# Patient Record
Sex: FEMALE | Race: WHITE | Employment: OTHER | URBAN - METROPOLITAN AREA
[De-identification: names, ages, dates, MRNs, and addresses within clinical notes are randomized per-mention and may not be internally consistent; named-entity substitution may affect disease eponyms.]

---

## 2017-01-12 ENCOUNTER — OFFICE VISIT (OUTPATIENT)
Dept: CARDIOLOGY CLINIC | Age: 67
End: 2017-01-12

## 2017-01-12 DIAGNOSIS — Z95.810 PRESENCE OF AUTOMATIC CARDIOVERTER/DEFIBRILLATOR (AICD): Primary | ICD-10-CM

## 2017-01-25 ENCOUNTER — OFFICE VISIT (OUTPATIENT)
Dept: CARDIOLOGY CLINIC | Age: 67
End: 2017-01-25

## 2017-01-25 VITALS
RESPIRATION RATE: 18 BRPM | HEIGHT: 65 IN | SYSTOLIC BLOOD PRESSURE: 140 MMHG | DIASTOLIC BLOOD PRESSURE: 94 MMHG | BODY MASS INDEX: 41.72 KG/M2 | HEART RATE: 77 BPM | WEIGHT: 250.4 LBS | OXYGEN SATURATION: 95 %

## 2017-01-25 DIAGNOSIS — I50.22 CHRONIC SYSTOLIC CONGESTIVE HEART FAILURE (HCC): Primary | ICD-10-CM

## 2017-01-25 DIAGNOSIS — I25.10 CORONARY ARTERY DISEASE INVOLVING NATIVE HEART WITHOUT ANGINA PECTORIS, UNSPECIFIED VESSEL OR LESION TYPE: ICD-10-CM

## 2017-01-25 DIAGNOSIS — E87.6 HYPOKALEMIA: ICD-10-CM

## 2017-01-25 DIAGNOSIS — I34.0 MITRAL VALVE INSUFFICIENCY, UNSPECIFIED ETIOLOGY: ICD-10-CM

## 2017-01-25 DIAGNOSIS — I10 ESSENTIAL HYPERTENSION: ICD-10-CM

## 2017-01-25 DIAGNOSIS — I34.0 NON-RHEUMATIC MITRAL REGURGITATION: ICD-10-CM

## 2017-01-25 DIAGNOSIS — E78.5 HYPERLIPIDEMIA, UNSPECIFIED HYPERLIPIDEMIA TYPE: ICD-10-CM

## 2017-01-25 NOTE — PATIENT INSTRUCTIONS
Decrease your Toprol XL dose to 12.5mg (1/2 of 25mg tablet) once daily  Decrease your Entresto dose to 24/26 one tablet twice daily  Please check your blood pressure twice daily (at least one hour after your morning blood pressure medications.)  Keep a written record of your blood pressure readings and call the office in 2 weeks with your readings.   Please have labs drawn prior to your appointment with Dr. Patel Ann in March 2017

## 2017-01-25 NOTE — PROGRESS NOTES
[facility]  [phone]    HPI: Cintia Lane, a 61 y.o. who presents for follow up regarding her CAD and CHF. She is feeling well, losing weight, has lost 9 lbs since her last visit  Having some hypotension and dizziness at home, BP ok today but several of her readings are low at home (SBP 90mmHg) accompanied with dizziness  She admits to some confusion about her medications - currently taking Toprol XL 12.5mg BID and Entrestro 24/26 1/2 tab BID  No falls or syncope but more dizziness than usual  No increase in dyspnea, denies PND  No chest pain or palpitations  Last revision for AICD in November 2016 with Dr. Birt Saint, doing well now  No LE edema, taking Bumex 2 BID, only takes Metolazone PRN (maybe once every other week)  She is exercising 3 days/week, walking, riding stationary bike, doing ellipitcal    Sleeping better on 10mg ambien, will not [facility] for now    Assessment/Plan:  1. Chronic systolic heart failure - LVEF 35% by TTE in 9/16, s/p AICD placement with Dr. Birt Saint and subsequent lead revisions and repeat procedures due to non-healing midsternal incision  -due to dizziness and hypotension will reduce Entresto to 24/26 BID and Toprol XL 12.5mg to once daily, will reassess at follow up visit in 2 months  -continue Spironolactone, Bumex 2mg BID, Metolazone PRN   -will check BMP and magnesium level prior to her next visit   -c/o hair loss with Coreg and Toprol XL but willing to continue Toprol XL for now    2. CAD - hx of MI x 2 and multiple stents, she believes she may have 6 or 7 stents, recent cardiac cath did not show progression of CAD, continue ASA and beta blocker, unable to tolerate pravastatin, simvastatin, atorvastatin, see lipid therapy plan below    -reports having an MI in 11/2011 and received PCI to RCA at that time, previous MI in 2006 or 2007  3. Mitral regurgitation - mod MR by TTE in 9/16  4.  Asthma - x 15 - 16 years, has not seen pulmonologist in years, did not need her Albuterol PRN until Coreg dose increased in 2015, now using it 2-3 times/week  5. HTN - continue current regimen  6. Dyslipidemia -  in 2/16, zocor caused muscle spasms and cramps, lipitor caused pain shooting all over her body, could tolerate Pravastatin 40mg daily due to leg cramps but LDL 97 on pravastatin 40mg daily, tried Livalo after her last visit but could not tolerate it due to myalgias, now on just [employer]  -will discuss injectable lipid medication at her next visit   7. DM Type 2 - resolved with gastric bypass, not currently on any oral agents, last Hgb A1C 5.8%  8. Hypokalemia - on Spironolactone 25mg daily and KCL 40meq daily, will check BMP prior to her next visit   9. Hypomagnesemia - takes OTC magnesium 250mg daily, will check Mg level prior to her next visit  10. Morbid obesity - Body mass index is 41.67 kg/(m^2). ). weighed 416 lbs at her heaviest weight, s/p gastric bypass in 2007 with revision a few years later, working on weight loss and exercise, weight down 9 lbs today  11. PUD - had EGD and cauterized bleeding ulcer, not on PPI anymore  12. Vitamin D deficiency - on supplementation, Vitamin D level 34.2  13. Anxiety - on Prozac, could not sleep on Clonazepam, able to sleep better on ambien 10mg at bedtime      TTE 9/16 - LVEF 35%, moderate hypokinesis of the basal-mid inferolateral wall(s), grd 1 dd, dilated LA, mod MR, mild TR  8/26/16 - RV lead revision by Dr. Brittney Cope  8/24/16 - single chamber AICD placed by Dr. Brittney Cope ([address] [place] [address], serial # [id number], model # [id number].  The ventricular lead: model # [id number] , serial # [id number])  MUGA 6/16 - LVEF 27%  Holter 6/16 - no arrhythmias  Echo 5/16 - LV mildly dilated, LVEF 40%, moderate diffuse hypokinesis with regional variations, severe hypokinesis of the basal-mid inferior wall(s), grd 1 dd, mod dilated LA, atrial septum bows from left to right, consistent with increased left atrial pressure, mildly dilated RA, mild to near moderate MR    OLIVER  - normal  Nuc 5/15 EF 31% large anterolateral infarct with periinfarct reversibility, 13% LV reversible(32% infarct at rest, 45% at stress)    Echo 2015 - LVEF 30-35%, grd 1 dd, mod LAE, mild to mod MR  Nuc Stress  - small reversibility in anteroapical wall, LVEF 31%  Cardiac Cath 3/13 - patent stents in LAD, LCx and RCA, LVEF 30%, severe inferior HK, LCx relatively small vessel with patent stent in proximal LCx, RCA is large and dominant with patent stents in proximal and mid RCA, distal RCA free of disease, LAD normal and large vessel which coursed around apex  Echo 2011 - LVEF 30%, mild MR  Cardiac Cath 2011 - s/p PCI with AMRIT to 100% mid RCA, also had 40% mid LAD lesion, 50% diagonal 1 lesion, 100% distal LCx lesion, 60% OM1 lesion, 100% proximal Ramus lesion      Soc Hx: quit tobacco use x 13 years ago, used to smoke 1ppd, no etoh use, no drug use, lives with , son, daughter in law, grandson, retired/on disability  Fam Hx: father in his 62s when he had a MI, had 3 vessel CABG in his 62s, passed from fall but had cancer too, mother lived to age 80 and  from Alzheimer's, brother had MI in his 62s, sister had aortic repair for aneurysm in her 76s    She  has a past medical history of Asthma; Cardiomyopathy ([facility] Coronary artery disease (); Depression with anxiety; DVT (deep venous thrombosis) ([facility] (2010); Family history of early CAD; GERD (gastroesophageal reflux disease); H/O gastric bypass (); Hepatitis C antibody test positive; HTN (hypertension) (2010); Hyperlipidemia (2010); Joint pain (2010); MI (myocardial infarction) ([facility] (2010); Mitral valve regurgitation; Morbid obesity ([facility] (2010); Myocardial infarction [facility]) ( and ); PUD (peptic ulcer disease); and Urinary incontinence, stress (2010). She also has no past medical history of Diabetes ([facility]     Cardiovascular ROS: positive for dyspnea on exertion   Respiratory ROS: no cough, wheezing  Neurological ROS: no TIA or stroke symptoms  All other systems negative except as above. PE  Vitals:    01/25/17 1306   BP: (!) 140/94   Pulse: 77   Resp: 18   SpO2: 95%   Weight: 250 lb 6.4 oz (113.6 kg)   Height: 5' 5\" (1.651 m)    Body mass index is 41.67 kg/(m^2).   General appearance - alert, well appearing, and in no distress  Mental status - affect appropriate to mood  Eyes - sclera anicteric, moist mucous membranes  Neck - supple  Lymphatics - not assessed  Chest - clear to auscultation, no wheezes, rales or rhonchi  Heart - normal rate, regular rhythm, normal S1, S2, 2/6 MYKE   Abdomen - soft, nontender, nondistended, obese  Back exam - full range of motion, no tenderness  Neurological - cranial nerves II through XII grossly intact, no focal deficit  Musculoskeletal - no muscular tenderness noted, normal strength  Extremities - diminished peripheral pulses, no LE edema  Skin - normal coloration  no rashes    Recent Labs:  Lab Results   Component Value Date/Time    Cholesterol, total 177 08/02/2016 12:51 PM    HDL Cholesterol 63 08/02/2016 12:51 PM    LDL, calculated 97 08/02/2016 12:51 PM    Triglyceride 87 08/02/2016 12:51 PM     Lab Results   Component Value Date/Time    Creatinine 0.89 11/23/2016 10:59 AM     Lab Results   Component Value Date/Time    BUN 22 11/23/2016 10:59 AM    BUN (POC) 24 11/26/2011 09:50 PM     Lab Results   Component Value Date/Time    Potassium 3.8 11/23/2016 10:59 AM     Lab Results   Component Value Date/Time    Hemoglobin A1c 5.8 02/09/2016 11:44 AM    Hemoglobin A1c, External 5.5 12/12/2014     Lab Results   Component Value Date/Time    HGB 14.0 11/23/2016 10:59 AM     Lab Results   Component Value Date/Time    PLATELET 829 29/76/4942 10:59 AM       Reviewed:  Past Medical History   Diagnosis Date    Asthma     Cardiomy[facility] ([facility]     Coronary artery disease 2008     s/p RCA stent (AMRIT) on 11/26/11    Depression with anxiety      2011    DVT (deep venous thrombosis) ([facility] 7/27/2010    Family history of early CAD     GERD (gastroesophageal reflux disease)     H/O gastric bypass 2006     Revision in 2009    Hepatitis C antibody test positive      Does not have chronic hep C (labs 10/6/15: neg HCV RNA)     HTN (hypertension) 7/27/2010    Hyperlipidemia 07/27/2010    Joint pain 7/27/2010    MI (myocardial infarction) ([facility] 7/27/2010    Mitral valve regurgitation      Mild to moderate    Morbid obesity ([facility] 7/27/2010    Myocardial infarction [facility]) 2006 and 2011     Dr. Martine Georges    PUD (peptic ulcer disease)      gi bleed 2008; ulcer n gastric bypass pouch    Urinary incontinence, stress 7/27/2010     History   Smoking Status    Former Smoker    Start date: 1/1/1970    Quit date: 5/17/2004   Smokeless Tobacco    Never Used     History   Alcohol Use No     Allergies   Allergen Reactions    Amoxicillin Anaphylaxis    Amoxicillin Anaphylaxis    Lipitor [Atorvastatin] Other (comments)     Severe muscle pain and spasms    Zocor [Simvastatin] Other (comments)     Cramps, muscle spasms    Livalo [Pitavastatin] Myalgia    Pravastatin Other (comments)     Leg cramps       Current Outpatient Prescriptions   Medication Sig    FLUoxetine (PROZAC) 20 mg tablet TAKE 2 TABLETS EVERY MORNING AND 1 AT BEDTIME FOR ANXIETY WITH DEPRESSION    metoprolol succinate (TOPROL-XL) 25 mg XL tablet Take 0.5 Tabs by mouth daily. (Patient taking differently: Take 12.5 mg by mouth two (2) times a day.)    LACTOBACILLUS ACIDOPHILUS (PROBIOTIC PO) Take  by mouth.  spironolactone (ALDACTONE) 25 mg tablet Take 1 Tab by mouth daily.  potassium chloride (KLOR-CON M20) 20 mEq tablet TAKE TWO TABLETS BY MOUTH DAILY    zolpidem (AMBIEN) 10 mg tablet Take 1 Tab by mouth nightly as needed for Sleep.  Max Daily Amount: 10 mg.    sacubitril-valsartan (ENTRESTO) 49 mg/51 mg tablet Take 1 Tab by mouth two (2) times a day. (Patient taking differently: Take 0.5 Tabs by mouth two (2) times a day.)    albuterol (PROVENTIL HFA, VENTOLIN HFA, PROAIR HFA) 90 mcg/actuation inhaler Take 2 Puffs by inhalation every four (4) hours as needed for Wheezing or Shortness of Breath.  aspirin delayed-release 81 mg tablet Take  by mouth daily.  vitamin A 8,000 unit capsule Take 8,000 Units by mouth daily.  biotin 10,000 mcg cap Take  by mouth daily.  OTHER Complete multi formula, bariatric advantage    magnesium 250 mg tab Take 1 Tab by mouth daily.  bumetanide (BUMEX) 2 mg tablet Take 2 mg by mouth two (2) times a day. Patient takes Bumex in relation to what her B/P is. Indications: EDEMA    ferrous sulfate (IRON, FERROUS SULFATE,) 325 mg (65 mg Iron) tablet Take  by mouth daily (before breakfast).  cholecalciferol, vitamin d3, (VITAMIN D) 1,000 unit tablet Take 10,000 Units by mouth daily. 10,000 units \"dry vitamin e \"    CALCIUM PO Take 1 Tab by mouth daily. No current facility-administered medications for this visit.         Eli Salazar NP  [facility] [address] [facility]  [facility]  [phone]

## 2017-01-25 NOTE — MR AVS SNAPSHOT
Visit Information Date & Time Provider Department Dept. Phone Encounter #  
 1/25/2017  1:00 PM Daysi Castro, [address] [phone] [id number] Your Appointments 2/23/2017 10:20 AM  
ESTABLISHED PATIENT with Clinton De Jesus MD  
[facility] ([address]) Appt Note: bsc sicd, chron visit, new Lat, see Carrion $35CP valadez 10/6/16; bsc sicd, chron visit, new Lat, see Carrion $35CP valadez 10/6/16 pt r/s from 1/12  
 [address] [facility] [address]  
[address] [address]  
  
    
 3/16/2017  9:40 AM  
ESTABLISHED PATIENT with Carrol Whitmore MD  
[facility] ([address]) Appt Note: follow up  
 [address] [place]  
[address] [address]  
  
    
 4/7/2017  1:00 PM  
ROUTINE CARE with Krystal Arias MD  
[facility] [address]) Appt Note: 4mth f/u;  
 [address] [facility] [address] [phone]  
  
   
 8 [facility] [address] [place] [address] 4/17/2017  9:30 AM  
REMOTE OFFICE VISIT with Alejandrina Montaño [facility] ([facility] SCHEDULING) Appt Note: LAT sub Q /rc  b 1-12-17   (added 1-24-17)  remote came, no schedule [address] [facility] 7 [address]  
[address] [address]  
  
    
 7/19/2017  8:15 AM  
REMOTE OFFICE VISIT with Alejandrina Montaño [facility] ([facility] SCHEDULING) Appt Note: LAT SubQ ICD/rc b  
 [address] [place]  
[phone] Upcoming Health Maintenance Date Due  
 MEDICARE YEARLY EXAM 6/15/2017 BREAST CANCER SCRN MAMMOGRAM 2/1/2018 PAP AKA CERVICAL CYTOLOGY 7/26/2019 COLONOSCOPY 9/5/2019 DTaP/Tdap/Td series (2 - Td) 10/30/2024 Allergies as of 1/25/2017  Review Complete On: 12/16/2016 By: Irma Ng MD  
  
 Severity Noted Reaction Type Reactions Amoxicillin High 07/27/2010   Side Effect Anaphylaxis Amoxicillin High 06/30/2015    Anaphylaxis Lipitor [Atorvastatin] High 06/30/2015    Other (comments) Severe muscle pain and spasms Zocor [Simvastatin] Medium 06/30/2015    Other (comments) Cramps, muscle spasms Livalo [Pitavastatin]  01/25/2017    Myalgia Pravastatin  08/19/2016    Other (comments) Leg cramps Current Immunizations  Reviewed on 7/11/2016 Name Date Influenza Vaccine (Quad) PF 9/21/2016  6:19 PM, 10/6/2015 Influenza Vaccine Split 11/28/2011 11:25 AM  
 Pneumococcal Conjugate (PCV-13) 6/14/2016 Pneumococcal Vaccine (Unspecified Type) 8/20/2011 Tdap 10/30/2014 Zoster Vaccine, Live 2/26/2016 Not reviewed this visit You Were Diagnosed With   
  
 Codes Comments Chronic systolic congestive heart failure (HCC)    -  Primary ICD-10-CM: O53.15 ICD-9-CM: 428.22, 428.0 Hyperlipidemia, unspecified hyperlipidemia type     ICD-10-CM: E78.5 ICD-9-CM: 272.4 Mitral valve insufficiency, unspecified etiology     ICD-10-CM: I34.0 ICD-9-CM: 424.0 Essential hypertension     ICD-10-CM: I10 
ICD-9-CM: 401.9 Coronary artery disease involving native heart without angina pectoris, unspecified vessel or lesion type     ICD-10-CM: I25.10 ICD-9-CM: 414.01 Non-rheumatic mitral regurgitation     ICD-10-CM: I34.0 ICD-9-CM: 424.0 Hypokalemia     ICD-10-CM: E87.6 ICD-9-CM: 276.8 Vitals BP Pulse Resp Height(growth percentile) Weight(growth percentile) SpO2  
 (!) 140/94 (BP 1 Location: Left arm, BP Patient Position: Sitting) 77 18 5' 5\" (1.651 m) 250 lb 6.4 oz (113.6 kg) 95% BMI OB Status Smoking Status 41.67 kg/m2 Postmenopausal Former Smoker BMI and BSA Data Body Mass Index Body Surface Area  
 41.67 kg/m 2 2.28 m 2 Preferred Pharmacy Pharmacy Name Phone [facility]/PHARMACY #[id number] [facility] [place] [phone] Your Updated Medication List  
  
   
This list is accurate as of: 1/25/17  1:39 PM.  Always use your most recent med list.  
  
  
  
  
 albuterol 90 mcg/actuation inhaler Commonly known as:  PROVENTIL HFA, VENTOLIN HFA, PROAIR HFA Take 2 Puffs by inhalation every four (4) hours as needed for Wheezing or Shortness of Breath. aspirin delayed-release 81 mg tablet Take  by mouth daily. biotin 10,000 mcg Cap Take  by mouth daily. bumetanide 2 mg tablet Commonly known as:  Zhane Campbell Take 2 mg by mouth two (2) times a day. Patient takes Bumex in relation to what her B/P is. Indications: EDEMA CALCIUM PO Take 1 Tab by mouth daily. FLUoxetine 20 mg tablet Commonly known as:  PROzac TAKE 2 TABLETS EVERY MORNING AND 1 AT BEDTIME FOR ANXIETY WITH DEPRESSION Iron (Ferrous Sulfate) 325 mg (65 mg iron) tablet Generic drug:  ferrous sulfate Take  by mouth daily (before breakfast). magnesium 250 mg Tab Take 1 Tab by mouth daily. metoprolol succinate 25 mg XL tablet Commonly known as:  TOPROL-XL Take 0.5 Tabs by mouth daily. OTHER Complete multi formula, bariatric advantage  
  
 potassium chloride 20 mEq tablet Commonly known as:  KLOR-CON M20  
TAKE TWO TABLETS BY MOUTH DAILY PROBIOTIC PO Take  by mouth. * sacubitril-valsartan 24-26 mg tablet Commonly known as:  ENTRESTO Take 1 Tab by mouth two (2) times a day. * sacubitril-valsartan 24-26 mg tablet Commonly known as:  ENTRESTO Take 1 Tab by mouth two (2) times a day. spironolactone 25 mg tablet Commonly known as:  ALDACTONE Take 1 Tab by mouth daily. vitamin A 8,000 unit capsule Take 8,000 Units by mouth daily. VITAMIN D3 1,000 unit tablet Generic drug:  cholecalciferol Take 10,000 Units by mouth daily. 10,000 units \"dry vitamin e \"  
  
 zolpidem 10 mg tablet Commonly known as:  AMBIEN Take 1 Tab by mouth nightly as needed for Sleep. Max Daily Amount: 10 mg.  
  
 * Notice: This list has 2 medication(s) that are the same as other medications prescribed for you. Read the directions carefully, and ask your doctor or other care provider to review them with you. Prescriptions Sent to Pharmacy Refills  
 sacubitril-valsartan (ENTRESTO) 24 mg/26 mg tablet 3 Sig: Take 1 Tab by mouth two (2) times a day. Class: Normal  
 Pharmacy: [facility]/pharmacy [address] [facility], [address] [place] [address] #: [phone] Route: Oral  
  
We Performed the Following AMB POC EKG ROUTINE W/ 12 LEADS, INTER & REP [67468 CPT(R)] MAGNESIUM [id number] CPT(R)] METABOLIC PANEL, BASIC [89838 CPT(R)] Patient Instructions Decrease your Toprol XL dose to 12.5mg (1/2 of 25mg tablet) once daily Decrease your Entresto dose to 24/26 one tablet twice daily Please check your blood pressure twice daily (at least one hour after your morning blood pressure medications.)  Keep a written record of your blood pressure readings and call the office in 2 weeks with your readings. Please have labs drawn prior to your appointment with Dr. Tiesha Burgess in March 2017 Introducing [facility]! Dear Kelly Alexander: Thank you for requesting a [employer] account. Our records indicate that you already have an active [employer] account. You can access your account anytime at [email or web address] Did you know that you can access your hospital and ER discharge instructions at any time in [employer]? You can also review all of your test results from your hospital stay or ER visit. Additional Information If you have questions, please visit the Frequently Asked Questions section of the [employer] website at [email or web address] "[email or web address][email or web address] Remember, [employer] is NOT to be used for urgent needs. For medical emergencies, dial 911. Now available from your iPhone and Android! Please provide this summary of care documentation to your next provider. Your primary care clinician is listed as Jason Perez. If you have any questions after today's visit, please call [phone].

## 2017-02-01 RX ORDER — POTASSIUM CHLORIDE 20 MEQ/1
TABLET, EXTENDED RELEASE ORAL
Qty: 60 TAB | Refills: 5 | Status: SHIPPED | OUTPATIENT
Start: 2017-02-01 | End: 2017-08-06 | Stop reason: SDUPTHER

## 2017-02-23 ENCOUNTER — HOSPITAL ENCOUNTER (EMERGENCY)
Age: 67
Discharge: HOME | End: 2017-02-23
Attending: EMERGENCY MEDICINE | Admitting: EMERGENCY MEDICINE
Payer: COMMERCIAL

## 2017-02-23 ENCOUNTER — APPOINTMENT (OUTPATIENT)
Dept: GENERAL RADIOLOGY | Age: 67
End: 2017-02-23
Attending: EMERGENCY MEDICINE
Payer: COMMERCIAL

## 2017-02-23 ENCOUNTER — APPOINTMENT (OUTPATIENT)
Dept: CT IMAGING | Age: 67
End: 2017-02-23
Attending: EMERGENCY MEDICINE
Payer: COMMERCIAL

## 2017-02-23 VITALS
OXYGEN SATURATION: 97 % | RESPIRATION RATE: 17 BRPM | TEMPERATURE: 98.6 F | WEIGHT: 250 LBS | DIASTOLIC BLOOD PRESSURE: 77 MMHG | BODY MASS INDEX: 41.65 KG/M2 | HEART RATE: 74 BPM | SYSTOLIC BLOOD PRESSURE: 120 MMHG | HEIGHT: 65 IN

## 2017-02-23 DIAGNOSIS — R55 SYNCOPE AND COLLAPSE: Primary | ICD-10-CM

## 2017-02-23 LAB
ALBUMIN SERPL BCP-MCNC: 3.8 G/DL (ref 3.5–5)
ALBUMIN/GLOB SERPL: 1.1 {RATIO} (ref 1.1–2.2)
ALP SERPL-CCNC: 63 U/L (ref 45–117)
ALT SERPL-CCNC: 21 U/L (ref 12–78)
ANION GAP BLD CALC-SCNC: 9 MMOL/L (ref 5–15)
AST SERPL W P-5'-P-CCNC: 12 U/L (ref 15–37)
ATRIAL RATE: 69 BPM
BASOPHILS # BLD AUTO: 0 K/UL (ref 0–0.1)
BASOPHILS # BLD: 0 % (ref 0–1)
BILIRUB SERPL-MCNC: 0.5 MG/DL (ref 0.2–1)
BUN SERPL-MCNC: 24 MG/DL (ref 6–20)
BUN/CREAT SERPL: 19 (ref 12–20)
CALCIUM SERPL-MCNC: 8.8 MG/DL (ref 8.5–10.1)
CALCULATED P AXIS, ECG09: 33 DEGREES
CALCULATED R AXIS, ECG10: -12 DEGREES
CALCULATED T AXIS, ECG11: 84 DEGREES
CHLORIDE SERPL-SCNC: 93 MMOL/L (ref 97–108)
CK SERPL-CCNC: 85 U/L (ref 26–192)
CO2 SERPL-SCNC: 34 MMOL/L (ref 21–32)
CREAT SERPL-MCNC: 1.24 MG/DL (ref 0.55–1.02)
DIAGNOSIS, 93000: NORMAL
EOSINOPHIL # BLD: 0.1 K/UL (ref 0–0.4)
EOSINOPHIL NFR BLD: 1 % (ref 0–7)
ERYTHROCYTE [DISTWIDTH] IN BLOOD BY AUTOMATED COUNT: 13.7 % (ref 11.5–14.5)
GLOBULIN SER CALC-MCNC: 3.4 G/DL (ref 2–4)
GLUCOSE SERPL-MCNC: 115 MG/DL (ref 65–100)
HCT VFR BLD AUTO: 42.7 % (ref 35–47)
HGB BLD-MCNC: 14.7 G/DL (ref 11.5–16)
LYMPHOCYTES # BLD AUTO: 26 % (ref 12–49)
LYMPHOCYTES # BLD: 2.1 K/UL (ref 0.8–3.5)
MCH RBC QN AUTO: 29.5 PG (ref 26–34)
MCHC RBC AUTO-ENTMCNC: 34.4 G/DL (ref 30–36.5)
MCV RBC AUTO: 85.7 FL (ref 80–99)
MONOCYTES # BLD: 0.6 K/UL (ref 0–1)
MONOCYTES NFR BLD AUTO: 8 % (ref 5–13)
NEUTS SEG # BLD: 5.4 K/UL (ref 1.8–8)
NEUTS SEG NFR BLD AUTO: 65 % (ref 32–75)
P-R INTERVAL, ECG05: 174 MS
PLATELET # BLD AUTO: 226 K/UL (ref 150–400)
POTASSIUM SERPL-SCNC: 3 MMOL/L (ref 3.5–5.1)
PROT SERPL-MCNC: 7.2 G/DL (ref 6.4–8.2)
Q-T INTERVAL, ECG07: 446 MS
QRS DURATION, ECG06: 116 MS
QTC CALCULATION (BEZET), ECG08: 477 MS
RBC # BLD AUTO: 4.98 M/UL (ref 3.8–5.2)
SODIUM SERPL-SCNC: 136 MMOL/L (ref 136–145)
TROPONIN I SERPL-MCNC: <0.04 NG/ML
VENTRICULAR RATE, ECG03: 69 BPM
WBC # BLD AUTO: 8.3 K/UL (ref 3.6–11)

## 2017-02-23 PROCEDURE — 84484 ASSAY OF TROPONIN QUANT: CPT | Performed by: EMERGENCY MEDICINE

## 2017-02-23 PROCEDURE — 74011250637 HC RX REV CODE- 250/637: Performed by: EMERGENCY MEDICINE

## 2017-02-23 PROCEDURE — 99285 EMERGENCY DEPT VISIT HI MDM: CPT

## 2017-02-23 PROCEDURE — 36415 COLL VENOUS BLD VENIPUNCTURE: CPT | Performed by: EMERGENCY MEDICINE

## 2017-02-23 PROCEDURE — 93971 EXTREMITY STUDY: CPT

## 2017-02-23 PROCEDURE — 70450 CT HEAD/BRAIN W/O DYE: CPT

## 2017-02-23 PROCEDURE — 72125 CT NECK SPINE W/O DYE: CPT

## 2017-02-23 PROCEDURE — 71020 XR CHEST PA LAT: CPT

## 2017-02-23 PROCEDURE — 82550 ASSAY OF CK (CPK): CPT | Performed by: EMERGENCY MEDICINE

## 2017-02-23 PROCEDURE — 80053 COMPREHEN METABOLIC PANEL: CPT | Performed by: EMERGENCY MEDICINE

## 2017-02-23 PROCEDURE — 93005 ELECTROCARDIOGRAM TRACING: CPT

## 2017-02-23 PROCEDURE — 85025 COMPLETE CBC W/AUTO DIFF WBC: CPT | Performed by: EMERGENCY MEDICINE

## 2017-02-23 RX ORDER — POTASSIUM CHLORIDE 750 MG/1
40 TABLET, FILM COATED, EXTENDED RELEASE ORAL
Status: COMPLETED | OUTPATIENT
Start: 2017-02-23 | End: 2017-02-23

## 2017-02-23 RX ADMIN — POTASSIUM CHLORIDE 40 MEQ: 750 TABLET, FILM COATED, EXTENDED RELEASE ORAL at 09:43

## 2017-02-23 NOTE — PROCEDURES
[address]  *** FINAL REPORT ***    Name: Yohannes Smith  MRN: [id number]  : 17 May 1950  HIS Order #: [id number]  [id number] [facility] Visit #: [id number]  Date: 2017    TYPE OF TEST: Peripheral Venous Testing    REASON FOR TEST  Pain in limb    Right Leg:-  Deep venous thrombosis:           No  Superficial venous thrombosis:    No  Deep venous insufficiency:        Not examined  Superficial venous insufficiency: Not examined      INTERPRETATION/FINDINGS  PROCEDURE:  Color duplex ultrasound imaging of lower extremity veins. FINDINGS:       Right: The common femoral, deep femoral, femoral, popliteal,  posterior tibial, peroneal, and great saphenous are patent and without   evidence of thrombus where visualized; each is is compressible and  there is no narrowing of the flow channel on color Doppler imaging. Phasic flow is observed in the common femoral vein. Left:   The common femoral vein is patent and without evidence of   thrombus. Phasic flow is observed. This extremity was not otherwise   evaluated. IMPRESSION:  No evidence of right lower extremity vein thrombosis. ADDITIONAL COMMENTS    I have personally reviewed the data relevant to the interpretation of  this  study. TECHNOLOGIST: Jessie An.  Lorraine Deras  Signed: 2017 09:04 AM    PHYSICIAN: Silvia Queen MD  Signed: 2017 07:46 AM

## 2017-02-23 NOTE — ED NOTES
Pt tolerating ginger ale well. Discharge instructions given to pt. All questions answered and pt verbalized understanding. V/S stable @ time of discharge. Pt reports pain 0/10. Pt ambulatory out of unit.

## 2017-02-23 NOTE — DISCHARGE INSTRUCTIONS

## 2017-02-23 NOTE — ED TRIAGE NOTES
Triage note: Pt arrives via EMS with syncopal episode +LOC this morning while in the kitchen, found by . Pt has extensive cardiac hx. Goose egg to back of head. En route pt had 4mg zofran and 125mg fentanyl. .

## 2017-02-23 NOTE — PROGRESS NOTES
Dr Virginia Shen in ER called me about her syncope  [employer] scientific rep check S ICD and there was no VT  BP is mildly low but otherwise he would like to have Dr Rachell Alex follow up in office

## 2017-02-23 NOTE — ED PROVIDER NOTES
Patient is a 61 y.o. female presenting with syncope. Syncope           58y F with hx of CAD, DVT, HTN, cardiomyopathy here with syncope. Was up in the kitchen this morning getting a cup for her grandson, and the next thing she knew, she was on the floor. She doesn't recall how she got there. Did hit the back of her head and has a \"goose egg\" to the back of the head. Has had a hx of syncope. She says that she has an AICD but that it did not fire today that she is aware. No recent illnesses. No vomiting or diarrhea. No chest pain. No trouble breathing. Has chronic RLE calf pain that seems worse over the past few days. No shortness of breath. No fever. No cough.     Past Medical History:   Diagnosis Date    Asthma     Cardiomyopathy ([address]     Coronary artery disease 2008    s/p RCA stent (AMRIT) on 11/26/11    Depression with anxiety     2011    DVT (deep venous thrombosis) ([facility] 7/27/2010    Family history of early CAD     GERD (gastroesophageal reflux disease)     H/O gastric bypass 2006    Revision in 2009    Hepatitis C antibody test positive     Does not have chronic hep C (labs 10/6/15: neg HCV RNA)     HTN (hypertension) 7/27/2010    Hyperlipidemia 07/27/2010    Joint pain 7/27/2010    MI (myocardial infarction) ([facility] 7/27/2010    Mitral valve regurgitation     Mild to moderate    Morbid obesity ([facility] 7/27/2010    Myocardial infarction [facility]) 2006 and 2011    Dr. Dina Stapleton    PUD (peptic ulcer disease)     gi bleed 2008; ulcer n gastric bypass pouch    Urinary incontinence, stress 7/27/2010       Past Surgical History:   Procedure Laterality Date    HX COLONOSCOPY  9/5/14    Dr. Ed Baez; normal, repeat in 5 yrs    HX IMPLANTABLE CARDIOVERTER DEFIBRILLATOR  08/24/2016    HX LAP GASTRIC BYPASS  2006    revision 2009/Dr. Kvng Johnson    HX OTHER SURGICAL  09/19/2016    Removal of right ventricular ICD lead & single chamber transvenous AICD    HX OTHER SURGICAL  10/12/2016    pocket revison of ICD; Dr. Elton Hung    INS PPM/ICD LED SING ONLY  8/24/2016         INS PPM/ICD LED SING ONLY  8/26/2016         INS PPM/ICD LED SING ONLY  9/21/2016         [place] LAP,STOMACH,OTHER,W/O TUBE  04/05/2010    Revision GBP         Family History:   Problem Relation Age of Onset    Dementia Mother     Cancer Mother      colon    Alzheimer Mother     Cancer Father      stomach    Heart Disease Father     Hypertension Father     Heart Disease Sister     Stroke Sister     Heart Attack Brother        Social History     Social History    Marital status:      Spouse name: N/A    Number of children: N/A    Years of education: N/A     Occupational History    Not on file. Social History Main Topics    Smoking status: Former Smoker     Start date: 1/1/1970     Quit date: 5/17/2004    Smokeless tobacco: Never Used    Alcohol use No    Drug use: No    Sexual activity: No     Other Topics Concern    Not on file     Social History Narrative    ** Merged History Encounter **              ALLERGIES: Amoxicillin; Amoxicillin; Lipitor [atorvastatin]; Zocor [simvastatin]; Livalo [pitavastatin]; and Pravastatin    Review of Systems   Cardiovascular: Positive for syncope. Review of Systems   Constitutional: (-) weight loss. HEENT: (-) stiff neck   Eyes: (-) discharge. Respiratory: (-) for cough. Cardiovascular: (+) syncope. Gastrointestinal: (-) blood in stool. Genitourinary: (-) hematuria. Musculoskeletal: (-) myalgias. Neurological: (-) seizure. Skin: (-) petechiae  Lymph/Immunologic: (-) enlarged lymph nodes  All other systems reviewed and are negative. Vitals:    02/23/17 0724 02/23/17 0741   BP: 108/70    Pulse: 65    Resp: 16    Temp: 98.1 °F (36.7 °C)    SpO2: 92% 94%   Weight: 113.4 kg (250 lb)    Height: 5' 5\" (1.651 m)             Physical Exam Nursing note and vitals reviewed. Constitutional: oriented to person, place, and time. appears obese and deconditioned.  No distress. Head: Normocephalic. 1cm hematoma to the back of the head at the occiput. Sclera anicteric  Nose: No rhinorrhea  Mouth/Throat: Oropharynx is clear and moist. Pharynx normal  Eyes: Conjunctivae are normal. Pupils are equal, round, and reactive to light. Right eye exhibits no discharge. Left eye exhibits no discharge. Neck: Painless normal range of motion. Neck supple. No LAD. Cardiovascular: Normal rate, regular rhythm, normal heart sounds and intact distal pulses. Exam reveals no gallop and no friction rub. No murmur heard. Pulmonary/Chest:  No respiratory distress. No wheezes. No rales. No rhonchi. No increased work of breathing. No accessory muscle use. Good air exchange throughout. Abdominal: soft, non-tender, no rebound or guarding. No hepatosplenomegaly. Normal bowel sounds throughout. Back: no tenderness to palpation, no deformities, no CVA tenderness  Extremities/Musculoskeletal: Normal range of motion. no tenderness. No edema. Distal extremities are neurovasc intact. Lymphadenopathy:   No adenopathy. Neurological:  Alert and oriented to person, place, and time. Coordination normal. CN 2-12 intact. Motor and sensory function intact. Skin: Skin is warm and dry. No rash noted. No pallor. MDM 58y F here with syncope. Hx of same. Has a pacer. Will check labs and interrogate the pacemaker. Will also check CT head/neck given fall. ED Course       Procedures    ED EKG interpretation:  Rhythm: normal sinus rhythm; and regular . Rate (approx.): 69; Axis: normal; P wave: normal; QRS interval: normal ; ST/T wave: normal;  This EKG was interpreted by Florencia Graf MD,ED Provider.

## 2017-02-24 ENCOUNTER — TELEPHONE (OUTPATIENT)
Dept: CARDIOLOGY CLINIC | Age: 67
End: 2017-02-24

## 2017-02-24 DIAGNOSIS — I50.22 SYSTOLIC CHF, CHRONIC (HCC): Primary | ICD-10-CM

## 2017-02-24 NOTE — LETTER
3/2/2017 3:10 PM 
 
Ms[address] [facility] [id number] Dear Anthony Carr: Please find your most recent results below. Resulted Orders METABOLIC PANEL, BASIC Result Value Ref Range Glucose 97 65 - 99 mg/dL BUN 14 8 - 27 mg/dL Creatinine 0.95 0.57 - 1.00 mg/dL GFR est non-AA 63 >59 mL/min/1.73 GFR est AA 73 >59 mL/min/1.73  
 BUN/Creatinine ratio 15 11 - 26 Sodium 141 134 - 144 mmol/L Potassium 5.2 3.5 - 5.2 mmol/L Chloride 102 96 - 106 mmol/L  
 CO2 22 18 - 29 mmol/L Calcium 8.9 8.7 - 10.3 mg/dL Narrative Performed at:  [address]  [id number] : Jennifer Whitmore MD, Phone:  [phone] MAGNESIUM Result Value Ref Range Magnesium 2.2 1.6 - 2.3 mg/dL Narrative Performed at:  [address]  [id number] : Jennifer Whitmore MD, Phone:  [phone] RECOMMENDATIONS: 
None. Keep up the good work! Please call me if you have any questions: [phone] Sincerely, Aminta Desai MD

## 2017-02-24 NOTE — TELEPHONE ENCOUNTER
Pt called and stated she needs a follow up from her hospital stay with Dr Helen Wiley or [facility] Plaza for syncope. She is scheduled with Dr. Helen Wiley on 3/16 but requested a sooner appt. Please return her call to [phone]. Thanks!

## 2017-02-24 NOTE — TELEPHONE ENCOUNTER
Patient identified with 2 identifiers  Returned patient phone call, she states she went to ED and they told her that she had syncope because her blood pressure was too low, so she has not taken anything since yesterday. I spoke with Teresa Regalado, who advised that patient hold bumex and spironolactone, take kcl 20 meq Bid for 2 days, take blood pressures twice per day for 2 days, and have BMP and magnesium level drawn on Monday. I instructed patient and she states understanding.

## 2017-02-24 NOTE — TELEPHONE ENCOUNTER
Patient has questions concerning her medications after her recent ED visit.  Please call her @ [phone]

## 2017-02-28 ENCOUNTER — OFFICE VISIT (OUTPATIENT)
Dept: INTERNAL MEDICINE CLINIC | Age: 67
End: 2017-02-28

## 2017-02-28 VITALS
TEMPERATURE: 98.7 F | WEIGHT: 250 LBS | RESPIRATION RATE: 16 BRPM | BODY MASS INDEX: 41.65 KG/M2 | HEART RATE: 73 BPM | SYSTOLIC BLOOD PRESSURE: 110 MMHG | OXYGEN SATURATION: 96 % | DIASTOLIC BLOOD PRESSURE: 69 MMHG | HEIGHT: 65 IN
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
## 2017-02-28 DIAGNOSIS — I10 ESSENTIAL HYPERTENSION: ICD-10-CM

## 2017-02-28 DIAGNOSIS — M79.604 RIGHT LEG PAIN: Primary | ICD-10-CM

## 2017-02-28 DIAGNOSIS — M17.11 PRIMARY OSTEOARTHRITIS OF RIGHT KNEE: Primary | ICD-10-CM

## 2017-02-28 DIAGNOSIS — I50.22 SYSTOLIC CHF, CHRONIC (HCC): ICD-10-CM

## 2017-02-28 DIAGNOSIS — R42 DIZZINESS: ICD-10-CM

## 2017-02-28 DIAGNOSIS — M25.561 ACUTE PAIN OF RIGHT KNEE: ICD-10-CM

## 2017-02-28 DIAGNOSIS — Z95.810 ICD (IMPLANTABLE CARDIOVERTER-DEFIBRILLATOR) IN PLACE: ICD-10-CM

## 2017-02-28 RX ORDER — METHYLPREDNISOLONE 4 MG/1
TABLET ORAL
Qty: 1 DOSE PACK | Refills: 0 | Status: SHIPPED | OUTPATIENT
Start: 2017-02-28 | End: 2017-03-16 | Stop reason: ALTCHOICE

## 2017-02-28 RX ORDER — IBUPROFEN 200 MG
TABLET ORAL
COMMUNITY
End: 2018-01-23

## 2017-02-28 NOTE — PATIENT INSTRUCTIONS
Leg Pain: Care Instructions  Your Care Instructions  Many things can cause leg pain. Too much exercise or overuse can cause a muscle cramp (or charley horse). You can get leg cramps from not eating a balanced diet that has enough potassium, calcium, and other minerals. If you do not drink enough fluids or are taking certain medicines, you may develop leg cramps. Other causes of leg pain include injuries, blood flow problems, nerve damage, and twisted and enlarged veins (varicose veins). You can usually ease pain with self-care. Your doctor may recommend that you rest your leg and keep it elevated. Follow-up care is a key part of your treatment and safety. Be sure to make and go to all appointments, and call your doctor if you are having problems. Its also a good idea to know your test results and keep a list of the medicines you take. How can you care for yourself at home? · Take pain medicines exactly as directed. ¨ If the doctor gave you a prescription medicine for pain, take it as prescribed. ¨ If you are not taking a prescription pain medicine, ask your doctor if you can take an over-the-counter medicine. · Take any other medicines exactly as prescribed. Call your doctor if you think you are having a problem with your medicine. · Rest your leg while you have pain, and avoid standing for long periods of time. · Prop up your leg at or above the level of your heart when possible. · Make sure you are eating a balanced diet that is rich in calcium, potassium, and magnesium, especially if you are pregnant. · If directed by your doctor, put ice or a cold pack on the area for 10 to 20 minutes at a time. Put a thin cloth between the ice and your skin. · Your leg may be in a splint, a brace, or an elastic bandage, and you may have crutches to help you walk. Follow your doctor's directions about how long to wear supports and how to use the crutches. When should you call for help?   Call 911 anytime you think you may need emergency care. For example, call if:  · You have sudden chest pain and shortness of breath, or you cough up blood. · Your leg is cool or pale or changes color. Call your doctor now or seek immediate medical care if:  · You have increasing or severe pain. · Your leg suddenly feels weak and you cannot move it. · You have signs of a blood clot, such as:  ¨ Pain in your calf, back of the knee, thigh, or groin. ¨ Redness and swelling in your leg or groin. · You have signs of infection, such as:  ¨ Increased pain, swelling, warmth, or redness. ¨ Red streaks leading from the sore area. ¨ Pus draining from a place on your leg. ¨ A fever. · You cannot bear weight on your leg. Watch closely for changes in your health, and be sure to contact your doctor if:  · You do not get better as expected. Where can you learn more? Go to http://jose-rm.info/. Enter G702 in the search box to learn more about \"Leg Pain: Care Instructions. \"  Current as of: May 27, 2016  Content Version: 11.1  © 7963-9852 [employer]. Care instructions adapted under license by [employer] (which disclaims liability or warranty for this information). If you have questions about a medical condition or this instruction, always ask your healthcare professional. [address] any warranty or liability for your use of this information.

## 2017-02-28 NOTE — PROGRESS NOTES
Reviewed record  In preparation for visit and have obtained necessary documentation. 1. Have you been to the ER, urgent care clinic since your last visit? Hospitalized since your last visit?seen on 2/23/17   2. Have you seen or consulted any other health care providers outside of the [facility] since your last visit? Include any pap smears or colon screening. No  Patient has been given information on advanced directives at a previous visit.

## 2017-02-28 NOTE — MR AVS SNAPSHOT
Visit Information Date & Time Provider Department Dept. Phone Encounter #  
 2/28/2017 10:30 AM MD Lisa Stubbs [phone] [id number] Follow-up Instructions Return in about 2 months (around 4/28/2017), or if symptoms worsen or fail to improve, for Dizziness, falls, HTN. Your Appointments 3/16/2017  9:40 AM  
ESTABLISHED PATIENT with Denzel Armstrong MD  
[facility] ([address]) Appt Note: follow up  
 [address]  
[address] [address]  
  
    
 4/7/2017  1:00 PM  
ROUTINE CARE with MD Lisa Stubbs [address]) Appt Note: 4mth f/u;  
 [address] [facility] [address] [phone]  
  
   
 [address] 4/17/2017  9:30 AM  
REMOTE OFFICE VISIT with Guillermo Mendenhall [facility] ([facility] SCHEDULING) Appt Note: LAT sub Q /rc  b 1-12-17   (added 1-24-17)  remote came, no schedule [address] [place],[address] [facility] [address] [id number]  
[address] [address]  
  
    
 7/19/2017  8:15 AM  
REMOTE OFFICE VISIT with Guillermo Mendenhall [facility] ([facility] SCHEDULING) Appt Note: LAT SubQ ICD/rc b  
 [address]  
[phone] Upcoming Health Maintenance Date Due  
 MEDICARE YEARLY EXAM 6/15/2017 BREAST CANCER SCRN MAMMOGRAM 2/1/2018 PAP AKA CERVICAL CYTOLOGY 7/26/2019 COLONOSCOPY 9/5/2019 DTaP/Tdap/Td series (2 - Td) 10/30/2024 Allergies as of 2/28/2017  Review Complete On: 2/28/2017 By: Genevieve Marina MD  
  
 Severity Noted Reaction Type Reactions Amoxicillin High 07/27/2010   Side Effect Anaphylaxis Amoxicillin High 06/30/2015    Anaphylaxis Lipitor [Atorvastatin] High 06/30/2015    Other (comments) Severe muscle pain and spasms Zocor [Simvastatin] Medium 06/30/2015    Other (comments) Cramps, muscle spasms Livalo [Pitavastatin]  01/25/2017    Myalgia Pravastatin  08/19/2016    Other (comments) Leg cramps Current Immunizations  Reviewed on 7/11/2016 Name Date Influenza Vaccine (Quad) PF 9/21/2016  6:19 PM, 10/6/2015 Influenza Vaccine Split 11/28/2011 11:25 AM  
 Pneumococcal Conjugate (PCV-13) 6/14/2016 Pneumococcal Vaccine (Unspecified Type) 8/20/2011 Tdap 10/30/2014 Zoster Vaccine, Live 2/26/2016 Not reviewed this visit You Were Diagnosed With   
  
 Codes Comments Right leg pain    -  Primary ICD-10-CM: M79.604 ICD-9-CM: 729.5 Acute pain of right knee     ICD-10-CM: M25.561 ICD-9-CM: 719.46 Fall, initial encounter     ICD-10-CM: W19. Jayme Hashimoto ICD-9-CM: E888.9 Dizziness     ICD-10-CM: X94 ICD-9-CM: 780.4 Systolic CHF, chronic (HCC)     ICD-10-CM: I50.22 ICD-9-CM: 428.22, 428.0 Essential hypertension     ICD-10-CM: I10 
ICD-9-CM: 401.9 ICD (implantable cardioverter-defibrillator) in place     ICD-10-CM: Z95.810 ICD-9-CM: V45.02 Vitals BP  
  
  
  
  
  
 110/69 (BP 1 Location: Left arm, BP Patient Position: Sitting) BMI and BSA Data Body Mass Index Body Surface Area  
 41.6 kg/m 2 2.28 m 2 Preferred Pharmacy Pharmacy Name Phone [id number] [phone] Your Updated Medication List  
  
   
This list is accurate as of: 2/28/17 11:15 AM.  Always use your most recent med list.  
  
  
  
  
 albuterol 90 mcg/actuation inhaler Commonly known as:  PROVENTIL HFA, VENTOLIN HFA, PROAIR HFA Take 2 Puffs by inhalation every four (4) hours as needed for Wheezing or Shortness of Breath. aspirin delayed-release 81 mg tablet Take  by mouth daily. biotin 10,000 mcg Cap Take  by mouth daily. bumetanide 2 mg tablet Commonly known as:  Romy Walden Take 2 mg by mouth two (2) times a day. Patient takes Bumex in relation to what her B/P is. Indications: EDEMA CALCIUM PO Take 1 Tab by mouth daily. FLUoxetine 20 mg tablet Commonly known as:  PROzac TAKE 2 TABLETS EVERY MORNING AND 1 AT BEDTIME FOR ANXIETY WITH DEPRESSION  
  
 ibuprofen 200 mg tablet Commonly known as:  MOTRIN Take  by mouth. Iron (Ferrous Sulfate) 325 mg (65 mg iron) tablet Generic drug:  ferrous sulfate Take  by mouth daily (before breakfast). magnesium 250 mg Tab Take 1 Tab by mouth daily. methylPREDNISolone 4 mg tablet Commonly known as:  Trinna  Use following package instructions. For right knee and leg pain. metoprolol succinate 25 mg XL tablet Commonly known as:  TOPROL-XL Take 0.5 Tabs by mouth daily. OTHER Complete multi formula, bariatric advantage  
  
 potassium chloride 20 mEq tablet Commonly known as:  K-DUR, KLOR-CON  
TAKE TWO TABLETS BY MOUTH DAILY PROBIOTIC PO Take  by mouth. sacubitril-valsartan 24-26 mg tablet Commonly known as:  ENTRESTO Take 1 Tab by mouth two (2) times a day. spironolactone 25 mg tablet Commonly known as:  ALDACTONE Take 1 Tab by mouth daily. vitamin A 8,000 unit capsule Take 8,000 Units by mouth daily. VITAMIN D3 1,000 unit tablet Generic drug:  cholecalciferol Take 10,000 Units by mouth daily. 10,000 units \"dry vitamin e \"  
  
 zolpidem 10 mg tablet Commonly known as:  AMBIEN Take 1 Tab by mouth nightly as needed for Sleep. Max Daily Amount: 10 mg.  
  
  
  
  
Prescriptions Sent to Pharmacy Refills  
 methylPREDNISolone (MEDROL DOSEPACK) 4 mg tablet 0 Sig: Use following package instructions. For right knee and leg pain. Class: Normal  
 [facility], [address] [place] [address] Ph #: [phone] Follow-up Instructions Return in about 2 months (around 4/28/2017), or if symptoms worsen or fail to improve, for Dizziness, falls, HTN. To-Do List   
 02/28/2017 Imaging:  XR KNEE RT 3 V   
  
 03/01/2017 Imaging:  US EXT NONVAS RT LTD Patient Instructions Leg Pain: Care Instructions Your Care Instructions Many things can cause leg pain. Too much exercise or overuse can cause a muscle cramp (or charley horse). You can get leg cramps from not eating a balanced diet that has enough potassium, calcium, and other minerals. If you do not drink enough fluids or are taking certain medicines, you may develop leg cramps. Other causes of leg pain include injuries, blood flow problems, nerve damage, and twisted and enlarged veins (varicose veins). You can usually ease pain with self-care. Your doctor may recommend that you rest your leg and keep it elevated. Follow-up care is a key part of your treatment and safety. Be sure to make and go to all appointments, and call your doctor if you are having problems. Its also a good idea to know your test results and keep a list of the medicines you take. How can you care for yourself at home? · Take pain medicines exactly as directed. ¨ If the doctor gave you a prescription medicine for pain, take it as prescribed. ¨ If you are not taking a prescription pain medicine, ask your doctor if you can take an over-the-counter medicine. · Take any other medicines exactly as prescribed. Call your doctor if you think you are having a problem with your medicine. · Rest your leg while you have pain, and avoid standing for long periods of time. · Prop up your leg at or above the level of your heart when possible. · Make sure you are eating a balanced diet that is rich in calcium, potassium, and magnesium, especially if you are pregnant.  
· If directed by your doctor, put ice or a cold pack on the area for 10 to 20 minutes at a time. Put a thin cloth between the ice and your skin. · Your leg may be in a splint, a brace, or an elastic bandage, and you may have crutches to help you walk. Follow your doctor's directions about how long to wear supports and how to use the crutches. When should you call for help? Call 911 anytime you think you may need emergency care. For example, call if: 
· You have sudden chest pain and shortness of breath, or you cough up blood. · Your leg is cool or pale or changes color. Call your doctor now or seek immediate medical care if: 
· You have increasing or severe pain. · Your leg suddenly feels weak and you cannot move it. · You have signs of a blood clot, such as: 
¨ Pain in your calf, back of the knee, thigh, or groin. ¨ Redness and swelling in your leg or groin. · You have signs of infection, such as: 
¨ Increased pain, swelling, warmth, or redness. ¨ Red streaks leading from the sore area. ¨ Pus draining from a place on your leg. ¨ A fever. · You cannot bear weight on your leg. Watch closely for changes in your health, and be sure to contact your doctor if: 
· You do not get better as expected. Where can you learn more? Go to http://jose-rm.info/. Enter W579 in the search box to learn more about \"Leg Pain: Care Instructions. \" Current as of: May 27, 2016 Content Version: 11.1 © 4005-4017 [employer]. Care instructions adapted under license by [employer] (which disclaims liability or warranty for this information). If you have questions about a medical condition or this instruction, always ask your healthcare professional. [place] [address] any warranty or liability for your use of this information. Introducing [facility]! Dear Colin Quintana: Thank you for requesting a Yeahka account. Our records indicate that you already have an active Yeahka account.   You can access your account anytime at [email or web address] Did you know that you can access your hospital and ER discharge instructions at any time in SimpleSite? You can also review all of your test results from your hospital stay or ER visit. Additional Information If you have questions, please visit the Frequently Asked Questions section of the SimpleSite website at [email or web address] Remember, SimpleSite is NOT to be used for urgent needs. For medical emergencies, dial 911. Now available from your iPhone and Android! Please provide this summary of care documentation to your next provider. Your primary care clinician is listed as Felisa Baker. If you have any questions after today's visit, please call [phone].

## 2017-02-28 NOTE — PROGRESS NOTES
CC:  Chief Complaint   Patient presents with    Leg Pain     right leg/follow up ER visit     HISTORY OF PRESENT ILLNESS  Kaleb Lopez is a 61 y.o. female. Presents for ED follow up evaluation. She has HTN, CAD s/p 2 MI's in  and , valvular heart disease, cardiomyopathy, ICD in place, depression with anxiety, obesity s/p gastric bypass in . Since last clinic had AICD placement on ; complicated by infection, leading to removal of ICD and wires on 16 and placement of a subcutaneous ICD on 16 and wound revision of sternal incision. Seen at ED [facility] ED on 17 after having a syncopal episode with a fall at home while reaching in a kitchen cabinet. Had noticed dizziness morning of fall. Labs remarkable for low potassium of 3.0. EKG showed sinus rhythm with incomplete BBB. CT head showed left parietal scalp hematoma with no intracranial abnormalities. CT cervical spine was normal. Having dizziness since . Seen in Cardiology Clinic on 17 with complaint of dizziness; doses of Entresto and metoprolol reduced. Reports persisting dizziness even after these changes. Has next Cardiology appointment on 3/16/17. Denies CP, palpitations, leg swelling, orthopnea, PND. Today she complains of right leg pain since the fall. Had RLE venous Doppler in ED that was normal. Pain located at posterior knee and right calf, achy, constant, ranges from 5-8/10 in intensity, worse in mornings after getting out of bed and after sitting for a while, feels like right knee \"catches\" when she gets up from a chair, no improvement with heat, ice, or Tylenol. Improved with ibuprofen, which she has been told not to take due to heart. Soc Hx  . Has 3 living children; one daughter  a few years ago in a car accident. Has 2 grandchildren. She is a retired post . Former smoker; quit in .  Denies alcohol or recreational drug use.     ROS  Constitutional: negative for fevers, chills, night sweats  ENT:   negative for sore throat, nasal congestion, ear pains, hoarseness  Respiratory:  negative for cough, hemoptysis, dyspnea, wheezing  CV:   negative for chest pain, palpitations, lower extremity edema  GI:   negative for heartburn, abd pain, nausea, vomiting, diarrhea, constipation  Genitourinary: negative for frequency, dysuria and hematuria  Integument:  negative for rash and pruritus  Musculoskel: negative for back pain, muscle weakness  Neurological:  negative for headaches, gait problems  Behavl/Psych: negative for feelings of anxiety, depression, mood change     Patient Active Problem List   Diagnosis Code    Urinary incontinence, stress     DVT (deep venous thrombosis) ([facility]) I82.409    HTN (hypertension) I10    History of gastric bypass Z98.890    Depression with anxiety F41.8    Reactive airway disease J45.909    CAD (coronary artery disease) J29.88    Systolic CHF, chronic ([facility]) I50.22    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[facility] I42.9    Hyperlipidemia E78.5    Mitral valve regurgitation I34.0    History of MI (myocardial infarction) I25.2    Cardiomyopathy ([facility] I42.9    Osteoporosis M81.0    Morbid obesity with BMI of 40.0-44.9, adult ([facility] E66.01, Z68.41    Advance care planning Z71.89    Insomnia G47.00    S/P ICD (internal cardiac defibrillator) procedure Z95.810    AICD lead displacement T82.120A    ICD (implantable cardioverter-defibrillator) in place Z95.810     Past Medical History:   Diagnosis Date    Asthma     Cardiomyopathy ([facility]     Coronary artery disease 2008    s/p RCA stent ([facility]) on 11/26/11    Depression with anxiety     2011    DVT (deep venous thrombosis) ([facility] 7/27/2010    Family history of early CAD     GERD (gastroesophageal reflux disease)     H/O gastric bypass 2006    Revision in 2009    Hepatitis C antibody test positive     Does not have chronic hep C (labs 10/6/15: neg HCV RNA)     HTN (hypertension) 7/27/2010    Hyperlipidemia 07/27/2010    Joint pain 7/27/2010    MI (myocardial infarction) ([facility] 7/27/2010    Mitral valve regurgitation     Mild to moderate    Morbid obesity ([facility] 7/27/2010    Myocardial infarction [facility]) 2006 and 2011    Dr. Anotnia SMITH (peptic ulcer disease)     gi bleed 2008; ulcer n gastric bypass pouch    Urinary incontinence, stress 7/27/2010     Allergies   Allergen Reactions    Amoxicillin Anaphylaxis    Amoxicillin Anaphylaxis    Lipitor [Atorvastatin] Other (comments)     Severe muscle pain and spasms    Zocor [Simvastatin] Other (comments)     Cramps, muscle spasms    Livalo [Pitavastatin] Myalgia    Pravastatin Other (comments)     Leg cramps     Current Outpatient Prescriptions   Medication Sig Dispense Refill    ibuprofen (MOTRIN) 200 mg tablet Take  by mouth.  potassium chloride (K-DUR, KLOR-CON) 20 mEq tablet TAKE TWO TABLETS BY MOUTH DAILY 60 Tab 5    sacubitril-valsartan (ENTRESTO) 24 mg/26 mg tablet Take 1 Tab by mouth two (2) times a day. 180 Tab 3    FLUoxetine (PROZAC) 20 mg tablet TAKE 2 TABLETS EVERY MORNING AND 1 AT BEDTIME FOR ANXIETY WITH DEPRESSION 90 Tab 3    metoprolol succinate (TOPROL-XL) 25 mg XL tablet Take 0.5 Tabs by mouth daily. 45 Tab 3    LACTOBACILLUS ACIDOPHILUS (PROBIOTIC PO) Take  by mouth.  spironolactone (ALDACTONE) 25 mg tablet Take 1 Tab by mouth daily. 90 Tab 3    zolpidem (AMBIEN) 10 mg tablet Take 1 Tab by mouth nightly as needed for Sleep. Max Daily Amount: 10 mg. 90 Tab 1    albuterol (PROVENTIL HFA, VENTOLIN HFA, PROAIR HFA) 90 mcg/actuation inhaler Take 2 Puffs by inhalation every four (4) hours as needed for Wheezing or Shortness of Breath. 1 Inhaler 5    aspirin delayed-release 81 mg tablet Take  by mouth daily.  vitamin A 8,000 unit capsule Take 8,000 Units by mouth daily.  biotin 10,000 mcg cap Take  by mouth daily.       OTHER Complete multi formula, bariatric advantage      magnesium 250 mg tab Take 1 Tab by mouth daily.  bumetanide (BUMEX) 2 mg tablet Take 2 mg by mouth two (2) times a day. Patient takes Bumex in relation to what her B/P is. Indications: EDEMA      ferrous sulfate (IRON, FERROUS SULFATE,) 325 mg (65 mg Iron) tablet Take  by mouth daily (before breakfast).  cholecalciferol, vitamin d3, (VITAMIN D) 1,000 unit tablet Take 10,000 Units by mouth daily. 10,000 units \"dry vitamin e \"      CALCIUM PO Take 1 Tab by mouth daily. PHYSICAL EXAM  Visit Vitals    /69 (BP 1 Location: Left arm, BP Patient Position: Sitting)    Pulse 73    Temp 98.7 °F (37.1 °C) (Oral)    Resp 16    Ht 5' 5\" (1.651 m)    Wt 250 lb (113.4 kg)    SpO2 96%    BMI 41.6 kg/m2       General: Obese, no distress. HEENT:  Head normocephalic/atraumatic, no scleral icterus  Lungs:  Clear to ausculation bilaterally. Good air movement. Heart:  Regular rate and rhythm, normal S1 and S2, no murmur, gallop, or rub  Extremities: No clubbing, cyanosis, or edema. Normal ROM at right knee. Mild tenderness at posterior right knee and right calf; right upper calf slightly larger in size compared to left upper calf. Negative Priscilla's sign. Neurological: Alert and oriented. Psychiatric: Normal mood and affect. Behavior is normal.         ASSESSMENT AND PLAN    ICD-10-CM ICD-9-CM    1. Right leg pain M79.604 729.5 US EXT NONVAS RT LTD      methylPREDNISolone (MEDROL DOSEPACK) 4 mg tablet   2. Acute pain of right knee M25.561 719.46 XR KNEE RT 3 V      US EXT NONVAS RT LTD   3. Fall, initial encounter [address] XXXA E888.9    4. Dizziness R42 780.4    5. Systolic CHF, chronic (HCC) I50.22 428.22      428.0    6. Essential hypertension I10 401.9    7. ICD (implantable cardioverter-defibrillator) in place Z95.810 V45.02        Lisa Paredes was seen today for leg pain. Diagnoses and all orders for this visit:    Right leg pain/Acute pain of right knee  Likely right knee OA; rule out occult fracture.  Rule out [employer] cyst.  -     US EXT NONVAS RT LTD; Future  -     methylPREDNISolone (MEDROL DOSEPACK) 4 mg tablet; Use following package instructions. For right knee and leg pain. -     XR KNEE RT 3 V; Future    Fall, initial encounter    Dizziness    Systolic CHF, chronic ([facility]    Essential hypertension    ICD (implantable cardioverter-defibrillator) in place    Greater than 40 mins direct face-to-face time spent with patient. Greater than 50% of time spent on counseling and coordination of care. Follow-up Disposition:  Return in about 2 months (around 4/28/2017), or if symptoms worsen or fail to improve, for Dizziness, falls, HTN. Provided patient and/or family with advanced directive information and answered pertinent questions. Encouraged patient to provide a copy of advanced directive to the office when available. I have discussed the diagnosis with the patient and the intended plan as seen in the above orders. Patient is in agreement. The patient has received an after-visit summary and questions were answered concerning future plans. I have discussed medication side effects and warnings with the patient as well.

## 2017-03-01 ENCOUNTER — HOSPITAL ENCOUNTER (OUTPATIENT)
Dept: ULTRASOUND IMAGING | Age: 67
Discharge: HOME | End: 2017-03-01
Attending: INTERNAL MEDICINE
Payer: COMMERCIAL

## 2017-03-01 DIAGNOSIS — M25.561 ACUTE PAIN OF RIGHT KNEE: ICD-10-CM

## 2017-03-01 DIAGNOSIS — M79.604 RIGHT LEG PAIN: ICD-10-CM

## 2017-03-01 LAB
BUN SERPL-MCNC: 14 MG/DL (ref 8–27)
BUN/CREAT SERPL: 15 (ref 11–26)
CALCIUM SERPL-MCNC: 8.9 MG/DL (ref 8.7–10.3)
CHLORIDE SERPL-SCNC: 102 MMOL/L (ref 96–106)
CO2 SERPL-SCNC: 22 MMOL/L (ref 18–29)
CREAT SERPL-MCNC: 0.95 MG/DL (ref 0.57–1)
GLUCOSE SERPL-MCNC: 97 MG/DL (ref 65–99)
MAGNESIUM SERPL-MCNC: 2.2 MG/DL (ref 1.6–2.3)
POTASSIUM SERPL-SCNC: 5.2 MMOL/L (ref 3.5–5.2)
SODIUM SERPL-SCNC: 141 MMOL/L (ref 134–144)

## 2017-03-01 PROCEDURE — 76882 US LMTD JT/FCL EVL NVASC XTR: CPT

## 2017-03-01 NOTE — PROGRESS NOTES
Inform patient that her right leg ultrasound showed a small to moderate sized Baker's cyst. I placed a referral for her to see an orthopedic doctor (see result note for right knee X-ray).

## 2017-03-01 NOTE — PROGRESS NOTES
Attempted to reach patient by phone. Unable to reach patient or leave message requesting a return call. Will attempt at latter date and time.

## 2017-03-02 NOTE — PROGRESS NOTES
Spoke with patient and after verifying name and date of birth of patient gave her test results and referral to ortho per Dr. Sheng Saeed.

## 2017-03-02 NOTE — TELEPHONE ENCOUNTER
Letter sent to patient with lab results. Patient just sent in her blood pressure readings and Leticia Conner has noted them.

## 2017-03-03 ENCOUNTER — TELEPHONE (OUTPATIENT)
Dept: INTERNAL MEDICINE CLINIC | Age: 67
End: 2017-03-03

## 2017-03-03 ENCOUNTER — DOCUMENTATION ONLY (OUTPATIENT)
Dept: CARDIOLOGY CLINIC | Age: 67
End: 2017-03-03

## 2017-03-03 ENCOUNTER — TELEPHONE (OUTPATIENT)
Dept: CARDIOLOGY CLINIC | Age: 67
End: 2017-03-03

## 2017-03-03 RX ORDER — SPIRONOLACTONE 25 MG/1
12.5 TABLET ORAL DAILY
Qty: 45 TAB | Refills: 3
Start: 2017-03-03 | End: 2017-03-08

## 2017-03-03 RX ORDER — BUMETANIDE 2 MG/1
1 TABLET ORAL 2 TIMES DAILY
Qty: 45 TAB | Refills: 3
Start: 2017-03-03 | End: 2017-10-25

## 2017-03-03 NOTE — TELEPHONE ENCOUNTER
Patient identified with 2 identifiers  Called patient and per Lavonne Hernandez, let her know that Kenna Osler has reviewed her BP readings and would like her to decrease her bumex to 1 mg twice per day and spironolactone to 12.5 mg per day. She will continue to take her blood pressure readings. The last couple of days, she has been 110/60, 98/55, but complaining of dizziness when laying down, then getting up. She states she still has a bump on her head. She did see her PCP 3 days ago for leg pain, but didn't tell them this, because she states it became more obvious when she was having her xrays done. Please advise. Left message for patient to return call to check blood pressures and see how she is feeling since decreasing bumex. Left message for patient to return call on home and mobile voice mailboxes. Returned patient phone call, she states she is feeling better, but still has some dizziness,especially when laying down. No chest pain but has shortness of breath but states that is her baseline. Her legs are swollen and tight today. Has not been taking daily weights but BP today 102/60, occasionally drops below 892 systolically.     Future Appointments  Date Time Provider Denis Hurst   3/16/2017 9:40 124 [facility] MD Kaden [address]   4/7/2017 1:00 PM Humberto Pollard  [facility]   4/17/2017 9:30 AM REMOTE1, Darci VAZQUEZ   7/19/2017 8:15 AM REMOTE1, [id number] Liang Carter

## 2017-03-03 NOTE — TELEPHONE ENCOUNTER
Pt states that Dr. Marito Giles needs a copy of her recent xrays; states they may be faxed to them at [phone]  If any further questions, you may call their office [phone] (located near [facility])

## 2017-03-03 NOTE — PROGRESS NOTES
Please call and let her know I reviewed her BP readings. Ask her to decrease her Bumex 1mg BID and then resume spironolactone 12.5mg daily. So she should be taking Toprol XL, Entresto, Spironolactone 12.5mg daily and Bumex 1mg BID. Please ask her to check BP BID x 1 week and send me her readings. Has follow up with Dr. Jefferson Jacob 3/16.     Jacob Bazzial

## 2017-03-06 NOTE — TELEPHONE ENCOUNTER
Hopefully decreasing her bumex will help with her dizziness and low BP. Please call her Monday to check on her BP readings and dizziness after making the changes.

## 2017-03-08 ENCOUNTER — TELEPHONE (OUTPATIENT)
Dept: CARDIOLOGY CLINIC | Age: 67
End: 2017-03-08

## 2017-03-08 NOTE — TELEPHONE ENCOUNTER
Patient identified with 2 identifiers  Called patient to check on her blood pressure and edema. She states she still has some lower extremity edema, BP is 102/61 today, not feeling too dizzy today. I instructed her per Joanie Vaughan to stop spironolactone, and take an extra dose of bumex 1 mg today, continue to take daily blood pressures, and to try to get a battery so she can take daily weights. She states understanding and has no further questions.

## 2017-03-16 ENCOUNTER — OFFICE VISIT (OUTPATIENT)
Dept: CARDIOLOGY CLINIC | Age: 67
End: 2017-03-16

## 2017-03-16 VITALS
RESPIRATION RATE: 16 BRPM | BODY MASS INDEX: 42.62 KG/M2 | OXYGEN SATURATION: 98 % | DIASTOLIC BLOOD PRESSURE: 84 MMHG | SYSTOLIC BLOOD PRESSURE: 112 MMHG | HEART RATE: 82 BPM | HEIGHT: 65 IN | WEIGHT: 255.8 LBS

## 2017-03-16 DIAGNOSIS — E78.5 HYPERLIPIDEMIA, UNSPECIFIED HYPERLIPIDEMIA TYPE: ICD-10-CM

## 2017-03-16 DIAGNOSIS — I42.9 CARDIOMYOPATHY (HCC): Primary | ICD-10-CM

## 2017-03-16 DIAGNOSIS — I50.22 SYSTOLIC CHF, CHRONIC (HCC): ICD-10-CM

## 2017-03-16 DIAGNOSIS — I34.0 MITRAL VALVE INSUFFICIENCY, UNSPECIFIED ETIOLOGY: ICD-10-CM

## 2017-03-16 NOTE — MR AVS SNAPSHOT
Visit Information Date & Time Provider Department Dept. Phone Encounter #  
 3/16/2017  9:40 AM Ashlyn Hinton MD [facility] Nitin Peterson [phone] [id number] Your Appointments 4/7/2017  1:00 PM  
ROUTINE CARE with Izaiah Landers MD  
40 [facility] [address] [place] [address]) Appt Note: 4mth f/u;  
 [address] [place] [address] [phone]  
  
   
 [address] 4/17/2017  9:30 AM  
REMOTE OFFICE VISIT with Sulema Levy [facility] ([facility] SCHEDULING) Appt Note: LAT sub Q /rc  b 1-12-17   (added 1-24-17)  remote came, no schedule [address] [facility] [address] [id number]  
[address] [place] [id number]  
  
    
 7/19/2017  8:15 AM  
REMOTE OFFICE VISIT with Sulema Levy [facility] ([facility] SCHEDULING) Appt Note: LAT SubQ ICD/rc b  
 [address] [place]  
[phone] Upcoming Health Maintenance Date Due  
 MEDICARE YEARLY EXAM 6/15/2017 BREAST CANCER SCRN MAMMOGRAM 2/1/2018 PAP AKA CERVICAL CYTOLOGY 7/26/2019 COLONOSCOPY 9/5/2019 DTaP/Tdap/Td series (2 - Td) 10/30/2024 Allergies as of 3/16/2017  Review Complete On: 3/16/2017 By: Trae Liz RN Severity Noted Reaction Type Reactions Amoxicillin High 07/27/2010   Side Effect Anaphylaxis Amoxicillin High 06/30/2015    Anaphylaxis Lipitor [Atorvastatin] High 06/30/2015    Other (comments) Severe muscle pain and spasms Zocor [Simvastatin] Medium 06/30/2015    Other (comments) Cramps, muscle spasms Livalo [Pitavastatin]  01/25/2017    Myalgia Pravastatin  08/19/2016    Other (comments) Leg cramps Current Immunizations  Reviewed on 7/11/2016 Name Date Influenza Vaccine (Quad) PF 9/21/2016  6:19 PM, 10/6/2015 Influenza Vaccine Split 11/28/2011 11:25 AM  
 Pneumococcal Conjugate (PCV-13) 6/14/2016 Pneumococcal Vaccine (Unspecified Type) 8/20/2011 Tdap 10/30/2014 Zoster Vaccine, Live 2/26/2016 Not reviewed this visit You Were Diagnosed With   
  
 Codes Comments Cardiomyopathy ([facility]    -  Primary ICD-10-CM: I42.9 ICD-9-CM: 425.4 Systolic CHF, chronic (HCC)     ICD-10-CM: I50.22 ICD-9-CM: 428.22, 428.0 Hyperlipidemia, unspecified hyperlipidemia type     ICD-10-CM: E78.5 ICD-9-CM: 272.4 Mitral valve insufficiency, unspecified etiology     ICD-10-CM: I34.0 ICD-9-CM: 424.0 Vitals BP Pulse Resp Height(growth percentile) Weight(growth percentile) SpO2  
 112/84 (BP 1 Location: Right arm, BP Patient Position: Sitting) 82 16 5' 5\" (1.651 m) 255 lb 12.8 oz (116 kg) 98% BMI OB Status Smoking Status 42.57 kg/m2 Postmenopausal Former Smoker Vitals History BMI and BSA Data Body Mass Index Body Surface Area 42.57 kg/m 2 2.31 m 2 Preferred Pharmacy Pharmacy Name Phone [id number] [place], [place] - [place] [phone] Your Updated Medication List  
  
   
This list is accurate as of: 3/16/17 10:27 AM.  Always use your most recent med list.  
  
  
  
  
 albuterol 90 mcg/actuation inhaler Commonly known as:  PROVENTIL HFA, VENTOLIN HFA, PROAIR HFA Take 2 Puffs by inhalation every four (4) hours as needed for Wheezing or Shortness of Breath. aspirin delayed-release 81 mg tablet Take  by mouth daily. biotin 10,000 mcg Cap Take  by mouth daily. bumetanide 2 mg tablet Commonly known as:  Quiros [place]an Take 0.5 Tabs by mouth two (2) times a day. Patient takes Bumex in relation to what her B/P is. Indications: Edema CALCIUM PO Take 1 Tab by mouth daily. FLUoxetine 20 mg tablet Commonly known as:  PROzac TAKE 2 TABLETS EVERY MORNING AND 1 AT BEDTIME FOR ANXIETY WITH DEPRESSION  
  
 ibuprofen 200 mg tablet Commonly known as:  MOTRIN Take  by mouth. Iron (Ferrous Sulfate) 325 mg (65 mg iron) tablet Generic drug:  ferrous sulfate Take  by mouth daily (before breakfast). magnesium 250 mg Tab Take 1 Tab by mouth daily. metoprolol succinate 25 mg XL tablet Commonly known as:  TOPROL-XL Take 0.5 Tabs by mouth daily. OTHER Complete multi formula, bariatric advantage  
  
 potassium chloride 20 mEq tablet Commonly known as:  K-DUR, KLOR-CON  
TAKE TWO TABLETS BY MOUTH DAILY PROBIOTIC PO Take  by mouth. sacubitril-valsartan 24-26 mg tablet Commonly known as:  ENTRESTO Take 1 Tab by mouth two (2) times a day. vitamin A 8,000 unit capsule Take 8,000 Units by mouth daily. VITAMIN D3 1,000 unit tablet Generic drug:  cholecalciferol Take 10,000 Units by mouth daily. 10,000 units \"dry vitamin e \"  
  
 zolpidem 10 mg tablet Commonly known as:  AMBIEN Take 1 Tab by mouth nightly as needed for Sleep. Max Daily Amount: 10 mg. We Performed the Following AMB POC EKG ROUTINE W/ 12 LEADS, INTER & REP [39255 CPT(R)] To-Do List   
 04/03/2017 ECG:  STRESS TEST LEXISCAN   
  
 04/06/2017 ECHO:  2D ECHO COMPLETE ADULT (TTE) W OR WO CONTR Referral Information Referral ID Referred By Referred To  
  
 [id number] Poncho Vaughan Not Available Visits Status Start Date End Date 1 New Request 3/16/17 3/16/18 If your referral has a status of pending review or denied, additional information will be sent to support the outcome of this decision. Introducing [address]! Dear Jamari Navarro: Thank you for requesting a [employer] account. Our records indicate that you already have an active [employer] account. You can access your account anytime at [email or web address] Did you know that you can access your hospital and ER discharge instructions at any time in [employer]? You can also review all of your test results from your hospital stay or ER visit. Additional Information If you have questions, please visit the Frequently Asked Questions section of the [employer] website at [email or web address] Remember, [employer] is NOT to be used for urgent needs. For medical emergencies, dial 911. Now available from your iPhone and Android! Please provide this summary of care documentation to your next provider. Your primary care clinician is listed as Anmol Collins. If you have any questions after today's visit, please call [phone].

## 2017-03-16 NOTE — PROGRESS NOTES
[facility]  [phone]    HPI: Argenis Ackerman, a 61 y.o. who presents for follow up regarding her CAD and CHF. Recently went to ER for syncope, also with dyspnea, chest discomfort and general malaise today. She does not feel well, she is tired and her heart races at night, which wakes her up, she is short of breath, the fluid is still there. The heartbeat feels very fast and lasts 1-2 minutes but wakes her up and then comes off and on. Having some dizziness with position changes. Feels the room is spinning. EKG today is NSR. Passed out a few weeks ago and had to go to the ER. She felt ill and she could not stop it fast enough to sit down, hit her head, everything went black and her BP was low. She feels that she is doing worse sine her last pacer surgery. Needs echo and stress testing to look for progression of her MV and CAD. 12.5mg BID and Entrestro 24/26 1/2 tab BID  Last revision for AICD in November 2016 with Dr. Martinez January. No LE edema, taking Bumex 2 BID, only takes Metolazone PRN (maybe once every other week)    Assessment/Plan:  1. Chronic systolic heart failure - LVEF 35% by TTE in 9/16, s/p AICD placement with Dr. Martinez January and subsequent lead revisions and repeat procedures due to non-healing midsternal incision  -due to dizziness and hypotension will reduce Entresto to 24/26 BID and Toprol XL 12.5mg to once daily, will reassess at follow up visit in 2 months  -off spironolactone right now due to hypotension, cont Bumex 2mg BID, Metolazone PRN   -c/o hair loss with Coreg and Toprol XL but willing to continue Toprol XL for now    2.  CAD - hx of MI x 2 and multiple stents, she believes she may have 6 or 7 stents, recent cardiac cath did not show progression of CAD, continue ASA and beta blocker, unable to tolerate pravastatin, simvastatin, atorvastatin, see lipid therapy plan below    -reports having an MI in 11/2011 and received PCI to RCA at that time, previous MI in 2006 or 2007  3. Mitral regurgitation - mod MR by TTE in 9/16  4. Asthma - x 15 - 16 years, has not seen pulmonologist in years, did not need her Albuterol PRN until Coreg dose increased in 2015, now using it 2-3 times/week  5. HTN - continue current regimen  6. Dyslipidemia -  in 2/16, zocor caused muscle spasms and cramps, lipitor caused pain shooting all over her body, could tolerate Pravastatin 40mg daily due to leg cramps but LDL 97 on pravastatin 40mg daily, tried Livalo after her last visit but could not tolerate it due to myalgias, now on just [employer]  -will discuss injectable lipid medication at her next visit   7. DM Type 2 - resolved with gastric bypass, , last Hgb A1C 5.8%  8. Hypokalemia - nomal on recheck, now off spironolactone  9. Hypomagnesemia - cont otc, ok last check  10. Morbid obesity - Body mass index is 42.57 kg/(m^2). ). weighed 416 lbs at her heaviest weight, s/p gastric bypass in 2007 with revision a few years later, working on weight loss and exercise, weight down 9 lbs today  11. PUD - had EGD and cauterized bleeding ulcer, not on PPI anymore  12. Vitamin D deficiency - on supplementation, Vitamin D level 34.2  13. Anxiety - on Prozac, could not sleep on Clonazepam, able to sleep better on ambien 10mg at bedtime      TTE 9/16 - LVEF 35%, moderate hypokinesis of the basal-mid inferolateral wall(s), grd 1 dd, dilated LA, mod MR, mild TR  8/26/16 - RV lead revision by Dr. Jeannette De La Cruz  8/24/16 - single chamber AICD placed by Dr. Jeannette De La Cruz ([address] [place] [address], serial # [id number], model # [id number].  The ventricular lead: model # [id number] , serial # [id number])  MUGA 6/16 - LVEF 27%  Holter 6/16 - no arrhythmias  Echo 5/16 - LV mildly dilated, LVEF 40%, moderate diffuse hypokinesis with regional variations, severe hypokinesis of the basal-mid inferior wall(s), grd 1 dd, mod dilated LA, atrial septum bows from left to right, consistent with increased left atrial pressure, mildly dilated RA, mild to near moderate MR    OLIVER /16 - normal  Nuc 5/15 EF 31% large anterolateral infarct with periinfarct reversibility, 13% LV reversible(32% infarct at rest, 45% at stress)    Echo 2015 - LVEF 30-35%, grd 1 dd, mod LAE, mild to mod MR  Nuc Stress  - small reversibility in anteroapical wall, LVEF 31%  Cardiac Cath 3/13 - patent stents in LAD, LCx and RCA, LVEF 30%, severe inferior HK, LCx relatively small vessel with patent stent in proximal LCx, RCA is large and dominant with patent stents in proximal and mid RCA, distal RCA free of disease, LAD normal and large vessel which coursed around apex  Echo 2011 - LVEF 30%, mild MR  Cardiac Cath 2011 - s/p PCI with AMRIT to 100% mid RCA, also had 40% mid LAD lesion, 50% diagonal 1 lesion, 100% distal LCx lesion, 60% OM1 lesion, 100% proximal Ramus lesion      Soc Hx: quit tobacco use x 13 years ago, used to smoke 1ppd, no etoh use, no drug use, lives with , son, daughter in law, grandson, retired/on disability  Fam Hx: father in his 62s when he had a MI, had 3 vessel CABG in his 62s, passed from fall but had cancer too, mother lived to age 80 and  from Alzheimer's, brother had MI in his 62s, sister had aortic repair for aneurysm in her 76s    She  has a past medical history of Asthma; Cardiomyopathy ([facility] Coronary artery disease (); Depression with anxiety; DVT (deep venous thrombosis) ([facility] (2010); Family history of early CAD; GERD (gastroesophageal reflux disease); H/O gastric bypass (); Hepatitis C antibody test positive; HTN (hypertension) (2010); Hyperlipidemia (2010); Joint pain (2010); MI (myocardial infarction) ([facility] (2010); Mitral valve regurgitation; Morbid obesity ([facility] (2010); Myocardial infarction [facility]) ( and ); PUD (peptic ulcer disease); and Urinary incontinence, stress (2010). She also has no past medical history of Diabetes ([facility]     Cardiovascular ROS: positive for dyspnea on exertion   Respiratory ROS: no cough, wheezing  Neurological ROS: no TIA or stroke symptoms  All other systems negative except as above. PE  Vitals:    03/16/17 1004 03/16/17 1005   BP: 120/80 112/84   Pulse: 82    Resp: 16    SpO2: 98%    Weight: 255 lb 12.8 oz (116 kg)    Height: 5' 5\" (1.651 m)     Body mass index is 42.57 kg/(m^2).   General appearance - alert, well appearing, and in no distress  Mental status - affect appropriate to mood  Eyes - sclera anicteric, moist mucous membranes  Neck - supple  Lymphatics - not assessed  Chest - clear to auscultation, no wheezes, rales or rhonchi  Heart - normal rate, regular rhythm, normal S1, S2, 2/6 MYKE   Abdomen - soft, nontender, nondistended, obese  Back exam - full range of motion, no tenderness  Neurological - cranial nerves II through XII grossly intact, no focal deficit  Musculoskeletal - no muscular tenderness noted, normal strength  Extremities - diminished peripheral pulses, no LE edema  Skin - normal coloration  no rashes    Recent Labs:  Lab Results   Component Value Date/Time    Cholesterol, total 177 08/02/2016 12:51 PM    HDL Cholesterol 63 08/02/2016 12:51 PM    LDL, calculated 97 08/02/2016 12:51 PM    Triglyceride 87 08/02/2016 12:51 PM     Lab Results   Component Value Date/Time    Creatinine 0.95 02/27/2017 12:24 PM     Lab Results   Component Value Date/Time    BUN 14 02/27/2017 12:24 PM    BUN (POC) 24 11/26/2011 09:50 PM     Lab Results   Component Value Date/Time    Potassium 5.2 02/27/2017 12:24 PM     Lab Results   Component Value Date/Time    Hemoglobin A1c 5.8 02/09/2016 11:44 AM    Hemoglobin A1c, External 5.5 12/12/2014     Lab Results   Component Value Date/Time    HGB 14.7 02/23/2017 07:26 AM     Lab Results   Component Value Date/Time    PLATELET 865 87/35/3152 07:26 AM       Reviewed:  Past Medical History:   Diagnosis Date    Asthma     Cardiomy[facility] ([facility]     Coronary artery disease 2008    s/p RCA stent (AMRIT) on 11/26/11    Depression with anxiety     2011    DVT (deep venous thrombosis) ([facility] 7/27/2010    Family history of early CAD     GERD (gastroesophageal reflux disease)     H/O gastric bypass 2006    Revision in 2009    Hepatitis C antibody test positive     Does not have chronic hep C (labs 10/6/15: neg HCV RNA)     HTN (hypertension) 7/27/2010    Hyperlipidemia 07/27/2010    Joint pain 7/27/2010    MI (myocardial infarction) ([facility] 7/27/2010    Mitral valve regurgitation     Mild to moderate    Morbid obesity ([facility] 7/27/2010    Myocardial infarction [facility]) 2006 and 2011    Dr. Martine Georges    PUD (peptic ulcer disease)     gi bleed 2008; ulcer n gastric bypass pouch    Urinary incontinence, stress 7/27/2010     History   Smoking Status    Former Smoker    Start date: 1/1/1970    Quit date: 5/17/2004   Smokeless Tobacco    Never Used     History   Alcohol Use No     Allergies   Allergen Reactions    Amoxicillin Anaphylaxis    Amoxicillin Anaphylaxis    Lipitor [Atorvastatin] Other (comments)     Severe muscle pain and spasms    Zocor [Simvastatin] Other (comments)     Cramps, muscle spasms    Livalo [Pitavastatin] Myalgia    Pravastatin Other (comments)     Leg cramps       Current Outpatient Prescriptions   Medication Sig    bumetanide (BUMEX) 2 mg tablet Take 0.5 Tabs by mouth two (2) times a day. Patient takes Bumex in relation to what her B/P is. Indications: Edema    ibuprofen (MOTRIN) 200 mg tablet Take  by mouth.  potassium chloride (K-DUR, KLOR-CON) 20 mEq tablet TAKE TWO TABLETS BY MOUTH DAILY    sacubitril-valsartan (ENTRESTO) 24 mg/26 mg tablet Take 1 Tab by mouth two (2) times a day.  FLUoxetine (PROZAC) 20 mg tablet TAKE 2 TABLETS EVERY MORNING AND 1 AT BEDTIME FOR ANXIETY WITH DEPRESSION    metoprolol succinate (TOPROL-XL) 25 mg XL tablet Take 0.5 Tabs by mouth daily.  LACTOBACILLUS ACIDOPHILUS (PROBIOTIC PO) Take  by mouth.     zolpidem (AMBIEN) 10 mg tablet Take 1 Tab by mouth nightly as needed for Sleep. Max Daily Amount: 10 mg.    albuterol (PROVENTIL HFA, VENTOLIN HFA, PROAIR HFA) 90 mcg/actuation inhaler Take 2 Puffs by inhalation every four (4) hours as needed for Wheezing or Shortness of Breath.  aspirin delayed-release 81 mg tablet Take  by mouth daily.  vitamin A 8,000 unit capsule Take 8,000 Units by mouth daily.  biotin 10,000 mcg cap Take  by mouth daily.  OTHER Complete multi formula, bariatric advantage    magnesium 250 mg tab Take 1 Tab by mouth daily.  ferrous sulfate (IRON, FERROUS SULFATE,) 325 mg (65 mg Iron) tablet Take  by mouth daily (before breakfast).  cholecalciferol, vitamin d3, (VITAMIN D) 1,000 unit tablet Take 10,000 Units by mouth daily. 10,000 units \"dry vitamin e \"    CALCIUM PO Take 1 Tab by mouth daily. No current facility-administered medications for this visit.         Ashlyn Hinton MD  [facility] [address] [place] [address] [id number] [facility] [address]  [facility]  [phone]

## 2017-04-07 ENCOUNTER — OFFICE VISIT (OUTPATIENT)
Dept: INTERNAL MEDICINE CLINIC | Age: 67
End: 2017-04-07

## 2017-04-07 VITALS
OXYGEN SATURATION: 97 % | RESPIRATION RATE: 16 BRPM | HEART RATE: 75 BPM | SYSTOLIC BLOOD PRESSURE: 104 MMHG | BODY MASS INDEX: 41.95 KG/M2 | DIASTOLIC BLOOD PRESSURE: 66 MMHG | WEIGHT: 251.8 LBS | HEIGHT: 65 IN | TEMPERATURE: 98 F

## 2017-04-07 DIAGNOSIS — M17.11 PRIMARY OSTEOARTHRITIS OF RIGHT KNEE: ICD-10-CM

## 2017-04-07 DIAGNOSIS — F41.9 ANXIETY: ICD-10-CM

## 2017-04-07 DIAGNOSIS — I10 ESSENTIAL HYPERTENSION: ICD-10-CM

## 2017-04-07 DIAGNOSIS — I50.22 SYSTOLIC CHF, CHRONIC (HCC): ICD-10-CM

## 2017-04-07 DIAGNOSIS — I25.10 CORONARY ARTERY DISEASE INVOLVING NATIVE CORONARY ARTERY OF NATIVE HEART WITHOUT ANGINA PECTORIS: ICD-10-CM

## 2017-04-07 DIAGNOSIS — H81.10 VERTIGO, BENIGN PAROXYSMAL, UNSPECIFIED LATERALITY: Primary | ICD-10-CM

## 2017-04-07 RX ORDER — ALPRAZOLAM 0.5 MG/1
0.5 TABLET ORAL
Qty: 20 TAB | Refills: 0 | Status: SHIPPED | OUTPATIENT
Start: 2017-04-07 | End: 2017-10-09 | Stop reason: SDUPTHER

## 2017-04-07 RX ORDER — MECLIZINE HYDROCHLORIDE 25 MG/1
25 TABLET ORAL
Qty: 30 TAB | Refills: 1 | Status: SHIPPED | OUTPATIENT
Start: 2017-04-07 | End: 2017-04-17

## 2017-04-07 NOTE — PROGRESS NOTES
CC:  Chief Complaint   Patient presents with    Depression    Coronary Artery Disease    Hypertension    Dizziness     problems with lying flat/getting up or turn over     Rita Caceres is a 61 y.o. female. Presents for 2 month follow up evaluation. She has HTN, CAD s/p 2 MI's in 2006 and 2011, valvular heart disease, CHF (EF 35% in 9/16), ICD in place, depression with anxiety, obesity s/p gastric bypass in 2006. AICD placement in 3/50 was complicated by infection, leading to removal of ICD and wires on 9/19/16 and placement of a subcutaneous ICD on 9/2/16 and wound revision of sternal incision. She complains of dizziness that occurs with positional changes; associated with nausea. States that it is different from the type of dizziness she was having before, which is now better with decrease in Entresto dose. No falls or syncopal spells since syncope on 2/23/17. She also complains of increased anxiety recently related to home situation (has tense interactions with son's girlfriend that make her anxious).       Soc Hx  . Has 2 living children; lives with , son, son's girlfriend, and 2 grandchildren. She is a retired post . Former smoker; quit in 2002.  Denies alcohol or recreational drug use.     ROS  Constitutional: negative for fevers, chills, night sweats  ENT:   negative for sore throat, nasal congestion, ear pains, hoarseness  Respiratory:  negative for cough, hemoptysis, dyspnea,wheezing  CV:   negative for chest pain, palpitations, lower extremity edema  GI:   negative for heartburn, abd pain, nausea, vomiting, diarrhea, constipation  Genitourinary: negative for frequency, dysuria and hematuria  Integument:  negative for rash and pruritus  Musculoskel: negative for myalgias, back pain, muscle weakness; has mild pain behind right knee (received corticosteroid injection for right-sided Baker's cyst in 3/17 by an orthopedist)  Neurological: negative for headaches, dizziness, vertigo, gait problems  Behavl/Psych: negative for feelings of depression    Patient Active Problem List   Diagnosis Code    Urinary incontinence, stress     DVT (deep venous thrombosis) ([facility]) I82.409    HTN (hypertension) I10    History of gastric bypass Z98.890    Depression with anxiety F41.8    Reactive airway disease J45.909    CAD (coronary artery disease) R07.98    Systolic CHF, chronic (HCC) I50.22    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[facility] 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.9    Hyperlipidemia E78.5    Mitral valve regurgitation I34.0    History of MI (myocardial infarction) I25.2    Cardiomyopathy ([facility] I42.9    Osteoporosis M81.0    Morbid obesity with BMI of 40.0-44.9, adult ([facility] E66.01, Z68.41    Advance care planning Z71.89    Insomnia G47.00    S/P ICD (internal cardiac defibrillator) procedure Z95.810    AICD lead displacement T82.120A    ICD (implantable cardioverter-defibrillator) in place Z95.810    Primary osteoarthritis of right knee M17.11     Past Medical History:   Diagnosis Date    Asthma     Cardiomyopathy ([facility]     Coronary artery disease 2008    s/p RCA stent (AMRIT) on 11/26/11    Depression with anxiety     2011    DVT (deep venous thrombosis) ([facility] 7/27/2010    Family history of early CAD     GERD (gastroesophageal reflux disease)     H/O gastric bypass 2006    Revision in 2009    Hepatitis C antibody test positive     Does not have chronic hep C (labs 10/6/15: neg HCV RNA)     HTN (hypertension) 7/27/2010    Hyperlipidemia 07/27/2010    Joint pain 7/27/2010    MI (myocardial infarction) ([facility] 7/27/2010    Mitral valve regurgitation     Mild to moderate    Morbid obesity ([facility] 7/27/2010    Myocardial infarction [facility]) 2006 and 2011    Dr. Gant Cassette    PUD (peptic ulcer disease)     gi bleed 2008; ulcer n gastric bypass pouch    Urinary incontinence, stress 7/27/2010     Allergies   Allergen Reactions    Amoxicillin Anaphylaxis    Amoxicillin Anaphylaxis    Lipitor [Atorvastatin] Other (comments)     Severe muscle pain and spasms    Zocor [Simvastatin] Other (comments)     Cramps, muscle spasms    Livalo [Pitavastatin] Myalgia    Pravastatin Other (comments)     Leg cramps     Current Outpatient Prescriptions   Medication Sig Dispense Refill    zolpidem (AMBIEN) 10 mg tablet TAKE ONE TABLET BY MOUTH NIGHTLY AS NEEDED FOR SLEEP. MAX DAILY AMOUNT 10MG 30 Tab 5    bumetanide (BUMEX) 2 mg tablet Take 0.5 Tabs by mouth two (2) times a day. Patient takes Bumex in relation to what her B/P is. Indications: Edema 45 Tab 3    ibuprofen (MOTRIN) 200 mg tablet Take  by mouth.  potassium chloride (K-DUR, KLOR-CON) 20 mEq tablet TAKE TWO TABLETS BY MOUTH DAILY 60 Tab 5    sacubitril-valsartan (ENTRESTO) 24 mg/26 mg tablet Take 1 Tab by mouth two (2) times a day. 180 Tab 3    FLUoxetine (PROZAC) 20 mg tablet TAKE 2 TABLETS EVERY MORNING AND 1 AT BEDTIME FOR ANXIETY WITH DEPRESSION 90 Tab 3    metoprolol succinate (TOPROL-XL) 25 mg XL tablet Take 0.5 Tabs by mouth daily. 45 Tab 3    LACTOBACILLUS ACIDOPHILUS (PROBIOTIC PO) Take  by mouth.  albuterol (PROVENTIL HFA, VENTOLIN HFA, PROAIR HFA) 90 mcg/actuation inhaler Take 2 Puffs by inhalation every four (4) hours as needed for Wheezing or Shortness of Breath. 1 Inhaler 5    aspirin delayed-release 81 mg tablet Take  by mouth daily.  vitamin A 8,000 unit capsule Take 8,000 Units by mouth daily.  biotin 10,000 mcg cap Take  by mouth daily.  OTHER Complete multi formula, bariatric advantage      magnesium 250 mg tab Take 1 Tab by mouth daily.  ferrous sulfate (IRON, FERROUS SULFATE,) 325 mg (65 mg Iron) tablet Take  by mouth daily (before breakfast).  cholecalciferol, vitamin d3, (VITAMIN D) 1,000 unit tablet Take 10,000 Units by mouth daily. 10,000 units \"dry vitamin d \"      CALCIUM PO Take 1 Tab by mouth daily.            PHYSICAL EXAM  Visit Vitals    /66 (BP 1 Location: Left arm, BP Patient Position: Sitting)    Pulse 75    Temp 98 °F (36.7 °C) (Oral)    Resp 16    Ht 5' 5\" (1.651 m)    Wt 251 lb 12.8 oz (114.2 kg)    SpO2 97%    BMI 41.9 kg/m2       General: Obese, no distress. HEENT:  Head normocephalic/atraumatic, no scleral icterus  Lungs:  Clear to ausculation bilaterally. Good air movement. Heart:  Regular rate and rhythm, normal S1 and S2, 2/6 MYKE at LSB, no gallop, or rub  Extremities: No clubbing, cyanosis, or edema. Neurological: Alert and oriented. Psychiatric: Normal mood and affect. Behavior is normal.     Results for orders placed or performed in visit on 20/95/00   METABOLIC PANEL, BASIC   Result Value Ref Range    Glucose 97 65 - 99 mg/dL    BUN 14 8 - 27 mg/dL    Creatinine 0.95 0.57 - 1.00 mg/dL    GFR est non-AA 63 >59 mL/min/1.73    GFR est AA 73 >59 mL/min/1.73    BUN/Creatinine ratio 15 11 - 26    Sodium 141 134 - 144 mmol/L    Potassium 5.2 3.5 - 5.2 mmol/L    Chloride 102 96 - 106 mmol/L    CO2 22 18 - 29 mmol/L    Calcium 8.9 8.7 - 10.3 mg/dL   MAGNESIUM   Result Value Ref Range    Magnesium 2.2 1.6 - 2.3 mg/dL         ASSESSMENT AND PLAN    ICD-10-CM ICD-9-CM    1. Vertigo, benign paroxysmal, unspecified laterality H81.10 386.11 meclizine (ANTIVERT) 25 mg tablet   2. Anxiety F41.9 300.00 ALPRAZolam (XANAX) 0.5 mg tablet   3. Essential hypertension I10 401.9    4. Systolic CHF, chronic (HCC) I50.22 428.22      428.0    5. Coronary artery disease involving native coronary artery of native heart without angina pectoris I25.10 414.01    6. Primary osteoarthritis of right knee M17.11 715.16      Orders Placed This Encounter    ALPRAZolam (XANAX) 0.5 mg tablet    meclizine (ANTIVERT) 25 mg tablet       Ladonna Peralesursula was seen today for depression, coronary artery disease, hypertension and dizziness. Diagnoses and all orders for this visit:    Vertigo, benign paroxysmal, unspecified laterality  -     meclizine (ANTIVERT) 25 mg tablet;  Take 1 Tab by mouth three (3) times daily as needed for up to 10 days. Indications: VERTIGO    Anxiety  -     Start ALPRAZolam (XANAX) 0.5 mg tablet; Take 1 Tab by mouth two (2) times daily as needed for Anxiety. Max Daily Amount: 1 mg. Essential hypertension  Well-controlled. Continue present management. Systolic CHF, chronic (HCC)  Scheduled for TTE and Lexiscan per Dr. Jerry Wahl. Coronary artery disease involving native coronary artery of native heart without angina pectoris    Primary osteoarthritis of right knee    Provided patient and/or family with advanced directive information and answered pertinent questions. Encouraged patient to provide a copy of advanced directive to the office when available. I have discussed the diagnosis with the patient and the intended plan as seen in the above orders. Patient is in agreement. The patient has received an after-visit summary and questions were answered concerning future plans. I have discussed medication side effects and warnings with the patient as well.

## 2017-04-07 NOTE — MR AVS SNAPSHOT
Visit Information Date & Time Provider Department Dept. Phone Encounter #  
 4/7/2017  1:00 PM Mark Chavezdavina [phone] [id number] Follow-up Instructions Return in about 3 months (around 7/7/2017), or if symptoms worsen or fail to improve, for HTN, depression/anxiety, Medicare Annual Wellness Visit. Your Appointments 4/12/2017 11:00 AM  
ECHO CARDIOGRAMS 2D with Meron Bautista [facility] ([place] SCHEDULING) Appt Note: Per Dr. Fred Kim 2 Day Testing Lexiscan Nuc & Echo dx CAD, SOB, MR ht 5'5 wt 255lb pt r/s from 3/30 to 4/12  
 [address] [place] [address]  
[address] [email or web address]  
  
    
 4/12/2017 12:00 PM  
NUCLEAR MEDICINE with JOSE ALBERTO [facility] [place] Appt Note: Per Dr. Fred Kim 2 Day Testing Lexiscan Nuc & Echo dx CAD, SOB, MR ht 5'5 wt 255lb; Per Dr. Fred Kim 2 Day Testing Lexiscan Nuc & Echo dx CAD, SOB, MR ht 5'5 wt 255lb pt r/s from 3/30 to 4/12  
 [address] [place] [id number]  
[address] [address]  
  
    
 4/17/2017  1:30 PM  
NUCLEAR MEDICINE with Elliot RIVERS [facility] ([place] SCHEDULING) Appt Note: Per Dr. Fred Kim Day 2 Resting/Images Lexiscan Nuc dx CAD, SOB, MR ht 5'5 wt 255lb; Per Dr. Fred Kim Day 2 Resting/Images Lexiscan Nuc dx CAD, SOB, MR ht 5'5 wt 255lb pt r/s from 3/31 to 4/17  
 [address] [place]  
[phone] 4/17/2017  9:30 AM  
REMOTE OFFICE VISIT with Giselle Guevara [facility] ([place] SCHEDULING) Appt Note: LAT sub Q /rc  b 1-12-17   (added 1-24-17)  remote came, no schedule [address] [place]  
[phone] [address]  
  
    
 7/19/2017  8:15 AM  
REMOTE OFFICE VISIT with Pillo Moser [facility] ([place] SCHEDUL[place]) Appt Note: LAT SubQ ICD/rc b  
 [address] [place]  
[phone] Upcoming Health Maintenance Date Due  
 MEDICARE YEARLY EXAM 6/15/2017 BREAST CANCER SCRN MAMMOGRAM 2/1/2018 PAP AKA CERVICAL CYTOLOGY 7/26/2019 COLONOSCOPY 9/5/2019 DTaP/Tdap/Td series (2 - Td) 10/30/2024 Allergies as of 4/7/2017  Review Complete On: 4/7/2017 By: Sharad London MD  
  
 Severity Noted Reaction Type Reactions Amoxicillin High 07/27/2010   Side Effect Anaphylaxis Amoxicillin High 06/30/2015    Anaphylaxis Lipitor [Atorvastatin] High 06/30/2015    Other (comments) Severe muscle pain and spasms Zocor [Simvastatin] Medium 06/30/2015    Other (comments) Cramps, muscle spasms Livalo [Pitavastatin]  01/25/2017    Myalgia Pravastatin  08/19/2016    Other (comments) Leg cramps Current Immunizations  Reviewed on 7/11/2016 Name Date Influenza Vaccine (Quad) PF 9/21/2016  6:19 PM, 10/6/2015 Influenza Vaccine Split 11/28/2011 11:25 AM  
 Pneumococcal Conjugate (PCV-13) 6/14/2016 Pneumococcal Vaccine (Unspecified Type) 8/20/2011 Tdap 10/30/2014 Zoster Vaccine, Live 2/26/2016 Not reviewed this visit You Were Diagnosed With   
  
 Codes Comments Vertigo, benign paroxysmal, unspecified laterality    -  Primary ICD-10-CM: H81.10 ICD-9-CM: 386.11 Anxiety     ICD-10-CM: F41.9 ICD-9-CM: 300.00 Essential hypertension     ICD-10-CM: I10 
ICD-9-CM: 401.9 Systolic CHF, chronic (HCC)     ICD-10-CM: I50.22 ICD-9-CM: 428.22, 428.0 Coronary artery disease involving native coronary artery of native heart without angina pectoris     ICD-10-CM: I25.10 ICD-9-CM: 414.01  Primary osteoarthritis of right knee     ICD-10-CM: M17.11 
 ICD-9-CM: 715.16 Vitals BP Pulse Temp Resp Height(growth percentile) Weight(growth percentile) 104/66 (BP 1 Location: Left arm, BP Patient Position: Sitting) 75 98 °F (36.7 °C) (Oral) 16 5' 5\" (1.651 m) 251 lb 12.8 oz (114.2 kg) SpO2 BMI OB Status Smoking Status 97% 41.9 kg/m2 Postmenopausal Former Smoker BMI and BSA Data Body Mass Index Body Surface Area 41.9 kg/m 2 2.29 m 2 Preferred Pharmacy Pharmacy Name Phone [id number] [place] [phone] Your Updated Medication List  
  
   
This list is accurate as of: 4/7/17  1:45 PM.  Always use your most recent med list.  
  
  
  
  
 albuterol 90 mcg/actuation inhaler Commonly known as:  PROVENTIL HFA, VENTOLIN HFA, PROAIR HFA Take 2 Puffs by inhalation every four (4) hours as needed for Wheezing or Shortness of Breath. ALPRAZolam 0.5 mg tablet Commonly known as:  Candelario Inch Take 1 Tab by mouth two (2) times daily as needed for Anxiety. Max Daily Amount: 1 mg.  
  
 aspirin delayed-release 81 mg tablet Take  by mouth daily. biotin 10,000 mcg Cap Take  by mouth daily. bumetanide 2 mg tablet Commonly known as:  Shelva Mulligan Take 0.5 Tabs by mouth two (2) times a day. Patient takes Bumex in relation to what her B/P is. Indications: Edema CALCIUM PO Take 1 Tab by mouth daily. FLUoxetine 20 mg tablet Commonly known as:  PROzac TAKE 2 TABLETS EVERY MORNING AND 1 AT BEDTIME FOR ANXIETY WITH DEPRESSION  
  
 ibuprofen 200 mg tablet Commonly known as:  MOTRIN Take  by mouth. Iron (Ferrous Sulfate) 325 mg (65 mg iron) tablet Generic drug:  ferrous sulfate Take  by mouth daily (before breakfast). magnesium 250 mg Tab Take 1 Tab by mouth daily. meclizine 25 mg tablet Commonly known as:  ANTIVERT Take 1 Tab by mouth three (3) times daily as needed for up to 10 days.  Indications: VERTIGO  
  
 metoprolol succinate 25 mg XL tablet Commonly known as:  TOPROL-XL Take 0.5 Tabs by mouth daily. OTHER Complete multi formula, bariatric advantage  
  
 potassium chloride 20 mEq tablet Commonly known as:  K-DUR, KLOR-CON  
TAKE TWO TABLETS BY MOUTH DAILY PROBIOTIC PO Take  by mouth. sacubitril-valsartan 24-26 mg tablet Commonly known as:  ENTRESTO Take 1 Tab by mouth two (2) times a day. vitamin A 8,000 unit capsule Take 8,000 Units by mouth daily. VITAMIN D3 1,000 unit tablet Generic drug:  cholecalciferol Take 10,000 Units by mouth daily. 10,000 units \"dry vitamin d \"  
  
 zolpidem 10 mg tablet Commonly known as:  AMBIEN  
TAKE ONE TABLET BY MOUTH NIGHTLY AS NEEDED FOR SLEEP. MAX DAILY AMOUNT 10MG Prescriptions Printed Refills ALPRAZolam (XANAX) 0.5 mg tablet 0 Sig: Take 1 Tab by mouth two (2) times daily as needed for Anxiety. Max Daily Amount: 1 mg. Class: Print Route: Oral  
  
Prescriptions Sent to Pharmacy Refills  
 meclizine (ANTIVERT) 25 mg tablet 1 Sig: Take 1 Tab by mouth three (3) times daily as needed for up to 10 days. Indications: VERTIGO Class: Normal  
 [facility] [address] #: [phone] Route: Oral  
  
Follow-up Instructions Return in about 3 months (around 7/7/2017), or if symptoms worsen or fail to improve, for HTN, depression/anxiety, Medicare Annual Wellness Visit. Patient Instructions Benign Paroxysmal Positional Vertigo (BPPV): Care Instructions Your Care Instructions Benign paroxysmal positional vertigo, also called BPPV, is an inner ear problem. It causes a spinning or whirling sensation when you move your head. This sensation is called vertigo. The vertigo usually lasts for less than a minute. People often have vertigo spells for a few days or weeks. Then the vertigo goes away.  But it may come back again. The vertigo may be mild, or it may be bad enough to cause unsteadiness, nausea, and vomiting. When you move, your inner ear sends messages to the brain. This helps you keep your balance. Vertigo can happen when debris builds up in the inner ear. The buildup can cause the inner ear to send the wrong message to the brain. Your doctor may move you in different positions to help your vertigo get better faster. This is called the Epley maneuver. Your doctor may also prescribe medicines or exercises to help with your symptoms. Follow-up care is a key part of your treatment and safety. Be sure to make and go to all appointments, and call your doctor if you are having problems. It's also a good idea to know your test results and keep a list of the medicines you take. How can you care for yourself at home? · If your doctor suggests that you do Hernandez-Daroff exercises: ¨ Sit on the edge of a bed or sofa. Quickly lie down on the side that causes the worst vertigo. Lie on your side with your ear down. ¨ Stay in this position for at least 30 seconds or until the vertigo goes away. ¨ Sit up. If this causes vertigo, wait for it to stop. ¨ Repeat the procedure on the other side. ¨ Repeat this 10 times. Do these exercises 2 times a day until the vertigo is gone. When should you call for help? Call 911 anytime you think you may need emergency care. For example, call if: 
· You have symptoms of a stroke. These may include: 
¨ Sudden numbness, tingling, weakness, or loss of movement in your face, arm, or leg, especially on only one side of your body. ¨ Sudden vision changes. ¨ Sudden trouble speaking. ¨ Sudden confusion or trouble understanding simple statements. ¨ Sudden problems with walking or balance. ¨ A sudden, severe headache that is different from past headaches. Call your doctor now or seek immediate medical care if: 
· You have new or worse nausea and vomiting. · You have new symptoms such as hearing loss or roaring in your ears. Watch closely for changes in your health, and be sure to contact your doctor if: 
· You are not getting better as expected. · Your vertigo gets worse. Where can you learn more? Go to http://jose-rm.info/. Enter  in the search box to learn more about \"Benign Paroxysmal Positional Vertigo (BPPV): Care Instructions. \" Current as of: July 29, 2016 Content Version: 11.2 © 6948-4871 [employer]. Care instructions adapted under license by [employer] (which disclaims liability or warranty for this information). If you have questions about a medical condition or this instruction, always ask your healthcare professional. [address] any warranty or liability for your use of this information. Introducing [facility]! Dear Low Crawford: Thank you for requesting a Logo[employer] account. Our records indicate that you already have an active LogoGr[employer] account. You can access your account anytime at [email or web address] Did you know that you can access your hospital and ER discharge instructions at any time in LogoGrab? You can also review all of your test results from your hospital stay or ER visit. Additional Information If you have questions, please visit the Frequently Asked Questions section of the Logo[employer] website at [email or web address] Remember, LogoGrab is NOT to be used for urgent needs. For medical emergencies, dial 911. Now available from your iPhone and Android! Please provide this summary of care documentation to your next provider. Your primary care clinician is listed as Malou Villa. If you have any questions after today's visit, please call [phone].

## 2017-04-07 NOTE — PROGRESS NOTES
Reviewed record  In preparation for visit and have obtained necessary documentation. 1. Have you been to the ER, urgent care clinic since your last visit? Hospitalized since your last visit?no  2. Have you seen or consulted any other health care providers outside of the [address] [place] [address] since your last visit? Include any pap smears or colon screening. No  Patient has been given advanced directives information at previous office visit.

## 2017-04-12 ENCOUNTER — CLINICAL SUPPORT (OUTPATIENT)
Dept: CARDIOLOGY CLINIC | Age: 67
End: 2017-04-12

## 2017-04-12 DIAGNOSIS — I42.9 SECONDARY CARDIOMYOPATHY (HCC): ICD-10-CM

## 2017-04-12 DIAGNOSIS — I50.22 SYSTOLIC CHF, CHRONIC (HCC): ICD-10-CM

## 2017-04-12 DIAGNOSIS — T82.120S: Primary | ICD-10-CM

## 2017-04-13 ENCOUNTER — TELEPHONE (OUTPATIENT)
Dept: CARDIOLOGY CLINIC | Age: 67
End: 2017-04-13

## 2017-04-13 NOTE — TELEPHONE ENCOUNTER
The following test results given to patient per Dr. Lyn Daily:    Echo: 4/17, EF 35-40%, inf HK [id number] change    Patient reports feeling much better with recent medication changes. Blood pressure has stabilized.    2 pt identifiers used

## 2017-04-17 ENCOUNTER — CLINICAL SUPPORT (OUTPATIENT)
Dept: CARDIOLOGY CLINIC | Age: 67
End: 2017-04-17

## 2017-04-17 ENCOUNTER — OFFICE VISIT (OUTPATIENT)
Dept: CARDIOLOGY CLINIC | Age: 67
End: 2017-04-17

## 2017-04-17 DIAGNOSIS — I25.119 CORONARY ARTERY DISEASE INVOLVING NATIVE CORONARY ARTERY OF NATIVE HEART WITH ANGINA PECTORIS (HCC): ICD-10-CM

## 2017-04-17 DIAGNOSIS — Z95.810 PRESENCE OF AUTOMATIC CARDIOVERTER/DEFIBRILLATOR (AICD): Primary | ICD-10-CM

## 2017-04-17 DIAGNOSIS — I42.9 CARDIOMYOPATHY (HCC): ICD-10-CM

## 2017-04-17 DIAGNOSIS — I50.22 SYSTOLIC CHF, CHRONIC (HCC): ICD-10-CM

## 2017-04-17 DIAGNOSIS — E78.5 HYPERLIPIDEMIA, UNSPECIFIED HYPERLIPIDEMIA TYPE: ICD-10-CM

## 2017-04-17 DIAGNOSIS — I34.0 MITRAL VALVE INSUFFICIENCY, UNSPECIFIED ETIOLOGY: ICD-10-CM

## 2017-04-17 DIAGNOSIS — R06.02 SHORTNESS OF BREATH: ICD-10-CM

## 2017-04-19 ENCOUNTER — TELEPHONE (OUTPATIENT)
Dept: CARDIOLOGY CLINIC | Age: 67
End: 2017-04-19

## 2017-04-19 NOTE — TELEPHONE ENCOUNTER
Called patient to schedule an appointment with patient. Dr. Kyle Dominguez would like to see her in clinic to discuss the following results:    Nuclear: 4/17, EF 36%, anterior ischemia, lateral infarct.     Future Appointments  Date Time Provider Denis Hurst   4/28/2017 8:40 AM Diana Cifuentes  [address]   7/7/2017 11:30 AM Mxa Berumen MD [place] [id number]   7/19/2017 8:15 AM REMOTE1, [address] [facility]       2 pt identifiers used

## 2017-04-28 ENCOUNTER — OFFICE VISIT (OUTPATIENT)
Dept: CARDIOLOGY CLINIC | Age: 67
End: 2017-04-28

## 2017-04-28 VITALS
HEIGHT: 65 IN | HEART RATE: 70 BPM | BODY MASS INDEX: 42.72 KG/M2 | OXYGEN SATURATION: 97 % | RESPIRATION RATE: 18 BRPM | DIASTOLIC BLOOD PRESSURE: 80 MMHG | WEIGHT: 256.4 LBS | SYSTOLIC BLOOD PRESSURE: 130 MMHG

## 2017-04-28 DIAGNOSIS — I25.10 CORONARY ARTERY DISEASE INVOLVING NATIVE CORONARY ARTERY OF NATIVE HEART WITHOUT ANGINA PECTORIS: Primary | ICD-10-CM

## 2017-04-28 DIAGNOSIS — I42.9 CARDIOMYOPATHY, UNSPECIFIED TYPE (HCC): ICD-10-CM

## 2017-04-28 DIAGNOSIS — E78.5 HYPERLIPIDEMIA, UNSPECIFIED HYPERLIPIDEMIA TYPE: ICD-10-CM

## 2017-04-28 NOTE — PROGRESS NOTES
[facility]  ([phone]    HPI: Lisa Gordon, a 61 y.o. who presents for follow up regarding her CAD and CHF. Last visit cut bumex for hypotension and near-syncope, with dizziness etc. Now dizziness seems to be unrelated BPPV, stress test is possible anterior ischemia but could be body habitus artifact, will watch fo rnow as only nonobstructive disease in LAD territory on cath less than one month ago. Weight is up 5 pounds, is gaining fluid since cutting Bumex. and stable dyspnea, no chest pain,   Recently went to ER for syncope, also with dyspnea, chest discomfort and general malaise today. Feeling better, dizziness is better, PCP thinks she has vertigo and no more syncope. She fels the room spinning and it is positional, had head maneuvers. Heart racing has stopped. No more near-syncope. Not using hte metolazone. Will go back up to the whole Bumex  Will recheck labs in 6 qweeks and see how she is doing. Since she is doing well will defer her cath for change or progression of sx. Eating and drinking ok now. losartan 12.5mg BID and Entrestro 24/26 1tab BID  Last revision for AICD in November 2016 with Dr. Naz Talley. No LE edema, go back to Bumex 2mg QD, only takes Metolazone PRN (not using now)    Assessment/Plan:  1. Chronic systolic heart failure - LVEF 35% by TTE in 9/16, s/p AICD placement with Dr. Naz Talley and subsequent lead revisions and repeat procedures due to non-healing midsternal incision  -due to dizziness and hypotension on low dose Entresto to 24/26 BID and Toprol XL 12.5mg daily  -off spironolactone right now due to hypotension, cont Bumex 2mg, Metolazone PRN   -c/o hair loss with Coreg and Toprol XL but willing to continue Toprol XL for now    2.  CAD - hx of MI x 2 and multiple stents, she believes she may have 6 or 7 stents, recent cardiac cath did not show progression of CAD, continue ASA and beta blocker, unable to tolerate pravastatin, simvastatin, atorvastatin, see lipid therapy plan below    -reports having an MI in 11/2011 and received PCI to RCA at that time, previous MI in 2006 or 2007  3. Mitral regurgitation - mod MR by TTE in 9/16  4. Asthma - x 15 - 16 years, has not seen pulmonologist in years, did not need her Albuterol PRN until Coreg dose increased in 2015, now using it 2-3 times/week  5. HTN - continue current regimen  6. Dyslipidemia -  in 2/16, zocor caused muscle spasms and cramps, lipitor caused pain shooting all over her body, could tolerate Pravastatin 40mg daily due to leg cramps but LDL 97 on pravastatin 40mg daily, tried Livalo after her last visit but could not tolerate it due to myalgias, now on just [employer]  -will discuss injectable lipid medication at her next visit   7. DM Type 2 - resolved with gastric bypass, , last Hgb A1C 5.8%  8. Hypokalemia - nomal on recheck, now off spironolactone  9. Hypomagnesemia - cont otc, ok last check  10. Morbid obesity - Body mass index is 42.67 kg/(m^2). ). weighed 416 lbs at her heaviest weight, s/p gastric bypass in 2007 with revision a few years later, working on weight loss and exercise, weight down 9 lbs today  11. PUD - had EGD and cauterized bleeding ulcer, not on PPI anymore  12. Vitamin D deficiency - on supplementation, Vitamin D level 34.2  13. Anxiety - on Prozac, could not sleep on Clonazepam, able to sleep better on ambien 10mg at bedtime     Echo: 4/17, EF 35-40%, inf HK gr1dd  Nuclear: 4/17, EF 36%, anterior ischemia, lateral infarct. TTE 9/16 - LVEF 35%, moderate hypokinesis of the basal-mid inferolateral wall(s), grd 1 dd, dilated LA, mod MR, mild TR  8/26/16 - RV lead revision by Dr. Ami Tomlin  8/24/16 - single chamber AICD placed by Dr. Ami Tomlin ([address] VR, serial # [id number], model # [id number].  The ventricular lead: model # [id number] , serial # [id number])  MUGA 6/16 - LVEF 27%  Holter 6/16 - no arrhythmias  Echo 5/16 - LV mildly dilated, LVEF 40%, moderate diffuse hypokinesis with regional variations, severe hypokinesis of the basal-mid inferior wall(s), grd 1 dd, mod dilated LA, atrial septum bows from left to right, consistent with increased left atrial pressure, mildly dilated RA, mild to near moderate MR    OLIVER  - normal  Nuc 5/15 EF 31% large anterolateral infarct with periinfarct reversibility, 13% LV reversible(32% infarct at rest, 45% at stress)    Echo 2015 - LVEF 30-35%, grd 1 dd, mod LAE, mild to mod MR  Nuc Stress  - small reversibility in anteroapical wall, LVEF 31%  Cardiac Cath 3/13 - patent stents in LAD, LCx and RCA, LVEF 30%, severe inferior HK, LCx relatively small vessel with patent stent in proximal LCx, RCA is large and dominant with patent stents in proximal and mid RCA, distal RCA free of disease, LAD normal and large vessel which coursed around apex  Echo 2011 - LVEF 30%, mild MR  Cardiac Cath 2011 - s/p PCI with AMRIT to 100% mid RCA, also had 40% mid LAD lesion, 50% diagonal 1 lesion, 100% distal LCx lesion, 60% OM1 lesion, 100% proximal Ramus lesion      Soc Hx: quit tobacco use x 13 years ago, used to smoke 1ppd, no etoh use, no drug use, lives with , son, daughter in law, grandson, retired/on disability  Fam Hx: father in his 62s when he had a MI, had 3 vessel CABG in his 62s, passed from fall but had cancer too, mother lived to age 80 and  from Alzheimer's, brother had MI in his 62s, sister had aortic repair for aneurysm in her 76s    She  has a past medical history of Asthma; Cardiomyopathy ([facility] Coronary artery disease (); Depression with anxiety; DVT (deep venous thrombosis) ([facility] (2010); Family history of early CAD; GERD (gastroesophageal reflux disease); H/O gastric bypass (); Hepatitis C antibody test positive; HTN (hypertension) (2010); Hyperlipidemia (2010); Joint pain (2010); MI (myocardial infarction) ([facility] (2010); Mitral valve regurgitation;  [facility] (2010); Myocardial infarction [facility]) (2006 and 2011); PUD (peptic ulcer disease); and Urinary incontinence, stress (7/27/2010). She also has no past medical history of Diabetes ([address] Cardiovascular ROS: positive for dyspnea on exertion   Respiratory ROS: no cough, wheezing  Neurological ROS: no TIA or stroke symptoms  All other systems negative except as above. PE  Vitals:    04/28/17 0907   BP: 130/80   Pulse: 70   Resp: 18   SpO2: 97%   Weight: 256 lb 6.4 oz (116.3 kg)   Height: 5' 5\" (1.651 m)    Body mass index is 42.67 kg/(m^2).   General appearance - alert, well appearing, and in no distress  Mental status - affect appropriate to mood  Eyes - sclera anicteric, moist mucous membranes  Neck - supple  Lymphatics - not assessed  Chest - clear to auscultation, no wheezes, rales or rhonchi  Heart - normal rate, regular rhythm, normal S1, S2, 2/6 MYKE   Abdomen - soft, nontender, nondistended, obese  Back exam - full range of motion, no tenderness  Neurological - cranial nerves II through XII grossly intact, no focal deficit  Musculoskeletal - no muscular tenderness noted, normal strength  Extremities - diminished peripheral pulses, no LE edema  Skin - normal coloration  no rashes    Recent Labs:  Lab Results   Component Value Date/Time    Cholesterol, total 177 08/02/2016 12:51 PM    HDL Cholesterol 63 08/02/2016 12:51 PM    LDL, calculated 97 08/02/2016 12:51 PM    Triglyceride 87 08/02/2016 12:51 PM     Lab Results   Component Value Date/Time    Creatinine 0.95 02/27/2017 12:24 PM     Lab Results   Component Value Date/Time    BUN 14 02/27/2017 12:24 PM    BUN (POC) 24 11/26/2011 09:50 PM     Lab Results   Component Value Date/Time    Potassium 5.2 02/27/2017 12:24 PM     Lab Results   Component Value Date/Time    Hemoglobin A1c 5.8 02/09/2016 11:44 AM    Hemoglobin A1c, External 5.5 12/12/2014     Lab Results   Component Value Date/Time    HGB 14.7 02/23/2017 07:26 AM     Lab Results   Component Value Date/Time PLATELET 827 22/43/2886 07:26 AM       Reviewed:  Past Medical History:   Diagnosis Date    Asthma     [facility]     Coronary artery disease 2008    s/p RCA stent (AMRIT) on 11/26/11    Depression with anxiety     2011    DVT (deep venous thrombosis) ([facility] 7/27/2010    Family history of early CAD     GERD (gastroesophageal reflux disease)     H/O gastric bypass 2006    Revision in 2009    Hepatitis C antibody test positive     Does not have chronic hep C (labs 10/6/15: neg HCV RNA)     HTN (hypertension) 7/27/2010    Hyperlipidemia 07/27/2010    Joint pain 7/27/2010    MI (myocardial infarction) ([facility] 7/27/2010    Mitral valve regurgitation     Mild to moderate    [facility] 7/27/[facility]    Myocardial infarction ([facility] 2006 and 2011    Dr. Alivia SMTIH (peptic ulcer disease)     gi bleed 2008; ulcer n gastric bypass pouch    Urinary incontinence, stress 7/27/2010     History   Smoking Status    Former Smoker    Start date: 1/1/1970    Quit date: 5/17/2004   Smokeless Tobacco    Never Used     History   Alcohol Use No     Allergies   Allergen Reactions    Amoxicillin Anaphylaxis    Amoxicillin Anaphylaxis    Lipitor [Atorvastatin] Other (comments)     Severe muscle pain and spasms    Zocor [Simvastatin] Other (comments)     Cramps, muscle spasms    Livalo [Pitavastatin] Myalgia    Pravastatin Other (comments)     Leg cramps       Current Outpatient Prescriptions   Medication Sig    ALPRAZolam (XANAX) 0.5 mg tablet Take 1 Tab by mouth two (2) times daily as needed for Anxiety. Max Daily Amount: 1 mg.  zolpidem (AMBIEN) 10 mg tablet TAKE ONE TABLET BY MOUTH NIGHTLY AS NEEDED FOR SLEEP. MAX DAILY AMOUNT 10MG    bumetanide (BUMEX) 2 mg tablet Take 0.5 Tabs by mouth two (2) times a day. Patient takes Bumex in relation to what her B/P is. Indications: Edema    ibuprofen (MOTRIN) 200 mg tablet Take  by mouth.     potassium chloride (K-DUR, KLOR-CON) 20 mEq tablet TAKE TWO TABLETS BY MOUTH DAILY    sacubitril-valsartan (ENTRESTO) 24 mg/26 mg tablet Take 1 Tab by mouth two (2) times a day.  FLUoxetine (PROZAC) 20 mg tablet TAKE 2 TABLETS EVERY MORNING AND 1 AT BEDTIME FOR ANXIETY WITH DEPRESSION    metoprolol succinate (TOPROL-XL) 25 mg XL tablet Take 0.5 Tabs by mouth daily.  LACTOBACILLUS ACIDOPHILUS (PROBIOTIC PO) Take  by mouth.  albuterol (PROVENTIL HFA, VENTOLIN HFA, PROAIR HFA) 90 mcg/actuation inhaler Take 2 Puffs by inhalation every four (4) hours as needed for Wheezing or Shortness of Breath.  aspirin delayed-release 81 mg tablet Take  by mouth daily.  vitamin A 8,000 unit capsule Take 8,000 Units by mouth daily.  biotin 10,000 mcg cap Take  by mouth daily.  OTHER Complete multi formula, bariatric advantage    magnesium 250 mg tab Take 1 Tab by mouth daily.  ferrous sulfate (IRON, FERROUS SULFATE,) 325 mg (65 mg Iron) tablet Take  by mouth daily (before breakfast).  cholecalciferol, vitamin d3, (VITAMIN D) 1,000 unit tablet Take 10,000 Units by mouth daily. 10,000 units \"dry vitamin d \"    CALCIUM PO Take 1 Tab by mouth daily. No current facility-administered medications for this visit.         Roxane Cortez MD  [facility] [address] [facility] [address] [facility] [address]  [facility]  [phone]

## 2017-04-28 NOTE — MR AVS SNAPSHOT
Visit Information Date & Time Provider Department Dept. Phone Encounter #  
 4/28/2017  8:40 AM Sabino Brennan MD [facility] Imani Watson [phone] [id number] Your Appointments 5/30/2017  9:00 AM  
ESTABLISHED PATIENT with Sabino Brennan MD  
[facility] ([facility]) Appt Note: 1 month follow up per Dr. [address] [place]  
[address] [facility] [address]  
  
    
 7/7/2017 11:30 AM  
ROUTINE CARE with MD Claire Sanchez [facility] Appt Note: 3mth f/u; HTN, depression/anxiety, Medicare Annual Wellness Visit. [address] [facility] [address] [phone]  
  
   
 [address] [place] [address] 7/19/2017  8:15 AM  
REMOTE OFFICE VISIT with Eloy Sandhu [facility] ([facility] SCHEDULING) Appt Note: LAT SubQ ICD/rc b  
 [address] [place]  
[address] [address] Upcoming Health Maintenance Date Due  
 MEDICARE YEARLY EXAM 6/15/2017 BREAST CANCER SCRN MAMMOGRAM 2/1/2018 PAP AKA CERVICAL CYTOLOGY 7/26/2019 COLONOSCOPY 9/5/2019 DTaP/Tdap/Td series (2 - Td) 10/30/2024 Allergies as of 4/28/2017  Review Complete On: 4/28/2017 By: Brooklyn Garrido RN Severity Noted Reaction Type Reactions Amoxicillin High 07/27/2010   Side Effect Anaphylaxis Amoxicillin High 06/30/2015    Anaphylaxis Lipitor [Atorvastatin] High 06/30/2015    Other (comments) Severe muscle pain and spasms Zocor [Simvastatin] Medium 06/30/2015    Other (comments) Cramps, muscle spasms Livalo [Pitavastatin]  01/25/2017    Myalgia Pravastatin  08/19/2016    Other (comments) Leg cramps Current Immunizations  Reviewed on 7/11/2016 Name Date Influenza Vaccine (Quad) PF 9/21/2016  6:19 PM, 10/6/2015 Influenza Vaccine Split 11/28/2011 11:25 AM  
 Pneumococcal Conjugate (PCV-13) 6/14/2016 Pneumococcal Vaccine (Unspecified Type) 8/20/2011 Tdap 10/30/2014 Zoster Vaccine, Live 2/26/2016 Not reviewed this visit You Were Diagnosed With   
  
 Codes Comments Coronary artery disease involving native coronary artery of native heart without angina pectoris    -  Primary ICD-10-CM: I25.10 ICD-9-CM: 414.01 Cardiomyopathy, unspecified type     ICD-10-CM: I42.9 ICD-9-CM: 425.4 Hyperlipidemia, unspecified hyperlipidemia type     ICD-10-CM: E78.5 ICD-9-CM: 272.4 Vitals BP Pulse Resp Height(growth percentile) Weight(growth percentile) SpO2  
 130/80 (BP 1 Location: Left arm, BP Patient Position: Sitting) 70 18 5' 5\" (1.651 m) 256 lb 6.4 oz (116.3 kg) 97% BMI OB Status Smoking Status 42.67 kg/m2 Postmenopausal Former Smoker BMI and BSA Data Body Mass Index Body Surface Area  
 42.67 kg/m 2 2.31 m 2 Preferred Pharmacy Pharmacy Name Phone [id number] [phone] Your Updated Medication List  
  
   
This list is accurate as of: 4/28/17  9:54 AM.  Always use your most recent med list.  
  
  
  
  
 albuterol 90 mcg/actuation inhaler Commonly known as:  PROVENTIL HFA, VENTOLIN HFA, PROAIR HFA Take 2 Puffs by inhalation every four (4) hours as needed for Wheezing or Shortness of Breath. ALPRAZolam 0.5 mg tablet Commonly known as:  Philip Cellar Take 1 Tab by mouth two (2) times daily as needed for Anxiety. Max Daily Amount: 1 mg.  
  
 aspirin delayed-release 81 mg tablet Take  by mouth daily. biotin 10,000 mcg Cap Take  by mouth daily. bumetanide 2 mg tablet Commonly known as:  Doc Penn Take 0.5 Tabs by mouth two (2) times a day.  Patient takes Bumex in relation to what her B/P is. Indications: Edema CALCIUM PO Take 1 Tab by mouth daily. FLUoxetine 20 mg tablet Commonly known as:  PROzac TAKE 2 TABLETS EVERY MORNING AND 1 AT BEDTIME FOR ANXIETY WITH DEPRESSION  
  
 ibuprofen 200 mg tablet Commonly known as:  MOTRIN Take  by mouth. Iron (Ferrous Sulfate) 325 mg (65 mg iron) tablet Generic drug:  ferrous sulfate Take  by mouth daily (before breakfast). magnesium 250 mg Tab Take 1 Tab by mouth daily. metoprolol succinate 25 mg XL tablet Commonly known as:  TOPROL-XL Take 0.5 Tabs by mouth daily. OTHER Complete multi formula, bariatric advantage  
  
 potassium chloride 20 mEq tablet Commonly known as:  K-DUR, KLOR-CON  
TAKE TWO TABLETS BY MOUTH DAILY PROBIOTIC PO Take  by mouth. sacubitril-valsartan 24-26 mg tablet Commonly known as:  ENTRESTO Take 1 Tab by mouth two (2) times a day. vitamin A 8,000 unit capsule Take 8,000 Units by mouth daily. VITAMIN D3 1,000 unit tablet Generic drug:  cholecalciferol Take 10,000 Units by mouth daily. 10,000 units \"dry vitamin d \"  
  
 zolpidem 10 mg tablet Commonly known as:  AMBIEN  
TAKE ONE TABLET BY MOUTH NIGHTLY AS NEEDED FOR SLEEP. MAX DAILY AMOUNT 10MG We Performed the Following MAGNESIUM [id number] CPT(R)] METABOLIC PANEL, BASIC [48991 CPT(R)] Introducing [facility]! Dear Ellie Kendrick: Thank you for requesting a theAudience account. Our records indicate that you already have an active theAudience account. You can access your account anytime at [email or web address] Did you know that you can access your hospital and ER discharge instructions at any time in theAudience? You can also review all of your test results from your hospital stay or ER visit. Additional Information If you have questions, please visit the Frequently Asked Questions section of the Quitbit website at [email or web address] Remember, Quitbit is NOT to be used for urgent needs. For medical emergencies, dial 911. Now available from your iPhone and Android! Please provide this summary of care documentation to your next provider. Your primary care clinician is listed as Dipika Caicedo. If you have any questions after today's visit, please call [phone].

## 2017-05-19 ENCOUNTER — OFFICE VISIT (OUTPATIENT)
Dept: INTERNAL MEDICINE CLINIC | Age: 67
End: 2017-05-19

## 2017-05-19 VITALS
OXYGEN SATURATION: 97 % | SYSTOLIC BLOOD PRESSURE: 117 MMHG | RESPIRATION RATE: 16 BRPM | WEIGHT: 248 LBS | DIASTOLIC BLOOD PRESSURE: 62 MMHG | BODY MASS INDEX: 41.32 KG/M2 | HEIGHT: 65 IN | TEMPERATURE: 98.2 F | HEART RATE: 78 BPM

## 2017-05-19 DIAGNOSIS — J02.9 SORE THROAT: ICD-10-CM

## 2017-05-19 DIAGNOSIS — J45.20 MILD INTERMITTENT ASTHMA WITHOUT COMPLICATION: ICD-10-CM

## 2017-05-19 DIAGNOSIS — I25.10 CORONARY ARTERY DISEASE INVOLVING NATIVE CORONARY ARTERY OF NATIVE HEART WITHOUT ANGINA PECTORIS: ICD-10-CM

## 2017-05-19 DIAGNOSIS — Z13.5 SCREENING FOR GLAUCOMA: ICD-10-CM

## 2017-05-19 DIAGNOSIS — J30.1 SEASONAL ALLERGIC RHINITIS DUE TO POLLEN: ICD-10-CM

## 2017-05-19 DIAGNOSIS — R05.9 COUGH: Primary | ICD-10-CM

## 2017-05-19 LAB
S PYO AG THROAT QL: NEGATIVE
VALID INTERNAL CONTROL?: YES

## 2017-05-19 RX ORDER — CODEINE PHOSPHATE AND GUAIFENESIN 10; 100 MG/5ML; MG/5ML
5 SOLUTION ORAL
Qty: 180 ML | Refills: 0 | Status: SHIPPED | OUTPATIENT
Start: 2017-05-19 | End: 2017-10-09 | Stop reason: ALTCHOICE

## 2017-05-19 NOTE — LETTER
6/1/2017 11:13 AM 
 
Ms. Radha Gamez [address] [facility] [address] Dear Radha Scale: Please find your most recent results below. Resulted Orders AMB POC RAPID STREP A Result Value Ref Range VALID INTERNAL CONTROL POC Yes Group A Strep Ag Negative Negative METABOLIC PANEL, BASIC Result Value Ref Range Glucose 79 65 - 99 mg/dL BUN 18 8 - 27 mg/dL Creatinine 0.93 0.57 - 1.00 mg/dL GFR est non-AA 65 >59 mL/min/1.73 GFR est AA 75 >59 mL/min/1.73  
 BUN/Creatinine ratio 19 12 - 28 Sodium 141 134 - 144 mmol/L Potassium 4.2 3.5 - 5.2 mmol/L Chloride 96 96 - 106 mmol/L  
 CO2 26 18 - 29 mmol/L Calcium 9.5 8.7 - 10.3 mg/dL Narrative Performed at:  [address]  [id number] : Tracy Schulz MD, Phone:  [phone] MAGNESIUM Result Value Ref Range Magnesium 2.4 (H) 1.6 - 2.3 mg/dL Narrative Performed at:  [address]  [id number] : Tracy Schulz MD, Phone:  [phone] RECOMMENDATIONS: 
Your labs showed normal potassium, magnesium, and kidney tests Please call me if you have any questions: [phone] Sincerely, Samy Love MD

## 2017-05-19 NOTE — PROGRESS NOTES
Reviewed record  In preparation for visit and have obtained necessary documentation. 1. Have you been to the ER, urgent care clinic since your last visit? Hospitalized since your last visit?no  2. Have you seen or consulted any other health care providers outside of the [address] [place] [address] since your last visit? Include any pap smears or colon screening. Dr Mak Palmer  Patient has been given information on advanced directives at a previous visit.    Per Dr. Errol Mccloud verbal order read back orders placed for strep test.

## 2017-05-19 NOTE — PATIENT INSTRUCTIONS
Cough: Care Instructions  Your Care Instructions  A cough is your body's response to something that bothers your throat or airways. Many things can cause a cough. You might cough because of a cold or the flu, bronchitis, or asthma. Smoking, postnasal drip, allergies, and stomach acid that backs up into your throat also can cause coughs. A cough is a symptom, not a disease. Most coughs stop when the cause, such as a cold, goes away. You can take a few steps at home to cough less and feel better. Follow-up care is a key part of your treatment and safety. Be sure to make and go to all appointments, and call your doctor if you are having problems. It's also a good idea to know your test results and keep a list of the medicines you take. How can you care for yourself at home? · Drink lots of water and other fluids. This helps thin the mucus and soothes a dry or sore throat. Honey or lemon juice in hot water or tea may ease a dry cough. · Take cough medicine as directed by your doctor. · Prop up your head on pillows to help you breathe and ease a dry cough. · Try cough drops to soothe a dry or sore throat. Cough drops don't stop a cough. Medicine-flavored cough drops are no better than candy-flavored drops or hard candy. · Do not smoke. Avoid secondhand smoke. If you need help quitting, talk to your doctor about stop-smoking programs and medicines. These can increase your chances of quitting for good. When should you call for help? Call 911 anytime you think you may need emergency care. For example, call if:  · You have severe trouble breathing. Call your doctor now or seek immediate medical care if:  · You cough up blood. · You have new or worse trouble breathing. · You have a new or higher fever. · You have a new rash.   Watch closely for changes in your health, and be sure to contact your doctor if:  · You cough more deeply or more often, especially if you notice more mucus or a change in the color of your mucus. · You have new symptoms, such as a sore throat, an earache, or sinus pain. · You do not get better as expected. Where can you learn more? Go to http://jose-rm.info/. Enter D279 in the search box to learn more about \"Cough: Care Instructions. \"  Current as of: May 27, 2016  Content Version: 11.2  © 6811-5415 Visionnaire. Care instructions adapted under license by [employer] (which disclaims liability or warranty for this information). If you have questions about a medical condition or this instruction, always ask your healthcare professional. [place] [address] any warranty or liability for your use of this information. Managing Your Allergies: Care Instructions  Your Care Instructions  Managing your allergies is an important part of staying healthy. Your doctor will help you find out what may be causing the allergies. Common causes of allergy symptoms are house dust and dust mites, animal dander, mold, and pollen. As soon as you know what triggers your symptoms, try to reduce your exposure to your triggers. This can help prevent allergy symptoms, asthma, and other health problems. Ask your doctor about allergy medicine or immunotherapy. These treatments may help reduce or prevent allergy symptoms. Follow-up care is a key part of your treatment and safety. Be sure to make and go to all appointments, and call your doctor if you are having problems. It's also a good idea to know your test results and keep a list of the medicines you take. How can you care for yourself at home? · If you think that dust or dust mites are causing your allergies:  ¨ Wash sheets, pillowcases, and other bedding every week in hot water. ¨ Use airtight, dust-proof covers for pillows, duvets, and mattresses. Avoid plastic covers, because they tend to tear quickly and do not \"breathe. \" Wash according to the instructions.   ¨ Remove extra blankets and pillows that you don't need. ¨ Use blankets that are machine-washable. ¨ Don't use home humidifiers. They can help mites live longer. · Use air-conditioning. Change or clean all filters every month. Keep windows closed. Use high-efficiency air filters. Don't use window or attic fans, which draw dust into the air. · If you're allergic to pet dander, keep pets outside or, at the very least, out of your bedroom. Old carpet and cloth-covered furniture can hold a lot of animal dander. You may need to replace them. · Look for signs of cockroaches. Use cockroach baits to get rid of them. Then clean your home well. · If you're allergic to mold, don't keep indoor plants, because molds can grow in soil. Get rid of furniture, rugs, and drapes that smell musty. Check for mold in the bathroom. · If you're allergic to pollen, stay inside when pollen counts are high. · Don't smoke or let anyone else smoke in your house. Don't use fireplaces or wood-burning stoves. Avoid paint fumes, perfumes, and other strong odors. When should you call for help? Give an epinephrine shot if:  · You think you are having a severe allergic reaction. · You have symptoms in more than one body area, such as mild nausea and an itchy mouth. After giving an epinephrine shot call 911, even if you feel better. Call 911 if:  · You have symptoms of a severe allergic reaction. These may include:  ¨ Sudden raised, red areas (hives) all over your body. ¨ Swelling of the throat, mouth, lips, or tongue. ¨ Trouble breathing. ¨ Passing out (losing consciousness). Or you may feel very lightheaded or suddenly feel weak, confused, or restless. · You have been given an epinephrine shot, even if you feel better. Call your doctor now or seek immediate medical care if:  · You have symptoms of an allergic reaction, such as:  ¨ A rash or hives (raised, red areas on the skin). ¨ Itching. ¨ Swelling. ¨ Belly pain, nausea, or vomiting.   Watch closely for changes in your health, and be sure to contact your doctor if:  · Your allergies get worse. · You need help controlling your allergies. · You have questions about allergy testing. · You do not get better as expected. Where can you learn more? Go to http://jose-rm.info/. Enter L249 in the search box to learn more about \"Managing Your Allergies: Care Instructions. \"  Current as of: February 12, 2016  Content Version: 11.2  © 2385-8471 [employer]. Care instructions adapted under license by [employer] (which disclaims liability or warranty for this information). If you have questions about a medical condition or this instruction, always ask your healthcare professional. [address] any warranty or liability for your use of this information.

## 2017-05-19 NOTE — PROGRESS NOTES
CC:  Chief Complaint   Patient presents with    Cough     times 2 weeks    Sore Throat       HISTORY OF PRESENT ILLNESS  Kem Hunter is a 74 y.o. female. She complains of 2 week history of sore throat, postnasal drainage, non-productive cough, itchy eyes, some runny nose, chills initially, mild dyspnea. Denies fevers, headaches, sinus congestion, ear pains, wheezing, hemoptysis, chest pain, myalgias, abdominal pain, and diarrhea. Treatment to date: Mucinex, throat lozenges. Patient reports no ill contacts.      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    Hyperlipidemia E78.5    Mitral valve regurgitation I34.0    History of MI (myocardial infarction) I25.2    Cardiomyopathy ([facility] I42.9    Osteoporosis M81.0    Morbid obesity with BMI of 40.0-44.9, adult ([facility] E66.01, Z68.41    Advance care planning Z71.89    Insomnia G47.00    S/P ICD (internal cardiac defibrillator) procedure Z95.810    AICD lead displacement T82.120A    ICD (implantable cardioverter-defibrillator) in place Z95.810    Primary osteoarthritis of right knee M17.11    Mild intermittent asthma without complication H58.75     Past Medical History:   Diagnosis Date    Asthma     Cardiomyopathy ([facility]     Coronary artery disease 2008    s/p RCA stent ([facility]) on 11/26/11    Depression with anxiety     2011    DVT (deep venous thrombosis) ([facility] 7/27/2010    Family history of early CAD     GERD (gastroesophageal reflux disease)     H/O gastric bypass 2006    Revision in 2009    Hepatitis C antibody test positive     Does not have chronic hep C (labs 10/6/15: neg HCV RNA)     HTN (hypertension) 7/27/2010    Hyperlipidemia 07/27/2010    Joint pain 7/27/2010    MI (myocardial infarction) ([facility] 7/27/2010    Mitral valve regurgitation     Mild to moderate    Morbid obesity ([facility] 7/27/2010    Myocardial infarction [facility]) 2006 and 2011    Dr. Kaiden SMITH (peptic ulcer disease)     gi bleed 2008; ulcer n gastric bypass pouch    Urinary incontinence, stress 7/27/2010     Allergies   Allergen Reactions    Amoxicillin Anaphylaxis    Amoxicillin Anaphylaxis    Lipitor [Atorvastatin] Other (comments)     Severe muscle pain and spasms    Zocor [Simvastatin] Other (comments)     Cramps, muscle spasms    Livalo [Pitavastatin] Myalgia    Pravastatin Other (comments)     Leg cramps     Current Outpatient Prescriptions   Medication Sig Dispense Refill    ALPRAZolam (XANAX) 0.5 mg tablet Take 1 Tab by mouth two (2) times daily as needed for Anxiety. Max Daily Amount: 1 mg. 20 Tab 0    zolpidem (AMBIEN) 10 mg tablet TAKE ONE TABLET BY MOUTH NIGHTLY AS NEEDED FOR SLEEP. MAX DAILY AMOUNT 10MG 30 Tab 5    bumetanide (BUMEX) 2 mg tablet Take 0.5 Tabs by mouth two (2) times a day. Patient takes Bumex in relation to what her B/P is. Indications: Edema 45 Tab 3    ibuprofen (MOTRIN) 200 mg tablet Take  by mouth.  potassium chloride (K-DUR, KLOR-CON) 20 mEq tablet TAKE TWO TABLETS BY MOUTH DAILY 60 Tab 5    sacubitril-valsartan (ENTRESTO) 24 mg/26 mg tablet Take 1 Tab by mouth two (2) times a day. 180 Tab 3    FLUoxetine (PROZAC) 20 mg tablet TAKE 2 TABLETS EVERY MORNING AND 1 AT BEDTIME FOR ANXIETY WITH DEPRESSION 90 Tab 3    metoprolol succinate (TOPROL-XL) 25 mg XL tablet Take 0.5 Tabs by mouth daily. 45 Tab 3    LACTOBACILLUS ACIDOPHILUS (PROBIOTIC PO) Take  by mouth.  albuterol (PROVENTIL HFA, VENTOLIN HFA, PROAIR HFA) 90 mcg/actuation inhaler Take 2 Puffs by inhalation every four (4) hours as needed for Wheezing or Shortness of Breath.  1 Inhaler 5    aspirin delayed-release 81 mg tablet Take  by mouth daily.  vitamin A 8,000 unit capsule Take 8,000 Units by mouth daily.  biotin 10,000 mcg cap Take  by mouth daily.  OTHER Complete multi formula, bariatric advantage      magnesium 250 mg tab Take 1 Tab by mouth daily.  ferrous sulfate (IRON, FERROUS SULFATE,) 325 mg (65 mg Iron) tablet Take  by mouth daily (before breakfast).  cholecalciferol, vitamin d3, (VITAMIN D) 1,000 unit tablet Take 10,000 Units by mouth daily. 10,000 units \"dry vitamin d \"      CALCIUM PO Take 1 Tab by mouth daily. PHYSICAL EXAM  Visit Vitals    /62 (BP 1 Location: Left arm, BP Patient Position: Sitting)    Pulse 78    Temp 98.2 °F (36.8 °C) (Oral)    Resp 16    Ht 5' 5\" (1.651 m)    Wt 248 lb (112.5 kg)    SpO2 97%    BMI 41.27 kg/m2       General: Morbidly obese. Coughs frequently. HEENT:  Head normocephalic/atraumatic, no scleral icterus or conjunctival injection. Nasal mucosa mildly edematous. Oropharynx edematous with no erythema. No sinus tenderness. TM's and ear canals normal bilaterally. Neck: Supple. No lymphadenopathy. Lungs:  Clear to ausculation bilaterally. Good air movement. Heart:  Regular rate and rhythm, normal S1 and S2, no murmur, gallop, or rub  Extremities: No clubbing, cyanosis, or edema. Neurological: Alert and oriented. Psychiatric: Normal mood and affect. Behavior is normal.       Results for orders placed or performed in visit on 05/19/17   AMB POC RAPID STREP A   Result Value Ref Range    VALID INTERNAL CONTROL POC Yes     Group A Strep Ag Negative Negative         ASSESSMENT AND PLAN    ICD-10-CM ICD-9-CM    1. Cough R05 786.2 guaiFENesin-codeine (CHERATUSSIN AC) 100-10 mg/5 mL solution   2. Sore throat J02.9 462 AMB POC RAPID STREP A   3. Seasonal allergic rhinitis due to pollen J30.1 477.0    4. Mild intermittent asthma without complication O29.26 097.04    5.  Coronary artery disease involving native coronary artery of native heart without angina pectoris F78.59 420.41 METABOLIC PANEL, BASIC      MAGNESIUM   6. Screening for glaucoma Z13.5 V80.1 REFERRAL TO OPHTHALMOLOGY       Namrata Gaming was seen today for cough and sore throat. Diagnoses and all orders for this visit:    Cough/Sore throat  Negative rapid stress test. Most likely due to seasonal allergic rhinitis. -     Start guaiFENesin-codeine (CHERATUSSIN AC) 100-10 mg/5 mL solution; Take 5 mL by mouth three (3) times daily as needed for Cough. Max Daily Amount: 15 mL. -     AMB POC RAPID STREP A    Seasonal allergic rhinitis due to pollen   I recommended OTC Claritin and Flonase nasal spray. Asthma  Continue albuterol inhaler as needed. Coronary artery disease involving native coronary artery of native heart without angina pectoris  -     METABOLIC PANEL, BASIC  -     MAGNESIUM    Screening for glaucoma  -     REFERRAL TO OPHTHALMOLOGY      Follow-up Disposition:  Return if symptoms worsen or fail to improve. Provided patient and/or family with advanced directive information and answered pertinent questions. Encouraged patient to provide a copy of advanced directive to the office when available. I have discussed the diagnosis with the patient and the intended plan as seen in the above orders. Patient is in agreement. The patient has received an after-visit summary and questions were answered concerning future plans. I have discussed medication side effects and warnings with the patient as well.

## 2017-05-19 NOTE — MR AVS SNAPSHOT
Visit Information Date & Time Provider Department Dept. Phone Encounter #  
 5/19/2017 10:10 AM Osiris Blandon [phone] [id number] Follow-up Instructions Return if symptoms worsen or fail to improve. Your Appointments 5/30/2017  9:00 AM  
ESTABLISHED PATIENT with Leslie Kirkpatrick MD  
[facility] Appt Note: 1 month follow up per Dr. Kasi Berg [address]  
[address] [address]  
  
    
 7/7/2017 11:30 AM  
ROUTINE CARE with MD Osiris Blandon [facility]) Appt Note: 3mth f/u; HTN, depression/anxiety, Medicare Annual Wellness Visit. [address] [address] [phone]  
  
   
 [address] 7/19/2017  8:15 AM  
REMOTE OFFICE VISIT with Chelo Nguyen [facility] ([facility] SCHEDULING) Appt Note: LAT SubQ ICD/rc b  
 [address]  
[phone] Upcoming Health Maintenance Date Due  
 GLAUCOMA SCREENING Q2Y 5/17/2017 Pneumococcal 65+ Low/Medium Risk (2 of 2 - PPSV23) 6/14/2017 MEDICARE YEARLY EXAM 6/15/2017 INFLUENZA AGE 9 TO ADULT 8/1/2017 BREAST CANCER SCRN MAMMOGRAM 2/1/2018 PAP AKA CERVICAL CYTOLOGY 7/26/2019 COLONOSCOPY 9/5/2019 DTaP/Tdap/Td series (2 - Td) 10/30/2024 Allergies as of 5/19/2017  Review Complete On: 5/19/2017 By: Araceli Wright MD  
  
 Severity Noted Reaction Type Reactions Amoxicillin High 07/27/2010   Side Effect Anaphylaxis Amoxicillin High 06/30/2015    Anaphylaxis Lipitor [Atorvastatin] High 06/30/2015    Other (comments) Severe muscle pain and spasms Zocor [Simvastatin] Medium 06/30/2015    Other (comments) Cramps, muscle spasms Livalo [Pitavastatin]  01/25/2017    Myalgia Pravastatin  08/19/2016    Other (comments) Leg cramps Current Immunizations  Reviewed on 7/11/2016 Name Date Influenza Vaccine (Quad) PF 9/21/2016  6:19 PM, 10/6/2015 Influenza Vaccine Split 11/28/2011 11:25 AM  
 Pneumococcal Conjugate (PCV-13) 6/14/2016 Pneumococcal Vaccine (Unspecified Type) 8/20/2011 Tdap 10/30/2014 Zoster Vaccine, Live 2/26/2016 Not reviewed this visit You Were Diagnosed With   
  
 Codes Comments Cough    -  Primary ICD-10-CM: J05 ICD-9-CM: 786.2 Sore throat     ICD-10-CM: J02.9 ICD-9-CM: 827 Seasonal allergic rhinitis due to pollen     ICD-10-CM: J30.1 ICD-9-CM: 477.0 Coronary artery disease involving native coronary artery of native heart without angina pectoris     ICD-10-CM: I25.10 ICD-9-CM: 414.01 Screening for glaucoma     ICD-10-CM: Z13.5 ICD-9-CM: V80.1 Vitals BP Pulse Temp Resp Height(growth percentile) Weight(growth percentile)  
 117/62 (BP 1 Location: Left arm, BP Patient Position: Sitting) 78 98.2 °F (36.8 °C) (Oral) 16 5' 5\" (1.651 m) 248 lb (112.5 kg) SpO2 BMI OB Status Smoking Status 97% 41.27 kg/m2 Postmenopausal Former Smoker BMI and BSA Data Body Mass Index Body Surface Area  
 41.27 kg/m 2 2.27 m 2 Preferred Pharmacy Pharmacy Name Phone [id number] [phone] Your Updated Medication List  
  
   
This list is accurate as of: 5/19/17 10:53 AM.  Always use your most recent med list.  
  
  
  
  
 albuterol 90 mcg/actuation inhaler Commonly known as:  PROVENTIL HFA, VENTOLIN HFA, PROAIR HFA Take 2 Puffs by inhalation every four (4) hours as needed for Wheezing or Shortness of Breath. ALPRAZolam 0.5 mg tablet Commonly known as:  Mauri Vo Take 1 Tab by mouth two (2) times daily as needed for Anxiety. Max Daily Amount: 1 mg.  
  
 aspirin delayed-release 81 mg tablet Take  by mouth daily. biotin 10,000 mcg Cap Take  by mouth daily. bumetanide 2 mg tablet Commonly known as:  Sindy Lavender Take 0.5 Tabs by mouth two (2) times a day. Patient takes Bumex in relation to what her B/P is. Indications: Edema CALCIUM PO Take 1 Tab by mouth daily. FLUoxetine 20 mg tablet Commonly known as:  PROzac TAKE 2 TABLETS EVERY MORNING AND 1 AT BEDTIME FOR ANXIETY WITH DEPRESSION  
  
 guaiFENesin-codeine 100-10 mg/5 mL solution Commonly known as:  Terence Docker Take 5 mL by mouth three (3) times daily as needed for Cough. Max Daily Amount: 15 mL. ibuprofen 200 mg tablet Commonly known as:  MOTRIN Take  by mouth. Iron (Ferrous Sulfate) 325 mg (65 mg iron) tablet Generic drug:  ferrous sulfate Take  by mouth daily (before breakfast). magnesium 250 mg Tab Take 1 Tab by mouth daily. metoprolol succinate 25 mg XL tablet Commonly known as:  TOPROL-XL Take 0.5 Tabs by mouth daily. OTHER Complete multi formula, bariatric advantage  
  
 potassium chloride 20 mEq tablet Commonly known as:  K-DUR, KLOR-CON  
TAKE TWO TABLETS BY MOUTH DAILY PROBIOTIC PO Take  by mouth. sacubitril-valsartan 24-26 mg tablet Commonly known as:  ENTRESTO Take 1 Tab by mouth two (2) times a day. vitamin A 8,000 unit capsule Take 8,000 Units by mouth daily. VITAMIN D3 1,000 unit tablet Generic drug:  cholecalciferol Take 10,000 Units by mouth daily. 10,000 units \"dry vitamin d \"  
  
 zolpidem 10 mg tablet Commonly known as:  AMBIEN  
TAKE ONE TABLET BY MOUTH NIGHTLY AS NEEDED FOR SLEEP. MAX DAILY AMOUNT 10MG Prescriptions Printed  Refills  
 guaiFENesin-codeine (CHERATUSSIN AC) 100-10 mg/5 mL solution 0  
 Sig: Take 5 mL by mouth three (3) times daily as needed for Cough. Max Daily Amount: 15 mL. Class: Print Route: Oral  
  
We Performed the Following AMB POC RAPID STREP A [08380 CPT(R)] MAGNESIUM [id number] CPT(R)] METABOLIC PANEL, BASIC [01534 CPT(R)] REFERRAL TO OPHTHALMOLOGY [REF57 Custom] Comments:  
 Please evaluate patient for glaucoma exam.  
  
Follow-up Instructions Return if symptoms worsen or fail to improve. Referral Information Referral ID Referred By Referred To  
  
 [id number] [facility], [address] [place] [address], [address] Phone: [phone] Fax: [phone] Visits Status Start Date End Date 1 New Request 5/19/17 5/19/18 If your referral has a status of pending review or denied, additional information will be sent to support the outcome of this decision. Patient Instructions Cough: Care Instructions Your Care Instructions A cough is your body's response to something that bothers your throat or airways. Many things can cause a cough. You might cough because of a cold or the flu, bronchitis, or asthma. Smoking, postnasal drip, allergies, and stomach acid that backs up into your throat also can cause coughs. A cough is a symptom, not a disease. Most coughs stop when the cause, such as a cold, goes away. You can take a few steps at home to cough less and feel better. Follow-up care is a key part of your treatment and safety. Be sure to make and go to all appointments, and call your doctor if you are having problems. It's also a good idea to know your test results and keep a list of the medicines you take. How can you care for yourself at home? · Drink lots of water and other fluids. This helps thin the mucus and soothes a dry or sore throat. Honey or lemon juice in hot water or tea may ease a dry cough. · Take cough medicine as directed by your doctor. · Prop up your head on pillows to help you breathe and ease a dry cough. · Try cough drops to soothe a dry or sore throat. Cough drops don't stop a cough. Medicine-flavored cough drops are no better than candy-flavored drops or hard candy. · Do not smoke. Avoid secondhand smoke. If you need help quitting, talk to your doctor about stop-smoking programs and medicines. These can increase your chances of quitting for good. When should you call for help? Call 911 anytime you think you may need emergency care. For example, call if: 
· You have severe trouble breathing. Call your doctor now or seek immediate medical care if: 
· You cough up blood. · You have new or worse trouble breathing. · You have a new or higher fever. · You have a new rash. Watch closely for changes in your health, and be sure to contact your doctor if: 
· You cough more deeply or more often, especially if you notice more mucus or a change in the color of your mucus. · You have new symptoms, such as a sore throat, an earache, or sinus pain. · You do not get better as expected. Where can you learn more? Go to http://jose-rm.info/. Enter D279 in the search box to learn more about \"Cough: Care Instructions. \" Current as of: May 27, 2016 Content Version: 11.2 © 7018-7309 [employer]. Care instructions adapted under license by [employer] (which disclaims liability or warranty for this information). If you have questions about a medical condition or this instruction, always ask your healthcare professional. [place] [address] any warranty or liability for your use of this information. Managing Your Allergies: Care Instructions Your Care Instructions Managing your allergies is an important part of staying healthy. Your doctor will help you find out what may be causing the allergies.  Common causes of allergy symptoms are house dust and dust mites, animal dander, mold, and pollen. As soon as you know what triggers your symptoms, try to reduce your exposure to your triggers. This can help prevent allergy symptoms, asthma, and other health problems. Ask your doctor about allergy medicine or immunotherapy. These treatments may help reduce or prevent allergy symptoms. Follow-up care is a key part of your treatment and safety. Be sure to make and go to all appointments, and call your doctor if you are having problems. It's also a good idea to know your test results and keep a list of the medicines you take. How can you care for yourself at home? · If you think that dust or dust mites are causing your allergies: 
¨ Wash sheets, pillowcases, and other bedding every week in hot water. ¨ Use airtight, dust-proof covers for pillows, duvets, and mattresses. Avoid plastic covers, because they tend to tear quickly and do not \"breathe. \" Wash according to the instructions. ¨ Remove extra blankets and pillows that you don't need. ¨ Use blankets that are machine-washable. ¨ Don't use home humidifiers. They can help mites live longer. · Use air-conditioning. Change or clean all filters every month. Keep windows closed. Use high-efficiency air filters. Don't use window or attic fans, which draw dust into the air. · If you're allergic to pet dander, keep pets outside or, at the very least, out of your bedroom. Old carpet and cloth-covered furniture can hold a lot of animal dander. You may need to replace them. · Look for signs of cockroaches. Use cockroach baits to get rid of them. Then clean your home well. · If you're allergic to mold, don't keep indoor plants, because molds can grow in soil. Get rid of furniture, rugs, and drapes that smell musty. Check for mold in the bathroom. · If you're allergic to pollen, stay inside when pollen counts are high. · Don't smoke or let anyone else smoke in your house.  Don't use fireplaces or wood-burning stoves. Avoid paint fumes, perfumes, and other strong odors. When should you call for help? Give an epinephrine shot if: 
· You think you are having a severe allergic reaction. · You have symptoms in more than one body area, such as mild nausea and an itchy mouth. After giving an epinephrine shot call 911, even if you feel better. Call 911 if: 
· You have symptoms of a severe allergic reaction. These may include: 
¨ Sudden raised, red areas (hives) all over your body. ¨ Swelling of the throat, mouth, lips, or tongue. ¨ Trouble breathing. ¨ Passing out (losing consciousness). Or you may feel very lightheaded or suddenly feel weak, confused, or restless. · You have been given an epinephrine shot, even if you feel better. Call your doctor now or seek immediate medical care if: 
· You have symptoms of an allergic reaction, such as: ¨ A rash or hives (raised, red areas on the skin). ¨ Itching. ¨ Swelling. ¨ Belly pain, nausea, or vomiting. Watch closely for changes in your health, and be sure to contact your doctor if: 
· Your allergies get worse. · You need help controlling your allergies. · You have questions about allergy testing. · You do not get better as expected. Where can you learn more? Go to http://jose-rm.info/. Enter L249 in the search box to learn more about \"Managing Your Allergies: Care Instructions. \" Current as of: February 12, 2016 Content Version: 11.2 © 1591-5146 [employer]. Care instructions adapted under license by [employer] (which disclaims liability or warranty for this information). If you have questions about a medical condition or this instruction, always ask your healthcare professional. [address] any warranty or liability for your use of this information. Introducing [facility]! Dear Herrera Aviles: Thank you for requesting a [employer] account.   Our records indicate that you already have an active [employer] account. You can access your account anytime at [email or web address] Did you know that you can access your hospital and ER discharge instructions at any time in [employer]? You can also review all of your test results from your hospital stay or ER visit. Additional Information If you have questions, please visit the Frequently Asked Questions section of the [employer] website at [email or web address] Remember, [employer]iere is NOT to be used for urgent needs. For medical emergencies, dial 911. Now available from your iPhone and Android! Please provide this summary of care documentation to your next provider. Your primary care clinician is listed as Vladislav Howard. If you have any questions after today's visit, please call [phone].

## 2017-05-20 LAB
BUN SERPL-MCNC: 18 MG/DL (ref 8–27)
BUN/CREAT SERPL: 19 (ref 12–28)
CALCIUM SERPL-MCNC: 9.5 MG/DL (ref 8.7–10.3)
CHLORIDE SERPL-SCNC: 96 MMOL/L (ref 96–106)
CO2 SERPL-SCNC: 26 MMOL/L (ref 18–29)
CREAT SERPL-MCNC: 0.93 MG/DL (ref 0.57–1)
GLUCOSE SERPL-MCNC: 79 MG/DL (ref 65–99)
MAGNESIUM SERPL-MCNC: 2.4 MG/DL (ref 1.6–2.3)
POTASSIUM SERPL-SCNC: 4.2 MMOL/L (ref 3.5–5.2)
SODIUM SERPL-SCNC: 141 MMOL/L (ref 134–144)

## 2017-05-30 ENCOUNTER — OFFICE VISIT (OUTPATIENT)
Dept: CARDIOLOGY CLINIC | Age: 67
End: 2017-05-30

## 2017-05-30 VITALS
SYSTOLIC BLOOD PRESSURE: 122 MMHG | BODY MASS INDEX: 41.19 KG/M2 | HEIGHT: 65 IN | DIASTOLIC BLOOD PRESSURE: 84 MMHG | RESPIRATION RATE: 16 BRPM | HEART RATE: 76 BPM | WEIGHT: 247.2 LBS

## 2017-05-30 DIAGNOSIS — I42.9 SECONDARY CARDIOMYOPATHY (HCC): ICD-10-CM

## 2017-05-30 DIAGNOSIS — I50.22 SYSTOLIC CHF, CHRONIC (HCC): ICD-10-CM

## 2017-05-30 DIAGNOSIS — I25.10 CORONARY ARTERY DISEASE INVOLVING NATIVE CORONARY ARTERY OF NATIVE HEART WITHOUT ANGINA PECTORIS: Primary | ICD-10-CM

## 2017-05-30 DIAGNOSIS — I10 ESSENTIAL HYPERTENSION: ICD-10-CM

## 2017-05-30 DIAGNOSIS — I34.0 MITRAL VALVE INSUFFICIENCY, UNSPECIFIED ETIOLOGY: ICD-10-CM

## 2017-05-30 DIAGNOSIS — I42.9 CARDIOMYOPATHY, UNSPECIFIED TYPE (HCC): ICD-10-CM

## 2017-05-30 DIAGNOSIS — E66.01 OBESITY, CLASS III, BMI 40-49.9 (MORBID OBESITY) (HCC): ICD-10-CM

## 2017-05-30 NOTE — PROGRESS NOTES
[facility] [address] [place] [address]  [phone]    HPI: Orpah Severs, a 74 y.o. who presents for follow up regarding her CAD and CHF. Last visit cut bumex for hypotension and near-syncope, with dizziness etc. Now dizziness seems to be unrelated BPPV, stress test is possible anterior ischemia but could be body habitus artifact, will watch fo rnow as only nonobstructive disease in LAD territory on cath less than one month ago. Weight is down about 9 pounds form one month ago. She continues to feel short breath. Just walking through the house is making her short of breath. Also with walking outside. She feels like it is worse. She was sick and coughing and had to see Dr. Mona Sanches for allergies. Maybe bronchitis for 3 weeks. Dizziness still doing better. Had to use her inhaler and everything. No nausea. Was bringing up phlegm but better now, no fever or chills. Allergy pills and nose spray has been doing well. Since she is doing well will defer her cath for change or progression of sx. Eating and drinking ok now. losartan 12.5mg BID and Entrestro 24/26 1tab BID  Last revision for AICD in November 2016 with Dr. Sadie White. No LE edema, go back to Bumex 2mg QD, only takes Metolazone PRN (not using now)    Assessment/Plan:  1. Chronic systolic heart failure - LVEF 35% by TTE in 9/16, s/p AICD placement with Dr. Sadie White and subsequent lead revisions and repeat procedures due to non-healing midsternal incision  -due to dizziness and hypotension on low dose Entresto to 24/26 BID and Toprol XL 12.5mg daily  -off spironolactone right now due to hypotension, cont Bumex 2mg, Metolazone PRN   -c/o hair loss with Coreg and Toprol XL but willing to continue Toprol XL for now    2.  CAD - hx of MI x 2 and multiple stents, she believes she may have 6 or 7 stents, recent cardiac cath did not show progression of CAD, continue ASA and beta blocker, unable to tolerate pravastatin, simvastatin, atorvastatin, see lipid therapy plan below    -reports having an MI in 11/2011 and received PCI to RCA at that time, previous MI in 2006 or 2007  3. Mitral regurgitation - mod MR by TTE in 9/16  4. Asthma - x 15 - 16 years, has not seen pulmonologist in years, did not need her Albuterol PRN until Coreg dose increased in 2015, now using it 2-3 times/week  5. HTN - continue current regimen  6. Dyslipidemia -  in 2/16, zocor caused muscle spasms and cramps, lipitor caused pain shooting all over her body, could tolerate Pravastatin 40mg daily due to leg cramps but LDL 97 on pravastatin 40mg daily, tried Livalo after her last visit but could not tolerate it due to myalgias, now on just [employer], did not tolerate zetia either  -will try for repatha, needs lipid lowering and intolerant to everything  7. DM Type 2 - resolved with gastric bypass, , last Hgb A1C 5.8%  8. Hypokalemia - nomal on recheck, now off spironolactone  9. Hypomagnesemia - cont otc, ok last check  10. Morbid obesity - Body mass index is 41.14 kg/(m^2). ). weighed 416 lbs at her heaviest weight, s/p gastric bypass in 2007 with revision a few years later, working on weight loss and exercise, weight down 9 lbs today  11. PUD - had EGD and cauterized bleeding ulcer, not on PPI anymore  12. Vitamin D deficiency - on supplementation, Vitamin D level 34.2  13. Anxiety - on Prozac, could not sleep on Clonazepam, able to sleep better on ambien 10mg at bedtime     Echo: 4/17, EF 35-40%, inf HK gr1dd  Nuclear: 4/17, EF 36%, anterior ischemia, lateral infarct. TTE 9/16 - LVEF 35%, moderate hypokinesis of the basal-mid inferolateral wall(s), grd 1 dd, dilated LA, mod MR, mild TR  8/26/16 - RV lead revision by Dr. Debbie Lou  8/24/16 - single chamber AICD placed by Dr. Debbie Lou ([address] VR, serial # [id number], model # [id number].  The ventricular lead: model # X2805431 , serial # [id number])  MUGA 6/16 - LVEF 27%  Holter 6/16 - no arrhythmias  Echo 5/16 - LV mildly dilated, LVEF 40%, moderate diffuse hypokinesis with regional variations, severe hypokinesis of the basal-mid inferior wall(s), grd 1 dd, mod dilated LA, atrial septum bows from left to right, consistent with increased left atrial pressure, mildly dilated RA, mild to near moderate MR    OLIVER  - normal  Nuc 5/15 EF 31% large anterolateral infarct with periinfarct reversibility, 13% LV reversible(32% infarct at rest, 45% at stress)    Echo 2015 - LVEF 30-35%, grd 1 dd, mod LAE, mild to mod MR  Nuc Stress  - small reversibility in anteroapical wall, LVEF 31%  Cardiac Cath 3/13 - patent stents in LAD, LCx and RCA, LVEF 30%, severe inferior HK, LCx relatively small vessel with patent stent in proximal LCx, RCA is large and dominant with patent stents in proximal and mid RCA, distal RCA free of disease, LAD normal and large vessel which coursed around apex  Echo 2011 - LVEF 30%, mild MR  Cardiac Cath 2011 - s/p PCI with AMRIT to 100% mid RCA, also had 40% mid LAD lesion, 50% diagonal 1 lesion, 100% distal LCx lesion, 60% OM1 lesion, 100% proximal Ramus lesion      Soc Hx: quit tobacco use x 13 years ago, used to smoke 1ppd, no etoh use, no drug use, lives with , son, daughter in law, grandson, retired/on disability  Fam Hx: father in his 62s when he had a MI, had 3 vessel CABG in his 62s, passed from fall but had cancer too, mother lived to age 80 and  from Alzheimer's, brother had MI in his 62s, sister had aortic repair for aneurysm in her 76s    She  has a past medical history of Asthma; Cardiomyopathy ([facility] Coronary artery disease (); Depression with anxiety; DVT (deep venous thrombosis) ([facility] (2010); Family history of early CAD; GERD (gastroesophageal reflux disease); H/O gastric bypass (); Hepatitis C antibody test positive; HTN (hypertension) (2010); Hyperlipidemia (2010); Joint pain (2010); MI (myocardial infarction) ([facility] (2010); Mitral valve regurgitation;  Morbid obesity ([facility] (7/27/2010); Myocardial infarction [facility]) (2006 and 2011); PUD (peptic ulcer disease); and Urinary incontinence, stress (7/27/2010). She also has no past medical history of Diabetes ([facility] Cardiovascular ROS: positive for dyspnea on exertion   Respiratory ROS: no cough, wheezing  Neurological ROS: no TIA or stroke symptoms  All other systems negative except as above. PE  Vitals:    05/30/17 0901   BP: 122/84   Pulse: 76   Resp: 16   Weight: 247 lb 3.2 oz (112.1 kg)   Height: 5' 5\" (1.651 m)    Body mass index is 41.14 kg/(m^2).   General appearance - alert, well appearing, and in no distress  Mental status - affect appropriate to mood  Eyes - sclera anicteric, moist mucous membranes  Neck - supple  Lymphatics - not assessed  Chest - clear to auscultation, no wheezes, rales or rhonchi  Heart - normal rate, regular rhythm, normal S1, S2, 2/6 MYKE   Abdomen - soft, nontender, nondistended, obese  Back exam - full range of motion, no tenderness  Neurological - cranial nerves II through XII grossly intact, no focal deficit  Musculoskeletal - no muscular tenderness noted, normal strength  Extremities - diminished peripheral pulses, no LE edema  Skin - normal coloration  no rashes    Recent Labs:  Lab Results   Component Value Date/Time    Cholesterol, total 177 08/02/2016 12:51 PM    HDL Cholesterol 63 08/02/2016 12:51 PM    LDL, calculated 97 08/02/2016 12:51 PM    Triglyceride 87 08/02/2016 12:51 PM     Lab Results   Component Value Date/Time    Creatinine 0.93 05/19/2017 01:35 PM     Lab Results   Component Value Date/Time    BUN 18 05/19/2017 01:35 PM    BUN (POC) 24 11/26/2011 09:50 PM     Lab Results   Component Value Date/Time    Potassium 4.2 05/19/2017 01:35 PM     Lab Results   Component Value Date/Time    Hemoglobin A1c 5.8 02/09/2016 11:44 AM    Hemoglobin A1c, External 5.5 12/12/2014     Lab Results   Component Value Date/Time    HGB 14.7 02/23/2017 07:26 AM     Lab Results   Component Value Date/Time    PLATELET 519 60/07/3281 07:26 AM       Reviewed:  Past Medical History:   Diagnosis Date    Asthma     Cardiomyopathy ([facility]     Coronary artery disease 2008    s/p RCA stent (AMRIT) on 11/26/11    Depression with anxiety     2011    [facility] 7/27/2010    Family history of early CAD     GERD (gastroesophageal reflux disease)     H/O gastric bypass 2006    Revision in 2009    Hepatitis C antibody test positive     Does not have chronic hep C (labs 10/6/15: neg HCV RNA)     HTN (hypertension) 7/27/2010    Hyperlipidemia 07/27/2010    Joint pain 7/27/2010    MI (myocardial infarction) ([facility] 7/27/2010    Mitral valve regurgitation     Mild to moderate    Morbid obesity ([facility] 7/27/2010    Myocardial infarction ([facility] 2006 and 2011    Dr. Kirsty Mckeon    PUD (peptic ulcer disease)     gi bleed 2008; ulcer n gastric bypass pouch    Urinary incontinence, stress 7/27/2010     History   Smoking Status    Former Smoker    Start date: 1/1/1970    Quit date: 5/17/2004   Smokeless Tobacco    Never Used     History   Alcohol Use No     Allergies   Allergen Reactions    Amoxicillin Anaphylaxis    Amoxicillin Anaphylaxis    Lipitor [Atorvastatin] Other (comments)     Severe muscle pain and spasms    Zocor [Simvastatin] Other (comments)     Cramps, muscle spasms    Livalo [Pitavastatin] Myalgia    Pravastatin Other (comments)     Leg cramps       Current Outpatient Prescriptions   Medication Sig    guaiFENesin-codeine (CHERATUSSIN AC) 100-10 mg/5 mL solution Take 5 mL by mouth three (3) times daily as needed for Cough. Max Daily Amount: 15 mL.  ALPRAZolam (XANAX) 0.5 mg tablet Take 1 Tab by mouth two (2) times daily as needed for Anxiety. Max Daily Amount: 1 mg.  zolpidem (AMBIEN) 10 mg tablet TAKE ONE TABLET BY MOUTH NIGHTLY AS NEEDED FOR SLEEP. MAX DAILY AMOUNT 10MG    bumetanide (BUMEX) 2 mg tablet Take 0.5 Tabs by mouth two (2) times a day.  Patient takes Bumex in relation to what her B/P is. Indications: Edema    ibuprofen (MOTRIN) 200 mg tablet Take  by mouth.  potassium chloride (K-DUR, KLOR-CON) 20 mEq tablet TAKE TWO TABLETS BY MOUTH DAILY    sacubitril-valsartan (ENTRESTO) 24 mg/26 mg tablet Take 1 Tab by mouth two (2) times a day.  FLUoxetine (PROZAC) 20 mg tablet TAKE 2 TABLETS EVERY MORNING AND 1 AT BEDTIME FOR ANXIETY WITH DEPRESSION    metoprolol succinate (TOPROL-XL) 25 mg XL tablet Take 0.5 Tabs by mouth daily.  LACTOBACILLUS ACIDOPHILUS (PROBIOTIC PO) Take  by mouth.  albuterol (PROVENTIL HFA, VENTOLIN HFA, PROAIR HFA) 90 mcg/actuation inhaler Take 2 Puffs by inhalation every four (4) hours as needed for Wheezing or Shortness of Breath.  aspirin delayed-release 81 mg tablet Take  by mouth daily.  vitamin A 8,000 unit capsule Take 8,000 Units by mouth daily.  biotin 10,000 mcg cap Take  by mouth daily.  OTHER Complete multi formula, bariatric advantage    magnesium 250 mg tab Take 1 Tab by mouth daily.  ferrous sulfate (IRON, FERROUS SULFATE,) 325 mg (65 mg Iron) tablet Take  by mouth daily (before breakfast).  cholecalciferol, vitamin d3, (VITAMIN D) 1,000 unit tablet Take 10,000 Units by mouth daily. 10,000 units \"dry vitamin d \"    CALCIUM PO Take 1 Tab by mouth daily. No current facility-administered medications for this visit.         Esthela Ledesma MD  [facility] [address] [facility] [address] [facility] [address]  [place], [place]  [phone]

## 2017-06-05 RX ORDER — FLUOXETINE 20 MG/1
TABLET ORAL
Qty: 90 TAB | Refills: 3 | Status: SHIPPED | OUTPATIENT
Start: 2017-06-05 | End: 2017-08-07 | Stop reason: SDUPTHER

## 2017-07-19 ENCOUNTER — OFFICE VISIT (OUTPATIENT)
Dept: CARDIOLOGY CLINIC | Age: 67
End: 2017-07-19

## 2017-07-19 DIAGNOSIS — Z95.810 PRESENCE OF AUTOMATIC CARDIOVERTER/DEFIBRILLATOR (AICD): Primary | ICD-10-CM

## 2017-07-31 ENCOUNTER — TELEPHONE (OUTPATIENT)
Dept: CARDIOLOGY CLINIC | Age: 67
End: 2017-07-31

## 2017-07-31 NOTE — TELEPHONE ENCOUNTER
Patient said her medication has been denied because we forgot to answer two questions on her paperwork:     -is she female     -is she pregnant   They said you can call to answer these:  Phone: [phone]

## 2017-08-07 RX ORDER — POTASSIUM CHLORIDE 1500 MG/1
TABLET, EXTENDED RELEASE ORAL
Qty: 60 TAB | Refills: 5 | Status: SHIPPED | OUTPATIENT
Start: 2017-08-07 | End: 2017-08-10 | Stop reason: SDUPTHER

## 2017-08-07 RX ORDER — FLUOXETINE 20 MG/1
TABLET ORAL
Qty: 270 TAB | Refills: 1 | Status: SHIPPED | OUTPATIENT
Start: 2017-08-07 | End: 2017-10-09 | Stop reason: SDUPTHER

## 2017-08-07 NOTE — TELEPHONE ENCOUNTER
Request for Klor-Con 20mEq two every day. Last office visit 5/30/17, next office visit 10/26/17. Refills per verbal order from Dr. Antonino Bear.

## 2017-08-10 RX ORDER — POTASSIUM CHLORIDE 20 MEQ/1
TABLET, EXTENDED RELEASE ORAL
Qty: 180 TAB | Refills: 1 | Status: SHIPPED | OUTPATIENT
Start: 2017-08-10 | End: 2019-10-14 | Stop reason: SDUPTHER

## 2017-08-18 ENCOUNTER — DOCUMENTATION ONLY (OUTPATIENT)
Dept: CARDIOLOGY CLINIC | Age: 67
End: 2017-08-18

## 2017-10-03 RX ORDER — ZOLPIDEM TARTRATE 10 MG/1
TABLET ORAL
Qty: 30 TAB | Refills: 1 | Status: SHIPPED | OUTPATIENT
Start: 2017-10-03 | End: 2017-11-02 | Stop reason: SDUPTHER

## 2017-10-09 ENCOUNTER — OFFICE VISIT (OUTPATIENT)
Dept: INTERNAL MEDICINE CLINIC | Age: 67
End: 2017-10-09

## 2017-10-09 VITALS
HEIGHT: 65 IN | BODY MASS INDEX: 42.49 KG/M2 | HEART RATE: 71 BPM | TEMPERATURE: 98.3 F | WEIGHT: 255 LBS | OXYGEN SATURATION: 95 % | RESPIRATION RATE: 18 BRPM | SYSTOLIC BLOOD PRESSURE: 125 MMHG | DIASTOLIC BLOOD PRESSURE: 77 MMHG

## 2017-10-09 DIAGNOSIS — M17.11 PRIMARY OSTEOARTHRITIS OF RIGHT KNEE: ICD-10-CM

## 2017-10-09 DIAGNOSIS — Z23 ENCOUNTER FOR IMMUNIZATION: ICD-10-CM

## 2017-10-09 DIAGNOSIS — I10 ESSENTIAL HYPERTENSION: ICD-10-CM

## 2017-10-09 DIAGNOSIS — Z00.00 WELCOME TO MEDICARE PREVENTIVE VISIT: Primary | ICD-10-CM

## 2017-10-09 DIAGNOSIS — E78.2 MIXED HYPERLIPIDEMIA: ICD-10-CM

## 2017-10-09 DIAGNOSIS — Z95.810 ICD (IMPLANTABLE CARDIOVERTER-DEFIBRILLATOR) IN PLACE: ICD-10-CM

## 2017-10-09 DIAGNOSIS — Z12.31 ENCOUNTER FOR SCREENING MAMMOGRAM FOR MALIGNANT NEOPLASM OF BREAST: ICD-10-CM

## 2017-10-09 DIAGNOSIS — F41.9 ANXIETY: ICD-10-CM

## 2017-10-09 DIAGNOSIS — J45.20 MILD INTERMITTENT ASTHMA WITHOUT COMPLICATION: ICD-10-CM

## 2017-10-09 DIAGNOSIS — I50.22 SYSTOLIC CHF, CHRONIC (HCC): ICD-10-CM

## 2017-10-09 DIAGNOSIS — Z13.39 SCREENING FOR ALCOHOLISM: ICD-10-CM

## 2017-10-09 DIAGNOSIS — R73.01 IFG (IMPAIRED FASTING GLUCOSE): ICD-10-CM

## 2017-10-09 DIAGNOSIS — Z71.89 ADVANCE CARE PLANNING: ICD-10-CM

## 2017-10-09 DIAGNOSIS — Z13.5 SCREENING FOR GLAUCOMA: ICD-10-CM

## 2017-10-09 DIAGNOSIS — M81.0 AGE-RELATED OSTEOPOROSIS WITHOUT CURRENT PATHOLOGICAL FRACTURE: ICD-10-CM

## 2017-10-09 DIAGNOSIS — F41.8 DEPRESSION WITH ANXIETY: ICD-10-CM

## 2017-10-09 DIAGNOSIS — E83.42 HYPOMAGNESEMIA: ICD-10-CM

## 2017-10-09 DIAGNOSIS — I25.10 CORONARY ARTERY DISEASE INVOLVING NATIVE CORONARY ARTERY OF NATIVE HEART WITHOUT ANGINA PECTORIS: ICD-10-CM

## 2017-10-09 DIAGNOSIS — E55.9 VITAMIN D DEFICIENCY: ICD-10-CM

## 2017-10-09 RX ORDER — ALPRAZOLAM 0.5 MG/1
0.5 TABLET ORAL
Qty: 20 TAB | Refills: 0 | Status: SHIPPED | OUTPATIENT
Start: 2017-10-09 | End: 2018-08-29 | Stop reason: ALTCHOICE

## 2017-10-09 RX ORDER — FLUOXETINE 20 MG/1
TABLET ORAL
Qty: 270 TAB | Refills: 1 | Status: SHIPPED | OUTPATIENT
Start: 2017-10-09 | End: 2019-02-04 | Stop reason: SDUPTHER

## 2017-10-09 NOTE — ACP (ADVANCE CARE PLANNING)
Advance Care Planning (ACP) Provider Conversation Snapshot    Date of ACP Conversation: 10/09/17  Persons included in Conversation:  patient  Length of ACP Conversation in minutes:  <16 minutes (Non-Billable)    Authorized Decision Maker (if patient is incapable of making informed decisions): This person is:   Healthcare Agent/Medical Power of  under Advance Directive          For Patients with Decision Making Capacity:   Values/Goals: Exploration of values, goals, and preferences if recovery is not expected, even with continued medical treatment in the event of:  Imminent death    Conversation Outcomes / Follow-Up Plan:   Has advance directives and living will at home. Asked to bring in a copy for her medical records.

## 2017-10-09 NOTE — PATIENT INSTRUCTIONS
Schedule of Personalized Health Plan    The best way to stay healthy is to live a healthy lifestyle. A healthy lifestyle includes regular exercise, eating a well-balanced diet, keeping a healthy weight and not smoking. Regular physical exams and screening tests are another important way to take care of yourself. Preventive exams provided by health care providers can find health problems early when treatment works best and can keep you from getting certain diseases or illnesses. Preventive services include exams, lab tests, screenings, shots, monitoring and information to help you take care of your own health. All people over 65 should have a pneumonia shot. Pneumonia shots are usually only needed once in a lifetime unless your doctor decides differently. All people over 65 should have a yearly flu shot. People over 65 are at medium to high risk for Hepatitis B. Three shots are needed for complete protection. In addition to your physical exam, some screening tests are recommended:    Bone mass measurement (dexa scan) is recommended every two years  Diabetes Mellitus screening is recommended every year. Glaucoma is an eye disease caused by high pressure in the eye. An eye exam is recommended every year. Cardiovascular screening tests that check your cholesterol and other blood fat (lipid) levels are recommended every five years. Colorectal Cancer screening tests help to find pre-cancerous polyps (growths in the colon) so they can be removed before they turn into cancer. Tests ordered for screening depend on your personal and family history risk factors.     Screening for Breast Cancer is recommended yearly with a mammogram.    Screening for Cervical Cancer is recommended every two years (annually for certain risk factors, such as previous history of STD or abnormal PAP in past 7 years), with a Pelvic Exam with PAP    Here is a list of your current Health Maintenance items with a due date:  Health Maintenance   Topic Date Due    GLAUCOMA SCREENING Q2Y  05/17/2017    BREAST CANCER SCRN MAMMOGRAM  02/01/2018    MEDICARE YEARLY EXAM  10/10/2018    COLONOSCOPY  09/05/2019    DTaP/Tdap/Td series (2 - Td) 10/30/2024    Hepatitis C Screening  Completed    OSTEOPOROSIS SCREENING (DEXA)  Completed    ZOSTER VACCINE AGE 60>  Completed    Pneumococcal 65+ Low/Medium Risk  Completed    INFLUENZA AGE 9 TO ADULT  Completed             Vaccine Information Statement    Influenza (Flu) Vaccine (Inactivated or Recombinant): What you need to know    Many Vaccine Information Statements are available in [place] and other languages. See www.immunize.org/vis  Hojas de Información Sobre Vacunas están disponibles en Español y en muchos otros idiomas. Visite www.immunize.org/vis    1. Why get vaccinated? Influenza (flu) is a contagious disease that spreads around the [facility] every year, usually between October and May. Flu is caused by influenza viruses, and is spread mainly by coughing, sneezing, and close contact. Anyone can get flu. Flu strikes suddenly and can last several days. Symptoms vary by age, but can include:   fever/chills   sore throat   muscle aches   fatigue   cough   headache    runny or stuffy nose    Flu can also lead to pneumonia and blood infections, and cause diarrhea and seizures in children. If you have a medical condition, such as heart or lung disease, flu can make it worse. Flu is more dangerous for some people. Infants and young children, people 72years of age and older, pregnant women, and people with certain health conditions or a weakened immune system are at greatest risk. Each year thousands of people in the [facility] die from flu, and many more are hospitalized. Flu vaccine can:   keep you from getting flu,   make flu less severe if you do get it, and   keep you from spreading flu to your family and other people.      2. Inactivated and recombinant flu vaccines    A dose of flu vaccine is recommended every flu season. Children 6 months through 6years of age may need two doses during the same flu season. Everyone else needs only one dose each flu season. Some inactivated flu vaccines contain a very small amount of a mercury-based preservative called thimerosal. Studies have not shown thimerosal in vaccines to be harmful, but flu vaccines that do not contain thimerosal are available. There is no live flu virus in flu shots. They cannot cause the flu. There are many flu viruses, and they are always changing. Each year a new flu vaccine is made to protect against three or four viruses that are likely to cause disease in the upcoming flu season. But even when the vaccine doesnt exactly match these viruses, it may still provide some protection    Flu vaccine cannot prevent:   flu that is caused by a virus not covered by the vaccine, or   illnesses that look like flu but are not. It takes about 2 weeks for protection to develop after vaccination, and protection lasts through the flu season. 3. Some people should not get this vaccine    Tell the person who is giving you the vaccine:     If you have any severe, life-threatening allergies. If you ever had a life-threatening allergic reaction after a dose of flu vaccine, or have a severe allergy to any part of this vaccine, you may be advised not to get vaccinated. Most, but not all, types of flu vaccine contain a small amount of egg protein.  If you ever had Guillain-Barré Syndrome (also called GBS). Some people with a history of GBS should not get this vaccine. This should be discussed with your doctor.  If you are not feeling well. It is usually okay to get flu vaccine when you have a mild illness, but you might be asked to come back when you feel better.       4. Risks of a vaccine reaction    With any medicine, including vaccines, there is a chance of reactions. These are usually mild and go away on their own, but serious reactions are also possible. Most people who get a flu shot do not have any problems with it. Minor problems following a flu shot include:    soreness, redness, or swelling where the shot was given     hoarseness   sore, red or itchy eyes   cough   fever   aches   headache   itching   fatigue  If these problems occur, they usually begin soon after the shot and last 1 or 2 days. More serious problems following a flu shot can include the following:     There may be a small increased risk of Guillain-Barré Syndrome (GBS) after inactivated flu vaccine. This risk has been estimated at 1 or 2 additional cases per million people vaccinated. This is much lower than the risk of severe complications from flu, which can be prevented by flu vaccine.  Young children who get the flu shot along with pneumococcal vaccine (PCV13) and/or DTaP vaccine at the same time might be slightly more likely to have a seizure caused by fever. Ask your doctor for more information. Tell your doctor if a child who is getting flu vaccine has ever had a seizure. Problems that could happen after any injected vaccine:      People sometimes faint after a medical procedure, including vaccination. Sitting or lying down for about 15 minutes can help prevent fainting, and injuries caused by a fall. Tell your doctor if you feel dizzy, or have vision changes or ringing in the ears.  Some people get severe pain in the shoulder and have difficulty moving the arm where a shot was given. This happens very rarely.  Any medication can cause a severe allergic reaction. Such reactions from a vaccine are very rare, estimated at about 1 in a million doses, and would happen within a few minutes to a few hours after the vaccination. As with any medicine, there is a very remote chance of a vaccine causing a serious injury or death.     The safety of vaccines is always being monitored. For more information, visit: www.cdc.gov/vaccinesafety/    5. What if there is a serious reaction? What should I look for?  Look for anything that concerns you, such as signs of a severe allergic reaction, very high fever, or unusual behavior. Signs of a severe allergic reaction can include hives, swelling of the face and throat, difficulty breathing, a fast heartbeat, dizziness, and weakness  usually within a few minutes to a few hours after the vaccination. What should I do?  If you think it is a severe allergic reaction or other emergency that cant wait, call 9-1-1 and get the person to the nearest hospital. Otherwise, call your doctor.  Reactions should be reported to the Vaccine Adverse Event Reporting System (VAERS). Your doctor should file this report, or you can do it yourself through  the VAERS web site at www.vaers. hhs.gov, or by calling 2-526.284.6564. VAERS does not give medical advice. 6. The National Vaccine Injury Compensation Program    The [facility] Vaccine Injury Compensation Program (VICP) is a federal program that was created to compensate people who may have been injured by certain vaccines. Persons who believe they may have been injured by a vaccine can learn about the program and about filing a claim by calling 7-297.617.4648 or visiting the [address] website at www.[facility].gov/vaccinecompensation. There is a time limit to file a claim for compensation. 7. How can I learn more?  Ask your healthcare provider. He or she can give you the vaccine package insert or suggest other sources of information.  Call your local or state health department.  Contact the Centers for Disease Control and Prevention (CDC):  - Call 8-372.422.2545 (1-800-CDC-INFO) or  - Visit CDCs website at www.cdc.gov/flu    Vaccine Information Statement   Inactivated Influenza Vaccine   8/7/2015  42 SHANTHI Trenavasyl Barclay [id number]    [facility] for Disease Control and Prevention    Office Use Only    Vaccine Information Statement    Pneumococcal Polysaccharide Vaccine: What You Need to Know    Many Vaccine Information Statements are available in [place]ese and other languages. See www.immunize.org/vis. Hojas de información Sobre Vacunas están disponibles en español y en muchos otros idiomas. Visite Melisa.si. 1. Why get vaccinated? Vaccination can protect older adults (and some children and younger adults) from pneumococcal disease. Pneumococcal disease is caused by bacteria that can spread from person to person through close contact. It can cause ear infections, and it can also lead to more serious infections of the:   Lungs (pneumonia),   Blood (bacteremia), and   Covering of the brain and spinal cord (meningitis). Meningitis can cause deafness and brain damage, and it can be fatal.      Anyone can get pneumococcal disease, but children under 3years of age, people with certain medical conditions, adults over 72years of age, and cigarette smokers are at the highest risk. About 18,000 older adults die each year from pneumococcal disease in the [place]. Treatment of pneumococcal infections with penicillin and other drugs used to be more effective. But some strains of the disease have become resistant to these drugs. This makes prevention of the disease, through vaccination, even more important. 2. Pneumococcal polysaccharide vaccine (PPSV23)    Pneumococcal polysaccharide vaccine (PPSV23) protects against 23 types of pneumococcal bacteria. It will not prevent all pneumococcal disease. PPSV23 is recommended for:   All adults 72years of age and older,   Anyone 2 through 59years of age with certain long-term health problems,   Anyone 2 through 59years of age with a weakened immune system,   Adults 23 through 59years of age who smoke cigarettes or have asthma. Most people need only one dose of PPSV. A second dose is recommended for certain high-risk groups. People 72 and older should get a dose even if they have gotten one or more doses of the vaccine before they turned 65. Your healthcare provider can give you more information about these recommendations. Most healthy adults develop protection within 2 to 3 weeks of getting the shot. 3. Some people should not get this vaccine     Anyone who has had a life-threatening allergic reaction to PPSV should not get another dose.  Anyone who has a severe allergy to any component of PPSV should not receive it. Tell your provider if you have any severe allergies.  Anyone who is moderately or severely ill when the shot is scheduled may be asked to wait until they recover before getting the vaccine. Someone with a mild illness can usually be vaccinated.  Children less than 3years of age should not receive this vaccine.  There is no evidence that PPSV is harmful to either a pregnant woman or to her fetus. However, as a precaution, women who need the vaccine should be vaccinated before becoming pregnant, if possible. 4. Risks of a vaccine reaction    With any medicine, including vaccines, there is a chance of side effects. These are usually mild and go away on their own, but serious reactions are also possible. About half of people who get PPSV have mild side effects, such as redness or pain where the shot is given, which go away within about two days. Less than 1 out of 100 people develop a fever, muscle aches, or more severe local reactions. Problems that could happen after any vaccine:     People sometimes faint after a medical procedure, including vaccination. Sitting or lying down for about 15 minutes can help prevent fainting, and injuries caused by a fall. Tell your doctor if you feel dizzy, or have vision changes or ringing in the ears.      Some people get severe pain in the shoulder and have difficulty moving the arm where a shot was given. This happens very rarely.  Any medication can cause a severe allergic reaction. Such reactions from a vaccine are very rare, estimated at about 1 in a million doses, and would happen within a few minutes to a few hours after the vaccination. As with any medicine, there is a very remote chance of a vaccine causing a serious injury or death. The safety of vaccines is always being monitored. For more information, visit: www.cdc.gov/vaccinesafety/     5. What if there is a serious reaction? What should I look for? Look for anything that concerns you, such as signs of a severe allergic reaction, very high fever, or unusual behavior. Signs of a severe allergic reaction can include hives, swelling of the face and throat, difficulty breathing, a fast heartbeat, dizziness, and weakness. These would usually start a few minutes to a few hours after the vaccination. What should I do? If you think it is a severe allergic reaction or other emergency that cant wait, call 9-1-1 or get to the nearest hospital. Otherwise, call your doctor. Afterward, the reaction should be reported to the Vaccine Adverse Event Reporting System (VAERS). Your doctor might file this report, or you can do it yourself through the VAERS web site at www.vaers. [facility].gov, or by calling 4-445.566.9766. [employer] does not give medical advice. 6. How can I learn more?  Ask your doctor. He or she can give you the vaccine package insert or suggest other sources of information.  Call your local or state health department.    Contact the Centers for Disease Control and Prevention (CDC):  - Call 5-614.718.1155 (1-800-CDC-INFO) or  - Visit CDCs website at LLUSTRE 18 04/24/2015    [facility] for Disease Control and Prevention    Office Use Only

## 2017-10-09 NOTE — PROGRESS NOTES
This is a \"Welcome to [employer]"  Initial Preventive Physical Examination (IPPE) providing Personalized Prevention Plan Services (Performed in the first 12 months of enrollment)    I have reviewed the patient's medical history in detail and updated the computerized patient record. History   Jessica Hart is a 74 y.o. female. Presents for [facility] Visit and 4 month follow up care. She has HTN, CAD s/p 2 MI's in 2006 and 2011, valvular heart disease, CHF (EF 35% in 9/16), ICD in place, depression with anxiety, obesity s/p gastric bypass in 2006. AICD placement in 0/19 was complicated by infection, leading to removal of ICD and wires on 9/19/16 and placement of a subcutaneous ICD on 9/2/16 and wound revision of sternal incision. Today she has no complaints.       Soc Hx  . Has 2 living children; lives with , son, son's girlfriend, and 2 grandchildren. She is a retired post . Former smoker; quit in 2002. Denies alcohol or recreational drug use. Health Maintenance  Flu vaccine: 10/9/17  Pneumonia vaccine: PCV-13 6/14/16, PPSV-23 10/9/17  Tetanus vaccine:  Tdap 10/30/04  Zoster vaccine: 2/26/16  Colonoscopy: 9/5/14, Dr. Alyssa Smith, repeat in 5 yrs  Pap smear: 7/26/16  Mammogram: 2/1/16, due for this 2/1/18  Bone density: 2/1/16 (osteoporosis: L femoral neck T -2.5, tot L hip T -2.4, LS T-2.2)  Eye exam: due for this, has appt next week with Dr. Maura Burrell  Lipids: 8/2/16 (tot chol 177, LDL 97)  A1c: 2/9/16 (5.8%)  Advanced Directives: has booklet and form at home   End of Life: has booklet and form at home    Past Medical History:   Diagnosis Date    Asthma     Cardiomyopathy ([facility] [address]     Coronary artery disease 2008    s/p RCA stent ([facility]) on 11/26/11    Depression with anxiety     2011    DVT (deep venous thrombosis) ([address] 7/27/2010    Family history of early CAD     GERD (gastroesophageal reflux disease)     H/O gastric bypass 2006    Revision in 2009    Hepatitis C antibody test positive     Does not have chronic hep C (labs 10/6/15: neg HCV RNA)     HTN (hypertension) 7/27/2010    Hyperlipidemia 07/27/2010    Joint pain 7/27/2010    MI (myocardial infarction) 7/27/2010    Mitral valve regurgitation     Mild to moderate    Morbid obesity ([address] 7/27/2010    Myocardial infarction 2006 and 2011    Dr. Molly Abdi    PUD (peptic ulcer disease)     gi bleed 2008; ulcer n gastric bypass pouch    Urinary incontinence, stress 7/27/2010      Past Surgical History:   Procedure Laterality Date    HX COLONOSCOPY  9/5/14    Dr. Jaspal Wiseman; normal, repeat in 5 yrs    HX IMPLANTABLE CARDIOVERTER DEFIBRILLATOR  08/24/2016    HX LAP GASTRIC BYPASS  2006    revision 2009/Dr. Kimberlee Browning    HX OTHER SURGICAL  09/19/2016    Removal of right ventricular ICD lead & single chamber transvenous AICD    HX OTHER SURGICAL  10/12/2016    pocket revison of ICD; Dr. Reji Wells INS PPM/ICD LED SING ONLY  8/24/2016         INS PPM/ICD LED SING ONLY  8/26/2016         INS PPM/ICD LED SING ONLY  9/21/2016         ME LAP,STOMACH,OTHER,W/O TUBE  04/05/2010    Revision GBP     Current Outpatient Prescriptions   Medication Sig Dispense Refill    ALPRAZolam (XANAX) 0.5 mg tablet Take 1 Tab by mouth two (2) times daily as needed for Anxiety. Max Daily Amount: 1 mg. 20 Tab 0    FLUoxetine (PROZAC) 20 mg tablet TAKE 2 TABLETS EVERY MORNING AND 1 AT BEDTIME FOR ANXIETY WITH DEPRESSION 270 Tab 1    zolpidem (AMBIEN) 10 mg tablet TAKE ONE TALBET BY MOUTH NIGHTLY AS NEEDED FOR SLEEP, MAX DAILY AMOUNT 1 TABLET 30 Tab 1    metoprolol succinate (TOPROL-XL) 25 mg XL tablet Take 0.5 Tabs by mouth daily. 45 Tab 3    potassium chloride (KLOR-CON M20) 20 mEq tablet TAKE TWO TABLETS BY MOUTH DAILY 180 Tab 1    metOLazone (ZAROXOLYN) 2.5 mg tablet TAKE 1 TAB BY MOUTH DAILY AS NEEDED FOR UP TO 2 DAYS. 6 Tab 1    bumetanide (BUMEX) 2 mg tablet Take 0.5 Tabs by mouth two (2) times a day. Patient takes Bumex in relation to what her B/P is. Indications: Edema 45 Tab 3    ibuprofen (MOTRIN) 200 mg tablet Take  by mouth.  sacubitril-valsartan (ENTRESTO) 24 mg/26 mg tablet Take 1 Tab by mouth two (2) times a day. 180 Tab 3    LACTOBACILLUS ACIDOPHILUS (PROBIOTIC PO) Take  by mouth.  albuterol (PROVENTIL HFA, VENTOLIN HFA, PROAIR HFA) 90 mcg/actuation inhaler Take 2 Puffs by inhalation every four (4) hours as needed for Wheezing or Shortness of Breath. 1 Inhaler 5    aspirin delayed-release 81 mg tablet Take  by mouth daily.  vitamin A 8,000 unit capsule Take 8,000 Units by mouth daily.  biotin 10,000 mcg cap Take  by mouth daily.  OTHER Complete multi formula, bariatric advantage      magnesium 250 mg tab Take 1 Tab by mouth daily.  ferrous sulfate (IRON, FERROUS SULFATE,) 325 mg (65 mg Iron) tablet Take  by mouth daily (before breakfast).  cholecalciferol, vitamin d3, (VITAMIN D) 1,000 unit tablet Take 10,000 Units by mouth daily. 10,000 units \"dry vitamin d \"      CALCIUM PO Take 1 Tab by mouth daily.        Allergies   Allergen Reactions    Amoxicillin Anaphylaxis    Amoxicillin Anaphylaxis    Lipitor [Atorvastatin] Other (comments)     Severe muscle pain and spasms    Zocor [Simvastatin] Other (comments)     Cramps, muscle spasms    Livalo [Pitavastatin] Myalgia    Pravastatin Other (comments)     Leg cramps     Family History   Problem Relation Age of Onset    Dementia Mother     Cancer Mother      colon    Alzheimer Mother     Cancer Father      stomach    Heart Disease Father     Hypertension Father     Heart Disease Sister     Stroke Sister     Heart Attack Brother      Social History   Substance Use Topics    Smoking status: Former Smoker     Start date: 1/1/1970     Quit date: 5/17/2004    Smokeless tobacco: Never Used    Alcohol use No     Diet, Lifestyle: generally follows a low fat low cholesterol diet    Exercise level: exercises 3 times a week    Depression Risk Screen     PHQ over the last two weeks 7/26/2016   PHQ Not Done Active Diagnosis of Depression or Bipolar Disorder   Little interest or pleasure in doing things -   Feeling down, depressed or hopeless -   Total Score PHQ 2 -     Alcohol Risk Screen   You do not drink alcohol or very rarely. Functional Ability and Level of Safety     Hearing Loss   Hearing is good. Activities of Daily Living   Self-care     Fall Risk Screen   Fall Risk Assessment, last 12 mths 10/9/2017   Able to walk? Yes   Fall in past 12 months? Yes   Fall with injury? Yes   Number of falls in past 12 months 1   Fall Risk Score 2     Abuse Screen   Patient is not abused    Review of Systems   Pertinent items are noted in HPI. Physical Examination        Visit Vitals    /77 (BP 1 Location: Left arm, BP Patient Position: Sitting)    Pulse 71    Temp 98.3 °F (36.8 °C) (Oral)    Resp 18    Ht 5' 5\" (1.651 m)    Wt 255 lb (115.7 kg)    SpO2 95%    BMI 42.43 kg/m2       General: Morbidly obese, no distress. HEENT:  Head normocephalic/atraumatic, no scleral icterus  Neck: Supple. No carotid bruits, JVD, lymphadenopathy, or thyromegaly. Lungs:  Clear to ausculation bilaterally. Good air movement. Heart:  Regular rate and rhythm, normal S1 and S2, no murmur, gallop, or rub  Abdomen: Soft, non-distended, normal bowel sounds, no tenderness, no guarding, masses, rebound tenderness, or HSM. Extremities: No clubbing, cyanosis, or edema. Neurological: Alert and oriented. Psychiatric: Normal mood and affect.  Behavior is normal.     Patient Care Team:  Neri Davis MD as PCP - General (Internal Medicine)  Sohan Montaño MD (Cardiology)  Denia Patricia MD (Cardiology)  Moe Spears NP (Nurse Practitioner)     End-of-life planning  Advanced Directive discussed and documented: YES    Assessment/Plan   Education and counseling provided:  Are appropriate based on today's review and evaluation  End-of-Life planning (with patient's consent)  Pneumococcal Vaccine  Influenza Vaccine  Screening for glaucoma         ICD-10-CM ICD-9-CM    1. Welcome to Medicare preventive visit Z00.00 V70.0    2. Essential hypertension I10 401.9    3. Systolic CHF, chronic (HCC) I50.22 428.22      428.0    4. ICD (implantable cardioverter-defibrillator) in place Z95.810 V45.02    5. Anxiety F41.9 300.00 ALPRAZolam (XANAX) 0.5 mg tablet   6. Primary osteoarthritis of right knee M17.11 715.16    7. Depression with anxiety F41.8 300.4 FLUoxetine (PROZAC) 20 mg tablet   8. Mild intermittent asthma without complication Y93.84 694.08    9. Age-related osteoporosis without current pathological fracture M81.0 733.01    10. Coronary artery disease involving native coronary artery of native heart without angina pectoris I25.10 414.01    11. Screening for alcoholism Z13.89 V79.1    12. Screening for glaucoma Z13.5 V80.1 REFERRAL TO OPHTHALMOLOGY   13. Encounter for screening mammogram for malignant neoplasm of breast Z12.31 V76.12 [facility] MAMMO BI SCREENING INCL CAD   15. Encounter for immunization Z23 V03.89 INFLUENZA VIRUS VACCINE, HIGH DOSE SEASONAL, PRESERVATIVE FREE      PNEUMOCOCCAL POLYSACCHARIDE VACCINE, 23-VALENT, ADULT OR IMMUNOSUPPRESSED PT DOSE,      ADMIN INFLUENZA VIRUS VAC   15. Advance care planning Z71.89 V65.49        Diagnoses and all orders for this visit:    1. Welcome to Medicare preventive visit    2. Essential hypertension  Controlled. /77 today. Continue metoprolol. 3. Systolic CHF, chronic (HCC)  Controlled. Continue Entresto, metoprolol, and Bumex. 4. ICD (implantable cardioverter-defibrillator) in place    5. Anxiety  -     Refill ALPRAZolam (XANAX) 0.5 mg tablet; Take 1 Tab by mouth two (2) times daily as needed for Anxiety. Max Daily Amount: 1 mg.    6. Primary osteoarthritis of right knee  Controlled.     7. Depression with anxiety  -     Refill FLUoxetine (PROZAC) 20 mg tablet; TAKE 2 TABLETS EVERY MORNING AND 1 AT BEDTIME FOR ANXIETY WITH DEPRESSION    8. Mild intermittent asthma without complication  Controlled on present inhalers. 9. Age-related osteoporosis without current pathological fracture    10. Coronary artery disease involving native coronary artery of native heart without angina pectoris    11. Screening for alcoholism    12. Screening for glaucoma  -     REFERRAL TO OPHTHALMOLOGY    13. Encounter for screening mammogram for malignant neoplasm of breast  -     [facility] MAMMO BI SCREENING INCL CAD; Future    14. Encounter for immunization  -     Influenza virus vaccine ([address]) 72 years and older (20673)  -     Pneumococcal Polysaccharide vaccine, 23-Valent, Adult or Immunocompromised  -     Administration fee () for Medicare insured patients    15. Advance care planning      Follow-up Disposition:  Return in about 6 months (around 4/9/2018), or if symptoms worsen or fail to improve, for HTN, CHF; schedule for fasting labs 1 week prior to appt. .    I have discussed the diagnosis with the patient and the intended plan as seen in the above orders. Patient is in agreement. The patient has received an after-visit summary and questions were answered concerning future plans. I have discussed medication side effects and warnings with the patient as well.

## 2017-10-09 NOTE — MR AVS SNAPSHOT
Visit Information Date & Time Provider Department Dept. Phone Encounter #  
 10/9/2017  9:30 AM Sanjiv Martinez Lucy Shwetha [phone] [id number] Follow-up Instructions Return in about 6 months (around 4/9/2018), or if symptoms worsen or fail to improve, for HTN, CHF; schedule for fasting labs 1 week prior to appt. Your Appointments 10/25/2017  2:40 PM  
ESTABLISHED PATIENT with Clay Betts NP  
[facility] ([facility]) Appt Note: fu per pt; fu per pt r/s from 10/11  
 [address]  
[address] [employer] [address]  
  
    
 10/26/2017  3:30 PM  
PACEMAKER with Shiv Nuñez [facility] ([facility] SCHEDULING) Appt Note: evans sci sub Q ICD/rc/Carrion 1 yr b 7-19-17  
 [address]  
[phone]  
  
   
 [address]  
  
    
 10/26/2017  3:40 PM  
ESTABLISHED PATIENT with Betsy Blizzard, MD  
[facility] ([facility]) Appt Note: evans sci sub Q ICD/rc/Carrion 1 yr b 7-19-17  
 [address]  
[address] [id number] Upcoming Health Maintenance Date Due  
 GLAUCOMA SCREENING Q2Y 5/17/2017 BREAST CANCER SCRN MAMMOGRAM 2/1/2018 MEDICARE YEARLY EXAM 10/10/2018 COLONOSCOPY 9/5/2019 DTaP/Tdap/Td series (2 - Td) 10/30/2024 Allergies as of 10/9/2017  Review Complete On: 10/9/2017 By: Sanjiv Martinez MD  
  
 Severity Noted Reaction Type Reactions Amoxicillin High 07/27/2010   Side Effect Anaphylaxis Amoxicillin High 06/30/2015    Anaphylaxis Lipitor [Atorvastatin] High 06/30/2015    Other (comments) Severe muscle pain and spasms Zocor [Simvastatin] Medium 06/30/2015    Other (comments) Cramps, muscle spasms Livalo [Pitavastatin]  01/25/2017    Myalgia Pravastatin  08/19/2016    Other (comments) Leg cramps Current Immunizations  Reviewed on 7/11/2016 Name Date Influenza High Dose Vaccine PF  Incomplete Influenza Vaccine (Quad) PF 9/21/2016  6:19 PM, 10/6/2015 Influenza Vaccine Split 11/28/2011 11:25 AM  
 Pneumococcal Conjugate (PCV-13) 6/14/2016 Pneumococcal Polysaccharide (PPSV-23)  Incomplete Tdap 10/30/2014 ZZZ-RETIRED (DO NOT USE) Pneumococcal Vaccine (Unspecified Type) 8/20/2011 Zoster Vaccine, Live 2/26/2016 Not reviewed this visit You Were Diagnosed With   
  
 Codes Comments Welcome to Medicare preventive visit    -  Primary ICD-10-CM: Z00.00 ICD-9-CM: V70.0 Essential hypertension     ICD-10-CM: I10 
ICD-9-CM: 401.9 Systolic CHF, chronic (HCC)     ICD-10-CM: I50.22 ICD-9-CM: 428.22, 428.0 ICD (implantable cardioverter-defibrillator) in place     ICD-10-CM: Z95.810 ICD-9-CM: V45.02 Anxiety     ICD-10-CM: F41.9 ICD-9-CM: 300.00 Primary osteoarthritis of right knee     ICD-10-CM: M17.11 ICD-9-CM: 715.16 Depression with anxiety     ICD-10-CM: F41.8 ICD-9-CM: 300.4 Mild intermittent asthma without complication     [facility]-XE: J45.20 ICD-9-CM: 493.90 Age-related osteoporosis without current pathological fracture     ICD-10-CM: M81.0 ICD-9-CM: 733.01 Coronary artery disease involving native coronary artery of native heart without angina pectoris     ICD-10-CM: I25.10 ICD-9-CM: 414.01 Screening for alcoholism     ICD-10-CM: Z13.89 ICD-9-CM: V79.1 Screening for glaucoma     ICD-10-CM: Z13.5 ICD-9-CM: V80.1 Encounter for screening mammogram for malignant neoplasm of breast     ICD-10-CM: Z12.31 
ICD-9-CM: V76.12 Encounter for immunization     ICD-10-CM: C88 ICD-9-CM: V03.89 Advance care planning     ICD-10-CM: Z71.89 ICD-9-CM: V65.49 Vitals BP Pulse Temp Resp Height(growth percentile) Weight(growth percentile) 125/77 (BP 1 Location: Left arm, BP Patient Position: Sitting) 71 98.3 °F (36.8 °C) (Oral) 18 5' 5\" (1.651 m) 255 lb (115.7 kg) SpO2 BMI OB Status Smoking Status 95% 42.43 kg/m2 Postmenopausal Former Smoker BMI and BSA Data Body Mass Index Body Surface Area  
 42.43 kg/m 2 2.3 m 2 Preferred Pharmacy Pharmacy Name Phone [phone] Your Updated Medication List  
  
   
This list is accurate as of: 10/9/17 10:18 AM.  Always use your most recent med list.  
  
  
  
  
 albuterol 90 mcg/actuation inhaler Commonly known as:  PROVENTIL HFA, VENTOLIN HFA, PROAIR HFA Take 2 Puffs by inhalation every four (4) hours as needed for Wheezing or Shortness of Breath. ALPRAZolam 0.5 mg tablet Commonly known as:  Kaleta Krystin Take 1 Tab by mouth two (2) times daily as needed for Anxiety. Max Daily Amount: 1 mg.  
  
 aspirin delayed-release 81 mg tablet Take  by mouth daily. biotin 10,000 mcg Cap Take  by mouth daily. bumetanide 2 mg tablet Commonly known as:  Garlon Salen Take 0.5 Tabs by mouth two (2) times a day. Patient takes Bumex in relation to what her B/P is. Indications: Edema CALCIUM PO Take 1 Tab by mouth daily. FLUoxetine 20 mg tablet Commonly known as:  PROzac TAKE 2 TABLETS EVERY MORNING AND 1 AT BEDTIME FOR ANXIETY WITH DEPRESSION  
  
 ibuprofen 200 mg tablet Commonly known as:  MOTRIN Take  by mouth. Iron (Ferrous Sulfate) 325 mg (65 mg iron) tablet Generic drug:  ferrous sulfate Take  by mouth daily (before breakfast). magnesium 250 mg Tab Take 1 Tab by mouth daily. metOLazone 2.5 mg tablet Commonly known as:  ZAROXOLYN  
TAKE 1 TAB BY MOUTH DAILY AS NEEDED FOR UP TO 2 DAYS. metoprolol succinate 25 mg XL tablet Commonly known as:  TOPROL-XL  
 Take 0.5 Tabs by mouth daily. OTHER Complete multi formula, bariatric advantage  
  
 potassium chloride 20 mEq tablet Commonly known as:  KLOR-CON M20  
TAKE TWO TABLETS BY MOUTH DAILY PROBIOTIC PO Take  by mouth. sacubitril-valsartan 24-26 mg tablet Commonly known as:  ENTRESTO Take 1 Tab by mouth two (2) times a day. vitamin A 8,000 unit capsule Take 8,000 Units by mouth daily. VITAMIN D3 1,000 unit tablet Generic drug:  cholecalciferol Take 10,000 Units by mouth daily. 10,000 units \"dry vitamin d \"  
  
 zolpidem 10 mg tablet Commonly known as:  AMBIEN  
TAKE ONE TALBET BY MOUTH NIGHTLY AS NEEDED FOR SLEEP, MAX DAILY AMOUNT 1 TABLET Prescriptions Printed Refills ALPRAZolam (XANAX) 0.5 mg tablet 0 Sig: Take 1 Tab by mouth two (2) times daily as needed for Anxiety. Max Daily Amount: 1 mg. Class: Print Route: Oral  
  
Prescriptions Sent to Pharmacy Refills FLUoxetine (PROZAC) 20 mg tablet 1 Sig: TAKE 2 TABLETS EVERY MORNING AND 1 AT BEDTIME FOR ANXIETY WITH DEPRESSION Class: Normal  
 [facility] [address] [place] [address] #: [phone] We Performed the Following ADMIN INFLUENZA VIRUS VAC [ [facility]] INFLUENZA VIRUS VACCINE, HIGH DOSE SEASONAL, PRESERVATIVE FREE [74670 CPT(R)] PNEUMOCOCCAL POLYSACCHARIDE VACCINE, 23-VALENT, ADULT OR IMMUNOSUPPRESSED PT DOSE, [94332 CPT(R)] REFERRAL TO OPHTHALMOLOGY [REF57 Custom] Comments:  
 Please evaluate patient for glaucoma. Follow-up Instructions Return in about 6 months (around 4/9/2018), or if symptoms worsen or fail to improve, for HTN, CHF; schedule for fasting labs 1 week prior to appt. To-Do List   
 02/02/2018 Imaging:  [facility] BI SCREENING INCL CAD Referral Information Referral ID Referred By Referred To  
  
 [id number] [facility] Marlyn Reynoso.  [facility]   
 [address]   
   [place], [address] Visits Status Start Date End Date 1 New Request 10/9/17 10/9/18 If your referral has a status of pending review or denied, additional information will be sent to support the outcome of this decision. Patient Instructions Schedule of Personalized Health Plan The best way to stay healthy is to live a healthy lifestyle. A healthy lifestyle includes regular exercise, eating a well-balanced diet, keeping a healthy weight and not smoking. Regular physical exams and screening tests are another important way to take care of yourself. Preventive exams provided by health care providers can find health problems early when treatment works best and can keep you from getting certain diseases or illnesses. Preventive services include exams, lab tests, screenings, shots, monitoring and information to help you take care of your own health. All people over 65 should have a pneumonia shot. Pneumonia shots are usually only needed once in a lifetime unless your doctor decides differently. All people over 65 should have a yearly flu shot. People over 65 are at medium to high risk for Hepatitis B. Three shots are needed for complete protection. In addition to your physical exam, some screening tests are recommended: 
 
Bone mass measurement (dexa scan) is recommended every two years Diabetes Mellitus screening is recommended every year. Glaucoma is an eye disease caused by high pressure in the eye. An eye exam is recommended every year. Cardiovascular screening tests that check your cholesterol and other blood fat (lipid) levels are recommended every five years. Colorectal Cancer screening tests help to find pre-cancerous polyps (growths in the colon) so they can be removed before they turn into cancer. Tests ordered for screening depend on your personal and family history risk factors. Screening for Breast Cancer is recommended yearly with a mammogram. 
 
Screening for Cervical Cancer is recommended every two years (annually for certain risk factors, such as previous history of STD or abnormal PAP in past 7 years), with a Pelvic Exam with PAP Here is a list of your current Health Maintenance items with a due date: 
Health Maintenance Topic Date Due  GLAUCOMA SCREENING Q2Y  05/17/2017  BREAST CANCER SCRN MAMMOGRAM  02/01/2018  MEDICARE YEARLY EXAM  10/10/2018  COLONOSCOPY  09/05/2019  
 DTaP/Tdap/Td series (2 - Td) 10/30/2024  Hepatitis C Screening  Completed  OSTEOPOROSIS SCREENING (DEXA)  Completed  ZOSTER VACCINE AGE 60>  Completed  Pneumococcal 65+ Low/Medium Risk  Completed  INFLUENZA AGE 9 TO ADULT  Completed Vaccine Information Statement Influenza (Flu) Vaccine (Inactivated or Recombinant): What you need to know Many Vaccine Information Statements are available in Greenlandic and other languages. See www.immunize.org/vis Hojas de Información Sobre Vacunas están disponibles en Español y en muchos otros idiomas. Visite www.immunize.org/vis 1. Why get vaccinated? Influenza (flu) is a contagious disease that spreads around the [place] every year, usually between October and May. Flu is caused by influenza viruses, and is spread mainly by coughing, sneezing, and close contact. Anyone can get flu. Flu strikes suddenly and can last several days. Symptoms vary by age, but can include: 
 fever/chills  sore throat  muscle aches  fatigue  cough  headache  runny or stuffy nose Flu can also lead to pneumonia and blood infections, and cause diarrhea and seizures in children. If you have a medical condition, such as heart or lung disease, flu can make it worse. Flu is more dangerous for some people.  Infants and young children, people 72years of age and older, pregnant women, and people with certain health conditions or a weakened immune system are at greatest risk. Each year thousands of people in the [facility] die from flu, and many more are hospitalized. Flu vaccine can: 
 keep you from getting flu, 
 make flu less severe if you do get it, and 
 keep you from spreading flu to your family and other people. 2. Inactivated and recombinant flu vaccines A dose of flu vaccine is recommended every flu season. Children 6 months through 6years of age may need two doses during the same flu season. Everyone else needs only one dose each flu season. Some inactivated flu vaccines contain a very small amount of a mercury-based preservative called thimerosal. Studies have not shown thimerosal in vaccines to be harmful, but flu vaccines that do not contain thimerosal are available. There is no live flu virus in flu shots. They cannot cause the flu. There are many flu viruses, and they are always changing. Each year a new flu vaccine is made to protect against three or four viruses that are likely to cause disease in the upcoming flu season. But even when the vaccine doesnt exactly match these viruses, it may still provide some protection Flu vaccine cannot prevent: 
 flu that is caused by a virus not covered by the vaccine, or 
 illnesses that look like flu but are not. It takes about 2 weeks for protection to develop after vaccination, and protection lasts through the flu season. 3. Some people should not get this vaccine Tell the person who is giving you the vaccine:  If you have any severe, life-threatening allergies. If you ever had a life-threatening allergic reaction after a dose of flu vaccine, or have a severe allergy to any part of this vaccine, you may be advised not to get vaccinated. Most, but not all, types of flu vaccine contain a small amount of egg protein.  If you ever had Guillain-Barré Syndrome (also called GBS). Some people with a history of GBS should not get this vaccine. This should be discussed with your doctor.  If you are not feeling well. It is usually okay to get flu vaccine when you have a mild illness, but you might be asked to come back when you feel better. 4. Risks of a vaccine reaction With any medicine, including vaccines, there is a chance of reactions. These are usually mild and go away on their own, but serious reactions are also possible. Most people who get a flu shot do not have any problems with it. Minor problems following a flu shot include:  
 soreness, redness, or swelling where the shot was given  hoarseness  sore, red or itchy eyes  cough  fever  aches  headache  itching  fatigue If these problems occur, they usually begin soon after the shot and last 1 or 2 days. More serious problems following a flu shot can include the following:  There may be a small increased risk of Guillain-Barré Syndrome (GBS) after inactivated flu vaccine. This risk has been estimated at 1 or 2 additional cases per million people vaccinated. This is much lower than the risk of severe complications from flu, which can be prevented by flu vaccine.  Young children who get the flu shot along with pneumococcal vaccine (PCV13) and/or DTaP vaccine at the same time might be slightly more likely to have a seizure caused by fever. Ask your doctor for more information. Tell your doctor if a child who is getting flu vaccine has ever had a seizure. Problems that could happen after any injected vaccine:  People sometimes faint after a medical procedure, including vaccination. Sitting or lying down for about 15 minutes can help prevent fainting, and injuries caused by a fall. Tell your doctor if you feel dizzy, or have vision changes or ringing in the ears.  
 
 Some people get severe pain in the shoulder and have difficulty moving the arm where a shot was given. This happens very rarely.  Any medication can cause a severe allergic reaction. Such reactions from a vaccine are very rare, estimated at about 1 in a million doses, and would happen within a few minutes to a few hours after the vaccination. As with any medicine, there is a very remote chance of a vaccine causing a serious injury or death. The safety of vaccines is always being monitored. For more information, visit: www.cdc.gov/vaccinesafety/ 
 
5. What if there is a serious reaction? What should I look for?  Look for anything that concerns you, such as signs of a severe allergic reaction, very high fever, or unusual behavior. Signs of a severe allergic reaction can include hives, swelling of the face and throat, difficulty breathing, a fast heartbeat, dizziness, and weakness  usually within a few minutes to a few hours after the vaccination. What should I do?  If you think it is a severe allergic reaction or other emergency that cant wait, call 9-1-1 and get the person to the nearest hospital. Otherwise, call your doctor.  Reactions should be reported to the Vaccine Adverse Event Reporting System (VAERS). Your doctor should file this report, or you can do it yourself through  the VAERS web site at www.vaers. [facility].gov, or by calling 5-919.580.2598. VAERS does not give medical advice. 6. The National Vaccine Injury Compensation Program 
 
The [facility] Vaccine Injury Compensation Program (VICP) is a federal program that was created to compensate people who may have been injured by certain vaccines. Persons who believe they may have been injured by a vaccine can learn about the program and about filing a claim by calling 2-942.129.5074 or visiting the [employer] website at www.[facility].gov/vaccinecompensation. There is a time limit to file a claim for compensation. 7. How can I learn more?  Ask your healthcare provider. He or she can give you the vaccine package insert or suggest other sources of information.  Call your local or state health department.  Contact the Centers for Disease Control and Prevention (CDC): 
- Call 4-360.955.1092 (1-800-CDC-INFO) or 
- Visit CDCs website at www.cdc.gov/flu Vaccine Information Statement Inactivated Influenza Vaccine 8/7/2015 
42 SHANTHI Lamar [id number]-17 [facility] and [employer] Centers for Disease Control and Prevention Office Use Only Vaccine Information Statement Pneumococcal Polysaccharide Vaccine: What You Need to Know Many Vaccine Information Statements are available in [place]ijani and other languages. See www.immunize.org/vis. Hojas de información Sobre Vacunas están disponibles en español y en muchos otros idiomas. Visite Melisa.si. 1. Why get vaccinated? Vaccination can protect older adults (and some children and younger adults) from pneumococcal disease. Pneumococcal disease is caused by bacteria that can spread from person to person through close contact. It can cause ear infections, and it can also lead to more serious infections of the: 
 Lungs (pneumonia),  Blood (bacteremia), and 
 Covering of the brain and spinal cord (meningitis). Meningitis can cause deafness and brain damage, and it can be fatal.   
 
Anyone can get pneumococcal disease, but children under 3years of age, people with certain medical conditions, adults over 72years of age, and cigarette smokers are at the highest risk. About 18,000 older adults die each year from pneumococcal disease in the [place]. Treatment of pneumococcal infections with penicillin and other drugs used to be more effective. But some strains of the disease have become resistant to these drugs. This makes prevention of the disease, through vaccination, even more important. 2. Pneumococcal polysaccharide vaccine (PPSV23) Pneumococcal polysaccharide vaccine (PPSV23) protects against 23 types of pneumococcal bacteria. It will not prevent all pneumococcal disease. PPSV23 is recommended for:  All adults 72years of age and older,  Anyone 2 through 59years of age with certain long-term health problems, 
 Anyone 2 through 59years of age with a weakened immune system, 
 Adults 23 through 59years of age who smoke cigarettes or have asthma. Most people need only one dose of PPSV. A second dose is recommended for certain high-risk groups. People 72 and older should get a dose even if they have gotten one or more doses of the vaccine before they turned 65. Your healthcare provider can give you more information about these recommendations. Most healthy adults develop protection within 2 to 3 weeks of getting the shot. 3. Some people should not get this vaccine  Anyone who has had a life-threatening allergic reaction to PPSV should not get another dose.  Anyone who has a severe allergy to any component of PPSV should not receive it. Tell your provider if you have any severe allergies.  Anyone who is moderately or severely ill when the shot is scheduled may be asked to wait until they recover before getting the vaccine. Someone with a mild illness can usually be vaccinated.  Children less than 3years of age should not receive this vaccine.  There is no evidence that PPSV is harmful to either a pregnant woman or to her fetus. However, as a precaution, women who need the vaccine should be vaccinated before becoming pregnant, if possible. 4. Risks of a vaccine reaction With any medicine, including vaccines, there is a chance of side effects. These are usually mild and go away on their own, but serious reactions are also possible. About half of people who get PPSV have mild side effects, such as redness or pain where the shot is given, which go away within about two days. Less than 1 out of 100 people develop a fever, muscle aches, or more severe local reactions. Problems that could happen after any vaccine:  People sometimes faint after a medical procedure, including vaccination. Sitting or lying down for about 15 minutes can help prevent fainting, and injuries caused by a fall. Tell your doctor if you feel dizzy, or have vision changes or ringing in the ears.  Some people get severe pain in the shoulder and have difficulty moving the arm where a shot was given. This happens very rarely.  Any medication can cause a severe allergic reaction. Such reactions from a vaccine are very rare, estimated at about 1 in a million doses, and would happen within a few minutes to a few hours after the vaccination. As with any medicine, there is a very remote chance of a vaccine causing a serious injury or death. The safety of vaccines is always being monitored. For more information, visit: www.cdc.gov/vaccinesafety/  
 
5. What if there is a serious reaction? What should I look for? Look for anything that concerns you, such as signs of a severe allergic reaction, very high fever, or unusual behavior. Signs of a severe allergic reaction can include hives, swelling of the face and throat, difficulty breathing, a fast heartbeat, dizziness, and weakness. These would usually start a few minutes to a few hours after the vaccination. What should I do? If you think it is a severe allergic reaction or other emergency that cant wait, call 9-1-1 or get to the nearest hospital. Otherwise, call your doctor. Afterward, the reaction should be reported to the Vaccine Adverse Event Reporting System (VAERS). Your doctor might file this report, or you can do it yourself through the VAERS web site at www.vaers. hhs.gov, or by calling 2-293.523.3512. VAERS does not give medical advice. 6. How can I learn more?  Ask your doctor. He or she can give you the vaccine package insert or suggest other sources of information.  Call your local or state health department.  Contact the Centers for Disease Control and Prevention (CDC): 
- Call 7-561.351.3345 (1-800-CDC-INFO) or 
- Visit CDCs website at www.cdc.gov/vaccines Vaccine Information Statement PPSV  
04/24/2015 Department of [facility] and [employer] Centers for Disease Control and Prevention Office Use Only [facility]! Dear Darrion Huggins: Thank you for requesting a [facility] account. Our records indicate that you already have an active [facility] account. You can access your account anytime at [email or web address] Did you know that you can access your hospital and ER discharge instructions at any time in [employer]? You can also review all of your test results from your hospital stay or ER visit. Additional Information If you have questions, please visit the Frequently Asked Questions section of the [employer] website at [email or web address] Remember, [facility] [employer] is NOT to be used for urgent needs. For medical emergencies, dial 911. Now available from your iPhone and Android! Please provide this summary of care documentation to your next provider. Your primary care clinician is listed as Kristian Miller. If you have any questions after today's visit, please call [phone].

## 2017-10-09 NOTE — PROGRESS NOTES
Reviewed record  In preparation for visit and have obtained necessary documentation. 1. Have you been to the ER, urgent care clinic since your last visit? Hospitalized since your last visit?no  2. Have you seen or consulted any other health care providers outside of the [address] [place] [address] since your last visit? Include any pap smears or colon screening. saw Dr Nigel Leal  Advanced directives: Patient has been given information on advanced directives at a previous visit. Patients vital signs discussed with physician. High dose influenza vaccine 0.5 ml given left deltoid IM. Lot # [id number] Exp. Date 4/13/18 VIS [address]  Patient tolerated injection with no complications. Pneumonia vaccine PPSV23 0.5 ml given right deltoid IM. [employer] Lot# [id number] exp. 1/20/19 VIS given. Patient tolerated procedure without incident.

## 2017-10-09 NOTE — PROGRESS NOTES
This is a Subsequent Medicare Annual Wellness Visit providing Personalized Prevention Plan Services (PPPS) (Performed 12 months after initial AWV and PPPS )    I have reviewed the patient's medical history in detail and updated the computerized patient record. History   Yaakov Sunshine is a 74 y.o. female. [facility] up [facility]. She has HTN, CAD s/p 2 MI's in 2006 and 2011, valvular heart disease, CHF (EF 35% in 9/16), ICD in place, depression with anxiety, obesity s/p gastric bypass in 2006. AICD placement in 1/83 was complicated by infection, leading to removal of ICD and wires on 9/19/16 and placement of a subcutaneous ICD on 9/2/16 and wound revision of sternal incision.      She complains of dizziness that occurs with positional changes; associated with nausea. States that it is different from the type of dizziness she was having before, which is now better with decrease in Entresto dose. No falls or syncopal spells since syncope on 2/23/17. She also complains of increased anxiety recently related to home situation (has tense interactions with son's girlfriend that make her anxious).       Soc Hx  . Has 2 living children; lives with , son, son's girlfriend, and 2 grandchildren. She is a retired post . Former smoker; quit in 2002. Denies alcohol or recreational drug use.           Past Medical History:   Diagnosis Date    Asthma     Cardiomyopathy ([address]     Coronary artery disease 2008    s/p RCA stent (AMRIT) on 11/26/11    Depression with anxiety     2011    DVT (deep venous thrombosis) ([facility] 7/27/2010    Family history of early CAD     GERD (gastroesophageal reflux disease)     H/O gastric bypass 2006    Revision in 2009    Hepatitis C antibody test positive     Does not have chronic hep C (labs 10/6/15: neg HCV RNA)     HTN (hypertension) 7/27/2010    Hyperlipidemia 07/27/2010    Joint pain 7/27/2010    MI (myocardial infarction) 7/27/2010    Mitral valve regurgitation     Mild to moderate    Morbid obesity ([address] 7/27/2010    Myocardial infarction 2006 and 2011    Dr. Catherine Muñoz    PUD (peptic ulcer disease)     gi bleed 2008; ulcer n gastric bypass pouch    Urinary incontinence, stress 7/27/2010      Past Surgical History:   Procedure Laterality Date    HX COLONOSCOPY  9/5/14    Dr. Katty Long; normal, repeat in 5 yrs    HX IMPLANTABLE CARDIOVERTER DEFIBRILLATOR  08/24/2016    HX LAP GASTRIC BYPASS  2006    revision 2009/Dr. Olaf Willis    HX OTHER SURGICAL  09/19/2016    Removal of right ventricular ICD lead & single chamber transvenous AICD    HX OTHER SURGICAL  10/12/2016    pocket revison of ICD; Dr. Danny Castillo INS PPM/ICD LED SING ONLY  8/24/2016         INS PPM/ICD LED SING ONLY  8/26/2016         INS PPM/ICD LED SING ONLY  9/21/2016         [place] LAP,STOMACH,OTHER,W/O TUBE  04/05/2010    Revision GBP     Current Outpatient Prescriptions   Medication Sig Dispense Refill    zolpidem (AMBIEN) 10 mg tablet TAKE ONE TALBET BY MOUTH NIGHTLY AS NEEDED FOR SLEEP, MAX DAILY AMOUNT 1 TABLET 30 Tab 1    metoprolol succinate (TOPROL-XL) 25 mg XL tablet Take 0.5 Tabs by mouth daily. 45 Tab 3    potassium chloride (KLOR-CON M20) 20 mEq tablet TAKE TWO TABLETS BY MOUTH DAILY 180 Tab 1    FLUoxetine (PROZAC) 20 mg tablet TAKE 2 TABLETS EVERY MORNING AND 1 AT BEDTIME FOR ANXIETY WITH DEPRESSION 270 Tab 1    metOLazone (ZAROXOLYN) 2.5 mg tablet TAKE 1 TAB BY MOUTH DAILY AS NEEDED FOR UP TO 2 DAYS. 6 Tab 1    ALPRAZolam (XANAX) 0.5 mg tablet Take 1 Tab by mouth two (2) times daily as needed for Anxiety. Max Daily Amount: 1 mg. 20 Tab 0    bumetanide (BUMEX) 2 mg tablet Take 0.5 Tabs by mouth two (2) times a day. Patient takes Bumex in relation to what her B/P is. Indications: Edema 45 Tab 3    ibuprofen (MOTRIN) 200 mg tablet Take  by mouth.       sacubitril-valsartan (ENTRESTO) 24 mg/26 mg tablet Take 1 Tab by mouth two (2) times a day. 180 Tab 3    LACTOBACILLUS ACIDOPHILUS (PROBIOTIC PO) Take  by mouth.  albuterol (PROVENTIL HFA, VENTOLIN HFA, PROAIR HFA) 90 mcg/actuation inhaler Take 2 Puffs by inhalation every four (4) hours as needed for Wheezing or Shortness of Breath. 1 Inhaler 5    aspirin delayed-release 81 mg tablet Take  by mouth daily.  vitamin A 8,000 unit capsule Take 8,000 Units by mouth daily.  biotin 10,000 mcg cap Take  by mouth daily.  OTHER Complete multi formula, bariatric advantage      magnesium 250 mg tab Take 1 Tab by mouth daily.  ferrous sulfate (IRON, FERROUS SULFATE,) 325 mg (65 mg Iron) tablet Take  by mouth daily (before breakfast).  cholecalciferol, vitamin d3, (VITAMIN D) 1,000 unit tablet Take 10,000 Units by mouth daily. 10,000 units \"dry vitamin d \"      CALCIUM PO Take 1 Tab by mouth daily.        Allergies   Allergen Reactions    Amoxicillin Anaphylaxis    Amoxicillin Anaphylaxis    Lipitor [Atorvastatin] Other (comments)     Severe muscle pain and spasms    Zocor [Simvastatin] Other (comments)     Cramps, muscle spasms    Livalo [Pitavastatin] Myalgia    Pravastatin Other (comments)     Leg cramps     Family History   Problem Relation Age of Onset    Dementia Mother     Cancer Mother      colon    Alzheimer Mother     Cancer Father      stomach    Heart Disease Father     Hypertension Father     Heart Disease Sister     Stroke Sister     Heart Attack Brother      Social History   Substance Use Topics    Smoking status: Former Smoker     Start date: 1/1/1970     Quit date: 5/17/2004    Smokeless tobacco: Never Used    Alcohol use No     Patient Active Problem List   Diagnosis Code    Urinary incontinence, stress     DVT (deep venous thrombosis) (HCC) I82.409    HTN (hypertension) I10    History of gastric bypass Z98.890    Depression with anxiety F41.8    Reactive airway disease J45.909    CAD (coronary artery disease) kg/m2     7 lb weight gain since last clinic visit in 5/17    General: Well-developed and well-nourished, no distress. HEENT:  Head normocephalic/atraumatic, no scleral icterus  Lungs:  Clear to ausculation bilaterally. Good air movement. Heart:  Regular rate and rhythm, normal S1 and S2, no murmur, gallop, or rub  Extremities: No clubbing, cyanosis, or edema. Neurological: Alert and oriented. Psychiatric: Normal mood and affect. Behavior is normal.       Patient Care Team:  Jo-Ann Jimenez MD as PCP - General (Internal Medicine)  Leslye Thibodeaux MD (Cardiology)  Jazz Rubio MD (Cardiology)  Linda Combs NP (Nurse Practitioner)    Results for orders placed or performed in visit on 07/13/48   METABOLIC PANEL, BASIC   Result Value Ref Range    Glucose 79 65 - 99 mg/dL    BUN 18 8 - 27 mg/dL    Creatinine 0.93 0.57 - 1.00 mg/dL    GFR est non-AA 65 >59 mL/min/1.73    GFR est AA 75 >59 mL/min/1.73    BUN/Creatinine ratio 19 12 - 28    Sodium 141 134 - 144 mmol/L    Potassium 4.2 3.5 - 5.2 mmol/L    Chloride 96 96 - 106 mmol/L    CO2 26 18 - 29 mmol/L    Calcium 9.5 8.7 - 10.3 mg/dL   MAGNESIUM   Result Value Ref Range    Magnesium 2.4 (H) 1.6 - 2.3 mg/dL   AMB POC RAPID STREP A   Result Value Ref Range    VALID INTERNAL CONTROL POC Yes     Group A Strep Ag Negative Negative       Advice/Referrals/Counseling   Education and counseling provided:  {Education List, choose as appropriate:19754::\"Are appropriate based on today's review and evaluation\"}    Assessment/Plan   {Assessment and Plan:33952}. Patient seen and had Medicare Annual Wellness Exam; Wellness Schedule printed, reviewed, and given to patient.

## 2017-10-25 ENCOUNTER — OFFICE VISIT (OUTPATIENT)
Dept: CARDIOLOGY CLINIC | Age: 67
End: 2017-10-25

## 2017-10-25 VITALS
BODY MASS INDEX: 42.32 KG/M2 | RESPIRATION RATE: 18 BRPM | SYSTOLIC BLOOD PRESSURE: 120 MMHG | HEART RATE: 62 BPM | HEIGHT: 65 IN | WEIGHT: 254 LBS | DIASTOLIC BLOOD PRESSURE: 70 MMHG

## 2017-10-25 DIAGNOSIS — I50.22 CHRONIC SYSTOLIC CONGESTIVE HEART FAILURE (HCC): Primary | ICD-10-CM

## 2017-10-25 DIAGNOSIS — E66.01 MORBID OBESITY WITH BMI OF 40.0-44.9, ADULT (HCC): ICD-10-CM

## 2017-10-25 DIAGNOSIS — R00.2 PALPITATIONS: ICD-10-CM

## 2017-10-25 DIAGNOSIS — E78.5 DYSLIPIDEMIA: ICD-10-CM

## 2017-10-25 DIAGNOSIS — I10 ESSENTIAL HYPERTENSION: ICD-10-CM

## 2017-10-25 DIAGNOSIS — I25.10 CORONARY ARTERY DISEASE INVOLVING NATIVE HEART WITHOUT ANGINA PECTORIS, UNSPECIFIED VESSEL OR LESION TYPE: ICD-10-CM

## 2017-10-25 RX ORDER — BUMETANIDE 2 MG/1
2 TABLET ORAL 2 TIMES DAILY
COMMUNITY
End: 2019-10-14 | Stop reason: SDUPTHER

## 2017-10-25 RX ORDER — METOPROLOL SUCCINATE 50 MG/1
50 TABLET, EXTENDED RELEASE ORAL DAILY
Qty: 90 TAB | Refills: 3 | Status: SHIPPED | OUTPATIENT
Start: 2017-10-25 | End: 2017-10-26 | Stop reason: SDUPTHER

## 2017-10-25 NOTE — PATIENT INSTRUCTIONS
Please increase your Toprol XL to 50mg daily for your heart racing at night - feel free to discuss with Dr. Martinez January tomorrow as well   Please have fasting labs drawn in December

## 2017-10-25 NOTE — MR AVS SNAPSHOT
Visit Information Date & Time Provider Department Dept. Phone Encounter #  
 10/25/2017  2:40 PM Claudene Francis, [facility] JOSELO Coates [id number] Your Appointments 10/26/2017  3:30 PM  
PACEMAKER with [facility] (EMMETT SCHEDULING) Appt Note: evans sci sub Q ICD/rc/Carrion 1 yr b 7-19-17  
 [address] [place]  
[phone]  
  
   
 [address] [place]  
  
    
 10/26/2017  3:40 PM  
ESTABLISHED PATIENT with Luis Ahumada MD  
[facility] ([address]) Appt Note: evans sci sub Q ICD/rc/Carrion 1 yr b 7-19-17  
 [address] [facility] [address] [address]  
[address] [facility]  
  
    
 1/23/2018 11:00 AM  
ESTABLISHED PATIENT with Kathleen Johansen MD  
[facility] ([address]) Appt Note: 3 month follow up  
 [address] [place] [address]  
[address] [facility] [address]  
  
    
 4/2/2018  9:30 AM  
LAB with LAB [facility] [address]) Appt Note: fasting lab  
 [address] [place] [address] [id number] [phone]  
  
   
 [facility]  
  
    
 4/9/2018  9:30 AM  
ESTABLISHED PATIENT with [address]) Appt Note: 6mth f/u; HTN, CHF; schedule for fasting labs 1 week prior to appt [address] [place] [address] [phone]  
  
   
 [facility] [address] Upcoming Health Maintenance Date Due  
 GLAUCOMA SCREENING Q2Y 5/17/2017 BREAST CANCER SCRN MAMMOGRAM 2/1/2018 MEDICARE YEARLY EXAM 10/10/2018 COLONOSCOPY 9/5/2019 DTaP/Tdap/Td series (2 - Td) 10/30/2024 Allergies as of 10/25/2017  Review Complete On: 10/9/2017 By: Dinah Gonzales MD  
  
 Severity Noted Reaction Type Reactions Amoxicillin High 07/27/2010   Side Effect Anaphylaxis Amoxicillin High 06/30/2015    Anaphylaxis Lipitor [Atorvastatin] High 06/30/2015    Other (comments) Severe muscle pain and spasms Zocor [Simvastatin] Medium 06/30/2015    Other (comments) Cramps, muscle spasms Livalo [Pitavastatin]  01/25/2017    Myalgia Pravastatin  08/19/2016    Other (comments) Leg cramps Current Immunizations  Reviewed on 7/11/2016 Name Date Influenza High Dose Vaccine PF 10/9/2017 Influenza Vaccine (Quad) PF 9/21/2016  6:19 PM, 10/6/2015 Influenza Vaccine Split 11/28/2011 11:25 AM  
 Pneumococcal Conjugate (PCV-13) 6/14/2016 Pneumococcal Polysaccharide (PPSV-23) 10/9/2017 Tdap 10/30/2014 ZZZ-RETIRED (DO NOT USE) Pneumococcal Vaccine (Unspecified Type) 8/20/2011 Zoster Vaccine, Live 2/26/2016 Not reviewed this visit You Were Diagnosed With   
  
 Codes Comments Chronic systolic congestive heart failure (HCC)    -  Primary ICD-10-CM: R51.43 ICD-9-CM: 428.22, 428.0 Palpitations     ICD-10-CM: R00.2 ICD-9-CM: 785.1 Dyslipidemia     ICD-10-CM: E78.5 ICD-9-CM: 272.4 Vitals BP Pulse Resp Height(growth percentile) Weight(growth percentile) BMI  
 120/70 (BP 1 Location: Left arm, BP Patient Position: Sitting) 62 18 5' 5\" (1.651 m) 254 lb (115.2 kg) 42.27 kg/m2 OB Status Smoking Status Postmenopausal Former Smoker Vitals History BMI and BSA Data Body Mass Index Body Surface Area  
 42.27 kg/m 2 2.3 m 2 Preferred Pharmacy Pharmacy Name Phone [id number] [phone] Your Updated Medication List  
  
   
This list is accurate as of: 10/25/17  2:59 PM.  Always use your most recent med list.  
  
  
  
  
 albuterol 90 mcg/actuation inhaler Commonly known as:  PROVENTIL HFA, VENTOLIN HFA, PROAIR HFA Take 2 Puffs by inhalation every four (4) hours as needed for Wheezing or Shortness of Breath. ALPRAZolam 0.5 mg tablet Commonly known as:  Ace Sa Take 1 Tab by mouth two (2) times daily as needed for Anxiety. Max Daily Amount: 1 mg.  
  
 aspirin delayed-release 81 mg tablet Take  by mouth daily. biotin 10,000 mcg Cap Take  by mouth daily. bumetanide 2 mg tablet Commonly known as:  Mindi Oas Take 2 mg by mouth two (2) times a day. CALCIUM PO Take 1 Tab by mouth daily. FLUoxetine 20 mg tablet Commonly known as:  PROzac TAKE 2 TABLETS EVERY MORNING AND 1 AT BEDTIME FOR ANXIETY WITH DEPRESSION  
  
 ibuprofen 200 mg tablet Commonly known as:  MOTRIN Take  by mouth. Iron (Ferrous Sulfate) 325 mg (65 mg iron) tablet Generic drug:  ferrous sulfate Take  by mouth daily (before breakfast). magnesium 250 mg Tab Take 1 Tab by mouth daily. metOLazone 2.5 mg tablet Commonly known as:  ZAROXOLYN  
TAKE 1 TAB BY MOUTH DAILY AS NEEDED FOR UP TO 2 DAYS. metoprolol succinate 50 mg XL tablet Commonly known as:  TOPROL-XL Take 1 Tab by mouth daily. OTHER Complete multi formula, bariatric advantage  
  
 potassium chloride 20 mEq tablet Commonly known as:  KLOR-CON M20  
TAKE TWO TABLETS BY MOUTH DAILY PROBIOTIC PO Take  by mouth. sacubitril-valsartan 24-26 mg tablet Commonly known as:  ENTRESTO Take 1 Tab by mouth two (2) times a day. vitamin A 8,000 unit capsule Take 8,000 Units by mouth daily. VITAMIN D3 1,000 unit tablet Generic drug:  cholecalciferol Take 10,000 Units by mouth daily. 10,000 units \"dry vitamin d \"  
  
 zolpidem 10 mg tablet Commonly known as:  AMBIEN  
TAKE ONE TALBET BY MOUTH NIGHTLY AS NEEDED FOR SLEEP, MAX DAILY AMOUNT 1 TABLET Prescriptions Printed Refills metoprolol succinate (TOPROL-XL) 50 mg XL tablet 3 Sig: Take 1 Tab by mouth daily. Class: Print Route: Oral  
  
We Performed the Following LIPID PANEL [19076 CPT(R)] MAGNESIUM [id number] CPT(R)] METABOLIC PANEL, COMPREHENSIVE [30667 CPT(R)] Patient Instructions Please increase your Toprol XL to 50mg daily for your heart racing at night - feel free to discuss with Dr. Nithin Isaacs tomorrow as well Please have fasting labs drawn in December Introducing [facility] SERVICES! Dear Cyndie Esteves: Thank you for requesting a [employer] account. Our records indicate that you already have an active [employer] account. You can access your account anytime at [email or web address] Did you know that you can access your hospital and ER discharge instructions at any time in [employer]? You can also review all of your test results from your hospital stay or ER visit. Additional Information If you have questions, please visit the Frequently Asked Questions section of the [employer] website at [email or web address] Remember, [employer] is NOT to be used for urgent needs. For medical emergencies, dial 911. Now available from your iPhone and Android! Please provide this summary of care documentation to your next provider. Your primary care clinician is listed as Rhoda Carlos. If you have any questions after today's visit, please call [phone].

## 2017-10-25 NOTE — PROGRESS NOTES
[facility]  [phone]    HPI: Girish Dunne, a 74 y.o. who presents for follow up regarding her CAD and CHF. She was last seen by Dr. Jacqueline Cuadra in May 2017  Just got back from [place] and had a wonderful trip  She admits to eating out for dinner every night and knows that she gained weight but plans to try to lose it again  Her weight is up 7 lbs since 5/17  Overall she is feeling good  She is having some heart racing, mostly at night time, episodes can a minute or two sometimes, no associated symptoms  Has appt with Dr. Raquel Maldonado tomorrow and will have device checked at that visit   She asked about increasing her Entresto to cut back on her palpitations  She has baseline dyspnea with exertion   Denies PND  No LE edema, takes bumex BID but rarely uses metolazone PRN  No chest pain   Finally over her vertigo from earlier this year, no significant dizziness, no syncope  Says her insurance will be changing January 1, discussed trying to get Repatha for her after January 1  Stress test with possible anterior ischemia but could be body habitus/artifact , will continue to watch    Assessment/Plan:  1. Chronic systolic heart failure - LVEF 35%-40%, s/p AICD placement with Dr. Raquel Maldonado and subsequent lead revisions and repeat procedures due to non-healing midsternal incision  -last revision for AICD in November 2016 with Dr. Raquel Maldonado  -continue Augustin [place] 24/26 one tablet BID and will increase Toprol XL to 50mg daily for c/o palpitations, encouraged her to discuss this with Dr. Raquel Maldonado at her visit tomorrow as well  -off spironolactone due to hypotension, continue bumex BID, using Metolazone PRN  -c/o hair loss with Coreg and Toprol XL but willing to continue Toprol XL for now    -will check CMP and magnesium level prior to her return visit with Dr. Jacqueline Cuadra in January 2018  2.  CAD - hx of MI x 2 and multiple stents, she believes she may have 6 or 7 stents, last cardiac cath without progression of CAD and stress test in 4/17 showed possible anterior ischemia vs artifact but asymptomatic currently so will just continue to watch, continue ASA and beta blocker, unable to tolerate pravastatin, simvastatin, atorvastatin, see lipid therapy plan below    -reports having an MI in 11/2011 and received PCI to RCA at that time, previous MI in 2006 or 2007  3. Mitral regurgitation - mild by TTE 4/17   4. Asthma - x 15 - 16 years, has not seen pulmonologist in years  11. HTN - continue current regimen  6. Dyslipidemia -  in 2/16, zocor caused muscle spasms and cramps, lipitor caused pain shooting all over her body, could tolerate Pravastatin 40mg daily due to leg cramps but LDL 97 on pravastatin 40mg daily, tried Livalo after her last visit but could not tolerate it due to myalgias, now on just [employer], did not tolerate zetia either   -since her insurance is changing in January will recheck fasting lipids in December and then discuss applying for Repatha in January 2018 at her visit with Dr. Tree Mina  -needs lipid lowering and intolerant to everything  7. DM Type 2 - resolved with gastric bypass, last Hgb A1C 5.8%  8. Hypokalemia -off spironolactone and on KCL 20meq daily, will check CMP in December   9. Hypomagnesemia - on OTC, will check Mg level in December   10. Morbid obesity - Body mass index is 42.27 kg/(m^2). ). weighed 416 lbs at her heaviest weight, s/p gastric bypass in 2007 with revision a few years later, gained weight on vacation and plans to start working on weight loss and exercise again   11. PUD - had EGD and cauterized bleeding ulcer, not on PPI anymore  12. Vitamin D deficiency - on supplementation, Vitamin D level 34.2  13. Anxiety - on Prozac, could not sleep on Clonazepam, able to sleep better on ambien 10mg at bedtime     Echo: 4/17, EF 35-40%, inf HK gr1dd  Nuclear: 4/17, EF 36%, anterior ischemia, lateral infarct.   TTE 9/16 - LVEF 35%, moderate hypokinesis of the basal-mid inferolateral wall(s), grd 1 dd, dilated LA, mod MR, mild TR  16 - RV lead revision by Dr. Stepan Palacios  16 - single chamber AICD placed by Dr. Stepan Palacios ([address] VR, serial # [id number], model # [id number]. The ventricular lead: model # [id number] , serial # [id number])  MUGA  - LVEF 27%  Holter  - no arrhythmias  Echo  - LV mildly dilated, LVEF 40%, moderate diffuse hypokinesis with regional variations, severe hypokinesis of the basal-mid inferior wall(s), grd 1 dd, mod dilated LA, atrial septum bows from left to right, consistent with increased left atrial pressure, mildly dilated RA, mild to near moderate MR    OLIVER  - normal  Nuc 5/15 EF 31% large anterolateral infarct with periinfarct reversibility, 13% LV reversible(32% infarct at rest, 45% at stress)    Echo 2015 - LVEF 30-35%, grd 1 dd, mod LAE, mild to mod MR  Nuc Stress  - small reversibility in anteroapical wall, LVEF 31%  Cardiac Cath 3/13 - patent stents in LAD, LCx and RCA, LVEF 30%, severe inferior HK, LCx relatively small vessel with patent stent in proximal LCx, RCA is large and dominant with patent stents in proximal and mid RCA, distal RCA free of disease, LAD normal and large vessel which coursed around apex  Echo 2011 - LVEF 30%, mild MR  Cardiac Cath 2011 - s/p PCI with AMRIT to 100% mid RCA, also had 40% mid LAD lesion, 50% diagonal 1 lesion, 100% distal LCx lesion, 60% OM1 lesion, 100% proximal Ramus lesion      Soc Hx: quit tobacco use x 13 years ago, used to smoke 1ppd, no etoh use, no drug use, lives with , son, daughter in law, grandson, retired/on disability  Fam Hx: father in his 62s when he had a MI, had 3 vessel CABG in his 62s, passed from fall but had cancer too, mother lived to age 80 and  from Alzheimer's, brother had MI in his 62s, sister had aortic repair for aneurysm in her 76s    She  has a past medical history of Asthma; Cardiomyopathy ([address] Coronary artery disease (2008);  Depression with anxiety; DVT (deep venous thrombosis) ([facility] (7/27/2010); Family history of early CAD; GERD (gastroesophageal reflux disease); H/O gastric bypass (2006); Hepatitis C antibody test positive; HTN (hypertension) (7/27/2010); Hyperlipidemia (07/27/2010); Joint pain (7/27/2010); MI (myocardial infarction) (7/27/2010); Mitral valve regurgitation; Morbid obesity ([facility] (7/27/2010); Myocardial infarction (2006 and 2011); PUD (peptic ulcer disease); and Urinary incontinence, stress (7/27/2010). She also has no past medical history of Diabetes ([facility] Cardiovascular ROS: positive for dyspnea on exertion   Respiratory ROS: no cough, wheezing  Neurological ROS: no TIA or stroke symptoms  All other systems negative except as above. PE  Vitals:    10/25/17 1436   BP: 120/70   Pulse: 62   Resp: 18   Weight: 254 lb (115.2 kg)   Height: 5' 5\" (1.651 m)    Body mass index is 42.27 kg/(m^2).   General appearance - alert, well appearing, and in no distress  Mental status - affect appropriate to mood  Eyes - sclera anicteric, moist mucous membranes  Neck - supple  Lymphatics - not assessed  Chest - clear to auscultation, no wheezes, rales or rhonchi  Heart - normal rate, regular rhythm, normal S1, S2, 2/6 MYKE   Abdomen - soft, nontender, nondistended, obese  Back exam - full range of motion, no tenderness  Neurological - cranial nerves II through XII grossly intact, no focal deficit  Musculoskeletal - no muscular tenderness noted, normal strength  Extremities - diminished peripheral pulses, no LE edema  Skin - normal coloration  no rashes    Recent Labs:  Lab Results   Component Value Date/Time    Cholesterol, total 177 08/02/2016 12:51 PM    HDL Cholesterol 63 08/02/2016 12:51 PM    LDL, calculated 97 08/02/2016 12:51 PM    Triglyceride 87 08/02/2016 12:51 PM     Lab Results   Component Value Date/Time    Creatinine 0.93 05/19/2017 01:35 PM     Lab Results   Component Value Date/Time    BUN 18 05/19/2017 01:35 PM    BUN (POC) 24 11/26/2011 09:50 PM     Lab Results   Component Value Date/Time    Potassium 4.2 05/19/2017 01:35 PM     Lab Results   Component Value Date/Time    Hemoglobin A1c 5.8 02/09/2016 11:44 AM    Hemoglobin A1c, External 5.5 12/12/2014     Lab Results   Component Value Date/Time    HGB 14.7 02/23/2017 07:26 AM     Lab Results   Component Value Date/Time    PLATELET 602 25/43/8084 07:26 AM       Reviewed:  Past Medical History:   Diagnosis Date    Asthma     Cardiomyopathy ([facility]     Coronary artery disease 2008    s/p RCA stent (AMRIT) on 11/26/11    Depression with anxiety     2011    DVT (deep venous thrombosis) ([facility] 7/27/2010    Family history of early CAD     GERD (gastroesophageal reflux disease)     H/O gastric bypass 2006    Revision in 2009    Hepatitis C antibody test positive     Does not have chronic hep C (labs 10/6/15: neg HCV RNA)     HTN (hypertension) 7/27/2010    Hyperlipidemia 07/27/2010    Joint pain 7/27/2010    MI (myocardial infarction) 7/27/2010    Mitral valve regurgitation     Mild to moderate    Morbid obesity ([facility] 7/27/2010    Myocardial infarction 2006 and 2011    Dr. Brenda SMITH (peptic ulcer disease)     gi bleed 2008; ulcer n gastric bypass pouch    Urinary incontinence, stress 7/27/2010     History   Smoking Status    Former Smoker    Start date: 1/1/1970    Quit date: 5/17/2004   Smokeless Tobacco    Never Used     History   Alcohol Use No     Allergies   Allergen Reactions    Amoxicillin Anaphylaxis    Amoxicillin Anaphylaxis    Lipitor [Atorvastatin] Other (comments)     Severe muscle pain and spasms    Zocor [Simvastatin] Other (comments)     Cramps, muscle spasms    Livalo [Pitavastatin] Myalgia    Pravastatin Other (comments)     Leg cramps       Current Outpatient Prescriptions   Medication Sig    ALPRAZolam (XANAX) 0.5 mg tablet Take 1 Tab by mouth two (2) times daily as needed for Anxiety. Max Daily Amount: 1 mg.     FLUoxetine (PROZAC) 20 mg tablet TAKE 2 TABLETS EVERY MORNING AND 1 AT BEDTIME FOR ANXIETY WITH DEPRESSION    zolpidem (AMBIEN) 10 mg tablet TAKE ONE TALBET BY MOUTH NIGHTLY AS NEEDED FOR SLEEP, MAX DAILY AMOUNT 1 TABLET    potassium chloride (KLOR-CON M20) 20 mEq tablet TAKE TWO TABLETS BY MOUTH DAILY    metOLazone (ZAROXOLYN) 2.5 mg tablet TAKE 1 TAB BY MOUTH DAILY AS NEEDED FOR UP TO 2 DAYS.  bumetanide (BUMEX) 2 mg tablet Take 0.5 Tabs by mouth two (2) times a day. Patient takes Bumex in relation to what her B/P is. Indications: Edema    ibuprofen (MOTRIN) 200 mg tablet Take  by mouth.  sacubitril-valsartan (ENTRESTO) 24 mg/26 mg tablet Take 1 Tab by mouth two (2) times a day.  LACTOBACILLUS ACIDOPHILUS (PROBIOTIC PO) Take  by mouth.  albuterol (PROVENTIL HFA, VENTOLIN HFA, PROAIR HFA) 90 mcg/actuation inhaler Take 2 Puffs by inhalation every four (4) hours as needed for Wheezing or Shortness of Breath.  aspirin delayed-release 81 mg tablet Take  by mouth daily.  vitamin A 8,000 unit capsule Take 8,000 Units by mouth daily.  biotin 10,000 mcg cap Take  by mouth daily.  OTHER Complete multi formula, bariatric advantage    magnesium 250 mg tab Take 1 Tab by mouth daily.  ferrous sulfate (IRON, FERROUS SULFATE,) 325 mg (65 mg Iron) tablet Take  by mouth daily (before breakfast).  cholecalciferol, vitamin d3, (VITAMIN D) 1,000 unit tablet Take 10,000 Units by mouth daily. 10,000 units \"dry vitamin d \"    CALCIUM PO Take 1 Tab by mouth daily.  metoprolol succinate (TOPROL-XL) 25 mg XL tablet Take 0.5 Tabs by mouth daily. No current facility-administered medications for this visit.         Shary Soulier, NP  [facility] [address], [address] [facility] [address]  [facility], [address]  [phone]

## 2017-10-26 ENCOUNTER — OFFICE VISIT (OUTPATIENT)
Dept: CARDIOLOGY CLINIC | Age: 67
End: 2017-10-26

## 2017-10-26 ENCOUNTER — CLINICAL SUPPORT (OUTPATIENT)
Dept: CARDIOLOGY CLINIC | Age: 67
End: 2017-10-26

## 2017-10-26 VITALS
HEIGHT: 65 IN | HEART RATE: 75 BPM | RESPIRATION RATE: 16 BRPM | WEIGHT: 253 LBS | OXYGEN SATURATION: 97 % | DIASTOLIC BLOOD PRESSURE: 70 MMHG | SYSTOLIC BLOOD PRESSURE: 120 MMHG | BODY MASS INDEX: 42.15 KG/M2

## 2017-10-26 DIAGNOSIS — R00.2 PALPITATIONS: ICD-10-CM

## 2017-10-26 DIAGNOSIS — I50.22 CHF NYHA CLASS II (SYMPTOMS WITH MODERATELY STRENUOUS ACTIVITIES), CHRONIC, SYSTOLIC (HCC): ICD-10-CM

## 2017-10-26 DIAGNOSIS — Z95.810 PRESENCE OF AUTOMATIC CARDIOVERTER/DEFIBRILLATOR (AICD): Primary | ICD-10-CM

## 2017-10-26 DIAGNOSIS — I25.5 ISCHEMIC CARDIOMYOPATHY: ICD-10-CM

## 2017-10-26 DIAGNOSIS — E78.5 DYSLIPIDEMIA: ICD-10-CM

## 2017-10-26 DIAGNOSIS — Z95.810 ICD (IMPLANTABLE CARDIOVERTER-DEFIBRILLATOR) IN PLACE: Primary | ICD-10-CM

## 2017-10-26 DIAGNOSIS — I25.10 CORONARY ARTERY DISEASE INVOLVING NATIVE CORONARY ARTERY OF NATIVE HEART WITHOUT ANGINA PECTORIS: ICD-10-CM

## 2017-10-26 RX ORDER — METOPROLOL SUCCINATE 25 MG/1
25 TABLET, EXTENDED RELEASE ORAL DAILY
Qty: 30 TAB | Refills: 1
Start: 2017-10-26 | End: 2017-12-01 | Stop reason: SDUPTHER

## 2017-10-26 NOTE — PROGRESS NOTES
Cardiac Electrophysiology Office Note     Subjective:      Fernanda Christian is a 74 y.o. female who had ICD implanted 8/24/16. She is here today for her annual follow up. She reports she has been doing very well. No recent hospitalizations. No further chest pain. Subcutaneous ICD site has completely healed. She occasionally has brief episodes of palpitations in the evening. They occur about 1x week for a couple of seconds. She is a part of a support group on [employer] for SICD.      Timeline of events:   S/p subcutaneous ICD pocket debridement -left axillary site and upper sternal incision closed with staples. DFTs also done.       PMHX:  She initially had an ICD implanted 8/24/16. She had the transvenous RV lead displacement twice and repositioned once due to large breast sizes  By the second time it was decided to remove the whole system d/t to the high recurrent risk of perforation. Subcutaneous ICD was implanted in place of transvenous system 9/21/16.       She is kindly referred for ICD by Dr Papo Hussein. NYHA class: 2   LVEF: 27%. I reviewed MUGA and she has had many studies, nuclear study, cardiac cath and echos with chronic LVEF 30%  Medications are optimized. - toprol and entresto   She has had stents and old MI  Cardiac cath 2013 with patent stents, LVEF had been 35%  She has had echo with LVEF 40% but nuclear stress test GATED SPECT LVEF 31% and MUGA 6/29/2016 LVEF 27%   She walks with RODNEY NYHA class is 2   Syncope in May but appeared to be hypovolumia related  Social hx: Former smoker, no alcohol use      Nuc 5/15 EF 31% large anterolateral infarct with periinfarct reversibility, 13% LV reversible(32% infarct at rest, 45% at stress)    Echo 7/2015 - LVEF 30-35%, grade 1 dd, mod LAE, mild to mod MR  Nuc Stress 7/13 - small reversibility in anteroapical wall, LVEF 31%  Cardiac Cath 3/13 - patent stents in LAD, LCx and RCA, LVEF 30%, severe inferior HK, LCx relatively small vessel with patent stent in proximal LCx, RCA is large and dominant with patent stents in proximal and mid RCA, distal RCA free of disease, LAD normal and large vessel which coursed around apex  Echo 11/2011 - LVEF 30%, mild MR  Cardiac Cath 11/2011 - s/p PCI with AMRIT to 100% mid RCA, also had 40% mid LAD lesion, 50% diagonal 1 lesion, 100% distal LCx lesion, 60% OM1 lesion, 100% proximal Ramus lesion       Patient Active Problem List   Diagnosis Code    Urinary incontinence, stress     DVT (deep venous thrombosis) ([facility]) I82.409    HTN (hypertension) I10    History of gastric bypass Z98.890    Depression with anxiety F41.8    Reactive airway disease J45.909    CAD (coronary artery disease) V96.86    Systolic CHF, chronic ([facility]) I50.22    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[facility]) I42.9    Hyperlipidemia E78.5    Mitral valve regurgitation I34.0    History of MI (myocardial infarction) I25.2    Cardiomyopathy ([facility]) I42.9    Osteoporosis M81.0    Morbid obesity with BMI of 40.0-44.9, adult ([facility]) E66.01, Z68.41    Advance care planning Z71.89    Insomnia G47.00    S/P ICD (internal cardiac defibrillator) procedure Z95.810    AICD lead displacement T82.120A    ICD (implantable cardioverter-defibrillator) in place Z95.810    Primary osteoarthritis of right knee M17.11    Mild intermittent asthma without complication O78.63    Obesity, Class III, BMI 40-49.9 (morbid obesity) ([facility]) E66.01     Current Outpatient Prescriptions   Medication Sig Dispense Refill    metoprolol succinate (TOPROL-XL) 25 mg XL tablet Take 1 Tab by mouth daily. 30 Tab 1    bumetanide (BUMEX) 2 mg tablet Take 2 mg by mouth two (2) times a day.  ALPRAZolam (XANAX) 0.5 mg tablet Take 1 Tab by mouth two (2) times daily as needed for Anxiety.  Max Daily Amount: 1 mg. 20 Tab 0    FLUoxetine (PROZAC) 20 mg tablet TAKE 2 TABLETS EVERY MORNING AND 1 AT BEDTIME FOR ANXIETY WITH DEPRESSION 270 Tab 1    zolpidem (AMBIEN) 10 mg tablet TAKE ONE TALBET BY MOUTH NIGHTLY AS NEEDED FOR SLEEP, MAX DAILY AMOUNT 1 TABLET 30 Tab 1    potassium chloride (KLOR-CON M20) 20 mEq tablet TAKE TWO TABLETS BY MOUTH DAILY 180 Tab 1    metOLazone (ZAROXOLYN) 2.5 mg tablet TAKE 1 TAB BY MOUTH DAILY AS NEEDED FOR UP TO 2 DAYS. 6 Tab 1    ibuprofen (MOTRIN) 200 mg tablet Take  by mouth.  sacubitril-valsartan (ENTRESTO) 24 mg/26 mg tablet Take 1 Tab by mouth two (2) times a day. 180 Tab 3    LACTOBACILLUS ACIDOPHILUS (PROBIOTIC PO) Take  by mouth.  albuterol (PROVENTIL HFA, VENTOLIN HFA, PROAIR HFA) 90 mcg/actuation inhaler Take 2 Puffs by inhalation every four (4) hours as needed for Wheezing or Shortness of Breath. 1 Inhaler 5    aspirin delayed-release 81 mg tablet Take  by mouth daily.  vitamin A 8,000 unit capsule Take 8,000 Units by mouth daily.  biotin 10,000 mcg cap Take  by mouth daily.  OTHER Complete multi formula, bariatric advantage      magnesium 250 mg tab Take 1 Tab by mouth daily.  ferrous sulfate (IRON, FERROUS SULFATE,) 325 mg (65 mg Iron) tablet Take  by mouth daily (before breakfast).  cholecalciferol, vitamin d3, (VITAMIN D) 1,000 unit tablet Take 10,000 Units by mouth daily. 10,000 units \"dry vitamin d \"      CALCIUM PO Take 1 Tab by mouth daily.        Allergies   Allergen Reactions    Amoxicillin Anaphylaxis    Amoxicillin Anaphylaxis    Lipitor [Atorvastatin] Other (comments)     Severe muscle pain and spasms    Zocor [Simvastatin] Other (comments)     Cramps, muscle spasms    Livalo [Pitavastatin] Myalgia    Pravastatin Other (comments)     Leg cramps     Past Medical History:   Diagnosis Date    Asthma     [facility]     Coronary artery disease 2008    s/p RCA stent (AMRIT) on 11/26/11    Depression with anxiety     2011    DVT (deep venous thrombosis) ([facility] 7/27/2010    Family history of early CAD     GERD (gastroesophageal reflux disease)     H/O gastric bypass 2006    Revision in 2009    Hepatitis C antibody test positive     Does not have chronic hep C (labs 10/6/15: neg HCV RNA)     HTN (hypertension) 7/27/2010    Hyperlipidemia 07/27/2010    Joint pain 7/27/2010    MI (myocardial infarction) 7/27/2010    Mitral valve regurgitation     Mild to moderate    Morbid obesity ([address] 7/27/2010    Myocardial infarction 2006 and 2011    Dr. Felisa Byers    PUD (peptic ulcer disease)     gi bleed 2008; ulcer n gastric bypass pouch    Urinary incontinence, stress 7/27/2010     Past Surgical History:   Procedure Laterality Date    HX COLONOSCOPY  9/5/14    Dr. Bethany Zuñiga; normal, repeat in 5 yrs    HX IMPLANTABLE CARDIOVERTER DEFIBRILLATOR  08/24/2016    HX LAP GASTRIC BYPASS  2006    revision 2009/Dr. Kami Ann    HX OTHER SURGICAL  09/19/2016    Removal of right ventricular ICD lead & single chamber transvenous AICD    HX OTHER SURGICAL  10/12/2016    pocket revison of ICD; Dr. Danielle Cry INS PPM/ICD LED SING ONLY  8/24/2016         INS PPM/ICD LED SING ONLY  8/26/2016         INS PPM/ICD LED SING ONLY  9/21/2016         [place] LAP,STOMACH,OTHER,W/O TUBE  04/05/2010    Revision GBP     Family History   Problem Relation Age of Onset    Dementia Mother     Cancer Mother      colon    Alzheimer Mother     Cancer Father      stomach    Heart Disease Father     Hypertension Father     Heart Disease Sister     Stroke Sister     Heart Attack Brother      Social History   Substance Use Topics    Smoking status: Former Smoker     Start date: 1/1/1970     Quit date: 5/17/2004    Smokeless tobacco: Never Used    Alcohol use No        Review of Systems:   Constitutional: Negative for fever, chills, weight loss   HEENT: Negative for nosebleeds, vision changes. Respiratory: Negative for cough, hemoptysis, sputum production, and wheezing. Cardiovascular: Negative for chest pain, + occasional brief palpitations, orthopnea, claudication, leg swelling, syncope, and PND.    Gastrointestinal: Negative for nausea, vomiting, diarrhea, constipation, blood in stool and melena. Genitourinary: Negative for dysuria, and hematuria. Musculoskeletal: Negative for myalgias, arthralgia. Skin: Negative for rash. Wound clean  Heme: Does not bleed or bruise easily. + varicose veins   Neurological: Negative for speech change and focal weakness     Objective:     Visit Vitals    /70 (BP 1 Location: Left arm, BP Patient Position: Sitting)    Pulse 75    Resp 16    Ht 5' 5\" (1.651 m)    Wt 253 lb (114.8 kg)    SpO2 97%    BMI 42.1 kg/m2      Physical Exam:   Constitutional: well-developed and well-nourished. No distress. Head: Normocephalic and atraumatic. Eyes: Pupils are equal, round  Neck: supple. No JVD present. Cardiovascular: Normal rate, regular rhythm and normal heart sounds. Exam reveals no gallop and no friction rub. No murmur heard. Pulmonary/Chest: Effort normal and breath sounds normal. No wheezes. Abdominal: Soft, obese  Musculoskeletal: no edema. Neurological: alert,oriented. Skin: Skin is warm and dry    Psychiatric: normal mood and affect. Behavior is normal. Judgment and thought content normal.           Assessment/Plan:       ICD-10-CM ICD-9-CM    1. ICD (implantable cardioverter-defibrillator) in place Z95.810 V45.02    2. Palpitations R00.2 785.1 metoprolol succinate (TOPROL-XL) 25 mg XL tablet   3. Dyslipidemia E78.5 272.4 metoprolol succinate (TOPROL-XL) 25 mg XL tablet   4. CHF NYHA class II (symptoms with moderately strenuous activities), chronic, systolic (HCC) Y69.90 326.10      428.0    5. Coronary artery disease involving native coronary artery of native heart without angina pectoris I25.10 414.01    6. Ischemic cardiomyopathy I25.5 414.8      SICD check today shows proper functions. No VT events detected.    If she continues to have more frequent palpitations or palpitations of longer duration then recommend a 30 day loop monitor for further evaluation of palpitations and whether or not she has PAF. BP controlled. She is on GDMT for chronic CHF NYHA class II. Follow-up Disposition:  Return in about 1 year (around 10/26/2018). Thank you for involving me in this patient's care and please call with further concerns or questions. Christian Avelar M.D.   Electrophysiology/Cardiology  [facility]  [address], [facility] [address] [place] [address]  [phone]

## 2017-10-26 NOTE — MR AVS SNAPSHOT
Visit Information Date & Time Provider Department Dept. Phone Encounter #  
 10/26/2017  3:40 PM Hassel Collet, MD [facility] [phone] [id number] Your Appointments 1/23/2018 11:00 AM  
ESTABLISHED PATIENT with Denzel Armstrong MD  
[facility] ([facility]) Appt Note: 3 month follow up  
 [address]  
[address] [address]  
  
    
 4/2/2018  9:30 AM  
LAB with LAB [facility] Appt Note: fasting lab  
 [address] [place] [address] [address] [address] [phone]  
  
   
 [address] [place] [address]  
  
    
 4/9/2018  9:30 AM  
ESTABLISHED PATIENT with [address] [facility]) Appt Note: 6mth f/u; HTN, CHF; schedule for fasting labs 1 week prior to appt [address] [place] [address] [phone]  
  
   
 [address] [place] [address]  
  
    
 11/15/2018 10:30 AM  
PACEMAKER with Jason Storey [facility] ([facility]) Appt Note: bshubert perez, annual , Lat, see [address]  
[address] [facility]  
  
    
 11/15/2018 10:40 AM  
ESTABLISHED PATIENT with Hassel Collet, MD  
[facility] ([facility]) Appt Note: bshubert perez, annual , Lat, see [address]  
[address] [address]  
  
    
  
 1/31/2018  1:00 PM  
REMOTE OFFICE VISIT with Guillermo Mendenhall [facility] ([facility]) Appt Note: bsc icd, rc b 10/26/17  
 [address]  
[phone] [address] [address] [facility] [phone]  
  
    
 5/7/2018 11:30 AM  
REMOTE OFFICE VISIT with Corey Pink [facility] ([facility]) Appt Note: vero richards, hubert  
 [address]  
[phone]  
  
    
 8/13/2018  9:15 AM  
REMOTE OFFICE VISIT with Corey Pink [facility] ([facility]) Appt Note: vero richards, hubert  
 [address]  
[phone] Upcoming Health Maintenance Date Due  
 GLAUCOMA SCREENING Q2Y 5/17/2017 BREAST CANCER SCRN MAMMOGRAM 2/1/2018 MEDICARE YEARLY EXAM 10/10/2018 COLONOSCOPY 9/5/2019 DTaP/Tdap/Td series (2 - Td) 10/30/2024 Allergies as of 10/26/2017  Review Complete On: 10/26/2017 By: Frandy Correia Severity Noted Reaction Type Reactions Amoxicillin High 07/27/2010   Side Effect Anaphylaxis Amoxicillin High 06/30/2015    Anaphylaxis Lipitor [Atorvastatin] High 06/30/2015    Other (comments) Severe muscle pain and spasms Zocor [Simvastatin] Medium 06/30/2015    Other (comments) Cramps, muscle spasms Livalo [Pitavastatin]  01/25/2017    Myalgia Pravastatin  08/19/2016    Other (comments) Leg cramps Current Immunizations  Reviewed on 7/11/2016 Name Date Influenza High Dose Vaccine PF 10/9/2017 Influenza Vaccine (Quad) PF 9/21/2016  6:19 PM, 10/6/2015 Influenza Vaccine Split 11/28/2011 11:25 AM  
 Pneumococcal Conjugate (PCV-13) 6/14/2016 Pneumococcal Polysaccharide (PPSV-23) 10/9/2017 Tdap 10/30/2014 ZZZ-RETIRED (DO NOT USE) Pneumococcal Vaccine (Unspecified Type) 8/20/2011 Zoster Vaccine, Live 2/26/2016 Not reviewed this visit You Were Diagnosed With   
  
 Codes Comments ICD (implantable cardioverter-defibrillator) in place    -  Primary ICD-10-CM: Z95.810 ICD-9-CM: V45.02 Palpitations     ICD-10-CM: R00.2 ICD-9-CM: 785.1 Dyslipidemia     ICD-10-CM: E78.5 ICD-9-CM: 272.4 CHF NYHA class II (symptoms with moderately strenuous activities), chronic, systolic (HCC)     E-36-EL: I50.22 ICD-9-CM: 428.22, 428.0 Coronary artery disease involving native coronary artery of native heart without angina pectoris     ICD-10-CM: I25.10 ICD-9-CM: 414.01 Ischemic cardiomyopathy     ICD-10-CM: I25.5 ICD-9-CM: 414.8 Vitals BP Pulse Resp Height(growth percentile) Weight(growth percentile) SpO2  
 120/70 (BP 1 Location: Left arm, BP Patient Position: Sitting) 75 16 5' 5\" (1.651 m) 253 lb (114.8 kg) 97% BMI OB Status Smoking Status 42.1 kg/m2 Postmenopausal Former Smoker BMI and BSA Data Body Mass Index Body Surface Area  
 42.1 kg/m 2 2.29 m 2 Preferred Pharmacy Pharmacy Name Phone [id number] [phone] Your Updated Medication List  
  
   
This list is accurate as of: 10/26/17  4:14 PM.  Always use your most recent med list.  
  
  
  
  
 albuterol 90 mcg/actuation inhaler Commonly known as:  PROVENTIL HFA, VENTOLIN HFA, PROAIR HFA Take 2 Puffs by inhalation every four (4) hours as needed for Wheezing or Shortness of Breath. ALPRAZolam 0.5 mg tablet Commonly known as:  Alcus Nicholas Take 1 Tab by mouth two (2) times daily as needed for Anxiety. Max Daily Amount: 1 mg.  
  
 aspirin delayed-release 81 mg tablet Take  by mouth daily. biotin 10,000 mcg Cap Take  by mouth daily. bumetanide 2 mg tablet Commonly known as:  Merla Goods Take 2 mg by mouth two (2) times a day. CALCIUM PO Take 1 Tab by mouth daily. FLUoxetine 20 mg tablet Commonly known as:  PROzac TAKE 2 TABLETS EVERY MORNING AND 1 AT BEDTIME FOR ANXIETY WITH DEPRESSION  
  
 ibuprofen 200 mg tablet Commonly known as:  MOTRIN Take  by mouth. Iron (Ferrous Sulfate) 325 mg (65 mg iron) tablet Generic drug:  ferrous sulfate Take  by mouth daily (before breakfast). magnesium 250 mg Tab Take 1 Tab by mouth daily. metOLazone 2.5 mg tablet Commonly known as:  ZAROXOLYN  
TAKE 1 TAB BY MOUTH DAILY AS NEEDED FOR UP TO 2 DAYS. metoprolol succinate 25 mg XL tablet Commonly known as:  TOPROL-XL Take 1 Tab by mouth daily. OTHER Complete multi formula, bariatric advantage  
  
 potassium chloride 20 mEq tablet Commonly known as:  KLOR-CON M20  
TAKE TWO TABLETS BY MOUTH DAILY PROBIOTIC PO Take  by mouth. sacubitril-valsartan 24-26 mg tablet Commonly known as:  ENTRESTO Take 1 Tab by mouth two (2) times a day. vitamin A 8,000 unit capsule Take 8,000 Units by mouth daily. VITAMIN D3 1,000 unit tablet Generic drug:  cholecalciferol Take 10,000 Units by mouth daily. 10,000 units \"dry vitamin d \"  
  
 zolpidem 10 mg tablet Commonly known as:  AMBIEN  
TAKE ONE TALBET BY MOUTH NIGHTLY AS NEEDED FOR SLEEP, MAX DAILY AMOUNT 1 TABLET Patient Instructions You will need to follow up in clinic with Dr. Tanna Michel in 12 months. Introducing [facility]! Dear Wilfredo Large: Thank you for requesting a [employer] account. Our records indicate that you already have an active [employer] account. You can access your account anytime at [email or web address] Did you know that you can access your hospital and ER discharge instructions at any time in [employer]? You can also review all of your test results from your hospital stay or ER visit. Additional Information If you have questions, please visit the Frequently Asked Questions section of the [employer] website at [email or web address] Remember, [employer] is NOT to be used for urgent needs. For medical emergencies, dial 911. Now available from your iPhone and Android! Please provide this summary of care documentation to your next provider. Your primary care clinician is listed as Marilou Zuniga. If you have any questions after today's visit, please call [phone].

## 2017-10-26 NOTE — PROGRESS NOTES
Cardiac Electrophysiology Office Note     Subjective:      Carter Lara is a 74 y.o. female who had ICD implanted 8/24/16. She is here today for her annual follow up. She reports she has been doing very well. NO recent hospitalizations. No further chest pain. Sub cutaneous ICD site has completely healed. She occasionally has brief episodes of palpitations in the evening. They occur about 1x week for a couple of seconds. She is apart of a support group on Facebook for SICD.      Timeline of events:   S/p subcutaneous ICD pocket debridement -left axillary site and upper sternal incision closed with staples. DFTs also done. 10/11/16- started on 2 weeks of cipro/clindamycin d/c'd  Wound culture 10/12/16 results + scant amount of pseudomonas aeruginosa & serratia marcescens. She has seen Dr Luis Branch about this. Staples removed 10/24/16. Additional week of cipro given d/t manipulation with removing staples. Upper sternal incision revised/pocket explored cultured 11/29/16      PMHX:  She initially had an ICD implanted 8/24/16. She had the transvenous RV lead displacement twice and repositioned once due to large breast sizes  By the second time it was decided to remove the whole system d/t to the high recurrent risk of perforation. Subcutaneous ICD was implanted in place of transvenous system 9/21/16.       She is kindly referred for ICD by Dr Chu Cardona. NYHA class: 2   LVEF: 27%. I reviewed MUGA and she has had many studies, nuclear study, cardiac cath and echos with chronic LVEF 30%  Medications are optimized. - toprol and entresto   She has had stents and old MI  Cardiac cath 2013 with patent stents, LVEF had been 35%  She has had echo with LVEF 40% but nuclear stress test GATED SPECT LVEF 31% and MUGA 6/29/2016 LVEF 27%   She walks with RODNEY NYHA class is 2   Syncope in May but appeared to be hypovolumia related  Social hx: Former smoker, no alcohol use      Nuc 5/15 EF 31% large anterolateral infarct with periinfarct reversibility, 13% LV reversible(32% infarct at rest, 45% at stress)    Echo 7/2015 - LVEF 30-35%, grade 1 dd, mod LAE, mild to mod MR  Nuc Stress 7/13 - small reversibility in anteroapical wall, LVEF 31%  Cardiac Cath 3/13 - patent stents in LAD, LCx and RCA, LVEF 30%, severe inferior HK, LCx relatively small vessel with patent stent in proximal LCx, RCA is large and dominant with patent stents in proximal and mid RCA, distal RCA free of disease, LAD normal and large vessel which coursed around apex  Echo 11/2011 - LVEF 30%, mild MR  Cardiac Cath 11/2011 - s/p PCI with AMRIT to 100% mid RCA, also had 40% mid LAD lesion, 50% diagonal 1 lesion, 100% distal LCx lesion, 60% OM1 lesion, 100% proximal Ramus lesion       Patient Active Problem List   Diagnosis Code    Urinary incontinence, stress     DVT (deep venous thrombosis) ([facility] I82.409    HTN (hypertension) I10    History of gastric bypass Z98.890    Depression with anxiety F41.8    Reactive airway disease J45.909    CAD (coronary artery disease) W03.10    Systolic CHF, chronic ([facility]) I50.22    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    Hyperlipidemia E78.5    Mitral valve regurgitation I34.0    History of MI (myocardial infarction) I25.2    Cardiomyopathy ([facility]) I42.9    Osteoporosis M81.0    Morbid obesity with BMI of 40.0-44.9, adult ([facility]) E66.01, Z68.41    Advance care planning Z71.89    Insomnia G47.00    S/P ICD (internal cardiac defibrillator) procedure Z95.810    AICD lead displacement T82.120A    ICD (implantable cardioverter-defibrillator) in place Z95.810    Primary osteoarthritis of right knee M17.11    Mild intermittent asthma without complication A83.12    Obesity, Class III, BMI 40-49.9 (morbid obesity) ([facility]) E66.01     Current Outpatient Prescriptions   Medication Sig Dispense Refill    metoprolol succinate (TOPROL-XL) 25 mg XL tablet Take 1 Tab by mouth daily.  30 Tab 1    bumetanide (BUMEX) 2 mg tablet Take 2 mg by mouth two (2) times a day.  ALPRAZolam (XANAX) 0.5 mg tablet Take 1 Tab by mouth two (2) times daily as needed for Anxiety. Max Daily Amount: 1 mg. 20 Tab 0    FLUoxetine (PROZAC) 20 mg tablet TAKE 2 TABLETS EVERY MORNING AND 1 AT BEDTIME FOR ANXIETY WITH DEPRESSION 270 Tab 1    zolpidem (AMBIEN) 10 mg tablet TAKE ONE TALBET BY MOUTH NIGHTLY AS NEEDED FOR SLEEP, MAX DAILY AMOUNT 1 TABLET 30 Tab 1    potassium chloride (KLOR-CON M20) 20 mEq tablet TAKE TWO TABLETS BY MOUTH DAILY 180 Tab 1    metOLazone (ZAROXOLYN) 2.5 mg tablet TAKE 1 TAB BY MOUTH DAILY AS NEEDED FOR UP TO 2 DAYS. 6 Tab 1    ibuprofen (MOTRIN) 200 mg tablet Take  by mouth.  sacubitril-valsartan (ENTRESTO) 24 mg/26 mg tablet Take 1 Tab by mouth two (2) times a day. 180 Tab 3    LACTOBACILLUS ACIDOPHILUS (PROBIOTIC PO) Take  by mouth.  albuterol (PROVENTIL HFA, VENTOLIN HFA, PROAIR HFA) 90 mcg/actuation inhaler Take 2 Puffs by inhalation every four (4) hours as needed for Wheezing or Shortness of Breath. 1 Inhaler 5    aspirin delayed-release 81 mg tablet Take  by mouth daily.  vitamin A 8,000 unit capsule Take 8,000 Units by mouth daily.  biotin 10,000 mcg cap Take  by mouth daily.  OTHER Complete multi formula, bariatric advantage      magnesium 250 mg tab Take 1 Tab by mouth daily.  ferrous sulfate (IRON, FERROUS SULFATE,) 325 mg (65 mg Iron) tablet Take  by mouth daily (before breakfast).  cholecalciferol, vitamin d3, (VITAMIN D) 1,000 unit tablet Take 10,000 Units by mouth daily. 10,000 units \"dry vitamin d \"      CALCIUM PO Take 1 Tab by mouth daily.        Allergies   Allergen Reactions    Amoxicillin Anaphylaxis    Amoxicillin Anaphylaxis    Lipitor [Atorvastatin] Other (comments)     Severe muscle pain and spasms    Zocor [Simvastatin] Other (comments)     Cramps, muscle spasms    Livalo [Pitavastatin] Myalgia    Pravastatin Other (comments)     Leg cramps     Past Medical History:   Diagnosis Date    Asthma     Cardiomyopathy ([facility]     Coronary artery disease 2008    s/p RCA stent (AMRIT) on 11/26/11    Depression with anxiety     2011    DVT (deep venous thrombosis) ([facility] 7/27/2010    Family history of early CAD     GERD (gastroesophageal reflux disease)     H/O gastric bypass 2006    Revision in 2009    Hepatitis C antibody test positive     Does not have chronic hep C (labs 10/6/15: neg HCV RNA)     HTN (hypertension) 7/27/2010    Hyperlipidemia 07/27/2010    Joint pain 7/27/2010    MI (myocardial infarction) 7/27/2010    Mitral valve regurgitation     Mild to moderate    Morbid obesity ([facility] 7/27/2010    Myocardial infarction 2006 and 2011    Dr. Cheryl SMITH (peptic ulcer disease)     gi bleed 2008; ulcer n gastric bypass pouch    Urinary incontinence, stress 7/27/2010     Past Surgical History:   Procedure Laterality Date    HX COLONOSCOPY  9/5/14    Dr. Becca Duncan; normal, repeat in 5 yrs    HX IMPLANTABLE CARDIOVERTER DEFIBRILLATOR  08/24/2016    HX LAP GASTRIC BYPASS  2006    revision 2009/Dr. Mary Ellen Callejas    HX OTHER SURGICAL  09/19/2016    Removal of right ventricular ICD lead & single chamber transvenous AICD    HX OTHER SURGICAL  10/12/2016    pocket revison of ICD; Dr. Sanchez Prior INS PPM/ICD LED SING ONLY  8/24/2016         INS PPM/ICD LED SING ONLY  8/26/2016         INS PPM/ICD LED SING ONLY  9/21/2016         MI LAP,STOMACH,OTHER,W/O TUBE  04/05/2010    Revision GBP     Family History   Problem Relation Age of Onset    Dementia Mother     Cancer Mother      colon    Alzheimer Mother     Cancer Father      stomach    Heart Disease Father     Hypertension Father     Heart Disease Sister     Stroke Sister     Heart Attack Brother      Social History   Substance Use Topics    Smoking status: Former Smoker     Start date: 1/1/1970     Quit date: 5/17/2004    Smokeless tobacco: Never Used    Alcohol use No        Review of Systems:   Constitutional: Negative for fever, chills, weight loss   HEENT: Negative for nosebleeds, vision changes. Respiratory: Negative for cough, hemoptysis, sputum production, and wheezing. Cardiovascular: Negative for chest pain, + occasional brief palpitations, orthopnea, claudication, leg swelling, syncope, and PND. Gastrointestinal: Negative for nausea, vomiting, diarrhea, constipation, blood in stool and melena. Genitourinary: Negative for dysuria, and hematuria. Musculoskeletal: Negative for myalgias, arthralgia. Skin: Negative for rash. Wound clean  Heme: Does not bleed or bruise easily. + varicose veins   Neurological: Negative for speech change and focal weakness     Objective:     Visit Vitals    /70 (BP 1 Location: Left arm, BP Patient Position: Sitting)    Pulse 75    Resp 16    Ht 5' 5\" (1.651 m)    Wt 253 lb (114.8 kg)    SpO2 97%    BMI 42.1 kg/m2      Physical Exam:   Constitutional: well-developed and well-nourished. No distress. Head: Normocephalic and atraumatic. Eyes: Pupils are equal, round  Neck: supple. No JVD present. Cardiovascular: Normal rate, regular rhythm and normal heart sounds. Exam reveals no gallop and no friction rub. No murmur heard. Pulmonary/Chest: Effort normal and breath sounds normal. No wheezes. Abdominal: Soft, obese  Musculoskeletal: no edema. Neurological: alert,oriented. Skin: Skin is warm and dry, wound is healing without redness or hematoma. The S ICD pocket is healed without redness or swelling. Sternal scar is healed without redness or drainage. Psychiatric: normal mood and affect. Behavior is normal. Judgment and thought content normal.           Assessment/Plan:       ICD-10-CM ICD-9-CM    1. ICD (implantable cardioverter-defibrillator) in place Z95.810 V45.02    2. Palpitations R00.2 785.1 metoprolol succinate (TOPROL-XL) 25 mg XL tablet   3.  Dyslipidemia E78.5 272.4 metoprolol succinate (TOPROL-XL) 25 mg XL tablet   4. CHF NYHA class II (symptoms with moderately strenuous activities), chronic, systolic ([facility]) K57.81 910.06      428.0    5. Coronary artery disease involving native coronary artery of native heart without angina pectoris I25.10 414.01    6. Ischemic cardiomyopathy I25.5 414.8      SICD check today shows proper functioning. No events detected. If she continues to have more frequent palpitations or palpitations of longer duration then recommend a 30 day loop monitor for further evaluation of palpitations. BP controlled. She is on GDMT for chronic CHF NYHA class II. She is euvolemic on exam with no acute s/s of CHF on exam.         Follow-up Disposition: Not on File    Thank you for involving me in this patient's care and please call with further concerns or questions. Jaymie Saravia M.D.   Electrophysiology/Cardiology  [facility]  [address] [place] [address]  [phone]

## 2017-11-02 ENCOUNTER — HOSPITAL ENCOUNTER (OUTPATIENT)
Dept: GENERAL RADIOLOGY | Age: 67
Discharge: HOME | End: 2017-11-02
Payer: MEDICARE

## 2017-11-02 ENCOUNTER — OFFICE VISIT (OUTPATIENT)
Dept: INTERNAL MEDICINE CLINIC | Age: 67
End: 2017-11-02

## 2017-11-02 VITALS
OXYGEN SATURATION: 97 % | RESPIRATION RATE: 18 BRPM | WEIGHT: 256 LBS | TEMPERATURE: 98.4 F | BODY MASS INDEX: 42.65 KG/M2 | SYSTOLIC BLOOD PRESSURE: 137 MMHG | DIASTOLIC BLOOD PRESSURE: 79 MMHG | HEART RATE: 74 BPM | HEIGHT: 65 IN

## 2017-11-02 DIAGNOSIS — F51.04 CHRONIC INSOMNIA: ICD-10-CM

## 2017-11-02 DIAGNOSIS — M54.50 ACUTE MIDLINE LOW BACK PAIN WITHOUT SCIATICA: Primary | ICD-10-CM

## 2017-11-02 DIAGNOSIS — M54.50 ACUTE MIDLINE LOW BACK PAIN WITHOUT SCIATICA: ICD-10-CM
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
## 2017-11-02 PROCEDURE — 72100 X-RAY EXAM L-S SPINE 2/3 VWS: CPT

## 2017-11-02 RX ORDER — CYCLOBENZAPRINE HCL 5 MG
5 TABLET ORAL 2 TIMES DAILY
Qty: 10 TAB | Refills: 0 | Status: SHIPPED | OUTPATIENT
Start: 2017-11-02 | End: 2017-11-06 | Stop reason: SDUPTHER

## 2017-11-02 RX ORDER — OXYCODONE AND ACETAMINOPHEN 5; 325 MG/1; MG/1
1 TABLET ORAL
Qty: 9 TAB | Refills: 0 | Status: SHIPPED | OUTPATIENT
Start: 2017-11-02 | End: 2018-01-23

## 2017-11-02 RX ORDER — ZOLPIDEM TARTRATE 10 MG/1
TABLET ORAL
Qty: 30 TAB | Refills: 1 | Status: SHIPPED | OUTPATIENT
Start: 2017-11-02 | End: 2018-01-09 | Stop reason: SDUPTHER

## 2017-11-02 NOTE — PATIENT INSTRUCTIONS
Learning About Relief for Back Pain  What is back tension and strain? Back strain happens when you overstretch, or pull, a muscle in your back. You may hurt your back in an accident or when you exercise or lift something. Most back pain will get better with rest and time. You can take care of yourself at home to help your back heal.  What can you do first to relieve back pain? When you first feel back pain, try these steps:  · Walk. Take a short walk (10 to 20 minutes) on a level surface (no slopes, hills, or stairs) every 2 to 3 hours. Walk only distances you can manage without pain, especially leg pain. · Relax. Find a comfortable position for rest. Some people are comfortable on the floor or a medium-firm bed with a small pillow under their head and another under their knees. Some people prefer to lie on their side with a pillow between their knees. Don't stay in one position for too long. · Try heat or ice. Try using a heating pad on a low or medium setting, or take a warm shower, for 15 to 20 minutes every 2 to 3 hours. Or you can buy single-use heat wraps that last up to 8 hours. You can also try an ice pack for 10 to 15 minutes every 2 to 3 hours. You can use an ice pack or a bag of frozen vegetables wrapped in a thin towel. There is not strong evidence that either heat or ice will help, but you can try them to see if they help. You may also want to try switching between heat and cold. · Take pain medicine exactly as directed. ¨ If the doctor gave you a prescription medicine for pain, take it as prescribed. ¨ If you are not taking a prescription pain medicine, ask your doctor if you can take an over-the-counter medicine. What else can you do? · Stretch and exercise. Exercises that increase flexibility may relieve your pain and make it easier for your muscles to keep your spine in a good, neutral position. And don't forget to keep walking. · Do self-massage.  You can use self-massage to unwind after work or school or to energize yourself in the morning. You can easily massage your feet, hands, or neck. Self-massage works best if you are in comfortable clothes and are sitting or lying in a comfortable position. Use oil or lotion to massage bare skin. · Reduce stress. Back pain can lead to a vicious [place]: Distress about the pain tenses the muscles in your back, which in turn causes more pain. Learn how to relax your mind and your muscles to lower your stress. Where can you learn more? Go to http://jose-rm.info/. Enter H238 in the search box to learn more about \"Learning About Relief for Back Pain. \"  Current as of: March 21, 2017  Content Version: 11.4  © 3319-9904 [employer]wise, [employer]. Care instructions adapted under license by [employer] (which disclaims liability or warranty for this information). If you have questions about a medical condition or this instruction, always ask your healthcare professional. [place] [address] any warranty or liability for your use of this information.

## 2017-11-02 NOTE — MR AVS SNAPSHOT
Visit Information Date & Time Provider Department Dept. Phone Encounter #  
 11/2/2017 11:10 AM Harjinder Tapia [phone] [id number] Follow-up Instructions Return if symptoms worsen or fail to improve, for Scheduled appointment in April 2018. Your Appointments 1/23/2018 11:00 AM  
ESTABLISHED PATIENT with Sohan Montaño MD  
[facility] ([facility] Appt Note: 3 month follow up  
 [address]  
[address] [facility] [address]  
  
    
 4/2/2018  9:30 AM  
LAB with LAB [facility]) Appt Note: fasting lab  
 [address] [facility] [address] [facility] [address] [phone]  
  
   
 [facility]  
  
    
 4/9/2018  9:30 AM  
ESTABLISHED PATIENT with [address] [facility]) Appt Note: 6mth f/u; HTN, CHF; schedule for fasting labs 1 week prior to appt [address] [facility] [address] [phone]  
  
   
 [address] [place] [address]  
  
    
 11/15/2018 10:30 AM  
PACEMAKER with Morro Bradshaw [facility] ([facility] SCHEDULING) Appt Note: bshubert perez, annual , Lat, see [address]  
[address] [facility]  
  
    
 11/15/2018 10:40 AM  
ESTABLISHED PATIENT with Denia Patricia MD  
[facility] ([facility]) Appt Note: hubert hand, annual , Lat, see [address]  
[address] [facility] [address]  
  
    
  
 1/31/2018  1:00 PM  
REMOTE OFFICE VISIT with Raj Jensen [facility] ([facility] SCHEDULING) Appt Note: [facility] icd, rc b 10/26/17  
 [address] [address]  
[address] [facility] [address] [address]  
  
    
 5/7/2018 11:30 AM  
REMOTE OFFICE VISIT with Arturo Delgado [facility] ([facility] SCHEDULING) Appt Note: [facility] sicd, rc  
 [address] [place] [address]  
[phone]  
  
    
 8/13/2018  9:15 AM  
[facility] Appt Note: [facility] sicd, rc  
 [address] [place] [address]  
[phone] Upcoming Health Maintenance Date Due  
 GLAUCOMA SCREENING Q2Y 5/17/2017 BREAST CANCER SCRN MAMMOGRAM 2/1/2018 MEDICARE YEARLY EXAM 10/10/2018 COLONOSCOPY 9/5/2019 DTaP/Tdap/Td series (2 - Td) 10/30/2024 Allergies as of 11/2/2017  Review Complete On: 11/2/2017 By: Ani Miguel MD  
  
 Severity Noted Reaction Type Reactions Amoxicillin High 07/27/2010   Side Effect Anaphylaxis Amoxicillin High 06/30/2015    Anaphylaxis Lipitor [Atorvastatin] High 06/30/2015    Other (comments) Severe muscle pain and spasms Zocor [Simvastatin] Medium 06/30/2015    Other (comments) Cramps, muscle spasms Livalo [Pitavastatin]  01/25/2017    Myalgia Pravastatin  08/19/2016    Other (comments) Leg cramps Current Immunizations  Reviewed on 7/11/2016 Name Date Influenza High Dose Vaccine PF 10/9/2017 Influenza Vaccine (Quad) PF 9/21/2016  6:19 PM, 10/6/2015 Influenza Vaccine Split 11/28/2011 11:25 AM  
 Pneumococcal Conjugate (PCV-13) 6/14/2016 Pneumococcal Polysaccharide (PPSV-23) 10/9/2017 Tdap 10/30/2014 ZZZ-RETIRED (DO NOT USE) Pneumococcal Vaccine (Unspecified Type) 8/20/2011 Zoster Vaccine, Live 2/26/2016 Not reviewed this visit You Were Diagnosed With   
  
 Codes Comments Acute midline low back pain without sciatica    -  Primary ICD-10-CM: M54.5 ICD-9-CM: 724.2 Fall, initial encounter     ICD-10-CM: W19. Jose Santiago ICD-9-CM: [id number]. 9 Chronic insomnia     ICD-10-CM: F51.04 
ICD-9-CM: 780.52 Vitals BP Pulse Temp Resp Height(growth percentile) Weight(growth percentile)  
 137/79 (BP 1 Location: Left arm, BP Patient Position: Sitting) 74 98.4 °F (36.9 °C) (Oral) 18 5' 5\" (1.651 m) 256 lb (116.1 kg) SpO2 BMI OB Status Smoking Status 97% 42.6 kg/m2 Postmenopausal Former Smoker BMI and BSA Data Body Mass Index Body Surface Area  
 42.6 kg/m 2 2.31 m 2 Preferred Pharmacy Pharmacy Name Phone [id number] [phone] Your Updated Medication List  
  
   
This list is accurate as of: 11/2/17 11:17 AM.  Always use your most recent med list.  
  
  
  
  
 albuterol 90 mcg/actuation inhaler Commonly known as:  PROVENTIL HFA, VENTOLIN HFA, PROAIR HFA Take 2 Puffs by inhalation every four (4) hours as needed for Wheezing or Shortness of Breath. ALPRAZolam 0.5 mg tablet Commonly known as:  Wendall Ray Take 1 Tab by mouth two (2) times daily as needed for Anxiety. Max Daily Amount: 1 mg.  
  
 aspirin delayed-release 81 mg tablet Take  by mouth daily. biotin 10,000 mcg Cap Take  by mouth daily. bumetanide 2 mg tablet Commonly known as:  [place] Take 2 mg by mouth two (2) times a day. CALCIUM PO Take 1 Tab by mouth daily. cyclobenzaprine 5 mg tablet Commonly known as:  FLEXERIL Take 1 Tab by mouth two (2) times a day for 5 days. For back muscle tightening. FLUoxetine 20 mg tablet Commonly known as:  PROzac TAKE 2 TABLETS EVERY MORNING AND 1 AT BEDTIME FOR ANXIETY WITH DEPRESSION  
  
 ibuprofen 200 mg tablet Commonly known as:  MOTRIN Take  by mouth. Iron (Ferrous Sulfate) 325 mg (65 mg iron) tablet Generic drug:  ferrous sulfate Take  by mouth daily (before breakfast). magnesium 250 mg Tab Take 1 Tab by mouth daily. metOLazone 2.5 mg tablet Commonly known as:  ZAROXOLYN  
TAKE 1 TAB BY MOUTH DAILY AS NEEDED FOR UP TO 2 DAYS. metoprolol succinate 25 mg XL tablet Commonly known as:  TOPROL-XL Take 1 Tab by mouth daily. OTHER Complete multi formula, bariatric advantage  
  
 oxyCODONE-acetaminophen 5-325 mg per tablet Commonly known as:  PERCOCET Take 1 Tab by mouth every eight (8) hours as needed for Pain. Max Daily Amount: 3 Tabs. potassium chloride 20 mEq tablet Commonly known as:  KLOR-CON M20  
TAKE TWO TABLETS BY MOUTH DAILY PROBIOTIC PO Take  by mouth. sacubitril-valsartan 24-26 mg tablet Commonly known as:  ENTRESTO Take 1 Tab by mouth two (2) times a day. vitamin A 8,000 unit capsule Take 8,000 Units by mouth daily. VITAMIN D3 1,000 unit tablet Generic drug:  cholecalciferol Take 10,000 Units by mouth daily. 10,000 units \"dry vitamin d \"  
  
 zolpidem 10 mg tablet Commonly known as:  AMBIEN  
TAKE ONE TALBET BY MOUTH NIGHTLY AS NEEDED FOR SLEEP, MAX DAILY AMOUNT 1 TABLET Prescriptions Printed Refills  
 zolpidem (AMBIEN) 10 mg tablet 1 Sig: TAKE ONE TALBET BY MOUTH NIGHTLY AS NEEDED FOR SLEEP, MAX DAILY AMOUNT 1 TABLET Class: Print  
 oxyCODONE-acetaminophen (PERCOCET) 5-325 mg per tablet 0 Sig: Take 1 Tab by mouth every eight (8) hours as needed for Pain. Max Daily Amount: 3 Tabs. Class: Print Route: Oral  
  
Prescriptions Sent to Pharmacy Refills  
 cyclobenzaprine (FLEXERIL) 5 mg tablet 0 Sig: Take 1 Tab by mouth two (2) times a day for 5 days. For back muscle tightening. Class: Normal  
 [facility], [address] [place] [address] #: [phone] Route: Oral  
  
Follow-up Instructions  Return if symptoms worsen or fail to improve, for Scheduled appointment in April 2018. To-Do List   
 11/02/2017 Imaging:  XR SPINE LUMB 2 OR 3 V Patient Instructions Learning About Relief for Back Pain What is back tension and strain? Back strain happens when you overstretch, or pull, a muscle in your back. You may hurt your back in an accident or when you exercise or lift something. Most back pain will get better with rest and time. You can take care of yourself at home to help your back heal. 
What can you do first to relieve back pain? When you first feel back pain, try these steps: 
· Walk. Take a short walk (10 to 20 minutes) on a level surface (no slopes, hills, or stairs) every 2 to 3 hours. Walk only distances you can manage without pain, especially leg pain. · Relax. Find a comfortable position for rest. Some people are comfortable on the floor or a medium-firm bed with a small pillow under their head and another under their knees. Some people prefer to lie on their side with a pillow between their knees. Don't stay in one position for too long. · Try heat or ice. Try using a heating pad on a low or medium setting, or take a warm shower, for 15 to 20 minutes every 2 to 3 hours. Or you can buy single-use heat wraps that last up to 8 hours. You can also try an ice pack for 10 to 15 minutes every 2 to 3 hours. You can use an ice pack or a bag of frozen vegetables wrapped in a thin towel. There is not strong evidence that either heat or ice will help, but you can try them to see if they help. You may also want to try switching between heat and cold. · Take pain medicine exactly as directed. ¨ If the doctor gave you a prescription medicine for pain, take it as prescribed. ¨ If you are not taking a prescription pain medicine, ask your doctor if you can take an over-the-counter medicine. What else can you do? · Stretch and exercise.  Exercises that increase flexibility may relieve your pain and make it easier for your muscles to keep your spine in a good, neutral position. And don't forget to keep walking. · Do self-massage. You can use self-massage to unwind after work or school or to energize yourself in the morning. You can easily massage your feet, hands, or neck. Self-massage works best if you are in comfortable clothes and are sitting or lying in a comfortable position. Use oil or lotion to massage bare skin. · Reduce stress. Back pain can lead to a vicious [place]: Distress about the pain tenses the muscles in your back, which in turn causes more pain. Learn how to relax your mind and your muscles to lower your stress. Where can you learn more? Go to http://jose-rm.info/. Enter E600 in the search box to learn more about \"Learning About Relief for Back Pain. \" Current as of: March 21, 2017 Content Version: 11.4 © 1732-1816 [employer]. Care instructions adapted under license by [employer] (which disclaims liability or warranty for this information). If you have questions about a medical condition or this instruction, always ask your healthcare professional. [place] [address] any warranty or liability for your use of this information. Introducing [facility]! Dear Aidan Lund: Thank you for requesting a [employer] account. Our records indicate that you already have an active Buzzwire account. You can access your account anytime at [email or web address] Did you know that you can access your hospital and ER discharge instructions at any time in Buzzwire? You can also review all of your test results from your hospital stay or ER visit. Additional Information If you have questions, please visit the Frequently Asked Questions section of the Buzz[employer] website at [email or web address] Remember, Buzzwire is NOT to be used for urgent needs.  For medical emergencies, dial 911. Now available from your iPhone and Android! Please provide this summary of care documentation to your next provider. Your primary care clinician is listed as Kristian Miller. If you have any questions after today's visit, please call [phone].

## 2017-11-02 NOTE — PROGRESS NOTES
Reviewed record  In preparation for visit and have obtained necessary documentation. 1. Have you been to the ER, urgent care clinic since your last visit? Hospitalized since your last visit?no  2. Have you seen or consulted any other health care providers outside of the [address] [place] [address] since your last visit? Include any pap smears or colon screening. Saw Dr Mihai Hamilton NP   Advanced directives: Patient has been given information on advanced directives at a previous visit. Patients vital signs discussed with physician. Had an appt with Dr Con Riley but had to cancel due to being sick. she will schedule another appt.

## 2017-11-02 NOTE — PROGRESS NOTES
CC:   Chief Complaint   Patient presents with    Fall     hurt back       Rita Caceres is a 74 y.o. female. Presents for evaluation of back pain. Reports that 3 days ago, she accidentally fell backwards at home while playing with her grandson. She was squatting, lost her balance, and fell backwards, hitting her back on a hardwood floor. Did not hit her head. She thought she was okay so did not go to the ED or Urgent Care Center. Today she complains of 8/10 low back pain, deep aching and tightening sensation in character, located at midline that sometimes radiates to buttocks, constant, worse with bending forwards. No improvement with ibuprofen or heating pad. Ice packs help a little. Taking ibuprofen 200 mg 4 tabs 3 times a day with no relief. Denies previous back problems.      Patient Active Problem List   Diagnosis Code    Urinary incontinence, stress     DVT (deep venous thrombosis) ([facility]) I82.409    HTN (hypertension) I10    History of gastric bypass Z98.890    Depression with anxiety F41.8    Reactive airway disease J45.909    CAD (coronary artery disease) H65.03    Systolic CHF, chronic ([facility]) I50.22    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[facility]) I42.9    Hyperlipidemia E78.5    Mitral valve regurgitation I34.0    History of MI (myocardial infarction) I25.2    Cardiomyopathy ([facility] [address] I42.9    Osteoporosis M81.0    Morbid obesity with BMI of 40.0-44.9, adult ([facility] [address] E66.01, Z68.41    Advance care planning Z71.89    Insomnia G47.00    S/P ICD (internal cardiac defibrillator) procedure Z95.810    AICD lead displacement T82.120A    ICD (implantable cardioverter-defibrillator) in place Z95.810    Primary osteoarthritis of right knee M17.11    Mild intermittent asthma without complication L24.44    Obesity, Class III, BMI 40-49.9 (morbid obesity) ([facility] [address] E66.01     Past Medical History:   Diagnosis Date    Asthma     Cardiomyopathy ([facility] [address]     Coronary artery disease 2008 s/p RCA stent ([facility]) on 11/26/11    Depression with anxiety     2011    DVT (deep venous thrombosis) ([facility] [address] 7/27/2010    Family history of early CAD     GERD (gastroesophageal reflux disease)     H/O gastric bypass 2006    Revision in 2009    Hepatitis C antibody test positive     Does not have chronic hep C (labs 10/6/15: neg HCV RNA)     HTN (hypertension) 7/27/2010    Hyperlipidemia 07/27/2010    Joint pain 7/27/2010    MI (myocardial infarction) 7/27/2010    Mitral valve regurgitation     Mild to moderate    Morbid obesity ([facility] [address] 7/27/2010    Myocardial infarction 2006 and 2011    Dr. Vona Dandy    PUD (peptic ulcer disease)     gi bleed 2008; ulcer n gastric bypass pouch    Urinary incontinence, stress 7/27/2010     Allergies   Allergen Reactions    Amoxicillin Anaphylaxis    Amoxicillin Anaphylaxis    Lipitor [Atorvastatin] Other (comments)     Severe muscle pain and spasms    Zocor [Simvastatin] Other (comments)     Cramps, muscle spasms    Livalo [Pitavastatin] Myalgia    Pravastatin Other (comments)     Leg cramps     Current Outpatient Prescriptions   Medication Sig Dispense Refill    metoprolol succinate (TOPROL-XL) 25 mg XL tablet Take 1 Tab by mouth daily. 30 Tab 1    bumetanide (BUMEX) 2 mg tablet Take 2 mg by mouth two (2) times a day.  ALPRAZolam (XANAX) 0.5 mg tablet Take 1 Tab by mouth two (2) times daily as needed for Anxiety. Max Daily Amount: 1 mg. 20 Tab 0    FLUoxetine (PROZAC) 20 mg tablet TAKE 2 TABLETS EVERY MORNING AND 1 AT BEDTIME FOR ANXIETY WITH DEPRESSION 270 Tab 1    zolpidem (AMBIEN) 10 mg tablet TAKE ONE TALBET BY MOUTH NIGHTLY AS NEEDED FOR SLEEP, MAX DAILY AMOUNT 1 TABLET 30 Tab 1    potassium chloride (KLOR-CON M20) 20 mEq tablet TAKE TWO TABLETS BY MOUTH DAILY 180 Tab 1    metOLazone (ZAROXOLYN) 2.5 mg tablet TAKE 1 TAB BY MOUTH DAILY AS NEEDED FOR UP TO 2 DAYS. 6 Tab 1    ibuprofen (MOTRIN) 200 mg tablet Take  by mouth.       sacubitril-valsartan (ENTRESTO) 24 mg/26 mg tablet Take 1 Tab by mouth two (2) times a day. 180 Tab 3    LACTOBACILLUS ACIDOPHILUS (PROBIOTIC PO) Take  by mouth.  albuterol (PROVENTIL HFA, VENTOLIN HFA, PROAIR HFA) 90 mcg/actuation inhaler Take 2 Puffs by inhalation every four (4) hours as needed for Wheezing or Shortness of Breath. 1 Inhaler 5    aspirin delayed-release 81 mg tablet Take  by mouth daily.  vitamin A 8,000 unit capsule Take 8,000 Units by mouth daily.  biotin 10,000 mcg cap Take  by mouth daily.  OTHER Complete multi formula, bariatric advantage      magnesium 250 mg tab Take 1 Tab by mouth daily.  ferrous sulfate (IRON, FERROUS SULFATE,) 325 mg (65 mg Iron) tablet Take  by mouth daily (before breakfast).  cholecalciferol, vitamin d3, (VITAMIN D) 1,000 unit tablet Take 10,000 Units by mouth daily. 10,000 units \"dry vitamin d \"      CALCIUM PO Take 1 Tab by mouth daily. PHYSICAL EXAM  Visit Vitals    /79 (BP 1 Location: Left arm, BP Patient Position: Sitting)    Pulse 74    Temp 98.4 °F (36.9 °C) (Oral)    Resp 18    Ht 5' 5\" (1.651 m)    Wt 256 lb (116.1 kg)    SpO2 97%    BMI 42.6 kg/m2       General: Obese, no distress. HEENT:  Head normocephalic/atraumatic, no scleral icterus  Lungs:  Clear to ausculation bilaterally. Good air movement. Heart:  Regular rate and rhythm, normal S1 and S2, no murmur, gallop, or rub  Back: Slightly decreased ROM. Tenderness at lower lumbar vertebral and adjacent paravertebral areas. Extremities: No clubbing, cyanosis, or edema. Neurological: Alert and oriented. MS 5/5 at bilateral LE's. Negative SLR bilaterally. Psychiatric: Normal mood and affect. Behavior is normal.         ASSESSMENT AND PLAN    ICD-10-CM ICD-9-CM    1.  Acute midline low back pain without sciatica M54.5 724.2 XR SPINE LUMB 2 OR 3 V      cyclobenzaprine (FLEXERIL) 5 mg tablet      oxyCODONE-acetaminophen (PERCOCET) 5-325 mg per tablet 2. Fall, initial encounter [address] XXXA E888.9    3. Chronic insomnia F51.04 780.52 zolpidem (AMBIEN) 10 mg tablet       Diagnoses and all orders for this visit:    1. Acute midline low back pain without sciatica  Because of her history of osteoporosis, should rule out vertebral fracture. -     XR SPINE LUMB 2 OR 3 V; Future  -     Start cyclobenzaprine (FLEXERIL) 5 mg tablet; Take 1 Tab by mouth two (2) times a day for 5 days. For back muscle tightening.  -     Start oxyCODONE-acetaminophen (PERCOCET) 5-325 mg per tablet; Take 1 Tab by mouth every eight (8) hours as needed for Pain. Max Daily Amount: 3 Tabs. 2. Fall, initial encounter    3. Chronic insomnia  Patient instructed to not take Ambien while on Flexeril and Oxycodone due to increased risk of sedation and depressed respiratory function. -     Refill zolpidem (AMBIEN) 10 mg tablet; TAKE ONE TABLET BY MOUTH NIGHTLY AS NEEDED FOR SLEEP, MAX DAILY AMOUNT 1 TABLET      Follow-up Disposition:  Return if symptoms worsen or fail to improve, for Scheduled appointment in April 2018. Provided patient and/or family with advanced directive information and answered pertinent questions. Encouraged patient to provide a copy of advanced directive to the office when available. I have discussed the diagnosis with the patient and the intended plan as seen in the above orders. Patient is in agreement. The patient has received an after-visit summary and questions were answered concerning future plans. I have discussed medication side effects and warnings with the patient as well.

## 2017-11-03 NOTE — PROGRESS NOTES
Spoke with patient and after verifying name and date of birth of patient gave her test results per Dr. Kindra Mota. Patient stated understanding.

## 2017-11-06 DIAGNOSIS — M54.50 ACUTE MIDLINE LOW BACK PAIN WITHOUT SCIATICA: ICD-10-CM

## 2017-11-06 RX ORDER — CYCLOBENZAPRINE HCL 5 MG
5 TABLET ORAL 2 TIMES DAILY
Qty: 10 TAB | Refills: 0 | Status: SHIPPED | OUTPATIENT
Start: 2017-11-06 | End: 2017-11-11

## 2017-11-06 NOTE — TELEPHONE ENCOUNTER
PCP: Maged Csatro MD    Last appt: 11/2/2017  Future Appointments  Date Time Provider Denis Hurst   1/23/2018 11:00 AM Brit Barakat  [address]   1/31/2018 1:00 PM [address], [address]   4/2/2018 9:30 AM LAB [facility] SCHED   4/9/2018 9:30 AM Maged Castro MD [facility] SCHED   5/7/2018 11:30 AM REMOTE1, [address]   8/13/2018 9:15 AM REMOTE1, [address]   11/15/2018 10:30 AM PACEMAKER3, [address]   11/15/2018 10:40 AM Joey Whitehead  [address]       Requested Prescriptions     Pending Prescriptions Disp Refills    cyclobenzaprine (FLEXERIL) 5 mg tablet 10 Tab 0     Sig: Take 1 Tab by mouth two (2) times a day for 5 days. For back muscle tightening.

## 2017-12-01 DIAGNOSIS — E78.5 DYSLIPIDEMIA: ICD-10-CM

## 2017-12-01 DIAGNOSIS — R00.2 PALPITATIONS: ICD-10-CM

## 2017-12-01 RX ORDER — METOPROLOL SUCCINATE 25 MG/1
25 TABLET, EXTENDED RELEASE ORAL DAILY
Qty: 30 TAB | Refills: 5 | Status: SHIPPED | OUTPATIENT
Start: 2017-12-01 | End: 2018-02-23 | Stop reason: SDUPTHER

## 2017-12-22 ENCOUNTER — HOSPITAL ENCOUNTER (OUTPATIENT)
Dept: LAB | Age: 67
Discharge: HOME | End: 2017-12-22
Payer: MEDICARE

## 2017-12-22 PROCEDURE — 83735 ASSAY OF MAGNESIUM: CPT

## 2017-12-22 PROCEDURE — 80053 COMPREHEN METABOLIC PANEL: CPT

## 2017-12-22 PROCEDURE — 80061 LIPID PANEL: CPT

## 2017-12-22 PROCEDURE — 36415 COLL VENOUS BLD VENIPUNCTURE: CPT

## 2017-12-23 LAB
ALBUMIN SERPL-MCNC: 4 G/DL (ref 3.6–4.8)
ALBUMIN/GLOB SERPL: 1.6 {RATIO} (ref 1.2–2.2)
ALP SERPL-CCNC: 63 IU/L (ref 39–117)
ALT SERPL-CCNC: 15 IU/L (ref 0–32)
AST SERPL-CCNC: 20 IU/L (ref 0–40)
BILIRUB SERPL-MCNC: 0.4 MG/DL (ref 0–1.2)
BUN SERPL-MCNC: 14 MG/DL (ref 8–27)
BUN/CREAT SERPL: 20 (ref 12–28)
CALCIUM SERPL-MCNC: 9.4 MG/DL (ref 8.7–10.3)
CHLORIDE SERPL-SCNC: 110 MMOL/L (ref 96–106)
CHOLEST SERPL-MCNC: 235 MG/DL (ref 100–199)
CO2 SERPL-SCNC: 20 MMOL/L (ref 18–29)
CREAT SERPL-MCNC: 0.7 MG/DL (ref 0.57–1)
GLOBULIN SER CALC-MCNC: 2.5 G/DL (ref 1.5–4.5)
GLUCOSE SERPL-MCNC: 90 MG/DL (ref 65–99)
HDLC SERPL-MCNC: 71 MG/DL
INTERPRETATION, 910389: NORMAL
LDLC SERPL CALC-MCNC: 140 MG/DL (ref 0–99)
MAGNESIUM SERPL-MCNC: 2.1 MG/DL (ref 1.6–2.3)
POTASSIUM SERPL-SCNC: 4 MMOL/L (ref 3.5–5.2)
PROT SERPL-MCNC: 6.5 G/DL (ref 6–8.5)
SODIUM SERPL-SCNC: 153 MMOL/L (ref 134–144)
TRIGL SERPL-MCNC: 118 MG/DL (ref 0–149)
VLDLC SERPL CALC-MCNC: 24 MG/DL (ref 5–40)

## 2018-01-04 NOTE — PROGRESS NOTES
LDL is way too high for her hx of CAD/MI. Please see if she would like to begin application for repatha or if she wants to wait to see Dr. Markos Daley at her visit to discuss.

## 2018-01-05 ENCOUNTER — TELEPHONE (OUTPATIENT)
Dept: CARDIOLOGY CLINIC | Age: 68
End: 2018-01-05

## 2018-01-05 NOTE — TELEPHONE ENCOUNTER
----- Message from Carmel Esquivel NP sent at 1/4/2018 11:51 AM EST -----  LDL is way too high for her hx of CAD/MI. Please see if she would like to begin application for repatha or if she wants to wait to see Dr. Mihai Sierra at her visit to discuss.

## 2018-01-08 NOTE — TELEPHONE ENCOUNTER
Called patient, verified identity. Informed her of the following recommendation per Sandra Issa. NP. States that she would rather discuss this with Dr. Jyoti Salazar during her office visit on 1/23/18 at 11 am. Confirmed date and time with patient.

## 2018-01-09 DIAGNOSIS — F51.04 CHRONIC INSOMNIA: ICD-10-CM

## 2018-01-09 RX ORDER — ZOLPIDEM TARTRATE 10 MG/1
TABLET ORAL
Qty: 30 TAB | Refills: 1 | OUTPATIENT
Start: 2018-01-09 | End: 2018-02-23 | Stop reason: SDUPTHER

## 2018-01-15 RX ORDER — METOLAZONE 2.5 MG/1
TABLET ORAL
Qty: 6 TAB | Refills: 1 | Status: SHIPPED | OUTPATIENT
Start: 2018-01-15 | End: 2019-02-24 | Stop reason: SDUPTHER

## 2018-01-23 ENCOUNTER — HOSPITAL ENCOUNTER (OUTPATIENT)
Dept: LAB | Age: 68
Discharge: HOME | End: 2018-01-23
Payer: MEDICARE

## 2018-01-23 ENCOUNTER — OFFICE VISIT (OUTPATIENT)
Dept: CARDIOLOGY CLINIC | Age: 68
End: 2018-01-23

## 2018-01-23 VITALS
DIASTOLIC BLOOD PRESSURE: 80 MMHG | BODY MASS INDEX: 41.27 KG/M2 | SYSTOLIC BLOOD PRESSURE: 130 MMHG | WEIGHT: 247.7 LBS | HEIGHT: 65 IN | RESPIRATION RATE: 16 BRPM | HEART RATE: 66 BPM

## 2018-01-23 DIAGNOSIS — I25.10 CORONARY ARTERY DISEASE INVOLVING NATIVE CORONARY ARTERY OF NATIVE HEART WITHOUT ANGINA PECTORIS: ICD-10-CM

## 2018-01-23 DIAGNOSIS — Z95.810 ICD (IMPLANTABLE CARDIOVERTER-DEFIBRILLATOR) IN PLACE: ICD-10-CM

## 2018-01-23 DIAGNOSIS — I50.22 SYSTOLIC CHF, CHRONIC (HCC): ICD-10-CM

## 2018-01-23 DIAGNOSIS — E87.0 HYPERNATREMIA: ICD-10-CM

## 2018-01-23 DIAGNOSIS — I10 ESSENTIAL HYPERTENSION: ICD-10-CM

## 2018-01-23 DIAGNOSIS — I42.9 CARDIOMYOPATHY, UNSPECIFIED TYPE (HCC): ICD-10-CM

## 2018-01-23 DIAGNOSIS — F41.8 DEPRESSION WITH ANXIETY: ICD-10-CM

## 2018-01-23 DIAGNOSIS — E66.01 OBESITY, CLASS III, BMI 40-49.9 (MORBID OBESITY) (HCC): ICD-10-CM

## 2018-01-23 DIAGNOSIS — E78.5 HYPERLIPIDEMIA, UNSPECIFIED HYPERLIPIDEMIA TYPE: ICD-10-CM

## 2018-01-23 DIAGNOSIS — E55.9 VITAMIN D DEFICIENCY: ICD-10-CM

## 2018-01-23 DIAGNOSIS — I34.0 NON-RHEUMATIC MITRAL REGURGITATION: ICD-10-CM

## 2018-01-23 DIAGNOSIS — E66.01 MORBID OBESITY WITH BMI OF 40.0-44.9, ADULT (HCC): ICD-10-CM

## 2018-01-23 DIAGNOSIS — I65.21 ATHEROSCLEROSIS OF RIGHT CAROTID ARTERY: Primary | ICD-10-CM

## 2018-01-23 PROCEDURE — 80048 BASIC METABOLIC PNL TOTAL CA: CPT

## 2018-01-23 PROCEDURE — 36415 COLL VENOUS BLD VENIPUNCTURE: CPT

## 2018-01-23 PROCEDURE — 82306 VITAMIN D 25 HYDROXY: CPT

## 2018-01-23 NOTE — PROGRESS NOTES
[facility]  [phone]    HPI: Nora Diez, a 74 y.o. who presents for follow up regarding her CAD and CHF. Down 7 lbs from her last visit  She is walking more and doing her \"cardio cruiser\" at home   Says her dyspnea with exertion is the same, denies PND or orthopnea   Denies chest pain or palpitations  Denies dizziness or syncope  LE edema is doing well, takes metolazone PRN - maybe 1-2 tabs every other week   Insurance changed to [employer] and she is willing to try to get approval for Lalita Whitlock for her lipid therapy  Today she mentions that she had some plaque in her right carotid years ago, before she began coming to this office, and would like this rechecked today    Assessment/Plan:  1. Chronic systolic heart failure - LVEF 35%-40% by TTE 4/17, s/p AICD placement with Dr. Ambrosio Davis and subsequent lead revisions and repeat procedures due to non-healing midsternal incision  -last revision for AICD in November 2016 with Dr. Ambrosio Davis, last ICD check in 11/17 without arrhythmias   -continue Entresto 24/26 one tablet BID and Toprol XL 50mg daily   -off spironolactone due to hypotension, continue bumex BID, using Metolazone PRN  -c/o hair loss with Coreg and Toprol XL but willing to continue Toprol XL for now   -will repeat TTE in 3 months at follow up visit to assess LVEF  2. CAD - hx of MI x 2 and multiple stents, she believes she may have 6 or 7 stents, last cardiac cath without progression of CAD and stress test in 4/17 showed possible anterior ischemia vs artifact but asymptomatic currently so will just continue to watch, continue ASA and Toprol XL, unable to tolerate pravastatin, simvastatin, atorvastatin, see lipid therapy plan below    -reports having an MI in 11/2011 and received PCI to RCA at that time, previous MI in 2006 or 2007  3. Mitral regurgitation - mild by TTE 4/17, will reassess with TTE in 3 months  4. Asthma - x 15 - 16 years, has not seen pulmonologist in years  11.  HTN - continue current regimen  6. Dyslipidemia -  in 12/17, zocor caused muscle spasms and cramps, lipitor caused pain shooting all over her body, could tolerate Pravastatin 40mg daily due to leg cramps but LDL 97 on pravastatin 40mg daily, tried Livalo after her last visit but could not tolerate it due to myalgias, now on just [employer], did not tolerate zetia either   -will try to get approval for Repatha, needs lipid lowering and intolerant to several statins, zetia, etc  7. DM Type 2 - resolved with gastric bypass, last Hgb A1C 5.8%  8. Hypokalemia -off spironolactone and on KCL 20meq daily, CMP stable 12/17   9. Hypomagnesemia - on OTC, Mg level stable 12/17    10. Morbid obesity - Body mass index is 41.22 kg/(m^2). ). weighed 416 lbs at her heaviest weight, s/p gastric bypass in 2007 with revision a few years later, now down to 247 lbs and exercising   11. PUD - had EGD and cauterized bleeding ulcer, not on PPI anymore  12. Vitamin D deficiency - on supplementation, Vitamin D level 34.2 in 2016, will check Vitamin D level today   13. Anxiety - on Prozac, could not sleep on Clonazepam, able to sleep better on ambien 10mg at bedtime  14. Hx of right carotid stenosis - no recent duplex, on ASA and cannot tolerate statins as above, will order carotid duplex to assess for stenosis   15. Hypernatremia - Na 153 on last set of labs, will recheck BMP today     Echo: 4/17, EF 35-40%, inf HK gr1dd  Nuclear: 4/17, EF 36%, anterior ischemia, lateral infarct. TTE 9/16 - LVEF 35%, moderate hypokinesis of the basal-mid inferolateral wall(s), grd 1 dd, dilated LA, mod MR, mild TR  8/26/16 - RV lead revision by Dr. Betina Meza  8/24/16 - single chamber AICD placed by Dr. Betina Meza ([address] VR, serial # [id number], model # [id number].  The ventricular lead: model # [id number] , serial # [id number])  MUGA 6/16 - LVEF 27%  Holter 6/16 - no arrhythmias  Echo 5/16 - LV mildly dilated, LVEF 40%, moderate diffuse hypokinesis with regional variations, severe hypokinesis of the basal-mid inferior wall(s), grd 1 dd, mod dilated LA, atrial septum bows from left to right, consistent with increased left atrial pressure, mildly dilated RA, mild to near moderate MR    OLIVER  - normal  Nuc 5/15 EF 31% large anterolateral infarct with periinfarct reversibility, 13% LV reversible(32% infarct at rest, 45% at stress)    Echo 2015 - LVEF 30-35%, grd 1 dd, mod LAE, mild to mod MR  Nuc Stress  - small reversibility in anteroapical wall, LVEF 31%  Cardiac Cath 3/13 - patent stents in LAD, LCx and RCA, LVEF 30%, severe inferior HK, LCx relatively small vessel with patent stent in proximal LCx, RCA is large and dominant with patent stents in proximal and mid RCA, distal RCA free of disease, LAD normal and large vessel which coursed around apex  Echo 2011 - LVEF 30%, mild MR  Cardiac Cath 2011 - s/p PCI with AMRIT to 100% mid RCA, also had 40% mid LAD lesion, 50% diagonal 1 lesion, 100% distal LCx lesion, 60% OM1 lesion, 100% proximal Ramus lesion      Soc Hx: quit tobacco use x 13 years ago, used to smoke 1ppd, no etoh use, no drug use, lives with , son, daughter in law, grandson, retired/on disability  Fam Hx: father in his 62s when he had a MI, had 3 vessel CABG in his 62s, passed from fall but had cancer too, mother lived to age 80 and  from Alzheimer's, brother had MI in his 62s, sister had aortic repair for aneurysm in her 76s    She  has a past medical history of Asthma; Cardiomyopathy ([facility] Coronary artery disease (); Depression with anxiety; DVT (deep venous thrombosis) ([facility] (2010); Family history of early CAD; GERD (gastroesophageal reflux disease); H/O gastric bypass (); Hepatitis C antibody test positive; HTN (hypertension) (2010); Hyperlipidemia (2010); Joint pain (2010); MI (myocardial infarction) (2010); Mitral valve regurgitation; Morbid obesity ([facility] (2010);  Myocardial infarction (2006 and 2011); PUD (peptic ulcer disease); and Urinary incontinence, stress (7/27/2010). She also has no past medical history of Diabetes ([address] Cardiovascular ROS: positive for dyspnea on exertion   Respiratory ROS: no cough, wheezing  Neurological ROS: no TIA or stroke symptoms  All other systems negative except as above. PE  Vitals:    01/23/18 1121   BP: 130/80   Pulse: 66   Resp: 16   Weight: 247 lb 11.2 oz (112.4 kg)   Height: 5' 5\" (1.651 m)    Body mass index is 41.22 kg/(m^2).   General appearance - alert, well appearing, and in no distress  Mental status - affect appropriate to mood  Eyes - sclera anicteric, moist mucous membranes  Neck - supple  Lymphatics - not assessed  Chest - clear to auscultation, no wheezes, rales or rhonchi  Heart - normal rate, regular rhythm, normal S1, S2, 2/6 MYKE   Abdomen - soft, nontender, nondistended, obese  Back exam - full range of motion, no tenderness  Neurological - cranial nerves II through XII grossly intact, no focal deficit  Musculoskeletal - no muscular tenderness noted, normal strength  Extremities - diminished peripheral pulses, no LE edema  Skin - normal coloration  no rashes    Recent Labs:  Lab Results   Component Value Date/Time    Cholesterol, total 235 12/22/2017 11:29 AM    HDL Cholesterol 71 12/22/2017 11:29 AM    LDL, calculated 140 12/22/2017 11:29 AM    Triglyceride 118 12/22/2017 11:29 AM     Lab Results   Component Value Date/Time    Creatinine 0.90 01/23/2018 12:11 PM     Lab Results   Component Value Date/Time    BUN 14 01/23/2018 12:11 PM    BUN (POC) 24 11/26/2011 09:50 PM     Lab Results   Component Value Date/Time    Potassium 3.7 01/23/2018 12:11 PM     Lab Results   Component Value Date/Time    Hemoglobin A1c 5.8 02/09/2016 11:44 AM    Hemoglobin A1c, External 5.5 12/12/2014     Lab Results   Component Value Date/Time    HGB 14.7 02/23/2017 07:26 AM     Lab Results   Component Value Date/Time    PLATELET 488 57/39/0778 07:26 AM Reviewed:  Past Medical History:   Diagnosis Date    Asthma     Cardiomyopathy ([facility]     Coronary artery disease 2008    s/p RCA stent (AMRIT) on 11/26/11    Depression with anxiety     2011    DVT (deep venous thrombosis) ([facility] 7/27/2010    Family history of early CAD     GERD (gastroesophageal reflux disease)     H/O gastric bypass 2006    Revision in 2009    Hepatitis C antibody test positive     Does not have chronic hep C (labs 10/6/15: neg HCV RNA)     HTN (hypertension) 7/27/2010    Hyperlipidemia 07/27/2010    Joint pain 7/27/2010    MI (myocardial infarction) 7/27/2010    Mitral valve regurgitation     Mild to moderate    Morbid obesity ([facility] 7/27/2010    Myocardial infarction 2006 and 2011    Dr. Mima Snider    PUD (peptic ulcer disease)     gi bleed 2008; ulcer n gastric bypass pouch    Urinary incontinence, stress 7/27/2010     History   Smoking Status    Former Smoker    Start date: 1/1/1970    Quit date: 5/17/2004   Smokeless Tobacco    Never Used     History   Alcohol Use No     Allergies   Allergen Reactions    Amoxicillin Anaphylaxis    Amoxicillin Anaphylaxis    Lipitor [Atorvastatin] Other (comments)     Severe muscle pain and spasms    Zocor [Simvastatin] Other (comments)     Cramps, muscle spasms    Livalo [Pitavastatin] Myalgia    Pravastatin Other (comments)     Leg cramps       Current Outpatient Prescriptions   Medication Sig    evolocumab (REPATHA SURECLICK) pen injection 1 mL by SubCUTAneous route every fourteen (14) days.  metOLazone (ZAROXOLYN) 2.5 mg tablet TAKE 1 TABLET BY MOUTH DAILY AS NEEDED FOR UP TO 2 DAYS    zolpidem (AMBIEN) 10 mg tablet TAKE 1 TABLET BY MOUTH NIGHTLY AS NEEDED FOR SLEEP. MAX DAILY AMOUNT: 1 TABLET    metoprolol succinate (TOPROL-XL) 25 mg XL tablet Take 1 Tab by mouth daily.  bumetanide (BUMEX) 2 mg tablet Take 2 mg by mouth two (2) times a day.     ALPRAZolam (XANAX) 0.5 mg tablet Take 1 Tab by mouth two (2) times daily as needed for Anxiety. Max Daily Amount: 1 mg.  FLUoxetine (PROZAC) 20 mg tablet TAKE 2 TABLETS EVERY MORNING AND 1 AT BEDTIME FOR ANXIETY WITH DEPRESSION    potassium chloride (KLOR-CON M20) 20 mEq tablet TAKE TWO TABLETS BY MOUTH DAILY    sacubitril-valsartan (ENTRESTO) 24 mg/26 mg tablet Take 1 Tab by mouth two (2) times a day.  LACTOBACILLUS ACIDOPHILUS (PROBIOTIC PO) Take  by mouth.  albuterol (PROVENTIL HFA, VENTOLIN HFA, PROAIR HFA) 90 mcg/actuation inhaler Take 2 Puffs by inhalation every four (4) hours as needed for Wheezing or Shortness of Breath.  aspirin delayed-release 81 mg tablet Take  by mouth daily.  vitamin A 8,000 unit capsule Take 8,000 Units by mouth daily.  biotin 10,000 mcg cap Take  by mouth daily.  OTHER Complete multi formula, bariatric advantage    magnesium 250 mg tab Take 1 Tab by mouth daily.  ferrous sulfate (IRON, FERROUS SULFATE,) 325 mg (65 mg Iron) tablet Take  by mouth daily (before breakfast).  cholecalciferol, vitamin d3, (VITAMIN D) 1,000 unit tablet Take 10,000 Units by mouth daily. 10,000 units \"dry vitamin d \"    CALCIUM PO Take 1 Tab by mouth daily. No current facility-administered medications for this visit.         Ita Keller MD  [facility] [address] [facility] [address], [address] [facility] [place] [address]  [address] [facility]  [phone]

## 2018-01-23 NOTE — PATIENT INSTRUCTIONS
Please have labs drawn today to check your sodium level and vitamin D level  We will try to get insurance approval for repatha

## 2018-01-23 NOTE — MR AVS SNAPSHOT
[address] [facility] [address] [place] 
[phone] Patient: Mehnaz Eden MRN: [id number] YZB:[birth year] Visit Information Date & Time Provider Department Dept. Phone Encounter #  
 1/23/2018 11:00 AM Ita Keller MD [facility] Anderson Abdi [phone] [id number] Your Appointments 4/2/2018  9:30 AM  
LAB with LAB [facility] Loree aBxter [address] [place] [address]) Appt Note: fasting lab  
 [address] [facility] [address] [phone]  
  
   
 [facility]  
  
    
 4/9/2018  9:30 AM  
ESTABLISHED PATIENT with [address]) Appt Note: 6mth f/u; HTN, CHF; schedule for fasting labs 1 week prior to appt [address] [facility] [address] [phone]  
  
   
 [facility]  
  
    
 11/15/2018 10:30 AM  
PACEMAKER with Brando Swenson [facility] ([facility] SCHEDULING) Appt Note: bsc hubert richards, annual , Lat, see [address] [facility] [address] [place]  
[address] [facility]  
  
    
 11/15/2018 10:40 AM  
ESTABLISHED PATIENT with Mindy Luu MD  
[facility] ([address]) Appt Note: bshubert perez, annual , Lat, see [address] [place]  
[address] [facility] [address]  
  
    
  
 1/31/2018  1:00 PM  
REMOTE OFFICE VISIT with Hamilton Trevino [facility] ([facility] SCHEDULING) Appt Note: bshannah icd, rc b 10/26/17  
 [address] [place]  
[address] [address]  
  
    
 5/7/2018 11:30 AM  
REMOTE OFFICE VISIT with Hamilton Trevino  
 [facility] ([facility] SCHEDULING) Appt Note: bsc sicd, rc  
 [address] [facility] [address]  
[phone]  
  
    
 8/13/2018  9:15 AM  
REMOTE OFFICE VISIT with Ann Carl [facility] ([facility] SCHEDULING) Appt Note: bshannah sicd, rc  
 [address]  
[phone] Upcoming Health Maintenance Date Due  
 GLAUCOMA SCREENING Q2Y 5/17/2017 BREAST CANCER SCRN MAMMOGRAM 2/1/2018 MEDICARE YEARLY EXAM 10/10/2018 COLONOSCOPY 9/5/2019 DTaP/Tdap/Td series (2 - Td) 10/30/2024 Allergies as of 1/23/2018  Review Complete On: 1/23/2018 By: Jazlyn Tian Severity Noted Reaction Type Reactions Amoxicillin High 07/27/2010   Side Effect Anaphylaxis Amoxicillin High 06/30/2015    Anaphylaxis Lipitor [Atorvastatin] High 06/30/2015    Other (comments) Severe muscle pain and spasms Zocor [Simvastatin] Medium 06/30/2015    Other (comments) Cramps, muscle spasms Livalo [Pitavastatin]  01/25/2017    Myalgia Pravastatin  08/19/2016    Other (comments) Leg cramps Current Immunizations  Reviewed on 7/11/2016 Name Date Influenza High Dose Vaccine PF 10/9/2017 Influenza Vaccine (Quad) PF 9/21/2016  6:19 PM, 10/6/2015 Influenza Vaccine Split 11/28/2011 11:25 AM  
 Pneumococcal Conjugate (PCV-13) 6/14/2016 Pneumococcal Polysaccharide (PPSV-23) 10/9/2017 Tdap 10/30/2014 ZZZ-RETIRED (DO NOT USE) Pneumococcal Vaccine (Unspecified Type) 8/20/2011 Zoster Vaccine, Live 2/26/2016 Not reviewed this visit You Were Diagnosed With   
  
 Codes Comments Atherosclerosis of right carotid artery    -  Primary ICD-10-CM: T87.12 ICD-9-CM: 433.10 Coronary artery disease involving native coronary artery of native heart without angina pectoris     ICD-10-CM: I25.10 ICD-9-CM: 414.01  Systolic CHF, chronic (HCC)     ICD-10-CM: I50.22 
 ICD-9-CM: 428.22, 428.0 Hyperlipidemia, unspecified hyperlipidemia type     ICD-10-CM: E78.5 ICD-9-CM: 272.4 Non-rheumatic mitral regurgitation     ICD-10-CM: I34.0 ICD-9-CM: 424.0 Essential hypertension     ICD-10-CM: I10 
ICD-9-CM: 401.9 Obesity, Class III, BMI 40-49.9 (morbid obesity) (HCC)     ICD-10-CM: E66.01 
ICD-9-CM: 278.01   
 ICD (implantable cardioverter-defibrillator) in place     ICD-10-CM: Z95.810 ICD-9-CM: V45.02 Hypernatremia     ICD-10-CM: E87.0 ICD-9-CM: 276.0 Vitamin D deficiency     ICD-10-CM: E55.9 ICD-9-CM: 268.9 Vitals BP Pulse Resp Height(growth percentile) Weight(growth percentile) BMI  
 130/80 (BP 1 Location: Left arm, BP Patient Position: Sitting) 66 16 5' 5\" (1.651 m) 247 lb 11.2 oz (112.4 kg) 41.22 kg/m2 OB Status Smoking Status Postmenopausal Former Smoker Vitals History BMI and BSA Data Body Mass Index Body Surface Area  
 41.22 kg/m 2 2.27 m 2 Preferred Pharmacy Pharmacy Name Phone [facility]/PHARMACY #[id number] [phone] Your Updated Medication List  
  
   
This list is accurate as of: 1/23/18 11:58 AM.  Always use your most recent med list.  
  
  
  
  
 albuterol 90 mcg/actuation inhaler Commonly known as:  PROVENTIL HFA, VENTOLIN HFA, PROAIR HFA Take 2 Puffs by inhalation every four (4) hours as needed for Wheezing or Shortness of Breath. ALPRAZolam 0.5 mg tablet Commonly known as:  Lis Mic Take 1 Tab by mouth two (2) times daily as needed for Anxiety. Max Daily Amount: 1 mg.  
  
 aspirin delayed-release 81 mg tablet Take  by mouth daily. biotin 10,000 mcg Cap Take  by mouth daily. bumetanide 2 mg tablet Commonly known as:  Marijane Scales Take 2 mg by mouth two (2) times a day. CALCIUM PO Take 1 Tab by mouth daily. evolocumab pen injection Commonly known as:  Shad Null 1 mL by SubCUTAneous route every fourteen (14) days. FLUoxetine 20 mg tablet Commonly known as:  PROzac TAKE 2 TABLETS EVERY MORNING AND 1 AT BEDTIME FOR ANXIETY WITH DEPRESSION Iron (Ferrous Sulfate) 325 mg (65 mg iron) tablet Generic drug:  ferrous sulfate Take  by mouth daily (before breakfast). magnesium 250 mg Tab Take 1 Tab by mouth daily. metOLazone 2.5 mg tablet Commonly known as:  ZAROXOLYN  
TAKE 1 TABLET BY MOUTH DAILY AS NEEDED FOR UP TO 2 DAYS  
  
 metoprolol succinate 25 mg XL tablet Commonly known as:  TOPROL-XL Take 1 Tab by mouth daily. OTHER Complete multi formula, bariatric advantage  
  
 potassium chloride 20 mEq tablet Commonly known as:  KLOR-CON M20  
TAKE TWO TABLETS BY MOUTH DAILY PROBIOTIC PO Take  by mouth. sacubitril-valsartan 24-26 mg tablet Commonly known as:  ENTRESTO Take 1 Tab by mouth two (2) times a day. vitamin A 8,000 unit capsule Take 8,000 Units by mouth daily. VITAMIN D3 1,000 unit tablet Generic drug:  cholecalciferol Take 10,000 Units by mouth daily. 10,000 units \"dry vitamin d \"  
  
 zolpidem 10 mg tablet Commonly known as:  AMBIEN  
TAKE 1 TABLET BY MOUTH NIGHTLY AS NEEDED FOR SLEEP. MAX DAILY AMOUNT: 1 TABLET Prescriptions Printed Refills  
 evolocumab (REPATHA SURECLICK) pen injection 3 Si mL by SubCUTAneous route every fourteen (14) days. Class: Print Route: SubCUTAneous We Performed the Following METABOLIC PANEL, BASIC [36501 CPT(R)] VITAMIN D, 25 HYDROXY [id number] CPT(R)] To-Do List   
 2018 ECHO:  2D ECHO COMPLETE ADULT (TTE) W OR WO CONTR   
  
 2018 Imaging:  DUPLEX CAROTID BILATERAL Patient Instructions Please have labs drawn today to check your sodium level and vitamin D level We will try to get insurance approval for repatha [facility]! Dear Dave Liao: Thank you for requesting a DocRun account. Our records indicate that you already have an active DocRun account. You can access your account anytime at [email or web address] Did you know that you can access your hospital and ER discharge instructions at any time in DocRun? You can also review all of your test results from your hospital stay or ER visit. Additional Information If you have questions, please visit the Frequently Asked Questions section of the DocRun website at [email or web address] Remember, DocRun is NOT to be used for urgent needs. For medical emergencies, dial 911. Now available from your iPhone and Android! Please provide this summary of care documentation to your next provider. Your primary care clinician is listed as Vito Corral. If you have any questions after today's visit, please call [phone].

## 2018-01-24 LAB
25(OH)D3+25(OH)D2 SERPL-MCNC: 27 NG/ML (ref 30–100)
BUN SERPL-MCNC: 14 MG/DL (ref 8–27)
BUN/CREAT SERPL: 16 (ref 12–28)
CALCIUM SERPL-MCNC: 8.8 MG/DL (ref 8.7–10.3)
CHLORIDE SERPL-SCNC: 100 MMOL/L (ref 96–106)
CO2 SERPL-SCNC: 26 MMOL/L (ref 18–29)
CREAT SERPL-MCNC: 0.9 MG/DL (ref 0.57–1)
GLUCOSE SERPL-MCNC: 104 MG/DL (ref 65–99)
POTASSIUM SERPL-SCNC: 3.7 MMOL/L (ref 3.5–5.2)
SODIUM SERPL-SCNC: 142 MMOL/L (ref 134–144)

## 2018-01-24 NOTE — PROGRESS NOTES
NA level is better but Vitamin D level is low, please advise her to take Vitamin D 50,000 once/week x 8 weeks and then resume her usual Vitamin D dose.

## 2018-01-25 ENCOUNTER — TELEPHONE (OUTPATIENT)
Dept: CARDIOLOGY CLINIC | Age: 68
End: 2018-01-25

## 2018-01-25 NOTE — TELEPHONE ENCOUNTER
----- Message from Jose Grider NP sent at 1/24/2018  3:44 PM EST -----  NA level is better but Vitamin D level is low, please advise her to take Vitamin D 50,000 once/week x 8 weeks and then resume her usual Vitamin D dose.

## 2018-01-31 ENCOUNTER — OFFICE VISIT (OUTPATIENT)
Dept: CARDIOLOGY CLINIC | Age: 68
End: 2018-01-31

## 2018-01-31 DIAGNOSIS — Z95.810 ICD (IMPLANTABLE CARDIOVERTER-DEFIBRILLATOR) IN PLACE: Primary | ICD-10-CM

## 2018-02-08 RX ORDER — ERGOCALCIFEROL 1.25 MG/1
50000 CAPSULE ORAL DAILY
Qty: 8 CAP | Refills: 0 | Status: SHIPPED | OUTPATIENT
Start: 2018-02-08 | End: 2018-05-29 | Stop reason: ALTCHOICE

## 2018-02-08 NOTE — TELEPHONE ENCOUNTER
Called patient. Verified identity. Informed her of the following lab results per [facility] NP. Patient was agreeable to taking Vitamin D as ordered. Requested Prescriptions     Signed Prescriptions Disp Refills    ergocalciferol (VITAMIN D2) 50,000 unit capsule 8 Cap 0     Sig: Take 1 Cap by mouth daily.      Authorizing Provider: Herve Khan     Ordering User: Juan Manuel Kahn

## 2018-02-12 ENCOUNTER — TELEPHONE (OUTPATIENT)
Dept: CARDIOLOGY CLINIC | Age: 68
End: 2018-02-12

## 2018-02-12 RX ORDER — PRAVASTATIN SODIUM 40 MG/1
40 TABLET ORAL
COMMUNITY
End: 2018-02-12 | Stop reason: SDUPTHER

## 2018-02-12 RX ORDER — PRAVASTATIN SODIUM 40 MG/1
40 TABLET ORAL
Qty: 30 TAB | Refills: 3 | Status: SHIPPED | OUTPATIENT
Start: 2018-02-12 | End: 2018-05-25

## 2018-02-12 NOTE — TELEPHONE ENCOUNTER
Pt's insurance company refuses to cover repatha  because she can not tolerate high dose statin. I strongly recommended she take PCSK9 based on MI, recurrent stents, recurrent MI and FH. Insurance refuses to consider her for therapy due to [address]! Reviewed that she is very high risk for future events and this medication could prevent future events/MI/hospitalization/even death. Insurance company denies her recommended treatment.  Will submit for praulent and ask their liason to assist.

## 2018-02-12 NOTE — TELEPHONE ENCOUNTER
[facility] from [facility] specialty is calling in regards to the PA for repatha being denied. [facility] can be reached at [phone].     Thank you,  Hilaria Underwood

## 2018-02-12 NOTE — TELEPHONE ENCOUNTER
Returned call to Adrián Khalil, 2 pt identifiers used    Adrián Khalil states [address] was denied. Call placed to insurance company, given the option to do a peer to peer. [phone]. Ref # [id number]. Will call this afternoon when provider is in the office. Dr. Sonia Lozano completed Ranell Sin denied. Will trial highest dose of Pravastatin. Patient aware. 2 pt identifiers used      Requested Prescriptions     Signed Prescriptions Disp Refills    pravastatin (PRAVACHOL) 40 mg tablet 30 Tab 3     Sig: Take 1 Tab by mouth nightly.      Authorizing Provider: Paige Marshall     Ordering User: Paty Doing     Per Dr Reuel Gaucher orders

## 2018-02-20 RX ORDER — SACUBITRIL AND VALSARTAN 24; 26 MG/1; MG/1
TABLET, FILM COATED ORAL
Qty: 180 TAB | Refills: 3 | Status: SHIPPED | OUTPATIENT
Start: 2018-02-20 | End: 2018-05-25 | Stop reason: ALTCHOICE

## 2018-02-23 DIAGNOSIS — R00.2 PALPITATIONS: ICD-10-CM

## 2018-02-23 DIAGNOSIS — F51.04 CHRONIC INSOMNIA: ICD-10-CM

## 2018-02-23 DIAGNOSIS — E78.5 DYSLIPIDEMIA: ICD-10-CM

## 2018-02-23 DIAGNOSIS — Z79.899 HIGH RISK MEDICATION USE: Primary | ICD-10-CM

## 2018-02-23 RX ORDER — ZOLPIDEM TARTRATE 10 MG/1
10 TABLET ORAL
Qty: 90 TAB | Refills: 0 | Status: SHIPPED | OUTPATIENT
Start: 2018-02-23 | End: 2018-05-29 | Stop reason: SDUPTHER

## 2018-02-23 RX ORDER — METOPROLOL SUCCINATE 25 MG/1
25 TABLET, EXTENDED RELEASE ORAL DAILY
Qty: 90 TAB | Refills: 3 | Status: SHIPPED | OUTPATIENT
Start: 2018-02-23 | End: 2018-06-04

## 2018-02-23 NOTE — TELEPHONE ENCOUNTER
From: Amirah Rodriguez  To: Maren Arceo MD  Sent: 2/23/2018 11:57 AM EST  Subject: Medication Renewal Request    Original authorizing provider: Maren Arceo MD    Yasmine Arcos would like a refill of the following medications:  zolpidem (AMBIEN) 10 mg tablet Maren Arceo MD]    Preferred pharmacy: [facility]/PHARMACY #[id number] - Akira Thorpe [address]    Comment:  need 90 day prescriptions.  metroprolol needs to be changed to 1 tablet 1x a day    Medication renewals requested in this message routed to other providers:  metoprolol succinate (TOPROL-XL) 25 mg XL tablet Maddy Valdez MD]

## 2018-02-23 NOTE — TELEPHONE ENCOUNTER
Requested Prescriptions     Signed Prescriptions Disp Refills    metoprolol succinate (TOPROL-XL) 25 mg XL tablet 90 Tab 3     Sig: Take 1 Tab by mouth daily.      Authorizing Provider: Sigifredo Shell     Ordering User: Jesus Kruger     Per verbal orders

## 2018-02-23 NOTE — TELEPHONE ENCOUNTER
From: Sen Kline  To: Rekha Thomas MD  Sent: 2/23/2018 11:57 AM EST  Subject: Medication Renewal Request    Original authorizing provider: MD Corey Moy. Florencio Wesley would like a refill of the following medications:  metoprolol succinate (TOPROL-XL) 25 mg XL tablet Rekha Thomas MD]    Preferred pharmacy: [facility]/PHARMACY [facility] Ryan Levine B, [address] [facility]    Comment:  need 90 day prescriptions.  metroprolol needs to be changed to 1 tablet 1x a day    Medication renewals requested in this message routed to other providers:  zolpidem (AMBIEN) 10 mg tablet Sami Stafford MD]

## 2018-03-05 ENCOUNTER — TELEPHONE (OUTPATIENT)
Dept: INTERNAL MEDICINE CLINIC | Age: 68
End: 2018-03-05

## 2018-03-05 NOTE — TELEPHONE ENCOUNTER
Patient was informed that her Ambien is at the office. She would like her RX's sent to the [facility] since that is closer to her. Pharmacy phone number is [phone].

## 2018-03-05 NOTE — TELEPHONE ENCOUNTER
Left message for patient that she needs to  rx for ambien at office, sign controlled substance agreement and leave urine per [place] state law.

## 2018-03-06 ENCOUNTER — APPOINTMENT (OUTPATIENT)
Dept: INTERNAL MEDICINE CLINIC | Age: 68
End: 2018-03-06

## 2018-03-06 DIAGNOSIS — Z79.899 HIGH RISK MEDICATION USE: ICD-10-CM

## 2018-03-10 LAB — DRUGS UR: NORMAL

## 2018-03-13 ENCOUNTER — DOCUMENTATION ONLY (OUTPATIENT)
Dept: INTERNAL MEDICINE CLINIC | Age: 68
End: 2018-03-13

## 2018-05-07 ENCOUNTER — OFFICE VISIT (OUTPATIENT)
Dept: CARDIOLOGY CLINIC | Age: 68
End: 2018-05-07

## 2018-05-07 DIAGNOSIS — Z95.810 PRESENCE OF AUTOMATIC CARDIOVERTER/DEFIBRILLATOR (AICD): Primary | ICD-10-CM

## 2018-05-25 ENCOUNTER — CLINICAL SUPPORT (OUTPATIENT)
Dept: CARDIOLOGY CLINIC | Age: 68
End: 2018-05-25

## 2018-05-25 ENCOUNTER — OFFICE VISIT (OUTPATIENT)
Dept: CARDIOLOGY CLINIC | Age: 68
End: 2018-05-25

## 2018-05-25 VITALS
BODY MASS INDEX: 40.82 KG/M2 | HEART RATE: 76 BPM | WEIGHT: 245 LBS | SYSTOLIC BLOOD PRESSURE: 118 MMHG | HEIGHT: 65 IN | DIASTOLIC BLOOD PRESSURE: 72 MMHG

## 2018-05-25 DIAGNOSIS — E78.5 DYSLIPIDEMIA: ICD-10-CM

## 2018-05-25 DIAGNOSIS — I34.0 MITRAL VALVE INSUFFICIENCY, UNSPECIFIED ETIOLOGY: ICD-10-CM

## 2018-05-25 DIAGNOSIS — I65.23 BILATERAL CAROTID ARTERY STENOSIS: Primary | ICD-10-CM

## 2018-05-25 DIAGNOSIS — I65.21 ATHEROSCLEROSIS OF RIGHT CAROTID ARTERY: ICD-10-CM

## 2018-05-25 DIAGNOSIS — I25.10 CORONARY ARTERY DISEASE INVOLVING NATIVE CORONARY ARTERY OF NATIVE HEART WITHOUT ANGINA PECTORIS: ICD-10-CM

## 2018-05-25 DIAGNOSIS — L65.9 HAIR LOSS: ICD-10-CM

## 2018-05-25 DIAGNOSIS — I50.22 SYSTOLIC CHF, CHRONIC (HCC): ICD-10-CM

## 2018-05-25 DIAGNOSIS — I73.9 PAD (PERIPHERAL ARTERY DISEASE) (HCC): ICD-10-CM

## 2018-05-25 DIAGNOSIS — I34.0 NON-RHEUMATIC MITRAL REGURGITATION: ICD-10-CM

## 2018-05-25 DIAGNOSIS — I50.22 CHRONIC SYSTOLIC CONGESTIVE HEART FAILURE (HCC): Primary | ICD-10-CM

## 2018-05-25 DIAGNOSIS — E55.9 VITAMIN D DEFICIENCY: ICD-10-CM

## 2018-05-25 NOTE — PATIENT INSTRUCTIONS
Please have fasting labs drawn prior to your next visit   Please increase Entresto to 49/51 one tablet twice daily

## 2018-05-25 NOTE — PROCEDURES
[facility]  *** PRELIMINARY REPORT ***    Name: Alex Edwards  MRN: [id number]       Outpatient  : 17 May 1950  HIS Order #: [id number]  [address] [facility] Visit #: [id number]  Date: 25 May 2018    TYPE OF TEST: Cerebrovascular Duplex    REASON FOR TEST  Known carotid stenosis    Right Carotid:-             Proximal               Mid                 Distal  cm/s  Systolic  Diastolic  Systolic  Diastolic  Systolic  Diastolic  CCA:     62.9      14.0                            46.0      14.0  Bulb:  ECA:     93.0      15.0  ICA:     68.0      24.0       86.0      34.0       43.0      14.0  ICA/CCA:  1.5       1.7    ICA Stenosis: <50%    Right Vertebral:-  Finding: Antegrade  Sys:       33.0  Tia:       11.0    Right Subclavian:    Left Carotid:-            Proximal                Mid                 Distal  cm/s  Systolic  Diastolic  Systolic  Diastolic  Systolic  Diastolic  CCA:     71.6      16.0                            64.0      15.0  Bulb:  ECA:     87.0      15.0  ICA:     45.0      16.0       99.0      37.0       69.0      27.0  ICA/CCA:  0.7       1.1    ICA Stenosis: <50%    Left Vertebral:-  Finding: Antegrade  Sys:       48.0  Tia:       19.0    Left Subclavian:    INTERPRETATION/FINDINGS  PROCEDURE:  Evaluation of the extracranial cerebrovascular arteries  with ultrasound (Grayscale imaging, pulsed Doppler color Doppler). Includes the common carotid, internal carotid, external carotid and  vertebral arteries. FINDINGS:  Right side - Moderate diffuse heterogeneous plaquing is present within   the proximal and mid segments of the internal carotid artery. Mild  plaque is present within the external carotid artery. Color flow  exhibits mild filling defects and peak velocities are within normal  limits. Left side - Mild to moderate partially calcified plaque is present  within the proximal internal carotid artery. The external carotid  artery also exhibits mild plaquing.   There are mild filling defects  seen on color flow imaging. Peak velocities are normal.    IMPRESSION:  1)  10-49% stenosis of the right internal carotid artery. 2)  10-49% stenosis of the left internal carotid artery. 3)  Vertebral arteries are patent with antegrade flow. 4)  There was no previous study available for comparison. ADDITIONAL COMMENTS    I have personally reviewed the data relevant to the interpretation of  this study.     TECHNOLOGIST: Montana Cobb RVT, HENRRY, RDCS  Signed: 05/25/2018 01:34 PM

## 2018-05-25 NOTE — MR AVS SNAPSHOT
[address] [facility] [address] [place] 
[phone] Patient: Mohini Jacobs MRN: [id number] RHE:[id number] Visit Information Date & Time Provider Department Dept. Phone Encounter #  
 5/25/2018  3:00 PM Miguel Timmons, [address] [phone] Your Appointments 5/29/2018  9:30 AM  
ESTABLISHED PATIENT with MD Samanta Vazqeuz [facility]) Appt Note: Routine follow up/$0CP KMP 04/18/18  
 [address] [facility] [address] [phone]  
  
   
 [address] [facility] 7/10/2018 11:45 AM  
ESTABLISHED PATIENT with Jody Mosley MD  
[facility] ([address]) Appt Note: 6 wk f/u per [address] [place]  
[address] [facility] [address]  
  
    
 11/15/2018 10:30 AM  
PACEMAKER with Jaydon Alexander [facility] ([facility] SCHEDULING) Appt Note: bshubert perez, annual , Lat, see [address] [place]  
[address] [facility]  
  
    
 11/15/2018 10:40 AM  
ESTABLISHED PATIENT with Carly Pillai MD  
[facility] ([address]) Appt Note: bshubert perez, annual , Lat, see [address] [address]  
[address] [address]  
  
    
  
 8/13/2018  9:15 AM  
REMOTE OFFICE VISIT with Michelle Hall [facility] ([facility] SCHEDULING) Appt Note: hubert hand  
 [address] [facility] [address] [place]  
[address] [address] Upcoming Health Maintenance  Date Due  
 GLAUCOMA SCREENING Q2Y 5/17/2017 BREAST CANCER SCRN MAMMOGRAM 2/1/2018 Influenza Age 5 to Adult 8/1/2018 MEDICARE YEARLY EXAM 10/10/2018 COLONOSCOPY 9/5/2019 DTaP/Tdap/Td series (2 - Td) 10/30/2024 Allergies as of 5/25/2018  Review Complete On: 5/25/2018 By: Cyrus Ochoa LPN Severity Noted Reaction Type Reactions Amoxicillin High 07/27/2010   Side Effect Anaphylaxis Amoxicillin High 06/30/2015    Anaphylaxis Lipitor [Atorvastatin] High 06/30/2015    Other (comments) Severe muscle pain and spasms Zocor [Simvastatin] Medium 06/30/2015    Other (comments) Cramps, muscle spasms Livalo [Pitavastatin]  01/25/2017    Myalgia Pravastatin  08/19/2016    Other (comments) Leg cramps Current Immunizations  Reviewed on 7/11/2016 Name Date Influenza High Dose Vaccine PF 10/9/2017 Influenza Vaccine (Quad) PF 9/21/2016  6:19 PM, 10/6/2015 Influenza Vaccine Split 11/28/2011 11:25 AM  
 Pneumococcal Conjugate (PCV-13) 6/14/2016 Pneumococcal Polysaccharide (PPSV-23) 10/9/2017 Tdap 10/30/2014 ZZZ-RETIRED (DO NOT USE) Pneumococcal Vaccine (Unspecified Type) 8/20/2011 Zoster Vaccine, Live 2/26/2016 Not reviewed this visit You Were Diagnosed With   
  
 Codes Comments Chronic systolic congestive heart failure (HCC)    -  Primary ICD-10-CM: W45.25 ICD-9-CM: 428.22, 428.0 Vitamin D deficiency     ICD-10-CM: E55.9 ICD-9-CM: 268.9 Coronary artery disease involving native coronary artery of native heart without angina pectoris     ICD-10-CM: I25.10 ICD-9-CM: 414.01 Dyslipidemia     ICD-10-CM: E78.5 ICD-9-CM: 272.4 Vitals BP Pulse Height(growth percentile) Weight(growth percentile) BMI OB Status 118/72 (BP 1 Location: Left arm, BP Patient Position: Sitting) 76 5' 5\" (1.651 m) 245 lb (111.1 kg) 40.77 kg/m2 Postmenopausal  
 Smoking Status Former Smoker Vitals History BMI and BSA Data Body Mass Index Body Surface Area 40.77 kg/m 2 2.26 m 2 Preferred Pharmacy Pharmacy Name Phone [facility] #[id number] Deepika Boliv[place] Ana Torres [phone] Your Updated Medication List  
  
   
This list is accurate as of 5/25/18  3:19 PM.  Always use your most recent med list.  
  
  
  
  
 albuterol 90 mcg/actuation inhaler Commonly known as:  PROVENTIL HFA, VENTOLIN HFA, PROAIR HFA Take 2 Puffs by inhalation every four (4) hours as needed for Wheezing or Shortness of Breath. ALPRAZolam 0.5 mg tablet Commonly known as:  Laz Valentino Take 1 Tab by mouth two (2) times daily as needed for Anxiety. Max Daily Amount: 1 mg.  
  
 aspirin delayed-release 81 mg tablet Take  by mouth daily. biotin 10,000 mcg Cap Take  by mouth daily. bumetanide 2 mg tablet Commonly known as:  Norris [place] Take 2 mg by mouth two (2) times a day. CALCIUM PO Take 1 Tab by mouth daily. ergocalciferol 50,000 unit capsule Commonly known as:  VITAMIN D2 Take 1 Cap by mouth daily. FLUoxetine 20 mg tablet Commonly known as:  PROzac TAKE 2 TABLETS EVERY MORNING AND 1 AT BEDTIME FOR ANXIETY WITH DEPRESSION Iron (Ferrous Sulfate) 325 mg (65 mg iron) tablet Generic drug:  ferrous sulfate Take  by mouth daily (before breakfast). magnesium 250 mg Tab Take 1 Tab by mouth daily. metOLazone 2.5 mg tablet Commonly known as:  ZAROXOLYN  
TAKE 1 TABLET BY MOUTH DAILY AS NEEDED FOR UP TO 2 DAYS  
  
 metoprolol succinate 25 mg XL tablet Commonly known as:  TOPROL-XL Take 1 Tab by mouth daily. OTHER Complete multi formula, bariatric advantage  
  
 potassium chloride 20 mEq tablet Commonly known as:  KLOR-CON M20  
TAKE TWO TABLETS BY MOUTH DAILY PROBIOTIC PO Take  by mouth. sacubitril-valsartan 49-51 mg Tab tablet Commonly known as:  ENTRESTO Take 1 Tab by mouth two (2) times a day. vitamin A 8,000 unit capsule Take 8,000 Units by mouth daily. VITAMIN D3 1,000 unit tablet Generic drug:  cholecalciferol Take 10,000 Units by mouth daily. 10,000 units \"dry vitamin d \"  
  
 zolpidem 10 mg tablet Commonly known as:  AMBIEN Take 1 Tab by mouth nightly as needed for Sleep. Max Daily Amount: 10 mg.  
  
  
  
  
Prescriptions Sent to Pharmacy Refills  
 sacubitril-valsartan (ENTRESTO) 49 mg/51 mg tablet 5 Sig: Take 1 Tab by mouth two (2) times a day. Class: Normal  
 Pharmacy[facility], [address] [place] [address] Ph #: [phone] Route: Oral  
  
We Performed the Following LIPID PANEL [67627 CPT(R)] MAGNESIUM [id number] CPT(R)] METABOLIC PANEL, BASIC [77475 CPT(R)] VITAMIN D, 25 HYDROXY [id number] CPT(R)] Patient Instructions Please have fasting labs drawn prior to your next visit Please increase Entresto to 49/51 one tablet twice daily Introducing [facility]! Dear Nenita Morales: Thank you for requesting a [employer] account. Our records indicate that you already have an active [employer] account. You can access your account anytime at [email or web address] Did you know that you can access your hospital and ER discharge instructions at any time in [employer]? You can also review all of your test results from your hospital stay or ER visit. Additional Information If you have questions, please visit the Frequently Asked Questions section of the V[employer] website at [email or web address] Remember, V[employer] is NOT to be used for urgent needs. For medical emergencies, dial 911. Now available from your iPhone and Android! Please provide this summary of care documentation to your next provider. Your primary care clinician is listed as Anna Galvan. If you have any questions after today's visit, please call [phone].

## 2018-05-25 NOTE — LETTER
6/18/2018 11:05 AM 
 
Ms. Ant Holte[address]es[address] [facility] [address] Dear Ant Gordon: Please find your most recent results below. Resulted Orders LIPID PANEL Result Value Ref Range Cholesterol, total 226 (H) 100 - 199 mg/dL Triglyceride 145 0 - 149 mg/dL HDL Cholesterol 65 >39 mg/dL VLDL, calculated 29 5 - 40 mg/dL LDL, calculated 132 (H) 0 - 99 mg/dL Narrative Performed at:  [address]  [id number] : Riki Yao MD, Phone:  [phone] METABOLIC PANEL, BASIC Result Value Ref Range Glucose 87 65 - 99 mg/dL BUN 13 8 - 27 mg/dL Creatinine 0.70 0.57 - 1.00 mg/dL GFR est non-AA 91 >59 mL/min/1.73 GFR est  >59 mL/min/1.73  
 BUN/Creatinine ratio 19 12 - 28 Sodium 142 134 - 144 mmol/L Potassium 4.0 3.5 - 5.2 mmol/L Chloride 102 96 - 106 mmol/L  
 CO2 28 20 - 29 mmol/L Comment: **Please note reference interval change** Calcium 9.0 8.7 - 10.3 mg/dL Narrative Performed at:  [address]  [id number] : Riki Yao MD, Phone:  [phone] MAGNESIUM Result Value Ref Range Magnesium 2.0 1.6 - 2.3 mg/dL Narrative Performed at:  [address]  [id number] : Riki Yao MD, Phone:  [phone] VITAMIN D, 25 HYDROXY Result Value Ref Range VITAMIN D, 25-HYDROXY 24.9 (L) 30.0 - 100.0 ng/mL Comment:  
   Vitamin D deficiency has been defined by the [facility] practice guideline as a 
level of serum 25-OH vitamin D less than 20 ng/mL (1,2). The Endocrine Society went on to further define vitamin D 
insufficiency as a level between 21 and 29 ng/mL (2). 1. IOM ([employer] of [employer]). 2010. Dietary reference 
   intakes for calcium and D. [facility]:  The 
 Tindie. 2. Tay MF, Susana PEDERSON, Vandana ESPINOZA, et al. 
   Evaluation, treatment, and prevention of vitamin D 
   deficiency: an Endocrine Society clinical practice 
   guideline. JCEM. 2011 Jul; 96(7):1911-30. Narrative Performed at:  [address]  [id number] : Mecca Martinez MD, Phone:  [phone] CVD REPORT Result Value Ref Range INTERPRETATION Note Comment:  
   Supplemental report is available. Narrative Performed at:  [address] [place] [address]  [id number] : Valeria Katz MD, Phone:  [phone] RECOMMENDATIONS: 
LDL still elevated on pravastatin 20mg daily (highest dose she can tolerate), we will go ahead and submit application for Praluent.  Vit D level still low - please begin Vitamin D 50,000 units once/week x 8 weeks ( new script sent to your pharmacy) once completed take other the counter Vitamin D 10,000 units daily. Do not take over the counter and prescription Vitamin D. At the same time. Thanks Please call me if you have any questions: [phone] Sincerely, 
 
 
Miguel Timmons, NP

## 2018-05-25 NOTE — PROGRESS NOTES
[facility]  [phone]    HPI: Hailey Bravo, a 79 y.o. who presents for follow up regarding her CAD and CHF. She has been feeling tired the last few weeks, having more dyspnea with exertion  No PND  Insurance refused coverage for repatha unless she tried pravastatin 40mg daily, she tried taking it and had worsening myalgias on it again  She had to stop taking the pravastatin altogether  Had had myalgias on pravastatin 20mg daily as well, now she is not taking pravastatin at all and myaglias improved  She denies any exertional chest pain or palpitations  Not exercising much due to twisted right ankle, getting cortisone shots in both knees   Denies dizziness or syncope  LE edema is doing well, takes metolazone PRN - takes it maybe every other week   Anxiety waxes and wanes  Still c/o hair loss on Toprol XL, expresses interest in transitioning to bystolic to see if hair loss improves   Had echo today - will call with results     Assessment/Plan:  1. Chronic systolic heart failure - LVEF 35%-40% by TTE 4/17, TTE today - results pending, s/p AICD placement with Dr. Damian Marte and subsequent lead revisions and repeat procedures due to non-healing midsternal incision  -last revision for AICD in November 2016 with Dr. Damian Marte, last ICD check in 11/17 without arrhythmias   -due to increased fatigue and RODNEY will increase Entresto to 49/51 one tablet BID, for hair loss will stop Toprol XL and begin Bystolic 5mg daily    -off spironolactone due to hypotension, continue bumex BID, using Metolazone PRN  -will follow up with Dr. Chacho Osman in 6 weeks  2.  CAD - hx of MI x 2 and multiple stents, she believes she may have 6 or 7 stents, last cardiac cath without progression of CAD and stress test in 4/17 showed possible anterior ischemia vs artifact but asymptomatic currently so will just continue to watch, continue ASA and beta blocker, see lipid plan below  -reports having an MI in 11/2011 and received PCI to RCA at that time, previous MI in 2006 or 2007  3. Mitral regurgitation - mild by TTE 4/17, TTE today - results pending   4. Asthma - x 15 - 16 years, has not seen pulmonologist in years  11. HTN - continue current regimen  6. Dyslipidemia - statin failures, simvastatin caused muscle spasms and cramps, atorvastatin caused pain shooting all over her body, pravastatin 40mg and 20mg caused myalgias and leg cramps, had myalgias on Livalo, could not tolerate zetia either   -Repatha application requested she try pravastatin 40mg daily again and she had myalgias again on this dose  -not taking any statin currently, advised her to restart pravastatin 20mg [place] again and have fasting lipids checked prior to her visit in 6 weeks  -will likely submit application for Praluent at that time, needs lipid lowering for CAD and PAD and has had several statin failures  7. DM Type 2 - resolved with gastric bypass  8. Hypokalemia -off spironolactone and on KCL 40meq daily, will check BMP prior to her next visit    9. Hypomagnesemia - on OTC, will check Mg level prior to her next visit     10. Morbid obesity - Body mass index is 40.77 kg/(m^2). ). weighed 416 lbs at her heaviest weight, s/p gastric bypass in 2007 with revision a few years later, now down to 245 lbs, encouraged exercising   11. PUD - had EGD and cauterized bleeding ulcer, not on PPI anymore  12. Vitamin D deficiency - on 2000 units daily, will check Vitamin D prior to next appointment    15. Anxiety - on multiple agents per PCP   14. PAD/Carotid stenosis - 10-49% bilateral ICA stenosis, continue ASA, see plan above regarding statin       Echo 5/18 (awaiting results)  Carotid duplex 5/18 - 10-49% bilateral ICA stenosis  Echo: 4/17, EF 35-40%, inf HK gr1dd  Nuclear: 4/17, EF 36%, anterior ischemia, lateral infarct.   TTE 9/16 - LVEF 35%, moderate hypokinesis of the basal-mid inferolateral wall(s), grd 1 dd, dilated LA, mod MR, mild TR  8/26/16 - RV lead revision by  Carrion  16 - single chamber AICD placed by Dr. Armida Bazan ([address] VR, serial # [id number], model # [id number]. The ventricular lead: model # [id number] , serial # [id number])  MUGA  - LVEF 27%  Holter  - no arrhythmias  Echo  - LV mildly dilated, LVEF 40%, moderate diffuse hypokinesis with regional variations, severe hypokinesis of the basal-mid inferior wall(s), grd 1 dd, mod dilated LA, atrial septum bows from left to right, consistent with increased left atrial pressure, mildly dilated RA, mild to near moderate MR    OLIVER  - normal  Nuc 5/15 EF 31% large anterolateral infarct with periinfarct reversibility, 13% LV reversible(32% infarct at rest, 45% at stress)    Echo 2015 - LVEF 30-35%, grd 1 dd, mod LAE, mild to mod MR  Nuc Stress  - small reversibility in anteroapical wall, LVEF 31%  Cardiac Cath 3/13 - patent stents in LAD, LCx and RCA, LVEF 30%, severe inferior HK, LCx relatively small vessel with patent stent in proximal LCx, RCA is large and dominant with patent stents in proximal and mid RCA, distal RCA free of disease, LAD normal and large vessel which coursed around apex  Echo 2011 - LVEF 30%, mild MR  Cardiac Cath 2011 - s/p PCI with AMRIT to 100% mid RCA, also had 40% mid LAD lesion, 50% diagonal 1 lesion, 100% distal LCx lesion, 60% OM1 lesion, 100% proximal Ramus lesion      Soc Hx: quit tobacco use x 13 years ago, used to smoke 1ppd, no etoh use, no drug use, lives with , son, daughter in law, grandson, retired/on disability  Fam Hx: father in his 62s when he had a MI, had 3 vessel CABG in his 62s, passed from fall but had cancer too, mother lived to age 80 and  from Alzheimer's, brother had MI in his 62s, sister had aortic repair for aneurysm in her 76s    She  has a past medical history of Asthma; Cardiomyopathy ([facility] [address] Coronary artery disease (); Depression with anxiety; DVT (deep venous thrombosis) ([facility] [address] (2010);  Family history of early CAD; GERD (gastroesophageal reflux disease); H/O gastric bypass (2006); Hepatitis C antibody test positive; HTN (hypertension) (7/27/2010); Hyperlipidemia (07/27/2010); Joint pain (7/27/2010); MI (myocardial infarction) ([facility] [facility] (7/27/2010); Mitral valve regurgitation; Morbid obesity ([facility] (7/27/2010); Myocardial infarction [facility]) (2006 and 2011); PUD (peptic ulcer disease); and Urinary incontinence, stress (7/27/2010). She also has no past medical history of Diabetes ([facility] Cardiovascular ROS: positive for dyspnea on exertion   Respiratory ROS: no cough, wheezing  Neurological ROS: no TIA or stroke symptoms  All other systems negative except as above. PE  Vitals:    05/25/18 1432   BP: 118/72   Pulse: 76   Weight: 245 lb (111.1 kg)   Height: 5' 5\" (1.651 m)    Body mass index is 40.77 kg/(m^2).   General appearance - alert, well appearing, and in no distress  Mental status - affect appropriate to mood  Eyes - sclera anicteric, moist mucous membranes  Neck - supple  Lymphatics - not assessed  Chest - clear to auscultation, no wheezes, rales or rhonchi  Heart - normal rate, regular rhythm, normal S1, S2, 2/6 MYKE   Abdomen - soft, nontender, nondistended, obese  Back exam - full range of motion, no tenderness  Neurological - cranial nerves II through XII grossly intact, no focal deficit  Musculoskeletal - no muscular tenderness noted, normal strength  Extremities - diminished peripheral pulses, no LE edema  Skin - normal coloration  no rashes    Recent Labs:  Lab Results   Component Value Date/Time    Cholesterol, total 235 (H) 12/22/2017 11:29 AM    HDL Cholesterol 71 12/22/2017 11:29 AM    LDL, calculated 140 (H) 12/22/2017 11:29 AM    Triglyceride 118 12/22/2017 11:29 AM     Lab Results   Component Value Date/Time    Creatinine 0.90 01/23/2018 12:11 PM     Lab Results   Component Value Date/Time    BUN 14 01/23/2018 12:11 PM    BUN (POC) 24 (H) 11/26/2011 09:50 PM     Lab Results   Component Value Date/Time    Potassium 3.7 01/23/2018 12:11 PM     Lab Results   Component Value Date/Time    Hemoglobin A1c 5.8 (H) 02/09/2016 11:44 AM    Hemoglobin A1c, External 5.5 12/12/2014     Lab Results   Component Value Date/Time    HGB 14.7 02/23/2017 07:26 AM     Lab Results   Component Value Date/Time    PLATELET 980 60/00/4969 07:26 AM       Reviewed:  Past Medical History:   Diagnosis Date    Asthma     Cardiomyopathy ([facility] [facility]     Coronary artery disease 2008    s/p RCA stent (AMRIT) on 11/26/11    Depression with anxiety     2011    DVT (deep venous thrombosis) ([facility] [address] [facility] 7/27/2010    Family history of early CAD     GERD (gastroesophageal reflux disease)     H/O gastric bypass 2006    Revision in 2009    Hepatitis C antibody test positive     Does not have chronic hep C (labs 10/6/15: neg HCV RNA)     HTN (hypertension) 7/27/2010    Hyperlipidemia 07/27/2010    Joint pain 7/27/2010    MI (myocardial infarction) ([facility] [address] [facility] 7/27/2010    Mitral valve regurgitation     Mild to moderate    Morbid obesity ([facility] [address] 7/27/2010    Myocardial infarction ([facility] [address] 2006 and 2011    Dr. Gerry SMITH (peptic ulcer disease)     gi bleed 2008; ulcer n gastric bypass pouch    Urinary incontinence, stress 7/27/2010     History   Smoking Status    Former Smoker    Start date: 1/1/1970    Quit date: 5/17/2004   Smokeless Tobacco    Never Used     History   Alcohol Use No     Allergies   Allergen Reactions    Amoxicillin Anaphylaxis    Amoxicillin Anaphylaxis    Lipitor [Atorvastatin] Other (comments)     Severe muscle pain and spasms    Zocor [Simvastatin] Other (comments)     Cramps, muscle spasms    Livalo [Pitavastatin] Myalgia    Pravastatin Other (comments)     Leg cramps       Current Outpatient Prescriptions   Medication Sig    sacubitril-valsartan (ENTRESTO) 49 mg/51 mg tablet Take 1 Tab by mouth two (2) times a day.  metoprolol succinate (TOPROL-XL) 25 mg XL tablet Take 1 Tab by mouth daily.     metOLazone (ZAROXOLYN) 2.5 mg tablet TAKE 1 TABLET BY MOUTH DAILY AS NEEDED FOR UP TO 2 DAYS    bumetanide (BUMEX) 2 mg tablet Take 2 mg by mouth two (2) times a day.  ALPRAZolam (XANAX) 0.5 mg tablet Take 1 Tab by mouth two (2) times daily as needed for Anxiety. Max Daily Amount: 1 mg.  FLUoxetine (PROZAC) 20 mg tablet TAKE 2 TABLETS EVERY MORNING AND 1 AT BEDTIME FOR ANXIETY WITH DEPRESSION    potassium chloride (KLOR-CON M20) 20 mEq tablet TAKE TWO TABLETS BY MOUTH DAILY    LACTOBACILLUS ACIDOPHILUS (PROBIOTIC PO) Take  by mouth.  albuterol (PROVENTIL HFA, VENTOLIN HFA, PROAIR HFA) 90 mcg/actuation inhaler Take 2 Puffs by inhalation every four (4) hours as needed for Wheezing or Shortness of Breath.  aspirin delayed-release 81 mg tablet Take  by mouth daily.  vitamin A 8,000 unit capsule Take 8,000 Units by mouth daily.  biotin 10,000 mcg cap Take  by mouth daily.  OTHER Complete multi formula, bariatric advantage    magnesium 250 mg tab Take 1 Tab by mouth daily.  ferrous sulfate (IRON, FERROUS SULFATE,) 325 mg (65 mg Iron) tablet Take  by mouth daily (before breakfast).  cholecalciferol, vitamin d3, (VITAMIN D) 1,000 unit tablet Take 10,000 Units by mouth daily. 10,000 units \"dry vitamin d \"    CALCIUM PO Take 1 Tab by mouth daily.  zolpidem (AMBIEN) 10 mg tablet Take 1 Tab by mouth nightly as needed for Sleep. Max Daily Amount: 10 mg.    busPIRone (BUSPAR) 5 mg tablet Take 1 Tab by mouth two (2) times a day.  methylPREDNISolone (MEDROL DOSEPACK) 4 mg tablet Use following package instructions. For right ankle pain. No current facility-administered medications for this visit.         Lona River NP  [facility] [address], [address] [facility] [address]  [place], [facility]  [phone]

## 2018-05-29 ENCOUNTER — DOCUMENTATION ONLY (OUTPATIENT)
Dept: CARDIOLOGY CLINIC | Age: 68
End: 2018-05-29

## 2018-05-29 ENCOUNTER — OFFICE VISIT (OUTPATIENT)
Dept: INTERNAL MEDICINE CLINIC | Facility: CLINIC | Age: 68
End: 2018-05-29

## 2018-05-29 VITALS
HEART RATE: 73 BPM | RESPIRATION RATE: 18 BRPM | WEIGHT: 245 LBS | TEMPERATURE: 98.4 F | SYSTOLIC BLOOD PRESSURE: 129 MMHG | HEIGHT: 65 IN | DIASTOLIC BLOOD PRESSURE: 81 MMHG | OXYGEN SATURATION: 94 % | BODY MASS INDEX: 40.82 KG/M2

## 2018-05-29 DIAGNOSIS — Z98.84 HISTORY OF GASTRIC BYPASS: ICD-10-CM

## 2018-05-29 DIAGNOSIS — I25.10 CORONARY ARTERY DISEASE INVOLVING NATIVE CORONARY ARTERY OF NATIVE HEART WITHOUT ANGINA PECTORIS: ICD-10-CM

## 2018-05-29 DIAGNOSIS — I42.9 SECONDARY CARDIOMYOPATHY (HCC): ICD-10-CM

## 2018-05-29 DIAGNOSIS — I50.22 SYSTOLIC CHF, CHRONIC (HCC): ICD-10-CM

## 2018-05-29 DIAGNOSIS — F51.04 CHRONIC INSOMNIA: ICD-10-CM

## 2018-05-29 DIAGNOSIS — I10 ESSENTIAL HYPERTENSION: Primary | ICD-10-CM

## 2018-05-29 DIAGNOSIS — M25.571 ACUTE RIGHT ANKLE PAIN: ICD-10-CM

## 2018-05-29 DIAGNOSIS — K52.9 CHRONIC DIARRHEA: ICD-10-CM

## 2018-05-29 DIAGNOSIS — E78.5 HYPERLIPIDEMIA, UNSPECIFIED HYPERLIPIDEMIA TYPE: ICD-10-CM

## 2018-05-29 DIAGNOSIS — F41.8 DEPRESSION WITH ANXIETY: ICD-10-CM

## 2018-05-29 DIAGNOSIS — I42.9 CARDIOMYOPATHY, UNSPECIFIED TYPE (HCC): ICD-10-CM

## 2018-05-29 DIAGNOSIS — E66.01 MORBID OBESITY WITH BMI OF 40.0-44.9, ADULT (HCC): ICD-10-CM

## 2018-05-29 RX ORDER — ZOLPIDEM TARTRATE 10 MG/1
10 TABLET ORAL
Qty: 90 TAB | Refills: 0 | Status: SHIPPED | OUTPATIENT
Start: 2018-05-29 | End: 2018-08-29 | Stop reason: SDUPTHER

## 2018-05-29 RX ORDER — BUSPIRONE HYDROCHLORIDE 5 MG/1
5 TABLET ORAL 2 TIMES DAILY
Qty: 60 TAB | Refills: 3 | Status: SHIPPED | OUTPATIENT
Start: 2018-05-29 | End: 2018-07-10

## 2018-05-29 RX ORDER — METHYLPREDNISOLONE 4 MG/1
TABLET ORAL
Qty: 1 DOSE PACK | Refills: 0 | Status: SHIPPED | OUTPATIENT
Start: 2018-05-29 | End: 2018-06-28

## 2018-05-29 NOTE — PROGRESS NOTES
Neva Muñoz  Identified pt with two pt identifiers(name and ). Chief Complaint   Patient presents with    Depression     feeling down some    Hypertension    Heart Problem    Cholesterol Problem    Ankle Pain     right        1. Have you been to the ER, urgent care clinic since your last visit? Hospitalized since your last visit? NO    2. Have you seen or consulted any other health care providers outside of the [address] [place] [address] since your last visit? Include any pap smears or colon screening. Dr Tyson Lorenzo   Has information to get mammogram and eye exam.  Has appt with Dr Julieta gomez 18    Today's provider has been notified of reason for visit, vitals and flowsheets obtained on patients.      Patient received paperwork for advance directive during previous visit but has not completed at this time     Reviewed record In preparation for visit, huddled with provider and have obtained necessary documentation      Health Maintenance Due   Topic    GLAUCOMA SCREENING Q2Y     BREAST CANCER SCRN MAMMOGRAM        Wt Readings from Last 3 Encounters:   18 245 lb (111.1 kg)   18 245 lb (111.1 kg)   18 247 lb 11.2 oz (112.4 kg)     Temp Readings from Last 3 Encounters:   18 98.4 °F (36.9 °C) (Oral)   17 98.4 °F (36.9 °C) (Oral)   10/09/17 98.3 °F (36.8 °C) (Oral)     BP Readings from Last 3 Encounters:   18 129/81   18 118/72   18 130/80     Pulse Readings from Last 3 Encounters:   18 73   18 76   18 66     Vitals:    18 0933   BP: 129/81   Pulse: 73   Resp: 18   Temp: 98.4 °F (36.9 °C)   TempSrc: Oral   SpO2: 94%   Weight: 245 lb (111.1 kg)   Height: 5' 5\" (1.651 m)   PainSc:   7         Learning Assessment:  :     Learning Assessment 10/26/2017 2015   PRIMARY LEARNER Patient Patient   HIGHEST LEVEL OF EDUCATION - PRIMARY LEARNER  - 2 [address] PRIMARY LEARNER - [address] CAREGIVER - No   PRIMARY LANGUAGE ENGLISH ENGLISH   LEARNER PREFERENCE PRIMARY READING DEMONSTRATION   ANSWERED BY patient pateint   RELATIONSHIP SELF SELF       Depression Screening:  :     PHQ over the last two weeks 5/29/2018   PHQ Not Done -   Little interest or pleasure in doing things Not at all   Feeling down, depressed or hopeless Several days   Total Score PHQ 2 1       Fall Risk Assessment:  :     Fall Risk Assessment, last 12 mths 5/29/2018   Able to walk? Yes   Fall in past 12 months? No   Fall with injury? -   Number of falls in past 12 months -   Fall Risk Score -       Abuse Screening:  :     Abuse Screening Questionnaire 5/29/2018 7/26/2016 6/30/2015   Do you ever feel afraid of your partner? N N N   Are you in a relationship with someone who physically or mentally threatens you? N N N   Is it safe for you to go home? Y Y Y       ADL Screening:  :     ADL Assessment 5/29/2018   Feeding yourself No Help Needed   Getting from bed to chair No Help Needed   Getting dressed No Help Needed   Bathing or showering No Help Needed   Walk across the room (includes cane/walker) No Help Needed   Using the telphone No Help Needed   Taking your medications No Help Needed   Preparing meals No Help Needed   Managing money (expenses/bills) No Help Needed   Moderately strenuous housework (laundry) No Help Needed   Shopping for personal items (toiletries/medicines) No Help Needed   Shopping for groceries No Help Needed   Driving No Help Needed   Climbing a flight of stairs No Help Needed   Getting to places beyond walking distances No Help Needed                 Medication reconciliation up to date and corrected with patient at this time.

## 2018-05-29 NOTE — MR AVS SNAPSHOT
[address] [address] [phone] Patient: Huseyin Whitfield MRN: [id number] THX:3/31/[birth year]51 Visit Information Date & Time Provider Department Dept. Phone Encounter #  
 5/29/2018  9:30 AM Tammy Shay MD [facility] Internal Medicine of [address] [facility] [id number] Follow-up Instructions Return in about 2 months (around 7/29/2018), or if symptoms worsen or fail to improve, for Depression/anxiety. Your Appointments 7/10/2018 11:45 AM  
ESTABLISHED PATIENT with Lakeshia Danielson MD  
[facility] Appt Note: 6 wk f/u per [address] [place] [address]  
[address] [facility] [address]  
  
    
 11/15/2018 10:30 AM  
PACEMAKER with Javon Stacy [facility] ([facility]) Appt Note: hubert hand, annual , Lat, see [address] [facility] [address] [place] [address]  
[address] [facility]  
  
    
 11/15/2018 10:40 AM  
ESTABLISHED PATIENT with Felice Bowie MD  
[facility] ([facility]) Appt Note: hubert hand, annual , Lat, see [address] [facility] [address] [address]  
[address] [facility] [address]  
  
    
  
 8/13/2018  9:15 AM  
REMOTE OFFICE VISIT with Cirilo Adkins [facility] ([facility]) Appt Note: hubert hand  
 [address] [facility] [address] [place] [address]  
[address] [facility] [address] [address] Upcoming Health Maintenance Date Due  
 GLAUCOMA SCREENING Q2Y 5/17/2017 BREAST CANCER SCRN MAMMOGRAM 2/1/2018 Influenza Age 5 to Adult 8/1/2018 MEDICARE YEARLY EXAM 10/10/2018 COLONOSCOPY 9/5/2019 DTaP/Tdap/Td series (2 - Td) 10/30/2024 Allergies as of 5/29/2018  Review Complete On: 5/29/2018 By: Mei Madrid MD  
  
 Severity Noted Reaction Type Reactions Amoxicillin High 07/27/2010   Side Effect Anaphylaxis Amoxicillin High 06/30/2015    Anaphylaxis Lipitor [Atorvastatin] High 06/30/2015    Other (comments) Severe muscle pain and spasms Zocor [Simvastatin] Medium 06/30/2015    Other (comments) Cramps, muscle spasms Livalo [Pitavastatin]  01/25/2017    Myalgia Pravastatin  08/19/2016    Other (comments) Leg cramps Current Immunizations  Reviewed on 7/11/2016 Name Date Influenza High Dose Vaccine PF 10/9/2017 Influenza Vaccine (Quad) PF 9/21/2016  6:19 PM, 10/6/2015 Influenza Vaccine Split 11/28/2011 11:25 AM  
 Pneumococcal Conjugate (PCV-13) 6/14/2016 Pneumococcal Polysaccharide (PPSV-23) 10/9/2017 Tdap 10/30/2014 ZZZ-RETIRED (DO NOT USE) Pneumococcal Vaccine (Unspecified Type) 8/20/2011 Zoster Vaccine, Live 2/26/2016 Not reviewed this visit You Were Diagnosed With   
  
 Codes Comments Essential hypertension    -  Primary ICD-10-CM: I10 
ICD-9-CM: 401.9 Chronic diarrhea     ICD-10-CM: K52.9 ICD-9-CM: 787.91 Acute right ankle pain     ICD-10-CM: M25.571 ICD-9-CM: 719.47, 338.19 Chronic insomnia     ICD-10-CM: F51.04 
ICD-9-CM: 780.52 Depression with anxiety     ICD-10-CM: F41.8 ICD-9-CM: 300.4 Coronary artery disease involving native coronary artery of native heart without angina pectoris     ICD-10-CM: I25.10 ICD-9-CM: 414.01 Hyperlipidemia, unspecified hyperlipidemia type     ICD-10-CM: E78.5 ICD-9-CM: 272.4 History of gastric bypass     ICD-10-CM: Z98.84 ICD-9-CM: V45.86 Systolic CHF, chronic (HCC)     ICD-10-CM: I50.22 ICD-9-CM: 428.22, 428.0 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[facility] [address]     ICD-10-CM: I42.9 ICD-9-CM: 425.9 Cardiomyopathy, unspecified type ([facility]     ICD-10-CM: I42.9 ICD-9-CM: 425.4 Morbid obesity with BMI of 40.0-44.9, adult (HCC)     ICD-10-CM: E66.01, Z68.41 
ICD-9-CM: 278.01, V85.41 Vitals BP Pulse Temp Resp Height(growth percentile) Weight(growth percentile) 129/81 (BP 1 Location: Left arm, BP Patient Position: Sitting) 73 98.4 °F (36.9 °C) (Oral) 18 5' 5\" (1.651 m) 245 lb (111.1 kg) SpO2 BMI OB Status Smoking Status 94% 40.77 kg/m2 Postmenopausal Former Smoker Vitals History BMI and BSA Data Body Mass Index Body Surface Area 40.77 kg/m 2 2.26 m 2 Preferred Pharmacy Pharmacy Name Phone [id number] Kayleigh Campbell, [address] [facility] [phone] Your Updated Medication List  
  
   
This list is accurate as of 5/29/18 10:25 AM.  Always use your most recent med list.  
  
  
  
  
 albuterol 90 mcg/actuation inhaler Commonly known as:  PROVENTIL HFA, VENTOLIN HFA, PROAIR HFA Take 2 Puffs by inhalation every four (4) hours as needed for Wheezing or Shortness of Breath. ALPRAZolam 0.5 mg tablet Commonly known as:  Yamileth Holly Take 1 Tab by mouth two (2) times daily as needed for Anxiety. Max Daily Amount: 1 mg.  
  
 aspirin delayed-release 81 mg tablet Take  by mouth daily. biotin 10,000 mcg Cap Take  by mouth daily. bumetanide 2 mg tablet Commonly known as:  [place] Take 2 mg by mouth two (2) times a day. busPIRone 5 mg tablet Commonly known as:  BUSPAR Take 1 Tab by mouth two (2) times a day. CALCIUM PO Take 1 Tab by mouth daily. FLUoxetine 20 mg tablet Commonly known as:  PROzac TAKE 2 TABLETS EVERY MORNING AND 1 AT BEDTIME FOR ANXIETY WITH DEPRESSION Iron (Ferrous Sulfate) 325 mg (65 mg iron) tablet Generic drug:  ferrous sulfate Take  by mouth daily (before breakfast). magnesium 250 mg Tab Take 1 Tab by mouth daily. methylPREDNISolone 4 mg tablet Commonly known as:  Parul Rye  
 Use following package instructions. For right ankle pain.  
  
 metOLazone 2.5 mg tablet Commonly known as:  ZAROXOLYN  
TAKE 1 TABLET BY MOUTH DAILY AS NEEDED FOR UP TO 2 DAYS  
  
 metoprolol succinate 25 mg XL tablet Commonly known as:  TOPROL-XL Take 1 Tab by mouth daily. OTHER Complete multi formula, bariatric advantage  
  
 potassium chloride 20 mEq tablet Commonly known as:  KLOR-CON M20  
TAKE TWO TABLETS BY MOUTH DAILY PROBIOTIC PO Take  by mouth. sacubitril-valsartan 49-51 mg Tab tablet Commonly known as:  ENTRESTO Take 1 Tab by mouth two (2) times a day. vitamin A 8,000 unit capsule Take 8,000 Units by mouth daily. VITAMIN D3 1,000 unit tablet Generic drug:  cholecalciferol Take 10,000 Units by mouth daily. 10,000 units \"dry vitamin d \"  
  
 zolpidem 10 mg tablet Commonly known as:  AMBIEN Take 1 Tab by mouth nightly as needed for Sleep. Max Daily Amount: 10 mg.  
  
  
  
  
Prescriptions Printed Refills  
 zolpidem (AMBIEN) 10 mg tablet 0 Sig: Take 1 Tab by mouth nightly as needed for Sleep. Max Daily Amount: 10 mg.  
 Class: Print Route: Oral  
  
Prescriptions Sent to Pharmacy Refills  
 busPIRone (BUSPAR) 5 mg tablet 3 Sig: Take 1 Tab by mouth two (2) times a day. Class: Normal  
 Pharmacy: [facility]pharmacy [address] [place] [address] Ph #: [phone] Route: Oral  
 methylPREDNISolone (MEDROL DOSEPACK) 4 mg tablet 0 Sig: Use following package instructions. For right ankle pain. Class: Normal  
 Pharmacy: [facility]/pharmacy [address] [place] [address] Ph #: [phone] We Performed the Following REFERRAL TO GASTROENTEROLOGY [BYC38 Custom] Comments:  
 Evaluation and management of diarrhea, abdominal pain, and increased bowel urgency in patient with history of gastric bypass. Follow-up Instructions Return in about 2 months (around 7/29/2018), or if symptoms worsen or fail to improve, for Depression/anxiety. To-Do List   
 05/29/2018 Imaging:  XR ANKLE RT MIN 3 V Referral Information Referral ID Referred By Referred To  
  
 [id number] [facility] [address] [place], [address] Visits Status Start Date End Date 1 New Request 5/29/18 5/29/19 If your referral has a status of pending review or denied, additional information will be sent to support the outcome of this decision. Patient Instructions For a therapist and/or psychiatrist, call: 
 
3000 [facility] at [address], [address] [place], [address] 
[phone] [address] [facility] at [address] [facility] [address] [place], [address]  
[phone] [address] at [address] 43 [facility] [address]  
[phone] 1010 [facility], [address] 
[phone] [address] [place], 5352 [facility] 
[phone] [address] 
[phone] [facility] [address], [address] [place], [address] 
[phone] [facility] 1224 [facility], [address] 
[phone] [address] [place], [address] [facility] [address]  
[phone] [facility] ([employer] Lisa Duckworth. [facility] [address] [place] [address] [address], [facility] [address] 
[phone] Foot Pain: Care Instructions Your Care Instructions Foot injuries that cause pain and swelling are fairly common.  Almost all sports or home repair projects can cause a misstep that ends up as foot pain. Normal wear and tear, especially as you get older, also can cause foot pain. Most minor foot injuries will heal on their own, and home treatment is usually all you need to do. If you have a severe injury, you may need tests and treatment. Follow-up care is a key part of your treatment and safety. Be sure to make and go to all appointments, and call your doctor if you are having problems. It's also a good idea to know your test results and keep a list of the medicines you take. How can you care for yourself at home? · Take pain medicines exactly as directed. ¨ If the doctor gave you a prescription medicine for pain, take it as prescribed. ¨ If you are not taking a prescription pain medicine, ask your doctor if you can take an over-the-counter medicine. · Rest and protect your foot. Take a break from any activity that may cause pain. · Put ice or a cold pack on your foot for 10 to 20 minutes at a time. Put a thin cloth between the ice and your skin. · Prop up the sore foot on a pillow when you ice it or anytime you sit or lie down during the next 3 days. Try to keep it above the level of your heart. This will help reduce swelling. · Your doctor may recommend that you wrap your foot with an elastic bandage. Keep your foot wrapped for as long as your doctor advises. · If your doctor recommends crutches, use them as directed. · Wear roomy footwear. · As soon as pain and swelling end, begin gentle exercises of your foot. Your doctor can tell you which exercises will help. When should you call for help? Call 911 anytime you think you may need emergency care. For example, call if: 
? · Your foot turns pale, white, blue, or cold. ?Call your doctor now or seek immediate medical care if: 
? · You cannot move or stand on your foot. ? · Your foot looks twisted or out of its normal position. ? · Your foot is not stable when you step down. ? · You have signs of infection, such as: 
¨ Increased pain, swelling, warmth, or redness. ¨ Red streaks leading from the sore area. ¨ Pus draining from a place on your foot. ¨ A fever. ? · Your foot is numb or tingly. ? Watch closely for changes in your health, and be sure to contact your doctor if: 
? · You do not get better as expected. ? · You have bruises from an injury that last longer than 2 weeks. Where can you learn more? Go to http://jose-rm.info/. Enter O660 in the search box to learn more about \"Foot Pain: Care Instructions. \" Current as of: March 21, 2017 Content Version: 11.4 © 7252-2059 [employer]. Care instructions adapted under license by [employer] (which disclaims liability or warranty for this information). If you have questions about a medical condition or this instruction, always ask your healthcare professional. [address] [address] any warranty or liability for your use of this information. Introducing [facility]! Dear Jennifer Boyd: Thank you for requesting a Mayvenn account. Our records indicate that you already have an active Mayvenn account. You can access your account anytime at [email or web address] Did you know that you can access your hospital and ER discharge instructions at any time in Mayvenn? You can also review all of your test results from your hospital stay or ER visit. Additional Information If you have questions, please visit the Frequently Asked Questions section of the Mayvenn website at [email or web address] Remember, Mayvenn is NOT to be used for urgent needs. For medical emergencies, dial 911. Now available from your iPhone and Android! Please provide this summary of care documentation to your next provider. Your primary care clinician is listed as Michael King. If you have any questions after today's visit, please call [phone].

## 2018-05-29 NOTE — PROGRESS NOTES
Please call patient - spoke with Dr. Gregor Keenan after her visit on Friday who recommended we stop Toprol XL for hair loss and begin Bystolic 5mg daily  She also recommended that she start back on pravastatin 20mg daily and we will check lipids in 6 weeks as planned and if LDL is not at goal will apply for Praluent  She is also waiting for her echo results from Dr. Gregor Keenan - echo done friday

## 2018-05-29 NOTE — PATIENT INSTRUCTIONS
For a therapist and/or psychiatrist, call:    [address] [facility] [address] at [address], [address]   [place], [address]  [address] at [address], [address]   [address] at [address] [facility], [address]   [address]  [place], [address]  440 [facility], [address]  [place], 5352 [facility]  1900 [facility] [place]  [address], 2305 [facility], [address]  [phone]    [facility]  [address]  [place], [address]  [address] Psychiatry  1224 [facility], [address]  [address] [facility] [address] [address] [facility]   [phone]    [facility] 73 ([employer])- [address] [facility]. [address] [address]  [phone]           Foot Pain: Care Instructions  Your Care Instructions  Foot injuries that cause pain and swelling are fairly common. Almost all sports or home repair projects can cause a misstep that ends up as foot pain. Normal wear and tear, especially as you get older, also can cause foot pain. Most minor foot injuries will heal on their own, and home treatment is usually all you need to do. If you have a severe injury, you may need tests and treatment. Follow-up care is a key part of your treatment and safety. Be sure to make and go to all appointments, and call your doctor if you are having problems. It's also a good idea to know your test results and keep a list of the medicines you take. How can you care for yourself at home? · Take pain medicines exactly as directed.   ¨ If the doctor gave you a prescription medicine for pain, take it as prescribed. ¨ If you are not taking a prescription pain medicine, ask your doctor if you can take an over-the-counter medicine. · Rest and protect your foot. Take a break from any activity that may cause pain. · Put ice or a cold pack on your foot for 10 to 20 minutes at a time. Put a thin cloth between the ice and your skin. · Prop up the sore foot on a pillow when you ice it or anytime you sit or lie down during the next 3 days. Try to keep it above the level of your heart. This will help reduce swelling. · Your doctor may recommend that you wrap your foot with an elastic bandage. Keep your foot wrapped for as long as your doctor advises. · If your doctor recommends crutches, use them as directed. · Wear roomy footwear. · As soon as pain and swelling end, begin gentle exercises of your foot. Your doctor can tell you which exercises will help. When should you call for help? Call 911 anytime you think you may need emergency care. For example, call if:  ? · Your foot turns pale, white, blue, or cold. ?Call your doctor now or seek immediate medical care if:  ? · You cannot move or stand on your foot. ? · Your foot looks twisted or out of its normal position. ? · Your foot is not stable when you step down. ? · You have signs of infection, such as:  ¨ Increased pain, swelling, warmth, or redness. ¨ Red streaks leading from the sore area. ¨ Pus draining from a place on your foot. ¨ A fever. ? · Your foot is numb or tingly. ? Watch closely for changes in your health, and be sure to contact your doctor if:  ? · You do not get better as expected. ? · You have bruises from an injury that last longer than 2 weeks. Where can you learn more? Go to http://jose-rm.info/. Enter P821 in the search box to learn more about \"Foot Pain: Care Instructions. \"  Current as of: March 21, 2017  Content Version: 11.4  © 3748-7021 [employer]wise, [employer].  Care instructions adapted under license by [employer] (which disclaims liability or warranty for this information). If you have questions about a medical condition or this instruction, always ask your healthcare professional. [address] any warranty or liability for your use of this information.

## 2018-05-29 NOTE — PROGRESS NOTES
CC:   Chief Complaint   Patient presents with    Depression     feeling down some    Other     bowel issues    Heart Problem    Ankle Pain     right        HISTORY OF PRESENT ILLNESS  Michelle Bethea is a 79 y.o. female. Presents for 6 month follow up evaluation. She has HTN, CAD s/p 2 MI's in 2006 and 2011, valvular heart disease, CHF (EF 35% in 9/16), ICD in place with hx lead revisions, depression with anxiety, obesity s/p gastric bypass in 2006. Today she complains of:    1) right ankle pain that shoots up the leg for past 3 weeks. Pain is 7/10, sharp, intermittent, worse with standing. Does not recall any injury or trauma. 2) feels a little depressed and having a lot of anxiety. Feels like her Prozac is not working. Denies SI/HI. PHQ-2 score today is 1. Does not follow with a therapist or psychiatrist.  3) 7/10 bilateral knee pain. Has an appointment for cortisone injection to knees with Orthopedics tomorrow. 4) problem with bowels. Has 5-8 BM's a day, sometimes loose but usually formed, associated with bowel urgency. Has mild diffuse abdominal pain and much gas. Denies heartburn, nausea, vomiting, constipation, hematemesis, melena, or hematochezia. Cardiovascular Review  The patient has hypertension, hyperlipidemia, coronary artery disease and CHF. She reports taking medications as instructed, no medication side effects noted. Diet and Lifestyle: generally follows a low fat low cholesterol diet, generally follows a low sodium diet, no formal exercise but active during the day. Lab review: labs reviewed and discussed with patient. Patient cannot tolerate statins. Her cardiologist is trying to get her approved for Repatha. Other Providers: Marisol Higgins NP/Dr. Dawit Fischer (Cardiology)     Soc Hx  . Has 2 living children; lives with , son, son's girlfriend, and 2 grandchildren. She is a retired post . Former smoker; quit in 2002.  Denies alcohol or recreational drug use.     Health Maintenance  Flu vaccine: 10/9/17  Pneumonia vaccine: PCV-13 6/14/16, PPSV-23 10/9/17  Tetanus vaccine: Tdap 10/30/04  Zoster vaccine: 2/26/16  Colonoscopy: 9/5/14, Dr. Marylee Awkward, repeat in 5 yrs  Pap smear: 7/26/16  Mammogram: 2/1/16, due for this  Bone density: 2/1/16 (osteoporosis: L femoral neck T -2.5, tot L hip T -2.4, LS T-2.2)  Eye exam: due for this  Lipids: 12/22/17 (tot chol 235, [id number])  A1c: 2/9/16 (5.8%)  Advanced Directives: has booklet and form at home   End of Life: has booklet and form at home     ROS  A complete review of systems was performed and is negative except for those mentioned in the HPI.       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    Hyperlipidemia E78.5    Mitral valve regurgitation I34.0    History of MI (myocardial infarction) I25.2    Cardiomyopathy ([facility] [address] I42.9    Osteoporosis M81.0    Morbid obesity with BMI of 40.0-44.9, adult ([facility] E66.01, Z68.41    Advance care planning Z71.89    Insomnia G47.00    S/P ICD (internal cardiac defibrillator) procedure Z95.810    AICD lead displacement T82.120A    ICD (implantable cardioverter-defibrillator) in place Z95.810    Primary osteoarthritis of right knee M17.11    Mild intermittent asthma without complication H48.57    Obesity, Class III, BMI 40-49.9 (morbid obesity) ([facility] [address] E66.01    High risk medication use Z79.899     Past Medical History:   Diagnosis Date    Asthma     Cardiomyopathy ([facility]     Coronary artery disease 2008    s/p RCA stent ([facility]IT) on 11/26/11    Depression with anxiety     2011    DVT (deep venous thrombosis) ([facility] [address] 7/27/2010    Family history of early CAD     GERD (gastroesophageal reflux disease)     H/O gastric bypass 2006    Revision in 2009    Hepatitis C antibody test positive     Does not have chronic hep C (labs 10/6/15: neg HCV RNA)     HTN (hypertension) 7/27/2010    Hyperlipidemia 07/27/2010    Joint pain 7/27/2010    MI (myocardial infarction) ([facility] [address] 7/27/2010    Mitral valve regurgitation     Mild to moderate    Morbid obesity ([facility] [address] [facility] 7/27/2010    Myocardial infarction [facility]) 2006 and 2011    Dr. Hannah Lopez    PUD (peptic ulcer disease)     gi bleed 2008; ulcer n gastric bypass pouch    Urinary incontinence, stress 7/27/2010     Allergies   Allergen Reactions    Amoxicillin Anaphylaxis    Amoxicillin Anaphylaxis    Lipitor [Atorvastatin] Other (comments)     Severe muscle pain and spasms    Zocor [Simvastatin] Other (comments)     Cramps, muscle spasms    Livalo [Pitavastatin] Myalgia    Pravastatin Other (comments)     Leg cramps       Current Outpatient Prescriptions   Medication Sig Dispense Refill    sacubitril-valsartan (ENTRESTO) 49 mg/51 mg tablet Take 1 Tab by mouth two (2) times a day. 60 Tab 5    metoprolol succinate (TOPROL-XL) 25 mg XL tablet Take 1 Tab by mouth daily. 90 Tab 3    zolpidem (AMBIEN) 10 mg tablet Take 1 Tab by mouth nightly as needed for Sleep. Max Daily Amount: 10 mg. 90 Tab 0    metOLazone (ZAROXOLYN) 2.5 mg tablet TAKE 1 TABLET BY MOUTH DAILY AS NEEDED FOR UP TO 2 DAYS 6 Tab 1    bumetanide (BUMEX) 2 mg tablet Take 2 mg by mouth two (2) times a day.  ALPRAZolam (XANAX) 0.5 mg tablet Take 1 Tab by mouth two (2) times daily as needed for Anxiety. Max Daily Amount: 1 mg. 20 Tab 0    FLUoxetine (PROZAC) 20 mg tablet TAKE 2 TABLETS EVERY MORNING AND 1 AT BEDTIME FOR ANXIETY WITH DEPRESSION 270 Tab 1    potassium chloride (KLOR-CON M20) 20 mEq tablet TAKE TWO TABLETS BY MOUTH DAILY 180 Tab 1    LACTOBACILLUS ACIDOPHILUS (PROBIOTIC PO) Take  by mouth.       albuterol (PROVENTIL HFA, VENTOLIN HFA, PROAIR HFA) 90 mcg/actuation inhaler Take 2 Puffs by inhalation every four (4) hours as needed for Wheezing or Shortness of Breath. 1 Inhaler 5    aspirin delayed-release 81 mg tablet Take  by mouth daily.  vitamin A 8,000 unit capsule Take 8,000 Units by mouth daily.  biotin 10,000 mcg cap Take  by mouth daily.  OTHER Complete multi formula, bariatric advantage      magnesium 250 mg tab Take 1 Tab by mouth daily.  ferrous sulfate (IRON, FERROUS SULFATE,) 325 mg (65 mg Iron) tablet Take  by mouth daily (before breakfast).  cholecalciferol, vitamin d3, (VITAMIN D) 1,000 unit tablet Take 10,000 Units by mouth daily. 10,000 units \"dry vitamin d \"      CALCIUM PO Take 1 Tab by mouth daily. PHYSICAL EXAM  Visit Vitals    /81 (BP 1 Location: Left arm, BP Patient Position: Sitting)    Pulse 73    Temp 98.4 °F (36.9 °C) (Oral)    Resp 18    Ht 5' 5\" (1.651 m)    Wt 245 lb (111.1 kg)    SpO2 94%    BMI 40.77 kg/m2       General: Morbidly obese, no distress. HEENT:  Head normocephalic/atraumatic, no scleral icterus  Neck: Supple. No carotid bruits, JVD, lymphadenopathy, or thyromegaly. Lungs:  Clear to ausculation bilaterally. Good air movement. Heart:  Regular rate and rhythm, normal S1 and S2, no murmur, gallop, or rub  Extremities: No clubbing, cyanosis. Soft tissue swelling right lateral malleolus with tenderness below lateral malleolus. Normal ROM. Pain worse with foot extension and inversion. Neurological: Alert and oriented. Psychiatric: Normal mood and affect. Behavior is normal.     Review of Studies  Echo 5/25/18: LVEF 25-30%. Akinesis of basal-mid inferoseptal and basal-mid inferolateral wall(s). There was severe hypokinesis of the entire inferior wall(s). Doppler parameters were consistent with abnormal left ventricular relaxation (grade 1 diastolic dysfunction). Left ventricle: The ventricle was mildly to moderately dilated.    Left atrium: The atrium was mildly to moderately dilated. Mitral valve: There was mild to moderate regurgitation. Duplex carotid arteries 1/25/18: No significant stenosis at ICA's bilaterally. ASSESSMENT AND PLAN    ICD-10-CM ICD-9-CM    1. Essential hypertension I10 401.9    2. Chronic diarrhea K52.9 787.91 REFERRAL TO GASTROENTEROLOGY   3. Acute right ankle pain M25.571 719.47 XR ANKLE RT MIN 3 V     338.19 methylPREDNISolone (MEDROL DOSEPACK) 4 mg tablet   4. Chronic insomnia F51.04 780.52 zolpidem (AMBIEN) 10 mg tablet   5. Depression with anxiety F41.8 300.4 busPIRone (BUSPAR) 5 mg tablet   6. Coronary artery disease involving native coronary artery of native heart without angina pectoris I25.10 414.01    7. Hyperlipidemia, unspecified hyperlipidemia type E78.5 272.4    8. History of gastric bypass Z98.84 V45.86    9. Systolic CHF, chronic (HCC) I50.22 428.22      428.0    10. 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 Cardiomyopathy, unspecified type ([facility] I42.9 425.4    12. Morbid obesity with BMI of 40.0-44.9, adult ([facility] E66.01 278.01     Z68.41 V85.41        She already has information on information on scheduling mammogram and eye exam.    Diagnoses and all orders for this visit:    1. Essential hypertension  Controlled. Continue present management. 2. Chronic diarrhea  May be related to previous gastric bypass versus IBS-related. -     Referral: Dr. Cyrus Duncan    3. Acute right ankle pain  -     XR ANKLE RT MIN 3 V; Future  -     Start methylPREDNISolone (MEDROL DOSEPACK) 4 mg tablet; Use following package instructions. For right ankle pain. 4. Chronic insomnia  -     Refill zolpidem (AMBIEN) 10 mg tablet; Take 1 Tab by mouth nightly as needed for Sleep. Max Daily Amount: 10 mg.    5. Depression with anxiety  Continue Prozac at 60 mg daily.  -     Stat busPIRone (BUSPAR) 5 mg tablet; Take 1 Tab by mouth two (2) times a day.     6. Coronary artery disease involving native coronary artery of native heart without angina pectoris  Continue ASA and beta-blocker. 7. Hyperlipidemia, unspecified hyperlipidemia type  Cardiology team working on getting her Repatha due to statin intolerance. 8. History of gastric bypass    9. Systolic CHF, chronic (HCC)  LVEF 25-30% by TTE on 5/25/18. Has AICD in place. Continue Entresto to 49/51 one tablet BID, Bystolic 5 mg daily, Bumex 2 mg BID, and metolazone as needed. 10. 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[facility] [address] [facility]    11. Cardiomyopathy, unspecified type ([facility] [address] [facility]    12. Morbid obesity with BMI of 40.0-44.9, adult [facility])  Discussed the patient's BMI with her. The BMI follow up plan is as follows: dietary management education, guidance, and counseling; encourage exercise; monitor weight; prescribed dietary intake. Follow up BMI in 6 months. Greater than 40 mins direct face-to-face time spent with patient. Greater than 50% of time spent on counseling and coordination of care. Follow-up Disposition:  Return in about 2 months (around 7/29/2018), or if symptoms worsen or fail to improve, for Depression/anxiety. Provided patient and/or family with advanced directive information and answered pertinent questions. Encouraged patient to provide a copy of advanced directive to the office when available. I have discussed the diagnosis with the patient and the intended plan as seen in the above orders. Patient is in agreement. The patient has received an after-visit summary and questions were answered concerning future plans. I have discussed medication side effects and warnings with the patient as well.

## 2018-05-31 ENCOUNTER — TELEPHONE (OUTPATIENT)
Dept: INTERNAL MEDICINE CLINIC | Facility: CLINIC | Age: 68
End: 2018-05-31

## 2018-05-31 NOTE — TELEPHONE ENCOUNTER
Pt states has an appointment with Dr. Little Patton) on 6/6/18 @ 9:10am and they would like a copy of her xray results  [phone]

## 2018-06-03 PROBLEM — M17.0 PRIMARY OSTEOARTHRITIS OF BOTH KNEES: Status: ACTIVE | Noted: 2017-02-28

## 2018-06-04 ENCOUNTER — TELEPHONE (OUTPATIENT)
Dept: CARDIOLOGY CLINIC | Age: 68
End: 2018-06-04

## 2018-06-04 RX ORDER — NEBIVOLOL 5 MG/1
5 TABLET ORAL DAILY
Qty: 90 TAB | Refills: 1 | Status: SHIPPED | OUTPATIENT
Start: 2018-06-04 | End: 2018-10-24 | Stop reason: SDUPTHER

## 2018-06-04 NOTE — PROGRESS NOTES
Identifiers x 2. Informed patient to stop toprol and begin bystolic 5 mg daily. Patient states unable to tolerate pravastatin.   To inform Marizol Hannah NP.

## 2018-06-04 NOTE — PROGRESS NOTES
Requested Prescriptions     Signed Prescriptions Disp Refills    nebivolol (BYSTOLIC) 5 mg tablet 90 Tab 1     Sig: Take 1 Tab by mouth daily. Verbal order per Wallace Wood, NP. Follow up with Dr. Jamaal Roy on 7-10-18.

## 2018-06-05 NOTE — TELEPHONE ENCOUNTER
LVM for patient to return call at earliest convenience. Please ask her to go ahead and get fasting labs drawn now to check her LDL. We will plan to apply for Praulent based on those readings. Please ask her what symptoms she had on pravastatin 20mg daily and document her answer so that we have documentation of her statin failure. Above message given, 2 pt identifiers used  Severe muscle spasms, cramping and pain. She states its miserable. Patient will have labs drawn this week.   2 pt identifiers used    Future Appointments  Date Time Provider Denis Burksi   7/10/2018 11:45 AM Galina Huang  [address]   8/13/2018 9:15 AM REMOTE[facility], [address]   8/29/2018 9:30 AM Anali Lynne  [address] [place]   11/15/2018 10:30 AM Giorgio Stafford [facility] SCHED   11/15/2018 10:40 AM Navneet Johnston  [address]

## 2018-06-05 NOTE — PROGRESS NOTES
Please ask her to go ahead and get fasting labs drawn now to check her LDL. We will plan to apply for Praulent based on those readings. Please ask her what symptoms she had on pravastatin 20mg daily and document her answer so that we have documentation of her statin failure.

## 2018-06-07 ENCOUNTER — HOSPITAL ENCOUNTER (OUTPATIENT)
Age: 68
Setting detail: OUTPATIENT SURGERY
Discharge: HOME | End: 2018-06-07
Attending: SPECIALIST | Admitting: SPECIALIST
Payer: MEDICARE

## 2018-06-07 VITALS
HEIGHT: 65 IN | HEART RATE: 69 BPM | DIASTOLIC BLOOD PRESSURE: 90 MMHG | BODY MASS INDEX: 40.82 KG/M2 | OXYGEN SATURATION: 95 % | RESPIRATION RATE: 18 BRPM | SYSTOLIC BLOOD PRESSURE: 149 MMHG | WEIGHT: 245 LBS

## 2018-06-07 PROCEDURE — 76040000007: Performed by: SPECIALIST

## 2018-06-07 PROCEDURE — 77030020268 HC MISC GENERAL SUPPLY: Performed by: SPECIALIST

## 2018-06-07 NOTE — IP AVS SNAPSHOT
[address] [facility] 
[phone] Patient: Keny Carrion MRN: [id number] QRF:[id number]/03/0266 A check jacqueline indicates which time of day the medication should be taken. My Medications ASK your doctor about these medications Instructions Each Dose to Equal  
 Morning Noon Evening Bedtime  
 albuterol 90 mcg/actuation inhaler Commonly known as:  PROVENTIL HFA, VENTOLIN HFA, PROAIR HFA Your last dose was: Your next dose is: Take 2 Puffs by inhalation every four (4) hours as needed for Wheezing or Shortness of Breath. 2 Puff ALPRAZolam 0.5 mg tablet Commonly known as:  Andreina Aguliar Your last dose was: Your next dose is: Take 1 Tab by mouth two (2) times daily as needed for Anxiety. Max Daily Amount: 1 mg. 0.5 mg  
    
   
   
   
  
 aspirin delayed-release 81 mg tablet Your last dose was: Your next dose is: Take  by mouth daily. biotin 10,000 mcg Cap Your last dose was: Your next dose is: Take  by mouth daily. bumetanide 2 mg tablet Commonly known as:  Marcio Daubs Your last dose was: Your next dose is: Take 2 mg by mouth two (2) times a day. 2 mg  
    
   
   
   
  
 busPIRone 5 mg tablet Commonly known as:  BUSPAR Your last dose was: Your next dose is: Take 1 Tab by mouth two (2) times a day. 5 mg CALCIUM PO Your last dose was: Your next dose is: Take 1 Tab by mouth daily. 1 Tab FLUoxetine 20 mg tablet Commonly known as:  PROzac Your last dose was: Your next dose is:  TAKE 2 TABLETS EVERY MORNING AND 1 AT BEDTIME FOR ANXIETY WITH DEPRESSION  
     
   
   
   
  
 Iron (Ferrous Sulfate) 325 mg (65 mg iron) tablet Generic drug:  ferrous sulfate Your last dose was: Your next dose is: Take  by mouth daily (before breakfast). magnesium 250 mg Tab Your last dose was: Your next dose is: Take 1 Tab by mouth daily. 1 Tab  
    
   
   
   
  
 methylPREDNISolone 4 mg tablet Commonly known as:  Suzy Mccarthy Your last dose was: Your next dose is:    
   
   
 Use following package instructions. For right ankle pain.  
     
   
   
   
  
 metOLazone 2.5 mg tablet Commonly known as:  Nadeem Galeas Your last dose was: Your next dose is: TAKE 1 TABLET BY MOUTH DAILY AS NEEDED FOR UP TO 2 DAYS  
     
   
   
   
  
 nebivolol 5 mg tablet Commonly known as:  BYSTOLIC Your last dose was: Your next dose is: Take 1 Tab by mouth daily. 5 mg OTHER Your last dose was: Your next dose is:    
   
   
 Complete multi formula, bariatric advantage  
     
   
   
   
  
 potassium chloride 20 mEq tablet Commonly known as:  KLOR-CON M20 Your last dose was: Your next dose is: TAKE TWO TABLETS BY MOUTH DAILY PROBIOTIC PO Your last dose was: Your next dose is: Take  by mouth. sacubitril-valsartan 49-51 mg Tab tablet Commonly known as:  ENTRESTO Your last dose was: Your next dose is: Take 1 Tab by mouth two (2) times a day. 1 Tab  
    
   
   
   
  
 vitamin A 8,000 unit capsule Your last dose was: Your next dose is: Take 8,000 Units by mouth daily. 8000 Units VITAMIN D3 1,000 unit tablet Generic drug:  cholecalciferol Your last dose was: Your next dose is: Take 10,000 Units by mouth daily. 10,000 units \"dry vitamin d \"  
 69559 Units  
    
   
   
   
  
 zolpidem 10 mg tablet Commonly known as:  AMBIEN Your last dose was: Your next dose is: Take 1 Tab by mouth nightly as needed for Sleep.  Max Daily Amount: 10 mg.  
 10 mg

## 2018-06-07 NOTE — DISCHARGE INSTRUCTIONS
Regino Hernandez  [id number]  1950      MANOMETRY DISCHARGE INSTRUCTION    You may resume your regular diet as tolerated. You may resume your normal daily activities. If you develop a sore throat- throat lozenges or warm salt water gargles will help. Call your Physician if you have any complications or questions. [employer] Activation    Thank you for requesting access to [employer]. Please follow the instructions below to securely access and download your online medical record. [employer] allows you to send messages to your doctor, view your test results, renew your prescriptions, schedule appointments, and more. How Do I Sign Up? 1. In your internet browser, go to [email or web address]  2. Click on the First Time User? Click Here link in the Sign In box. You will be redirect to the New Member Sign Up page. 3. Enter your [employer] Access Code exactly as it appears below. You will not need to use this code after youve completed the sign-up process. If you do not sign up before the expiration date, you must request a new code. [employer] Access Code: Activation code not generated  Current Promosome Status: Active (This is the date your Promosome access code will )    4. Enter the last four digits of your Social Security Number (xxxx) and Date of Birth (mm/dd/yyyy) as indicated and click Submit. You will be taken to the next sign-up page. 5. Create a Promosome ID. This will be your Promosome login ID and cannot be changed, so think of one that is secure and easy to remember. 6. Create a [employer] password. You can change your password at any time. 7. Enter your Password Reset Question and Answer. This can be used at a later time if you forget your password. 8. Enter your e-mail address. You will receive e-mail notification when new information is available in [address] 9. Click Sign Up. You can now view and download portions of your medical record.   10. Click the Download Summary menu link to download a portable copy of your medical information. Additional Information    If you have questions, please visit the Frequently Asked Questions section of the [employer] website at [email or web address] Remember, [employer] is NOT to be used for urgent needs. For medical emergencies, dial 911.

## 2018-06-07 NOTE — IP AVS SNAPSHOT
[address] [facility] 
[phone] Patient: Lolis Nix MRN: [id number] PMR:[id number] About your hospitalization You were admitted on:  June 7, 2018 You last received care in the:  [facility] ENDOSCOPY You were discharged on:  June 7, 2018 Why you were hospitalized Your primary diagnosis was:  Not on File Follow-up Information None Your Scheduled Appointments Tuesday July 10, 2018 11:45 AM EDT  
ESTABLISHED PATIENT with Daivd MD Steffanie  
[facility] ([address] [facility]) [address]  [address] [place] [address]  
[phone] Discharge Orders None A check jacqueline indicates which time of day the medication should be taken. My Medications ASK your doctor about these medications Instructions Each Dose to Equal  
 Morning Noon Evening Bedtime  
 albuterol 90 mcg/actuation inhaler Commonly known as:  PROVENTIL HFA, VENTOLIN HFA, PROAIR HFA Your last dose was: Your next dose is: Take 2 Puffs by inhalation every four (4) hours as needed for Wheezing or Shortness of Breath. 2 Puff ALPRAZolam 0.5 mg tablet Commonly known as:  Grayson Hightower Your last dose was: Your next dose is: Take 1 Tab by mouth two (2) times daily as needed for Anxiety. Max Daily Amount: 1 mg. 0.5 mg  
    
   
   
   
  
 aspirin delayed-release 81 mg tablet Your last dose was: Your next dose is: Take  by mouth daily. biotin 10,000 mcg Cap Your last dose was: Your next dose is: Take  by mouth daily. bumetanide 2 mg tablet Commonly known as:  Albert Sharp Your last dose was: Your next dose is: Take 2 mg by mouth two (2) times a day. 2 mg busPIRone 5 mg tablet Commonly known as:  BUSPAR Your last dose was: Your next dose is: Take 1 Tab by mouth two (2) times a day. 5 mg CALCIUM PO Your last dose was: Your next dose is: Take 1 Tab by mouth daily. 1 Tab FLUoxetine 20 mg tablet Commonly known as:  PROzac Your last dose was: Your next dose is: TAKE 2 TABLETS EVERY MORNING AND 1 AT BEDTIME FOR ANXIETY WITH DEPRESSION Iron (Ferrous Sulfate) 325 mg (65 mg iron) tablet Generic drug:  ferrous sulfate Your last dose was: Your next dose is: Take  by mouth daily (before breakfast). magnesium 250 mg Tab Your last dose was: Your next dose is: Take 1 Tab by mouth daily. 1 Tab  
    
   
   
   
  
 methylPREDNISolone 4 mg tablet Commonly known as:  Jair Kennedy Your last dose was: Your next dose is:    
   
   
 Use following package instructions. For right ankle pain.  
     
   
   
   
  
 metOLazone 2.5 mg tablet Commonly known as:  [place] Your last dose was: Your next dose is: TAKE 1 TABLET BY MOUTH DAILY AS NEEDED FOR UP TO 2 DAYS  
     
   
   
   
  
 nebivolol 5 mg tablet Commonly known as:  BYSTOLIC Your last dose was: Your next dose is: Take 1 Tab by mouth daily. 5 mg OTHER Your last dose was: Your next dose is:    
   
   
 Complete multi formula, bariatric advantage  
     
   
   
   
  
 potassium chloride 20 mEq tablet Commonly known as:  KLOR-CON M20 Your last dose was: Your next dose is: TAKE TWO TABLETS BY MOUTH DAILY PROBIOTIC PO Your last dose was: Your next dose is: Take  by mouth. sacubitril-valsartan 49-51 mg Tab tablet Commonly known as:  ENTRESTO Your last dose was: Your next dose is: Take 1 Tab by mouth two (2) times a day. 1 Tab  
    
   
   
   
  
 vitamin A 8,000 unit capsule Your last dose was: Your next dose is: Take 8,000 Units by mouth daily. 8000 Units VITAMIN D3 1,000 unit tablet Generic drug:  cholecalciferol Your last dose was: Your next dose is: Take 10,000 Units by mouth daily. 10,000 units \"dry vitamin d \"  
 [address]160 Units  
    
   
   
   
  
 zolpidem 10 mg tablet Commonly known as:  AMBIEN Your last dose was: Your next dose is: Take 1 Tab by mouth nightly as needed for Sleep. Max Daily Amount: 10 mg.  
 10 mg Discharge Instructions Slava Haywood [id number] 1950 MANOMETRY DISCHARGE INSTRUCTION You may resume your regular diet as tolerated. You may resume your normal daily activities. If you develop a sore throat- throat lozenges or warm salt water gargles will help. Call your Physician if you have any complications or questions. [employer] Activation Thank you for requesting access to [employer]. Please follow the instructions below to securely access and download your online medical record. [employer] allows you to send messages to your doctor, view your test results, renew your prescriptions, schedule appointments, and more. How Do I Sign Up? 1. In your internet browser, go to [email or web address].[email or web address] 
2. Click on the First Time User? Click Here link in the Sign In box. You will be redirect to the New Member Sign Up page. 3. Enter your [employer] Access Code exactly as it appears below. You will not need to use this code after youve completed the sign-up process. If you do not sign up before the expiration date, you must request a new code. [employer] Access Code: Activation code not generated Current [employer] Status: Active (This is the date your Park[employer] access code will ) 4. Enter the last four digits of your Social Security Number (xxxx) and Date of Birth (mm/dd/yyyy) as indicated and click Submit. You will be taken to the next sign-up page. 5. Create a [employer] ID. This will be your [employer] login ID and cannot be changed, so think of one that is secure and easy to remember. 6. Create a [employer] password. You can change your password at any time. 7. Enter your Password Reset Question and Answer. This can be used at a later time if you forget your password. 8. Enter your e-mail address. You will receive e-mail notification when new information is available in [address] 9. Click Sign Up. You can now view and download portions of your medical record. 10. Click the Download Summary menu link to download a portable copy of your medical information. Additional Information If you have questions, please visit the Frequently Asked Questions section of the [employer] website at [email or web address] Remember, [employer]atz[employer] is NOT to be used for urgent needs. For medical emergencies, dial 911. Introducing [facility] SERVICES! Dear Toan Acevedo: Thank you for requesting a [employer] account. Our records indicate that you already have an active [employer] account. You can access your account anytime at [email or web address] Did you know that you can access your hospital and ER discharge instructions at any time in [employer]? You can also review all of your test results from your hospital stay or ER visit. Additional Information If you have questions, please visit the Frequently Asked Questions section of the [employer] website at [email or web address] Remember, [employer] is NOT to be used for urgent needs. For medical emergencies, dial 911. Now available from your iPhone and Android! Introducing Driss Morris As a [facility] patient, I wanted to make you aware of our electronic visit tool called Driss Morris. [facility] [employer] allows you to connect within minutes with a medical provider 24 hours a day, seven days a week via a mobile device or tablet or logging into a secure website from your computer. You can access Driss Morris from anywhere in the [place]. A virtual visit might be right for you when you have a simple condition and feel like you just dont want to get out of bed, or cant get away from work for an appointment, when your regular [facility] provider is not available (evenings, weekends or holidays), or when youre out of town and need minor care. Electronic visits cost only $49 and if the [facility] [employer] provider determines a prescription is needed to treat your condition, one can be electronically transmitted to a nearby pharmacy*. Please take a moment to enroll today if you have not already done so. The enrollment process is free and takes just a few minutes. To enroll, please download the [employer] pina to your tablet or phone, or visit [email or web address] to enroll on your computer. And, as an [address] [place] [address] patient with a [employer] account, the results of your visits will be scanned into your electronic medical record and your primary care provider will be able to view the scanned results. We urge you to continue to see your regular [facility] provider for your ongoing medical care. And while your primary care provider may not be the one available when you seek a Driss Morris virtual visit, the peace of mind you get from getting a real diagnosis real time can be priceless. For more information on Driss Morris, view our Frequently Asked Questions (FAQs) at [email or web address]. Sincerely, 
 
Angus Mejia MD 
Chief Medical Officer Bridgette Noyola *:  certain medications cannot be prescribed via Driss Morris Providers Seen During Your Hospitalization Provider Specialty Primary office phone Denny Starkey MD Gastroenterology [phone] Your Primary Care Physician (PCP) Primary Care Physician Office Phone Office Swathi Padilla [phone] You are allergic to the following Allergen Reactions Amoxicillin Anaphylaxis Amoxicillin Anaphylaxis Lipitor (Atorvastatin) Other (comments) Severe muscle pain and spasms Zocor (Simvastatin) Other (comments) Cramps, muscle spasms Livalo (Pitavastatin) Myalgia Pravastatin Other (comments) Leg cramps Recent Documentation Height Weight Breastfeeding? BMI OB Status Smoking Status 1.651 m 111.1 kg No 40.77 kg/m2 Postmenopausal Former Smoker Emergency Contacts Name Discharge Info Relation Home Work Mobile Reji Huddleston DISCHARGE CAREGIVER [3] Spouse [3] [phone] Patient Belongings The following personal items are in your possession at time of discharge: 
  Dental Appliances: None  Visual Aid: Glasses Please provide this summary of care documentation to your next provider. Signatures-by signing, you are acknowledging that this After Visit Summary has been reviewed with you and you have received a copy. Patient Signature:  ____________________________________________________________ Date:  ____________________________________________________________  
  
Pallavi Lees Provider Signature:  ____________________________________________________________ Date:  ____________________________________________________________

## 2018-06-08 PROBLEM — M25.571 ACUTE RIGHT ANKLE PAIN: Status: ACTIVE | Noted: 2018-06-08

## 2018-06-11 NOTE — OP NOTES
[facility]  OPERATIVE REPORT    Desmond Oconnell  MR#: [id number]  : 1950  ACCOUNT #: [de-identified]   DATE OF SERVICE: 2018    PROCEDURE PLANNED:   1. High resolution anorectal manometry. 2.  Assessment of anorectal function with balloon. PROCEDURE PERFORMED:  1. High resolution anorectal manometry. 2.  Assessment of anorectal function with balloon. SURGEON:  Sreedhar Reece MD     ASSISTANT:  Jessica JOY     SPECIMENS REMOVED:  None. ANESTHESIA:  None. ESTIMATED BLOOD LOSS:  None. COMPLICATIONS:  None. IMPLANTS:  None. PREOPERATIVE DIAGNOSIS:  Incontinence of feces. POSTOPERATIVE DIAGNOSES:  1. Abnormal study. 2.  Borderline failure to augment voluntary squeeze. 3.  20% relaxation with push maneuver. 4.  Balloon expulsion is passed. 5.  Rectal anal inhibitory reflex is not present. 6.  Markedly abnormal sensory testing, see comments below. DESCRIPTION OF PROCEDURE:  High resolution anorectal manometry was performed by the nursing staff with subsequent interpretation by Dr. Daria Patton. Normal pressures at rest are 30 mmHg or greater. Rectal reference max at rest 103.1, mean rectal reference 93.3. Maximal abdominal reference pressure at rest 107.1, mean 97.3. The patient then asked to voluntarily squeeze, normals will increase by more than 30 mmHg and sustain for more than 10 seconds. She has sustains for 20 seconds; however, sphincter pressures increase only to 125.9 mmHg by rectal reference and 139 mmHg abdominal by reference. These are borderline, particularly the rectal reference pressure. She was then asked to push or bear down. She decreases up to 84.3 mmHg, which is 19% relaxation. Intrarectal pressure at this time is 61.3 mmHg for rectoanal pressure differential of -23.0 mmHg. The balloon was then utilized. She can expel the balloon. Rectal anal inhibitory reflex is not present.   First sensation to rectal filling is at 110 mL, normals would be 20 mL or less. The urge to defecate is at 220 mL, normals would be about . The urge to defecate is at 270 mL, normal should be about 180 mL. COMMENT:  The most striking findings are the sensory findings which show a likely large rectum and possibly denervated rectum. The sphincter augmentation pressures are borderline. I wonder if this is overflow incontinence. RECOMMENDATION:  I recommend the followin.  MR defecography. 2.  Transanal ultrasound to look for sphincter defect. 3.  With those results, consider pelvic floor therapy.       Ondina Hernandez MD       RFK / MN  D: 2018 17:55     T: 2018 19:02  JOB #: [id number]  CC: Juan David Lugo MD  CC: Marissa Jennings MD

## 2018-06-13 ENCOUNTER — HOSPITAL ENCOUNTER (OUTPATIENT)
Dept: LAB | Age: 68
Discharge: HOME | End: 2018-06-13
Payer: MEDICARE

## 2018-06-13 PROCEDURE — 80048 BASIC METABOLIC PNL TOTAL CA: CPT

## 2018-06-13 PROCEDURE — 82306 VITAMIN D 25 HYDROXY: CPT

## 2018-06-13 PROCEDURE — 36415 COLL VENOUS BLD VENIPUNCTURE: CPT

## 2018-06-13 PROCEDURE — 83735 ASSAY OF MAGNESIUM: CPT

## 2018-06-13 PROCEDURE — 80061 LIPID PANEL: CPT

## 2018-06-14 LAB
25(OH)D3+25(OH)D2 SERPL-MCNC: 24.9 NG/ML (ref 30–100)
BUN SERPL-MCNC: 13 MG/DL (ref 8–27)
BUN/CREAT SERPL: 19 (ref 12–28)
CALCIUM SERPL-MCNC: 9 MG/DL (ref 8.7–10.3)
CHLORIDE SERPL-SCNC: 102 MMOL/L (ref 96–106)
CHOLEST SERPL-MCNC: 226 MG/DL (ref 100–199)
CO2 SERPL-SCNC: 28 MMOL/L (ref 20–29)
CREAT SERPL-MCNC: 0.7 MG/DL (ref 0.57–1)
GFR SERPLBLD CREATININE-BSD FMLA CKD-EPI: 104 ML/MIN/1.73
GFR SERPLBLD CREATININE-BSD FMLA CKD-EPI: 91 ML/MIN/1.73
GLUCOSE SERPL-MCNC: 87 MG/DL (ref 65–99)
HDLC SERPL-MCNC: 65 MG/DL
INTERPRETATION, 910389: NORMAL
LDLC SERPL CALC-MCNC: 132 MG/DL (ref 0–99)
MAGNESIUM SERPL-MCNC: 2 MG/DL (ref 1.6–2.3)
POTASSIUM SERPL-SCNC: 4 MMOL/L (ref 3.5–5.2)
SODIUM SERPL-SCNC: 142 MMOL/L (ref 134–144)
TRIGL SERPL-MCNC: 145 MG/DL (ref 0–149)
VLDLC SERPL CALC-MCNC: 29 MG/DL (ref 5–40)

## 2018-06-15 ENCOUNTER — TELEPHONE (OUTPATIENT)
Dept: CARDIOLOGY CLINIC | Age: 68
End: 2018-06-15

## 2018-06-15 NOTE — TELEPHONE ENCOUNTER
Pt called to discuss MRI of Pelvic Wall that was ordered by Dr. Angelo Palma) and wants to be sure its okay to perform and be sure it wont affect her S-ICD. Pt can be reached at #[phone].   Thanks

## 2018-06-15 NOTE — TELEPHONE ENCOUNTER
Verified patient with two types of identifiers. Spoke to [employer] and they stated that the device she has A209 is MRI safe along with her lead. Notified patient of this and told her to make sure that they notify us when and where it is scheduled so that we can fax over the MRI form. Patient verbalizes understanding. And will call with any questions or concerns.

## 2018-06-18 RX ORDER — CHOLECALCIFEROL TAB 125 MCG (5000 UNIT) 125 MCG
10000 TAB ORAL DAILY
COMMUNITY

## 2018-06-18 RX ORDER — ERGOCALCIFEROL 1.25 MG/1
50000 CAPSULE ORAL
Qty: 8 CAP | Refills: 0 | Status: SHIPPED | OUTPATIENT
Start: 2018-06-18 | End: 2018-08-29 | Stop reason: ALTCHOICE

## 2018-06-18 RX ORDER — ERGOCALCIFEROL 1.25 MG/1
50000 CAPSULE ORAL
COMMUNITY
End: 2018-06-18 | Stop reason: SDUPTHER

## 2018-06-18 NOTE — PROGRESS NOTES
LDL still elevated on pravastatin 20mg daily (highest dose she can tolerate), please tell her that we will go ahead and submit application for Praluent. Vit D level still low - please advise her to begin Vitamin D 50,000 units once/week x 8 weeks and then return to her 10,000 units of Vit D daily.

## 2018-06-20 ENCOUNTER — TELEPHONE (OUTPATIENT)
Dept: CARDIOLOGY CLINIC | Age: 68
End: 2018-06-20

## 2018-06-20 NOTE — TELEPHONE ENCOUNTER
Selvin Vences returned my call and states that patient cannot get scheduled for MRI prior to form completion. Notified Selvin Vences that incorrect form was sent; however Dr. Joshua Washington did write instructions. Notified Selvin Vences that MRI will need to contact [employer] Rep to be present at MRI once MRI is scheduled. Mary verbalized understanding and will call with any other questions. Faxed cardiology order form to [facility] with [address] [facility]  of Care at fax number [phone]. Fax confirmation received.

## 2018-06-20 NOTE — TELEPHONE ENCOUNTER
Received a [employer] Cardiology Order Form to be completed for patient to be able to have a MRI. Called Mary Berman with [employer] of Care to find out when the MRI was scheduled and to notify her that patient has a [employer] device not a [employer]. Once patient is scheduled for MRI we will ask [facility] Scientific rep to be available for patient's MRI.

## 2018-06-28 RX ORDER — SODIUM, POTASSIUM,MAG SULFATES 17.5-3.13G
177 SOLUTION, RECONSTITUTED, ORAL ORAL
Status: ON HOLD | COMMUNITY
End: 2018-06-29

## 2018-06-29 ENCOUNTER — ANESTHESIA EVENT (OUTPATIENT)
Dept: ENDOSCOPY | Age: 68
End: 2018-06-29
Payer: MEDICARE

## 2018-06-29 ENCOUNTER — ANESTHESIA (OUTPATIENT)
Dept: ENDOSCOPY | Age: 68
End: 2018-06-29
Payer: MEDICARE

## 2018-06-29 ENCOUNTER — HOSPITAL ENCOUNTER (OUTPATIENT)
Age: 68
Setting detail: OUTPATIENT SURGERY
Discharge: HOME | End: 2018-06-29
Attending: INTERNAL MEDICINE | Admitting: INTERNAL MEDICINE
Payer: MEDICARE

## 2018-06-29 VITALS
SYSTOLIC BLOOD PRESSURE: 139 MMHG | HEART RATE: 57 BPM | HEIGHT: 65 IN | BODY MASS INDEX: 41.85 KG/M2 | WEIGHT: 251.19 LBS | DIASTOLIC BLOOD PRESSURE: 66 MMHG | TEMPERATURE: 97.6 F | OXYGEN SATURATION: 100 % | RESPIRATION RATE: 14 BRPM

## 2018-06-29 PROCEDURE — 74011250637 HC RX REV CODE- 250/637: Performed by: INTERNAL MEDICINE

## 2018-06-29 PROCEDURE — 77030009426 HC FCPS BIOP ENDOSC BSC -B: Performed by: INTERNAL MEDICINE

## 2018-06-29 PROCEDURE — 76060000032 HC ANESTHESIA 0.5 TO 1 HR: Performed by: INTERNAL MEDICINE

## 2018-06-29 PROCEDURE — 74011250636 HC RX REV CODE- 250/636: Performed by: INTERNAL MEDICINE

## 2018-06-29 PROCEDURE — 88305 TISSUE EXAM BY PATHOLOGIST: CPT | Performed by: INTERNAL MEDICINE

## 2018-06-29 PROCEDURE — 77030010936 HC CLP LIG BSC -C: Performed by: INTERNAL MEDICINE

## 2018-06-29 PROCEDURE — 74011250636 HC RX REV CODE- 250/636

## 2018-06-29 PROCEDURE — 76040000007: Performed by: INTERNAL MEDICINE

## 2018-06-29 PROCEDURE — 74011000250 HC RX REV CODE- 250

## 2018-06-29 RX ORDER — ATROPINE SULFATE 0.1 MG/ML
0.5 INJECTION INTRAVENOUS
Status: DISCONTINUED | OUTPATIENT
Start: 2018-06-29 | End: 2018-06-29 | Stop reason: HOSPADM

## 2018-06-29 RX ORDER — SODIUM CHLORIDE 9 MG/ML
75 INJECTION, SOLUTION INTRAVENOUS CONTINUOUS
Status: DISCONTINUED | OUTPATIENT
Start: 2018-06-29 | End: 2018-06-29 | Stop reason: HOSPADM

## 2018-06-29 RX ORDER — SODIUM CHLORIDE 0.9 % (FLUSH) 0.9 %
5-10 SYRINGE (ML) INJECTION AS NEEDED
Status: DISCONTINUED | OUTPATIENT
Start: 2018-06-29 | End: 2018-06-29 | Stop reason: HOSPADM

## 2018-06-29 RX ORDER — LIDOCAINE HYDROCHLORIDE 20 MG/ML
INJECTION, SOLUTION EPIDURAL; INFILTRATION; INTRACAUDAL; PERINEURAL AS NEEDED
Status: DISCONTINUED | OUTPATIENT
Start: 2018-06-29 | End: 2018-06-29 | Stop reason: HOSPADM

## 2018-06-29 RX ORDER — MIDAZOLAM HYDROCHLORIDE 1 MG/ML
.25-5 INJECTION, SOLUTION INTRAMUSCULAR; INTRAVENOUS
Status: DISCONTINUED | OUTPATIENT
Start: 2018-06-29 | End: 2018-06-29 | Stop reason: HOSPADM

## 2018-06-29 RX ORDER — FENTANYL CITRATE 50 UG/ML
25 INJECTION, SOLUTION INTRAMUSCULAR; INTRAVENOUS
Status: DISCONTINUED | OUTPATIENT
Start: 2018-06-29 | End: 2018-06-29 | Stop reason: HOSPADM

## 2018-06-29 RX ORDER — PROPOFOL 10 MG/ML
INJECTION, EMULSION INTRAVENOUS AS NEEDED
Status: DISCONTINUED | OUTPATIENT
Start: 2018-06-29 | End: 2018-06-29 | Stop reason: HOSPADM

## 2018-06-29 RX ORDER — SODIUM CHLORIDE 0.9 % (FLUSH) 0.9 %
5-10 SYRINGE (ML) INJECTION EVERY 8 HOURS
Status: DISCONTINUED | OUTPATIENT
Start: 2018-06-29 | End: 2018-06-29 | Stop reason: HOSPADM

## 2018-06-29 RX ORDER — MIDAZOLAM HYDROCHLORIDE 1 MG/ML
1-2 INJECTION, SOLUTION INTRAMUSCULAR; INTRAVENOUS
Status: DISCONTINUED | OUTPATIENT
Start: 2018-06-29 | End: 2018-06-29 | Stop reason: HOSPADM

## 2018-06-29 RX ORDER — FLUMAZENIL 0.1 MG/ML
0.2 INJECTION INTRAVENOUS
Status: DISCONTINUED | OUTPATIENT
Start: 2018-06-29 | End: 2018-06-29 | Stop reason: HOSPADM

## 2018-06-29 RX ORDER — EPINEPHRINE 0.1 MG/ML
1 INJECTION INTRACARDIAC; INTRAVENOUS
Status: DISCONTINUED | OUTPATIENT
Start: 2018-06-29 | End: 2018-06-29 | Stop reason: HOSPADM

## 2018-06-29 RX ORDER — NALOXONE HYDROCHLORIDE 0.4 MG/ML
0.4 INJECTION, SOLUTION INTRAMUSCULAR; INTRAVENOUS; SUBCUTANEOUS
Status: DISCONTINUED | OUTPATIENT
Start: 2018-06-29 | End: 2018-06-29 | Stop reason: HOSPADM

## 2018-06-29 RX ORDER — DEXTROMETHORPHAN/PSEUDOEPHED 2.5-7.5/.8
1.2 DROPS ORAL
Status: DISCONTINUED | OUTPATIENT
Start: 2018-06-29 | End: 2018-06-29 | Stop reason: HOSPADM

## 2018-06-29 RX ADMIN — PROPOFOL 30 MG: 10 INJECTION, EMULSION INTRAVENOUS at 13:47

## 2018-06-29 RX ADMIN — PROPOFOL 30 MG: 10 INJECTION, EMULSION INTRAVENOUS at 13:38

## 2018-06-29 RX ADMIN — PROPOFOL 30 MG: 10 INJECTION, EMULSION INTRAVENOUS at 13:42

## 2018-06-29 RX ADMIN — PROPOFOL 30 MG: 10 INJECTION, EMULSION INTRAVENOUS at 13:33

## 2018-06-29 RX ADMIN — PROPOFOL 20 MG: 10 INJECTION, EMULSION INTRAVENOUS at 13:31

## 2018-06-29 RX ADMIN — PROPOFOL 30 MG: 10 INJECTION, EMULSION INTRAVENOUS at 13:44

## 2018-06-29 RX ADMIN — PROPOFOL 30 MG: 10 INJECTION, EMULSION INTRAVENOUS at 13:40

## 2018-06-29 RX ADMIN — PROPOFOL 10 MG: 10 INJECTION, EMULSION INTRAVENOUS at 13:51

## 2018-06-29 RX ADMIN — PROPOFOL 30 MG: 10 INJECTION, EMULSION INTRAVENOUS at 13:49

## 2018-06-29 RX ADMIN — LIDOCAINE HYDROCHLORIDE 100 MG: 20 INJECTION, SOLUTION EPIDURAL; INFILTRATION; INTRACAUDAL; PERINEURAL at 13:30

## 2018-06-29 RX ADMIN — PROPOFOL 30 MG: 10 INJECTION, EMULSION INTRAVENOUS at 13:36

## 2018-06-29 RX ADMIN — PROPOFOL 40 MG: 10 INJECTION, EMULSION INTRAVENOUS at 13:30

## 2018-06-29 RX ADMIN — SIMETHICONE 80 MG: 20 SUSPENSION/ DROPS ORAL at 13:53

## 2018-06-29 RX ADMIN — SODIUM CHLORIDE 75 ML/HR: 900 INJECTION, SOLUTION INTRAVENOUS at 13:25

## 2018-06-29 NOTE — IP AVS SNAPSHOT
[address] [facility] 
[phone] Patient: Jared Nair MRN: [id number] SIZ:[id number]/00/[id number] About your hospitalization You were admitted on:  June 29, 2018 You last received care in the:  [facility] ENDOSCOPY You were discharged on:  June 29, 2018 Why you were hospitalized Your primary diagnosis was:  Not on File Follow-up Information None Your Scheduled Appointments Monday July 02, 2018 10:30 AM EDT  
MRI PELVIC FLOOR STUDY with [facility] MRI 1 [facility] MRI [address]) [address] [facility]  
[phone] *The patient should be NPO. Nothing by mouth after midnight*  1. Please bring a list or a bag of your current medications to your appointment 2. Please be sure to remove ALL hair clips, pins, extensions, etc., prior to arriving for your MRI procedure. 3. If you have any medical implants or devices, please bring associated medical card with you. 4. Bring any non [facility] films or CDs pertaining to the area being imaged with you on the day of appointment. 5. A written order with a valid diagnosis and Physicians  signature is required for all scheduled tests. 6. Disregard Location Instructions below and check in at registration by 815am.  
  
    
Patient should report to outpatient registration ([facility]) 30 minutes prior to the appointment time unless instructed otherwise. Tuesday July 10, 2018 11:45 AM EDT  
ESTABLISHED PATIENT with Brit Barakat MD  
[facility] ([facility]) [address] [facility]  
[phone] Monday August 13, 2018  9:15 AM EDT  
REMOTE OFFICE VISIT with Cristian Garcia [facility] ([facility] SCHEDULING) [address] [facility] 57  
[phone] Discharge Orders None A check jacqueline indicates which time of day the medication should be taken. My Medications CONTINUE taking these medications Instructions Each Dose to Equal  
 Morning Noon Evening Bedtime  
 albuterol 90 mcg/actuation inhaler Commonly known as:  PROVENTIL HFA, VENTOLIN HFA, PROAIR HFA Your last dose was: Your next dose is: Take 2 Puffs by inhalation every four (4) hours as needed for Wheezing or Shortness of Breath. 2 Puff ALPRAZolam 0.5 mg tablet Commonly known as:  Judy Gandara Your last dose was: Your next dose is: Take 1 Tab by mouth two (2) times daily as needed for Anxiety. Max Daily Amount: 1 mg. 0.5 mg  
    
   
   
   
  
 aspirin delayed-release 81 mg tablet Your last dose was: Your next dose is: Take  by mouth daily. biotin 10,000 mcg Cap Your last dose was: Your next dose is: Take  by mouth daily. bumetanide 2 mg tablet Commonly known as:  Buelah Bake Your last dose was: Your next dose is: Take 2 mg by mouth two (2) times a day. 2 mg  
    
   
   
   
  
 busPIRone 5 mg tablet Commonly known as:  BUSPAR Your last dose was: Your next dose is: Take 1 Tab by mouth two (2) times a day. 5 mg CALCIUM PO Your last dose was: Your next dose is: Take 600 mg by mouth daily. 600 mg  
    
   
   
   
  
 cholecalciferol (VITAMIN D3) 5,000 unit Tab tablet Commonly known as:  VITAMIN D3 Your last dose was: Your next dose is: Take 10,000 Units by mouth daily. 29464 Units  
    
   
   
   
  
 ergocalciferol 50,000 unit capsule Commonly known as:  ERGOCALCIFEROL Your last dose was: Your next dose is: Take 1 Cap by mouth every seven (7) days. 33286 Units FLUoxetine 20 mg tablet Commonly known as:  PROzac Your last dose was: Your next dose is: TAKE 2 TABLETS EVERY MORNING AND 1 AT BEDTIME FOR ANXIETY WITH DEPRESSION Iron (Ferrous Sulfate) 325 mg (65 mg iron) tablet Generic drug:  ferrous sulfate Your last dose was: Your next dose is: Take  by mouth daily (before breakfast). magnesium 250 mg Tab Your last dose was: Your next dose is: Take 1 Tab by mouth daily. 1 Tab  
    
   
   
   
  
 metOLazone 2.5 mg tablet Commonly known as:  [place] Maxon Your last dose was: Your next dose is: TAKE 1 TABLET BY MOUTH DAILY AS NEEDED FOR UP TO 2 DAYS  
     
   
   
   
  
 nebivolol 5 mg tablet Commonly known as:  BYSTOLIC Your last dose was: Your next dose is: Take 1 Tab by mouth daily. 5 mg OTHER Your last dose was: Your next dose is:    
   
   
 Complete multi formula, bariatric advantage  
     
   
   
   
  
 potassium chloride 20 mEq tablet Commonly known as:  KLOR-CON M20 Your last dose was: Your next dose is: TAKE TWO TABLETS BY MOUTH DAILY PROBIOTIC PO Your last dose was: Your next dose is: Take 1 Tab by mouth daily. 1 Tab  
    
   
   
   
  
 sacubitril-valsartan 49-51 mg Tab tablet Commonly known as:  ENTRESTO Your last dose was: Your next dose is: Take 1 Tab by mouth two (2) times a day. 1 Tab  
    
   
   
   
  
 zolpidem 10 mg tablet Commonly known as:  AMBIEN Your last dose was: Your next dose is: Take 1 Tab by mouth nightly as needed for Sleep.  Max Daily Amount: 10 mg.  
 10 mg  
    
   
   
   
  
  
  
  
 Discharge Instructions [place] Office: [phone] Mars Busch [id number] 1950 EGD/COLONOSCOPY DISCHARGE INSTRUCTIONS Discomfort: 
redness at IV site- apply warm compress to area; if redness or soreness persist- contact your physician Gaseous discomfort- walking, belching will help relieve any discomfort You may not operate a vehicle for 12 hours You may not engage in an occupation involving machinery or appliances for rest of today. You may not drink alcoholic beverages for at least 12 hours Avoid making any critical decisions for at least 24 hour DIET You may resume your regular diet  however -  remember your colon is empty and a heavy meal will produce gas. Avoid these foods:  fried / greasy foods, excessive carbonated drinks or too much caffeine MEDICATIONS Regarding Aspirin or Nonsteroidal medications specifically, please see below. ACTIVITY You may resume your normal daily activities. Spend the remainder of the day resting -  avoid any strenuous activity. CALL M.D. ANY SIGN OF Increasing pain, nausea, vomiting Abdominal distension (swelling) New increased bleeding (oral or rectal) Fever (chills) You may not take any Advil, Aspirin, Ibuprofen, Motrin, Aleve, or Goodys for 7 days, ONLY  Tylenol as needed for pain. Follow-up Instructions: 
 Call  Rajeev Mccann MD for any questions or concerns Results of procedure / biopsy in 7 days Telephone # [phone] Follow-up Information None Introducing hospitals & HEALTH SERVICES! Dear Darrion Huggins: Thank you for requesting a [employer] account. Our records indicate that you already have an active [employer] account. You can access your account anytime at [email or web address] Did you know that you can access your hospital and ER discharge instructions at any time in [employer]? You can also review all of your test results from your hospital stay or ER visit. Additional Information If you have questions, please visit the Frequently Asked Questions section of the MyChart website at [email or web address] Remember, PushCoint is NOT to be used for urgent needs. For medical emergencies, dial 911. Now available from your iPhone and Android! Introducing Driss Morris As a [employer] [facility] patient, I wanted to make you aware of our electronic visit tool called Driss Morris. Prezma/Karaz allows you to connect within minutes with a medical provider 24 hours a day, seven days a week via a mobile device or tablet or logging into a secure website from your computer. You can access Driss Morris from anywhere in the [place]. A virtual visit might be right for you when you have a simple condition and feel like you just dont want to get out of bed, or cant get away from work for an appointment, when your regular [facility] provider is not available (evenings, weekends or holidays), or when youre out of town and need minor care. Electronic visits cost only $49 and if the Prezma/Karaz provider determines a prescription is needed to treat your condition, one can be electronically transmitted to a nearby pharmacy*. Please take a moment to enroll today if you have not already done so. The enrollment process is free and takes just a few minutes. To enroll, please download the [email or web address] pina to your tablet or phone, or visit [email or web address] to enroll on your computer. And, as an [address] [place] [address] patient with a [employer] account, the results of your visits will be scanned into your electronic medical record and your primary care provider will be able to view the scanned results. We urge you to continue to see your regular [facility] provider for your ongoing medical care.   And while your primary care provider may not be the one available when you seek a Driss Morris virtual visit, the peace of mind you get from getting a real diagnosis real time can be priceless. For more information on Driss Valenciaandriyfin, view our Frequently Asked Questions (FAQs) at [email or web address]. Sincerely, 
 
Cassius Mooney MD 
Chief Medical Officer [employer] *:  certain medications cannot be prescribed via Driss Morris Providers Seen During Your Hospitalization Provider Specialty Primary office phone Pedro Pablo Lantigua MD Gastroenterology [phone] Your Primary Care Physician (PCP) Primary Care Physician Office Phone Office Fax [facility] [phone] You are allergic to the following Allergen Reactions Amoxicillin Anaphylaxis Amoxicillin Anaphylaxis Lipitor (Atorvastatin) Other (comments) Severe muscle pain and spasms Zocor (Simvastatin) Other (comments) Cramps, muscle spasms Livalo (Pitavastatin) Myalgia Pravastatin Other (comments) Leg cramps Recent Documentation Height Weight Breastfeeding? BMI OB Status Smoking Status 1.651 m 113.9 kg No 41.8 kg/m2 Postmenopausal Former Smoker Emergency Contacts Name Discharge Info Relation Home Work Mobile Reji Huddleston DISCHARGE CAREGIVER [3] Spouse [3] [phone] Patient Belongings The following personal items are in your possession at time of discharge: 
  Dental Appliances: None  Visual Aid: Glasses, With patient Please provide this summary of care documentation to your next provider. Signatures-by signing, you are acknowledging that this After Visit Summary has been reviewed with you and you have received a copy. Patient Signature:  ____________________________________________________________ Date:  ____________________________________________________________ `    
    
 Provider Signature:  ____________________________________________________________ Date:  ____________________________________________________________

## 2018-06-29 NOTE — H&P
Pre-endoscopy H and P    The patient was seen and examined in the room/pre-op holding area. The airway was assessed and documented. The problem list, past medical history, and medications were reviewed. 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    Hyperlipidemia E78.5    Mitral valve regurgitation I34.0    History of MI (myocardial infarction) I25.2    Cardiomyopathy ([facility] I42.9    Osteoporosis M81.0    Morbid obesity with BMI of 40.0-44.9, adult ([facility] E66.01, Z68.41    Advance care planning Z71.89    Insomnia G47.00    S/P ICD (internal cardiac defibrillator) procedure Z95.810    AICD lead displacement T82.120A    ICD (implantable cardioverter-defibrillator) in place Z95.810    Primary osteoarthritis of both knees M17.0    Mild intermittent asthma without complication J98.72    High risk medication use Z79.899    Acute right ankle pain M25.571     Social History     Social History    Marital status:      Spouse name: N/A    Number of children: N/A    Years of education: N/A     Occupational History    Not on file.      Social History Main Topics    Smoking status: Former Smoker     Packs/day: 1.00     Years: 30.00     Start date: 1/1/1970     Quit date: 5/17/2004    Smokeless tobacco: Never Used    Alcohol use No    Drug use: No    Sexual activity: No     Other Topics Concern    Not on file     Social History Narrative    ** Merged History Encounter **          Past Medical History:   Diagnosis Date    Asthma     Cardiomyopathy ([facility]     Coronary artery disease 2008    s/p RCA stent ([facility]) on 11/26/11    Depression with anxiety     2011    DVT (deep venous thrombosis) ([facility] 7/27/2010    Family history of early CAD     GERD (gastroesophageal reflux disease)     H/O gastric bypass 2006    Revision in 2009    Hepatitis C antibody test positive     Does not have chronic hep C (labs 10/6/15: neg HCV RNA)     HTN (hypertension) 7/27/2010    Hyperlipidemia 07/27/2010    Joint pain 7/27/2010    MI (myocardial infarction) ([facility] 7/27/2010    Mitral valve regurgitation     Mild to moderate    Morbid obesity ([facility] 7/27/2010    Myocardial infarction [facility]) 2006 and 2011    Dr. Parag Obando Psychiatric disorder     PUD (peptic ulcer disease)     gi bleed 2008; ulcer n gastric bypass pouch    Urinary incontinence, stress 7/27/2010         Prior to Admission Medications   Prescriptions Last Dose Informant Patient Reported? Taking? ALPRAZolam (XANAX) 0.5 mg tablet 5/29/2018 at Unknown time  No Yes   Sig: Take 1 Tab by mouth two (2) times daily as needed for Anxiety. Max Daily Amount: 1 mg. CALCIUM PO 6/28/2018 at Unknown time  Yes Yes   Sig: Take 600 mg by mouth daily. FLUoxetine (PROZAC) 20 mg tablet 6/27/2018 at Unknown time  No Yes   Sig: TAKE 2 TABLETS EVERY MORNING AND 1 AT BEDTIME FOR ANXIETY WITH DEPRESSION   LACTOBACILLUS ACIDOPHILUS (PROBIOTIC PO) 6/28/2018 at Unknown time  Yes Yes   Sig: Take 1 Tab by mouth daily. OTHER 6/28/2018 at Unknown time  Yes Yes   Sig: Complete multi formula, bariatric advantage   albuterol (PROVENTIL HFA, VENTOLIN HFA, PROAIR HFA) 90 mcg/actuation inhaler Unknown at Unknown time  No No   Sig: Take 2 Puffs by inhalation every four (4) hours as needed for Wheezing or Shortness of Breath. aspirin delayed-release 81 mg tablet 6/25/2018 at Unknown time  Yes Yes   Sig: Take  by mouth daily. biotin 10,000 mcg cap 6/28/2018 at Unknown time  Yes Yes   Sig: Take  by mouth daily. bumetanide (BUMEX) 2 mg tablet 6/28/2018 at Unknown time  Yes Yes   Sig: Take 2 mg by mouth two (2) times a day.    busPIRone (BUSPAR) 5 mg tablet Not Taking at Unknown time  No No   Sig: Take 1 Tab by mouth two (2) times a day. cholecalciferol, VITAMIN D3, (VITAMIN D3) 5,000 unit tab tablet 6/28/2018 at Unknown time  Yes Yes   Sig: Take 10,000 Units by mouth daily. ergocalciferol (ERGOCALCIFEROL) 50,000 unit capsule 6/22/2018  No No   Sig: Take 1 Cap by mouth every seven (7) days. ferrous sulfate (IRON, FERROUS SULFATE,) 325 mg (65 mg Iron) tablet 6/28/2018 at Unknown time  Yes Yes   Sig: Take  by mouth daily (before breakfast). magnesium 250 mg tab 6/28/2018 at Unknown time  Yes Yes   Sig: Take 1 Tab by mouth daily. metOLazone (ZAROXOLYN) 2.5 mg tablet Unknown at Unknown time  No No   Sig: TAKE 1 TABLET BY MOUTH DAILY AS NEEDED FOR UP TO 2 DAYS   nebivolol (BYSTOLIC) 5 mg tablet 6/49/3336 at Unknown time  No Yes   Sig: Take 1 Tab by mouth daily. potassium chloride (KLOR-CON M20) 20 mEq tablet 6/28/2018 at Unknown time  No Yes   Sig: TAKE TWO TABLETS BY MOUTH DAILY   sacubitril-valsartan (ENTRESTO) 49 mg/51 mg tablet 6/28/2018 at Unknown time  No Yes   Sig: Take 1 Tab by mouth two (2) times a day. zolpidem (AMBIEN) 10 mg tablet 6/27/2018 at Unknown time  No Yes   Sig: Take 1 Tab by mouth nightly as needed for Sleep. Max Daily Amount: 10 mg. Facility-Administered Medications: None           The review of systems is:  Negative  for shortness of breath or chest pain      The heart, lungs, and mental status were satisfactory for the administration of deep sedation and for the procedure. I discussed with the patient the objectives, risks, consequences and alternatives to the procedure.       Sai Perez MD  6/29/2018  1:23 PM

## 2018-06-29 NOTE — PERIOP NOTES
Endoscope was pre-cleaned at the bedside immediately following procedure by [employer] tech.

## 2018-06-29 NOTE — ANESTHESIA POSTPROCEDURE EVALUATION
Post-Anesthesia Evaluation and Assessment    Patient: Gloria Murcia MRN: [id number]  SSN: [id number]    YOB: 1950  Age: 79 y.o. Sex: female       Cardiovascular Function/Vital Signs  Visit Vitals    /66    Pulse (!) 57    Temp 36.4 °C (97.6 °F)    Resp 14    Ht 5' 5\" (1.651 m)    Wt 113.9 kg (251 lb 3 oz)    SpO2 100%    Breastfeeding No    BMI 41.8 kg/m2       Patient is status post total IV anesthesia, MAC anesthesia for Procedure(s):  COLONOSCOPY  COLON BIOPSY  RESOLUTION CLIP. Nausea/Vomiting: None    Postoperative hydration reviewed and adequate. Pain:  Pain Scale 1: Numeric (0 - 10) (06/29/18 1426)  Pain Intensity 1: 0 (06/29/18 1426)   Managed    Neurological Status: At baseline    Mental Status and Level of Consciousness: Arousable    Pulmonary Status:   O2 Device: Room air (06/29/18 1426)   Adequate oxygenation and airway patent    Complications related to anesthesia: None    Post-anesthesia assessment completed.  No concerns    Signed By: Kyle Melvin MD     June 29, 2018

## 2018-06-29 NOTE — ANESTHESIA PREPROCEDURE EVALUATION
Anesthetic History   No history of anesthetic complications            Review of Systems / Medical History  Patient summary reviewed, nursing notes reviewed and pertinent labs reviewed    Pulmonary            Asthma        Neuro/Psych         Psychiatric history     Cardiovascular    Hypertension      CHF    Pacemaker, past MI, CAD, cardiac stents and hyperlipidemia    Exercise tolerance: >4 METS  Comments: MR    Ejection fraction was estimated in the range of 25 % to 30 %.    GI/Hepatic/Renal     GERD      PUD     Endo/Other        Morbid obesity     Other Findings   Comments: H/O gastric bypass     H/o of DVT         Physical Exam    Airway  Mallampati: II  TM Distance: 4 - 6 cm  Neck ROM: normal range of motion   Mouth opening: Normal     Cardiovascular  Regular rate and rhythm,  S1 and S2 normal,  no murmur, click, rub, or gallop             Dental  No notable dental hx       Pulmonary  Breath sounds clear to auscultation               Abdominal  GI exam deferred       Other Findings            Anesthetic Plan    ASA: 3  Anesthesia type: total IV anesthesia and MAC          Induction: Intravenous  Anesthetic plan and risks discussed with: Patient

## 2018-06-29 NOTE — ROUTINE PROCESS
Yaakov Sunshine  1950  [id number]    Situation:  Verbal report received from: Felice Moe RN  Procedure: Procedure(s):  COLONOSCOPY  COLON BIOPSY  RESOLUTION CLIP    Background:    Preoperative diagnosis: incontinence, abdominal cramps  Postoperative diagnosis:   redundant colon, hemorrhoids    :  Dr. Vonda Dawn  Assistant(s): Endoscopy Technician-1: Khadijah Georges  Endoscopy RN-1: Sultnaa Harrington    Specimens:   ID Type Source Tests Collected by Time Destination   1 : bx Preservative   Holly Min, MD 6/29/2018 1359 Pathology     H. Pylori  no    Assessment:  Intra-procedure medications     Anesthesia gave intra-procedure sedation and medications, see anesthesia flow sheet yes    Intravenous fluids: NS@ KVO     Vital signs stable       Abdominal assessment: round and soft       Recommendation:  Discharge patient per MD order  Family or Friend   Permission to share finding with family or friend

## 2018-06-29 NOTE — PERIOP NOTES
Anesthesia reports 310 mg Propofol, 100 mg Lidocaine and 400 mL NS given during procedure. Received report from anesthesia staff on vital signs and status of patient. Glasses returned to pt.

## 2018-06-29 NOTE — PROCEDURES
Colonoscopy Procedure Note    Florentina Schilder  1950  [id number]    Indications:  Please see below. Pre-operative Diagnosis: incontinence, abdominal cramps    Post-operative Diagnosis:   redundant colon, internal hemorrhoids      : Timothy Núñez MD    Referring Provider: Ching Bowser MD    Sedation:  MAC anesthesia Propofol        Procedure Details:    After detailed informed consent was obtained with all risks and benefits of procedure explained and preoperative exam completed, the patient was taken to the endoscopy suite and placed in the left lateral decubitus position. Upon sequential sedation as per above, a digital rectal exam was performed  And was normal.  The Olympus videocolonoscope  was inserted in the rectum and carefully advanced to the cecum, which was identified by the ileocecal valve. The quality of preparation was good. The colonoscope was slowly withdrawn with careful evaluation between folds. Retroflexion in the rectum was performed. Findings:   · The colon is very redundant and challenging. Her abdominal pannus makes counter pressure very difficult which was provided by 3 RNs. I was able to see the ICV but not behind it with the scope inserted all the way. · I took mucosal biopsies;she oozes easily. A single 360 BS Resolution clip was applied to decrease bleed risk at one site that did not stop oozing. · Medium internal hemorrhoids  · CRYSTAL shows mildly dec anal sphincter muscle tone. Therapies:  biopsy of colon: random colon, BS Resolution clip x 1    Specimen: Specimens were collected as described above and sent to pathology. Complications: None were encountered during the procedure. EBL:  5 ml. Recommendations:     -Await pathology.  -Office visit as per plan. Rajeev Núñez MD  6/29/2018  1:54 PM

## 2018-06-29 NOTE — DISCHARGE INSTRUCTIONS
[place] Office: [phone]    Rashmi Motley  [id number]  1950    EGD/COLONOSCOPY DISCHARGE INSTRUCTIONS  Discomfort:  redness at IV site- apply warm compress to area; if redness or soreness persist- contact your physician  Gaseous discomfort- walking, belching will help relieve any discomfort  You may not operate a vehicle for 12 hours  You may not engage in an occupation involving machinery or appliances for rest of today. You may not drink alcoholic beverages for at least 12 hours  Avoid making any critical decisions for at least 24 hour  DIET  You may resume your regular diet - however -  remember your colon is empty and a heavy meal will produce gas. Avoid these foods:  fried / greasy foods, excessive carbonated drinks or too much caffeine  MEDICATIONS   Regarding Aspirin or Nonsteroidal medications specifically, please see below. ACTIVITY  You may resume your normal daily activities. Spend the remainder of the day resting -  avoid any strenuous activity. CALL M.D. ANY SIGN OF   Increasing pain, nausea, vomiting  Abdominal distension (swelling)  New increased bleeding (oral or rectal)  Fever (chills)      You may not take any Advil, Aspirin, Ibuprofen, Motrin, Aleve, or Goodys for 7 days, ONLY  Tylenol as needed for pain. Follow-up Instructions:   Call  Rajeev Nicolas MD for any questions or concerns  Results of procedure / biopsy in 7 days   Telephone # [phone]      Follow-up Information     None

## 2018-07-02 ENCOUNTER — HOSPITAL ENCOUNTER (OUTPATIENT)
Dept: MRI IMAGING | Age: 68
Discharge: HOME | End: 2018-07-02
Attending: INTERNAL MEDICINE
Payer: MEDICARE

## 2018-07-02 ENCOUNTER — HOSPITAL ENCOUNTER (OUTPATIENT)
Dept: GENERAL RADIOLOGY | Age: 68
Discharge: HOME | End: 2018-07-02
Attending: INTERNAL MEDICINE
Payer: MEDICARE

## 2018-07-02 DIAGNOSIS — R15.9 INCONTINENCE OF FECES: ICD-10-CM

## 2018-07-02 DIAGNOSIS — Z95.0 PACEMAKER: ICD-10-CM

## 2018-07-02 PROCEDURE — 72195 MRI PELVIS W/O DYE: CPT

## 2018-07-02 PROCEDURE — 71046 X-RAY EXAM CHEST 2 VIEWS: CPT

## 2018-07-10 ENCOUNTER — OFFICE VISIT (OUTPATIENT)
Dept: CARDIOLOGY CLINIC | Age: 68
End: 2018-07-10

## 2018-07-10 VITALS
DIASTOLIC BLOOD PRESSURE: 76 MMHG | RESPIRATION RATE: 16 BRPM | WEIGHT: 243.4 LBS | SYSTOLIC BLOOD PRESSURE: 120 MMHG | BODY MASS INDEX: 40.55 KG/M2 | HEART RATE: 64 BPM | HEIGHT: 65 IN

## 2018-07-10 DIAGNOSIS — I25.10 CORONARY ARTERY DISEASE INVOLVING NATIVE CORONARY ARTERY OF NATIVE HEART WITHOUT ANGINA PECTORIS: ICD-10-CM

## 2018-07-10 DIAGNOSIS — I10 ESSENTIAL HYPERTENSION: ICD-10-CM

## 2018-07-10 DIAGNOSIS — I50.22 SYSTOLIC CHF, CHRONIC (HCC): Primary | ICD-10-CM

## 2018-07-10 DIAGNOSIS — I34.0 NON-RHEUMATIC MITRAL REGURGITATION: ICD-10-CM

## 2018-07-10 NOTE — MR AVS SNAPSHOT
[address] [facility] [address] [place] 
[phone] Patient: Maribeth Bloch MRN: [id number] SPP:1/88/1825 Visit Information Date & Time Provider Department Dept. Phone Encounter #  
 7/10/2018 11:45 AM Sean Bliss MD [facility] [place] [phone] [id number] Your Appointments 8/29/2018  9:30 AM  
ESTABLISHED PATIENT with Wen Cooper MD  
[facility]) Appt Note: 2 months (around 7/29/2018), or if symptoms worsen or fail to improve, for Depression/anxiety [place] [address] [place] [address]  
  
    
 10/16/2018 11:00 AM  
ECHO CARDIOGRAMS 2D with Frankie Flores [facility] ([facility] SCHEDULING) Appt Note: echo for CHF 11:00 Dr. Radu Mckenna [address]  [address] [place] [address]  
[address] [address]  [phone]  
  
    
 10/16/2018 11:45 AM  
ESTABLISHED PATIENT with Sean Bliss MD  
[facility] ([address] [facility]) Appt Note: echo for CHF 11:00 Dr. Radu Mckenna [address] [facility] [place]  
[address] [address] [phone]  
  
    
 11/15/2018 10:30 AM  
PACEMAKER with Erick Madsen [facility] ([facility] SCHEDULING) Appt Note: hubert hand, annual , Lat, see [address] [facility] [address] [place]  
[address] [facility]  
  
    
 11/15/2018 10:40 AM  
ESTABLISHED PATIENT with Manuela Stephens MD  
[facility] ([address]) Appt Note: hubert hand, annual , Lat, see [address] [place]  
[phone] [address] [facility] [address]  
  
    
  
 8/13/2018  9:15 AM  
REMOTE OFFICE VISIT with Kyra Villagomez [facility] ([facility]) Appt Note: bsc sicd, rc  
 [address] [place]  
[address] [place] [address] Upcoming Health Maintenance Date Due  
 GLAUCOMA SCREENING Q2Y 5/17/2017 BREAST CANCER SCRN MAMMOGRAM 2/1/2018 Influenza Age 5 to Adult 8/1/2018 MEDICARE YEARLY EXAM 10/10/2018 COLONOSCOPY 6/29/2023 DTaP/Tdap/Td series (2 - Td) 10/30/2024 Allergies as of 7/10/2018  Review Complete On: 7/10/2018 By: Marcella Oden Severity Noted Reaction Type Reactions Amoxicillin High 07/27/2010   Side Effect Anaphylaxis Amoxicillin High 06/30/2015    Anaphylaxis Lipitor [Atorvastatin] High 06/30/2015    Other (comments) Severe muscle pain and spasms Zocor [Simvastatin] Medium 06/30/2015    Other (comments) Cramps, muscle spasms Livalo [Pitavastatin]  01/25/2017    Myalgia Pravastatin  08/19/2016    Other (comments) Leg cramps Current Immunizations  Reviewed on 7/11/2016 Name Date Influenza High Dose Vaccine PF 10/9/2017 Influenza Vaccine (Quad) PF 9/21/2016  6:19 PM, 10/6/2015 Influenza Vaccine Split 11/28/2011 11:25 AM  
 Pneumococcal Conjugate (PCV-13) 6/14/2016 Pneumococcal Polysaccharide (PPSV-23) 10/9/2017 Tdap 10/30/2014 ZZZ-RETIRED (DO NOT USE) Pneumococcal Vaccine (Unspecified Type) 8/20/2011 Zoster Vaccine, Live 2/26/2016 Not reviewed this visit You Were Diagnosed With   
  
 Codes Comments Systolic CHF, chronic (HCC)    -  Primary ICD-10-CM: B23.13 ICD-9-CM: 428.22, 428.0 Coronary artery disease involving native coronary artery of native heart without angina pectoris     ICD-10-CM: I25.10 ICD-9-CM: 414.01 Essential hypertension     ICD-10-CM: I10 
ICD-9-CM: 401.9 Non-rheumatic mitral regurgitation     ICD-10-CM: I34.0 ICD-9-CM: 424.0 Vitals BP Pulse Resp Height(growth percentile) Weight(growth percentile) BMI  
 120/76 (BP 1 Location: Left arm, BP Patient Position: Sitting) 64 16 5' 5\" (1.651 m) 243 lb 6.4 oz (110.4 kg) 40.5 kg/m2 OB Status Smoking Status Postmenopausal Former Smoker Vitals History BMI and BSA Data Body Mass Index Body Surface Area 40.5 kg/m 2 2.25 m 2 Preferred Pharmacy Pharmacy Name Phone [phone] Your Updated Medication List  
  
   
This list is accurate as of 7/10/18 12:19 PM.  Always use your most recent med list.  
  
  
  
  
 albuterol 90 mcg/actuation inhaler Commonly known as:  PROVENTIL HFA, VENTOLIN HFA, PROAIR HFA Take 2 Puffs by inhalation every four (4) hours as needed for Wheezing or Shortness of Breath. ALPRAZolam 0.5 mg tablet Commonly known as:  [place] Amend Take 1 Tab by mouth two (2) times daily as needed for Anxiety. Max Daily Amount: 1 mg.  
  
 aspirin delayed-release 81 mg tablet Take  by mouth daily. biotin 10,000 mcg Cap Take  by mouth daily. bumetanide 2 mg tablet Commonly known as:  [place] Shows Take 2 mg by mouth two (2) times a day. CALCIUM PO Take 600 mg by mouth daily. cholecalciferol (VITAMIN D3) 5,000 unit Tab tablet Commonly known as:  VITAMIN D3 Take 10,000 Units by mouth daily. ergocalciferol 50,000 unit capsule Commonly known as:  ERGOCALCIFEROL Take 1 Cap by mouth every seven (7) days. FLUoxetine 20 mg tablet Commonly known as:  PROzac TAKE 2 TABLETS EVERY MORNING AND 1 AT BEDTIME FOR ANXIETY WITH DEPRESSION Iron (Ferrous Sulfate) 325 mg (65 mg iron) tablet Generic drug:  ferrous sulfate Take  by mouth daily (before breakfast). magnesium 250 mg Tab Take 1 Tab by mouth daily. metOLazone 2.5 mg tablet Commonly known as:  ZAROXOLYN  
TAKE 1 TABLET BY MOUTH DAILY AS NEEDED FOR UP TO 2 DAYS  
  
 nebivolol 5 mg tablet Commonly known as:  BYSTOLIC Take 1 Tab by mouth daily. OTHER Complete multi formula, bariatric advantage  
  
 potassium chloride 20 mEq tablet Commonly known as:  KLOR-CON M20  
TAKE TWO TABLETS BY MOUTH DAILY PROBIOTIC PO Take 1 Tab by mouth daily. sacubitril-valsartan 49-51 mg Tab tablet Commonly known as:  ENTRESTO Take 1 Tab by mouth two (2) times a day. zolpidem 10 mg tablet Commonly known as:  AMBIEN Take 1 Tab by mouth nightly as needed for Sleep. Max Daily Amount: 10 mg. To-Do List   
 07/10/2018 ECHO:  2D ECHO COMPLETE ADULT (TTE) W OR WO CONTR Introducing hospitals & [facility] SERVICES! Dear Armida Alfonso: Thank you for requesting a doForms account. Our records indicate that you already have an active doForms account. You can access your account anytime at [email or web address] Did you know that you can access your hospital and ER discharge instructions at any time in doForms? You can also review all of your test results from your hospital stay or ER visit. Additional Information If you have questions, please visit the Frequently Asked Questions section of the doForms website at [email or web address] Remember, doForms is NOT to be used for urgent needs. For medical emergencies, dial 911. Now available from your iPhone and Android! Please provide this summary of care documentation to your next provider. Your primary care clinician is listed as Arminda Mariee. If you have any questions after today's visit, please call [phone].

## 2018-07-10 NOTE — PROGRESS NOTES
[facility]  [phone]    HPI: Carter Lara, a 79 y.o. who presents for follow up regarding her CAD and CHF. She is having issues with her bowels, had a pelvic MRI and hasn't gotten results of that yet, encouraged her to call GI to discuss results  No noticeable hair loss on bystolic thusfar  Says she is feeling better on higher dose of entresto   LE edema doing well, takes metolazone PRN maybe every other week   No diziness or syncope  No chest pain  No PND or orthopnea  RODNEY is stable   Still waiting to hear decision on praluent application  Insurance refused coverage for repatha unless she tried pravastatin 40mg daily, she tried taking it and had worsening myalgias on it again  She had to stop taking the pravastatin altogether  Had had myalgias on pravastatin 20mg daily as well, now she is not taking pravastatin at all and myaglias improved    Assessment/Plan:  1. Chronic systolic heart failure - LVEF 25%-30% by TTE, s/p AICD placement with Dr. Kishor Pressley and subsequent lead revisions and repeat procedures due to non-healing midsternal incision  -last revision for AICD in November 2016 with Dr. Kishor Pressley, last ICD check without arrhythmias   -continue Entresto 49/51 one tablet BID and Bystolic 5mg daily, had hair loss on coreg and Toprol XL but doing well on bystolic thusfar     -off spironolactone due to hypotension, continue bumex BID, using Metolazone PRN  -will follow up in the office in 3 months with an TTE to assess LVEF   2. CAD - hx of MI x 2 and multiple stents, she believes she may have 6 or 7 stents, last cardiac cath without progression of CAD and stress test in 4/17 showed possible anterior ischemia vs artifact but asymptomatic currently so will just continue to watch, continue ASA and beta blocker, see lipid plan below  -reports having an MI in 11/2011 and received PCI to RCA at that time, previous MI in 2006 or 2007  3.  Mitral regurgitation - mild to mod by TTE 5/18 4. Asthma - x 15 - 16 years, has not seen pulmonologist in years  11. HTN - continue current regimen  6. Primary Hyperlipidemia - statin failures, simvastatin caused muscle spasms and cramps, atorvastatin caused pain shooting all over her body, pravastatin 40mg and 20mg caused myalgias and leg cramps, had myalgias on Livalo, could not tolerate zetia either   -Repatha application requested she try pravastatin 40mg daily again and she had myalgias again on this dose, now awaiting decision regarding Praluent   -not able to take any statin at any dose,  in 6/18, needs lipid lowering for CAD and PAD  7. DM Type 2 - resolved with gastric bypass  8. Hypokalemia -off spironolactone and on KCL 40meq daily, labs ok 6/18     9. Hypomagnesemia - on OTC, labs ok 6/18      10. Morbid obesity - Body mass index is 40.5 kg/(m^2). ). weighed 416 lbs at her heaviest weight, s/p gastric bypass in 2007 with revision a few years later, now down to 243 lbs, encouraged exercising   11. PUD - had EGD and cauterized bleeding ulcer, not on PPI anymore  12. Vitamin D deficiency - on 50,000 units once/week x 8 weeks and then will start 2000 units daily   13. Anxiety - on multiple agents per PCP   14. PAD/Carotid stenosis - 10-49% bilateral ICA stenosis, continue ASA, see plan above regarding statin       Echo 5/18 - LVEF 25 %-30 %, akinesis of the basal-mid inferoseptal and basal-mid inferolateral wall(s), severe hypokinesis of the entire inferior wall(s), grade 1 dd, mild to mod dilated LA, mild to mod MR  Carotid duplex 5/18 - 10-49% bilateral ICA stenosis  Echo: 4/17, EF 35-40%, inf HK gr1dd  Nuclear: 4/17, EF 36%, anterior ischemia, lateral infarct. TTE 9/16 - LVEF 35%, moderate hypokinesis of the basal-mid inferolateral wall(s), grd 1 dd, dilated LA, mod MR, mild TR  8/26/16 - RV lead revision by Dr. Hilario Issa  8/24/16 - single chamber AICD placed by Dr. Hilario Issa ([address] VR, serial # [id number], model # [id number].  The ventricular lead: model # [id number] , serial # [id number])  MUGA  - LVEF 27%  Holter  - no arrhythmias  Echo  - LV mildly dilated, LVEF 40%, moderate diffuse hypokinesis with regional variations, severe hypokinesis of the basal-mid inferior wall(s), grd 1 dd, mod dilated LA, atrial septum bows from left to right, consistent with increased left atrial pressure, mildly dilated RA, mild to near moderate MR    OLIVER  - normal  Nuc 5/15 EF 31% large anterolateral infarct with periinfarct reversibility, 13% LV reversible(32% infarct at rest, 45% at stress)    Echo 2015 - LVEF 30-35%, grd 1 dd, mod LAE, mild to mod MR  Nuc Stress  - small reversibility in anteroapical wall, LVEF 31%  Cardiac Cath 3/13 - patent stents in LAD, LCx and RCA, LVEF 30%, severe inferior HK, LCx relatively small vessel with patent stent in proximal LCx, RCA is large and dominant with patent stents in proximal and mid RCA, distal RCA free of disease, LAD normal and large vessel which coursed around apex  Echo 2011 - LVEF 30%, mild MR  Cardiac Cath 2011 - s/p PCI with AMRIT to 100% mid RCA, also had 40% mid LAD lesion, 50% diagonal 1 lesion, 100% distal LCx lesion, 60% OM1 lesion, 100% proximal Ramus lesion      Soc Hx: quit tobacco use x 13 years ago, used to smoke 1ppd, no etoh use, no drug use, lives with , son, daughter in law, grandson, retired/on disability  Fam Hx: father in his 62s when he had a MI, had 3 vessel CABG in his 62s, passed from fall but had cancer too, mother lived to age 80 and  from Alzheimer's, brother had MI in his 62s, sister had aortic repair for aneurysm in her 76s    She  has a past medical history of Asthma; Cardiomyopathy ([facility] Coronary artery disease (); Depression with anxiety; DVT (deep venous thrombosis) ([facility] (2010); Family history of early CAD; GERD (gastroesophageal reflux disease); H/O gastric bypass ();  Hepatitis C antibody test positive; HTN (hypertension) (7/27/2010); Hyperlipidemia (07/27/2010); Joint pain (7/27/2010); MI (myocardial infarction) ([facility] (7/27/2010); Mitral valve regurgitation; Morbid obesity ([facility] (7/27/2010); Myocardial infarction [facility]) (2006 and 2011); Psychiatric disorder; PUD (peptic ulcer disease); and Urinary incontinence, stress (7/27/2010). She also has no past medical history of Diabetes ([facility] Cardiovascular ROS: positive for dyspnea on exertion   Respiratory ROS: no cough, wheezing  Neurological ROS: no TIA or stroke symptoms  All other systems negative except as above. PE  Vitals:    07/10/18 1148   BP: 120/76   Pulse: 64   Resp: 16   Weight: 243 lb 6.4 oz (110.4 kg)   Height: 5' 5\" (1.651 m)    Body mass index is 40.5 kg/(m^2).   General appearance - alert, well appearing, and in no distress  Mental status - affect appropriate to mood  Eyes - sclera anicteric, moist mucous membranes  Neck - supple  Lymphatics - not assessed  Chest - clear to auscultation, no wheezes, rales or rhonchi  Heart - normal rate, regular rhythm, normal S1, S2, 2/6 MYKE   Abdomen - soft, nontender, nondistended, obese  Back exam - full range of motion, no tenderness  Neurological - cranial nerves II through XII grossly intact, no focal deficit  Musculoskeletal - no muscular tenderness noted, normal strength  Extremities - diminished peripheral pulses, no LE edema  Skin - normal coloration  no rashes    Recent Labs:  Lab Results   Component Value Date/Time    Cholesterol, total 226 (H) 06/13/2018 01:37 PM    HDL Cholesterol 65 06/13/2018 01:37 PM    LDL, calculated 132 (H) 06/13/2018 01:37 PM    Triglyceride 145 06/13/2018 01:37 PM     Lab Results   Component Value Date/Time    Creatinine 0.70 06/13/2018 01:37 PM     Lab Results   Component Value Date/Time    BUN 13 06/13/2018 01:37 PM    BUN (POC) 24 (H) 11/26/2011 09:50 PM     Lab Results   Component Value Date/Time    Potassium 4.0 06/13/2018 01:37 PM     Lab Results   Component Value Date/Time Hemoglobin A1c 5.8 (H) 02/09/2016 11:44 AM    Hemoglobin A1c, External 5.5 12/12/2014     Lab Results   Component Value Date/Time    HGB 14.7 02/23/2017 07:26 AM     Lab Results   Component Value Date/Time    PLATELET 752 47/99/9103 07:26 AM       Reviewed:  Past Medical History:   Diagnosis Date    Asthma     Cardiomyopathy ([facility]     Coronary artery disease 2008    s/p RCA stent ([facility]RIT) on 11/26/11    Depression with anxiety     2011    DVT (deep venous thrombosis) ([facility] 7/27/2010    Family history of early CAD     GERD (gastroesophageal reflux disease)     H/O gastric bypass 2006    Revision in 2009    Hepatitis C antibody test positive     Does not have chronic hep C (labs 10/6/15: neg HCV RNA)     HTN (hypertension) 7/27/2010    Hyperlipidemia 07/27/2010    Joint pain 7/27/2010    MI (myocardial infarction) ([facility] 7/27/2010    Mitral valve regurgitation     Mild to moderate    Morbid obesity ([facility] 7/27/2010    Myocardial infarction [facility]) 2006 and 2011    Dr. Alyssa Camacho disorder     PUD (peptic ulcer disease)     gi bleed 2008; ulcer n gastric bypass pouch    Urinary incontinence, stress 7/27/2010     History   Smoking Status    Former Smoker    Packs/day: 1.00    Years: 30.00    Start date: 1/1/1970    Quit date: 5/17/2004   Smokeless Tobacco    Never Used     History   Alcohol Use No     Allergies   Allergen Reactions    Amoxicillin Anaphylaxis    Amoxicillin Anaphylaxis    Lipitor [Atorvastatin] Other (comments)     Severe muscle pain and spasms    Zocor [Simvastatin] Other (comments)     Cramps, muscle spasms    Livalo [Pitavastatin] Myalgia    Pravastatin Other (comments)     Leg cramps       Current Outpatient Prescriptions   Medication Sig    ergocalciferol (ERGOCALCIFEROL) 50,000 unit capsule Take 1 Cap by mouth every seven (7) days.  cholecalciferol, VITAMIN D3, (VITAMIN D3) 5,000 unit tab tablet Take 10,000 Units by mouth daily.     nebivolol (BYSTOLIC) 5 mg tablet Take 1 Tab by mouth daily.  zolpidem (AMBIEN) 10 mg tablet Take 1 Tab by mouth nightly as needed for Sleep. Max Daily Amount: 10 mg.    sacubitril-valsartan (ENTRESTO) 49 mg/51 mg tablet Take 1 Tab by mouth two (2) times a day.  metOLazone (ZAROXOLYN) 2.5 mg tablet TAKE 1 TABLET BY MOUTH DAILY AS NEEDED FOR UP TO 2 DAYS    bumetanide (BUMEX) 2 mg tablet Take 2 mg by mouth two (2) times a day.  ALPRAZolam (XANAX) 0.5 mg tablet Take 1 Tab by mouth two (2) times daily as needed for Anxiety. Max Daily Amount: 1 mg.  FLUoxetine (PROZAC) 20 mg tablet TAKE 2 TABLETS EVERY MORNING AND 1 AT BEDTIME FOR ANXIETY WITH DEPRESSION    potassium chloride (KLOR-CON M20) 20 mEq tablet TAKE TWO TABLETS BY MOUTH DAILY    LACTOBACILLUS ACIDOPHILUS (PROBIOTIC PO) Take 1 Tab by mouth daily.  albuterol (PROVENTIL HFA, VENTOLIN HFA, PROAIR HFA) 90 mcg/actuation inhaler Take 2 Puffs by inhalation every four (4) hours as needed for Wheezing or Shortness of Breath.  aspirin delayed-release 81 mg tablet Take  by mouth daily.  biotin 10,000 mcg cap Take  by mouth daily.  OTHER Complete multi formula, bariatric advantage    magnesium 250 mg tab Take 1 Tab by mouth daily.  ferrous sulfate (IRON, FERROUS SULFATE,) 325 mg (65 mg Iron) tablet Take  by mouth daily (before breakfast).  CALCIUM PO Take 600 mg by mouth daily. No current facility-administered medications for this visit.         Carlton Varma MD  [facility] [address] [facility] [place] [address], [facility] [place] [facility] [address]  [facility], [facility]  [phone]

## 2018-08-13 ENCOUNTER — OFFICE VISIT (OUTPATIENT)
Dept: CARDIOLOGY CLINIC | Age: 68
End: 2018-08-13

## 2018-08-13 DIAGNOSIS — Z95.810 AUTOMATIC IMPLANTABLE CARDIOVERTER-DEFIBRILLATOR IN SITU: Primary | ICD-10-CM

## 2018-08-29 ENCOUNTER — OFFICE VISIT (OUTPATIENT)
Dept: INTERNAL MEDICINE CLINIC | Facility: CLINIC | Age: 68
End: 2018-08-29

## 2018-08-29 VITALS
HEART RATE: 61 BPM | DIASTOLIC BLOOD PRESSURE: 74 MMHG | HEIGHT: 65 IN | WEIGHT: 245 LBS | SYSTOLIC BLOOD PRESSURE: 117 MMHG | OXYGEN SATURATION: 97 % | RESPIRATION RATE: 18 BRPM | BODY MASS INDEX: 40.82 KG/M2 | TEMPERATURE: 97.8 F

## 2018-08-29 DIAGNOSIS — Z13.5 SCREENING FOR GLAUCOMA: ICD-10-CM

## 2018-08-29 DIAGNOSIS — I10 ESSENTIAL HYPERTENSION: ICD-10-CM

## 2018-08-29 DIAGNOSIS — F51.04 CHRONIC INSOMNIA: ICD-10-CM

## 2018-08-29 DIAGNOSIS — I50.22 SYSTOLIC CHF, CHRONIC (HCC): ICD-10-CM

## 2018-08-29 DIAGNOSIS — F41.1 GENERALIZED ANXIETY DISORDER: Primary | ICD-10-CM

## 2018-08-29 DIAGNOSIS — Z12.39 SCREENING FOR BREAST CANCER: ICD-10-CM

## 2018-08-29 RX ORDER — ZOLPIDEM TARTRATE 10 MG/1
10 TABLET ORAL
Qty: 90 TAB | Refills: 0 | Status: SHIPPED | OUTPATIENT
Start: 2018-08-29 | End: 2018-11-30 | Stop reason: SDUPTHER

## 2018-08-29 RX ORDER — DOXEPIN HYDROCHLORIDE 10 MG/1
10 CAPSULE ORAL
Qty: 12 CAP | Refills: 1 | Status: SHIPPED | OUTPATIENT
Start: 2018-08-29 | End: 2018-11-30

## 2018-08-29 RX ORDER — ALPRAZOLAM 0.5 MG/1
0.5 TABLET ORAL
Qty: 20 TAB | Refills: 0 | Status: CANCELLED | OUTPATIENT
Start: 2018-08-29

## 2018-08-29 NOTE — MR AVS SNAPSHOT
[address] [address] [phone] Patient: Ann Gómez MRN: [id number] Visit Information Date & Time Provider Department Dept. Phone Encounter #  
 8/29/2018  9:30 AM Vini Sanz MD [facility] Internal Medicine [address] [id number] Follow-up Instructions Return in about 3 months (around 11/29/2018), or if symptoms worsen or fail to improve, for Medicare AWV. Your Appointments 10/16/2018 11:00 AM  
ECHO CARDIOGRAMS 2D with Holley Alcantara [facility] ([facility] SCHEDULING) Appt Note: echo for CHF 11:00 Dr. Yaakov King [address] [place] [address]  
[address] [place] [phone]  
  
    
 10/16/2018 11:45 AM  
ESTABLISHED PATIENT with Sandeep Louise MD  
[facility] ([address] [place] [address]) Appt Note: echo for CHF 11:00 Dr. Yaakov King [address] [place] [address]  
[address] [place] [id number]  
  
    
 11/15/2018 10:30 AM  
PACEMAKER with Yohannes Cyone [facility] ([facility]T SCHEDULING) Appt Note: hubert hnad, annual , Lat, see [address] [place]  
[address] [facility]  
  
    
 11/15/2018 10:40 AM  
ESTABLISHED PATIENT with Elizabeth Lou MD  
[facility] ([address]) Appt Note: hubert hand, annual , Lat, see [address] [place]  
[address] [id number] Upcoming Health Maintenance Date Due  
 GLAUCOMA SCREENING Q2Y 5/17/2017 BREAST CANCER SCRN MAMMOGRAM 2/1/2018 Influenza Age 5 to Adult 8/1/2018 MEDICARE YEARLY EXAM 10/10/2018 COLONOSCOPY 6/29/2023 DTaP/Tdap/Td series (2 - Td) 10/30/2024 Allergies as of 8/29/2018  Review Complete On: 8/29/2018 By: Sienna Stokes MD  
  
 Severity Noted Reaction Type Reactions Amoxicillin High 07/27/2010   Side Effect Anaphylaxis Amoxicillin High 06/30/2015    Anaphylaxis Lipitor [Atorvastatin] High 06/30/2015    Other (comments) Severe muscle pain and spasms Zocor [Simvastatin] Medium 06/30/2015    Other (comments) Cramps, muscle spasms Livalo [Pitavastatin]  01/25/2017    Myalgia Pravastatin  08/19/2016    Other (comments) Leg cramps Current Immunizations  Reviewed on 7/11/2016 Name Date Influenza High Dose Vaccine PF 10/9/2017 Influenza Vaccine (Quad) PF 9/21/2016  6:19 PM, 10/6/2015 Influenza Vaccine Split 11/28/2011 11:25 AM  
 Pneumococcal Conjugate (PCV-13) 6/14/2016 Pneumococcal Polysaccharide (PPSV-23) 10/9/2017 Tdap 10/30/2014 ZZZ-RETIRED (DO NOT USE) Pneumococcal Vaccine (Unspecified Type) 8/20/2011 Zoster Vaccine, Live 2/26/2016 Not reviewed this visit You Were Diagnosed With   
  
 Codes Comments Generalized anxiety disorder    -  Primary ICD-10-CM: F41.1 ICD-9-CM: 300.02 Essential hypertension     ICD-10-CM: I10 
ICD-9-CM: 401.9 Chronic insomnia     ICD-10-CM: F51.04 
ICD-9-CM: 780.52 Systolic CHF, chronic (HCC)     ICD-10-CM: I50.22 ICD-9-CM: 428.22, 428.0 Screening for breast cancer     ICD-10-CM: Z12.31 
ICD-9-CM: V76.10 Screening for glaucoma     ICD-10-CM: Z13.5 ICD-9-CM: V80.1 Vitals BP Pulse Temp Resp Height(growth percentile) Weight(growth percentile) 117/74 (BP 1 Location: Left arm, BP Patient Position: Sitting) 61 97.8 °F (36.6 °C) (Oral) 18 5' 5\" (1.651 m) 245 lb (111.1 kg) SpO2 BMI OB Status Smoking Status 97% 40.77 kg/m2 Postmenopausal Former Smoker BMI and BSA Data Body Mass Index Body Surface Area 40.77 kg/m 2 2.26 m 2 Preferred Pharmacy Pharmacy Name Phone [phone] Your Updated Medication List  
  
   
This list is accurate as of 8/29/18 10:21 AM.  Always use your most recent med list.  
  
  
  
  
 albuterol 90 mcg/actuation inhaler Commonly known as:  PROVENTIL HFA, VENTOLIN HFA, PROAIR HFA Take 2 Puffs by inhalation every four (4) hours as needed for Wheezing or Shortness of Breath. ALPRAZolam 0.5 mg tablet Commonly known as:  Kaleta Krystin Take 1 Tab by mouth two (2) times daily as needed for Anxiety. Max Daily Amount: 1 mg.  
  
 aspirin delayed-release 81 mg tablet Take  by mouth daily. biotin 10,000 mcg Cap Take  by mouth daily. bumetanide 2 mg tablet Commonly known as:  Garlon Salen Take 2 mg by mouth two (2) times a day. CALCIUM PO Take 600 mg by mouth daily. cholecalciferol (VITAMIN D3) 5,000 unit Tab tablet Commonly known as:  VITAMIN D3 Take 10,000 Units by mouth daily. FLUoxetine 20 mg tablet Commonly known as:  PROzac TAKE 2 TABLETS EVERY MORNING AND 1 AT BEDTIME FOR ANXIETY WITH DEPRESSION Iron (Ferrous Sulfate) 325 mg (65 mg iron) tablet Generic drug:  ferrous sulfate Take  by mouth daily (before breakfast). magnesium 250 mg Tab Take 1 Tab by mouth daily. metOLazone 2.5 mg tablet Commonly known as:  ZAROXOLYN  
TAKE 1 TABLET BY MOUTH DAILY AS NEEDED FOR UP TO 2 DAYS  
  
 nebivolol 5 mg tablet Commonly known as:  BYSTOLIC Take 1 Tab by mouth daily. OTHER Complete multi formula, bariatric advantage  
  
 potassium chloride 20 mEq tablet Commonly known as:  KLOR-CON M20  
TAKE TWO TABLETS BY MOUTH DAILY PROBIOTIC PO Take 1 Tab by mouth daily. psyllium Powd Commonly known as:  METAMUCIL Take  by mouth. 2 tsp daily  
  
 sacubitril-valsartan 49-51 mg Tab tablet Commonly known as:  ENTRESTO Take 1 Tab by mouth two (2) times a day. zolpidem 10 mg tablet Commonly known as:  AMBIEN Take 1 Tab by mouth nightly as needed for Sleep. Max Daily Amount: 10 mg.  
  
  
  
  
Prescriptions Printed Refills  
 zolpidem (AMBIEN) 10 mg tablet 0 Sig: Take 1 Tab by mouth nightly as needed for Sleep. Max Daily Amount: 10 mg.  
 Class: Print Route: Oral  
  
We Performed the Following REFERRAL TO OPHTHALMOLOGY [REF57 Custom] Comments:  
 Screening for glaucoma Follow-up Instructions Return in about 3 months (around 11/29/2018), or if symptoms worsen or fail to improve, for Medicare AWV. To-Do List   
 10/26/2018 Imaging:  BEL[facility]AMMO BI SCREENING INCL CAD Referral Information Referral ID Referred By Referred To  
  
 [id number] [facility], [address], [address] [facility] [facility], [address] Phone: [phone] Fax: [phone] Visits Status Start Date End Date 1 New Request 8/29/18 8/29/19 If your referral has a status of pending review or denied, additional information will be sent to support the outcome of this decision. Patient Instructions Well Visit, Over 72: Care Instructions Your Care Instructions Physical exams can help you stay healthy. Your doctor has checked your overall health and may have suggested ways to take good care of yourself. He or she also may have recommended tests. At home, you can help prevent illness with healthy eating, regular exercise, and other steps. Follow-up care is a key part of your treatment and safety. Be sure to make and go to all appointments, and call your doctor if you are having problems. It's also a good idea to know your test results and keep a list of the medicines you take. How can you care for yourself at home? · Reach and stay at a healthy weight.  This will lower your risk for many problems, such as obesity, diabetes, heart disease, and high blood pressure. · Get at least 30 minutes of exercise on most days of the week. Walking is a good choice. You also may want to do other activities, such as running, swimming, cycling, or playing tennis or team sports. · Do not smoke. Smoking can make health problems worse. If you need help quitting, talk to your doctor about stop-smoking programs and medicines. These can increase your chances of quitting for good. · Protect your skin from too much sun. When you're outdoors from 10 a.m. to 4 p.m., stay in the shade or cover up with clothing and a hat with a wide brim. Wear sunglasses that block UV rays. Even when it's cloudy, put broad-spectrum sunscreen (SPF 30 or higher) on any exposed skin. · See a dentist one or two times a year for checkups and to have your teeth cleaned. · Wear a seat belt in the car. · Limit alcohol to 2 drinks a day for men and 1 drink a day for women. Too much alcohol can cause health problems. Follow your doctor's advice about when to have certain tests. These tests can spot problems early. For men and women · Cholesterol. Your doctor will tell you how often to have this done based on your overall health and other things that can increase your risk for heart attack and stroke. · Blood pressure. Have your blood pressure checked during a routine doctor visit. Your doctor will tell you how often to check your blood pressure based on your age, your blood pressure results, and other factors. · Diabetes. Ask your doctor whether you should have tests for diabetes. · Vision. Experts recommend that you have yearly exams for glaucoma and other age-related eye problems. · Hearing. Tell your doctor if you notice any change in your hearing. You can have tests to find out how well you hear. · Colon cancer tests. Keep having colon cancer tests as your doctor recommends. You can have one of several types of tests. · Heart attack and stroke risk. At least every 4 to 6 years, you should have your risk for heart attack and stroke assessed. Your doctor uses factors such as your age, blood pressure, cholesterol, and whether you smoke or have diabetes to show what your risk for a heart attack or stroke is over the next 10 years. · Osteoporosis. Talk to your doctor about whether you should have a bone density test to find out whether you have thinning bones. Also ask your doctor about whether you should take calcium and vitamin D supplements. For women · Pap test and pelvic exam. You may no longer need a Pap test. Talk with your doctor about whether to stop or continue to have Pap tests. · Breast exam and mammogram. Ask how often you should have a mammogram, which is an X-ray of your breasts. A mammogram can spot breast cancer before it can be felt and when it is easiest to treat. · Thyroid disease. Talk to your doctor about whether to have your thyroid checked as part of a regular physical exam. Women have an increased chance of a thyroid problem. For men · Prostate exam. Talk to your doctor about whether you should have a blood test (called a PSA test) for prostate cancer. Experts disagree on whether men should have this test. Some experts recommend that you discuss the benefits and risks of the test with your doctor. · Abdominal aortic aneurysm. Ask your doctor whether you should have a test to check for an aneurysm. You may need a test if you ever smoked or if your parent, brother, sister, or child has had an aneurysm. When should you call for help? Watch closely for changes in your health, and be sure to contact your doctor if you have any problems or symptoms that concern you. Where can you learn more? Go to http://jose-rm.info/. Enter F941 in the search box to learn more about \"Well Visit, Over 65: Care Instructions. \" Current as of: May 16, 2017 Content Version: 11.7 © 3071-8782 Healthwise, Incorporated. Care instructions adapted under license by [employer] (which disclaims liability or warranty for this information). If you have questions about a medical condition or this instruction, always ask your healthcare professional. [address] any warranty or liability for your use of this information. Introducing \[facility]\"" & HEALTH SERVICES! Dear Los Wiley: Thank you for requesting a [employer] account. Our records indicate that you already have an active [employer] account. You can access your account anytime at [email or web address] Did you know that you can access your hospital and ER discharge instructions at any time in [employer]? You can also review all of your test results from your hospital stay or ER visit. Additional Information If you have questions, please visit the Frequently Asked Questions section of the [employer] website at [email or web address] Remember, [employer] is NOT to be used for urgent needs. For medical emergencies, dial 911. Now available from your iPhone and Android! Please provide this summary of care documentation to your next provider. Your primary care clinician is listed as Manual Paris. If you have any questions after today's visit, please call [phone].

## 2018-08-29 NOTE — PROGRESS NOTES
CC:  
Chief Complaint Patient presents with  Anxiety  
  room 3a  Depression HISTORY OF PRESENT ILLNESS Nii Khalil is a 79 y.o. female. Presents for 3 month follow up evaluation. She has HTN, CAD s/p 2 MI's in 2006 and 2011, valvular heart disease, CHF (EF 35% in 9/16), ICD in place with hx lead revisions, depression with anxiety, obesity s/p gastric bypass in 2006. Today she has no complaints. States she is better. Reports that: 
 
1) she started Repatha for lipid-lowering on 8/9/18. 
2) since last clinic, saw Dr. Keegan Bolden for fecal incontinence and crampy abdominal pain. Had a colonoscopy on 6/29/18; showed internal hemorrhoids and redundant colon. Had MRI pelvic floor study on 7/2/18 that showed pelvic floor weakness, small anterior rectocele, and prolapse of the vagina below the sacrococcygeal line. Wast started on psyllium husk powder and is having PT at [address] [place] [address] for pelvic floor therapy. States that these measures are helping. 3) could not take Buspar; made her feel woozy and have headaches. Has panic attacks about once a week. Used to take Xanax for them. Still get panic attacks. Used to take Xanax once a week. 4) bilateral knee pain generally better after having cortisone injections through Orthopedics. Cardiovascular Review The patient has hypertension, hyperlipidemia and coronary artery disease. She reports taking medications as instructed, no medication side effects noted. Diet and Lifestyle: generally follows a low fat low cholesterol diet, generally follows a low sodium diet, no formal exercise but active during the day. Lab review: labs reviewed and discussed with patient. Echo 5/25/18: LVEF 25-30%. Mild to mod LV and LA dilation. Mild to mod MR. Other Providers: Isael Lilly NP/Dr. Coletta Krabbe (Cardiology) Soc Hx 
.   Has 2 living children; lives with , son, son's girlfriend, and 2 grandchildren. She is a retired post . Former smoker; quit in 2002. Denies alcohol or recreational drug use. 
   
Health Maintenance Flu vaccine: 10/9/17 Pneumonia vaccine: PCV-13 6/14/16, PPSV-23 10/9/17 Tetanus vaccine: Tdap 10/30/04 Zoster vaccine: 2/26/16 Colonoscopy: 6/29/18, Dr. Zabrina Nicolas, redundant colon, internal hemorrhoids Pap smear: 7/26/16 Mammogram: 2/1/16, due for this Bone density: 2/1/16 (osteoporosis: L femoral neck T -2.5, tot L hip T -2.4, LS T-2.2) Eye exam: due for this Lipids: 6/13/18 (tot chol 226, OSB279) A1c: 2/9/16 (5.8%) Advanced Directives: has booklet and form at home End of Life: has booklet and form at home 
  
ROS A complete review of systems was performed and is negative except for those mentioned in the HPI. 
  
 
Patient Active Problem List  
Diagnosis Code  Urinary incontinence, stress  DVT (deep venous thrombosis) ([facility]) I82.409  
 HTN (hypertension) I10  
 History of gastric bypass Z98.84  Depression with anxiety F41.8  Reactive airway disease J45.909  CAD (coronary artery disease) I25.10  Systolic CHF, chronic ([facility]) I50.22  
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  
 Advance care planning Z71.89  
 Insomnia G47.00  
 S/P ICD (internal cardiac defibrillator) procedure Z95.810  
 AICD lead displacement T82.120A  ICD (implantable cardioverter-defibrillator) in place Z95.810  
 Primary osteoarthritis of both knees M17.0  Mild intermittent asthma without complication P06.33  
 High risk medication use Z79.899  Acute right ankle pain M25.571 Past Medical History:  
Diagnosis Date  Asthma  [facility]  Coronary artery disease 2008 s/p RCA stent ([facility]RIT) on 11/26/11  Depression with anxiety 2011  DVT (deep venous thrombosis) ([facility] 7/27/2010  Family history of early CAD  GERD (gastroesophageal reflux disease)  H/O gastric bypass 2006 Revision in 2009  Hepatitis C antibody test positive Does not have chronic hep C (labs 10/6/15: neg HCV RNA)  HTN (hypertension) 7/27/2010  Hyperlipidemia 07/27/2010  Joint pain 7/27/2010  MI (myocardial infarction) ([facility] 7/27/2010  Mitral valve regurgitation Mild to moderate  Morbid [facility] 7/27/2010  Myocardial infarction [facility]) 2006 and 2011 Dr. Ric Suarez  Psychiatric disorder  PUD (peptic ulcer disease)   
 gi bleed 2008; ulcer n gastric bypass pouch  Urinary incontinence, stress 7/27/2010 Allergies Allergen Reactions  Amoxicillin Anaphylaxis  Amoxicillin Anaphylaxis  Lipitor [Atorvastatin] Other (comments) Severe muscle pain and spasms  Zocor [Simvastatin] Other (comments) Cramps, muscle spasms  Livalo [Pitavastatin] Myalgia  Pravastatin Other (comments) Leg cramps Current Outpatient Prescriptions Medication Sig Dispense Refill  psyllium (METAMUCIL) powd Take  by mouth. 2 tsp daily  cholecalciferol, VITAMIN D3, (VITAMIN D3) 5,000 unit tab tablet Take 10,000 Units by mouth daily.  nebivolol (BYSTOLIC) 5 mg tablet Take 1 Tab by mouth daily. 90 Tab 1  
 zolpidem (AMBIEN) 10 mg tablet Take 1 Tab by mouth nightly as needed for Sleep. Max Daily Amount: 10 mg. 90 Tab 0  
 sacubitril-valsartan (ENTRESTO) 49 mg/51 mg tablet Take 1 Tab by mouth two (2) times a day. 60 Tab 5  
 metOLazone (ZAROXOLYN) 2.5 mg tablet TAKE 1 TABLET BY MOUTH DAILY AS NEEDED FOR UP TO 2 DAYS 6 Tab 1  
 bumetanide (BUMEX) 2 mg tablet Take 2 mg by mouth two (2) times a day.     
 ALPRAZolam (XANAX) 0.5 mg tablet Take 1 Tab by mouth two (2) times daily as needed for Anxiety. Max Daily Amount: 1 mg. 20 Tab 0  
 FLUoxetine (PROZAC) 20 mg tablet TAKE 2 TABLETS EVERY MORNING AND 1 AT BEDTIME FOR ANXIETY WITH DEPRESSION 270 Tab 1  potassium chloride (KLOR-CON M20) 20 mEq tablet TAKE TWO TABLETS BY MOUTH DAILY 180 Tab 1  
 LACTOBACILLUS ACIDOPHILUS (PROBIOTIC PO) Take 1 Tab by mouth daily.  albuterol (PROVENTIL HFA, VENTOLIN HFA, PROAIR HFA) 90 mcg/actuation inhaler Take 2 Puffs by inhalation every four (4) hours as needed for Wheezing or Shortness of Breath. 1 Inhaler 5  
 aspirin delayed-release 81 mg tablet Take  by mouth daily.  biotin 10,000 mcg cap Take  by mouth daily.  OTHER Complete multi formula, bariatric advantage  magnesium 250 mg tab Take 1 Tab by mouth daily.  ferrous sulfate (IRON, FERROUS SULFATE,) 325 mg (65 mg Iron) tablet Take  by mouth daily (before breakfast).  CALCIUM PO Take 600 mg by mouth daily. PHYSICAL EXAM 
Visit Vitals  /74 (BP 1 Location: Left arm, BP Patient Position: Sitting)  Pulse 61  Temp 97.8 °F (36.6 °C) (Oral)  Resp 18  Ht 5' 5\" (1.651 m)  Wt 245 lb (111.1 kg)  SpO2 97%  BMI 40.77 kg/m2 Weight unchanged since last clinic visit General: Morbidly obese, no distress. HEENT:  Head normocephalic/atraumatic, no scleral icterus Neck: Supple. No carotid bruits, JVD, lymphadenopathy, or thyromegaly. Lungs:  Clear to ausculation bilaterally. Good air movement. Heart:  Regular rate and rhythm, normal S1 and S2, no murmur, gallop, or rub Extremities: No clubbing, cyanosis, or edema. Neurological: Alert and oriented. Psychiatric: Normal mood and affect. Behavior is normal.  
 
Results for orders placed or performed in visit on 05/25/18 LIPID PANEL Result Value Ref Range Cholesterol, total 226 (H) 100 - 199 mg/dL Triglyceride 145 0 - 149 mg/dL HDL Cholesterol 65 >39 mg/dL VLDL, calculated 29 5 - 40 mg/dL LDL, calculated 132 (H) 0 - 99 mg/dL METABOLIC PANEL, BASIC Result Value Ref Range Glucose 87 65 - 99 mg/dL BUN 13 8 - 27 mg/dL Creatinine 0.70 0.57 - 1.00 mg/dL GFR est non-AA 91 >59 mL/min/1.73 GFR est  >59 mL/min/1.73  
 BUN/Creatinine ratio 19 12 - 28 Sodium 142 134 - 144 mmol/L Potassium 4.0 3.5 - 5.2 mmol/L Chloride 102 96 - 106 mmol/L  
 CO2 28 20 - 29 mmol/L Calcium 9.0 8.7 - 10.3 mg/dL MAGNESIUM Result Value Ref Range Magnesium 2.0 1.6 - 2.3 mg/dL VITAMIN D, 25 HYDROXY Result Value Ref Range VITAMIN D, 25-HYDROXY 24.9 (L) 30.0 - 100.0 ng/mL CVD REPORT Result Value Ref Range INTERPRETATION Note ASSESSMENT AND PLAN 
  ICD-10-CM ICD-9-CM 1. Generalized anxiety disorder F41.1 300.02   
2. Essential hypertension I10 401.9 3. Chronic insomnia F51.04 780.52 zolpidem (AMBIEN) 10 mg tablet 4. Systolic CHF, chronic (HCC) I50.22 428.22   
  428.0 5. Screening for breast cancer Z12.31 V76.10 BELEN MAMMO BI SCREENING INCL CAD 6. Screening for glaucoma Z13.5 V80.1 REFERRAL TO OPHTHALMOLOGY Diagnoses and all orders for this visit: 1. Generalized anxiety disorder Continue Prozac at 60 mg daily. Start doxepin 10 mg cap, 1 tab daily as needed for panic attacks (#12, 1 RF); replaces Xanax. 2. Essential hypertension Controlled on Bystolic. 3. Chronic insomnia -     Refill zolpidem (AMBIEN) 10 mg tablet; Take 1 Tab by mouth nightly as needed for Sleep. Max Daily Amount: 10 mg. 
 
4. Systolic CHF, chronic ([address] Controlled. Continue Entresto 29/13 mg, Bystolic, and Bumex. 5. Screening for breast cancer -     BELEN MAMMO BI SCREENING INCL CAD; Future 6. Screening for glaucoma 
-     REFERRAL TO OPHTHALMOLOGY Follow-up Disposition: 
Return in about 3 months (around 11/29/2018), or if symptoms worsen or fail to improve, for Medicare AW. Provided patient and/or family with advanced directive information and answered pertinent questions. Encouraged patient to provide a copy of advanced directive to the office when available. I have discussed the diagnosis with the patient and the intended plan as seen in the above orders. Patient is in agreement. The patient has received an after-visit summary and questions were answered concerning future plans. I have discussed medication side effects and warnings with the patient as well.

## 2018-08-29 NOTE — PATIENT INSTRUCTIONS

## 2018-08-29 NOTE — PROGRESS NOTES
Delon Less  Identified pt with two pt identifiers(name and ). Chief Complaint Patient presents with  Anxiety  
  room 3a  Depression 1. Have you been to the ER, urgent care clinic since your last visit? Hospitalized since your last visit? NO 
 
2. Have you seen or consulted any other health care providers outside of the [address] [place] [address] since your last visit? Include any pap smears or colon screening. Dr Junior Schulz (GI) Dr Campos(colon and rectal) and has upcoming appt at [address] [place] [address] Today's provider has been notified of reason for visit, vitals and flowsheets obtained on patients. Patient received paperwork for advance directive during previous visit but has not completed at this time Reviewed record In preparation for visit, huddled with provider and have obtained necessary documentation Health Maintenance Due Topic  GLAUCOMA SCREENING Q2Y   
 BREAST CANCER SCRN MAMMOGRAM   
 Influenza Age 5 to Adult Wt Readings from Last 3 Encounters:  
18 245 lb (111.1 kg) 07/10/18 243 lb 6.4 oz (110.4 kg) 18 251 lb 3 oz (113.9 kg) Temp Readings from Last 3 Encounters:  
18 97.8 °F (36.6 °C) (Oral) 18 97.6 °F (36.4 °C)  
18 98.4 °F (36.9 °C) (Oral) BP Readings from Last 3 Encounters:  
18 117/74  
07/10/18 120/76  
18 139/66 Pulse Readings from Last 3 Encounters:  
18 61  
07/10/18 64  
18 (!) 57 Vitals:  
 18 7785 BP: 117/74 Pulse: 61 Resp: 18 Temp: 97.8 °F (36.6 °C) TempSrc: Oral  
SpO2: 97% Weight: 245 lb (111.1 kg) Height: 5' 5\" (1.651 m) PainSc:   0 - No pain Learning Assessment: 
:  
 
Learning Assessment 10/26/2017 2015 PRIMARY LEARNER Patient Patient HIGHEST LEVEL OF EDUCATION - PRIMARY LEARNER  - 2 YEARS OF COLLEGE  
BARRIERS PRIMARY LEARNER - NONE  
CO-LEARNER CAREGIVER - No  
PRIMARY LANGUAGE ENGLISH ENGLISH  
 LEARNER PREFERENCE PRIMARY READING DEMONSTRATION  
ANSWERED BY patient pateint RELATIONSHIP SELF SELF Depression Screening: 
:  
 
PHQ over the last two weeks 5/29/2018 PHQ Not Done - Little interest or pleasure in doing things Not at all Feeling down, depressed, irritable, or hopeless Several days Total Score PHQ 2 1 Fall Risk Assessment: 
:  
 
Fall Risk Assessment, last 12 mths 8/29/2018 Able to walk? Yes Fall in past 12 months? No  
Fall with injury? -  
Number of falls in past 12 months - Fall Risk Score -  
 
 
Abuse Screening: 
:  
 
Abuse Screening Questionnaire 5/29/2018 7/26/2016 6/30/2015 Do you ever feel afraid of your partner? N N N Are you in a relationship with someone who physically or mentally threatens you? N N N Is it safe for you to go home? Brooksammiee Fallen  
 
 
ADL Screening: 
:  
 
ADL Assessment 5/29/2018 Feeding yourself No Help Needed Getting from bed to chair No Help Needed Getting dressed No Help Needed Bathing or showering No Help Needed Walk across the room (includes cane/walker) No Help Needed Using the telphone No Help Needed Taking your medications No Help Needed Preparing meals No Help Needed Managing money (expenses/bills) No Help Needed Moderately strenuous housework (laundry) No Help Needed Shopping for personal items (toiletries/medicines) No Help Needed Shopping for groceries No Help Needed Driving No Help Needed Climbing a flight of stairs No Help Needed Getting to places beyond walking distances No Help Needed Medication reconciliation up to date and corrected with patient at this time.

## 2018-08-30 ENCOUNTER — TELEPHONE (OUTPATIENT)
Dept: CARDIOLOGY CLINIC | Age: 68
End: 2018-08-30

## 2018-08-30 NOTE — TELEPHONE ENCOUNTER
Mary Gibson calling from [employer] to follow up on the authorization for the pt's medication entresto. She can be reached @ [phone].      Thanks

## 2018-09-03 PROBLEM — R15.9 INCONTINENCE OF FECES: Status: ACTIVE | Noted: 2018-09-03

## 2018-09-04 ENCOUNTER — TELEPHONE (OUTPATIENT)
Dept: CARDIOLOGY CLINIC | Age: 68
End: 2018-09-04

## 2018-09-04 NOTE — TELEPHONE ENCOUNTER
Pt states her insurance is requiring a pre-auth for her entresto. She has provided number to call in pre-auth ([phone])   States she has about 5 days of medication left.    Phone: [phone]

## 2018-09-11 ENCOUNTER — HOSPITAL ENCOUNTER (OUTPATIENT)
Dept: MAMMOGRAPHY | Age: 68
Discharge: HOME | End: 2018-09-11
Attending: INTERNAL MEDICINE
Payer: MEDICARE

## 2018-09-11 DIAGNOSIS — Z12.31 VISIT FOR SCREENING MAMMOGRAM: ICD-10-CM

## 2018-09-11 PROCEDURE — 77063 BREAST TOMOSYNTHESIS BI: CPT

## 2018-10-24 RX ORDER — NEBIVOLOL 5 MG/1
5 TABLET ORAL DAILY
Qty: 90 TAB | Refills: 2 | Status: SHIPPED | OUTPATIENT
Start: 2018-10-24 | End: 2019-06-27 | Stop reason: ALTCHOICE

## 2018-10-24 NOTE — TELEPHONE ENCOUNTER
Requested Prescriptions     Signed Prescriptions Disp Refills    nebivolol (BYSTOLIC) 5 mg tablet 90 Tab 2     Sig: Take 1 Tab by mouth daily.      Authorizing Provider: Ezekiel Celestin     Ordering User: Toshia Song     Per verbal orders

## 2018-11-30 ENCOUNTER — CLINICAL SUPPORT (OUTPATIENT)
Dept: CARDIOLOGY CLINIC | Age: 68
End: 2018-11-30

## 2018-11-30 ENCOUNTER — OFFICE VISIT (OUTPATIENT)
Dept: CARDIOLOGY CLINIC | Age: 68
End: 2018-11-30

## 2018-11-30 VITALS
DIASTOLIC BLOOD PRESSURE: 68 MMHG | BODY MASS INDEX: 39.82 KG/M2 | HEART RATE: 60 BPM | SYSTOLIC BLOOD PRESSURE: 112 MMHG | WEIGHT: 239 LBS | HEIGHT: 65 IN

## 2018-11-30 DIAGNOSIS — I25.10 CORONARY ARTERY DISEASE INVOLVING NATIVE CORONARY ARTERY OF NATIVE HEART WITHOUT ANGINA PECTORIS: ICD-10-CM

## 2018-11-30 DIAGNOSIS — E78.5 DYSLIPIDEMIA: ICD-10-CM

## 2018-11-30 DIAGNOSIS — I50.22 SYSTOLIC CHF, CHRONIC (HCC): Primary | ICD-10-CM

## 2018-11-30 DIAGNOSIS — E83.42 HYPOMAGNESEMIA: ICD-10-CM

## 2018-11-30 DIAGNOSIS — F51.04 CHRONIC INSOMNIA: ICD-10-CM

## 2018-11-30 DIAGNOSIS — I50.22 SYSTOLIC CHF, CHRONIC (HCC): ICD-10-CM

## 2018-11-30 DIAGNOSIS — E55.9 VITAMIN D DEFICIENCY: ICD-10-CM

## 2018-11-30 RX ORDER — ZOLPIDEM TARTRATE 10 MG/1
10 TABLET ORAL
Qty: 30 TAB | Refills: 0 | Status: SHIPPED | OUTPATIENT
Start: 2018-11-30 | End: 2019-01-16 | Stop reason: SDUPTHER

## 2018-11-30 NOTE — PROGRESS NOTES
Verified patient with two types of identifiers. Verified pharmacy with patient. Verified medications with the patient.

## 2018-11-30 NOTE — PROGRESS NOTES
[facility]  [phone]    HPI: Ayana Leroy is a 79 y.o. who presents for follow up regarding her CAD and CHF. She had an echocardigram today - preliminary result showed LVEF 35% which is a mild improvement  She is down 4 lbs and feeling well, BP well controlled  She has been on repatha since 8/18 and tolerating it without difficulty  Recent device check in 8/18 ok   She is having a hard time walking due to right ankle/heel pain - plans to see orthopedics  She can still do her cardio cruiser   Says her dyspnea is better on the bystolic, hair growing back in a little bit  Dyspnea with exertion is stable   She denies any chest pain or palpitations   LE edema doing well, takes metolazone PRN    No diziness or syncope    Assessment/Plan:  1. Chronic systolic heart failure - LVEF 35% by TTE, s/p AICD placement with Dr. Goyo Conner and subsequent lead revisions and repeat procedures due to non-healing midsternal incision  -last revision for AICD in November 2016 with Dr. Goyo Conner, last ICD check without arrhythmias   -continue Entresto 49/51 one tablet BID and Bystolic 5mg daily, had hair loss on coreg and Toprol XL but doing well on bystolic thusfar     -off spironolactone due to hypotension, continue bumex BID, using Metolazone PRN  -will follow up in the office in 6 months    2. CAD - hx of MI x 2 and multiple stents, she believes she may have 6 or 7 stents, last cardiac cath without progression of CAD and stress test in 4/17 showed possible anterior ischemia vs artifact but asymptomatic currently so will just continue to watch, continue ASA and beta blocker, see lipid plan below  -reports having an MI in 11/2011 and received PCI to RCA at that time, previous MI in 2006 or 2007  3. Mitral regurgitation - mild to mod by TTE   4. Asthma - x 15 - 16 years, has not seen pulmonologist in years  11. HTN - continue current regimen  6.  Primary Hyperlipidemia - statin failures, simvastatin caused muscle spasms and cramps, atorvastatin caused pain shooting all over her body, pravastatin 40mg and 20mg caused myalgias and leg cramps, had myalgias on Livalo, could not tolerate zetia either   -not able to take any statin at any dose  -now on praluent, will check fasting lipids   7. DM Type 2 - resolved with gastric bypass  8. Hypokalemia -off spironolactone and on KCL 40meq daily, will check CMP      9. Hypomagnesemia - on OTC magnesium, will check Mg level       10. Morbid obesity - Body mass index is 39.77 kg/m². ). weighed 416 lbs at her heaviest weight, s/p gastric bypass in 2007 with revision a few years later, now down to 239 lbs, encouraged exercising   11. PUD - had EGD and cauterized bleeding ulcer, not on PPI anymore  12. Vitamin D deficiency - on 10,000 units daily    13. Anxiety - on multiple agents per PCP   -refilled ambien today x 1 month until she sees per PCP 1/16/19 for further refills  14. PAD/Carotid stenosis - 10-49% bilateral ICA stenosis, continue ASA and repatha    Echo 10/18 (prelim) - LVEF 35%, mild to mod MR   Echo 5/18 - LVEF 25 %-30 %, akinesis of the basal-mid inferoseptal and basal-mid inferolateral wall(s), severe hypokinesis of the entire inferior wall(s), grade 1 dd, mild to mod dilated LA, mild to mod MR  Carotid duplex 5/18 - 10-49% bilateral ICA stenosis  Echo: 4/17, EF 35-40%, inf HK gr1dd  Nuclear: 4/17, EF 36%, anterior ischemia, lateral infarct. TTE 9/16 - LVEF 35%, moderate hypokinesis of the basal-mid inferolateral wall(s), grd 1 dd, dilated LA, mod MR, mild TR  8/26/16 - RV lead revision by Dr. Ivan Laughlin  8/24/16 - single chamber AICD placed by Dr. Ivan Laughlin ([facility] VR, serial # [id number], model # [id number].  The ventricular lead: model # Z2709729 , serial # [id number])  MUGA 6/16 - LVEF 27%  Holter 6/16 - no arrhythmias  Echo 5/16 - LV mildly dilated, LVEF 40%, moderate diffuse hypokinesis with regional variations, severe hypokinesis of the basal-mid inferior wall(s), grd 1 dd, mod dilated LA, atrial septum bows from left to right, consistent with increased left atrial pressure, mildly dilated RA, mild to near moderate MR    OLIVER  - normal  Nuc 5/15 EF 31% large anterolateral infarct with periinfarct reversibility, 13% LV reversible(32% infarct at rest, 45% at stress)    Echo 2015 - LVEF 30-35%, grd 1 dd, mod LAE, mild to mod MR  Nuc Stress  - small reversibility in anteroapical wall, LVEF 31%  Cardiac Cath 3/13 - patent stents in LAD, LCx and RCA, LVEF 30%, severe inferior HK, LCx relatively small vessel with patent stent in proximal LCx, RCA is large and dominant with patent stents in proximal and mid RCA, distal RCA free of disease, LAD normal and large vessel which coursed around apex  Echo 2011 - LVEF 30%, mild MR  Cardiac Cath 2011 - s/p PCI with AMRIT to 100% mid RCA, also had 40% mid LAD lesion, 50% diagonal 1 lesion, 100% distal LCx lesion, 60% OM1 lesion, 100% proximal Ramus lesion      Soc Hx: quit tobacco use x 13 years ago, used to smoke 1ppd, no etoh use, no drug use, lives with , son, daughter in law, grandson, retired/on disability  Fam Hx: father in his 62s when he had a MI, had 3 vessel CABG in his 62s, passed from fall but had cancer too, mother lived to age 80 and  from Alzheimer's, brother had MI in his 62s, sister had aortic repair for aneurysm in her 76s    She  has a past medical history of Asthma, Cardiomyopathy ([address] Coronary artery disease, Depression with anxiety, DVT (deep venous thrombosis) ([address] Family history of early CAD, GERD (gastroesophageal reflux disease), H/O gastric bypass, Hepatitis C antibody test positive, HTN (hypertension), Hyperlipidemia, Joint pain, MI (myocardial infarction) ([address] Mitral valve regurgitation, Morbid obesity ([address] Myocardial infarction ([address] Psychiatric disorder, PUD (peptic ulcer disease), and Urinary incontinence, stress.     Cardiovascular ROS: positive for dyspnea on exertion Respiratory ROS: no cough, wheezing  Neurological ROS: no TIA or stroke symptoms  All other systems negative except as above. PE  Vitals:    11/30/18 1345   BP: 112/68   Pulse: 60   Weight: 239 lb (108.4 kg)   Height: 5' 5\" (1.651 m)    Body mass index is 39.77 kg/m².   General appearance - alert, well appearing, and in no distress  Mental status - affect appropriate to mood  Eyes - sclera anicteric, moist mucous membranes  Neck - supple  Lymphatics - not assessed  Chest - clear to auscultation, no wheezes, rales or rhonchi  Heart - normal rate, regular rhythm, normal S1, S2, 2/6 MYKE   Abdomen - soft, nontender, nondistended, obese  Back exam - full range of motion, no tenderness  Neurological - cranial nerves II through XII grossly intact, no focal deficit  Musculoskeletal - no muscular tenderness noted, normal strength  Extremities - diminished peripheral pulses, no LE edema  Skin - normal coloration  no rashes    Recent Labs:  Lab Results   Component Value Date/Time    Cholesterol, total 226 (H) 06/13/2018 01:37 PM    HDL Cholesterol 65 06/13/2018 01:37 PM    LDL, calculated 132 (H) 06/13/2018 01:37 PM    Triglyceride 145 06/13/2018 01:37 PM     Lab Results   Component Value Date/Time    Creatinine 0.70 06/13/2018 01:37 PM     Lab Results   Component Value Date/Time    BUN 13 06/13/2018 01:37 PM    BUN (POC) 24 (H) 11/26/2011 09:50 PM     Lab Results   Component Value Date/Time    Potassium 4.0 06/13/2018 01:37 PM     Lab Results   Component Value Date/Time    Hemoglobin A1c 5.8 (H) 02/09/2016 11:44 AM    Hemoglobin A1c, External 5.5 12/12/2014     Lab Results   Component Value Date/Time    HGB 14.7 02/23/2017 07:26 AM     Lab Results   Component Value Date/Time    PLATELET 824 02/67/4854 07:26 AM       Reviewed:  Past Medical History:   Diagnosis Date    Asthma     [facility]     Coronary artery disease 2008    s/p RCA stent (AMRIT) on 11/26/11    Depression with anxiety     2011    DVT (deep venous thrombosis) ([facility] 2010    Family history of early CAD     GERD (gastroesophageal reflux disease)     H/O gastric bypass 2006    Revision in     Hepatitis C antibody test positive     Does not have chronic hep C (labs 10/6/15: neg HCV RNA)     HTN (hypertension) 2010    Hyperlipidemia 2010    Joint pain 2010    MI (myocardial infarction) ([facility] 2010    Mitral valve regurgitation     Mild to moderate    Morbid obesity ([facility] 2010    Myocardial infarction [facility])  and     Dr. Shayla Nobles    Psychiatric disorder     PUD (peptic ulcer disease)     gi bleed ; ulcer n gastric bypass pouch    Urinary incontinence, stress 2010     Social History     Tobacco Use   Smoking Status Former Smoker    Packs/day: 1.00    Years: 30.00    Pack years: 30.00    Start date: 1970    Last attempt to quit: 2004    Years since quittin.5   Smokeless Tobacco Never Used     Social History     Substance and Sexual Activity   Alcohol Use No    Alcohol/week: 0.0 oz     Allergies   Allergen Reactions    Amoxicillin Anaphylaxis    Amoxicillin Anaphylaxis    Lipitor [Atorvastatin] Other (comments)     Severe muscle pain and spasms    Zocor [Simvastatin] Other (comments)     Cramps, muscle spasms    Buspar [Buspirone] Other (comments)     Drunk sensation, headaches    Livalo [Pitavastatin] Myalgia    Pravastatin Other (comments)     Leg cramps       Current Outpatient Medications   Medication Sig    zolpidem (AMBIEN) 10 mg tablet Take 1 Tab by mouth nightly as needed for Sleep. Max Daily Amount: 10 mg.    albuterol (PROVENTIL HFA, VENTOLIN HFA, PROAIR HFA) 90 mcg/actuation inhaler Take 2 Puffs by inhalation every four (4) hours as needed for Wheezing or Shortness of Breath.  nebivolol (BYSTOLIC) 5 mg tablet Take 1 Tab by mouth daily.  psyllium (METAMUCIL) powd Take  by mouth.  2 tsp daily    cholecalciferol, VITAMIN D3, (VITAMIN D3) 5,000 unit tab tablet Take 10,000 Units by mouth daily.  sacubitril-valsartan (ENTRESTO) 49 mg/51 mg tablet Take 1 Tab by mouth two (2) times a day.  metOLazone (ZAROXOLYN) 2.5 mg tablet TAKE 1 TABLET BY MOUTH DAILY AS NEEDED FOR UP TO 2 DAYS    bumetanide (BUMEX) 2 mg tablet Take 2 mg by mouth two (2) times a day.  FLUoxetine (PROZAC) 20 mg tablet TAKE 2 TABLETS EVERY MORNING AND 1 AT BEDTIME FOR ANXIETY WITH DEPRESSION    potassium chloride (KLOR-CON M20) 20 mEq tablet TAKE TWO TABLETS BY MOUTH DAILY    LACTOBACILLUS ACIDOPHILUS (PROBIOTIC PO) Take 1 Tab by mouth daily.  aspirin delayed-release 81 mg tablet Take  by mouth daily.  biotin 10,000 mcg cap Take  by mouth daily.  OTHER Complete multi formula, bariatric advantage    magnesium 250 mg tab Take 1 Tab by mouth daily.  ferrous sulfate (IRON, FERROUS SULFATE,) 325 mg (65 mg Iron) tablet Take  by mouth daily (before breakfast).  CALCIUM PO Take 600 mg by mouth daily. No current facility-administered medications for this visit.         Sharon Giraldo NP  [facility] [address] [place] [address] [address], [address]  [facility]  [phone]

## 2018-12-04 ENCOUNTER — TELEPHONE (OUTPATIENT)
Dept: CARDIOLOGY CLINIC | Age: 68
End: 2018-12-04

## 2018-12-04 NOTE — TELEPHONE ENCOUNTER
----- Message from Sharon Giraldo NP sent at 12/4/2018  3:34 PM EST -----  No change from previous echo, LVEF 35%, mild to mod MR      Above echo results given via my chart e-mail.

## 2018-12-14 ENCOUNTER — ANESTHESIA EVENT (OUTPATIENT)
Dept: ENDOSCOPY | Age: 68
End: 2018-12-14
Payer: MEDICARE

## 2018-12-14 ENCOUNTER — HOSPITAL ENCOUNTER (OUTPATIENT)
Age: 68
Setting detail: OUTPATIENT SURGERY
Discharge: HOME | End: 2018-12-14
Attending: INTERNAL MEDICINE | Admitting: INTERNAL MEDICINE
Payer: MEDICARE

## 2018-12-14 ENCOUNTER — ANESTHESIA (OUTPATIENT)
Dept: ENDOSCOPY | Age: 68
End: 2018-12-14
Payer: MEDICARE

## 2018-12-14 VITALS
HEART RATE: 66 BPM | WEIGHT: 238 LBS | RESPIRATION RATE: 13 BRPM | OXYGEN SATURATION: 97 % | HEIGHT: 65 IN | BODY MASS INDEX: 39.65 KG/M2 | SYSTOLIC BLOOD PRESSURE: 135 MMHG | DIASTOLIC BLOOD PRESSURE: 77 MMHG | TEMPERATURE: 98.1 F

## 2018-12-14 PROCEDURE — 77030003406 HC NDL ASPIR BIOP OCOA -C: Performed by: INTERNAL MEDICINE

## 2018-12-14 PROCEDURE — 77030019957 HC CUF BLN GASTSCP OCOA -B: Performed by: INTERNAL MEDICINE

## 2018-12-14 PROCEDURE — 74011250636 HC RX REV CODE- 250/636: Performed by: INTERNAL MEDICINE

## 2018-12-14 PROCEDURE — 74011250636 HC RX REV CODE- 250/636

## 2018-12-14 PROCEDURE — 76060000031 HC ANESTHESIA FIRST 0.5 HR: Performed by: INTERNAL MEDICINE

## 2018-12-14 PROCEDURE — 77030018846 HC SOL IRR STRL H20 ICUM -A: Performed by: INTERNAL MEDICINE

## 2018-12-14 PROCEDURE — 76040000019: Performed by: INTERNAL MEDICINE

## 2018-12-14 RX ORDER — EPINEPHRINE 0.1 MG/ML
1 INJECTION INTRACARDIAC; INTRAVENOUS
Status: DISCONTINUED | OUTPATIENT
Start: 2018-12-14 | End: 2018-12-14 | Stop reason: HOSPADM

## 2018-12-14 RX ORDER — MIDAZOLAM HYDROCHLORIDE 1 MG/ML
.25-5 INJECTION, SOLUTION INTRAMUSCULAR; INTRAVENOUS
Status: DISCONTINUED | OUTPATIENT
Start: 2018-12-14 | End: 2018-12-14 | Stop reason: HOSPADM

## 2018-12-14 RX ORDER — SODIUM CHLORIDE 0.9 % (FLUSH) 0.9 %
5-10 SYRINGE (ML) INJECTION AS NEEDED
Status: DISCONTINUED | OUTPATIENT
Start: 2018-12-14 | End: 2018-12-14 | Stop reason: HOSPADM

## 2018-12-14 RX ORDER — ATROPINE SULFATE 0.1 MG/ML
0.5 INJECTION INTRAVENOUS
Status: DISCONTINUED | OUTPATIENT
Start: 2018-12-14 | End: 2018-12-14 | Stop reason: HOSPADM

## 2018-12-14 RX ORDER — FENTANYL CITRATE 50 UG/ML
50 INJECTION, SOLUTION INTRAMUSCULAR; INTRAVENOUS
Status: DISCONTINUED | OUTPATIENT
Start: 2018-12-14 | End: 2018-12-14 | Stop reason: HOSPADM

## 2018-12-14 RX ORDER — FLUMAZENIL 0.1 MG/ML
0.2 INJECTION INTRAVENOUS
Status: DISCONTINUED | OUTPATIENT
Start: 2018-12-14 | End: 2018-12-14 | Stop reason: HOSPADM

## 2018-12-14 RX ORDER — PROPOFOL 10 MG/ML
INJECTION, EMULSION INTRAVENOUS AS NEEDED
Status: DISCONTINUED | OUTPATIENT
Start: 2018-12-14 | End: 2018-12-14 | Stop reason: HOSPADM

## 2018-12-14 RX ORDER — SODIUM CHLORIDE 0.9 % (FLUSH) 0.9 %
5-10 SYRINGE (ML) INJECTION EVERY 8 HOURS
Status: DISCONTINUED | OUTPATIENT
Start: 2018-12-14 | End: 2018-12-14 | Stop reason: HOSPADM

## 2018-12-14 RX ORDER — SODIUM CHLORIDE 9 MG/ML
75 INJECTION, SOLUTION INTRAVENOUS CONTINUOUS
Status: DISCONTINUED | OUTPATIENT
Start: 2018-12-14 | End: 2018-12-14 | Stop reason: HOSPADM

## 2018-12-14 RX ORDER — DEXTROMETHORPHAN/PSEUDOEPHED 2.5-7.5/.8
1.2 DROPS ORAL
Status: DISCONTINUED | OUTPATIENT
Start: 2018-12-14 | End: 2018-12-14 | Stop reason: HOSPADM

## 2018-12-14 RX ORDER — NALOXONE HYDROCHLORIDE 0.4 MG/ML
0.4 INJECTION, SOLUTION INTRAMUSCULAR; INTRAVENOUS; SUBCUTANEOUS
Status: DISCONTINUED | OUTPATIENT
Start: 2018-12-14 | End: 2018-12-14 | Stop reason: HOSPADM

## 2018-12-14 RX ORDER — LIDOCAINE HYDROCHLORIDE 20 MG/ML
INJECTION, SOLUTION EPIDURAL; INFILTRATION; INTRACAUDAL; PERINEURAL AS NEEDED
Status: DISCONTINUED | OUTPATIENT
Start: 2018-12-14 | End: 2018-12-14 | Stop reason: HOSPADM

## 2018-12-14 RX ORDER — SODIUM CHLORIDE 9 MG/ML
25 INJECTION, SOLUTION INTRAVENOUS CONTINUOUS
Status: DISCONTINUED | OUTPATIENT
Start: 2018-12-14 | End: 2018-12-14 | Stop reason: HOSPADM

## 2018-12-14 RX ADMIN — PROPOFOL 30 MG: 10 INJECTION, EMULSION INTRAVENOUS at 13:22

## 2018-12-14 RX ADMIN — LIDOCAINE HYDROCHLORIDE 40 MG: 20 INJECTION, SOLUTION EPIDURAL; INFILTRATION; INTRACAUDAL; PERINEURAL at 13:19

## 2018-12-14 RX ADMIN — PROPOFOL 30 MG: 10 INJECTION, EMULSION INTRAVENOUS at 13:19

## 2018-12-14 RX ADMIN — SODIUM CHLORIDE 25 ML/HR: 900 INJECTION, SOLUTION INTRAVENOUS at 13:07

## 2018-12-14 RX ADMIN — PROPOFOL 30 MG: 10 INJECTION, EMULSION INTRAVENOUS at 13:27

## 2018-12-14 RX ADMIN — PROPOFOL 30 MG: 10 INJECTION, EMULSION INTRAVENOUS at 13:25

## 2018-12-14 NOTE — PROCEDURES
NAME:  Juan Parekh   :   1950   MRN:   [id number]     KARI SAVAGE [facility]    Date/Time:  2018   Procedure Type: rectal EUS for incontinence    Indications: fecal incontinence    Pre-operative Diagnosis: see indication above    Post-operative Diagnosis:  See findings below    : Mary Ellen Wheatley MD    Referring Provider: Aminta Preciado MD    Procedure Details:    Exam:  Airway: clear, no airway problems anticipated  Heart: RRR, without gallops or rubs  Lungs: clear bilaterally without wheezes, crackles, or rhonchi  Abdomen: soft, nontender, nondistended, bowel sounds present  Mental Status: awake, alert and oriented to person, place and time     Anethesia/Sedation:  MAC anesthesia Propofol      Procedure Details   After infom consent was obtained for the procedure, with all risks and benefits of procedure explained the patient was taken to the endoscopy suite and placed in the left lateral decubitus position. Following sequential administration of sedation as per above, the radial echoendoscope was inserted into the rectum. Afindings and interventions are described below. Findings:     Endoscopic: rectal mucosa appears normal (as much as can be seen with this scope)    Ultrasound:     Internal Anal Sphincter(IAS): This is seen much better that EAS. The anterior aspect is mildly atrophic (1.3 mm). Posteriorly it is 3 mm in thickness. External Anal sphincter (EAS): The EAS is somewhat difficult to see but no obvious 'break' is noted. It is not seen in a 360 degree manner, in one view. The puborectalis appears normal.     Specimen Removed:  None    Complications: None. EBL:  None. Recommendations: Follow up with pelvic floor physical therapist,  Kegel exercises and high fiber diet. CRS re-evaluation for continued symptoms not responding to conservative therapy. Danelle Yang MD

## 2018-12-14 NOTE — ROUTINE PROCESS
[facility]  1950  [id number]    Situation:  Verbal report received from: Girma Euceda RN  Procedure: Procedure(s):  RECTAL ENDOSCOPIC ULTRASOUND (EUS)    Background:    Preoperative diagnosis: Incontinence of feces, unspecified fecal incontinence type [R15.9]  Postoperative diagnosis: atrophy of internal anal sphincter    :  Dr. Vladimir Cotter  Assistant(s): Endoscopy Technician-1: Marcia Stearns  Endoscopy RN-1: Matt Diego RN    Specimens: * No specimens in log *  H. Pylori  no    Assessment:  Intra-procedure medications       Anesthesia gave intra-procedure sedation and medications, see anesthesia flow sheet yes    Intravenous fluids: NS@ KVO     Vital signs stable       Abdominal assessment: round and soft       Recommendation:  Discharge patient per MD order  Family or Friend   Reji   Permission to share finding with family or friend yes

## 2018-12-14 NOTE — H&P
Pre-endoscopy H and P    The patient was seen and examined in the room/pre-op holding area. The airway was assessed and documented. The problem list, past medical history, and medications were reviewed.      Patient Active Problem List   Diagnosis Code    Urinary incontinence, stress     D[facility]deep [facility] thrombosis) ([facility] I82.409    HTN (hypertension) I10    History of gastric bypass Z98.84    Depression with anxiety F41.8    Reactive airway disease J45.909    CAD (coronary artery disease) N28.84    Systolic CHF, chronic (HCC) I50.22    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HCC) I42.9    Hyperlipidemia E78.5    Mitral valve regurgitation I34.0    History of MI (myocardial infarction) I25.2    Cardiomyopathy ([facility] I42.9    Osteoporosis M81.0    Morbid obesity with BMI of 40.0-44.9, adult ([facility] E66.01, Z68.41    Advance care planning Z71.89    Insomnia G47.00    S/P ICD (internal cardiac defibrillator) procedure Z95.810    AICD lead displacement T82.120A    ICD (implantable cardioverter-defibrillator) in place Z95.810    Primary osteoarthritis of both knees M17.0    Mild intermittent asthma without complication M88.51    High risk medication use Z79.899    Acute right ankle pain M25.571    Incontinence of feces R15.9     Social History     Socioeconomic History    Marital status:      Spouse name: Not on file    Number of children: Not on file    Years of education: Not on file    Highest education level: Not on file   Social Needs    Financial resource strain: Not on file    Food insecurity - worry: Not on file    Food insecurity - inability: Not on file   [employer] needs - medical: Not on file   [employer] needs - non-medical: Not on file   Occupational History    Not on file   Tobacco Use    Smoking status: Former Smoker     Packs/day: 1.00     Years: 30.00     Pack years: 30.00     Start date: 1970     Last attempt to quit: 2004     Years since quittin.5    Smokeless tobacco: Never Used   Substance and Sexual Activity    Alcohol use: No     Alcohol/week: 0.0 oz    Drug use: No    Sexual activity: No     Partners: Male   Other Topics Concern    Not on file   Social History Narrative    ** Merged History Encounter **          Past Medical History:   Diagnosis Date    Asthma     Cardiomy[facility] ([facility]     Coronary artery disease 2008    s/p RCA stent ([facility]) on 11/26/11    Depression with anxiety     2011    DVT (deep venous thrombosis) ([facility] 7/27/2010    Family history of early CAD     GERD (gastroesophageal reflux disease)     H/O gastric bypass 2006    Revision in 2009    Hepatitis C antibody test positive     Does not have chronic hep C (labs 10/6/15: neg HCV RNA)     HTN (hypertension) 7/27/2010    Hyperlipidemia 07/27/2010    Joint pain 7/27/2010    MI (myocardial infarction) ([facility] 7/27/2010    Mitral valve regurgitation     Mild to moderate    Morbid obesity ([facility] 7/27/2010    Myocardial infarction [facility]) 2006 and 2011    Dr. Keila Hernandez Psychiatric disorder     PUD (peptic ulcer disease)     gi bleed 2008; ulcer n gastric bypass pouch    Urinary incontinence, stress 7/27/2010         Prior to Admission Medications   Prescriptions Last Dose Informant Patient Reported? Taking? CALCIUM PO 12/13/2018 at Unknown time  Yes Yes   Sig: Take 600 mg by mouth daily. FLUoxetine (PROZAC) 20 mg tablet 12/13/2018 at Unknown time  No Yes   Sig: TAKE 2 TABLETS EVERY MORNING AND 1 AT BEDTIME FOR ANXIETY WITH DEPRESSION   LACTOBACILLUS ACIDOPHILUS (PROBIOTIC PO) 12/13/2018 at Unknown time  Yes Yes   Sig: Take 1 Tab by mouth daily. OTHER 12/13/2018 at Unknown time  Yes Yes   Sig: Complete multi formula, bariatric advantage   albuterol (PROVENTIL HFA, VENTOLIN HFA, PROAIR HFA) 90 mcg/actuation inhaler 12/7/2018 at Unknown time  No Yes   Sig: Take 2 Puffs by inhalation every four (4) hours as needed for Wheezing or Shortness of Breath.    aspirin delayed-release 81 mg tablet 2018 at Unknown time  Yes Yes   Sig: Take  by mouth daily. biotin 10,000 mcg cap 2018 at Unknown time  Yes Yes   Sig: Take  by mouth daily. bumetanide (BUMEX) 2 mg tablet 2018 at Unknown time  Yes Yes   Sig: Take 2 mg by mouth two (2) times a day. cholecalciferol, VITAMIN D3, (VITAMIN D3) 5,000 unit tab tablet 2018 at Unknown time  Yes Yes   Sig: Take 10,000 Units by mouth daily. evolocumab (REPATHA SURECLICK) pen injection  at Unknown time  Yes Yes   Si mg by SubCUTAneous route every fourteen (14) days. ferrous sulfate (IRON, FERROUS SULFATE,) 325 mg (65 mg Iron) tablet 2018 at Unknown time  Yes Yes   Sig: Take  by mouth daily (before breakfast). magnesium 250 mg tab 2018 at Unknown time  Yes Yes   Sig: Take 1 Tab by mouth daily. metOLazone (ZAROXOLYN) 2.5 mg tablet Not Taking at Unknown time  No No   Sig: TAKE 1 TABLET BY MOUTH DAILY AS NEEDED FOR UP TO 2 DAYS   nebivolol (BYSTOLIC) 5 mg tablet  at Unknown time  No Yes   Sig: Take 1 Tab by mouth daily. potassium chloride (KLOR-CON M20) 20 mEq tablet 2018 at Unknown time  No Yes   Sig: TAKE TWO TABLETS BY MOUTH DAILY   psyllium (METAMUCIL) powd 2018 at Unknown time  Yes Yes   Sig: Take  by mouth. 2 tsp daily   sacubitril-valsartan (ENTRESTO) 49 mg/51 mg tablet 2018 at Unknown time  No Yes   Sig: Take 1 Tab by mouth two (2) times a day. zolpidem (AMBIEN) 10 mg tablet 2018 at Unknown time  No Yes   Sig: Take 1 Tab by mouth nightly as needed for Sleep. Max Daily Amount: 10 mg. Facility-Administered Medications: None           The review of systems is:  Negative  for shortness of breath or chest pain      The heart, lungs, and mental status were satisfactory for the administration of deep sedation and for the procedure. I discussed with the patient the objectives, risks, consequences and alternatives to the procedure. Victoria Garcia MD  12/14/2018  1:05 PM

## 2018-12-14 NOTE — PROGRESS NOTES
..Anesthesia reports 120mg Propofol, 40mg Lidocaine and 100mL NS given during procedure. Received report from anesthesia staff on vital signs and status of patient.

## 2018-12-14 NOTE — ANESTHESIA PREPROCEDURE EVALUATION
Anesthetic History   No history of anesthetic complications            Review of Systems / Medical History  Patient summary reviewed, nursing notes reviewed and pertinent labs reviewed    Pulmonary            Asthma        Neuro/Psych         Psychiatric history     Cardiovascular    Hypertension  Valvular problems/murmurs: mitral insufficiency    CHF    Pacemaker, past MI, CAD, cardiac stents and hyperlipidemia    Exercise tolerance: >4 METS  Comments: MR    Ejection fraction was estimated in the range of 35 %.    GI/Hepatic/Renal     GERD  Hepatitis: type C    PUD     Endo/Other        Morbid obesity     Other Findings   Comments: H/O gastric bypass   H/O of DVT  H/O Hepatitis C Positive Antibody Test             Physical Exam    Airway  Mallampati: II  TM Distance: 4 - 6 cm  Neck ROM: normal range of motion   Mouth opening: Normal     Cardiovascular  Regular rate and rhythm,  S1 and S2 normal,  no murmur, click, rub, or gallop             Dental    Dentition: Upper partial plate     Pulmonary  Breath sounds clear to auscultation               Abdominal  GI exam deferred       Other Findings            Anesthetic Plan    ASA: 3  Anesthesia type: total IV anesthesia and general          Induction: Intravenous  Anesthetic plan and risks discussed with: Patient      Propofol MAC/Supernova

## 2018-12-14 NOTE — ANESTHESIA POSTPROCEDURE EVALUATION
Procedure(s):  RECTAL ENDOSCOPIC ULTRASOUND (EUS). Anesthesia Post Evaluation        Patient location during evaluation: PACU  Note status: Adequate. Level of consciousness: responsive to verbal stimuli and sleepy but conscious  Pain management: satisfactory to patient  Airway patency: patent  Anesthetic complications: no  Cardiovascular status: acceptable  Respiratory status: acceptable  Hydration status: acceptable  Comments: +Post-Anesthesia Evaluation and Assessment    Patient: Lesly Lam MRN: [id number]  SSN: [id number]   YOB: 1950  Age: 79 y.o. Sex: female      Cardiovascular Function/Vital Signs    /77   Pulse 66   Temp 36.7 °C (98.1 °F)   Resp 13   Ht 5' 5\" (1.651 m)   Wt 108 kg (238 lb)   SpO2 97%   Breastfeeding? No   BMI 39.61 kg/m²     Patient is status post Procedure(s):  RECTAL ENDOSCOPIC ULTRASOUND (EUS). Nausea/Vomiting: Controlled. Postoperative hydration reviewed and adequate. Pain:  Pain Scale 1: Numeric (0 - 10) (12/14/18 1405)  Pain Intensity 1: 0 (12/14/18 1405)   Managed. Neurological Status: At baseline. Mental Status and Level of Consciousness: Arousable. Pulmonary Status:   O2 Device: Room air (12/14/18 1405)   Adequate oxygenation and airway patent. Complications related to anesthesia: None    Post-anesthesia assessment completed. No concerns. Signed By: Jasiel Montes MD    12/14/2018  Post anesthesia nausea and vomiting:  controlled      Visit Vitals  /77   Pulse 66   Temp 36.7 °C (98.1 °F)   Resp 13   Ht 5' 5\" (1.651 m)   Wt 108 kg (238 lb)   SpO2 97%   Breastfeeding?  No   BMI 39.61 kg/m²

## 2018-12-14 NOTE — DISCHARGE INSTRUCTIONS
[place] Office: [phone]    Vickie Thurston  [id number]  1950    EGD/COLONOSCOPY DISCHARGE INSTRUCTIONS  Discomfort:  Sore throat- throat lozenges or warm salt water gargle  redness at IV site- apply warm compress to area; if redness or soreness persist- contact your physician  Gaseous discomfort- walking, belching will help relieve any discomfort  You may not operate a vehicle for 12 hours  You may not engage in an occupation involving machinery or appliances for rest of today. You may not drink alcoholic beverages for at least 12 hours  Avoid making any critical decisions for at least 24 hour  DIET  You may resume your regular diet - however -  remember your colon is empty and a heavy meal will produce gas. Avoid these foods:  fried / greasy foods, excessive carbonated drinks or too much caffeine  MEDICATIONS   Regarding Aspirin or Nonsteroidal medications specifically, please see below. ACTIVITY  You may resume your normal daily activities. Spend the remainder of the day resting -  avoid any strenuous activity. CALL M.D. ANY SIGN OF   Increasing pain, nausea, vomiting  Abdominal distension (swelling)  New increased bleeding (oral or rectal)  Fever (chills)  Pain in chest area  Bloody discharge from nose or mouth  Shortness of breath    You may  take any Advil, Aspirin, Ibuprofen, Motrin, Aleve, or  Tylenol as needed for pain. Follow-up Instructions:   Call  Rajeev Szymanski MD for any questions or concerns   Telephone # [phone]      Follow-up Information    None        [employer] Activation    Thank you for requesting access to [employer]. Please follow the instructions below to securely access and download your online medical record. [employer] allows you to send messages to your doctor, view your test results, renew your prescriptions, schedule appointments, and more. How Do I Sign Up? 1. In your internet browser, go to [email or web address]  2. Click on the First Time User? Click Here link in the Sign In box. You will be redirect to the New Member Sign Up page. 3. Enter your [employer] Access Code exactly as it appears below. You will not need to use this code after youve completed the sign-up process. If you do not sign up before the expiration date, you must request a new code. [employer] Access Code: Activation code not generated  Current [employer] Status: Active (This is the date your [employer] access code will )    4. Enter the last four digits of your Social Security Number (xxxx) and Date of Birth (mm/dd/yyyy) as indicated and click Submit. You will be taken to the next sign-up page. 5. Create a [employer] ID. This will be your [employer] login ID and cannot be changed, so think of one that is secure and easy to remember. 6. Create a [employer] password. You can change your password at any time. 7. Enter your Password Reset Question and Answer. This can be used at a later time if you forget your password. 8. Enter your e-mail address. You will receive e-mail notification when new information is available in [address] 9. Click Sign Up. You can now view and download portions of your medical record. 10. Click the Download Summary menu link to download a portable copy of your medical information. Additional Information    If you have questions, please visit the Frequently Asked Questions section of the [employer] website at [email or web address] Remember, [employer] is NOT to be used for urgent needs. For medical emergencies, dial 911.

## 2018-12-19 ENCOUNTER — HOSPITAL ENCOUNTER (OUTPATIENT)
Dept: LAB | Age: 68
Discharge: HOME | End: 2018-12-19
Payer: MEDICARE

## 2018-12-19 PROCEDURE — 80061 LIPID PANEL: CPT

## 2018-12-19 PROCEDURE — 36415 COLL VENOUS BLD VENIPUNCTURE: CPT

## 2018-12-19 PROCEDURE — 82306 VITAMIN D 25 HYDROXY: CPT

## 2018-12-19 PROCEDURE — 85025 COMPLETE CBC W/AUTO DIFF WBC: CPT

## 2018-12-19 PROCEDURE — 83735 ASSAY OF MAGNESIUM: CPT

## 2018-12-19 PROCEDURE — 80053 COMPREHEN METABOLIC PANEL: CPT

## 2018-12-20 ENCOUNTER — TELEPHONE (OUTPATIENT)
Dept: CARDIOLOGY CLINIC | Age: 68
End: 2018-12-20

## 2018-12-20 LAB
25(OH)D3+25(OH)D2 SERPL-MCNC: 31.5 NG/ML (ref 30–100)
ALBUMIN SERPL-MCNC: 3.9 G/DL (ref 3.6–4.8)
ALBUMIN/GLOB SERPL: 1.8 {RATIO} (ref 1.2–2.2)
ALP SERPL-CCNC: 67 IU/L (ref 39–117)
ALT SERPL-CCNC: 11 IU/L (ref 0–32)
AST SERPL-CCNC: 15 IU/L (ref 0–40)
BASOPHILS # BLD AUTO: 0.1 X10E3/UL (ref 0–0.2)
BASOPHILS NFR BLD AUTO: 1 %
BILIRUB SERPL-MCNC: 0.4 MG/DL (ref 0–1.2)
BUN SERPL-MCNC: 11 MG/DL (ref 8–27)
BUN/CREAT SERPL: 15 (ref 12–28)
CALCIUM SERPL-MCNC: 8.8 MG/DL (ref 8.7–10.3)
CHLORIDE SERPL-SCNC: 105 MMOL/L (ref 96–106)
CHOLEST SERPL-MCNC: 148 MG/DL (ref 100–199)
CO2 SERPL-SCNC: 24 MMOL/L (ref 20–29)
CREAT SERPL-MCNC: 0.71 MG/DL (ref 0.57–1)
EOSINOPHIL # BLD AUTO: 0.1 X10E3/UL (ref 0–0.4)
EOSINOPHIL NFR BLD AUTO: 2 %
ERYTHROCYTE [DISTWIDTH] IN BLOOD BY AUTOMATED COUNT: 13.9 % (ref 12.3–15.4)
GLOBULIN SER CALC-MCNC: 2.2 G/DL (ref 1.5–4.5)
GLUCOSE SERPL-MCNC: 93 MG/DL (ref 65–99)
HCT VFR BLD AUTO: 40 % (ref 34–46.6)
HDLC SERPL-MCNC: 69 MG/DL
HGB BLD-MCNC: 13.1 G/DL (ref 11.1–15.9)
IMM GRANULOCYTES # BLD: 0 X10E3/UL (ref 0–0.1)
IMM GRANULOCYTES NFR BLD: 0 %
INTERPRETATION, 910389: NORMAL
LDLC SERPL CALC-MCNC: 60 MG/DL (ref 0–99)
LYMPHOCYTES # BLD AUTO: 2.2 X10E3/UL (ref 0.7–3.1)
LYMPHOCYTES NFR BLD AUTO: 27 %
MAGNESIUM SERPL-MCNC: 2 MG/DL (ref 1.6–2.3)
MCH RBC QN AUTO: 28.5 PG (ref 26.6–33)
MCHC RBC AUTO-ENTMCNC: 32.8 G/DL (ref 31.5–35.7)
MCV RBC AUTO: 87 FL (ref 79–97)
MONOCYTES # BLD AUTO: 0.5 X10E3/UL (ref 0.1–0.9)
MONOCYTES NFR BLD AUTO: 7 %
NEUTROPHILS # BLD AUTO: 5.4 X10E3/UL (ref 1.4–7)
NEUTROPHILS NFR BLD AUTO: 63 %
PLATELET # BLD AUTO: 218 X10E3/UL (ref 150–379)
POTASSIUM SERPL-SCNC: 4 MMOL/L (ref 3.5–5.2)
PROT SERPL-MCNC: 6.1 G/DL (ref 6–8.5)
RBC # BLD AUTO: 4.6 X10E6/UL (ref 3.77–5.28)
SODIUM SERPL-SCNC: 143 MMOL/L (ref 134–144)
TRIGL SERPL-MCNC: 95 MG/DL (ref 0–149)
VLDLC SERPL CALC-MCNC: 19 MG/DL (ref 5–40)
WBC # BLD AUTO: 8.3 X10E3/UL (ref 3.4–10.8)

## 2018-12-20 RX ORDER — ERGOCALCIFEROL 1.25 MG/1
50000 CAPSULE ORAL
Qty: 8 CAP | Refills: 0 | Status: SHIPPED | OUTPATIENT
Start: 2018-12-20 | End: 2018-12-20 | Stop reason: SDUPTHER

## 2018-12-20 RX ORDER — ERGOCALCIFEROL 1.25 MG/1
50000 CAPSULE ORAL
Qty: 8 CAP | Refills: 0 | Status: SHIPPED | OUTPATIENT
Start: 2018-12-20 | End: 2019-02-11 | Stop reason: ALTCHOICE

## 2018-12-20 NOTE — TELEPHONE ENCOUNTER
Two patient identifiers verified. Per MD patient called and given results of labs. Patient advised 50,000 units x2 months prescription sent to preferred pharmacy per verbal order by NP. Patient will take for 2 months then go back to 10,000 units daily. Patient verbalized understanding and denies any further questions or concerns at this time. ----- Message from Madina Ulloa NP sent at 12/20/2018 10:09 AM EST -----  Labs look great, cholesterol looks great. Vitamin D still low. Please ask her to take Vitamin D 50,000 units once/week x 8 weeks and then after 8 weeks return to 10,000 units daily.

## 2018-12-20 NOTE — PROGRESS NOTES
Labs look great, cholesterol looks great. Vitamin D still low. Please ask her to take Vitamin D 50,000 units once/week x 8 weeks and then after 8 weeks return to 10,000 units daily.

## 2019-01-16 ENCOUNTER — OFFICE VISIT (OUTPATIENT)
Dept: INTERNAL MEDICINE CLINIC | Facility: CLINIC | Age: 69
End: 2019-01-16

## 2019-01-16 VITALS
RESPIRATION RATE: 16 BRPM | TEMPERATURE: 98.3 F | OXYGEN SATURATION: 95 % | BODY MASS INDEX: 39.65 KG/M2 | WEIGHT: 238 LBS | DIASTOLIC BLOOD PRESSURE: 72 MMHG | HEIGHT: 65 IN | SYSTOLIC BLOOD PRESSURE: 110 MMHG | HEART RATE: 61 BPM

## 2019-01-16 DIAGNOSIS — F33.1 MODERATE EPISODE OF RECURRENT MAJOR DEPRESSIVE DISORDER (HCC): ICD-10-CM

## 2019-01-16 DIAGNOSIS — Z13.5 SCREENING FOR GLAUCOMA: ICD-10-CM

## 2019-01-16 DIAGNOSIS — E78.5 HYPERLIPIDEMIA, UNSPECIFIED HYPERLIPIDEMIA TYPE: ICD-10-CM

## 2019-01-16 DIAGNOSIS — Z23 ENCOUNTER FOR IMMUNIZATION: ICD-10-CM

## 2019-01-16 DIAGNOSIS — M17.0 PRIMARY OSTEOARTHRITIS OF BOTH KNEES: ICD-10-CM

## 2019-01-16 DIAGNOSIS — M81.0 AGE-RELATED OSTEOPOROSIS WITHOUT CURRENT PATHOLOGICAL FRACTURE: ICD-10-CM

## 2019-01-16 DIAGNOSIS — I50.22 SYSTOLIC CHF, CHRONIC (HCC): ICD-10-CM

## 2019-01-16 DIAGNOSIS — Z71.89 ADVANCE CARE PLANNING: ICD-10-CM

## 2019-01-16 DIAGNOSIS — L82.1 SEBORRHEIC KERATOSES: ICD-10-CM

## 2019-01-16 DIAGNOSIS — Z00.00 MEDICARE ANNUAL WELLNESS VISIT, INITIAL: Primary | ICD-10-CM

## 2019-01-16 DIAGNOSIS — F51.04 CHRONIC INSOMNIA: ICD-10-CM

## 2019-01-16 DIAGNOSIS — Z95.810 ICD (IMPLANTABLE CARDIOVERTER-DEFIBRILLATOR) IN PLACE: ICD-10-CM

## 2019-01-16 DIAGNOSIS — Z11.59 NEED FOR HEPATITIS C SCREENING TEST: ICD-10-CM

## 2019-01-16 DIAGNOSIS — I10 ESSENTIAL HYPERTENSION: ICD-10-CM

## 2019-01-16 DIAGNOSIS — F51.01 PRIMARY INSOMNIA: ICD-10-CM

## 2019-01-16 DIAGNOSIS — I25.10 CORONARY ARTERY DISEASE INVOLVING NATIVE CORONARY ARTERY OF NATIVE HEART WITHOUT ANGINA PECTORIS: ICD-10-CM

## 2019-01-16 DIAGNOSIS — R26.89 BALANCE PROBLEMS: ICD-10-CM

## 2019-01-16 DIAGNOSIS — I42.9 SECONDARY CARDIOMYOPATHY (HCC): ICD-10-CM

## 2019-01-16 RX ORDER — ZOLPIDEM TARTRATE 10 MG/1
10 TABLET ORAL
Qty: 90 TAB | Refills: 0 | Status: SHIPPED | OUTPATIENT
Start: 2019-01-16 | End: 2019-04-15 | Stop reason: SDUPTHER

## 2019-01-16 NOTE — PROGRESS NOTES
This is an Initial Medicare Annual Wellness Exam (AWV) (Performed 12 months after IPPE or effective date of Medicare Part B enrollment, Once in a lifetime) I have reviewed the patient's medical history in detail and updated the computerized patient record. History Kary Wren is a 79 y.o. female. Chief Complaint Patient presents with  Annual Wellness Visit  
  feeling down some days  Immunization/Injection Presents for Medicare AWV and 4 month follow up evaluation. She has HTN, CAD s/p 2 MI's in 2006 and 2011, valvular heart disease, CHF (EF 35% in 9/16), ICD in place with hx lead revisions, depression with anxiety, obesity s/p gastric bypass in 2006.  Today she complains of:  
 
1) feeling down in the dumps, feels tired all the time. Stressors: her grandson has dyslexia, does not get to see grandson enough because her son has 50% custody. Also has anxiety. Reports compliance with Prozac 60 mg daily. 2) has problems with balance. 3) fecal and urinary incontinence. Following with Dr. Shena Nicolas. Had a colonoscopy on 6/29/18; showed internal hemorrhoids and redundant colon. Had MRI pelvic floor study on 7/2/18 that showed pelvic floor weakness, small anterior rectocele, and prolapse of the vagina below the sacrococcygeal line. Was started on psyllium husk powder. Finished PT at [address] [place] [address] for pelvic floor therapy. Does not feel it helped much. 4) bilateral knee pain generally better after having cortisone injections through Orthopedics. 
  
Cardiovascular Review The patient has hypertension, hyperlipidemia and coronary artery disease.  She reports taking medications as instructed, no medication side effects noted.  Diet and Lifestyle: generally follows a low fat low cholesterol diet, generally follows a low sodium diet, no formal exercise but active during the day.  Lab review: labs reviewed and discussed with patient. Echo 5/25/18: LVEF 25-30%. Mild to mod LV and LA dilation. Mild to mod MR.  
  
Other Providers: Marisol Higgins, NP/Dr. Dawit Fischer (Cardiology) 
  Soc Hx 
. Has 2 living children; lives with , son, son's girlfriend, and 2 grandchildren. She is a retired post . Former smoker; quit in 2002. Denies alcohol or recreational drug use. 
   
Health Maintenance Flu vaccine: 10/9/17 Pneumonia vaccine: PCV-13 6/14/16, PPSV-23 10/9/17 Tetanus vaccine: Tdap 10/30/04 Zoster vaccine: 2/26/16 Colonoscopy: 6/29/18, Dr. Millie Burnett, redundant colon, internal hemorrhoids Pap smear: 7/26/16 Mammogram: 2/1/16, due for this Bone density: 2/1/16 (osteoporosis: L femoral neck T -2.5, tot L hip T -2.4, LS T-2.2) Eye exam: due for this Lipids: 6/13/18 (tot chol 226, [id number]) A1c: 2/9/16 (5.8%) Advanced Directives: has booklet and form at home End of Life: has booklet and form at home 
  
ROS A complete review of systems was performed and is negative except for those mentioned in the HPI. Past Medical History:  
Diagnosis Date  Asthma  Cardiomyopathy ([address]  Coronary artery disease 2008  
 s/p RCA stent (AMRIT) on 11/26/11  Depression with anxiety 2011  DVT (deep venous thrombosis) ([address] 7/27/2010  Family history of early CAD  GERD (gastroesophageal reflux disease)  H/O gastric bypass 2006 Revision in 2009  Hepatitis C antibody test positive Does not have chronic hep C (labs 10/6/15: neg HCV RNA)  HTN (hypertension) 7/27/2010  Hyperlipidemia 07/27/2010  Joint pain 7/27/2010  MI (myocardial infarction) ([address] 7/27/2010  Mitral valve regurgitation Mild to moderate  Morbid obesity ([address] 7/27/2010  Myocardial infarction [facility]) 2006 and 2011 Dr. Eugene Hayden  Psychiatric disorder  PUD (peptic ulcer disease)   
 gi bleed 2008; ulcer n gastric bypass pouch  Urinary incontinence, stress 7/27/2010 Past Surgical History:  
Procedure Laterality Date  CARDIAC SURG PROCEDURE UNLIST Stent x 5-6,Most recent 2011  COLONOSCOPY N/A 6/29/2018 COLONOSCOPY performed by Jose Grimes MD at [facility] ENDOSCOPY; redundant colon, int hemorrhoids, pathology: normal colonic mucosa  HX COLONOSCOPY  9/5/14 Dr. Alla Todd; normal, repeat in 5 yrs  HX GASTRIC BYPASS  HX IMPLANTABLE CARDIOVERTER DEFIBRILLATOR  08/24/2016  HX LAP GASTRIC BYPASS  2006 revision 2009/Dr. Chelsey Pennington  HX OTHER SURGICAL  09/19/2016 Removal of right ventricular ICD lead & single chamber transvenous AICD  HX OTHER SURGICAL  10/12/2016  
 pocket revison of ICD; Dr. Kelvin Guerra  HX OTHER SURGICAL  06/07/2018 Dr Fay Guidry; high resolution anorectal manaometry-abnormal study. Study done for eval of fecal incontinence  HX OTHER SURGICAL  12/14/2018 Dr. Fay Guidry. Rectal endoscopic US (EUS) for rectal incontinence. Normal rectal mucosa, no fistulas.  HX PACEMAKER    
 sicd/left side of chest under arm  INS PPM/ICD LED SING ONLY  8/24/2016  INS PPM/ICD LED SING ONLY  8/26/2016  INS PPM/ICD LED SING ONLY  9/21/2016  IA LAP,STOMACH,OTHER,W/O TUBE  04/05/2010 Revision GBP Current Outpatient Medications Medication Sig Dispense Refill  ENTRESTO 49-51 mg tab tablet TAKE 1 TABLET BY MOUTH TWICE A DAY 60 Tab 11  
 REPATHA SURECLICK pen injection INJECT 1ML SUB-Q EVERY 14 DAYS 2 Pen 11  
 ergocalciferol (ERGOCALCIFEROL) 50,000 unit capsule Take 1 Cap by mouth every seven (7) days. Take for 2 months only. 8 Cap 0  
 zolpidem (AMBIEN) 10 mg tablet Take 1 Tab by mouth nightly as needed for Sleep. Max Daily Amount: 10 mg. 30 Tab 0  
 albuterol (PROVENTIL HFA, VENTOLIN HFA, PROAIR HFA) 90 mcg/actuation inhaler Take 2 Puffs by inhalation every four (4) hours as needed for Wheezing or Shortness of Breath. 1 Inhaler 11  
 nebivolol (BYSTOLIC) 5 mg tablet Take 1 Tab by mouth daily.  90 Tab 2  
  psyllium (METAMUCIL) powd Take  by mouth. 2 tsp daily  cholecalciferol, VITAMIN D3, (VITAMIN D3) 5,000 unit tab tablet Take 10,000 Units by mouth daily.  metOLazone (ZAROXOLYN) 2.5 mg tablet TAKE 1 TABLET BY MOUTH DAILY AS NEEDED FOR UP TO 2 DAYS 6 Tab 1  
 bumetanide (BUMEX) 2 mg tablet Take 2 mg by mouth two (2) times a day.  FLUoxetine (PROZAC) 20 mg tablet TAKE 2 TABLETS EVERY MORNING AND 1 AT BEDTIME FOR ANXIETY WITH DEPRESSION 270 Tab 1  potassium chloride (KLOR-CON M20) 20 mEq tablet TAKE TWO TABLETS BY MOUTH DAILY 180 Tab 1  
 LACTOBACILLUS ACIDOPHILUS (PROBIOTIC PO) Take 1 Tab by mouth daily.  aspirin delayed-release 81 mg tablet Take  by mouth daily.  biotin 10,000 mcg cap Take  by mouth daily.  OTHER Complete multi formula, bariatric advantage  magnesium 250 mg tab Take 1 Tab by mouth daily.  ferrous sulfate (IRON, FERROUS SULFATE,) 325 mg (65 mg Iron) tablet Take  by mouth daily (before breakfast).  CALCIUM PO Take 600 mg by mouth daily. Allergies Allergen Reactions  Amoxicillin Anaphylaxis  Amoxicillin Anaphylaxis  Lipitor [Atorvastatin] Other (comments) Severe muscle pain and spasms  Zocor [Simvastatin] Other (comments) Cramps, muscle spasms  Buspar [Buspirone] Other (comments) Drunk sensation, headaches  Livalo [Pitavastatin] Myalgia  Pravastatin Other (comments) Leg cramps Family History Problem Relation Age of Onset  Dementia Mother  Cancer Mother   
     colon  Alzheimer Mother  Cancer Father   
     stomach  
 Heart Disease Father  Hypertension Father  Heart Disease Sister  Stroke Sister  Heart Attack Brother Social History Tobacco Use  Smoking status: Former Smoker Packs/day: 1.00 Years: 30.00 Pack years: 30.00 Start date: 1970 Last attempt to quit: 2004 Years since quittin.6  Smokeless tobacco: Never Used Substance Use Topics  Alcohol use: No  
  Alcohol/week: 0.0 oz Patient Active Problem List  
Diagnosis Code  Urinary incontinence, stress  DVT (deep venous thrombosis) ([facility]) I82.409  
 HTN (hypertension) I10  
 History of gastric bypass Z98.84  Depression with anxiety F41.8  Reactive airway disease J45.909  CAD (coronary artery disease) I25.10  Systolic CHF, chronic ([facility]) I50.22  
 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[facility] I42.9  Hyperlipidemia E78.5  Mitral valve regurgitation I34.0  History of MI (myocardial infarction) I25.2  Cardiomyopathy ([facility] I42.9  Osteoporosis M81.0  Morbid obesity with BMI of 40.0-44.9, adult ([facility]) E66.01, Z68.41  
 Advance care planning Z71.89  
 Insomnia G47.00  
 S/P ICD (internal cardiac defibrillator) procedure Z95.810  
 AICD lead displacement T82.120A  ICD (implantable cardioverter-defibrillator) in place Z95.810  
 Primary osteoarthritis of both knees M17.0  Mild intermittent asthma without complication M53.39  
 High risk medication use Z79.899  Acute right ankle pain M25.571  Incontinence of feces R15.9 Depression Risk Factor Screening: PHQ over the last two weeks 1/16/2019 PHQ Not Done - Little interest or pleasure in doing things Several days Feeling down, depressed, irritable, or hopeless Several days Total Score PHQ 2 2 Alcohol Risk Factor Screening: You do not drink alcohol or very rarely. Functional Ability and Level of Safety:  
 
Hearing Loss Hearing is good. Activities of Daily Living Self-care. Requires assistance with: no ADLs Fall Risk Fall Risk Assessment, last 12 mths 8/29/2018 Able to walk? Yes Fall in past 12 months? No  
Fall with injury? -  
Number of falls in past 12 months - Fall Risk Score -  
 
Abuse Screen Patient is not abused Review of Systems Pertinent items are noted in HPI. Physical Examination Evaluation of Cognitive Function: Mood/affect:  neutral 
Appearance: age appropriate Family member/caregiver input: none Visit Vitals /72 (BP 1 Location: Left arm, BP Patient Position: Sitting) Pulse 61 Temp 98.3 °F (36.8 °C) (Oral) Resp 16 Ht 5' 5\" (1.651 m) Wt 238 lb (108 kg) SpO2 95% BMI 39.61 kg/m² General: Morbidly obese, no distress. HEENT:  Head normocephalic/atraumatic, no scleral icterus Neck: Supple. No carotid bruits, JVD, lymphadenopathy, or thyromegaly. Lungs:  Clear to ausculation bilaterally. Good air movement. Heart:  Regular rate and rhythm, normal S1 and S2, no murmur, gallop, or rub Extremities: No clubbing, cyanosis, or edema. Neurological: Alert and oriented. Psychiatric: Normal mood and affect. Behavior is normal. 
 
 
Patient Care Team: 
Danielle Fulton MD as PCP - General (Internal Medicine) Ramses Regan MD (Cardiology) Jair Cervantes MD (Cardiology) Isaias An NP (Nurse Practitioner) Ignacio Reilly MD (Orthopedic Surgery) Advice/Referrals/Counseling Education and counseling provided: 
Are appropriate based on today's review and evaluation End-of-Life planning (with patient's consent) Screening Mammography Screening for glaucoma Assessment/Plan ICD-10-CM ICD-9-CM 1. Medicare annual wellness visit, initial Z00.00 V70.0 2. Essential hypertension I10 401.9 3. Encounter for immunization Z23 V03.89 ADMIN INFLUENZA VIRUS VAC INFLUENZA VIRUS VAC QUAD,SPLIT,PRESV FREE SYRINGE IM  
   CANCELED: INFLUENZA VIRUS VACCINE, PRESERVATIVE FREE SYRINGE, 3 YRS AND OLDER  
   CANCELED: INFLUENZA VIRUS VAC QUAD,SPLIT,PRESV FREE SYRINGE IM 4. Systolic CHF, chronic (HCC) I50.22 428.22   
  428.0 5. 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HCC) I42.9 425.9 6. Primary osteoarthritis of both knees M17.0 715.16   
7. ICD (implantable cardioverter-defibrillator) in place Z95.810 V45.02   
8.  Primary insomnia F51.01 307.42   
 9. Hyperlipidemia, unspecified hyperlipidemia type E78.5 272.4 10. Coronary artery disease involving native coronary artery of native heart without angina pectoris I25.10 414.01   
11. Age-related osteoporosis without current pathological fracture M81.0 733.01   
12. Moderate episode of recurrent major depressive disorder (HCC) F33.1 296.32 REFERRAL TO PSYCHOLOGY 13. Balance problems R26.89 781.99 REFERRAL TO PHYSICAL THERAPY 14. Seborrheic keratoses L82.1 702.19 REFERRAL TO DERMATOLOGY 15. Screening for glaucoma Z13.5 V80.1 REFERRAL TO OPHTHALMOLOGY 16. Need for hepatitis C screening test Z11.59 V73.89   
17. Advance care planning Z71.89 V65.49   
18. Chronic insomnia F51.04 780.52 zolpidem (AMBIEN) 10 mg tablet Diagnoses and all orders for this visit: 
 
1. Medicare annual wellness visit, initial 
 
2. Essential hypertension Controlled on Bystolic. 3. Encounter for immunization -     ADMIN INFLUENZA VIRUS VAC 
-     INFLUENZA VIRUS VAC QUAD,SPLIT,PRESV FREE SYRINGE IM 4. Systolic CHF, chronic ([address] Controlled. Continue Entresto 69/24 mg, Bystolic, and Bumex. Following closely with Cardiology. 5. 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[address] 6. Primary osteoarthritis of both knees 7. ICD (implantable cardioverter-defibrillator) in place 8. Primary insomnia 9. Hyperlipidemia, unspecified hyperlipidemia type Well-controlled on Repatha. 10. Coronary artery disease involving native coronary artery of native heart without angina pectoris 11. Age-related osteoporosis without current pathological fracture 12. Moderate episode of recurrent major depressive disorder (HCC) She is agreeable to therapy. 
-     [email or web address] 13. Balance problems Explained that she needs to have her eyes examined. Will also order PT for balance exercises. -     REFERRAL TO PHYSICAL THERAPY 14. Seborrheic keratoses 
-     REFERRAL TO DERMATOLOGY 15. Screening for glaucoma 
-     REFERRAL TO OPHTHALMOLOGY 16. Need for hepatitis C screening test 
 
17. Advance care planning 18. Chronic insomnia -     Refill zolpidem (AMBIEN) 10 mg tablet; Take 1 Tab by mouth nightly as needed for Sleep. Max Daily Amount: 10 mg. (#90, NR) Greater than 40 mins direct face-to-face time spent with patient. Greater than 50% of time spent on counseling and coordination of care. Follow-up Disposition: 
Return in about 4 months (around 5/16/2019), or if symptoms worsen or fail to improve, for HTN, balance issues, insomnia. Patient seen and had Medicare Annual Wellness Exam; Wellness Schedule printed, reviewed, and given to patient.

## 2019-01-16 NOTE — PROGRESS NOTES
Leoia Lawrence  Identified pt with two pt identifiers(name and ). Chief Complaint Patient presents with  Annual Wellness Visit  
  feeling down some days  Immunization/Injection Per Dr. Rosa Deras verbal order read back orders placed for flu vaccine. After verbal order read back of , patient received Flu Shot in right arm,  [address]  [id number] Lot  Exp 19. Patient tolerated procedure without complaints and received VIS. 1. Have you been to the ER, urgent care clinic since your last visit? Hospitalized since your last visit? NO 
 
2. Have you seen or consulted any other health care providers outside of the [address] [place] [address] since your last visit? Include any pap smears or colon screening. Dr Santiago Chakraborty Today's provider has been notified of reason for visit, vitals and flowsheets obtained on patients. Patient received paperwork for advance directive during previous visit but has not completed at this time Reviewed record In preparation for visit, huddled with provider and have obtained necessary documentation Health Maintenance Due Topic  Shingrix Vaccine Age 50> (1 of 2)  GLAUCOMA SCREENING Q2Y  Influenza Age 5 to Adult  MEDICARE YEARLY EXAM   
 
 
Wt Readings from Last 3 Encounters:  
19 238 lb (108 kg) 18 238 lb (108 kg)  
18 239 lb (108.4 kg) Temp Readings from Last 3 Encounters:  
19 98.3 °F (36.8 °C) (Oral) 18 98.1 °F (36.7 °C)  
18 97.8 °F (36.6 °C) (Oral) BP Readings from Last 3 Encounters:  
19 110/72  
18 135/77  
18 112/68 Pulse Readings from Last 3 Encounters:  
19 61  
18 66  
18 60 Vitals:  
 19 7785 BP: 110/72 Pulse: 61 Resp: 16 Temp: 98.3 °F (36.8 °C) TempSrc: Oral  
SpO2: 95% Weight: 238 lb (108 kg) Height: 5' 5\" (1.651 m) PainSc:   0 - No pain Learning Assessment: 
:  
 
 Learning Assessment 10/26/2017 6/30/2015 PRIMARY LEARNER Patient Patient HIGHEST LEVEL OF EDUCATION - PRIMARY LEARNER  - 2 YEARS OF COLLEGE  
BARRIERS PRIMARY LEARNER - NONE  
CO-LEARNER CAREGIVER - No  
PRIMARY LANGUAGE ENGLISH ENGLISH  
LEARNER PREFERENCE PRIMARY READING DEMONSTRATION  
ANSWERED BY patient pateint RELATIONSHIP SELF SELF Depression Screening: 
:  
 
PHQ over the last two weeks 1/16/2019 PHQ Not Done - Little interest or pleasure in doing things Several days Feeling down, depressed, irritable, or hopeless Several days Total Score PHQ 2 2 Fall Risk Assessment: 
:  
 
Fall Risk Assessment, last 12 mths 8/29/2018 Able to walk? Yes Fall in past 12 months? No  
Fall with injury? -  
Number of falls in past 12 months - Fall Risk Score -  
 
 
Abuse Screening: 
:  
 
Abuse Screening Questionnaire 5/29/2018 7/26/2016 6/30/2015 Do you ever feel afraid of your partner? N N N Are you in a relationship with someone who physically or mentally threatens you? N N N Is it safe for you to go home? Deloris Astudillo  
 
 
ADL Screening: 
:  
 
ADL Assessment 5/29/2018 Feeding yourself No Help Needed Getting from bed to chair No Help Needed Getting dressed No Help Needed Bathing or showering No Help Needed Walk across the room (includes cane/walker) No Help Needed Using the telphone No Help Needed Taking your medications No Help Needed Preparing meals No Help Needed Managing money (expenses/bills) No Help Needed Moderately strenuous housework (laundry) No Help Needed Shopping for personal items (toiletries/medicines) No Help Needed Shopping for groceries No Help Needed Driving No Help Needed Climbing a flight of stairs No Help Needed Getting to places beyond walking distances No Help Needed Medication reconciliation up to date and corrected with patient at this time.

## 2019-01-16 NOTE — PATIENT INSTRUCTIONS
For a therapist, call: [facility] [address] [place], [address] 
[phone] [facility] at [address], [address] [facility], [address] 
[phone] [address] [facility] at [address] [facility] [address] [place], [address]  
[phone] [address] at [address] [place], [address]  
[phone] [address] [facility], [address] 
[phone] [address], [address] [facility], [address] [facility] 
[phone] [address] 
[phone] [facility] [address], [address] [place], [address] 
[phone] [facility] 12[address] [facility], [address] 
[phone] [facility]  
[id number] [place] [facility], [facility] 
[phone] Vaccine Information Statement Influenza (Flu) Vaccine (Inactivated or Recombinant): What you need to know Many Vaccine Information Statements are available in [place] and other languages. See www.immunize.org/vis Hojas de Información Sobre Vacunas están disponibles en Español y en muchos otros idiomas. Visite www.immunize.org/vis 1. Why get vaccinated? Influenza (flu) is a contagious disease that spreads around the [place] every year, usually between October and May. Flu is caused by influenza viruses, and is spread mainly by coughing, sneezing, and close contact. Anyone can get flu. Flu strikes suddenly and can last several days. Symptoms vary by age, but can include: 
 fever/chills  sore throat  muscle aches  fatigue  cough  headache  runny or stuffy nose Flu can also lead to pneumonia and blood infections, and cause diarrhea and seizures in children. If you have a medical condition, such as heart or lung disease, flu can make it worse. Flu is more dangerous for some people. Infants and young children, people 72years of age and older, pregnant women, and people with certain health conditions or a weakened immune system are at greatest risk. Each year thousands of people in the [facility] die from flu, and many more are hospitalized. Flu vaccine can: 
 keep you from getting flu, 
 make flu less severe if you do get it, and 
 keep you from spreading flu to your family and other people. 2. Inactivated and recombinant flu vaccines A dose of flu vaccine is recommended every flu season. Children 6 months through 6years of age may need two doses during the same flu season. Everyone else needs only one dose each flu season. Some inactivated flu vaccines contain a very small amount of a mercury-based preservative called thimerosal. Studies have not shown thimerosal in vaccines to be harmful, but flu vaccines that do not contain thimerosal are available. There is no live flu virus in flu shots. They cannot cause the flu. There are many flu viruses, and they are always changing. Each year a new flu vaccine is made to protect against three or four viruses that are likely to cause disease in the upcoming flu season. But even when the vaccine doesnt exactly match these viruses, it may still provide some protection Flu vaccine cannot prevent: 
 flu that is caused by a virus not covered by the vaccine, or 
 illnesses that look like flu but are not. It takes about 2 weeks for protection to develop after vaccination, and protection lasts through the flu season. 3. Some people should not get this vaccine Tell the person who is giving you the vaccine:  If you have any severe, life-threatening allergies.    
If you ever had a life-threatening allergic reaction after a dose of flu vaccine, or have a severe allergy to any part of this vaccine, you may be advised not to get vaccinated. Most, but not all, types of flu vaccine contain a small amount of egg protein.  If you ever had Guillain-Barré Syndrome (also called GBS). Some people with a history of GBS should not get this vaccine. This should be discussed with your doctor.  If you are not feeling well. It is usually okay to get flu vaccine when you have a mild illness, but you might be asked to come back when you feel better. 4. Risks of a vaccine reaction With any medicine, including vaccines, there is a chance of reactions. These are usually mild and go away on their own, but serious reactions are also possible. Most people who get a flu shot do not have any problems with it. Minor problems following a flu shot include:  
 soreness, redness, or swelling where the shot was given  hoarseness  sore, red or itchy eyes  cough  fever  aches  headache  itching  fatigue If these problems occur, they usually begin soon after the shot and last 1 or 2 days. More serious problems following a flu shot can include the following:  There may be a small increased risk of Guillain-Barré Syndrome (GBS) after inactivated flu vaccine. This risk has been estimated at 1 or 2 additional cases per million people vaccinated. This is much lower than the risk of severe complications from flu, which can be prevented by flu vaccine.  Young children who get the flu shot along with pneumococcal vaccine (PCV13) and/or DTaP vaccine at the same time might be slightly more likely to have a seizure caused by fever. Ask your doctor for more information. Tell your doctor if a child who is getting flu vaccine has ever had a seizure. Problems that could happen after any injected vaccine:  People sometimes faint after a medical procedure, including vaccination. Sitting or lying down for about 15 minutes can help prevent fainting, and injuries caused by a fall. Tell your doctor if you feel dizzy, or have vision changes or ringing in the ears.  Some people get severe pain in the shoulder and have difficulty moving the arm where a shot was given. This happens very rarely.  Any medication can cause a severe allergic reaction. Such reactions from a vaccine are very rare, estimated at about 1 in a million doses, and would happen within a few minutes to a few hours after the vaccination. As with any medicine, there is a very remote chance of a vaccine causing a serious injury or death. The safety of vaccines is always being monitored. For more information, visit: www.cdc.gov/vaccinesafety/ 
 
 
The [facility] Vaccine Injury Compensation Program (VICP) is a federal program that was created to compensate people who may have been injured by certain vaccines.  
 
Persons who believe they may have been injured by a vaccine can learn about the program and about filing a claim by calling 0-343.995.2814 or visiting the [facility] website at www.[facility].gov/vaccinecompensation. There is a time limit to file a claim for compensation. 7. How can I learn more?  Ask your healthcare provider. He or she can give you the vaccine package insert or suggest other sources of information.  Call your local or state health department.  Contact the Centers for Disease Control and Prevention (CDC): 
- Call 8-324.726.9902 (1-800-CDC-INFO) or 
- Visit CDCs website at www.cdc.gov/flu Vaccine Information Statement Inactivated Influenza Vaccine 8/7/2015 
[address] SHANTHI [address] 105TI-85 [facility] Centers for Disease Control and Prevention Office Use Only Schedule of Personalized Health Plan The best way to stay healthy is to live a healthy lifestyle. A healthy lifestyle includes regular exercise, eating a well-balanced diet, keeping a healthy weight and not smoking. Regular physical exams and screening tests are another important way to take care of yourself. Preventive exams provided by health care providers can find health problems early when treatment works best and can keep you from getting certain diseases or illnesses. Preventive services include exams, lab tests, screenings, shots, monitoring and information to help you take care of your own health. All people over 65 should have a pneumonia shot. Pneumonia shots are usually only needed once in a lifetime unless your doctor decides differently. All people over 65 should have a yearly flu shot. People over 65 are at medium to high risk for Hepatitis B. Three shots are needed for complete protection. In addition to your physical exam, some screening tests are recommended: 
 
Bone mass measurement (dexa scan) is recommended every two years Diabetes Mellitus screening is recommended every year. Glaucoma is an eye disease caused by high pressure in the eye. An eye exam is recommended every year. Cardiovascular screening tests that check your cholesterol and other blood fat (lipid) levels are recommended every five years. Colorectal Cancer screening tests help to find pre-cancerous polyps (growths in the colon) so they can be removed before they turn into cancer. Tests ordered for screening depend on your personal and family history risk factors. Screening for Breast Cancer is recommended yearly with a mammogram. 
 
Screening for Cervical Cancer is recommended every two years (annually for certain risk factors, such as previous history of STD or abnormal PAP in past 7 years), with a Pelvic Exam with PAP Here is a list of your current Health Maintenance items with a due date: 
Health Maintenance Topic Date Due  Shingrix Vaccine Age 50> (1 of 2) 05/17/2002  GLAUCOMA SCREENING Q2Y  05/17/2017  MEDICARE YEARLY EXAM  01/17/2020  BREAST CANCER SCRN MAMMOGRAM  09/11/2020  COLONOSCOPY  06/29/2023  DTaP/Tdap/Td series (2 - Td) 10/30/2024  Hepatitis C Screening  Completed  Bone Densitometry (Dexa) Screening  Completed  Pneumococcal 65+ Low/Medium Risk  Completed  Influenza Age 5 to Adult  Completed

## 2019-01-17 NOTE — ACP (ADVANCE CARE PLANNING)
Advance Care Planning (ACP) Provider Conversation Snapshot    Date of ACP Conversation: 01/16/19  Persons included in Conversation:  patient  Length of ACP Conversation in minutes:  <16 minutes (Non-Billable)    Authorized Decision Maker (if patient is incapable of making informed decisions):    This person is:   Healthcare Agent/Medical Power of  under Advance Directive          For Patients with Decision Making Capacity:   Values/Goals: Exploration of values, goals, and preferences if recovery is not expected, even with continued medical treatment in the event of:  Imminent death  Severe, permanent brain injury    Conversation Outcomes / Follow-Up Plan:   Recommended completion of Advance Directive form after review of ACP materials and conversation with prospective healthcare agent

## 2019-01-24 ENCOUNTER — TELEPHONE (OUTPATIENT)
Dept: CARDIOLOGY CLINIC | Age: 69
End: 2019-01-24

## 2019-01-24 NOTE — TELEPHONE ENCOUNTER
LVM for patient to return call at earliest convenience. We have received notification for prior Auth for her Repatha. Will complete.    2 pt identifiers used

## 2019-01-24 NOTE — TELEPHONE ENCOUNTER
Pt called to f/u on the form that was faxed from [facility]. She said that can't refill her cholesterol shot until they receive the form.   Phone #[phone]  Thanks

## 2019-01-25 ENCOUNTER — TELEPHONE (OUTPATIENT)
Dept: CARDIOLOGY CLINIC | Age: 69
End: 2019-01-25

## 2019-02-11 RX ORDER — ERGOCALCIFEROL 1.25 MG/1
50000 CAPSULE ORAL
Qty: 8 CAP | Refills: 0 | OUTPATIENT
Start: 2019-02-11

## 2019-02-11 NOTE — TELEPHONE ENCOUNTER
Identifiers x 2. Patient states that she completed vitamin d 50,000 units weekly. Has resumed vitamin d 10,000 units daily. Confirmed follow ups.      Future Appointments   Date Time Provider Denis Natali   3/21/2019  9:00 AM Gary CHRISTINE [address] [place] [address]   3/21/2019  9:40 AM Sergio Bruner  [address]   5/14/2019 11:10 AM Parish Marte MD [facility]   6/7/2019 10:00 AM Angy Alberts  [address]

## 2019-02-25 RX ORDER — METOLAZONE 2.5 MG/1
TABLET ORAL
Qty: 6 TAB | Refills: 1 | Status: SHIPPED | OUTPATIENT
Start: 2019-02-25 | End: 2020-09-29 | Stop reason: SDUPTHER

## 2019-03-21 ENCOUNTER — CLINICAL SUPPORT (OUTPATIENT)
Dept: CARDIOLOGY CLINIC | Age: 69
End: 2019-03-21

## 2019-03-21 ENCOUNTER — OFFICE VISIT (OUTPATIENT)
Dept: CARDIOLOGY CLINIC | Age: 69
End: 2019-03-21

## 2019-03-21 VITALS
BODY MASS INDEX: 39.99 KG/M2 | DIASTOLIC BLOOD PRESSURE: 82 MMHG | HEIGHT: 65 IN | WEIGHT: 240 LBS | SYSTOLIC BLOOD PRESSURE: 130 MMHG | OXYGEN SATURATION: 97 % | HEART RATE: 67 BPM | RESPIRATION RATE: 18 BRPM

## 2019-03-21 DIAGNOSIS — I50.22 SYSTOLIC CHF, CHRONIC (HCC): ICD-10-CM

## 2019-03-21 DIAGNOSIS — Z95.810 PRESENCE OF AUTOMATIC CARDIOVERTER/DEFIBRILLATOR (AICD): Primary | ICD-10-CM

## 2019-03-21 DIAGNOSIS — Z95.810 AUTOMATIC IMPLANTABLE CARDIOVERTER-DEFIBRILLATOR IN SITU: Primary | ICD-10-CM

## 2019-03-21 DIAGNOSIS — I34.0 NON-RHEUMATIC MITRAL REGURGITATION: ICD-10-CM

## 2019-03-21 DIAGNOSIS — I10 ESSENTIAL HYPERTENSION: ICD-10-CM

## 2019-03-21 DIAGNOSIS — E66.01 MORBID OBESITY WITH BMI OF 40.0-44.9, ADULT (HCC): ICD-10-CM

## 2019-03-21 NOTE — PROGRESS NOTES
Cardiac Electrophysiology Office Note Subjective:  
  
James Ann is a 79 y.o. female who presents for follow up, is s/p [employer] S-ICD (DOI 09/21/2016). Device check today shows proper function, generator longevity estimated 71%. No interim detections or discharges. She states she has been very fatigued in the past few months. Used to walk 1/2 mile at a time, states can no longer do this, gets SOB. Able to vacuum if she paces herself & takes breaks in between. NYHA II. No recent hospitalizations. She denies chest pain, PND, orthopnea, lightheadedness, syncope, or edema. No recent hospitalizations. Previous palpitations have become very rare, brief if they occur at all. Previous:  
Initial ICD implanted 8/24/16. Transvenous RV lead displacement x 2 due to large breast size, repositioned once. She had the transvenous RV lead displacement twice and repositioned once due to large breast sizes Removed the whole system d/t to high recurrent risk of perforation. S-ICD was implanted in place of transvenous system 9/21/16. Echo (11/30/2018): LVEF 35%, no RWMA, akinesis of basal-mid inferoseptal & basal-mid inferolat wall(s), borderline LVH. LA mod dilated. Mild to mod MR. Mod increased aortic valve leaflet thickness. Trivial TR. Trivial OH. Lexiscan cardiolite stress (04/17/2017): Anterior wall with large, mod intensity reversible defect. Ulysses with large, mod severity, reversible defect. Large, severe fixed defect. Lat wall with mod size, mod severity reversible defect. Followed by Dr. Tash Rascon. LVEF has been as low as 27%. Cardiac cath 2013 with patent stents, LVEF had been 35% She has had echo with LVEF 40% but nuclear stress test GATED SPECT LVEF 31% and MUGA 6/29/2016 LVEF 27%  
  She is a part of a support group on [employer] for SICD.  
  
Patient Active Problem List  
Diagnosis Code  Urinary incontinence, stress  DVT (deep venous thrombosis) ([facility]) I82.409  
 HTN (hypertension) I10  
 History of gastric bypass Z98.84  Depression with anxiety F41.8  Reactive airway disease J45.909  CAD (coronary artery disease) I25.10  Systolic CHF, chronic ([facility]) I50.22  
 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[facility] I42.9  Hyperlipidemia E78.5  Mitral valve regurgitation I34.0  History of MI (myocardial infarction) I25.2  Cardiomyopathy ([facility] I42.9  Osteoporosis M81.0  Morbid obesity with BMI of 40.0-44.9, adult ([facility]) E66.01, Z68.41  
 Advance care planning Z71.89  
 Insomnia G47.00  
 S/P ICD (internal cardiac defibrillator) procedure Z95.810  
 AICD lead displacement T82.120A  ICD (implantable cardioverter-defibrillator) in place Z95.810  
 Primary osteoarthritis of both knees M17.0  Mild intermittent asthma without complication I41.47  
 High risk medication use Z79.899  Acute right ankle pain M25.571  Incontinence of feces R15.9 Current Outpatient Medications Medication Sig Dispense Refill  metOLazone (ZAROXOLYN) 2.5 mg tablet TAKE 1 TABLET BY MOUTH DAILY AS NEEDED FOR UP TO 2 DAYS 6 Tab 1  
 FLUoxetine (PROZAC) 20 mg tablet TAKE 2 TABLETS EVERY MORNING AND 1 TABLET AT BEDTIME FOR ANXIETY WITH DEPRESSION 270 Tab 3  
 zolpidem (AMBIEN) 10 mg tablet Take 1 Tab by mouth nightly as needed for Sleep. Max Daily Amount: 10 mg. 90 Tab 0  
 ENTRESTO 49-51 mg tab tablet TAKE 1 TABLET BY MOUTH TWICE A DAY 60 Tab 11  
 REPATHA SURECLICK pen injection INJECT 1ML SUB-Q EVERY 14 DAYS 2 Pen 11  
 albuterol (PROVENTIL HFA, VENTOLIN HFA, PROAIR HFA) 90 mcg/actuation inhaler Take 2 Puffs by inhalation every four (4) hours as needed for Wheezing or Shortness of Breath. 1 Inhaler 11  
 nebivolol (BYSTOLIC) 5 mg tablet Take 1 Tab by mouth daily. 90 Tab 2  psyllium (METAMUCIL) powd Take  by mouth. 2 tsp daily  cholecalciferol, VITAMIN D3, (VITAMIN D3) 5,000 unit tab tablet Take 10,000 Units by mouth daily.  bumetanide (BUMEX) 2 mg tablet Take 2 mg by mouth two (2) times a day.  potassium chloride (KLOR-CON M20) 20 mEq tablet TAKE TWO TABLETS BY MOUTH DAILY 180 Tab 1  
 LACTOBACILLUS ACIDOPHILUS (PROBIOTIC PO) Take 1 Tab by mouth daily.  aspirin delayed-release 81 mg tablet Take  by mouth daily.  biotin 10,000 mcg cap Take  by mouth daily.  OTHER Complete multi formula, bariatric advantage  magnesium 250 mg tab Take 1 Tab by mouth daily.  ferrous sulfate (IRON, FERROUS SULFATE,) 325 mg (65 mg Iron) tablet Take  by mouth daily (before breakfast).  CALCIUM PO Take 600 mg by mouth daily. Allergies Allergen Reactions  Amoxicillin Anaphylaxis  Amoxicillin Anaphylaxis  Lipitor [Atorvastatin] Other (comments) Severe muscle pain and spasms  Zocor [Simvastatin] Other (comments) Cramps, muscle spasms  Buspar [Buspirone] Other (comments) Drunk sensation, headaches  Livalo [Pitavastatin] Myalgia  Pravastatin Other (comments) Leg cramps Past Medical History:  
Diagnosis Date  Asthma  Cardiomyopathy ([facility]  Coronary artery disease 2008  
 s/p RCA stent (AMRIT) on 11/26/11  Depression with anxiety 2011  DVT (deep venous thrombosis) ([facility] 7/27/2010  Family history of early CAD  GERD (gastroesophageal reflux disease)  H/O gastric bypass 2006 Revision in 2009  Hepatitis C antibody test positive Does not have chronic hep C (labs 10/6/15: neg HCV RNA)  HTN (hypertension) 7/27/2010  Hyperlipidemia 07/27/2010  Joint pain 7/27/2010  MI (myocardial infarction) ([facility] 7/27/2010  Mitral valve regurgitation Mild to moderate  Morbid obesity ([facility] 7/27/2010  Myocardial infarction [facility]) 2006 and 2011 Dr. Wang [place]  Psychiatric disorder  PUD (peptic ulcer disease)   
 gi bleed 2008; ulcer n gastric bypass pouch  Urinary incontinence, stress 2010 Past Surgical History:  
Procedure Laterality Date  CARDIAC SURG PROCEDURE UNLIST Stent x 5-6,Most recent   COLONOSCOPY N/A 2018 COLONOSCOPY performed by Devonte Perez MD at [facility] ENDOSCOPY; redundant colon, int hemorrhoids, pathology: normal colonic mucosa  HX COLONOSCOPY  14 Dr. Breanna Mendez; normal, repeat in 5 yrs  HX GASTRIC BYPASS  HX IMPLANTABLE CARDIOVERTER DEFIBRILLATOR  2016  HX LAP GASTRIC BYPASS  2006 revision /Dr. William Collins  HX OTHER SURGICAL  2016 Removal of right ventricular ICD lead & single chamber transvenous AICD  HX OTHER SURGICAL  10/12/2016  
 pocket revison of ICD; Dr. Colton Auguste  HX OTHER SURGICAL  2018 Dr Adnres Carr; high resolution anorectal manaometry-abnormal study. Study done for eval of fecal incontinence  HX OTHER SURGICAL  2018 Dr. Andres Carr. Rectal endoscopic US (EUS) for rectal incontinence. Normal rectal mucosa, no fistulas.  HX PACEMAKER    
 sicd/left side of chest under arm  INS PPM/ICD LED SING ONLY  2016  INS PPM/ICD LED SING ONLY  2016  INS PPM/ICD LED SING ONLY  2016  [place] LAP,STOMACH,OTHER,W/O TUBE  2010 Revision GBP Family History Problem Relation Age of Onset  Dementia Mother  Cancer Mother   
     colon  Alzheimer Mother  Cancer Father   
     stomach  
 Heart Disease Father  Hypertension Father  Heart Disease Sister  Stroke Sister  Heart Attack Brother Social History Tobacco Use  Smoking status: Former Smoker Packs/day: 1.00 Years: 30.00 Pack years: 30.00 Start date: 1970 Last attempt to quit: 2004 Years since quittin.8  Smokeless tobacco: Never Used Substance Use Topics  Alcohol use: No  
  Alcohol/week: 0.0 oz Review of Systems:  
Constitutional: Negative for fever, chills, weight loss HEENT: Negative for nosebleeds, vision changes. Respiratory: Negative for cough, hemoptysis, sputum production, and wheezing. Cardiovascular: Negative for chest pain, + occasional brief palpitations, orthopnea, claudication, leg swelling, syncope, and PND. + RODNEY Gastrointestinal: Negative for nausea, vomiting, diarrhea, constipation, blood in stool and melena. Genitourinary: Negative for dysuria, and hematuria. Musculoskeletal: Negative for myalgias, arthralgia. Skin: Negative for rash. Wound clean. Heme: Does not bleed or bruise easily. + varicose veins Neurological: Negative for speech change and focal weakness Objective:  
 
Visit Vitals /82 Pulse 67 Resp 18 Ht 5' 5\" (1.651 m) Wt 240 lb (108.9 kg) SpO2 97% BMI 39.94 kg/m² Physical Exam:  
Constitutional: well-developed and well-nourished. No distress. Head: Normocephalic and atraumatic. Eyes: Pupils are equal, round Neck: supple. No JVD present. Cardiovascular: Normal rate, regular rhythm and normal heart sounds. Exam reveals no gallop and no friction rub. Soft systolic murmur. Pulmonary/Chest: Effort normal and breath sounds normal. No wheezes. Abdominal: Soft, obese, nontender. Musculoskeletal: No edema. Neurological: Alert,oriented. Skin: Skin is warm and dry. S-ICD site well healed. Psychiatric: Normal mood and affect. Behavior is normal. Judgment and thought content normal.   
 
ECG: NSR 64, QRSd 116 ms. Assessment/Plan: ICD-10-CM ICD-9-CM 1. Automatic implantable cardioverter-defibrillator in situ Z95.810 V45.02   
2. Systolic CHF, chronic (HCC) I50.22 428.22   
  428.0 3. Non-rheumatic mitral regurgitation I34.0 424.0 4. Essential hypertension I10 401.9 Ms. Otto Gonsalez has [employer] S-ICD (DOI 09/21/2016), had previous lead dislodgement with previous transvenous pacer. Device check shows proper function, 75% generator longevity.   No interim detections or discharges. Still NYHA II, but has decreased activity tolerance, increased RODNEY, fatigue. Medication regimen includes Entresto 49-51 mg po bid & nebivolol 5 mg po daily. Guidelines for chronic systolic CHF recommend Toprol XL or carvedilol as beta blocker therapy. She will ask Dr. Magen Shultz about potentially switching to one of these. States she was taken off Toprol XL due to hair loss, but this hasn't improved by switching to nebivolol. Based upon today's ECG, she is not a candidate for biventricular ICD. No BBB. /82, states did not take AM meds & that it's typically lower. Denies hypotension or lightheadedness. Remote ICD checks q 3 months. Follow up in EP clinic annually. Follow up with Dr. Magen Shultz as previously scheduled. Future Appointments Date Time Provider Denis Hurst 5/14/2019 11:10 AM Lorena Rangel MD [facility]  
6/7/2019 10:00 AM Minesh Berg  [address]  
7/3/2019  1:30 PM REMOTE1, [address]  
10/7/2019 11:00 AM REMOTE1, [address]  
1/8/2020  2:15 PM REMOTE1, [address]  
4/9/2020 10:30 AM PACEMAKER3, [address]RE[facility] EMMETT SCHED  
4/9/2020 10:40 AM Jose Luis Chung  [address] Thank you for involving me in this patient's care and please call with further concerns or questions. Kathlyne Snellen, M.D. Electrophysiology/Cardiology 1 [facility] [address], [address] [place] [address], [address] [place] [address] 
[phone]                                        [phone]

## 2019-03-21 NOTE — PROGRESS NOTES
Cardiac Electrophysiology Office Note     Subjective:      Jesus James is a 79 y.o. female who presents for follow up, is s/p [employer] S-ICD (DOI 09/21/2016). Device check today shows proper function, generator longevity estimated 71%. No interim detections or discharges. She states she has been very fatigued in the past few months. Used to walk 1/2 mile at a time, states can no longer do this, gets SOB. Able to vacuum if she paces herself & takes breaks in between. NYHA III. No recent hospitalizations. She denies chest pain, PND, orthopnea, lightheadedness, syncope, or edema. No recent hospitalizations. Previous palpitations have become very rare, brief if they occur at all. Previous:   Initial ICD implanted 8/24/16. Transvenous RV lead displacement x 2 due to large breast size, repositioned once. She had the transvenous RV lead displacement twice and repositioned once due to large breast sizes  Removed the whole system d/t to high recurrent risk of perforation. S-ICD was implanted in place of transvenous system 9/21/16. Echo (11/30/2018): LVEF 35%, no RWMA, akinesis of basal-mid inferoseptal & basal-mid inferolat wall(s), borderline LVH. LA mod dilated. Mild to mod MR. Mod increased aortic valve leaflet thickness. Trivial TR. Trivial VA. Lexiscan cardiolite stress (04/17/2017): Anterior wall with large, mod intensity reversible defect. Gerald with large, mod severity, reversible defect. Large, severe fixed defect. Lat wall with mod size, mod severity reversible defect. Followed by Dr. Katlin Sadler. LVEF has been as low as 27%.     Cardiac cath 2013 with patent stents, LVEF had been 35%  She has had echo with LVEF 40% but nuclear stress test GATED SPECT LVEF 31% and MUGA 6/29/2016 LVEF 27%      She is a part of a support group on [employer] for SICD.      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.9    Hyperlipidemia E78.5    Mitral valve regurgitation I34.0    History of MI (myocardial infarction) I25.2    Cardiomyopathy ([facility]) I42.9    Osteoporosis M81.0    Morbid obesity with BMI of 40.0-44.9, adult ([facility]) E66.01, Z68.41    Advance care planning Z71.89    Insomnia G47.00    S/P ICD (internal cardiac defibrillator) procedure Z95.810    AICD lead displacement T82.120A    ICD (implantable cardioverter-defibrillator) in place Z95.810    Primary osteoarthritis of both knees M17.0    Mild intermittent asthma without complication Z38.70    High risk medication use Z79.899    Acute right ankle pain M25.571    Incontinence of feces R15.9     Current Outpatient Medications   Medication Sig Dispense Refill    metOLazone (ZAROXOLYN) 2.5 mg tablet TAKE 1 TABLET BY MOUTH DAILY AS NEEDED FOR UP TO 2 DAYS 6 Tab 1    FLUoxetine (PROZAC) 20 mg tablet TAKE 2 TABLETS EVERY MORNING AND 1 TABLET AT BEDTIME FOR ANXIETY WITH DEPRESSION 270 Tab 3    zolpidem (AMBIEN) 10 mg tablet Take 1 Tab by mouth nightly as needed for Sleep. Max Daily Amount: 10 mg. 90 Tab 0    ENTRESTO 49-51 mg tab tablet TAKE 1 TABLET BY MOUTH TWICE A DAY 60 Tab 11    REPATHA SURECLICK pen injection INJECT 1ML SUB-Q EVERY 14 DAYS 2 Pen 11    albuterol (PROVENTIL HFA, VENTOLIN HFA, PROAIR HFA) 90 mcg/actuation inhaler Take 2 Puffs by inhalation every four (4) hours as needed for Wheezing or Shortness of Breath. 1 Inhaler 11    nebivolol (BYSTOLIC) 5 mg tablet Take 1 Tab by mouth daily. 90 Tab 2    psyllium (METAMUCIL) powd Take  by mouth. 2 tsp daily      cholecalciferol, VITAMIN D3, (VITAMIN D3) 5,000 unit tab tablet Take 10,000 Units by mouth daily.       bumetanide (BUMEX) 2 mg tablet Take 2 mg by mouth two (2) times a day.  potassium chloride (KLOR-CON M20) 20 mEq tablet TAKE TWO TABLETS BY MOUTH DAILY 180 Tab 1    LACTOBACILLUS ACIDOPHILUS (PROBIOTIC PO) Take 1 Tab by mouth daily.  aspirin delayed-release 81 mg tablet Take  by mouth daily.  biotin 10,000 mcg cap Take  by mouth daily.  OTHER Complete multi formula, bariatric advantage      magnesium 250 mg tab Take 1 Tab by mouth daily.  ferrous sulfate (IRON, FERROUS SULFATE,) 325 mg (65 mg Iron) tablet Take  by mouth daily (before breakfast).  CALCIUM PO Take 600 mg by mouth daily.        Allergies   Allergen Reactions    Amoxicillin Anaphylaxis    Amoxicillin Anaphylaxis    Lipitor [Atorvastatin] Other (comments)     Severe muscle pain and spasms    Zocor [Simvastatin] Other (comments)     Cramps, muscle spasms    Buspar [Buspirone] Other (comments)     Drunk sensation, headaches    Livalo [Pitavastatin] Myalgia    Pravastatin Other (comments)     Leg cramps     Past Medical History:   Diagnosis Date    Asthma     Cardiomyopathy [facility]     Coronary artery disease 2008    s/p RCA stent (AMRIT) on 11/26/11    Depression with anxiety     2011    DVT (deep venous thrombosis) ([facility] 7/27/2010    Family history of early CAD     GERD (gastroesophageal reflux disease)     H/O gastric bypass 2006    Revision in 2009    Hepatitis C antibody test positive     Does not have chronic hep C (labs 10/6/15: neg HCV RNA)     HTN (hypertension) 7/27/2010    Hyperlipidemia 07/27/2010    Joint pain 7/27/2010    MI (myocardial infarction) ([facility] 7/27/2010    Mitral valve regurgitation     Mild to moderate    Morbid obesity ([facility] 7/27/2010    Myocardial infarction [facility]) 2006 and 2011    Dr. Tequila Linda disorder     PUD (peptic ulcer disease)     gi bleed 2008; ulcer n gastric bypass pouch    Urinary incontinence, stress 7/27/2010     Past Surgical History: Procedure Laterality Date    CARDIAC SURG PROCEDURE UNLIST      Stent x 5-6,Most recent     COLONOSCOPY N/A 2018    COLONOSCOPY performed by Howard Valero MD at [facility]; redundant colon, int hemorrhoids, pathology: normal colonic mucosa    HX COLONOSCOPY  14    Dr. Christie Perry; normal, repeat in 5 yrs    HX GASTRIC BYPASS      HX IMPLANTABLE CARDIOVERTER DEFIBRILLATOR  2016    HX LAP GASTRIC BYPASS  2006    revision /Dr. Amee Frye    HX OTHER SURGICAL  2016    Removal of right ventricular ICD lead & single chamber transvenous AICD    HX OTHER SURGICAL  10/12/2016    pocket revison of ICD; Dr. Clinton Ulloa  2018    Dr José Klein; high resolution anorectal manaometry-abnormal study. Study done for eval of fecal incontinence    HX OTHER SURGICAL  2018    Dr. José Klein. Rectal endoscopic US (EUS) for rectal incontinence. Normal rectal mucosa, no fistulas.  HX PACEMAKER      sicd/left side of chest under arm    INS PPM/ICD LED SING ONLY  2016         INS PPM/ICD LED SING ONLY  2016         INS PPM/ICD LED SING ONLY  2016         RI LAP,STOMACH,OTHER,W/O TUBE  2010    Revision GBP     Family History   Problem Relation Age of Onset    Dementia Mother     Cancer Mother         colon    Alzheimer Mother     Cancer Father         stomach    Heart Disease Father     Hypertension Father     Heart Disease Sister     Stroke Sister     Heart Attack Brother      Social History     Tobacco Use    Smoking status: Former Smoker     Packs/day: 1.00     Years: 30.00     Pack years: 30.00     Start date: 1970     Last attempt to quit: 2004     Years since quittin.8    Smokeless tobacco: Never Used   Substance Use Topics    Alcohol use: No     Alcohol/week: 0.0 oz        Review of Systems:   Constitutional: Negative for fever, chills, weight loss   HEENT: Negative for nosebleeds, vision changes.    Respiratory: Negative for cough, hemoptysis, sputum production, and wheezing. Cardiovascular: Negative for chest pain, + occasional brief palpitations, orthopnea, claudication, leg swelling, syncope, and PND. + RODNEY  Gastrointestinal: Negative for nausea, vomiting, diarrhea, constipation, blood in stool and melena. Genitourinary: Negative for dysuria, and hematuria. Musculoskeletal: Negative for myalgias, arthralgia. Skin: Negative for rash. Wound clean. Heme: Does not bleed or bruise easily. + varicose veins   Neurological: Negative for speech change and focal weakness     Objective:     Visit Vitals  /82   Pulse 67   Resp 18   Ht 5' 5\" (1.651 m)   Wt 240 lb (108.9 kg)   SpO2 97%   BMI 39.94 kg/m²      Physical Exam:   Constitutional: well-developed and well-nourished. No distress. Head: Normocephalic and atraumatic. Eyes: Pupils are equal, round  Neck: supple. No JVD present. Cardiovascular: Normal rate, regular rhythm and normal heart sounds. Exam reveals no gallop and no friction rub. Soft systolic murmur. Pulmonary/Chest: Effort normal and breath sounds normal. No wheezes. Abdominal: Soft, obese, nontender. Musculoskeletal: No edema. Neurological: Alert,oriented. Skin: Skin is warm and dry. S-ICD site well healed. Psychiatric: Normal mood and affect. Behavior is normal. Judgment and thought content normal.      ECG: NSR 64, QRSd 116 ms. Assessment/Plan:       ICD-10-CM ICD-9-CM    1. Automatic implantable cardioverter-defibrillator in situ Z95.810 V45.02 AMB POC EKG ROUTINE W/ 12 LEADS, INTER & REP   2. Systolic CHF, chronic (HCC) I50.22 428.22 AMB POC EKG ROUTINE W/ 12 LEADS, INTER & REP     428.0    3. Non-rheumatic mitral regurgitation I34.0 424.0 AMB POC EKG ROUTINE W/ 12 LEADS, INTER & REP   4. Essential hypertension I10 401.9 AMB POC EKG ROUTINE W/ 12 LEADS, INTER & REP   5. Morbid obesity with BMI of 40.0-44.9, adult ([facility] E66.01 278.01     Z68.41 V85.41      Ms. Saucedo Molina has [employer] S-ICD (DOI 09/21/2016), had previous lead dislodgement with previous transvenous pacer. Device check shows proper function, 75% generator longevity. No interim detections or discharges. Still NYHA II, but has decreased activity tolerance, increased RODNEY, fatigue. Medication regimen includes Entresto 49-51 mg po bid & nebivolol 5 mg po daily. Guidelines for chronic systolic CHF recommend Toprol XL or carvedilol as beta blocker therapy. She will ask Dr. Virgilio Newman about potentially switching to one of these. States she was taken off Toprol XL due to hair loss, but this hasn't improved by switching to nebivolol. She may need a cardiopulmonary stress test but can wait to discuss with Dr. Virgilio Newman during the visit within the next couple of months. Based upon today's ECG, she is not a candidate for biventricular ICD. No BBB. /82, states did not take AM meds & that it's typically lower. Denies hypotension or lightheadedness. Remote ICD checks q 3 months. Follow up in EP clinic annually. Follow up with Dr. Virgilio Newman as previously scheduled. Future Appointments   Date Time Provider Denis Hurst   5/14/2019 11:10 AM Remy Bhatti  [address] [place]   6/7/2019 10:00 AM Iftikhar Rosado  [address]   7/3/2019  1:30 PM REMOTE1, [address]   10/7/2019 11:00 AM REMOTE1, [address]   1/8/2020  2:15 PM REMOTE1, [address]   4/9/2020 10:30 AM PACEMAKER3, [address]   4/9/2020 10:40 AM Jay Jay Keen  [address]       Thank you for involving me in this patient's care and please call with further concerns or questions. Gisela Espinal M.D.   Electrophysiology/Cardiology  [facility]  [address], [address]  [facility], [address]  [phone]

## 2019-04-15 DIAGNOSIS — F51.04 CHRONIC INSOMNIA: ICD-10-CM

## 2019-04-18 RX ORDER — ZOLPIDEM TARTRATE 10 MG/1
TABLET ORAL
Qty: 90 TAB | Refills: 0 | OUTPATIENT
Start: 2019-04-18 | End: 2019-07-18 | Stop reason: SDUPTHER

## 2019-04-18 NOTE — TELEPHONE ENCOUNTER
Prescription for Danny West called to pharmacy per written order of Dr Sheryl Vega.   Prescription left on pharmacy voice mail at 10:17am.

## 2019-06-27 ENCOUNTER — OFFICE VISIT (OUTPATIENT)
Dept: CARDIOLOGY CLINIC | Age: 69
End: 2019-06-27

## 2019-06-27 VITALS
OXYGEN SATURATION: 98 % | HEIGHT: 65 IN | SYSTOLIC BLOOD PRESSURE: 130 MMHG | RESPIRATION RATE: 16 BRPM | BODY MASS INDEX: 40.92 KG/M2 | HEART RATE: 70 BPM | DIASTOLIC BLOOD PRESSURE: 80 MMHG | WEIGHT: 245.6 LBS

## 2019-06-27 DIAGNOSIS — E78.5 HYPERLIPIDEMIA, UNSPECIFIED HYPERLIPIDEMIA TYPE: ICD-10-CM

## 2019-06-27 DIAGNOSIS — L65.9 HAIR LOSS: ICD-10-CM

## 2019-06-27 DIAGNOSIS — I73.9 PAD (PERIPHERAL ARTERY DISEASE) (HCC): ICD-10-CM

## 2019-06-27 DIAGNOSIS — I50.22 SYSTOLIC CHF, CHRONIC (HCC): Primary | ICD-10-CM

## 2019-06-27 DIAGNOSIS — E78.5 DYSLIPIDEMIA: ICD-10-CM

## 2019-06-27 DIAGNOSIS — I42.9 SECONDARY CARDIOMYOPATHY (HCC): ICD-10-CM

## 2019-06-27 DIAGNOSIS — E66.01 MORBID OBESITY WITH BMI OF 40.0-44.9, ADULT (HCC): ICD-10-CM

## 2019-06-27 RX ORDER — METOPROLOL SUCCINATE 25 MG/1
25 TABLET, EXTENDED RELEASE ORAL
Qty: 90 TAB | Refills: 3 | Status: SHIPPED | OUTPATIENT
Start: 2019-06-27 | End: 2020-09-01

## 2019-06-27 NOTE — PROGRESS NOTES
[facility]  [phone]    HPI: Trisha Yao is a 81 y.o. who presents for follow up regarding her CAD and CHF. She gets tired and short of breath and feels tired. Will trial coreg in place of Bystolic and see if that makes any difference since we went on bystolic for alopecia and that has not changed things. BP has been doing well. Labs reviewed from 12/18 looks good. Doing fine on repatha, pen works great and ldl down to 60. Needs MRI for ankle. ?torn ligament, tried brace but nothing has helped so far. She is having a hard time walking due to right ankle/heel pain - plans to see orthopedics   cardio cruiser   -Had said hair was growing back in some on bystolic and that her dyspnea was better on it. Dyspnea with exertion is stable   She denies any chest pain or palpitations   LE edema doing well, takes metolazone PRN    No diziness or syncope    Assessment/Plan:  1. Chronic systolic heart failure - LVEF 35% by TTE, s/p AICD placement with Dr. Froilan Ag and subsequent lead revisions and repeat procedures due to non-healing midsternal incision  -last revision for AICD in November 2016 with Dr. Froilan Ag, last ICD check without arrhythmias   -continue Entresto 49/51 one tablet BID and Bystolic 5mg daily, had hair loss on coreg and Toprol XL but doing well on bystolic thusfar, will change back at her request to Toprol XL 25mg HS. -off spironolactone due to hypotension, continue bumex BID, using Metolazone PRN, not very often, once every other week  -will follow up in the office in 6 months    2.  CAD - hx of MI x 2 and multiple stents, she believes she may have 6 or 7 stents, last cardiac cath without progression of CAD and stress test in 4/17 showed possible anterior ischemia vs artifact but asymptomatic currently so will just continue to watch, continue ASA and beta blocker, see lipid plan below  -reports having an MI in 11/2011 and received PCI to RCA at that time, previous MI in 2006 or 2007  3. Mitral regurgitation - mild to mod by TTE   4. Asthma - x 15 - 16 years, has not seen pulmonologist in years  11. HTN - continue current regimen  6. Primary Hyperlipidemia - statin failures, simvastatin caused muscle spasms and cramps, atorvastatin caused pain shooting all over her body, pravastatin 40mg and 20mg caused myalgias and leg cramps, had myalgias on Livalo, could not tolerate zetia either   -not able to take any statin at any dose  -now on repatha, at goal  7. DM Type 2 - resolved with gastric bypass  8. Hypokalemia -off spironolactone and on KCL 40meq daily   9. Hypomagnesemia - on OTC magnesium, will check Mg level       10. Morbid obesity - Body mass index is 40.87 kg/m². ). weighed 416 lbs at her heaviest weight, s/p gastric bypass in 2007 with revision a few years later, weight up a few pounds to 245 from 239  11. PUD - had EGD and cauterized bleeding ulcer, not on PPI anymore  12. Vitamin D deficiency - on 10,000 units daily    13. Anxiety - on multiple agents per PCP   -refilled ambien today x 1 month until she sees per PCP 1/16/19 for further refills  14. PAD/Carotid stenosis - 10-49% bilateral ICA stenosis, continue ASA and repatha    Echo 10/18 (prelim) - LVEF 35%, mild to mod MR   Echo 5/18 - LVEF 25 %-30 %, akinesis of the basal-mid inferoseptal and basal-mid inferolateral wall(s), severe hypokinesis of the entire inferior wall(s), grade 1 dd, mild to mod dilated LA, mild to mod MR  Carotid duplex 5/18 - 10-49% bilateral ICA stenosis  Echo: 4/17, EF 35-40%, inf HK gr1dd  Nuclear: 4/17, EF 36%, anterior ischemia, lateral infarct. TTE 9/16 - LVEF 35%, moderate hypokinesis of the basal-mid inferolateral wall(s), grd 1 dd, dilated LA, mod MR, mild TR  8/26/16 - RV lead revision by Dr. Clifford Done  8/24/16 - single chamber AICD placed by Dr. Clifford Done ([facility], serial # [id number], model # [id number].  The ventricular lead: model # [id number] , serial # [id number])  TAMMIE  - LVEF 27%  Holter  - no arrhythmias  Echo  - LV mildly dilated, LVEF 40%, moderate diffuse hypokinesis with regional variations, severe hypokinesis of the basal-mid inferior wall(s), grd 1 dd, mod dilated LA, atrial septum bows from left to right, consistent with increased left atrial pressure, mildly dilated RA, mild to near moderate MR    OLIVER  - normal  Nuc 5/15 EF 31% large anterolateral infarct with periinfarct reversibility, 13% LV reversible(32% infarct at rest, 45% at stress)    Echo 2015 - LVEF 30-35%, grd 1 dd, mod LAE, mild to mod MR  Nuc Stress  - small reversibility in anteroapical wall, LVEF 31%  Cardiac Cath 3/13 - patent stents in LAD, LCx and RCA, LVEF 30%, severe inferior HK, LCx relatively small vessel with patent stent in proximal LCx, RCA is large and dominant with patent stents in proximal and mid RCA, distal RCA free of disease, LAD normal and large vessel which coursed around apex  Echo 2011 - LVEF 30%, mild MR  Cardiac Cath 2011 - s/p PCI with AMRIT to 100% mid RCA, also had 40% mid LAD lesion, 50% diagonal 1 lesion, 100% distal LCx lesion, 60% OM1 lesion, 100% proximal Ramus lesion      Soc Hx: quit tobacco use x 13 years ago, used to smoke 1ppd, no etoh use, no drug use, lives with , son, daughter in law, grandson, retired/on disability  Fam Hx: father in his 62s when he had a MI, had 3 vessel CABG in his 62s, passed from fall but had cancer too, mother lived to age 80 and  from Alzheimer's, brother had MI in his 62s, sister had aortic repair for aneurysm in her 76s    She  has a past medical history of Asthma, Cardiomyopathy ([facility] Coronary artery disease (), Depression with anxiety, DVT (deep venous thrombosis) ([facility] (2010), Family history of early CAD, GERD (gastroesophageal reflux disease), H/O gastric bypass (), Hepatitis C antibody test positive, HTN (hypertension) (2010), Hyperlipidemia (2010), Joint pain (2010), MI (myocardial infarction) ([facility] (7/27/2010), Mitral valve regurgitation, Morbid obesity ([facility] (7/27/2010), Myocardial infarction [facility]) (2006 and 2011), Psychiatric disorder, PUD (peptic ulcer disease), and Urinary incontinence, stress (7/27/2010). Cardiovascular ROS: positive for dyspnea on exertion   Respiratory ROS: no cough, wheezing  Neurological ROS: no TIA or stroke symptoms  All other systems negative except as above. PE  Vitals:    06/27/19 1143   BP: 130/80   Pulse: 70   Resp: 16   SpO2: 98%   Weight: 245 lb 9.6 oz (111.4 kg)   Height: 5' 5\" (1.651 m)    Body mass index is 40.87 kg/m².   General appearance - alert, well appearing, and in no distress  Mental status - affect appropriate to mood  Eyes - sclera anicteric, moist mucous membranes  Neck - supple  Lymphatics - not assessed  Chest - clear to auscultation, no wheezes, rales or rhonchi  Heart - normal rate, regular rhythm, normal S1, S2, 2/6 MYKE   Abdomen - soft, nontender, nondistended, obese  Back exam - full range of motion, no tenderness  Neurological - cranial nerves II through XII grossly intact, no focal deficit  Musculoskeletal - no muscular tenderness noted, normal strength  Extremities - diminished peripheral pulses, no LE edema  Skin - normal coloration  no rashes    Recent Labs:  Lab Results   Component Value Date/Time    Cholesterol, total 148 12/19/2018 11:43 AM    HDL Cholesterol 69 12/19/2018 11:43 AM    LDL, calculated 60 12/19/2018 11:43 AM    Triglyceride 95 12/19/2018 11:43 AM     Lab Results   Component Value Date/Time    Creatinine 0.71 12/19/2018 11:43 AM     Lab Results   Component Value Date/Time    BUN 11 12/19/2018 11:43 AM    BUN (POC) 24 (H) 11/26/2011 09:50 PM     Lab Results   Component Value Date/Time    Potassium 4.0 12/19/2018 11:43 AM     Lab Results   Component Value Date/Time    Hemoglobin A1c 5.8 (H) 02/09/2016 11:44 AM    Hemoglobin A1c, External 5.5 12/12/2014     Lab Results   Component Value Date/Time HGB 13.1 12/19/2018 11:43 AM     Lab Results   Component Value Date/Time    PLATELET 990 29/10/9047 11:43 AM       Reviewed:  Past Medical History:   Diagnosis Date    Asthma     Cardiomyopathy ([facility]     Coronary artery disease 2008    s/p RCA stent (AMRIT) on 11/26/11    Depression with anxiety     2011    DVT (deep venous thrombosis) ([facility] 7/27/2010    Family history of early CAD     GERD (gastroesophageal reflux disease)     H/O gastric bypass 2006    Revision in 2009    Hepatitis C antibody test positive     Does not have chronic hep C (labs 10/6/15: neg HCV RNA)     HTN (hypertension) 7/27/2010    Hyperlipidemia 07/27/2010    Joint pain 7/27/2010    MI (myocardial infarction) ([facility] 7/27/2010    Mitral valve regurgitation     Mild to moderate    Morbid obesity ([facility] 7/27/2010    Myocardial infarction [facility]) 2006 and 2011    Dr. Raza Wall    Psychiatric disorder     PUD (peptic ulcer disease)     gi bleed 2008; ulcer n gastric bypass pouch    Urinary incontinence, stress 7/27/2010     Social History     Tobacco Use   Smoking Status Former Smoker    Packs/day: 1.00    Years: 30.00    Pack years: 30.00    Start date: 1/1/1970    Last attempt to quit: 5/17/2004    Years since quitting: 15.1   Smokeless Tobacco Never Used     Social History     Substance and Sexual Activity   Alcohol Use No    Alcohol/week: 0.0 oz     Allergies   Allergen Reactions    Amoxicillin Anaphylaxis    Amoxicillin Anaphylaxis    Lipitor [Atorvastatin] Other (comments)     Severe muscle pain and spasms    Zocor [Simvastatin] Other (comments)     Cramps, muscle spasms    Buspar [Buspirone] Other (comments)     Drunk sensation, headaches    Livalo [Pitavastatin] Myalgia    Pravastatin Other (comments)     Leg cramps       Current Outpatient Medications   Medication Sig    zolpidem (AMBIEN) 10 mg tablet TAKE 1 TABLET BY MOUTH NIGHTLY FOR SLEEP    metOLazone (ZAROXOLYN) 2.5 mg tablet TAKE 1 TABLET BY MOUTH DAILY AS NEEDED FOR UP TO 2 DAYS    FLUoxetine (PROZAC) 20 mg tablet TAKE 2 TABLETS EVERY MORNING AND 1 TABLET AT BEDTIME FOR ANXIETY WITH DEPRESSION    ENTRESTO 49-51 mg tab tablet TAKE 1 TABLET BY MOUTH TWICE A DAY    REPATHA SURECLICK pen injection INJECT 1ML SUB-Q EVERY 14 DAYS    albuterol (PROVENTIL HFA, VENTOLIN HFA, PROAIR HFA) 90 mcg/actuation inhaler Take 2 Puffs by inhalation every four (4) hours as needed for Wheezing or Shortness of Breath.  nebivolol (BYSTOLIC) 5 mg tablet Take 1 Tab by mouth daily.  psyllium (METAMUCIL) powd Take  by mouth. 2 tsp daily    cholecalciferol, VITAMIN D3, (VITAMIN D3) 5,000 unit tab tablet Take 10,000 Units by mouth daily.  bumetanide (BUMEX) 2 mg tablet Take 2 mg by mouth two (2) times a day.  potassium chloride (KLOR-CON M20) 20 mEq tablet TAKE TWO TABLETS BY MOUTH DAILY    LACTOBACILLUS ACIDOPHILUS (PROBIOTIC PO) Take 1 Tab by mouth daily.  aspirin delayed-release 81 mg tablet Take  by mouth daily.  biotin 10,000 mcg cap Take  by mouth daily.  OTHER Complete multi formula, bariatric advantage    magnesium 250 mg tab Take 1 Tab by mouth daily.  ferrous sulfate (IRON, FERROUS SULFATE,) 325 mg (65 mg Iron) tablet Take  by mouth daily (before breakfast).  CALCIUM PO Take 600 mg by mouth daily. No current facility-administered medications for this visit.         Nigel Mays MD  [facility] [address] [facility] [address] [facility]  Yared Bhardwajmoyina  [phone]

## 2019-07-02 NOTE — PATIENT INSTRUCTIONS
Please have fasting labs drawn in the next few weeks  Please see orthopedics about your ankle Sub-Acute rehab

## 2019-07-03 ENCOUNTER — TELEPHONE (OUTPATIENT)
Dept: CARDIOLOGY CLINIC | Age: 69
End: 2019-07-03

## 2019-07-05 ENCOUNTER — OFFICE VISIT (OUTPATIENT)
Dept: CARDIOLOGY CLINIC | Age: 69
End: 2019-07-05

## 2019-07-05 DIAGNOSIS — Z95.810 PRESENCE OF BIVENTRICULAR AUTOMATIC CARDIOVERTER/DEFIBRILLATOR (AICD): Primary | ICD-10-CM

## 2019-07-18 DIAGNOSIS — F51.04 CHRONIC INSOMNIA: ICD-10-CM

## 2019-07-19 RX ORDER — ZOLPIDEM TARTRATE 10 MG/1
TABLET ORAL
Qty: 30 TAB | Refills: 2 | Status: SHIPPED | OUTPATIENT
Start: 2019-07-19 | End: 2019-10-14 | Stop reason: SDUPTHER

## 2019-07-19 NOTE — TELEPHONE ENCOUNTER
Okay to phone in refill on zolpidem. Inform patient that she needs to schedule a 6 month follow up appointment with Dr. Tawana Hoffmann this month or next.

## 2019-07-31 NOTE — TELEPHONE ENCOUNTER
Requested Prescriptions     Signed Prescriptions Disp Refills    evolocumab (REPATHA SURECLICK) pen injection 6 Pen 3     Sig: INJECT 1ML SUB-Q EVERY 14 DAYS     Authorizing Provider: Niru Houser     Ordering User: Yang Gomez     Per verbal orders

## 2019-08-29 ENCOUNTER — TELEPHONE (OUTPATIENT)
Dept: CARDIOLOGY CLINIC | Age: 69
End: 2019-08-29

## 2019-08-29 NOTE — TELEPHONE ENCOUNTER
Notified that patient called yesterday asking if her device is MRI compatible. MRI form was completed and faxed to Tatianna Lundberg on 7/3/19. Patient's device is MRI compatible. Attempted to reach patient by telephone. VM currently full. Will try again later.

## 2019-09-10 ENCOUNTER — TELEPHONE (OUTPATIENT)
Dept: CARDIOLOGY CLINIC | Age: 69
End: 2019-09-10

## 2019-09-10 NOTE — TELEPHONE ENCOUNTER
Verified patient with two types of identifiers. Patients [facility] is stating they never received the MRI device form. Verified with patient correct fax number. Notified patient I will fax again. Patient requesting to come  a copy of form as well. Notified patient will leave a copy at . Patient will come  on Thursday. Patient verbalized understanding and will call with any other questions.

## 2019-09-10 NOTE — TELEPHONE ENCOUNTER
Patient states she would like to speak to you regarding her MRI compatibility form. Please advise.        Phone: [phone]

## 2019-09-24 ENCOUNTER — HOSPITAL ENCOUNTER (EMERGENCY)
Age: 69
Discharge: HOME | End: 2019-09-24
Attending: EMERGENCY MEDICINE | Admitting: EMERGENCY MEDICINE
Payer: MEDICARE

## 2019-09-24 ENCOUNTER — APPOINTMENT (OUTPATIENT)
Dept: VASCULAR SURGERY | Age: 69
End: 2019-09-24
Attending: EMERGENCY MEDICINE
Payer: MEDICARE

## 2019-09-24 VITALS
RESPIRATION RATE: 16 BRPM | SYSTOLIC BLOOD PRESSURE: 174 MMHG | OXYGEN SATURATION: 98 % | DIASTOLIC BLOOD PRESSURE: 64 MMHG | HEART RATE: 84 BPM

## 2019-09-24 DIAGNOSIS — M25.569 ACUTE KNEE PAIN, UNSPECIFIED LATERALITY: Primary | ICD-10-CM

## 2019-09-24 DIAGNOSIS — M71.20 BAKER CYST, UNSPECIFIED LATERALITY: ICD-10-CM

## 2019-09-24 PROCEDURE — 99282 EMERGENCY DEPT VISIT SF MDM: CPT

## 2019-09-24 PROCEDURE — 93971 EXTREMITY STUDY: CPT

## 2019-09-24 RX ORDER — PREDNISONE 20 MG/1
20 TABLET ORAL DAILY
Qty: 5 TAB | Refills: 0 | Status: SHIPPED | OUTPATIENT
Start: 2019-09-24 | End: 2019-09-29

## 2019-09-24 RX ORDER — HYDROCODONE BITARTRATE AND ACETAMINOPHEN 5; 325 MG/1; MG/1
1 TABLET ORAL
Qty: 12 TAB | Refills: 0 | Status: SHIPPED | OUTPATIENT
Start: 2019-09-24 | End: 2019-09-27

## 2019-09-24 NOTE — ED PROVIDER NOTES
HPI The patient has a history of osteoarthritis of the right knee and has recently had a flareup of knee pain, presumably due to arthritis. Last night she rolled over in bed and felt a tearing sensation in the popliteal area and has had worse pain since then. Past Medical History:   Diagnosis Date    Asthma     Cardiomyopathy ([facility]     Coronary artery disease 2008    s/p RCA stent (AMRIT) on 11/26/11    Depression with anxiety     2011    DVT (deep venous thrombosis) ([facility] 7/27/2010    Family history of early CAD     GERD (gastroesophageal reflux disease)     H/O gastric bypass 2006    Revision in 2009    Hepatitis C antibody test positive     Does not have chronic hep C (labs 10/6/15: neg HCV RNA)     HTN (hypertension) 7/27/2010    Hyperlipidemia 07/27/2010    Joint pain 7/27/2010    MI (myocardial infarction) ([facility] 7/27/2010    Mitral valve regurgitation     Mild to moderate    Morbid obesity ([facility] 7/27/2010    Myocardial infarction [facility]) 2006 and 2011    Dr. Imelda Dobbins Psychiatric disorder     PUD (peptic ulcer disease)     gi bleed 2008; ulcer n gastric bypass pouch    Urinary incontinence, stress 7/27/2010       Past Surgical History:   Procedure Laterality Date    CARDIAC SURG PROCEDURE UNLIST      Stent x 5-6,Most recent 2011    COLONOSCOPY N/A 6/29/2018    COLONOSCOPY performed by Norma Brown [facility] at [facility]COPY; redundant colon, int hemorrhoids, pathology: normal colonic mucosa    HX COLONOSCOPY  9/5/14    Dr. Larry Rosado; normal, repeat in 5 yrs    HX GASTRIC BYPASS      HX IMPLANTABLE CARDIOVERTER DEFIBRILLATOR  08/24/2016    HX LAP GASTRIC BYPASS  2006    revision 2009/Dr. Melisa Jones    HX OTHER SURGICAL  09/19/2016    Removal of right ventricular ICD lead & single chamber transvenous AICD    HX OTHER SURGICAL  10/12/2016    pocket revison of ICD; Dr. Jaswant Jacinto  06/07/2018    Dr Nicole Corey; high resolution anorectal manaometry-abnormal study. Study done for eval of fecal incontinence    HX OTHER SURGICAL  12/14/2018    Dr. Susan Enamorado. Rectal endoscopic US (EUS) for rectal incontinence. Normal rectal mucosa, no fistulas.     HX PACEMAKER      sicd/left side of chest under arm    INS PPM/ICD LED SING ONLY  8/24/2016         INS PPM/ICD LED SING ONLY  8/26/2016         INS PPM/ICD LED SING ONLY  9/21/2016         AR LAP,STOMACH,OTHER,W/O TUBE  04/05/2010    Revision GBP         Family History:   Problem Relation Age of Onset    Dementia Mother     Cancer Mother         colon    Alzheimer Mother     Cancer Father         stomach    Heart Disease Father     Hypertension Father     Heart Disease Sister     Stroke Sister     Heart Attack Brother        Social History     Socioeconomic History    Marital status:      Spouse name: Not on file    Number of children: Not on file    Years of education: Not on file    Highest education level: Not on file   Occupational History    Not on file   Social Needs    Financial resource strain: Not on file    Food insecurity:     Worry: Not on file     Inability: Not on file    Transportation needs:     Medical: Not on file     Non-medical: Not on file   Tobacco Use    Smoking status: Former Smoker     Packs/day: 1.00     Years: 30.00     Pack years: 30.00     Start date: 1/1/1970     Last attempt to quit: 5/17/2004     Years since quitting: 15.3    Smokeless tobacco: Never Used   Substance and Sexual Activity    Alcohol use: No     Alcohol/week: 0.0 standard drinks    Drug use: No     Types: Prescription    Sexual activity: Never     Partners: Male   Lifestyle    Physical activity:     Days per week: Not on file     Minutes per session: Not on file    Stress: Not on file   Relationships    Social connections:     Talks on phone: Not on file     Gets together: Not on file     Attends Congregational service: Not on file     Active member of club or organization: Not on file     Attends meetings of clubs or organizations: Not on file     Relationship status: Not on file    Intimate partner violence:     Fear of current or ex partner: Not on file     Emotionally abused: Not on file     Physically abused: Not on file     Forced sexual activity: Not on file   Other Topics Concern    Not on file   Social History Narrative    ** Merged History Encounter **              ALLERGIES: Amoxicillin; Amoxicillin; Lipitor [atorvastatin]; Zocor [simvastatin]; Buspar [buspirone]; Livalo [pitavastatin]; and Pravastatin    Review of Systems   Constitutional: Negative for fever. Respiratory: Negative for shortness of breath. Cardiovascular: Negative for chest pain. Musculoskeletal: Positive for arthralgias. Vitals:    09/24/19 1520 09/24/19 1529   BP:  174/64   Pulse: 76 84   Resp:  16   SpO2: 98% 98%            Physical Exam   Constitutional: She appears well-developed and well-nourished. No distress. HENT:   Head: Normocephalic. Neck: Normal range of motion. Cardiovascular: Normal rate. Pulmonary/Chest: Effort normal.   Musculoskeletal: Normal range of motion. r knee is ttp over popliteal area. No mass noted. Distal pulses are intact. Neurological: She is alert. Skin: Skin is warm and dry. Capillary refill takes less than 2 seconds. Psychiatric: She has a normal mood and affect.  Her behavior is normal.        MDM       Procedures

## 2019-09-24 NOTE — DISCHARGE INSTRUCTIONS
Patient Education        Nicholas's Cyst: Care Instructions  Your Care Instructions    A Baker's cyst is a swelling behind the knee. It may cause pain or stiffness when you bend your knee or straighten it all the way. Baker's cysts are also called popliteal cysts. If you have arthritis or another condition that is the cause of the Baker's cyst, your doctor may treat that condition. A Baker's cyst may go away on its own. If not, or if it is causing a lot of discomfort, your doctor may drain the fluid that has built up behind the knee. In some cases, a Baker's cyst is removed in surgery. There are things you can do at home, such as staying off your leg, to reduce the swelling and pain. Follow-up care is a key part of your treatment and safety. Be sure to make and go to all appointments, and call your doctor if you are having problems. It's also a good idea to know your test results and keep a list of the medicines you take. How can you care for yourself at home? · Rest your knee as much as possible. · Ask your doctor if you can take an over-the-counter pain medicine, such as acetaminophen (Tylenol), ibuprofen (Advil, Motrin), or naproxen (Aleve). Be safe with medicines. Read and follow all instructions on the label. · Use a cane, a crutch, a walker, or another device if you need help to get around. These can help rest your knees. · If you have an elastic bandage, make sure it is snug but not so tight that your leg is numb, tingles, or swells below the bandage. Ask your doctor if you can loosen the bandage if it is too tight. · Follow your doctor's instructions about how much weight you can put on your knee. · Stay at a healthy weight. Being overweight puts extra strain on your knee. When should you call for help? Call 911 anytime you think you may need emergency care.  For example, call if:    · You have chest pain, are short of breath, or you cough up blood.    Call your doctor now or seek immediate medical Patient appears to be having difficulty swallowing. Dr. Saniya Mccoy to be notified.  Electronically signed by Kwesi Olivo RN on 3/31/2019 at 11:18 AM care if:    · You have new or worse pain.     · Your foot is cool or pale or changes color.     · You have tingling, weakness, or numbness in your foot or toes.     · You have signs of a blood clot in your leg (called a deep vein thrombosis), such as:  ? Pain in your calf, back of the knee, thigh, or groin. ? Redness or swelling in your leg.    Watch closely for changes in your health, and be sure to contact your doctor if:    · You do not get better as expected. Where can you learn more? Go to http://jose-rm.info/. Enter S134 in the search box to learn more about \"Baker's Cyst: Care Instructions. \"  Current as of: June 26, 2019  Content Version: 12.2  © 3363-1979 [employer]. Care instructions adapted under license by [employer] (which disclaims liability or warranty for this information). If you have questions about a medical condition or this instruction, always ask your healthcare professional. [address] any warranty or liability for your use of this information.

## 2019-09-24 NOTE — ED TRIAGE NOTES
Pt presents with right knee pain. Pt was given cortosone shot and Pt by Dr Joyce Craft. Pt reports since it has gotten worse. Complains of muscle cramps and pain. Pt states she turned over in bed last night and felt a \"tear\" Pt states she cannot bear weight on the leg this morning. Pt is wearing a brace she ordered offline. Pt was diagnosed with osteoarthritis.

## 2019-10-08 ENCOUNTER — OFFICE VISIT (OUTPATIENT)
Dept: CARDIOLOGY CLINIC | Age: 69
End: 2019-10-08

## 2019-10-08 DIAGNOSIS — Z95.810 AICD (AUTOMATIC CARDIOVERTER/DEFIBRILLATOR) PRESENT: Primary | ICD-10-CM

## 2019-10-14 ENCOUNTER — OFFICE VISIT (OUTPATIENT)
Dept: INTERNAL MEDICINE CLINIC | Facility: CLINIC | Age: 69
End: 2019-10-14

## 2019-10-14 VITALS
TEMPERATURE: 98 F | SYSTOLIC BLOOD PRESSURE: 126 MMHG | BODY MASS INDEX: 41.48 KG/M2 | OXYGEN SATURATION: 94 % | HEART RATE: 71 BPM | HEIGHT: 65 IN | DIASTOLIC BLOOD PRESSURE: 82 MMHG | WEIGHT: 249 LBS | RESPIRATION RATE: 16 BRPM

## 2019-10-14 DIAGNOSIS — I50.22 SYSTOLIC CHF, CHRONIC (HCC): ICD-10-CM

## 2019-10-14 DIAGNOSIS — M81.0 AGE-RELATED OSTEOPOROSIS WITHOUT CURRENT PATHOLOGICAL FRACTURE: ICD-10-CM

## 2019-10-14 DIAGNOSIS — I10 ESSENTIAL HYPERTENSION: Primary | ICD-10-CM

## 2019-10-14 DIAGNOSIS — R73.02 IGT (IMPAIRED GLUCOSE TOLERANCE): ICD-10-CM

## 2019-10-14 DIAGNOSIS — M17.0 PRIMARY OSTEOARTHRITIS OF BOTH KNEES: ICD-10-CM

## 2019-10-14 DIAGNOSIS — F33.1 MODERATE EPISODE OF RECURRENT MAJOR DEPRESSIVE DISORDER (HCC): ICD-10-CM

## 2019-10-14 DIAGNOSIS — E78.5 HYPERLIPIDEMIA, UNSPECIFIED HYPERLIPIDEMIA TYPE: ICD-10-CM

## 2019-10-14 DIAGNOSIS — E53.8 VITAMIN B12 DEFICIENCY: ICD-10-CM

## 2019-10-14 DIAGNOSIS — E55.9 VITAMIN D DEFICIENCY: ICD-10-CM

## 2019-10-14 DIAGNOSIS — F51.04 CHRONIC INSOMNIA: ICD-10-CM

## 2019-10-14 DIAGNOSIS — I25.10 CORONARY ARTERY DISEASE INVOLVING NATIVE CORONARY ARTERY OF NATIVE HEART WITHOUT ANGINA PECTORIS: ICD-10-CM

## 2019-10-14 PROBLEM — Z79.899 HIGH RISK MEDICATION USE: Status: RESOLVED | Noted: 2018-02-23 | Resolved: 2019-10-14

## 2019-10-14 PROBLEM — R15.9 INCONTINENCE OF FECES: Status: RESOLVED | Noted: 2018-09-03 | Resolved: 2019-10-14

## 2019-10-14 PROBLEM — M25.571 ACUTE RIGHT ANKLE PAIN: Status: RESOLVED | Noted: 2018-06-08 | Resolved: 2019-10-14

## 2019-10-14 PROBLEM — F41.1 GENERALIZED ANXIETY DISORDER: Status: ACTIVE | Noted: 2019-10-14

## 2019-10-14 RX ORDER — ZOLPIDEM TARTRATE 10 MG/1
TABLET ORAL
Qty: 30 TAB | Refills: 2 | Status: SHIPPED | OUTPATIENT
Start: 2019-10-14 | End: 2019-12-27

## 2019-10-14 RX ORDER — BUMETANIDE 2 MG/1
2 TABLET ORAL 2 TIMES DAILY
Qty: 180 TAB | Refills: 3 | Status: SHIPPED | OUTPATIENT
Start: 2019-10-14 | End: 2020-03-10 | Stop reason: SDUPTHER

## 2019-10-14 RX ORDER — POTASSIUM CHLORIDE 20 MEQ/1
TABLET, EXTENDED RELEASE ORAL
Qty: 180 TAB | Refills: 3 | Status: SHIPPED | OUTPATIENT
Start: 2019-10-14 | End: 2020-11-19 | Stop reason: SDUPTHER

## 2019-10-14 NOTE — PROGRESS NOTES
Tricia Canada  Identified pt with two pt identifiers(name and ). Chief Complaint   Patient presents with    Hypertension    Cholesterol Problem    Immunization/Injection       1. Have you been to the ER, urgent care clinic since your last visit? Hospitalized since your last visit? Seen at [facility]    2. Have you seen or consulted any other health care providers outside of the [address] [place] [address] since your last visit? Include any pap smears or colon screening. Getting PT    Today's provider has been notified of reason for visit, vitals and flowsheets obtained on patients.      Patient received paperwork for advance directive during previous visit but has not completed at this time     Reviewed record In preparation for visit, huddled with provider and have obtained necessary documentation      Health Maintenance Due   Topic    Influenza Age 5 to Adult        Wt Readings from Last 3 Encounters:   10/14/19 249 lb (112.9 kg)   19 245 lb 9.6 oz (111.4 kg)   19 240 lb (108.9 kg)     Temp Readings from Last 3 Encounters:   10/14/19 98 °F (36.7 °C) (Oral)   19 98.3 °F (36.8 °C) (Oral)   18 98.1 °F (36.7 °C)     BP Readings from Last 3 Encounters:   10/14/19 126/82   19 174/64   19 130/80     Pulse Readings from Last 3 Encounters:   10/14/19 71   19 84   19 70     Vitals:    10/14/19 1119   BP: 126/82   Pulse: 71   Resp: 16   Temp: 98 °F (36.7 °C)   TempSrc: Oral   SpO2: 94%   Weight: 249 lb (112.9 kg)   Height: 5' 5\" (1.651 m)   PainSc:   5   PainLoc: Leg         Learning Assessment:  :     Learning Assessment 10/26/2017 2015   PRIMARY LEARNER Patient Patient   HIGHEST LEVEL OF EDUCATION - PRIMARY LEARNER  - 2 YEARS OF COLLEGE   BARRIERS PRIMARY LEARNER - NONE   CO-LEARNER CAREGIVER - No   PRIMARY LANGUAGE ENGLISH ENGLISH   LEARNER PREFERENCE PRIMARY READING DEMONSTRATION   ANSWERED BY patient pateint   RELATIONSHIP SELF SELF       Depression Screening:  :     3 most recent PHQ Screens 1/16/2019   PHQ Not Done -   Little interest or pleasure in doing things Several days   Feeling down, depressed, irritable, or hopeless Several days   Total Score PHQ 2 2       Fall Risk Assessment:  :     Fall Risk Assessment, last 12 mths 8/29/2018   Able to walk? Yes   Fall in past 12 months? No   Fall with injury? -   Number of falls in past 12 months -   Fall Risk Score -       Abuse Screening:  :     Abuse Screening Questionnaire 5/29/2018 7/26/2016 6/30/2015   Do you ever feel afraid of your partner? N N N   Are you in a relationship with someone who physically or mentally threatens you? N N N   Is it safe for you to go home? Y Y Y       ADL Screening:  :     ADL Assessment 5/29/2018   Feeding yourself No Help Needed   Getting from bed to chair No Help Needed   Getting dressed No Help Needed   Bathing or showering No Help Needed   Walk across the room (includes cane/walker) No Help Needed   Using the telphone No Help Needed   Taking your medications No Help Needed   Preparing meals No Help Needed   Managing money (expenses/bills) No Help Needed   Moderately strenuous housework (laundry) No Help Needed   Shopping for personal items (toiletries/medicines) No Help Needed   Shopping for groceries No Help Needed   Driving No Help Needed   Climbing a flight of stairs No Help Needed   Getting to places beyond walking distances No Help Needed                 Medication reconciliation up to date and corrected with patient at this time.

## 2019-10-14 NOTE — PATIENT INSTRUCTIONS

## 2019-10-14 NOTE — PROGRESS NOTES
CC:   Chief Complaint   Patient presents with    Hypertension    Cholesterol Problem       HISTORY OF PRESENT ILLNESS  Francisco Bernal is a 81 y.o. female. Presents for follow up evaluation. Last seen 9 months ago; did not follow up in 6 months as instructed. She has HTN, CAD s/p 2 MI's in 2006 and 2011, valvular heart disease, CHF (EF 35% in 9/16), ICD in place with hx lead revisions, depression with anxiety, obesity s/p gastric bypass in 2006.      She complains of chronic right knee pain, especially behind the knee. Seen at [address] [place] [address] on 9/24/19 about right knee pain; found to have ruptures Baker's cyst.  Presently doing PT for the right knee; has been for 3 weeks, 2 more to go. She is considering changing from Dr. Chelle Kinsey to a different orthopedic doctor because she does not think he has been addressing the Baker's cyst, only knee OA. Sleeps in a recliner with right leg elevated due to knee pain. Cardiovascular Review  The patient has hypertension, hyperlipidemia and coronary artery disease.  She reports taking medications as instructed, no medication side effects noted.  Diet and Lifestyle: generally follows a low fat low cholesterol diet, generally follows a low sodium diet, no formal exercise but active during the day.  Lab review: labs reviewed and discussed with patient.  Echo 5/25/18: LVEF 25-30%.  Mild to mod LV and LA dilation.  Mild to mod MR.      Other Providers: Rebecca Oliver NP/Dr. Anette Sandhu (Cardiology)     Soc Hx  Delle Minors 2 living children; lives with , son, son's girlfriend, and 2 grandchildren. 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  Former smoker; quit in 2002.  Denies alcohol or recreational drug use.  [facility] [address] [place]  Flu vaccine: 10/9/17; will call to get (took prednisone 3 weeks ago)  Pneumonia vaccine: PCV-13 6/14/16, PPSV-23 10/9/17  Tetanus vaccine:  Tdap 10/30/04  Zoster vaccine: 2/26/16, recommended she get Shingrix through her pharmacy  Colonoscopy: 6/29/18, Dr. Adri Hansen, redundant colon, internal hemorrhoids  Pap smear: 7/26/16  Mammogram: 9/11/8, due for this  Bone density: 2/1/16 (osteoporosis: L femoral neck T -2.5, tot L hip T -2.4, LS T-2.2)  Eye exam: 4/9/19, Dr. James Hoover  Lipids:12/19/18 (tot chol 148, LDL 60)  A1c: 2/9/16 (5.8%)  Advanced Directives: has booklet and form at home   End of Life: has booklet and form at home     ROS  Positive for chronic SOB. A complete review of systems was performed and is negative except for those mentioned in the HPI.      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[facility]) I42.9    Hyperlipidemia E78.5    Mitral valve regurgitation I34.0    History of MI (myocardial infarction) I25.2    Cardiomyopathy ([facility] I42.9    Osteoporosis M81.0    Morbid obesity with BMI of 40.0-44.9, adult ([facility] E66.01, Z68.41    Advance care planning Z71.89    Insomnia G47.00    S/P ICD (internal cardiac defibrillator) procedure Z95.810    AICD lead displacement T82.120A    ICD (implantable cardioverter-defibrillator) in place Z95.810    Primary osteoarthritis of both knees M17.0    Mild intermittent asthma without complication I61.65    High risk medication use Z79.899    Acute right ankle pain M25.571    Incontinence of feces R15.9     Past Medical History:   Diagnosis Date    Asthma     Cardiomyopathy ([facility]     Coronary artery disease 2008    s/p RCA stent (AMRIT) on 11/26/11    Depression with anxiety     2011    DVT (deep venous thrombosis) ([facility] 7/27/2010    Family history of early CAD     GERD (gastroesophageal reflux disease)     H/O gastric bypass 2006    Revision in 2009    Hepatitis C antibody test positive     Does not have chronic hep C (labs 10/6/15: neg HCV RNA)     HTN (hypertension) 7/27/2010    Hyperlipidemia 07/27/2010    Joint pain 7/27/2010    MI (myocardial infarction) ([facility] 7/27/2010    Mitral valve regurgitation     Mild to moderate    Morbid obesity ([facility] 7/27/2010    Myocardial infarction [facility]) 2006 and 2011    Dr. Monika Crook disorder     PUD (peptic ulcer disease)     gi bleed 2008; ulcer n gastric bypass pouch    Urinary incontinence, stress 7/27/2010     Allergies   Allergen Reactions    Amoxicillin Anaphylaxis    Amoxicillin Anaphylaxis    Lipitor [Atorvastatin] Other (comments)     Severe muscle pain and spasms    Zocor [Simvastatin] Other (comments)     Cramps, muscle spasms    Buspar [Buspirone] Other (comments)     Drunk sensation, headaches    Livalo [Pitavastatin] Myalgia    Pravastatin Other (comments)     Leg cramps       Current Outpatient Medications   Medication Sig Dispense Refill    zolpidem (AMBIEN) 10 mg tablet TAKE 1 TABLET BY MOUTH AT BEDTIME 30 Tab 2    bumetanide (BUMEX) 2 mg tablet Take 1 Tab by mouth two (2) times a day. 180 Tab 3    potassium chloride (KLOR-CON M20) 20 mEq tablet TAKE TWO TABLETS BY MOUTH DAILY 180 Tab 3    evolocumab (REPATHA SURECLICK) pen injection INJECT 1ML SUB-Q EVERY 14 DAYS 6 Pen 3    metoprolol succinate (TOPROL-XL) 25 mg XL tablet Take 1 Tab by mouth nightly. 90 Tab 3    metOLazone (ZAROXOLYN) 2.5 mg tablet TAKE 1 TABLET BY MOUTH DAILY AS NEEDED FOR UP TO 2 DAYS 6 Tab 1    FLUoxetine (PROZAC) 20 mg tablet TAKE 2 TABLETS EVERY MORNING AND 1 TABLET AT BEDTIME FOR ANXIETY WITH DEPRESSION 270 Tab 3    ENTRESTO 49-51 mg tab tablet TAKE 1 TABLET BY MOUTH TWICE A DAY 60 Tab 11    albuterol (PROVENTIL HFA, VENTOLIN HFA, PROAIR HFA) 90 mcg/actuation inhaler Take 2 Puffs by inhalation every four (4) hours as needed for Wheezing or Shortness of Breath.  1 Inhaler 11    psyllium (METAMUCIL) powd Take  by mouth. 2 tsp daily      cholecalciferol, VITAMIN D3, (VITAMIN D3) 5,000 unit tab tablet Take 10,000 Units by mouth daily.  LACTOBACILLUS ACIDOPHILUS (PROBIOTIC PO) Take 1 Tab by mouth daily.  aspirin delayed-release 81 mg tablet Take  by mouth daily.  biotin 10,000 mcg cap Take  by mouth daily.  OTHER Complete multi formula, bariatric advantage      magnesium 250 mg tab Take 1 Tab by mouth daily.  ferrous sulfate (IRON, FERROUS SULFATE,) 325 mg (65 mg Iron) tablet Take  by mouth daily (before breakfast).  CALCIUM PO Take 600 mg by mouth daily. PHYSICAL EXAM  Visit Vitals  /82 (BP 1 Location: Left arm, BP Patient Position: Sitting)   Pulse 71   Temp 98 °F (36.7 °C) (Oral)   Resp 16   Ht 5' 5\" (1.651 m)   Wt 249 lb (112.9 kg)   SpO2 94%   BMI 41.44 kg/m²   11 lb weight increase since last clinic visit 9 months ago    General: Morbidly obese, no distress. HEENT:  Head normocephalic/atraumatic, no scleral icterus  Neck: Supple. No carotid bruits, JVD, lymphadenopathy, or thyromegaly. Lungs:  Clear to ausculation bilaterally. Good air movement. Heart:  Regular rate and rhythm, normal S1 and S2, no murmur, gallop, or rub  Extremities: No clubbing, cyanosis, or edema. Neurological: Alert and oriented. Psychiatric: Normal mood and affect. Behavior is normal.        ASSESSMENT AND PLAN    ICD-10-CM ICD-9-CM    1. Essential hypertension O82 735.9 METABOLIC PANEL, COMPREHENSIVE      CBC WITH AUTOMATED DIFF   2. Systolic CHF, chronic (HCC) I50.22 428.22 bumetanide (BUMEX) 2 mg tablet     428.0 potassium chloride (KLOR-CON M20) 20 mEq tablet   3. Coronary artery disease involving native coronary artery of native heart without angina pectoris I25.10 414.01    4. Hyperlipidemia, unspecified hyperlipidemia type E78.5 272.4 LIPID PANEL      TSH RFX ON ABNORMAL TO FREE T4   5. Chronic insomnia F51.04 780.52 zolpidem (AMBIEN) 10 mg tablet   6.  Age-related osteoporosis without current pathological fracture M81.0 733.01    7. Primary osteoarthritis of both knees M17.0 715.16    8. Vitamin D deficiency E55.9 268.9 VITAMIN D, 25 HYDROXY   9. Vitamin B12 deficiency E53.8 266.2 VITAMIN B12 & FOLATE   10. IGT (impaired glucose tolerance) R73.02 790.22 HEMOGLOBIN A1C WITH EAG     Diagnoses and all orders for this visit:    1. Essential hypertension  Controlled on Toprol XL 25 mg nightly. -     METABOLIC PANEL, COMPREHENSIVE  -     CBC WITH AUTOMATED DIFF    2. Systolic CHF, chronic (HCC)  Controlled.  Continue Entresto 49/51 mg, Toprol XL, and Bumex. -     bumetanide (BUMEX) 2 mg tablet; Take 1 Tab by mouth two (2) times a day. -     potassium chloride (KLOR-CON M20) 20 mEq tablet; TAKE TWO TABLETS BY MOUTH DAILY    3. Coronary artery disease involving native coronary artery of native heart without angina pectoris  Asymptomatic. Continue ASA and metoprolol. 4. Hyperlipidemia, unspecified hyperlipidemia type  Controlled on Repatha. -     LIPID PANEL  -     TSH RFX ON ABNORMAL TO FREE T4    5. Chronic insomnia  Continue Prozac for depression and anxiety, Ambien for sleep. -     Refill zolpidem (AMBIEN) 10 mg tablet; TAKE 1 TABLET BY MOUTH AT BEDTIME    6. Age-related osteoporosis without current pathological fracture    7. Primary osteoarthritis of both knees  Follow up with Orthopedics. 8. Vitamin D deficiency  -     VITAMIN D, 25 HYDROXY    9. Vitamin B12 deficiency  -     VITAMIN B12 & FOLATE    10. IGT (impaired glucose tolerance)  -     HEMOGLOBIN A1C WITH EAG      Follow-up and Dispositions    · Return in about 3 months (around 1/14/2020), or if symptoms worsen or fail to improve, for Medicare AW. I have discussed the diagnosis with the patient and the intended plan as seen in the above orders. Patient is in agreement. The patient has received an after-visit summary and questions were answered concerning future plans.   I have discussed medication side effects and warnings with the patient as well.

## 2019-10-15 LAB
25(OH)D3+25(OH)D2 SERPL-MCNC: 31 NG/ML (ref 30–100)
ALBUMIN SERPL-MCNC: 4.1 G/DL (ref 3.6–4.8)
ALBUMIN/GLOB SERPL: 2.1 {RATIO} (ref 1.2–2.2)
ALP SERPL-CCNC: 58 IU/L (ref 39–117)
ALT SERPL-CCNC: 12 IU/L (ref 0–32)
AST SERPL-CCNC: 14 IU/L (ref 0–40)
BASOPHILS # BLD AUTO: 0 X10E3/UL (ref 0–0.2)
BASOPHILS NFR BLD AUTO: 1 %
BILIRUB SERPL-MCNC: 0.5 MG/DL (ref 0–1.2)
BUN SERPL-MCNC: 14 MG/DL (ref 8–27)
BUN/CREAT SERPL: 20 (ref 12–28)
CALCIUM SERPL-MCNC: 8.8 MG/DL (ref 8.7–10.3)
CHLORIDE SERPL-SCNC: 103 MMOL/L (ref 96–106)
CHOLEST SERPL-MCNC: 139 MG/DL (ref 100–199)
CO2 SERPL-SCNC: 24 MMOL/L (ref 20–29)
CREAT SERPL-MCNC: 0.7 MG/DL (ref 0.57–1)
EOSINOPHIL # BLD AUTO: 0.2 X10E3/UL (ref 0–0.4)
EOSINOPHIL NFR BLD AUTO: 3 %
ERYTHROCYTE [DISTWIDTH] IN BLOOD BY AUTOMATED COUNT: 13.6 % (ref 12.3–15.4)
EST. AVERAGE GLUCOSE BLD GHB EST-MCNC: 117 MG/DL
FOLATE SERPL-MCNC: 17.5 NG/ML
GLOBULIN SER CALC-MCNC: 2 G/DL (ref 1.5–4.5)
GLUCOSE SERPL-MCNC: 91 MG/DL (ref 65–99)
HBA1C MFR BLD: 5.7 % (ref 4.8–5.6)
HCT VFR BLD AUTO: 38.6 % (ref 34–46.6)
HDLC SERPL-MCNC: 75 MG/DL
HGB BLD-MCNC: 13.3 G/DL (ref 11.1–15.9)
IMM GRANULOCYTES # BLD AUTO: 0 X10E3/UL (ref 0–0.1)
IMM GRANULOCYTES NFR BLD AUTO: 0 %
LDLC SERPL CALC-MCNC: 43 MG/DL (ref 0–99)
LYMPHOCYTES # BLD AUTO: 2 X10E3/UL (ref 0.7–3.1)
LYMPHOCYTES NFR BLD AUTO: 30 %
MCH RBC QN AUTO: 28.7 PG (ref 26.6–33)
MCHC RBC AUTO-ENTMCNC: 34.5 G/DL (ref 31.5–35.7)
MCV RBC AUTO: 83 FL (ref 79–97)
MONOCYTES # BLD AUTO: 0.5 X10E3/UL (ref 0.1–0.9)
MONOCYTES NFR BLD AUTO: 7 %
NEUTROPHILS # BLD AUTO: 4 X10E3/UL (ref 1.4–7)
NEUTROPHILS NFR BLD AUTO: 59 %
PLATELET # BLD AUTO: 248 X10E3/UL (ref 150–450)
POTASSIUM SERPL-SCNC: 3.6 MMOL/L (ref 3.5–5.2)
PROT SERPL-MCNC: 6.1 G/DL (ref 6–8.5)
RBC # BLD AUTO: 4.63 X10E6/UL (ref 3.77–5.28)
SODIUM SERPL-SCNC: 143 MMOL/L (ref 134–144)
TRIGL SERPL-MCNC: 103 MG/DL (ref 0–149)
TSH SERPL DL<=0.005 MIU/L-ACNC: 1.75 UIU/ML (ref 0.45–4.5)
VIT B12 SERPL-MCNC: 308 PG/ML (ref 232–1245)
VLDLC SERPL CALC-MCNC: 21 MG/DL (ref 5–40)
WBC # BLD AUTO: 6.7 X10E3/UL (ref 3.4–10.8)

## 2019-10-18 NOTE — PROGRESS NOTES
Message sent to patient by My Chart:  Your labs showed mild prediabetes that has not changed much over the past 3 years. All your other lab results were normal.  This includes normal kidney and liver tests, blood counts, thyroid, cholesterol, vitamin D, and vitamin B12. Keep up the good work!     Dr. Freddie Lopez

## 2019-12-09 ENCOUNTER — TELEPHONE (OUTPATIENT)
Dept: INTERNAL MEDICINE CLINIC | Facility: CLINIC | Age: 69
End: 2019-12-09

## 2019-12-09 ENCOUNTER — TELEPHONE (OUTPATIENT)
Dept: CARDIOLOGY CLINIC | Age: 69
End: 2019-12-09

## 2019-12-09 NOTE — TELEPHONE ENCOUNTER
It is unclear to me who called to make this request, but I do not think we are obligated to try to change the quantity limit unless if the patient herself called to request it.

## 2019-12-09 NOTE — TELEPHONE ENCOUNTER
Patient states her specialty pharmacy, [facility], is no longer going to fill her Repatha and advised her to call the office to have medication filled at another pharmacy that will fill it for her.       Phone: [phone]

## 2019-12-09 NOTE — TELEPHONE ENCOUNTER
Per Abdon Orellana:    ----- Message from Katie Cristi sent at 12/9/2019 11:26 AM EST -----  Regarding: Dr. Manav Pike / telephone  Caller's first and last name:  Reason for call: Pt's drug plan is changing, starting January 1st, 2020 the pt's insurance will only cover the pt's Zolpidem- 10 mg prescription 15 pills every 25 days, unless Dr. Manav Pike calls her [facility] at [phone] to request the quantity limit be changed.   Callback required yes/no and why: Yes  Best contact number(s): [phone]  Details to clarify the request:

## 2019-12-09 NOTE — TELEPHONE ENCOUNTER
Requested Prescriptions     Signed Prescriptions Disp Refills    evolocumab (REPATHA SURECLICK) pen injection 6 Pen 3     Sig: INJECT 1ML SUB-Q EVERY 14 DAYS     Authorizing Provider: Lety Romero     Ordering User: Monica Gather     Per verbal orders

## 2020-01-14 ENCOUNTER — OFFICE VISIT (OUTPATIENT)
Dept: CARDIOLOGY CLINIC | Age: 70
End: 2020-01-14

## 2020-01-14 DIAGNOSIS — Z95.810 AUTOMATIC IMPLANTABLE CARDIAC DEFIBRILLATOR IN SITU: Primary | ICD-10-CM

## 2020-01-29 ENCOUNTER — TELEPHONE (OUTPATIENT)
Dept: INTERNAL MEDICINE CLINIC | Facility: CLINIC | Age: 70
End: 2020-01-29

## 2020-01-29 NOTE — TELEPHONE ENCOUNTER
I left a message for the patient to call back in order to inform them that the PA has been approved.

## 2020-01-29 NOTE — TELEPHONE ENCOUNTER
Pt called and stated that her insurance company is only authorizing 15 pills of Zolpidem every 25 days. Pt stated that she needs to take this medication daily and called [facility] to let them know. Pt stated that she was told by Salena Mireles that in order for them to cover this medication for daily use, they would need prior authorization from the provider. [facility] best callback # is [phone]. Please call pt back at [phone]. Per [facility] note from 12/9/19:     ----- Message from Lukas Lezama sent at 12/9/2019 11:26 AM EST -----  Regarding: Dr. Te Jenkins / telephone  Caller's first and last name:  Reason for call: Pt's drug plan is changing, starting January 1st, 2020 the pt's insurance will only cover the pt's Zolpidem- 10 mg prescription 15 pills every 25 days, unless Dr. Te Jenkins calls her [facility] at [phone] to request the quantity limit be changed.   Callback required yes/no and why: Yes  Best contact number(s): [phone]  Details to clarify the request:

## 2020-01-29 NOTE — TELEPHONE ENCOUNTER
Yes, she has been doing well on it for years. Was started by Dr. Sade Beckwith (Cardiology) on 8/4/16.

## 2020-01-30 NOTE — TELEPHONE ENCOUNTER
I verified the patient by name and date of birth. I informed the patient that the PA was approved and I had already called the pharmacy to informed them. She understood and did not have any questions at this time.

## 2020-02-03 ENCOUNTER — OFFICE VISIT (OUTPATIENT)
Dept: URGENT CARE | Age: 70
End: 2020-02-03

## 2020-02-03 VITALS
HEIGHT: 64 IN | SYSTOLIC BLOOD PRESSURE: 181 MMHG | HEART RATE: 85 BPM | OXYGEN SATURATION: 97 % | WEIGHT: 255 LBS | TEMPERATURE: 98.1 F | RESPIRATION RATE: 18 BRPM | BODY MASS INDEX: 43.54 KG/M2 | DIASTOLIC BLOOD PRESSURE: 83 MMHG

## 2020-02-03 DIAGNOSIS — J45.21 MILD INTERMITTENT ASTHMATIC BRONCHITIS WITH ACUTE EXACERBATION: Primary | ICD-10-CM

## 2020-02-03 RX ORDER — BENZONATATE 200 MG/1
200 CAPSULE ORAL
Qty: 21 CAP | Refills: 0 | Status: SHIPPED | OUTPATIENT
Start: 2020-02-03 | End: 2020-02-10

## 2020-02-03 RX ORDER — PROMETHAZINE HYDROCHLORIDE AND DEXTROMETHORPHAN HYDROBROMIDE 6.25; 15 MG/5ML; MG/5ML
5 SYRUP ORAL
Qty: 60 ML | Refills: 0 | Status: SHIPPED | OUTPATIENT
Start: 2020-02-03 | End: 2020-07-13

## 2020-02-03 RX ORDER — PREDNISONE 20 MG/1
20 TABLET ORAL 2 TIMES DAILY
Qty: 10 TAB | Refills: 0 | Status: SHIPPED | OUTPATIENT
Start: 2020-02-03 | End: 2020-02-08

## 2020-02-03 NOTE — PROGRESS NOTES
Cough   The history is provided by the patient. This is a new problem. The current episode started more than 1 week ago. The problem occurs constantly. The problem has not changed since onset. The cough is non-productive. There has been no fever. Associated symptoms include shortness of breath and wheezing. She has tried cough syrup for the symptoms. She is not a smoker (ex- quit 15 years before). Her past medical history is significant for bronchitis and asthma. Past Medical History:   Diagnosis Date    Acute right ankle pain 6/8/2018 5/29/18: X-ray showed possible right ankle sprain. 6/6/18: saw Dr. Brian Segura ([facility]), diagnosed with peroneal tendonitis. Prescribed an ASO    Asthma     Cardiomyopathy ([address]     Coronary artery disease 2008    s/p RCA stent ([facility]) on 11/26/11    Depression with anxiety     2011    DVT (deep venous thrombosis) ([address] 7/27/2010    Family history of early CAD     GERD (gastroesophageal reflux disease)     H/O gastric bypass 2006    Revision in 2009    Hepatitis C antibody test positive     Does not have chronic hep C (labs 10/6/15: neg HCV RNA)     HTN (hypertension) 7/27/2010    Hyperlipidemia 07/27/2010    Incontinence of feces 9/3/2018    Dr. Jose Mendez. 8/20/18: Improving with pelvic physical therapy and psyllium husk treatment. Saw Dr. Sinai George who suggested PT first. MR defecography showed pelvic floor weakness with pelvic descensus, small anterior  Rectocele, and vaginal prolapse. To have pelvic PT weekly for 8 wks and to see Dr. Sinai George again in Oct 2018.     Joint pain 7/27/2010    MI (myocardial infarction) ([address] 7/27/2010    Mitral valve regurgitation     Mild to moderate    Morbid obesity ([address] 7/27/2010    Myocardial infarction [facility]) 2006 and 2011    Dr. Denzel Ramirez disorder     PUD (peptic ulcer disease)     gi bleed 2008; ulcer n gastric bypass pouch    Urinary incontinence, stress 7/27/2010        Past Surgical History: Procedure Laterality Date    CARDIAC SURG PROCEDURE UNLIST      Stent x 5-6,Most recent 2011    COLONOSCOPY N/A 6/29/2018    COLONOSCOPY performed by Maricruz Acevedo [facility] at [facility] END[facility]COPY; redundant colon, int hemorrhoids, pathology: normal colonic mucosa    HX COLONOSCOPY  9/5/14    Dr. Blanca Lundberg; normal, repeat in 5 yrs    HX GASTRIC BYPASS      HX IMPLANTABLE CARDIOVERTER DEFIBRILLATOR  08/24/2016    HX LAP GASTRIC BYPASS  2006    revision 2009/Dr. Subramanian Push    HX OTHER SURGICAL  09/19/2016    Removal of right ventricular ICD lead & single chamber transvenous AICD    HX OTHER SURGICAL  10/12/2016    pocket revison of ICD; Dr. Ashley Jefferson  06/07/2018    Dr Angeles Palencia; high resolution anorectal manaometry-abnormal study. Study done for eval of fecal incontinence    HX OTHER SURGICAL  12/14/2018    Dr. Angeles Palencia. Rectal endoscopic US (EUS) for rectal incontinence. Normal rectal mucosa, no fistulas.     HX PACEMAKER      sicd/left side of chest under arm    INS PPM/ICD LED SING ONLY  8/24/2016         INS PPM/ICD LED SING ONLY  8/26/2016         INS PPM/ICD LED SING ONLY  9/21/2016         CO LAP,STOMACH,OTHER,W/O TUBE  04/05/2010    Revision GBP         Family History   Problem Relation Age of Onset    Dementia Mother     Cancer Mother         colon    Alzheimer Mother     Cancer Father         stomach    Heart Disease Father     Hypertension Father     Heart Disease Sister     Stroke Sister     Heart Attack Brother         Social History     Socioeconomic History    Marital status:      Spouse name: Not on file    Number of children: Not on file    Years of education: Not on file    Highest education level: Not on file   Occupational History    Not on file   Social Needs    Financial resource strain: Not on file    Food insecurity:     Worry: Not on file     Inability: Not on file    Transportation needs:     Medical: Not on file     Non-medical: Not on file   Tobacco Use    Smoking status: Former Smoker     Packs/day: 1.00     Years: 30.00     Pack years: 30.00     Start date: 1/1/1970     Last attempt to quit: 5/17/2004     Years since quitting: 15.7    Smokeless tobacco: Never Used   Substance and Sexual Activity    Alcohol use: No     Alcohol/week: 0.0 standard drinks    Drug use: No     Types: Prescription    Sexual activity: Never     Partners: Male   Lifestyle    Physical activity:     Days per week: Not on file     Minutes per session: Not on file    Stress: Not on file   Relationships    Social connections:     Talks on phone: Not on file     Gets together: Not on file     Attends [employer] service: Not on file     Active member of club or organization: Not on file     Attends meetings of clubs or organizations: Not on file     Relationship status: Not on file    Intimate partner violence:     Fear of current or ex partner: Not on file     Emotionally abused: Not on file     Physically abused: Not on file     Forced sexual activity: Not on file   Other Topics Concern    Not on file   Social History Narrative    ** Merged History Encounter **                     ALLERGIES: Amoxicillin; Amoxicillin; Lipitor [atorvastatin]; Zocor [simvastatin]; Buspar [buspirone]; Livalo [pitavastatin]; and Pravastatin    Review of Systems   Respiratory: Positive for cough, shortness of breath and wheezing. All other systems reviewed and are negative. Vitals:    02/03/20 1526   BP: 181/83   Pulse: 85   Resp: 18   Temp: 98.1 °F (36.7 °C)   SpO2: 97%   Weight: 255 lb (115.7 kg)   Height: 5' 4\" (1.626 m)       Physical Exam  Vitals signs and nursing note reviewed. Constitutional:       General: She is not in acute distress. HENT:      Right Ear: Tympanic membrane and ear canal normal.      Left Ear: Tympanic membrane and ear canal normal.      Nose: Nose normal.      Mouth/Throat:      Pharynx: No oropharyngeal exudate or posterior oropharyngeal erythema.    Eyes: General:         Right eye: No discharge. Left eye: No discharge. Conjunctiva/sclera: Conjunctivae normal.   Neck:      Musculoskeletal: Neck supple. Pulmonary:      Effort: Pulmonary effort is normal. No respiratory distress. Breath sounds: Decreased breath sounds and rhonchi present. No wheezing or rales. Lymphadenopathy:      Cervical: No cervical adenopathy. Skin:     Findings: No rash. MDM    Procedures             ICD-10-CM ICD-9-CM    1. Mild intermittent asthmatic bronchitis with acute exacerbation J45.21 493.92      Medications Ordered Today   Medications    benzonatate (TESSALON) 200 mg capsule     Sig: Take 1 Cap by mouth three (3) times daily as needed for Cough for up to 7 days. Dispense:  21 Cap     Refill:  0    predniSONE (DELTASONE) 20 mg tablet     Sig: Take 20 mg by mouth two (2) times a day for 5 days. With food     Dispense:  10 Tab     Refill:  0    promethazine-dextromethorphan (PROMETHAZINE-DM) 6.25-15 mg/5 mL syrup     Sig: Take 5 mL by mouth nightly as needed for Cough. Dispense:  60 mL     Refill:  0     No results found for any visits on 02/03/20. The patients condition was discussed with the patient and they understand. The patient is to follow up with primary care doctor. If signs and symptoms become worse the pt is to go to the ER. The patient is to take medications as prescribed.

## 2020-02-03 NOTE — PATIENT INSTRUCTIONS
Learning About Asthma Triggers What are asthma triggers? When you have asthma, certain things can make your symptoms worse. These are called triggers. Learn what triggers an asthma attack for you, and avoid the triggers when you can. Common triggers include colds, smoke, air pollution, dust, pollen, pets, stress, and cold air. How do asthma triggers affect you? Triggers can make it harder for your lungs to work as they should. They can lead to sudden breathing problems and other symptoms. When you are around a trigger, an asthma attack is more likely. If your symptoms are severe, you may need emergency treatment or have to go to the hospital for treatment. What can you do to avoid triggers? The first thing is to know your triggers. When you are having symptoms, note the things around you that might be causing them. Then look for patterns that may be triggering your symptoms. Record your triggers on a piece of paper or in an asthma diary. When you have your list of possible triggers, work with your doctor to find ways to avoid them. Avoid colds and flu. Get a pneumococcal vaccine shot. If you have had one before, ask your doctor whether you need a second dose. Get a flu vaccine every year, as soon as it's available. If you must be around people with colds or the flu, wash your hands often. Here are some ways to avoid a few common triggers. · Do not smoke or allow others to smoke around you. If you need help quitting, talk to your doctor about stop-smoking programs and medicines. These can increase your chances of quitting for good. · If there is a lot of pollution, pollen, or dust outside, stay at home and keep your windows closed. Use an air conditioner or air filter in your home. Check your local weather report or newspaper for air quality and pollen reports. What else should you know?  
· Take your controller medicine every day, not just when you have symptoms. It helps prevent problems before they occur. · Your doctor may suggest that you check how well your lungs are working by measuring your peak expiratory flow (PEF) throughout the day. Your PEF may drop when you are near things that trigger symptoms. Where can you learn more? Go to http://jose-rm.info/. Enter H494 in the search box to learn more about \"Learning About Asthma Triggers. \" Current as of: June 9, 2019 Content Version: 12.2 © 5056-0800 [employer]. Care instructions adapted under license by [employer] (which disclaims liability or warranty for this information). If you have questions about a medical condition or this instruction, always ask your healthcare professional. [address] any warranty or liability for your use of this information.

## 2020-03-10 DIAGNOSIS — I50.22 SYSTOLIC CHF, CHRONIC (HCC): ICD-10-CM

## 2020-03-10 RX ORDER — BUMETANIDE 2 MG/1
2 TABLET ORAL 2 TIMES DAILY
Qty: 180 TAB | Refills: 3 | Status: SHIPPED | OUTPATIENT
Start: 2020-03-10 | End: 2020-04-30 | Stop reason: SDUPTHER

## 2020-03-10 NOTE — TELEPHONE ENCOUNTER
Pt called to request a refill of   Requested Prescriptions     Pending Prescriptions Disp Refills    bumetanide (BUMEX) 2 mg tablet 180 Tab 3     Sig: Take 1 Tab by mouth two (2) times a day. To the [facility]) on file.

## 2020-03-30 RX ORDER — SACUBITRIL AND VALSARTAN 49; 51 MG/1; MG/1
TABLET, FILM COATED ORAL
Qty: 60 TAB | Refills: 11 | Status: SHIPPED | OUTPATIENT
Start: 2020-03-30 | End: 2020-09-29

## 2020-04-07 NOTE — PROGRESS NOTES
Cardiac Electrophysiology VISIT Note     Pursuant to the emergency declaration under the [facility], 1135 waiver authority and the [employer] Act, this Virtual  Visit was conducted, with patient's consent, to reduce the patient's risk of exposure to COVID-19 and provide continuity of care for an established patient. Services were provided through an audio synchronous discussion virtually to substitute for in-person clinic visit. Subjective:      Richi Howard is a 81 y.o. female who is addressed via telephone audio virtual visit for follow up, is s/p [employer] S-ICD (DOI 09/21/2016). Device check on 01/14/2020 showed proper function, adequate generator longevity. No episodes or discharges noted. She continues with resolved dizziness after bystolic change to toprol XL      Last echo in 2018 showed LVEF 35%. On appropriate GDMT. She denies chest pain, PND, orthopnea, lightheadedness, syncope     She does not have good reception for cell phone where she lives    Previous:   ECG (03/21/2019): NSR 64, QRSd 116 ms. Echo (11/30/2018): LVEF 35%, no RWMA, akinesis of basal-mid inferoseptal & basal-mid inferolat wall(s), borderline LVH. LA mod dilated. Mild to mod MR. Mod increased aortic valve leaflet thickness. Trivial TR. Trivial IA. Lexiscan cardiolite stress (04/17/2017): Anterior wall with large, mod intensity reversible defect. [place] with large, mod severity, reversible defect. Large, severe fixed defect. Lat wall with mod size, mod severity reversible defect. Initial ICD implanted 8/24/16. Transvenous RV lead displacement x 2 due to large breast size, repositioned once. Removed entire system d/t to high recurrent risk of perforation. [employer] S-ICD was implanted in place of transvenous system 9/21/16.      States she was taken off Toprol XL due to hair loss, but this hasn't improved by switching to nebivolol. S/p PCI RCA in 2011. LVEF has been as low as 27%. Cardiac cath 2013 with patent stents, LVEF had been 35%    Followed by Dr. Williams Clarke.     She is a part of a support group on Facebook for S-ICD.      Patient Active Problem List   Diagnosis Code    HTN (hypertension) I10    History of gastric bypass Z98.84    Moderate episode of recurrent [facility] depressive disorder ([facility] F33.1    CAD (coronary artery disease) J57.55    Systolic CHF, chronic (HCC) I50.22    Hyperlipidemia E78.5    Mitral valve regurgitation I34.0    History of MI (myocardial infarction) I25.2    Cardiomyopathy ([facility] I42.9    Osteoporosis M81.0    Morbid obesity with BMI of 40.0-44.9, adult ([facility] E66.01, Z68.41    Chronic insomnia F51.04    S/P ICD (internal cardiac defibrillator) procedure Z95.810    Primary osteoarthritis of both knees M17.0    Mild intermittent asthma without complication W50.97    Generalized anxiety disorder F41.1     Current Outpatient Medications   Medication Sig Dispense Refill    Entresto 49-51 mg tab tablet TAKE 1 TABLET BY MOUTH TWICE A DAY 60 Tab 11    bumetanide (BUMEX) 2 mg tablet Take 1 Tab by mouth two (2) times a day. 180 Tab 3    FLUoxetine (PROZAC) 20 mg tablet TAKE 2 TABLETS EVERY MORNING AND 1 TABLET AT BEDTIME FOR ANXIETY WITH DEPRESSION 90 Tab 11    promethazine-dextromethorphan (PROMETHAZINE-DM) 6.25-15 mg/5 mL syrup Take 5 mL by mouth nightly as needed for Cough.  60 mL 0    VENTOLIN HFA 90 mcg/actuation inhaler TAKE 2 PUFFS BY INHALATION EVERY FOUR (4) HOURS AS NEEDED FOR WHEEZING OR SHORTNESS OF BREATH. 18 Inhaler 11    zolpidem (AMBIEN) 10 mg tablet TAKE 1 TABLET AT BEDTIME 30 Tab 2    evolocumab (REPATHA SURECLICK) pen injection INJECT 1ML SUB-Q EVERY 14 DAYS 6 Pen 3    potassium chloride (KLOR-CON M20) 20 mEq tablet TAKE TWO TABLETS BY MOUTH DAILY 180 Tab 3    metoprolol succinate (TOPROL-XL) 25 mg XL tablet Take 1 Tab by mouth nightly. 90 Tab 3    metOLazone (ZAROXOLYN) 2.5 mg tablet TAKE 1 TABLET BY MOUTH DAILY AS NEEDED FOR UP TO 2 DAYS 6 Tab 1    psyllium (METAMUCIL) powd Take  by mouth. 2 tsp daily      cholecalciferol, VITAMIN D3, (VITAMIN D3) 5,000 unit tab tablet Take 10,000 Units by mouth daily.  LACTOBACILLUS ACIDOPHILUS (PROBIOTIC PO) Take 1 Tab by mouth daily.  aspirin delayed-release 81 mg tablet Take  by mouth daily.  biotin 10,000 mcg cap Take  by mouth daily.  OTHER Complete multi formula, bariatric advantage      magnesium 250 mg tab Take 1 Tab by mouth daily.  ferrous sulfate (IRON, FERROUS SULFATE,) 325 mg (65 mg Iron) tablet Take  by mouth daily (before breakfast).  CALCIUM PO Take 600 mg by mouth daily. Allergies   Allergen Reactions    Amoxicillin Anaphylaxis    Amoxicillin Anaphylaxis    Lipitor [Atorvastatin] Other (comments)     Severe muscle pain and spasms    Zocor [Simvastatin] Other (comments)     Cramps, muscle spasms    Buspar [Buspirone] Other (comments)     Drunk sensation, headaches    Livalo [Pitavastatin] Myalgia    Pravastatin Other (comments)     Leg cramps     Past Medical History:   Diagnosis Date    Acute right ankle pain 6/8/2018 5/29/18: X-ray showed possible right ankle sprain. 6/6/18: saw Dr. Prince Arell[facility] diagnosed with peroneal tendonitis.  Prescribed an ASO    Asthma     Cardiomyopathy ([facility]     Coronary artery disease 2008    s/p RCA stent (AMRIT) on 11/26/11    Depression with anxiety     2011    DVT (deep venous thrombosis) ([facility] 7/27/2010    Family history of early CAD     GERD (gastroesophageal reflux disease)     H/O gastric bypass 2006    Revision in 2009    Hepatitis C antibody test positive     Does not have chronic hep C (labs 10/6/15: neg HCV RNA)     HTN (hypertension) 7/27/2010    Hyperlipidemia 07/27/2010    Incontinence of feces 9/3/2018    Dr. Tunde Garcia. 8/20/18: Improving with pelvic physical therapy and psyllium husk treatment. Saw Dr. Deras Proffer who suggested PT first. MR defecography showed pelvic floor weakness with pelvic descensus, small anterior  Rectocele, and vaginal prolapse. To have pelvic PT weekly for 8 wks and to see Dr. Braeden Harris again in Oct 2018.  Joint pain 7/27/2010    MI (myocardial infarction) ([facility] 7/27/2010    Mitral valve regurgitation     Mild to moderate    Morbid obesity ([facility] 7/27/2010    Myocardial infarction [facility]) 2006 and 2011    Dr. Vera Sox disorder     PUD (peptic ulcer disease)     gi bleed 2008; ulcer n gastric bypass pouch    Urinary incontinence, stress 7/27/2010     Past Surgical History:   Procedure Laterality Date    CARDIAC SURG PROCEDURE UNLIST      Stent x 5-6,Most recent 2011    COLONOSCOPY N/A 6/29/2018    COLONOSCOPY performed by [facility]ys Sims [facility] at [facility] ENDOSCOPY; redundant colon, int hemorrhoids, pathology: normal colonic mucosa    HX COLONOSCOPY  9/5/14    Dr. Drew Yancey; normal, repeat in 5 yrs    HX GASTRIC BYPASS      HX IMPLANTABLE CARDIOVERTER DEFIBRILLATOR  08/24/2016    HX LAP GASTRIC BYPASS  2006    revision 2009/Dr. Jalen Salinas    HX OTHER SURGICAL  09/19/2016    Removal of right ventricular ICD lead & single chamber transvenous AICD    HX OTHER SURGICAL  10/12/2016    pocket revison of ICD; Dr. Socorro Vences  06/07/2018    Dr Karen Dixon; high resolution anorectal manaometry-abnormal study. Study done for eval of fecal incontinence    HX OTHER SURGICAL  12/14/2018    Dr. Karen Dixon. Rectal endoscopic US (EUS) for rectal incontinence. Normal rectal mucosa, no fistulas.     HX PACEMAKER      sicd/left side of chest under arm    INS PPM/ICD LED SING ONLY  8/24/2016         INS PPM/ICD LED SING ONLY  8/26/2016         INS PPM/ICD LED SING ONLY  9/21/2016         [place] LAP,STOMACH,OTHER,W/O TUBE  04/05/2010    Revision GBP     Family History   Problem Relation Age of Onset    Dementia Mother     Cancer Mother         colon  Alzheimer Mother     Cancer Father         stomach    Heart Disease Father     Hypertension Father     Heart Disease Sister     Stroke Sister     Heart Attack Brother      Social History     Tobacco Use    Smoking status: Former Smoker     Packs/day: 1.00     Years: 30.00     Pack years: 30.00     Start date: 1/1/1970     Last attempt to quit: 5/17/2004     Years since quitting: 15.9    Smokeless tobacco: Never Used   Substance Use Topics    Alcohol use: No     Alcohol/week: 0.0 standard drinks        Review of Systems:   Constitutional: Negative for fever, chills, weight loss   HEENT: Negative for nosebleeds, vision changes. Respiratory: Negative for cough, hemoptysis, sputum production, and wheezing. Cardiovascular: Negative for chest pain, no orthopnea, claudication, leg swelling, syncope, and PND. + chronic RODNEY  Gastrointestinal: Negative for nausea, vomiting, diarrhea, constipation, blood in stool and melena. Genitourinary: Negative for dysuria, and hematuria. Musculoskeletal: Negative for myalgias, arthralgia. Skin: Negative for rash. Wound clean. Heme: Does not bleed or bruise easily. + varicose veins   Neurological: Negative for speech change and focal weakness     Objective:     Due to this being a TeleHealth evaluation, many elements of the physical examination are unable to be assessed. Neuro: A&Ox3, speech clear, answering questions appropriately      Assessment/Plan:       ICD-10-CM ICD-9-CM    1. Automatic implantable cardiac defibrillator in situ Z95.810 V45.02    2. Systolic CHF, chronic (HCC) I50.22 428.22      428.0    3. Morbid obesity with BMI of 40.0-44.9, adult ([facility] E66.01 278.01     Z68.41 V85.41        Ms. Nevaeh Salinas has [employer] S-ICD (DOI 09/21/2016), had lead dislodgement x 2 with previous transvenous pacer. Device check on 01/14/2020 showed proper function, adequate generator longevity. No episodes or discharges noted.   Remaining battery longevity was 61% then    NYHA III, has decreased activity tolerance, increased RODNEY, fatigue. Medication regimen includes Entresto 49-51 mg po bid & Toprol XL is new and she does not have dizziness any more. Based upon last ECG, she is not a candidate for biventricular ICD. No BBB. Reports BP usually well controlled. Denies hypotension or lightheadedness. Remote ICD checks q 3 months but now said cellular/wifi reception is not good. Follow up in EP clinic annually but will check in person ICD next     Future Appointments   Date Time Provider Denis Burksi   4/8/2020 11:30 AM Bridget Saenz  [address]   7/6/2020  9:30 AM Tanya Alcazar MD [facility]     We discussed the expected course, resolution and complications of the diagnosis(es) in detail. Medication risks, benefits, costs, interactions, and alternatives were discussed as indicated. I advised her to contact the office if her condition worsens, changes or fails to improve as anticipated. She expressed understanding with the diagnosis(es) and plan. Patient was made aware and verbalized understanding that an appointment will be scheduled for them for a virtual visit and/or office visit within the above time frame. Patient understanding his/her responsibility to call and change time/date if he/she so chooses. Thank you for involving me in this patient's care and please call with further concerns or questions. Love Azar M.D.   Electrophysiology/Cardiology  [facility]  [address], [address]  [facility], [address]                             [address] [place] [address]  [phone]

## 2020-04-08 ENCOUNTER — VIRTUAL VISIT (OUTPATIENT)
Dept: CARDIOLOGY CLINIC | Age: 70
End: 2020-04-08

## 2020-04-08 ENCOUNTER — OFFICE VISIT (OUTPATIENT)
Dept: CARDIOLOGY CLINIC | Age: 70
End: 2020-04-08

## 2020-04-08 VITALS
BODY MASS INDEX: 43.54 KG/M2 | WEIGHT: 255 LBS | HEIGHT: 64 IN | DIASTOLIC BLOOD PRESSURE: 78 MMHG | SYSTOLIC BLOOD PRESSURE: 136 MMHG

## 2020-04-08 DIAGNOSIS — Z95.810 AUTOMATIC IMPLANTABLE CARDIAC DEFIBRILLATOR IN SITU: Primary | ICD-10-CM

## 2020-04-08 DIAGNOSIS — I50.22 SYSTOLIC CHF, CHRONIC (HCC): ICD-10-CM

## 2020-04-08 DIAGNOSIS — E66.01 MORBID OBESITY WITH BMI OF 40.0-44.9, ADULT (HCC): ICD-10-CM

## 2020-04-30 DIAGNOSIS — F51.04 CHRONIC INSOMNIA: ICD-10-CM

## 2020-04-30 DIAGNOSIS — I50.22 SYSTOLIC CHF, CHRONIC (HCC): ICD-10-CM

## 2020-04-30 RX ORDER — BUMETANIDE 2 MG/1
2 TABLET ORAL 2 TIMES DAILY
Qty: 180 TAB | Refills: 3 | Status: SHIPPED | OUTPATIENT
Start: 2020-04-30 | End: 2020-06-26

## 2020-04-30 RX ORDER — ZOLPIDEM TARTRATE 10 MG/1
TABLET ORAL
Qty: 30 TAB | Refills: 2 | Status: SHIPPED | OUTPATIENT
Start: 2020-04-30 | End: 2020-08-03

## 2020-06-26 DIAGNOSIS — I50.22 SYSTOLIC CHF, CHRONIC (HCC): ICD-10-CM

## 2020-06-26 RX ORDER — BUMETANIDE 2 MG/1
TABLET ORAL
Qty: 60 TAB | Refills: 11 | Status: SHIPPED | OUTPATIENT
Start: 2020-06-26 | End: 2022-03-28

## 2020-07-13 ENCOUNTER — VIRTUAL VISIT (OUTPATIENT)
Dept: INTERNAL MEDICINE CLINIC | Facility: CLINIC | Age: 70
End: 2020-07-13

## 2020-07-13 DIAGNOSIS — E53.8 VITAMIN B12 DEFICIENCY: ICD-10-CM

## 2020-07-13 DIAGNOSIS — I10 ESSENTIAL HYPERTENSION: ICD-10-CM

## 2020-07-13 DIAGNOSIS — I50.22 SYSTOLIC CHF, CHRONIC (HCC): ICD-10-CM

## 2020-07-13 DIAGNOSIS — E55.9 VITAMIN D DEFICIENCY: ICD-10-CM

## 2020-07-13 DIAGNOSIS — F51.04 CHRONIC INSOMNIA: ICD-10-CM

## 2020-07-13 DIAGNOSIS — I25.10 CORONARY ARTERY DISEASE INVOLVING NATIVE CORONARY ARTERY OF NATIVE HEART WITHOUT ANGINA PECTORIS: ICD-10-CM

## 2020-07-13 DIAGNOSIS — E78.2 MIXED HYPERLIPIDEMIA: ICD-10-CM

## 2020-07-13 DIAGNOSIS — I42.9 CARDIOMYOPATHY, UNSPECIFIED TYPE (HCC): ICD-10-CM

## 2020-07-13 DIAGNOSIS — Z00.00 MEDICARE ANNUAL WELLNESS VISIT, SUBSEQUENT: Primary | ICD-10-CM

## 2020-07-13 DIAGNOSIS — E66.01 MORBID OBESITY WITH BMI OF 40.0-44.9, ADULT (HCC): ICD-10-CM

## 2020-07-13 DIAGNOSIS — R73.02 IGT (IMPAIRED GLUCOSE TOLERANCE): ICD-10-CM

## 2020-07-13 DIAGNOSIS — M17.0 PRIMARY OSTEOARTHRITIS OF BOTH KNEES: ICD-10-CM

## 2020-07-13 DIAGNOSIS — Z71.89 ADVANCE CARE PLANNING: ICD-10-CM

## 2020-07-13 DIAGNOSIS — F33.1 MODERATE EPISODE OF RECURRENT MAJOR DEPRESSIVE DISORDER (HCC): ICD-10-CM

## 2020-07-13 NOTE — PATIENT INSTRUCTIONS
Medicare Wellness Visit, Female The best way to live healthy is to have a lifestyle where you eat a well-balanced diet, exercise regularly, limit alcohol use, and quit all forms of tobacco/nicotine, if applicable. Regular preventive services are another way to keep healthy. Preventive services (vaccines, screening tests, monitoring & exams) can help personalize your care plan, which helps you manage your own care. Screening tests can find health problems at the earliest stages, when they are easiest to treat. [facility] follows the current, evidence-based guidelines published by the [facility] Mike Maldonado ([facility]) when recommending preventive services for our patients. Because we follow these guidelines, sometimes recommendations change over time as research supports it. (For example, mammograms used to be recommended annually. Even though Medicare will still pay for an annual mammogram, the newer guidelines recommend a mammogram every two years for women of average risk). Of course, you and your doctor may decide to screen more often for some diseases, based on your risk and your co-morbidities (chronic disease you are already diagnosed with). Preventive services for you include: - Medicare offers their members a free annual wellness visit, which is time for you and your primary care provider to discuss and plan for your preventive service needs. Take advantage of this benefit every year! 
-All adults over the age of 72 should receive the recommended pneumonia vaccines. Current USPSTF guidelines recommend a series of two vaccines for the best pneumonia protection.  
-All adults should have a flu vaccine yearly and a tetanus vaccine every 10 years.  
-All adults age 48 and older should receive the shingles vaccines (series of two vaccines). -All adults age 38-68 who are overweight should have a diabetes screening test once every three years. -All adults born between [birth year] and 1963 should be screened once for Hepatitis C. 
-Other screening tests and preventive services for persons with diabetes include: an eye exam to screen for diabetic retinopathy, a kidney function test, a foot exam, and stricter control over your cholesterol.  
-Cardiovascular screening for adults with routine risk involves an electrocardiogram (ECG) at intervals determined by your doctor.  
-Colorectal cancer screenings should be done for adults age 54-65 with no increased risk factors for colorectal cancer. There are a number of acceptable methods of screening for this type of cancer. Each test has its own benefits and drawbacks. Discuss with your doctor what is most appropriate for you during your annual wellness visit. The different tests include: colonoscopy (considered the best screening method), a fecal occult blood test, a fecal DNA test, and sigmoidoscopy. 
 
-A bone mass density test is recommended when a woman turns 65 to screen for osteoporosis. This test is only recommended one time, as a screening. Some providers will use this same test as a disease monitoring tool if you already have osteoporosis. -Breast cancer screenings are recommended every other year for women of normal risk, age 54-69. 
-Cervical cancer screenings for women over age 72 are only recommended with certain risk factors. Here is a list of your current Health Maintenance items (your personalized list of preventive services) with a due date: 
Health Maintenance Due Topic Date Due  Shingles Vaccine (1 of 2) 05/17/2002

## 2020-07-13 NOTE — ACP (ADVANCE CARE PLANNING)
Advance Care Planning       Advance Care Planning (ACP) Physician/NP/PA (Provider) Conversation        Date of ACP Conversation: 7/13/2020    Conversation Conducted with:   Patient with Decision Making Jluis Noyola Maker:    Current Designated Health Care Decision Maker:   (If there is a valid [place] named in the [address]" box in the ACP activity, but it is not visible above, be sure to open that field and then select the health care decision maker relationship (ie \"primary\") in the blank space to the right of the name.)    Note: Assess and validate information in current ACP documents, as indicated. If no Authorized Decision Maker has previously been identified, then patient chooses [place]:  \"Who would you like to name as your primary health care decision-maker? \"    Name: Elizabeth Garcias   Relationship:  Phone number: [phone]  Nikko Mccarthy this person be reached easily? \" YES  \"Who would you like to name as your back-up decision maker? \"   Name: none  Relationship: none Phone number: none  \"Can this person be reached easily? \" NO    Note: If the relationship of these Decision-Makers to the patient does NOT follow your state's Next of Kin hierarchy, recommend that patient complete ACP document that meets state-specific requirements to allow them to act on the patient's behalf when appropriate. Care Preferences:    Hospitalization: \"If your health worsens and it becomes clear that your chance of recovery is unlikely, what would your preference be regarding hospitalization? \"  If the patient would want hospitalization, answer \"yes\". If the patient would prefer comfort-focused treatment without hospitalization, answer \"no\". yes      Ventilation:   \"If you were in your present state of health and suddenly became very ill and were unable to breathe on your own, what would your preference be about the use of a ventilator (breathing machine) if it was available to you? \"    If patient would desire the use of a ventilator (breathing machine), answer \"yes\", if not answer \"no\":yes    \"If your health worsens and it becomes clear that your chance of recovery is unlikely, what would your preference be about the use of a ventilator (breathing machine) if it was available to you? \"   yes      Resuscitation:  \"CPR works best to restart the heart when there is a sudden event, like a heart attack, in someone who is otherwise healthy. Unfortunately, CPR does not typically restart the heart for people who have serious health conditions or who are very sick. \"    \"In the event your heart stopped as a result of an underlying serious health condition, would you want attempts to be made to restart your heart (answer \"yes\" for attempt to resuscitate) or would you prefer a natural death (answer \"no\" for do not attempt to resuscitate)? \"   yes    NOTE: If the patient has a valid advance directive AND provides care preference(s) that are inconsistent with that prior directive, advise the patient to consider either: creating a new advance directive that complies with state-specific requirements; or, if that is not possible, orally revoking that prior directive in accordance with state-specific requirements, which must be documented in the EHR.     Conversation Outcomes / Follow-Up Plan:   Recommended completion of Advance Directive      Length of Voluntary ACP Conversation in minutes:  <16 minutes (Non-Billable)      Alla Mireles MD

## 2020-07-13 NOTE — PROGRESS NOTES
Monisha Sanchez  Identified pt with two pt identifiers(name and ). Chief Complaint   Patient presents with   Zada Sprain Annual Wellness Visit   pain in knees rated at a 4-5    1. Have you been to the ER, urgent care clinic since your last visit? Hospitalized since your last visit? NO    2. Have you seen or consulted any other health care providers outside of the [address] [place] [address] since your last visit? Include any pap smears or colon screening. Dr Buck Holcomb provider has been notified of reason for visit, vitals and flowsheets obtained on patients. Reviewed record In preparation for visit, huddled with provider and have obtained necessary documentation      Health Maintenance Due   Topic    Shingrix Vaccine Age 50> (1 of 2)    Medicare Yearly Exam        Wt Readings from Last 3 Encounters:   20 255 lb (115.7 kg)   20 255 lb (115.7 kg)   10/14/19 249 lb (112.9 kg)     Temp Readings from Last 3 Encounters:   20 98.1 °F (36.7 °C)   10/14/19 98 °F (36.7 °C) (Oral)   19 98.3 °F (36.8 °C) (Oral)     BP Readings from Last 3 Encounters:   20 136/78   20 181/83   10/14/19 126/82     Pulse Readings from Last 3 Encounters:   20 85   10/14/19 71   19 84     There were no vitals filed for this visit.       Learning Assessment:  :     Learning Assessment 10/26/2017 2015   PRIMARY LEARNER Patient Patient   HIGHEST LEVEL OF EDUCATION - PRIMARY LEARNER  - 2 YEARS OF COLLEGE   BARRIERS PRIMARY LEARNER - NONE   CO-LEARNER CAREGIVER - No   PRIMARY LANGUAGE ENGLISH ENGLISH   LEARNER PREFERENCE PRIMARY READING DEMONSTRATION   ANSWERED BY patient pateint   RELATIONSHIP SELF SELF       Depression Screening:  :     3 most recent PHQ Screens 2019   PHQ Not Done -   Little interest or pleasure in doing things Several days   Feeling down, depressed, irritable, or hopeless Several days   Total Score PHQ 2 2       Fall Risk Assessment:  :     Fall Risk Assessment, last 12 mths 8/29/2018   Able to walk? Yes   Fall in past 12 months? No   Fall with injury? -   Number of falls in past 12 months -   Fall Risk Score -       Abuse Screening:  :     Abuse Screening Questionnaire 5/29/2018 7/26/2016 6/30/2015   Do you ever feel afraid of your partner? N N N   Are you in a relationship with someone who physically or mentally threatens you? N N N   Is it safe for you to go home? Y Y Y       ADL Screening:  :     ADL Assessment 5/29/2018   Feeding yourself No Help Needed   Getting from bed to chair No Help Needed   Getting dressed No Help Needed   Bathing or showering No Help Needed   Walk across the room (includes cane/walker) No Help Needed   Using the telphone No Help Needed   Taking your medications No Help Needed   Preparing meals No Help Needed   Managing money (expenses/bills) No Help Needed   Moderately strenuous housework (laundry) No Help Needed   Shopping for personal items (toiletries/medicines) No Help Needed   Shopping for groceries No Help Needed   Driving No Help Needed   Climbing a flight of stairs No Help Needed   Getting to places beyond walking distances No Help Needed                 Medication reconciliation up to date and corrected with patient at this time.

## 2020-07-13 NOTE — Clinical Note
Call pt to schedule FU appt. Mail lab orders/[facility] locations and [facility] information.

## 2020-07-13 NOTE — PROGRESS NOTES
This is the Subsequent Medicare Annual Wellness Exam, performed 12 months or more after the Initial AWV or the last Subsequent AWV    I have reviewed the patient's medical history in detail and updated the computerized patient record. History   Segun Slaughter is a 78 y.o. female. Presents for Medicare AWV and follow up appointment. Last seen 9 months ago. She has HTN, CAD s/p 2 MI's in 2006 and 2011, valvular heart disease, CHF (EF 35% in 9/16), ICD in place with hx lead revisions, depression with anxiety, obesity s/p gastric bypass in 2006.      She complains of bilateral knee pain (right > left). Having laser treatments to knees at a laser center, helping pain.  Cortisone injections stopped working. Was told by Dr. Salgado Nurse that she needs knee replacements. Also complains of fatigue all the time despite sleeping well. Cardiovascular Review  The patient has hypertension, hyperlipidemia and coronary artery disease.  She reports taking medications as instructed, no medication side effects noted.  Diet and Lifestyle: generally follows a low fat low cholesterol diet, generally follows a low sodium diet, no formal exercise but active during the day.  Lab review: new labs ordered. Echo 11/30/18: LVEF 35%. Severe hypokinesis of inferior walls and basal-mid inferolateral wall. Mod LA dilation.  Mild to mod MR.      Other Providers: Jeyson Barone NP/Dr. Bessy Marshall (Cardiology), Dr. Samantha Bethea (Cardiology-EP), Dr. Kaia Ho (Orthopedics)     Soc Hx  Duyenril Orchard 2 living children; lives with , son, son's girlfriend, and 2 grandchildren. 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  Former smoker; quit in 2002.  Denies alcohol or recreational drug use.  [facility] [address] [place]  Flu vaccine: 1/16/19  Pneumonia vaccine: PCV-13 6/14/16, PPSV-23 10/9/17  Tetanus vaccine:  Tdap 10/30/04  Zoster vaccine: 2/26/16, says she had Shingrix at [facility]  Colonoscopy: 6/29/18, Dr. Russell Lundberg, redundant colon, internal hemorrhoids  Pap smear: 7/26/16  Mammogram: 9/11/18, next due 9/2020  Bone density: 2/1/16 (osteoporosis: L femoral neck T -2.5, tot L hip T -2.4, LS T-2.2)  Eye exam: 4/9/19, Dr. Cami Garcia  Lipids: 10/14/19 (tot chol 139, LDL 43)  A1c: 10/14/19 (5.7%)  Advanced Directives: has booklet and form at home   End of Life: has booklet and form at home     ROS  Positive for chronic SOB. A complete review of systems was performed and is negative except for those mentioned in the HPI. Patient Active Problem List   Diagnosis Code    HTN (hypertension) I10    History of gastric bypass Z98.84    Moderate episode of recurrent major depressive disorder ([address] F33.1    CAD (coronary artery disease) L07.17    Systolic CHF, chronic (HCC) I50.22    Hyperlipidemia E78.5    Mitral valve regurgitation I34.0    History of MI (myocardial infarction) I25.2    Cardiomyopathy ([address] I42.9    Osteoporosis M81.0    Morbid obesity with BMI of 40.0-44.9, adult ([address] E66.01, Z68.41    Chronic insomnia F51.04    S/P ICD (internal cardiac defibrillator) procedure Z95.810    Primary osteoarthritis of both knees M17.0    Mild intermittent asthma without complication C05.51    Generalized anxiety disorder F41.1     Past Medical History:   Diagnosis Date    Acute right ankle pain 6/8/2018 5/29/18: X-ray showed possible right ankle sprain. 6/6/18: saw [facility]. Ugo Petty ([facility]), diagnosed with peroneal tendonitis.  Prescribed an ASO    Asthma     Cardiomyopathy ([address]     Coronary artery disease 2008    s/p RCA stent ([facility]) on 11/26/11    Depression with anxiety     2011    DVT (deep venous thrombosis) ([address] 7/27/2010    Family history of early CAD     GERD (gastroesophageal reflux disease)     H/O gastric bypass 2006    Revision in 2009    Hepatitis C antibody test positive     Does not have chronic hep C (labs 10/6/15: neg HCV RNA)     HTN (hypertension) 7/27/2010    Hyperlipidemia 07/27/2010    Incontinence of feces 9/3/2018    Dr. Galileo Pritchard. 8/20/18: Improving with pelvic physical therapy and psyllium husk treatment. Saw Dr. Misael Briggs who suggested PT first. MR defecography showed pelvic floor weakness with pelvic descensus, small anterior  Rectocele, and vaginal prolapse. To have pelvic PT weekly for 8 wks and to see Dr. Misael Briggs again in Oct 2018.  Joint pain 7/27/2010    MI (myocardial infarction) ([facility] 7/27/2010    Mitral valve regurgitation     Mild to moderate    Morbid obesity ([address] 7/27/2010    Myocardial infarction [facility]) 2006 and 2011    Dr. Jackson Public disorder     PUD (peptic ulcer disease)     gi bleed 2008; ulcer n gastric bypass pouch    Urinary incontinence, stress 7/27/2010      Past Surgical History:   Procedure Laterality Date    CARDIAC SURG PROCEDURE UNLIST      Stent x 5-6,Most recent 2011    COLONOSCOPY N/A 6/29/2018    COLONOSCOPY performed by Hector Brower MD at [facility]COPY; redundant colon, int hemorrhoids, pathology: normal colonic mucosa    HX COLONOSCOPY  9/5/14    Dr. Marisol Esquivel; normal, repeat in 5 yrs    HX GASTRIC BYPASS      HX IMPLANTABLE CARDIOVERTER DEFIBRILLATOR  08/24/2016    HX LAP GASTRIC BYPASS  2006    revision 2009/Dr. Valerie Salinas    HX OTHER SURGICAL  09/19/2016    Removal of right ventricular ICD lead & single chamber transvenous AICD    HX OTHER SURGICAL  10/12/2016    pocket revison of ICD; Dr. Joseluis Luna  06/07/2018    Dr Galileo Pritchard; high resolution anorectal manaometry-abnormal study. Study done for eval of fecal incontinence    HX OTHER SURGICAL  12/14/2018    Dr. Galileo Pritchard. Rectal endoscopic US (EUS) for rectal incontinence. Normal rectal mucosa, no fistulas.     HX PACEMAKER      sicd/left side of chest under arm    INS PPM/ICD LED SING ONLY  8/24/2016         INS PPM/ICD LED SING ONLY  8/26/2016         INS PPM/ICD LED SING ONLY  9/21/2016         [place] LAP,STOMACH,OTHER,W/O TUBE  04/05/2010    Revision GBP Current Outpatient Medications   Medication Sig Dispense Refill    bumetanide (BUMEX) 2 mg tablet TAKE 1 TABLET BY MOUTH TWICE A DAY 60 Tab 11    zolpidem (AMBIEN) 10 mg tablet TAKE 1 TABLET AT BEDTIME 30 Tab 2    Entresto 49-51 mg tab tablet TAKE 1 TABLET BY MOUTH TWICE A DAY 60 Tab 11    FLUoxetine (PROZAC) 20 mg tablet TAKE 2 TABLETS EVERY MORNING AND 1 TABLET AT BEDTIME FOR ANXIETY WITH DEPRESSION 90 Tab 11    VENTOLIN HFA 90 mcg/actuation inhaler TAKE 2 PUFFS BY INHALATION EVERY FOUR (4) HOURS AS NEEDED FOR WHEEZING OR SHORTNESS OF BREATH. 18 Inhaler 11    evolocumab (REPATHA SURECLICK) pen injection INJECT 1ML SUB-Q EVERY 14 DAYS 6 Pen 3    potassium chloride (KLOR-CON M20) 20 mEq tablet TAKE TWO TABLETS BY MOUTH DAILY 180 Tab 3    metoprolol succinate (TOPROL-XL) 25 mg XL tablet Take 1 Tab by mouth nightly. 90 Tab 3    metOLazone (ZAROXOLYN) 2.5 mg tablet TAKE 1 TABLET BY MOUTH DAILY AS NEEDED FOR UP TO 2 DAYS 6 Tab 1    psyllium (METAMUCIL) powd Take  by mouth. 2 tsp daily      cholecalciferol, VITAMIN D3, (VITAMIN D3) 5,000 unit tab tablet Take 10,000 Units by mouth daily.  LACTOBACILLUS ACIDOPHILUS (PROBIOTIC PO) Take 1 Tab by mouth daily.  aspirin delayed-release 81 mg tablet Take  by mouth daily.  biotin 10,000 mcg cap Take  by mouth daily.  OTHER Complete multi formula, bariatric advantage      magnesium 250 mg tab Take 1 Tab by mouth daily.  ferrous sulfate (IRON, FERROUS SULFATE,) 325 mg (65 mg Iron) tablet Take  by mouth daily (before breakfast).  CALCIUM PO Take 600 mg by mouth daily.        Allergies   Allergen Reactions    Amoxicillin Anaphylaxis    Amoxicillin Anaphylaxis    Lipitor [Atorvastatin] Other (comments)     Severe muscle pain and spasms    Zocor [Simvastatin] Other (comments)     Cramps, muscle spasms    Buspar [Buspirone] Other (comments)     Drunk sensation, headaches    Livalo [Pitavastatin] Myalgia    Pravastatin Other (comments) Leg cramps       Family History   Problem Relation Age of Onset    Dementia Mother     Cancer Mother         colon    Alzheimer Mother     Cancer Father         stomach    Heart Disease Father     Hypertension Father     Heart Disease Sister     Stroke Sister     Heart Attack Brother      Social History     Tobacco Use    Smoking status: Former Smoker     Packs/day: 1.00     Years: 30.00     Pack years: 30.00     Start date: 1970     Last attempt to quit: 2004     Years since quittin.1    Smokeless tobacco: Never Used   Substance Use Topics    Alcohol use: No     Alcohol/week: 0.0 standard drinks       Depression Risk Factor Screening:     3 most recent PHQ Screens 2020   PHQ Not Done -   Little interest or pleasure in doing things Several days   Feeling down, depressed, irritable, or hopeless Several days   Total Score PHQ 2 2       Alcohol Risk Factor Screening:   Do you average 1 drink per night or more than 7 drinks a week:  No    On any one occasion in the past three months have you have had more than 3 drinks containing alcohol:  No      Functional Ability and Level of Safety:   Hearing: Hearing is good. Activities of Daily Living: The home contains: handrails  Patient does total self care     Ambulation: with no difficulty     Fall Risk:  Fall Risk Assessment, last 12 mths 2020   Able to walk? Yes   Fall in past 12 months? Yes   Fall with injury?  Yes   Number of falls in past 12 months 1   Fall Risk Score 2     Abuse Screen:  Patient is not abused       Cognitive Screening   Has your family/caregiver stated any concerns about your memory: no    Cognitive Screening: normal by exam    Patient Care Team   Patient Care Team:  Renny Queen MD as PCP - General (Internal Medicine)  Renny Queen MD as PCP - [facility] Empaneled Provider  Fredy Andre MD (Cardiology)  Kimberly Anna MD (Cardiology)  Kurt Sotomayor NP (Nurse Practitioner)  Vanessa Jarrett Miguel Angel Segura MD (Orthopedic Surgery)    Assessment/Plan   Education and counseling provided:  Are appropriate based on today's review and evaluation  End-of-Life planning (with patient's consent)  Shingrix    Diagnoses and all orders for this visit:    1. Medicare annual wellness visit, subsequent    2. Essential hypertension  Controlled on metoprolol.  -     METABOLIC PANEL, COMPREHENSIVE  -     CBC WITH AUTOMATED DIFF    3. Mixed hyperlipidemia  -     TSH RFX ON ABNORMAL TO FREE T4  -     LIPID PANEL    4. Coronary artery disease involving native coronary artery of native heart without angina pectoris  Has history of 2 MI's and several stents. Stable on ASA and metoprolol. 5. Systolic CHF, chronic (HCC)  NYHA III. Her fatigue is most likely due to CHF. Continue Entresto 49/51 mg, Toprol XL, Bumex, and metolazone pen.  Has AICD in place. 6. Cardiomyopathy, unspecified type ([facility]    7. Moderate episode of recurrent major depressive disorder ([facility]  Controlled on Prozac for depression and anxiety. 8. Primary osteoarthritis of both knees  Continue laser therapy. 9. Chronic insomnia  Controlled on zolpidem. 10. Vitamin D deficiency  On cholecalciferol 10,000 units daily. -     VITAMIN D, 25 HYDROXY    11. Vitamin B12 deficiency  -     VITAMIN B12 & FOLATE    12. IGT (impaired glucose tolerance)  -     HEMOGLOBIN A1C WITH EAG    13. Morbid obesity with BMI of 40.0-44.9, adult ([facility]  Counseled on diet, exercise, and weight loss. 14. Advance care planning      Follow-up and Dispositions    · Return in about 4 months (around 11/13/2020), or if symptoms worsen or fail to improve, for HTN, hyperlipidemia; have labs done at [facility] in the next 1-2 weeks.          Health Maintenance Due   Topic Date Due    Shingrix Vaccine Age 49> (1 of 2) 05/17/2002       Pravin Qiu, who was evaluated through a synchronous (real-time) audio-video encounter, and/or her healthcare decision maker, is aware that it is a billable service, with coverage as determined by her insurance carrier. She provided verbal consent to proceed: Yes, and patient identification was verified. It was conducted pursuant to the emergency declaration under the [address] [place] [address] and the [employer] and [employer] General Act. A caregiver was present when appropriate. Ability to conduct physical exam was limited. I was at home. The patient was at home.     Memo Reddy MD

## 2020-07-17 ENCOUNTER — OFFICE VISIT (OUTPATIENT)
Dept: CARDIOLOGY CLINIC | Age: 70
End: 2020-07-17

## 2020-07-17 DIAGNOSIS — Z95.810 AUTOMATIC IMPLANTABLE CARDIAC DEFIBRILLATOR IN SITU: Primary | ICD-10-CM

## 2020-07-27 RX ORDER — ALIROCUMAB 150 MG/ML
INJECTION, SOLUTION SUBCUTANEOUS EVERY 2 WEEKS
COMMUNITY
End: 2020-07-27 | Stop reason: SDUPTHER

## 2020-07-27 RX ORDER — ALIROCUMAB 150 MG/ML
150 INJECTION, SOLUTION SUBCUTANEOUS EVERY 2 WEEKS
Qty: 6 PEN | Refills: 1 | Status: SHIPPED | OUTPATIENT
Start: 2020-07-27 | End: 2021-03-09 | Stop reason: SDUPTHER

## 2020-07-27 NOTE — TELEPHONE ENCOUNTER
Requested Prescriptions     Signed Prescriptions Disp Refills    alirocumab (Praluent Pen) 150 mg/mL injector pen 6 Pen 1     Si mL by SubCUTAneous route Once every 2 weeks. Authorizing Provider: Víctor Roth     Ordering User: Leno Stark     Per verbal orders    Preferred product for patient's health plan is Praluent.   Repatha denied

## 2020-08-03 DIAGNOSIS — F51.04 CHRONIC INSOMNIA: ICD-10-CM

## 2020-08-03 RX ORDER — ZOLPIDEM TARTRATE 10 MG/1
TABLET ORAL
Qty: 30 TAB | Refills: 2 | Status: SHIPPED | OUTPATIENT
Start: 2020-08-03 | End: 2020-11-04

## 2020-08-11 LAB
25(OH)D3+25(OH)D2 SERPL-MCNC: 24.6 NG/ML (ref 30–100)
ALBUMIN SERPL-MCNC: 3.8 G/DL (ref 3.8–4.8)
ALBUMIN/GLOB SERPL: 2.2 {RATIO} (ref 1.2–2.2)
ALP SERPL-CCNC: 67 IU/L (ref 39–117)
ALT SERPL-CCNC: 9 IU/L (ref 0–32)
AST SERPL-CCNC: 14 IU/L (ref 0–40)
BASOPHILS # BLD AUTO: 0.1 X10E3/UL (ref 0–0.2)
BASOPHILS NFR BLD AUTO: 1 %
BILIRUB SERPL-MCNC: 0.5 MG/DL (ref 0–1.2)
BUN SERPL-MCNC: 14 MG/DL (ref 8–27)
BUN/CREAT SERPL: 22 (ref 12–28)
CALCIUM SERPL-MCNC: 8.5 MG/DL (ref 8.7–10.3)
CHLORIDE SERPL-SCNC: 107 MMOL/L (ref 96–106)
CHOLEST SERPL-MCNC: 131 MG/DL (ref 100–199)
CO2 SERPL-SCNC: 23 MMOL/L (ref 20–29)
CREAT SERPL-MCNC: 0.64 MG/DL (ref 0.57–1)
EOSINOPHIL # BLD AUTO: 0.1 X10E3/UL (ref 0–0.4)
EOSINOPHIL NFR BLD AUTO: 2 %
ERYTHROCYTE [DISTWIDTH] IN BLOOD BY AUTOMATED COUNT: 14 % (ref 11.7–15.4)
EST. AVERAGE GLUCOSE BLD GHB EST-MCNC: 117 MG/DL
FOLATE SERPL-MCNC: 5.8 NG/ML
GLOBULIN SER CALC-MCNC: 1.7 G/DL (ref 1.5–4.5)
GLUCOSE SERPL-MCNC: 91 MG/DL (ref 65–99)
HBA1C MFR BLD: 5.7 % (ref 4.8–5.6)
HCT VFR BLD AUTO: 36.9 % (ref 34–46.6)
HDLC SERPL-MCNC: 66 MG/DL
HGB BLD-MCNC: 12.3 G/DL (ref 11.1–15.9)
IMM GRANULOCYTES # BLD AUTO: 0 X10E3/UL (ref 0–0.1)
IMM GRANULOCYTES NFR BLD AUTO: 0 %
LDLC SERPL CALC-MCNC: 49 MG/DL (ref 0–99)
LYMPHOCYTES # BLD AUTO: 1.7 X10E3/UL (ref 0.7–3.1)
LYMPHOCYTES NFR BLD AUTO: 28 %
MCH RBC QN AUTO: 27.7 PG (ref 26.6–33)
MCHC RBC AUTO-ENTMCNC: 33.3 G/DL (ref 31.5–35.7)
MCV RBC AUTO: 83 FL (ref 79–97)
MONOCYTES # BLD AUTO: 0.5 X10E3/UL (ref 0.1–0.9)
MONOCYTES NFR BLD AUTO: 9 %
NEUTROPHILS # BLD AUTO: 3.7 X10E3/UL (ref 1.4–7)
NEUTROPHILS NFR BLD AUTO: 60 %
PLATELET # BLD AUTO: 236 X10E3/UL (ref 150–450)
POTASSIUM SERPL-SCNC: 4.1 MMOL/L (ref 3.5–5.2)
PROT SERPL-MCNC: 5.5 G/DL (ref 6–8.5)
RBC # BLD AUTO: 4.44 X10E6/UL (ref 3.77–5.28)
SODIUM SERPL-SCNC: 141 MMOL/L (ref 134–144)
TRIGL SERPL-MCNC: 78 MG/DL (ref 0–149)
TSH SERPL DL<=0.005 MIU/L-ACNC: 3.23 UIU/ML (ref 0.45–4.5)
VIT B12 SERPL-MCNC: 273 PG/ML (ref 232–1245)
VLDLC SERPL CALC-MCNC: 16 MG/DL (ref 5–40)
WBC # BLD AUTO: 6.1 X10E3/UL (ref 3.4–10.8)

## 2020-08-14 NOTE — PROGRESS NOTES
Message sent to patient by My Chart:  Your labs showed normal kidney and liver tests, blood counts, thyroid, cholesterol, and vitamin B12. You have mild prediabetes that is unchanged from a year ago. Your calcium and vitamin D levels were a little low, and both are important for the bones. Continue taking calcium and vitamin D tablets.     Dr. Dionne Lemus

## 2020-08-26 ENCOUNTER — TELEPHONE (OUTPATIENT)
Dept: CARDIOLOGY CLINIC | Age: 70
End: 2020-08-26

## 2020-08-26 NOTE — TELEPHONE ENCOUNTER
8/26/2020 at 1:10 appointments from 2/10/2020 rescheduled with patient     Future Appointments   Date Time Provider Denis Natali   9/17/2020  1:00 PM VASCULAR, JOSE ALBERTO DICKSON BS AMB   9/17/2020  2:00 PM Keren Donovan, NP CAVREY BS AMB   10/28/2020  3:45 PM REMOTE1, JOSE ALBERTO DICKSON BS AMB   11/13/2020 11:30 AM MD GAGAN Leggett BS AMB   1/13/2021 10:00 AM REMOTE1, JOSE ALBERTO DICKSON BS AMB   4/6/2021 10:00 AM PACEMAKER3, JOSE ALBERTO DICKSON BS AMB   4/6/2021 10:20 AM MD RANDOLPH Casey BS AMB

## 2020-08-26 NOTE — TELEPHONE ENCOUNTER
Pt requesting to r/s her 2/10 appt with Dr. Hakeem Fuentes and echo. Pt states she can see Miguelangel Chung also.  Please advise      [facility]:[phone]

## 2020-09-01 RX ORDER — METOPROLOL SUCCINATE 25 MG/1
TABLET, EXTENDED RELEASE ORAL
Qty: 30 TAB | Refills: 11 | Status: SHIPPED | OUTPATIENT
Start: 2020-09-01 | End: 2021-12-13

## 2020-09-29 ENCOUNTER — ANCILLARY PROCEDURE (OUTPATIENT)
Dept: CARDIOLOGY CLINIC | Age: 70
End: 2020-09-29
Payer: MEDICARE

## 2020-09-29 ENCOUNTER — OFFICE VISIT (OUTPATIENT)
Dept: CARDIOLOGY CLINIC | Age: 70
End: 2020-09-29
Payer: MEDICARE

## 2020-09-29 VITALS
WEIGHT: 251 LBS | OXYGEN SATURATION: 97 % | SYSTOLIC BLOOD PRESSURE: 148 MMHG | BODY MASS INDEX: 42.85 KG/M2 | HEART RATE: 93 BPM | HEIGHT: 64 IN | DIASTOLIC BLOOD PRESSURE: 88 MMHG

## 2020-09-29 VITALS — WEIGHT: 251 LBS | HEIGHT: 64 IN | BODY MASS INDEX: 42.85 KG/M2

## 2020-09-29 DIAGNOSIS — I34.0 NON-RHEUMATIC MITRAL REGURGITATION: ICD-10-CM

## 2020-09-29 DIAGNOSIS — I25.10 CORONARY ARTERY DISEASE INVOLVING NATIVE CORONARY ARTERY OF NATIVE HEART WITHOUT ANGINA PECTORIS: ICD-10-CM

## 2020-09-29 DIAGNOSIS — I10 ESSENTIAL HYPERTENSION: ICD-10-CM

## 2020-09-29 DIAGNOSIS — E78.5 DYSLIPIDEMIA: ICD-10-CM

## 2020-09-29 DIAGNOSIS — I50.22 CHRONIC SYSTOLIC HEART FAILURE (HCC): ICD-10-CM

## 2020-09-29 DIAGNOSIS — I50.22 SYSTOLIC CHF, CHRONIC (HCC): Primary | ICD-10-CM

## 2020-09-29 DIAGNOSIS — I65.23 BILATERAL CAROTID ARTERY STENOSIS: ICD-10-CM

## 2020-09-29 LAB
ECHO AO ROOT DIAM: 2.92 CM
ECHO AV AREA PEAK VELOCITY: 1.91 CM2
ECHO AV AREA VTI: 1.76 CM2
ECHO AV AREA/BSA PEAK VELOCITY: 0.9 CM2/M2
ECHO AV AREA/BSA VTI: 0.8 CM2/M2
ECHO AV MEAN GRADIENT: 8.51 MMHG
ECHO AV PEAK GRADIENT: 15.01 MMHG
ECHO AV PEAK VELOCITY: 193.7 CM/S
ECHO AV VTI: 36.72 CM
ECHO LA AREA 4C: 26.71 CM2
ECHO LA MAJOR AXIS: 5.36 CM
ECHO LA MINOR AXIS: 2.49 CM
ECHO LA VOL 2C: 106.82 ML (ref 22–52)
ECHO LA VOL 4C: 96.57 ML (ref 22–52)
ECHO LA VOL BP: 112.46 ML (ref 22–52)
ECHO LA VOL/BSA BIPLANE: 52.2 ML/M2 (ref 16–28)
ECHO LA VOLUME INDEX A2C: 49.59 ML/M2 (ref 16–28)
ECHO LA VOLUME INDEX A4C: 44.83 ML/M2 (ref 16–28)
ECHO LV E' LATERAL VELOCITY: 5.26 CM/S
ECHO LV E' SEPTAL VELOCITY: 3.33 CM/S
ECHO LV EDV A2C: 202.91 ML
ECHO LV EDV A4C: 198.46 ML
ECHO LV EDV BP: 206.35 ML (ref 56–104)
ECHO LV EDV INDEX A4C: 92.1 ML/M2
ECHO LV EDV INDEX BP: 95.8 ML/M2
ECHO LV EDV NDEX A2C: 94.2 ML/M2
ECHO LV EJECTION FRACTION 3D: 24.8 PERCENT
ECHO LV EJECTION FRACTION A2C: 24 PERCENT
ECHO LV EJECTION FRACTION A4C: 21 PERCENT
ECHO LV EJECTION FRACTION BIPLANE: 22.7 PERCENT (ref 55–100)
ECHO LV ESV A2C: 154.69 ML
ECHO LV ESV A4C: 156.14 ML
ECHO LV ESV BP: 159.56 ML (ref 19–49)
ECHO LV ESV INDEX A2C: 71.8 ML/M2
ECHO LV ESV INDEX A4C: 72.5 ML/M2
ECHO LV ESV INDEX BP: 74.1 ML/M2
ECHO LV GLOBAL LONGITUDINAL STRAIN (GLS): 7.4 PERCENT
ECHO LV INTERNAL DIMENSION DIASTOLIC: 6.85 CM (ref 3.9–5.3)
ECHO LV INTERNAL DIMENSION SYSTOLIC: 5.68 CM
ECHO LV IVSD: 1.32 CM (ref 0.6–0.9)
ECHO LV MASS 2D: 374.7 G (ref 67–162)
ECHO LV MASS INDEX 2D: 173.9 G/M2 (ref 43–95)
ECHO LV POSTERIOR WALL DIASTOLIC: 1 CM (ref 0.6–0.9)
ECHO LVOT DIAM: 2.31 CM
ECHO LVOT PEAK GRADIENT: 2.85 MMHG
ECHO LVOT PEAK VELOCITY: 84.37 CM/S
ECHO LVOT SV: 64.7 ML
ECHO LVOT VTI: 15.43 CM
ECHO MV A VELOCITY: 98.78 CM/S
ECHO MV AREA PHT: 4.96 CM2
ECHO MV E DECELERATION TIME (DT): 0.15 S
ECHO MV E VELOCITY: 59.38 CM/S
ECHO MV E/A RATIO: 0.6
ECHO MV E/E' LATERAL: 11.29
ECHO MV E/E' RATIO (AVERAGED): 14.56
ECHO MV E/E' SEPTAL: 17.83
ECHO MV PRESSURE HALF TIME (PHT): 0.04 S
ECHO RV INTERNAL DIMENSION: 3.32 CM
ECHO TV REGURGITANT MAX VELOCITY: 182.23 CM/S
ECHO TV REGURGITANT PEAK GRADIENT: 13.28 MMHG
GLOBAL LONGITUDINAL STRAIN 2 CHAMBER: 6.4 PERCENT
GLOBAL LONGITUDINAL STRAIN 4 CHAMBER: 8.5 PERCENT
GLOBAL LONGITUDINAL STRAIN LONG AXIS: 7.2 PERCENT
LA VOL DISK BP: 104.02 ML (ref 22–52)

## 2020-09-29 PROCEDURE — 93306 TTE W/DOPPLER COMPLETE: CPT | Performed by: INTERNAL MEDICINE

## 2020-09-29 PROCEDURE — G9717 DOC PT DX DEP/BP F/U NT REQ: HCPCS | Performed by: NURSE PRACTITIONER

## 2020-09-29 PROCEDURE — 99214 OFFICE O/P EST MOD 30 MIN: CPT | Performed by: NURSE PRACTITIONER

## 2020-09-29 PROCEDURE — G0463 HOSPITAL OUTPT CLINIC VISIT: HCPCS | Performed by: NURSE PRACTITIONER

## 2020-09-29 PROCEDURE — G8754 DIAS BP LESS 90: HCPCS | Performed by: NURSE PRACTITIONER

## 2020-09-29 PROCEDURE — 1100F PTFALLS ASSESS-DOCD GE2>/YR: CPT | Performed by: NURSE PRACTITIONER

## 2020-09-29 PROCEDURE — G8536 NO DOC ELDER MAL SCRN: HCPCS | Performed by: NURSE PRACTITIONER

## 2020-09-29 PROCEDURE — 3017F COLORECTAL CA SCREEN DOC REV: CPT | Performed by: NURSE PRACTITIONER

## 2020-09-29 PROCEDURE — G8427 DOCREV CUR MEDS BY ELIG CLIN: HCPCS | Performed by: NURSE PRACTITIONER

## 2020-09-29 PROCEDURE — G8753 SYS BP > OR = 140: HCPCS | Performed by: NURSE PRACTITIONER

## 2020-09-29 PROCEDURE — 1090F PRES/ABSN URINE INCON ASSESS: CPT | Performed by: NURSE PRACTITIONER

## 2020-09-29 PROCEDURE — 3288F FALL RISK ASSESSMENT DOCD: CPT | Performed by: NURSE PRACTITIONER

## 2020-09-29 PROCEDURE — 93005 ELECTROCARDIOGRAM TRACING: CPT | Performed by: NURSE PRACTITIONER

## 2020-09-29 PROCEDURE — 93010 ELECTROCARDIOGRAM REPORT: CPT | Performed by: NURSE PRACTITIONER

## 2020-09-29 PROCEDURE — G8417 CALC BMI ABV UP PARAM F/U: HCPCS | Performed by: NURSE PRACTITIONER

## 2020-09-29 RX ORDER — SACUBITRIL AND VALSARTAN 97; 103 MG/1; MG/1
1 TABLET, FILM COATED ORAL 2 TIMES DAILY
Qty: 60 TAB | Refills: 5 | Status: SHIPPED | OUTPATIENT
Start: 2020-09-29 | End: 2021-09-19

## 2020-09-29 RX ORDER — METOLAZONE 2.5 MG/1
2.5 TABLET ORAL
Qty: 30 TAB | Refills: 1 | Status: SHIPPED | OUTPATIENT
Start: 2020-09-29 | End: 2021-01-14

## 2020-09-29 NOTE — PATIENT INSTRUCTIONS
Please increase Entresto to 97/103 one tab twice daily Please check your blood pressure daily (at least one hour after your morning blood pressure medications.)  Keep a written record of your blood pressure readings to report to me at our virtual visit in 1 month.

## 2020-09-29 NOTE — PROGRESS NOTES
[facility]  [phone]    HPI: Sapna Isaacs is a 78 y.o. who presents for follow up regarding her CAD and CHF. Last visit 6/19  She reports that the last year has been bad, really bad knees, a lot going on in her personal life  Admits to having some generalized anxiety and wants to see psychiatry for medication adjustments - asking for recommendations, will discuss with Dr. Jagdish Forbes  She does not think her BP machine at home is working accurately, she is getting readings all over the board  She has dyspnea with exertion, no PND, sleeping on 2 pillows   Wakes up at night with leg cramps  No palpitations on Toprol XL  Bystolic caused alopecia and more dyspnea with exertion  Doing fine on repatha  She denies any chest pain   LE edema doing well, takes metolazone PRN (not that often)    No diziness or syncope    Assessment/Plan:  1. Chronic systolic heart failure - LVEF 25-30% by TTE today, s/p AICD placement with Dr. Zach Santiago and subsequent lead revisions and repeat procedures due to non-healing midsternal incision  -last revision for AICD in November 2016 with Dr. Zach Santiago, last ICD check without arrhythmias   -advised her to increase her Entresto to 97/103 one tablet BID and continue Toprol XL, had hair loss on coreg and bystolic   -off spironolactone due to hypotension, continue bumex 2mg BID, using Metolazone PRN, not very often, once every other week  -she will follow up with me with a virtual visit in 1 month and will follow up with Dr. Jagdish Forbes in January 2021  2. CAD - hx of MI x 2 and multiple stents, she believes she may have 6 or 7 stents, last cardiac cath without progression of CAD and stress test in 4/17 showed possible anterior ischemia vs artifact but asymptomatic currently so will just continue to watch, continue ASA and beta blocker, see lipid plan below  -reports having an MI in 11/2011 and received PCI to RCA at that time, previous MI in 2006 or 2007  3.  Mitral regurgitation - mod by TTE today  4. Asthma - x 15 - 16 years, has not seen pulmonologist in years  11. HTN - elevated, increasing entresto dose as above, continue other medications   6. Primary Hyperlipidemia - statin failures, simvastatin caused muscle spasms and cramps, atorvastatin caused pain shooting all over her body, pravastatin 40mg and 20mg caused myalgias and leg cramps, had myalgias on Livalo, could not tolerate zetia either   -not able to take any statin at any dose  -now on repatha, LDL 49 in 8/20  7. DM Type 2 - resolved with gastric bypass  8. Hypokalemia -off spironolactone and on KCL 40meq daily   9. Hypomagnesemia - on OTC magnesium       10. Morbid obesity - Body mass index is 43.08 kg/m². ). weighed 416 lbs at her heaviest weight, s/p gastric bypass in 2007 with revision a few years later, weight down to 239 lbs at one point, currently weighs 251 lbs, encouraged regular exercise  11. PUD - had EGD and cauterized bleeding ulcer, not on PPI anymore  12. Vitamin D deficiency - on 10,000 units daily    13. Anxiety - wants to see psychiatry, will discuss recommendations with Dr. Angelica Davila  14. PAD/Carotid stenosis - 10-49% bilateral ICA stenosis, continue ASA and repatha, will repeat carotid duplex at her next office visit in January 2021    Echo 9/20 - LVEF 25-30%, mod MR  Echo 10/18 - LVEF 35%, mild to mod MR   Echo 5/18 - LVEF 25 %-30 %, akinesis of the basal-mid inferoseptal and basal-mid inferolateral wall(s), severe hypokinesis of the entire inferior wall(s), grade 1 dd, mild to mod dilated LA, mild to mod MR  Carotid duplex 5/18 - 10-49% bilateral ICA stenosis  Echo: 4/17, EF 35-40%, inf HK gr1dd  Nuclear: 4/17, EF 36%, anterior ischemia, lateral infarct.   TTE 9/16 - LVEF 35%, moderate hypokinesis of the basal-mid inferolateral wall(s), grd 1 dd, dilated LA, mod MR, mild TR  8/26/16 - RV lead revision by Dr. Shelbi Euceda  8/24/16 - single chamber AICD placed by Dr. Shelbi Euceda [facility] Vince Velasquez VR, serial # [id number], model # [id number].  The ventricular lead: model # G9506159 , serial # [id number])  MUGA  - LVEF 27%  Holter  - no arrhythmias  Echo  - LV mildly dilated, LVEF 40%, moderate diffuse hypokinesis with regional variations, severe hypokinesis of the basal-mid inferior wall(s), grd 1 dd, mod dilated LA, atrial septum bows from left to right, consistent with increased left atrial pressure, mildly dilated RA, mild to near moderate MR    OLIVER  - normal  Nuc 5/15 EF 31% large anterolateral infarct with periinfarct reversibility, 13% LV reversible(32% infarct at rest, 45% at stress)    Echo 2015 - LVEF 30-35%, grd 1 dd, mod LAE, mild to mod MR  Nuc Stress  - small reversibility in anteroapical wall, LVEF 31%  Cardiac Cath 3/13 - patent stents in LAD, LCx and RCA, LVEF 30%, severe inferior HK, LCx relatively small vessel with patent stent in proximal LCx, RCA is large and dominant with patent stents in proximal and mid RCA, distal RCA free of disease, LAD normal and large vessel which coursed around apex  Echo 2011 - LVEF 30%, mild MR  Cardiac Cath 2011 - s/p PCI with AMRIT to 100% mid RCA, also had 40% mid LAD lesion, 50% diagonal 1 lesion, 100% distal LCx lesion, 60% OM1 lesion, 100% proximal Ramus lesion      Soc Hx: quit tobacco use x 13 years ago, used to smoke 1ppd, no etoh use, no drug use, lives with , son, daughter in law, grandson, retired/on disability  Fam Hx: father in his 62s when he had a MI, had 3 vessel CABG in his 62s, passed from fall but had cancer too, mother lived to age 80 and  from Alzheimer's, brother had MI in his 62s, sister had aortic repair for aneurysm in her 76s    She  has a past medical history of Acute right ankle pain (2018), Asthma, Cardiomyopathy ([facility] [address] Coronary artery disease (), Depression with anxiety, DVT (deep venous thrombosis) ([facility] [address] (2010), Family history of early CAD, GERD (gastroesophageal reflux disease), H/O gastric bypass (2006), Hepatitis C antibody test positive, HTN (hypertension) (7/27/2010), Hyperlipidemia (07/27/2010), Incontinence of feces (9/3/2018), Joint pain (7/27/2010), MI (myocardial infarction) ([facility] [address] (7/27/2010), Mitral valve regurgitation, Morbid obesity ([facility] [address] (7/27/2010), Myocardial infarction ([facility] [address] (2006 and 2011), Psychiatric disorder, PUD (peptic ulcer disease), and Urinary incontinence, stress (7/27/2010). Cardiovascular ROS: positive for dyspnea on exertion   Respiratory ROS: no cough, wheezing  Neurological ROS: no TIA or stroke symptoms  All other systems negative except as above. PE  Vitals:    09/29/20 1436   BP: (!) 148/88   Pulse: 93   SpO2: 97%   Weight: 251 lb (113.9 kg)   Height: 5' 4\" (1.626 m)    Body mass index is 43.08 kg/m².   General appearance - alert, well appearing, and in no distress  Mental status - affect appropriate to mood  Eyes - sclera anicteric, moist mucous membranes  Neck - supple  Lymphatics - not assessed  Chest - clear to auscultation, no wheezes, rales or rhonchi  Heart - normal rate, regular rhythm, normal S1, S2, 2/6 MYKE   Abdomen - soft, nontender, nondistended, obese  Back exam - full range of motion, no tenderness  Neurological - cranial nerves II through XII grossly intact, no focal deficit  Musculoskeletal - no muscular tenderness noted, normal strength  Extremities - diminished peripheral pulses, no LE edema  Skin - normal coloration  no rashes    12 lead ECG: NSR with non-specific ST-T wave changes    Recent Labs:  Lab Results   Component Value Date/Time    Cholesterol, total 131 08/10/2020 11:28 AM    HDL Cholesterol 66 08/10/2020 11:28 AM    LDL, calculated 49 08/10/2020 11:28 AM    Triglyceride 78 08/10/2020 11:28 AM     Lab Results   Component Value Date/Time    Creatinine 0.64 08/10/2020 11:28 AM     Lab Results   Component Value Date/Time    BUN 14 08/10/2020 11:28 AM    BUN (POC) 24 (H) 11/26/2011 09:50 PM     Lab Results   Component Value Date/Time Potassium 4.1 08/10/2020 11:28 AM     Lab Results   Component Value Date/Time    Hemoglobin A1c 5.7 (H) 08/10/2020 11:28 AM    Hemoglobin A1c, External 5.5 12/12/2014     Lab Results   Component Value Date/Time    HGB 12.3 08/10/2020 11:28 AM     Lab Results   Component Value Date/Time    PLATELET 994 02/24/9779 11:28 AM       Reviewed:  Past Medical History:   Diagnosis Date    Acute right ankle pain 6/8/2018 5/29/18: X-ray showed possible right ankle sprain. 6/6/18: saw Dr. Mohit Ray ([facility]), diagnosed with peroneal tendonitis. Prescribed an ASO    Asthma     Cardiomyopathy ([facility] [address]     Coronary artery disease 2008    s/p RCA stent (AMRIT) on 11/26/11    Depression with anxiety     2011    DVT (deep venous thrombosis) ([facility] [address] 7/27/2010    Family history of early CAD     GERD (gastroesophageal reflux disease)     H/O gastric bypass 2006    Revision in 2009    Hepatitis C antibody test positive     Does not have chronic hep C (labs 10/6/15: neg HCV RNA)     HTN (hypertension) 7/27/2010    Hyperlipidemia 07/27/2010    Incontinence of feces 9/3/2018    Dr. Manan Hampton. 8/20/18: Improving with pelvic physical therapy and psyllium husk treatment. Saw Dr. Maycol Nair who suggested PT first. MR defecography showed pelvic floor weakness with pelvic descensus, small anterior  Rectocele, and vaginal prolapse. To have pelvic PT weekly for 8 wks and to see Dr. Maycol Nair again in Oct 2018.     Joint pain 7/27/2010    MI (myocardial infarction) ([facility] [address] 7/27/2010    Mitral valve regurgitation     Mild to moderate    Morbid obesity ([address] 7/27/2010    Myocardial infarction [facility]) 2006 and 2011    Dr. Luis Romero    Psychiatric disorder     PUD (peptic ulcer disease)     gi bleed 2008; ulcer n gastric bypass pouch    Urinary incontinence, stress 7/27/2010     Social History     Tobacco Use   Smoking Status Former Smoker    Packs/day: 1.00    Years: 30.00    Pack years: 30.00    Start date: 1/1/1970    Last attempt to quit: 2004    Years since quittin.3   Smokeless Tobacco Never Used     Social History     Substance and Sexual Activity   Alcohol Use No    Alcohol/week: 0.0 standard drinks     Allergies   Allergen Reactions    Amoxicillin Anaphylaxis    Amoxicillin Anaphylaxis    Lipitor [Atorvastatin] Other (comments)     Severe muscle pain and spasms    Zocor [Simvastatin] Other (comments)     Cramps, muscle spasms    Buspar [Buspirone] Other (comments)     Drunk sensation, headaches    Livalo [Pitavastatin] Myalgia    Pravastatin Other (comments)     Leg cramps       Current Outpatient Medications   Medication Sig    sacubitriL-valsartan (Entresto)  mg tablet Take 1 Tab by mouth two (2) times a day.  metOLazone (ZAROXOLYN) 2.5 mg tablet Take 1 Tab by mouth daily as needed (swelling) for up to 2 days.  metoprolol succinate (TOPROL-XL) 25 mg XL tablet TAKE 1 TABLET BY MOUTH EVERY DAY AT NIGHT    zolpidem (AMBIEN) 10 mg tablet TAKE 1 TABLET BY MOUTH EVERYDAY AT BEDTIME    alirocumab (Praluent Pen) 150 mg/mL injector pen 1 mL by SubCUTAneous route Once every 2 weeks.  bumetanide (BUMEX) 2 mg tablet TAKE 1 TABLET BY MOUTH TWICE A DAY    FLUoxetine (PROZAC) 20 mg tablet TAKE 2 TABLETS EVERY MORNING AND 1 TABLET AT BEDTIME FOR ANXIETY WITH DEPRESSION    VENTOLIN HFA 90 mcg/actuation inhaler TAKE 2 PUFFS BY INHALATION EVERY FOUR (4) HOURS AS NEEDED FOR WHEEZING OR SHORTNESS OF BREATH.  potassium chloride (KLOR-CON M20) 20 mEq tablet TAKE TWO TABLETS BY MOUTH DAILY    psyllium (METAMUCIL) powd Take  by mouth. 2 tsp daily    cholecalciferol, VITAMIN D3, (VITAMIN D3) 5,000 unit tab tablet Take 10,000 Units by mouth daily.  LACTOBACILLUS ACIDOPHILUS (PROBIOTIC PO) Take 1 Tab by mouth daily.  biotin 10,000 mcg cap Take  by mouth daily.  OTHER Complete multi formula, bariatric advantage    magnesium 250 mg tab Take 1 Tab by mouth daily.     ferrous sulfate (IRON, FERROUS SULFATE,) 325 mg (65 mg Iron) tablet Take  by mouth daily (before breakfast).  CALCIUM PO Take 600 mg by mouth daily.  aspirin delayed-release 81 mg tablet Take  by mouth daily. No current facility-administered medications for this visit.         David Cruz NP  [facility] [address] [place] [address] [address], [address] [place] [address]  [facility]  [phone]

## 2020-10-28 ENCOUNTER — OFFICE VISIT (OUTPATIENT)
Dept: CARDIOLOGY CLINIC | Age: 70
End: 2020-10-28
Payer: MEDICARE

## 2020-10-28 DIAGNOSIS — Z95.810 AUTOMATIC IMPLANTABLE CARDIAC DEFIBRILLATOR IN SITU: Primary | ICD-10-CM

## 2020-10-28 PROCEDURE — 93282 PRGRMG EVAL IMPLANTABLE DFB: CPT | Performed by: INTERNAL MEDICINE

## 2020-11-04 DIAGNOSIS — F51.04 CHRONIC INSOMNIA: ICD-10-CM

## 2020-11-04 RX ORDER — ZOLPIDEM TARTRATE 10 MG/1
TABLET ORAL
Qty: 30 TAB | Refills: 2 | Status: SHIPPED | OUTPATIENT
Start: 2020-11-04 | End: 2021-02-03

## 2020-11-13 ENCOUNTER — VIRTUAL VISIT (OUTPATIENT)
Dept: CARDIOLOGY CLINIC | Age: 70
End: 2020-11-13
Payer: MEDICARE

## 2020-11-13 DIAGNOSIS — I25.10 CORONARY ARTERY DISEASE INVOLVING NATIVE CORONARY ARTERY OF NATIVE HEART WITHOUT ANGINA PECTORIS: ICD-10-CM

## 2020-11-13 DIAGNOSIS — F41.9 ANXIETY: ICD-10-CM

## 2020-11-13 DIAGNOSIS — I10 ESSENTIAL HYPERTENSION: ICD-10-CM

## 2020-11-13 DIAGNOSIS — I34.0 NON-RHEUMATIC MITRAL REGURGITATION: ICD-10-CM

## 2020-11-13 DIAGNOSIS — E66.01 MORBID OBESITY WITH BMI OF 40.0-44.9, ADULT (HCC): ICD-10-CM

## 2020-11-13 DIAGNOSIS — I50.22 CHRONIC SYSTOLIC HEART FAILURE (HCC): Primary | ICD-10-CM

## 2020-11-13 PROCEDURE — G8756 NO BP MEASURE DOC: HCPCS | Performed by: NURSE PRACTITIONER

## 2020-11-13 PROCEDURE — G8428 CUR MEDS NOT DOCUMENT: HCPCS | Performed by: NURSE PRACTITIONER

## 2020-11-13 PROCEDURE — 1090F PRES/ABSN URINE INCON ASSESS: CPT | Performed by: NURSE PRACTITIONER

## 2020-11-13 PROCEDURE — 3017F COLORECTAL CA SCREEN DOC REV: CPT | Performed by: NURSE PRACTITIONER

## 2020-11-13 PROCEDURE — 3288F FALL RISK ASSESSMENT DOCD: CPT | Performed by: NURSE PRACTITIONER

## 2020-11-13 PROCEDURE — G9717 DOC PT DX DEP/BP F/U NT REQ: HCPCS | Performed by: NURSE PRACTITIONER

## 2020-11-13 PROCEDURE — G0463 HOSPITAL OUTPT CLINIC VISIT: HCPCS | Performed by: NURSE PRACTITIONER

## 2020-11-13 PROCEDURE — 1100F PTFALLS ASSESS-DOCD GE2>/YR: CPT | Performed by: NURSE PRACTITIONER

## 2020-11-13 PROCEDURE — 99213 OFFICE O/P EST LOW 20 MIN: CPT | Performed by: NURSE PRACTITIONER

## 2020-11-13 NOTE — PROGRESS NOTES
VIRTUAL VISIT DOCUMENTATION     Pursuant to the emergency declaration under the [facility] waiver authority and the [employer] Act, this Virtual  Visit was conducted, with patient's consent, to reduce the patient's risk of exposure to COVID-19 and provide continuity of care for an established patient. Services were provided through a video synchronous discussion virtually to substitute for in-person clinic visit. CHIEF COMPLAINT      Darci Edwards is a 78 y.o. female who was seen by synchronous (real-time) audio-video technology on 11/13/2020. Patient is being seen today for CHF. History of Present Illness:    She still has a lot going on in her personal life, close friend passed away, nephew is missing, etc.  Plans to contact psychiatry to address her regimen but has just had too much going on recently to do that, plans to do it next week  She could not tolerate the higher dose of entresto, got very dizzy and had lower BP readings, back on entresto 49/51 BID now and BP is doing well  She has dyspnea with exertion, no PND, sleeping on 2 pillows  No chest pain or palpitations  Wakes up at night with leg cramps  Bystolic caused alopecia and more dyspnea with exertion  Doing fine on repatha  LE edema doing well, takes metolazone PRN (not that often)    No diziness or syncope    Assessment/Plan:  1.  Chronic systolic heart failure - LVEF 25-30% by last TTE, s/p AICD placement with Dr. Rene Aleman and subsequent lead revisions and repeat procedures due to non-healing midsternal incision  -last revision for AICD in November 2016 with Dr. Rene Aleman, last ICD check without arrhythmias   -continue Entresto to 49/51 one tablet BID and Toprol XL, had hair loss on coreg and bystolic   -off spironolactone due to hypotension, continue bumex 2mg BID, using Metolazone PRN, not very often, once every other week  -she will follow up with Dr. Hai Mejia in January 2021  2. CAD - hx of MI x 2 and multiple stents, she believes she may have 6 or 7 stents, last cardiac cath without progression of CAD and stress test in 4/17 showed possible anterior ischemia vs artifact but asymptomatic currently so will just continue to watch, continue ASA and beta blocker, see lipid plan below  -reports having an MI in 11/2011 and received PCI to RCA at that time, previous MI in 2006 or 2007  3. Mitral regurgitation - mod by TTE   4. Asthma - x 15 - 16 years, has not seen pulmonologist in years  11. HTN - well controlled on current medications   6. Primary Hyperlipidemia - statin failures, simvastatin caused muscle spasms and cramps, atorvastatin caused pain shooting all over her body, pravastatin 40mg and 20mg caused myalgias and leg cramps, had myalgias on Livalo, could not tolerate zetia either   -not able to take any statin at any dose  -now on repatha, LDL 49 in 8/20  7. DM Type 2 - resolved with gastric bypass  8. Hypokalemia -off spironolactone and on KCL 40meq daily   9. Hypomagnesemia - on OTC magnesium       10. Morbid obesity - There is no height or weight on file to calculate BMI. ). weighed 416 lbs at her heaviest weight, s/p gastric bypass in 2007 with revision a few years later, weight down to 239 lbs at one point, weighed 251 lbs at her last visit, encouraged regular exercise  11. PUD - had EGD and cauterized bleeding ulcer, not on PPI anymore  12. Vitamin D deficiency - on 10,000 units daily    13. Anxiety - plans to see psychiatry for management   14.  PAD/Carotid stenosis - 10-49% bilateral ICA stenosis, continue ASA and repatha, will repeat carotid duplex at her next office visit in January 2021    Echo 9/20 - LVEF 25-30%, mod MR  Echo 10/18 - LVEF 35%, mild to mod MR   Echo 5/18 - LVEF 25 %-30 %, akinesis of the basal-mid inferoseptal and basal-mid inferolateral wall(s), severe hypokinesis of the entire inferior wall(s), grade 1 dd, mild to mod dilated LA, mild to mod MR  Carotid duplex 5/18 - 10-49% bilateral ICA stenosis  Echo: 4/17, EF 35-40%, inf HK gr1dd  Nuclear: 4/17, EF 36%, anterior ischemia, lateral infarct. TTE 9/16 - LVEF 35%, moderate hypokinesis of the basal-mid inferolateral wall(s), grd 1 dd, dilated LA, mod MR, mild TR  8/26/16 - RV lead revision by Dr. Tammy Woodward  8/24/16 - single chamber AICD placed by Dr. Tammy Woodward ([address] VR, serial # [id number], model # [id number].  The ventricular lead: model # [id number] , serial # [id number])  MUGA 6/16 - LVEF 27%  Holter 6/16 - no arrhythmias  Echo 5/16 - LV mildly dilated, LVEF 40%, moderate diffuse hypokinesis with regional variations, severe hypokinesis of the basal-mid inferior wall(s), grd 1 dd, mod dilated LA, atrial septum bows from left to right, consistent with increased left atrial pressure, mildly dilated RA, mild to near moderate MR    OLIVER 5/16 - normal  Nuc 5/15 EF 31% large anterolateral infarct with periinfarct reversibility, 13% LV reversible(32% infarct at rest, 45% at stress)    Echo 7/2015 - LVEF 30-35%, grd 1 dd, mod LAE, mild to mod MR  Nuc Stress 7/13 - small reversibility in anteroapical wall, LVEF 31%  Cardiac Cath 3/13 - patent stents in LAD, LCx and RCA, LVEF 30%, severe inferior HK, LCx relatively small vessel with patent stent in proximal LCx, RCA is large and dominant with patent stents in proximal and mid RCA, distal RCA free of disease, LAD normal and large vessel which coursed around apex  Echo 11/2011 - LVEF 30%, mild MR  Cardiac Cath 11/2011 - s/p PCI with AMRIT to 100% mid RCA, also had 40% mid LAD lesion, 50% diagonal 1 lesion, 100% distal LCx lesion, 60% OM1 lesion, 100% proximal Ramus lesion      Soc Hx: quit tobacco use x 13 years ago, used to smoke 1ppd, no etoh use, no drug use, lives with , son, daughter in law, grandson, retired/on disability  Fam Hx: father in his 62s when he had a MI, had 3 vessel CABG in his 62s, passed from fall but had cancer too, mother lived to age 80 and  from Alzheimer's, brother had MI in his 62s, sister had aortic repair for aneurysm in her 76s    We discussed the expected course, resolution and complications of the diagnosis(es) in detail. Medication risks, benefits, costs, interactions, and alternatives were discussed as indicated. I advised her to contact the office if her condition worsens, changes or fails to improve as anticipated. She expressed understanding with the diagnosis(es) and plan    Patient was made aware and verbalized understanding that an appointment will be scheduled for them for a virtual visit and/or office visit within the above time frame. Patient understanding his/her responsibility to call and change time/date if he/she so chooses. PAST MEDICAL HISTORY     Past Medical History:   Diagnosis Date    Acute right ankle pain 2018: X-ray showed possible right ankle sprain. 18: saw Dr[facility] Reva Muro ([facility]), diagnosed with peroneal tendonitis. Prescribed an ASO    Asthma     [facility]     Coronary artery disease     s/p RCA stent (AMRIT) on 11    Depression with anxiety         DVT (deep venous thrombosis) ([facility] 2010    Family history of early CAD     GERD (gastroesophageal reflux disease)     H/O gastric bypass     Revision in     Hepatitis C antibody test positive     Does not have chronic hep C (labs 10/6/15: neg HCV RNA)     HTN (hypertension) 2010    Hyperlipidemia 2010    Incontinence of feces 9/3/2018    Dr. Nieves Allen. 18: Improving with pelvic physical therapy and psyllium husk treatment. Saw Dr. Opal Haines who suggested PT first. MR defecography showed pelvic floor weakness with pelvic descensus, small anterior  Rectocele, and vaginal prolapse. To have pelvic PT weekly for 8 wks and to see Dr. Opal Haines again in Oct 2018.     Joint pain 2010    MI (myocardial infarction) ([facility] 2010    Mitral valve regurgitation Mild to moderate    Morbid obesity ([facility] 7/27/2010    Myocardial infarction [facility]) 2006 and 2011    Dr. Naranjo Sellgagandeep disorder     PUD (peptic ulcer disease)     gi bleed 2008; ulcer n gastric bypass pouch    Urinary incontinence, stress 7/27/2010           PAST SURGICAL HISTORY     Past Surgical History:   Procedure Laterality Date    CARDIAC SURG PROCEDURE UNLIST      Stent x 5-6,Most recent 2011    COLONOSCOPY N/A 6/29/2018    COLONOSCOPY [facility]ink [facility]; redundant colon, int hemorrhoids, pathology: normal colonic mucosa    HX COLONOSCOPY  9/5/14    Dr. Joan Coyne; normal, repeat in 5 yrs    HX GASTRIC BYPASS      HX IMPLANTABLE CARDIOVERTER DEFIBRILLATOR  08/24/2016    HX LAP GASTRIC BYPASS  2006    revision 2009/Dr. Lacy Queen    HX OTHER SURGICAL  09/19/2016    Removal of right ventricular ICD lead & single chamber transvenous AICD    HX OTHER SURGICAL  10/12/2016    pocket revison of ICD; Dr. Lolis Mari  06/07/2018    Dr Elizabeth Darden; high resolution anorectal manaometry-abnormal study. Study done for eval of fecal incontinence    HX OTHER SURGICAL  12/14/2018    Dr. Elizabeth Darden. Rectal endoscopic US (EUS) for rectal incontinence. Normal rectal mucosa, no fistulas.     HX PACEMAKER      sicd/left side of chest under arm    INS PPM/ICD LED SING ONLY  8/24/2016         INS PPM/ICD LED SING ONLY  8/26/2016         INS PPM/ICD LED SING ONLY  9/21/2016         [place] LAP,STOMACH,OTHER,W/O TUBE  04/05/2010    Revision GBP          ALLERGIES     Allergies   Allergen Reactions    Amoxicillin Anaphylaxis    Amoxicillin Anaphylaxis    Lipitor [Atorvastatin] Other (comments)     Severe muscle pain and spasms    Zocor [Simvastatin] Other (comments)     Cramps, muscle spasms    Buspar [Buspirone] Other (comments)     Drunk sensation, headaches    Livalo [Pitavastatin] Myalgia    Pravastatin Other (comments)     Leg cramps          FAMILY HISTORY Family History   Problem Relation Age of Onset    Dementia Mother     Cancer Mother         colon    Alzheimer Mother     Cancer Father         stomach    Heart Disease Father     Hypertension Father     Heart Disease Sister     Stroke Sister     Heart Attack Brother            SOCIAL HISTORY     Social History     Socioeconomic History    Marital status:      Spouse name: Not on file    Number of children: Not on file    Years of education: Not on file    Highest education level: Not on file   Tobacco Use    Smoking status: Former Smoker     Packs/day: 1.00     Years: 30.00     Pack years: 30.00     Start date: 1970     Last attempt to quit: 2004     Years since quittin.5    Smokeless tobacco: Never Used   Substance and Sexual Activity    Alcohol use: No     Alcohol/week: 0.0 standard drinks    Drug use: No     Types: Prescription    Sexual activity: Never     Partners: Male   Social History Narrative    ** Merged History Encounter **              MEDICATIONS     Current Outpatient Medications   Medication Sig    zolpidem (AMBIEN) 10 mg tablet TAKE 1 TABLET BY MOUTH EVERYDAY AT BEDTIME    sacubitriL-valsartan (Entresto)  mg tablet Take 1 Tab by mouth two (2) times a day.  metoprolol succinate (TOPROL-XL) 25 mg XL tablet TAKE 1 TABLET BY MOUTH EVERY DAY AT NIGHT    alirocumab (Praluent Pen) 150 mg/mL injector pen 1 mL by SubCUTAneous route Once every 2 weeks.  bumetanide (BUMEX) 2 mg tablet TAKE 1 TABLET BY MOUTH TWICE A DAY    FLUoxetine (PROZAC) 20 mg tablet TAKE 2 TABLETS EVERY MORNING AND 1 TABLET AT BEDTIME FOR ANXIETY WITH DEPRESSION    VENTOLIN HFA 90 mcg/actuation inhaler TAKE 2 PUFFS BY INHALATION EVERY FOUR (4) HOURS AS NEEDED FOR WHEEZING OR SHORTNESS OF BREATH.  potassium chloride (KLOR-CON M20) 20 mEq tablet TAKE TWO TABLETS BY MOUTH DAILY    psyllium (METAMUCIL) powd Take  by mouth.  2 tsp daily    cholecalciferol, VITAMIN D3, (VITAMIN D3) 5,000 unit tab tablet Take 10,000 Units by mouth daily.  LACTOBACILLUS ACIDOPHILUS (PROBIOTIC PO) Take 1 Tab by mouth daily.  aspirin delayed-release 81 mg tablet Take  by mouth daily.  biotin 10,000 mcg cap Take  by mouth daily.  OTHER Complete multi formula, bariatric advantage    magnesium 250 mg tab Take 1 Tab by mouth daily.  ferrous sulfate (IRON, FERROUS SULFATE,) 325 mg (65 mg Iron) tablet Take  by mouth daily (before breakfast).  CALCIUM PO Take 600 mg by mouth daily. No current facility-administered medications for this visit. I have reviewed the vitals, medical history, surgical history, allergies, family history, social history and medications. REVIEW OF SYMPTOMS     Constitutional: Negative for fever, chills and diaphoresis. Respiratory: Negative for cough, sputum production and wheezing. Cardiovascular: Negative for chest pain, palpitations, orthopnea, claudication and PND. Gastrointestinal: Negative for heartburn, nausea, vomiting, blood in stool   Musculoskeletal: Negative for back pain. Skin: Negative for rash. Neurological: Negative for focal weakness, loss of consciousness, weakness and headaches. Endo/Heme/Allergies: Negative for abnormal bleeding. Psychiatric/Behavioral: Negative for memory loss. Positive for anxiety     PHYSICAL EXAMINATION      Due to this being a TeleHealth evaluation, many elements of the physical examination are unable to be assessed. General: Well developed, in no acute distress, cooperative and alert  HEENT: Pupils equal/round. No marked JVD visible on video. Respiratory: No audible wheezing, no signs of respiratory distress, lips not cyanotic  Extremities:  No edema  Neuro: A&Ox3, speech clear, no facial droop, answering questions appropriately  Skin: Skin color is normal. No rashes or lesions.  Non diaphoretic on visible skin during exam     LABORATORY DATA      Lab Results   Component Value Date/Time    WBC 6.1 08/10/2020 11:28 AM    Hemoglobin (POC) 14.3 11/26/2011 09:50 PM    HGB 12.3 08/10/2020 11:28 AM    Hematocrit (POC) 42 11/26/2011 09:50 PM    HCT 36.9 08/10/2020 11:28 AM    PLATELET 142 10/82/0248 11:28 AM    MCV 83 08/10/2020 11:28 AM      Lab Results   Component Value Date/Time    Sodium 141 08/10/2020 11:28 AM    Potassium 4.1 08/10/2020 11:28 AM    Chloride 107 (H) 08/10/2020 11:28 AM    CO2 23 08/10/2020 11:28 AM    Anion gap 9 02/23/2017 07:26 AM    Glucose 91 08/10/2020 11:28 AM    BUN 14 08/10/2020 11:28 AM    Creatinine 0.64 08/10/2020 11:28 AM    BUN/Creatinine ratio 22 08/10/2020 11:28 AM    GFR est  08/10/2020 11:28 AM    GFR est non-AA 92 08/10/2020 11:28 AM    Calcium 8.5 (L) 08/10/2020 11:28 AM    Bilirubin, total 0.5 08/10/2020 11:28 AM    Alk. phosphatase 67 08/10/2020 11:28 AM    Protein, total 5.5 (L) 08/10/2020 11:28 AM    Albumin 3.8 08/10/2020 11:28 AM    Globulin 3.4 02/23/2017 07:26 AM    A-G Ratio 2.2 08/10/2020 11:28 AM    ALT (SGPT) 9 08/10/2020 11:28 AM        Greater than 20 minutes was spent in direct video patient care, planning and chart review. This visit was conducted using [employer] telemedicine services.      Lorne Vincent NP    [address]  [facility], [address] [facility]  Cain Bhardwaj  [phone] / [phone] Fax

## 2020-11-19 ENCOUNTER — VIRTUAL VISIT (OUTPATIENT)
Dept: INTERNAL MEDICINE CLINIC | Age: 70
End: 2020-11-19
Payer: MEDICARE

## 2020-11-19 DIAGNOSIS — I10 ESSENTIAL HYPERTENSION: Primary | ICD-10-CM

## 2020-11-19 DIAGNOSIS — F41.1 GENERALIZED ANXIETY DISORDER: ICD-10-CM

## 2020-11-19 DIAGNOSIS — F33.1 MODERATE EPISODE OF RECURRENT MAJOR DEPRESSIVE DISORDER (HCC): ICD-10-CM

## 2020-11-19 DIAGNOSIS — E78.2 MIXED HYPERLIPIDEMIA: ICD-10-CM

## 2020-11-19 DIAGNOSIS — I25.10 CORONARY ARTERY DISEASE INVOLVING NATIVE CORONARY ARTERY OF NATIVE HEART WITHOUT ANGINA PECTORIS: ICD-10-CM

## 2020-11-19 DIAGNOSIS — I50.22 SYSTOLIC CHF, CHRONIC (HCC): ICD-10-CM

## 2020-11-19 PROCEDURE — 3017F COLORECTAL CA SCREEN DOC REV: CPT | Performed by: INTERNAL MEDICINE

## 2020-11-19 PROCEDURE — G8427 DOCREV CUR MEDS BY ELIG CLIN: HCPCS | Performed by: INTERNAL MEDICINE

## 2020-11-19 PROCEDURE — G8756 NO BP MEASURE DOC: HCPCS | Performed by: INTERNAL MEDICINE

## 2020-11-19 PROCEDURE — 1100F PTFALLS ASSESS-DOCD GE2>/YR: CPT | Performed by: INTERNAL MEDICINE

## 2020-11-19 PROCEDURE — 1090F PRES/ABSN URINE INCON ASSESS: CPT | Performed by: INTERNAL MEDICINE

## 2020-11-19 PROCEDURE — 3288F FALL RISK ASSESSMENT DOCD: CPT | Performed by: INTERNAL MEDICINE

## 2020-11-19 PROCEDURE — G9717 DOC PT DX DEP/BP F/U NT REQ: HCPCS | Performed by: INTERNAL MEDICINE

## 2020-11-19 PROCEDURE — 99214 OFFICE O/P EST MOD 30 MIN: CPT | Performed by: INTERNAL MEDICINE

## 2020-11-19 RX ORDER — FLUOXETINE 20 MG/1
20 TABLET ORAL DAILY
COMMUNITY
End: 2020-11-19

## 2020-11-19 RX ORDER — FLUOXETINE 20 MG/1
20 TABLET ORAL DAILY
Qty: 360 TAB | Status: CANCELLED | OUTPATIENT
Start: 2020-11-19

## 2020-11-19 RX ORDER — ASPIRIN 81 MG/1
81 TABLET ORAL DAILY
Qty: 90 TAB | Refills: 0
Start: 2020-11-19 | End: 2021-02-16

## 2020-11-19 RX ORDER — FLUOXETINE HYDROCHLORIDE 40 MG/1
40 CAPSULE ORAL 2 TIMES DAILY
Qty: 180 CAP | Refills: 3 | Status: SHIPPED | OUTPATIENT
Start: 2020-11-19 | End: 2021-03-20 | Stop reason: SDUPTHER

## 2020-11-19 RX ORDER — POTASSIUM CHLORIDE 20 MEQ/1
TABLET, EXTENDED RELEASE ORAL
Qty: 180 TAB | Refills: 3 | Status: SHIPPED | OUTPATIENT
Start: 2020-11-19 | End: 2021-08-26 | Stop reason: SDUPTHER

## 2020-11-19 NOTE — PROGRESS NOTES
Tiarra De La Cruz is a 78 y.o. female who was seen by synchronous (real-time) audio-video technology on 11/19/2020 for Cholesterol Problem        Assessment & Plan:     Diagnoses and all orders for this visit:    1. Essential hypertension    2. Mixed hyperlipidemia  Controlled on Praluent. 8/10/20: tot chol 131, LDL 49.    3. Systolic CHF, chronic (HCC)  Controlled on Bumex, Entresto, and metoprolol. -     potassium chloride (Klor-Con M20) 20 mEq tablet; TAKE TWO TABLETS BY MOUTH DAILY    4. Coronary artery disease involving native coronary artery of native heart without angina pectoris  Asymptomatic. Continue ASA 81 mg daily, metoprolol, and statin. 5. Moderate episode of recurrent major depressive disorder (HCC)  Controlled. -     Refill FLUoxetine (PROzac) 40 mg capsule; Take 1 Cap by mouth two (2) times a day. 6. Generalized anxiety disorder  Controlled on higher dose of Prozac. -     Refill  FLUoxetine (PROzac) 40 mg capsule; Take 1 Cap by mouth two (2) times a day. Follow-up and Dispositions    · Return in about 4 months (around 3/19/2021), or if symptoms worsen or fail to improve, for HTN, anxiety, depression. 712  Subjective:     Presents for 4 month follow up. She has HTN, CAD s/p 2 MI's in 2006 and 2011, valvular heart disease, CHF (EF 35% in 9/16), ICD in place with hx lead revisions, depression with anxiety, obesity s/p gastric bypass in 2006. Complains of increased anxiety. Denies depressed mood. Increased her dose of Prozac on her own to 20 mg 2 tabs a day. Has been helping anxiety. Does not follow with a therapist but says Dr. Bria Garrison gave her the name of 2 therapists to call. Denies SI. Has OA at both knees. Had laser therapy to knees and then hyaluronic acid injections done at [facility]. Knees are feeling a little better. Denies HA's, CP, SOB, dizziness, heart palpitations, or leg swelling. Home BP: 110/58 today.   She reports taking medications as instructed, no medication side effects noted. Diet and Lifestyle: generally follows a low fat low cholesterol diet, generally follows a low sodium diet, no formal exercise but active during the day. ROS  A complete review of systems was performed and is negative except for those mentioned in the HPI. Prior to Admission medications    Medication Sig Start Date End Date Taking? Authorizing Provider   FLUoxetine (PROzac) 20 mg tablet Take 20 mg by mouth daily. Take 2 tabs by mouth in the am and 2 at night   Yes Provider, Historical   zolpidem (AMBIEN) 10 mg tablet TAKE 1 TABLET BY MOUTH EVERYDAY AT BEDTIME 11/4/20  Yes Kay Rodrigez MD   sacubitriL-valsartan (Entresto)  mg tablet Take 1 Tab by mouth two (2) times a day. 9/29/20  Yes Yefri Montoya NP   metoprolol succinate (TOPROL-XL) 25 mg XL tablet TAKE 1 TABLET BY MOUTH EVERY DAY AT NIGHT 9/1/20  Yes Karyn Rosa MD   alirocumab (Praluent Pen) 150 mg/mL injector pen 1 mL by SubCUTAneous route Once every 2 weeks. 7/27/20  Yes Karyn Rosa MD   bumetanide (BUMEX) 2 mg tablet TAKE 1 TABLET BY MOUTH TWICE A DAY 6/26/20  Yes Madison El MD   FLUoxetine (PROZAC) 20 mg tablet TAKE 2 TABLETS EVERY MORNING AND 1 TABLET AT BEDTIME FOR ANXIETY WITH DEPRESSION 2/12/20  Yes Kay Rodrigez MD   VENTOLIN HFA 90 mcg/actuation inhaler TAKE 2 PUFFS BY INHALATION EVERY FOUR (4) HOURS AS NEEDED FOR WHEEZING OR SHORTNESS OF BREATH. 12/27/19  Yes Kay Rodrigez MD   potassium chloride (KLOR-CON M20) 20 mEq tablet TAKE TWO TABLETS BY MOUTH DAILY 10/14/19  Yes Kay Rodrigez MD   psyllium (METAMUCIL) powd Take  by mouth. 2 tsp daily   Yes Provider, Historical   cholecalciferol, VITAMIN D3, (VITAMIN D3) 5,000 unit tab tablet Take 10,000 Units by mouth daily. Yes Provider, Historical   LACTOBACILLUS ACIDOPHILUS (PROBIOTIC PO) Take 1 Tab by mouth daily.    Yes Provider, Historical   biotin 10,000 mcg cap Take  by mouth daily. Yes Provider, Historical   OTHER Complete multi formula, bariatric advantage   Yes Provider, Historical   magnesium 250 mg tab Take 1 Tab by mouth daily. Yes Provider, Historical   ferrous sulfate (IRON, FERROUS SULFATE,) 325 mg (65 mg Iron) tablet Take  by mouth daily (before breakfast). Yes Provider, Historical   CALCIUM PO Take 600 mg by mouth daily. Yes Provider, Historical     Patient Active Problem List   Diagnosis Code    HTN (hypertension) I10    History of gastric bypass Z98.84    Moderate episode of recurrent major depressive disorder ([facility] F33.1    CAD (coronary artery disease) L94.64    Systolic CHF, chronic (HCC) I50.22    Hyperlipidemia E78.5    Mitral valve regurgitation I34.0    History of MI (myocardial infarction) I25.2    Cardiomyopathy ([facility] I42.9    Osteoporosis M81.0    Morbid obesity with BMI of 40.0-44.9, adult ([facility] E66.01, Z68.41    Chronic insomnia F51.04    S/P ICD (internal cardiac defibrillator) procedure Z95.810    Primary osteoarthritis of both knees M17.0    Mild intermittent asthma without complication V86.38    Generalized anxiety disorder F41.1       Objective:     Patient-Reported Vitals 11/19/2020   Patient-Reported Weight -   Patient-Reported Systolic  382   Patient-Reported Diastolic 58      General: alert, cooperative, no distress   Mental  status: normal mood, behavior, speech, dress, motor activity, and thought processes, able to follow commands   HENT: NCAT   Neck: no visualized mass   Resp: no respiratory distress   Neuro: no gross deficits   Skin: no discoloration or lesions of concern on visible areas   Psychiatric: normal affect, consistent with stated mood, no evidence of hallucinations     Additional exam findings: obese      We discussed the expected course, resolution and complications of the diagnosis(es) in detail. Medication risks, benefits, costs, interactions, and alternatives were discussed as indicated.   I advised her to contact the office if her condition worsens, changes or fails to improve as anticipated. She expressed understanding with the diagnosis(es) and plan. THIS VISIT WAS COMPLETED VIRTUALLY USING [employer]. [facility], who was evaluated through a patient-initiated, synchronous (real-time) audio-video encounter, and/or her healthcare decision maker, is aware that it is a billable service, with coverage as determined by her insurance carrier. She provided verbal consent to proceed: Yes, and patient identification was verified. It was conducted pursuant to the emergency declaration under the [facility], [address] [place] [address] authority and the [employer] Act. A caregiver was present when appropriate. Ability to conduct physical exam was limited. I was at home. The patient was at home.       Vernon Hollingsworth MD

## 2020-11-19 NOTE — PROGRESS NOTES
Damir Dutton  Identified pt with two pt identifiers(name and ). Chief Complaint   Patient presents with    Cholesterol Problem     No travel  Has not had flu or shingles     1. Have you been to the ER, urgent care clinic since your last visit? Hospitalized since your last visit? NO    2. Have you seen or consulted any other health care providers outside of the [address] [place] [address] since your last visit? Include any pap smears or colon screening. Was seen at [facility] and had injections in her both knees     Today's provider has been notified of reason for visit, vitals and flowsheets obtained on patients. Reviewed record In preparation for visit, huddled with provider and have obtained necessary documentation      Health Maintenance Due   Topic    Shingrix Vaccine Age 50> (1 of 2)    Flu Vaccine (1)    Breast Cancer Screen Mammogram        Wt Readings from Last 3 Encounters:   20 251 lb (113.9 kg)   20 251 lb (113.9 kg)   20 255 lb (115.7 kg)     Temp Readings from Last 3 Encounters:   20 98.1 °F (36.7 °C)   10/14/19 98 °F (36.7 °C) (Oral)   19 98.3 °F (36.8 °C) (Oral)     BP Readings from Last 3 Encounters:   20 (!) 148/88   20 136/78   20 181/83     Pulse Readings from Last 3 Encounters:   20 93   20 85   10/14/19 71     There were no vitals filed for this visit.       Learning Assessment:  :     Learning Assessment 10/26/2017 2015   PRIMARY LEARNER Patient Patient   HIGHEST LEVEL OF EDUCATION - PRIMARY LEARNER  - 2 YEARS OF COLLEGE   BARRIERS PRIMARY LEARNER - NONE   CO-LEARNER CAREGIVER - No   PRIMARY LANGUAGE ENGLISH ENGLISH   LEARNER PREFERENCE PRIMARY READING DEMONSTRATION   ANSWERED BY patient pateint   RELATIONSHIP SELF SELF       Depression Screening:  :     3 most recent [facility] Screens 2020   [facility] Not Done -   Little interest or pleasure in doing things Several days   Feeling down, depressed, irritable, or hopeless Several days   Total Score PHQ 2 2       Fall Risk Assessment:  :     Fall Risk Assessment, last 12 mths 7/13/2020   Able to walk? Yes   Fall in past 12 months? Yes   Fall with injury? Yes   Number of falls in past 12 months 1   Fall Risk Score 2       Abuse Screening:  :     Abuse Screening Questionnaire 7/13/2020 5/29/2018 7/26/2016 6/30/2015   Do you ever feel afraid of your partner? N N N N   Are you in a relationship with someone who physically or mentally threatens you? N N N N   Is it safe for you to go home? Y Y Y Y       ADL Screening:  :     ADL Assessment 5/29/2018   Feeding yourself No Help Needed   Getting from bed to chair No Help Needed   Getting dressed No Help Needed   Bathing or showering No Help Needed   Walk across the room (includes cane/walker) No Help Needed   Using the telphone No Help Needed   Taking your medications No Help Needed   Preparing meals No Help Needed   Managing money (expenses/bills) No Help Needed   Moderately strenuous housework (laundry) No Help Needed   Shopping for personal items (toiletries/medicines) No Help Needed   Shopping for groceries No Help Needed   Driving No Help Needed   Climbing a flight of stairs No Help Needed   Getting to places beyond walking distances No Help Needed                 Medication reconciliation up to date and corrected with patient at this time.

## 2021-01-14 ENCOUNTER — TELEPHONE (OUTPATIENT)
Dept: INTERNAL MEDICINE CLINIC | Age: 71
End: 2021-01-14

## 2021-01-14 RX ORDER — METOLAZONE 2.5 MG/1
2.5 TABLET ORAL
Qty: 30 TAB | Refills: 1 | Status: SHIPPED | OUTPATIENT
Start: 2021-01-14 | End: 2021-03-16

## 2021-01-14 NOTE — TELEPHONE ENCOUNTER
Asking for medication for panic attacks, constantly crying feels sad, she currently takes depression meds.  Please advise  [phone]

## 2021-01-14 NOTE — TELEPHONE ENCOUNTER
Verified patients name and date of birth. Patient has been having crying/angry spells. She states she has a lot going on right now. Her nephew is missing, she is having problems at home and she lost a best friend. She has started having panic attacks-feels shaky, feels like she \"going to have a heart attack\" and once it passes she feels ok. She  Has tried to make an appt with a counselor but one did not take her insurance and the other was cash pay only. Advised he to call her insurance for names of counselors. She states she is able to take care of herself and denies any intent to self harm. She stated xanax has helped in past, but not buspar which made her dizzy. Uses [facility].

## 2021-01-14 NOTE — PROGRESS NOTES
Anna Boudreaux is a 78 y.o. female who was seen by synchronous (real-time) audio-video technology on 1/15/2021 for Panic Attack and Depression        Assessment & Plan:   Diagnoses and all orders for this visit:    1. Panic attack  -     hydrOXYzine HCL (ATARAX) 25 mg tablet; Take 1 Tab by mouth three (3) times daily as needed for Anxiety for up to 10 days. START NEW MED prn panic attack  Establish care with psych- she plans to call names in network per her insurance    2. Generalized anxiety disorder  -     hydrOXYzine HCL (ATARAX) 25 mg tablet; Take 1 Tab by mouth three (3) times daily as needed for Anxiety for up to 10 days. 3. Moderate episode of recurrent major depressive disorder (HCC)  Consider alternative to prozac if depression not improved once panic attacks under control               Subjective:     Pt c/o panic attacks and depression. She has hx of depression managed with prozac 40mg. C/o lot going on in her life- nephew is missing, best friend passed away, son and grandson custody battles. Gets upset easily, feels like she can't breathe, can't control her emotions at all, cries all the time. Feels that way almost everyday. Tries to be by herself and take deep breaths, feeling will go away in a few minutes. Denies SI/HI. Reports anhedonia. States hx of panic attacks in the past few years ago. Tried to get in with psychiatrist but no new patients until August, but found some on her insurance she plans to call. Prior to Admission medications    Medication Sig Start Date End Date Taking? Authorizing Provider   metOLazone (ZAROXOLYN) 2.5 mg tablet TAKE 1 TAB BY MOUTH DAILY AS NEEDED (SWELLING) FOR UP TO 2 DAYS. 1/14/21 1/16/21  Jessenia Donovan NP   potassium chloride (Klor-Con M20) 20 mEq tablet TAKE TWO TABLETS BY MOUTH DAILY 11/19/20   Guerrero Britt MD   FLUoxetine (PROzac) 40 mg capsule Take 1 Cap by mouth two (2) times a day.  11/19/20   Guerrero Britt MD   aspirin delayed-release 81 mg tablet Take 1 Tab by mouth daily. 11/19/20   Saud Saravia MD   zolpidem (AMBIEN) 10 mg tablet TAKE 1 TABLET BY MOUTH EVERYDAY AT BEDTIME 11/4/20   Saud Saravia MD   sacubitriL-valsartan Nishajessica NavaTylor)  mg tablet Take 1 Tab by mouth two (2) times a day. 9/29/20   Harry Donovan NP   metOLazone (ZAROXOLYN) 2.5 mg tablet Take 1 Tab by mouth daily as needed (swelling) for up to 2 days. 9/29/20 1/14/21  Harry Donovan NP   metoprolol succinate (TOPROL-XL) 25 mg XL tablet TAKE 1 TABLET BY MOUTH EVERY DAY AT NIGHT 9/1/20   Kvng Rodriguez MD   alirocumab (Praluent Pen) 150 mg/mL injector pen 1 mL by SubCUTAneous route Once every 2 weeks. 7/27/20   Kvng Rodriguez MD   bumetanide (BUMEX) 2 mg tablet TAKE 1 TABLET BY MOUTH TWICE A DAY 6/26/20   Darian El MD   VENTOLIN HFA 90 mcg/actuation inhaler TAKE 2 PUFFS BY INHALATION EVERY FOUR (4) HOURS AS NEEDED FOR WHEEZING OR SHORTNESS OF BREATH. 12/27/19   Saud Saravia MD   psyllium (METAMUCIL) powd Take  by mouth. 2 tsp daily    Provider, Historical   cholecalciferol, VITAMIN D3, (VITAMIN D3) 5,000 unit tab tablet Take 10,000 Units by mouth daily. Provider, Historical   LACTOBACILLUS ACIDOPHILUS (PROBIOTIC PO) Take 1 Tab by mouth daily. Provider, Historical   biotin 10,000 mcg cap Take  by mouth daily. Provider, Historical   OTHER Complete multi formula, bariatric advantage    Provider, Historical   magnesium 250 mg tab Take 1 Tab by mouth daily. Provider, Historical   ferrous sulfate (IRON, FERROUS SULFATE,) 325 mg (65 mg Iron) tablet Take  by mouth daily (before breakfast). Provider, Historical   CALCIUM PO Take 600 mg by mouth daily.     Provider, Historical         ROS    Objective:     Patient-Reported Vitals 11/19/2020   Patient-Reported Weight -   Patient-Reported Systolic  741   Patient-Reported Diastolic 58        [INSTRUCTIONS:  \"[x]\" Indicates a positive item  \"[]\" Indicates a negative item  -- DELETE ALL ITEMS NOT EXAMINED]    Constitutional: [x] Appears well-developed and well-nourished [x] No apparent distress      [] Abnormal -     Mental status: [x] Alert and awake  [x] Oriented to person/place/time [x] Able to follow commands    [] Abnormal -     Eyes:   EOM    [x]  Normal    [] Abnormal -   Sclera  [x]  Normal    [] Abnormal -          Discharge [x]  None visible   [] Abnormal -     HENT: [x] Normocephalic, atraumatic  [] Abnormal -   [x] Mouth/Throat: Mucous membranes are moist    External Ears [x] Normal  [] Abnormal -    Neck: [x] No visualized mass [] Abnormal -     Pulmonary/Chest: [x] Respiratory effort normal   [x] No visualized signs of difficulty breathing or respiratory distress        [] Abnormal -      Musculoskeletal:   [] Normal gait with no signs of ataxia         [x] Normal range of motion of neck        [] Abnormal -     Neurological:        [x] No Facial Asymmetry (Cranial nerve 7 motor function) (limited exam due to video visit)          [x] No gaze palsy        [] Abnormal -          Skin:        [x] No significant exanthematous lesions or discoloration noted on facial skin         [] Abnormal -            Psychiatric:       [x] Normal Affect [] Abnormal -        [] No Hallucinations    Other pertinent observable physical exam findings:-        We discussed the expected course, resolution and complications of the diagnosis(es) in detail. Medication risks, benefits, costs, interactions, and alternatives were discussed as indicated. I advised her to contact the office if her condition worsens, changes or fails to improve as anticipated. She expressed understanding with the diagnosis(es) and plan. Ale Pisano, who was evaluated through a patient-initiated, synchronous (real-time) audio-video encounter, and/or her healthcare decision maker, is aware that it is a billable service, with coverage as determined by her insurance carrier.  She provided verbal consent to proceed: Yes, and patient identification was verified. It was conducted pursuant to the emergency declaration under the [address] [place] [address] and the [employer] and [employer] General Act. A caregiver was present when appropriate. Ability to conduct physical exam was limited. I was at home. The patient was at home.       Darrin Kendrick, JOVAN

## 2021-01-15 ENCOUNTER — VIRTUAL VISIT (OUTPATIENT)
Dept: INTERNAL MEDICINE CLINIC | Age: 71
End: 2021-01-15
Payer: MEDICARE

## 2021-01-15 DIAGNOSIS — F41.1 GENERALIZED ANXIETY DISORDER: ICD-10-CM

## 2021-01-15 DIAGNOSIS — F41.0 PANIC ATTACK: Primary | ICD-10-CM

## 2021-01-15 DIAGNOSIS — F33.1 MODERATE EPISODE OF RECURRENT MAJOR DEPRESSIVE DISORDER (HCC): ICD-10-CM

## 2021-01-15 PROCEDURE — 1100F PTFALLS ASSESS-DOCD GE2>/YR: CPT | Performed by: NURSE PRACTITIONER

## 2021-01-15 PROCEDURE — 1090F PRES/ABSN URINE INCON ASSESS: CPT | Performed by: NURSE PRACTITIONER

## 2021-01-15 PROCEDURE — 3288F FALL RISK ASSESSMENT DOCD: CPT | Performed by: NURSE PRACTITIONER

## 2021-01-15 PROCEDURE — G9717 DOC PT DX DEP/BP F/U NT REQ: HCPCS | Performed by: NURSE PRACTITIONER

## 2021-01-15 PROCEDURE — 99213 OFFICE O/P EST LOW 20 MIN: CPT | Performed by: NURSE PRACTITIONER

## 2021-01-15 PROCEDURE — 3017F COLORECTAL CA SCREEN DOC REV: CPT | Performed by: NURSE PRACTITIONER

## 2021-01-15 PROCEDURE — G8756 NO BP MEASURE DOC: HCPCS | Performed by: NURSE PRACTITIONER

## 2021-01-15 PROCEDURE — G8427 DOCREV CUR MEDS BY ELIG CLIN: HCPCS | Performed by: NURSE PRACTITIONER

## 2021-01-15 RX ORDER — HYDROXYZINE 25 MG/1
25 TABLET, FILM COATED ORAL
Qty: 30 TAB | Refills: 0 | Status: SHIPPED | OUTPATIENT
Start: 2021-01-15 | End: 2021-01-19 | Stop reason: ALTCHOICE

## 2021-01-15 NOTE — PROGRESS NOTES
Asim Nye  Identified pt with two pt identifiers(name and ). Chief Complaint   Patient presents with    Panic Attack    Depression       Reviewed record In preparation for visit and have obtained necessary documentation. 1. Have you been to the ER, urgent care clinic or hospitalized since your last visit? No     2. Have you seen or consulted any other health care providers outside of the [address] [place] [address] since your last visit? Include any pap smears or colon screening. No    Patient does not have an advance directive. Vitals reviewed with provider. Health Maintenance reviewed:     Health Maintenance Due   Topic    Shingrix Vaccine Age 49> (1 of 2)    Breast Cancer Screen Mammogram           Wt Readings from Last 3 Encounters:   20 251 lb (113.9 kg)   20 251 lb (113.9 kg)   20 255 lb (115.7 kg)        Temp Readings from Last 3 Encounters:   20 98.1 °F (36.7 °C)   10/14/19 98 °F (36.7 °C) (Oral)   19 98.3 °F (36.8 °C) (Oral)        BP Readings from Last 3 Encounters:   20 (!) 148/88   20 136/78   20 181/83        Pulse Readings from Last 3 Encounters:   20 93   20 85   10/14/19 71      There were no vitals filed for this visit.        Learning Assessment:   :       Learning Assessment 10/26/2017 2015   PRIMARY LEARNER Patient Patient   HIGHEST LEVEL OF EDUCATION - PRIMARY LEARNER  - 2 YEARS OF COLLEGE   BARRIERS PRIMARY LEARNER - NONE   CO-LEARNER CAREGIVER - No   PRIMARY LANGUAGE ENGLISH ENGLISH   LEARNER PREFERENCE PRIMARY READING DEMONSTRATION   ANSWERED BY patient pateint   RELATIONSHIP SELF SELF        Depression Screening:   :       3 most recent PHQ Screens 1/15/2021   PHQ Not Done Active Diagnosis of Depression or Bipolar Disorder   Little interest or pleasure in doing things -   Feeling down, depressed, irritable, or hopeless -   Total Score PHQ 2 -        Fall Risk Assessment:   :       Fall Risk Assessment, last 12 mths 7/13/2020   Able to walk? Yes   Fall in past 12 months? Yes   Number of falls in past 12 months 1   Fall with injury? 1        Abuse Screening:   :       Abuse Screening Questionnaire 7/13/2020 5/29/2018 7/26/2016 6/30/2015   Do you ever feel afraid of your partner? N N N N   Are you in a relationship with someone who physically or mentally threatens you? N N N N   Is it safe for you to go home?  Y Y Y Y        ADL Screening:   :       ADL Assessment 5/29/2018   Feeding yourself No Help Needed   Getting from bed to chair No Help Needed   Getting dressed No Help Needed   Bathing or showering No Help Needed   Walk across the room (includes cane/walker) No Help Needed   Using the telphone No Help Needed   Taking your medications No Help Needed   Preparing meals No Help Needed   Managing money (expenses/bills) No Help Needed   Moderately strenuous housework (laundry) No Help Needed   Shopping for personal items (toiletries/medicines) No Help Needed   Shopping for groceries No Help Needed   Driving No Help Needed   Climbing a flight of stairs No Help Needed   Getting to places beyond walking distances No Help Needed

## 2021-01-19 ENCOUNTER — TELEPHONE (OUTPATIENT)
Dept: INTERNAL MEDICINE CLINIC | Age: 71
End: 2021-01-19

## 2021-01-19 DIAGNOSIS — F41.0 PANIC ATTACK: Primary | ICD-10-CM

## 2021-01-19 RX ORDER — CLONAZEPAM 0.5 MG/1
0.5 TABLET ORAL
Qty: 10 TAB | Refills: 0 | Status: SHIPPED | OUTPATIENT
Start: 2021-01-19 | End: 2021-02-16

## 2021-01-19 NOTE — TELEPHONE ENCOUNTER
Called pt, HIPAA verified by two patient identifiers, to get more information on message left. Pt states that she took the Atarax 25mg for 2 days but described her sx as \"out of it\", light headed and co of blurred vision. Pt states that she stopped taking the rx on 1/17/21 and that her sx has subsided although she is still having the panic attacks. Pt would like to know if there is something else that she can take. Advised that I will get msg to [facility] and give her a call back once she responds. Pt verified understanding.

## 2021-01-19 NOTE — TELEPHONE ENCOUNTER
Called pt to inform that she is to discontinue taking the Atarax 25mg and that a new rx for Clonazepam .5 mg has been sent to her pharmacy, specified that this rx is not something to be taken every day, only when she has a severe anxiety attack. Pt verified understanding.

## 2021-01-19 NOTE — TELEPHONE ENCOUNTER
Pls have her DC medication. I have sent new Rx to pharmacy- to be used only for acute panic attacks- not intended for daily use but for severe anxiety attack when she can not calm herself down.

## 2021-01-19 NOTE — TELEPHONE ENCOUNTER
Pt called and stated that the medication that was called in from her appt on Friday she can not take. She tried for the first 2 days and she was feel light headed and made her feel like she was out of it.  Pt would like to know if something else could be called in

## 2021-02-02 ENCOUNTER — TELEPHONE (OUTPATIENT)
Dept: CARDIOLOGY CLINIC | Age: 71
End: 2021-02-02

## 2021-02-02 DIAGNOSIS — J45.20 REACTIVE AIRWAY DISEASE, MILD INTERMITTENT, UNCOMPLICATED: ICD-10-CM

## 2021-02-02 DIAGNOSIS — F51.04 CHRONIC INSOMNIA: ICD-10-CM

## 2021-02-02 NOTE — TELEPHONE ENCOUNTER
Patient would like to reschedule today's appointment with Dr. Ami Sánchez.     Phone #: [phone]  Thanks

## 2021-02-02 NOTE — TELEPHONE ENCOUNTER
2/2/2021 at 11:23 spoke with patient appointments for today rescheduled as follows     Future Appointments   Date Time Provider Department Center   2/15/2021  3:30 PM REMOTE1, JOSE ALBERTO DICKSON [facility]   2/16/2021 11:30 AM Sasha Smith NP [facility]   3/9/2021  3:00 PM VASCULAR, JOSE ALBERTO DICKSON [facility]   3/9/2021  4:00 PM Aminta Arriaga MD [facility]   3/22/2021  9:50 AM Marcella El MD [facility]   4/6/2021 10:00 AM [email or web address], JOSE ALBERTO DICKSON [facility] AM[facility]   4/6/2021 10:20 AM Stephan Carrion MD [facility]

## 2021-02-03 RX ORDER — ALBUTEROL SULFATE 90 UG/1
AEROSOL, METERED RESPIRATORY (INHALATION)
Qty: 18 INHALER | Refills: 11 | Status: SHIPPED | OUTPATIENT
Start: 2021-02-03 | End: 2022-02-23

## 2021-02-03 RX ORDER — ZOLPIDEM TARTRATE 10 MG/1
TABLET ORAL
Qty: 30 TAB | Refills: 2 | Status: SHIPPED | OUTPATIENT
Start: 2021-02-03 | End: 2021-05-04

## 2021-02-15 ENCOUNTER — OFFICE VISIT (OUTPATIENT)
Dept: CARDIOLOGY CLINIC | Age: 71
End: 2021-02-15
Payer: MEDICARE

## 2021-02-15 DIAGNOSIS — Z95.810 AUTOMATIC IMPLANTABLE CARDIAC DEFIBRILLATOR IN SITU: Primary | ICD-10-CM

## 2021-02-15 PROCEDURE — 93295 DEV INTERROG REMOTE 1/2/MLT: CPT | Performed by: INTERNAL MEDICINE

## 2021-02-15 PROCEDURE — 93296 REM INTERROG EVL PM/IDS: CPT | Performed by: INTERNAL MEDICINE

## 2021-02-15 NOTE — PROGRESS NOTES
Abraham Doe is a 78 y.o. female who was seen by synchronous (real-time) audio-video technology on 2/16/2021 for No chief complaint on file. Assessment & Plan:  
{A/P PLUS DISPO HLER:42997} {time statement optional (Optional):70950} Subjective: Had ADR to atarax so switched to clonazepam  
 
1. Panic attack 
-     hydrOXYzine HCL (ATARAX) 25 mg tablet; Take 1 Tab by mouth three (3) times daily as needed for Anxiety for up to 10 days. START NEW MED prn panic attack Establish care with psych- she plans to call names in network per her insurance 
  
2. Generalized anxiety disorder 
-     hydrOXYzine HCL (ATARAX) 25 mg tablet; Take 1 Tab by mouth three (3) times daily as needed for Anxiety for up to 10 days. 
  
3. Moderate episode of recurrent major depressive disorder (HCC) Consider alternative to prozac if depression not improved once panic attacks under control  
  
Prior to Admission medications Medication Sig Start Date End Date Taking? Authorizing Provider  
albuterol (PROVENTIL HFA, VENTOLIN HFA, PROAIR HFA) 90 mcg/actuation inhaler TAKE 2 PUFFS BY INHALATION EVERY FOUR (4) HOURS AS NEEDED FOR WHEEZING OR SHORTNESS OF BREATH. 2/3/21   Ronda Slaughter MD  
zolpidem (AMBIEN) 10 mg tablet TAKE 1 TABLET BY MOUTH EVERYDAY AT BEDTIME 2/3/21   Dariel El MD  
clonazePAM (KlonoPIN) 0.5 mg tablet Take 1 Tab by mouth two (2) times daily as needed for Anxiety. Max Daily Amount: 1 mg. 1/19/21   Nadia Winn NP  
potassium chloride (Klor-Con M20) 20 mEq tablet TAKE TWO TABLETS BY MOUTH DAILY 11/19/20   Ronda Slaughter MD  
FLUoxetine (PROzac) 40 mg capsule Take 1 Cap by mouth two (2) times a day. 11/19/20   Ronda Slaughter MD  
aspirin delayed-release 81 mg tablet Take 1 Tab by mouth daily. 11/19/20   Ronda Slaughter MD  
sacubitriL-valsartan Desma Rabmo)  mg tablet Take 1 Tab by mouth two (2) times a day.  9/29/20   Wendy Garcia NP  
 metoprolol succinate (TOPROL-XL) 25 mg XL tablet TAKE 1 TABLET BY MOUTH EVERY DAY AT NIGHT 9/1/20   Dariela Ennis MD  
alirocumab (Praluent Pen) 150 mg/mL injector pen 1 mL by SubCUTAneous route Once every 2 weeks. 7/27/20   Dariela Ennis MD  
bumetanide (BUMEX) 2 mg tablet TAKE 1 TABLET BY MOUTH TWICE A DAY 6/26/20   Karyn Moe MD  
psyllium (METAMUCIL) powd Take  by mouth. 2 tsp daily    Provider, Historical  
cholecalciferol, VITAMIN D3, (VITAMIN D3) 5,000 unit tab tablet Take 10,000 Units by mouth daily. Provider, Historical  
biotin 10,000 mcg cap Take  by mouth daily. Provider, Historical  
OTHER Complete multi formula, bariatric advantage    Provider, Historical  
magnesium 250 mg tab Take 1 Tab by mouth daily. Provider, Historical  
ferrous sulfate (IRON, FERROUS SULFATE,) 325 mg (65 mg Iron) tablet Take  by mouth daily (before breakfast). Provider, Historical  
CALCIUM PO Take 600 mg by mouth daily. Provider, Historical  
 
{History SmartLink choices - disappears if left unselected (Optional):84698} ROS Objective:  
 
Patient-Reported Vitals 1/15/2021 Patient-Reported Weight 252lb Patient-Reported Systolic  - Patient-Reported Diastolic -  
  
 
[INSTRUCTIONS:  \"[x]\" Indicates a positive item  \"[]\" Indicates a negative item  -- DELETE ALL ITEMS NOT EXAMINED] Constitutional: [x] Appears well-developed and well-nourished [x] No apparent distress   
  [] Abnormal - Mental status: [x] Alert and awake  [x] Oriented to person/place/time [x] Able to follow commands   
[] Abnormal - Eyes:   EOM    [x]  Normal    [] Abnormal -  
Sclera  [x]  Normal    [] Abnormal - 
        Discharge [x]  None visible   [] Abnormal - HENT: [x] Normocephalic, atraumatic  [] Abnormal -  
[x] Mouth/Throat: Mucous membranes are moist 
 
External Ears [x] Normal  [] Abnormal - Neck: [x] No visualized mass [] Abnormal -  
 
 Pulmonary/Chest: [x] Respiratory effort normal   [x] No visualized signs of difficulty breathing or respiratory distress 
      [] Abnormal - Musculoskeletal:   [x] Normal gait with no signs of ataxia [x] Normal range of motion of neck [] Abnormal -  
 
Neurological:        [x] No Facial Asymmetry (Cranial nerve 7 motor function) (limited exam due to video visit) [x] No gaze palsy  
     [] Abnormal -   
     
Skin:        [x] No significant exanthematous lesions or discoloration noted on facial skin   
     [] Abnormal - Psychiatric:       [x] Normal Affect [] Abnormal -  
     [x] No Hallucinations Other pertinent observable physical exam findings:- 
 
 
 
We discussed the expected course, resolution and complications of the diagnosis(es) in detail. Medication risks, benefits, costs, interactions, and alternatives were discussed as indicated. I advised her to contact the office if her condition worsens, changes or fails to improve as anticipated. She expressed understanding with the diagnosis(es) and plan. Delfin Grimes, who was evaluated through a patient-initiated, synchronous (real-time) audio-video encounter, and/or her healthcare decision maker, is aware that it is a billable service, with coverage as determined by her insurance carrier. She provided verbal consent to proceed: {YES/NO/NA-Consent obtained within past 12 months:48142::\"Yes\"}, and patient identification was verified. It was conducted pursuant to the emergency declaration under the [address] [place] [address], [address] [place] [address] authority and the [employer] and [employer] General Act. A caregiver was present when appropriate. Ability to conduct physical exam was limited. I was {location home office other:96232::\"at home\"}. The patient was {location home office other:53189::\"at home\"}.  
 
 
Pascual Haq NP

## 2021-02-15 NOTE — LETTER
2/16/2021 9:48 AM 
 
Ms. [address] [facility] [id number] After careful review of your remote check of your implated device on 8-90-25 I have concluded that your device is working properly. Your next: In clinic device check is scheduled for   4-6-21 at  10:00am. 
 
 
 
 
If you have any questions, please call [facility] [address] at [phone]. Additional Comments: ________________________________________________ 
 
__________________________________________________________________ 
 
__________________________________________________________________ Parrish Cantor MD [facility]

## 2021-02-16 ENCOUNTER — VIRTUAL VISIT (OUTPATIENT)
Dept: INTERNAL MEDICINE CLINIC | Age: 71
End: 2021-02-16
Payer: MEDICARE

## 2021-02-16 DIAGNOSIS — F41.1 GENERALIZED ANXIETY DISORDER: Primary | ICD-10-CM

## 2021-02-16 DIAGNOSIS — F41.0 PANIC ATTACK: ICD-10-CM

## 2021-02-16 PROCEDURE — 99441 PR PHYS/QHP TELEPHONE EVALUATION 5-10 MIN: CPT | Performed by: NURSE PRACTITIONER

## 2021-02-16 NOTE — PROGRESS NOTES
Emiliano Chadwick  Identified pt with two pt identifiers(name and ). Chief Complaint   Patient presents with    Anxiety       Reviewed record In preparation for visit and have obtained necessary documentation. 1. Have you been to the ER, urgent care clinic or hospitalized since your last visit? No     2. Have you seen or consulted any other health care providers outside of the [address] [place] [address] since your last visit? Include any pap smears or colon screening. No    Patient does not have an advance directive. Vitals reviewed with provider. Health Maintenance reviewed:     Health Maintenance Due   Topic    Shingrix Vaccine Age 49> (1 of 2)    Breast Cancer Screen Mammogram     GLAUCOMA SCREENING Q2Y           Wt Readings from Last 3 Encounters:   20 251 lb (113.9 kg)   20 251 lb (113.9 kg)   20 255 lb (115.7 kg)        Temp Readings from Last 3 Encounters:   20 98.1 °F (36.7 °C)   10/14/19 98 °F (36.7 °C) (Oral)   19 98.3 °F (36.8 °C) (Oral)        BP Readings from Last 3 Encounters:   20 (!) 148/88   20 136/78   20 181/83        Pulse Readings from Last 3 Encounters:   20 93   20 85   10/14/19 71      There were no vitals filed for this visit.        Learning Assessment:   :       Learning Assessment 10/26/2017 2015   PRIMARY LEARNER Patient Patient   HIGHEST LEVEL OF EDUCATION - PRIMARY LEARNER  - 2 YEARS OF COLLEGE   BARRIERS PRIMARY LEARNER - NONE   CO-LEARNER CAREGIVER - No   PRIMARY LANGUAGE ENGLISH ENGLISH   LEARNER PREFERENCE PRIMARY READING DEMONSTRATION   ANSWERED BY patient pateint   RELATIONSHIP SELF SELF        Depression Screening:   :       3 most recent PHQ Screens 1/15/2021   PHQ Not Done Active Diagnosis of Depression or Bipolar Disorder   Little interest or pleasure in doing things -   Feeling down, depressed, irritable, or hopeless -   Total Score PHQ 2 -        Fall Risk Assessment:   :       Fall Risk Assessment, last 12 mths 7/13/2020   Able to walk? Yes   Fall in past 12 months? Yes   Number of falls in past 12 months 1   Fall with injury? 1        Abuse Screening:   :       Abuse Screening Questionnaire 7/13/2020 5/29/2018 7/26/2016 6/30/2015   Do you ever feel afraid of your partner? N N N N   Are you in a relationship with someone who physically or mentally threatens you? N N N N   Is it safe for you to go home?  Y Y Y Y        ADL Screening:   :       ADL Assessment 5/29/2018   Feeding yourself No Help Needed   Getting from bed to chair No Help Needed   Getting dressed No Help Needed   Bathing or showering No Help Needed   Walk across the room (includes cane/walker) No Help Needed   Using the telphone No Help Needed   Taking your medications No Help Needed   Preparing meals No Help Needed   Managing money (expenses/bills) No Help Needed   Moderately strenuous housework (laundry) No Help Needed   Shopping for personal items (toiletries/medicines) No Help Needed   Shopping for groceries No Help Needed   Driving No Help Needed   Climbing a flight of stairs No Help Needed   Getting to places beyond walking distances No Help Needed

## 2021-02-16 NOTE — PROGRESS NOTES
Fco Chávez is a 78 y.o. female, evaluated via audio-only technology on 2/16/2021 for Anxiety  . Assessment & Plan:   Diagnoses and all orders for this visit:    Sx much improved without medication. Pt still interested in seeing psych- provided info for [facility] psychiatric NP. 1. Generalized anxiety disorder    2. Panic attack            12  Subjective:           Pt here to fu on anxiety and panic attacks. Had ADR to atarax so switched to clonazepam following last visit. States anxiety is improved. Didn't like clonazepam- made her feel high/drunk so only took once. Hasn't had any panic attacks in the last few weeks. Has been reading the bible and meditating which has helped. Hasn't made appt with psychiatry yet but plans to, asking who I recommend. Prior to Admission medications    Medication Sig Start Date End Date Taking? Authorizing Provider   albuterol (PROVENTIL HFA, VENTOLIN HFA, PROAIR HFA) 90 mcg/actuation inhaler TAKE 2 PUFFS BY INHALATION EVERY FOUR (4) HOURS AS NEEDED FOR WHEEZING OR SHORTNESS OF BREATH. 2/3/21  Yes Marcella El MD   zolpidem (AMBIEN) 10 mg tablet TAKE 1 TABLET BY MOUTH EVERYDAY AT BEDTIME 2/3/21  Yes Christian Olson MD   potassium chloride (Klor-Con M20) 20 mEq tablet TAKE TWO TABLETS BY MOUTH DAILY 11/19/20  Yes Grayson El MD   FLUoxetine (PROzac) 40 mg capsule Take 1 Cap by mouth two (2) times a day. 11/19/20  Yes Christian Olson MD   sacubitriL-valsartan Giana Love)  mg tablet Take 1 Tab by mouth two (2) times a day. 9/29/20  Yes Norah Puckett NP   metoprolol succinate (TOPROL-XL) 25 mg XL tablet TAKE 1 TABLET BY MOUTH EVERY DAY AT NIGHT 9/1/20  Yes Gomez Castillo MD   alirocumab (Praluent Pen) 150 mg/mL injector pen 1 mL by SubCUTAneous route Once every 2 weeks.  7/27/20  Yes Gomez Castillo MD   bumetanide (BUMEX) 2 mg tablet TAKE 1 TABLET BY MOUTH TWICE A DAY 6/26/20  Yes Christian Olson MD   psyllium (METAMUCIL) powd Take  by mouth. 2 tsp daily   Yes Provider, Historical   cholecalciferol, VITAMIN D3, (VITAMIN D3) 5,000 unit tab tablet Take 10,000 Units by mouth daily. Yes Provider, Historical   biotin 10,000 mcg cap Take  by mouth daily. Yes Provider, Historical   OTHER Complete multi formula, bariatric advantage   Yes Provider, Historical   magnesium 250 mg tab Take 1 Tab by mouth daily. Yes Provider, Historical   ferrous sulfate (IRON, FERROUS SULFATE,) 325 mg (65 mg Iron) tablet Take  by mouth daily (before breakfast). Yes Provider, Historical   CALCIUM PO Take 600 mg by mouth daily. Yes Provider, Historical   clonazePAM (KlonoPIN) 0.5 mg tablet Take 1 Tab by mouth two (2) times daily as needed for Anxiety. Max Daily Amount: 1 mg. 1/19/21 2/16/21  Bob Rosado NP   aspirin delayed-release 81 mg tablet Take 1 Tab by mouth daily. 11/19/20 2/16/21  Keith Estevez MD         ROS  See hpi    Patient-Reported Vitals 2/16/2021   Patient-Reported Weight 250lb   Patient-Reported Systolic  708   Patient-Reported Diastolic 68        Theo Guillermo, who was evaluated through a patient-initiated, synchronous (real-time) audio only encounter, and/or her healthcare decision maker, is aware that it is a billable service, with coverage as determined by her insurance carrier. She provided verbal consent to proceed: Yes. She has not had a related appointment within my department in the past 7 days or scheduled within the next 24 hours.       Total Time: minutes: 5-10 minutes    Scarlett Mcmullen NP

## 2021-03-09 ENCOUNTER — ANCILLARY PROCEDURE (OUTPATIENT)
Dept: CARDIOLOGY CLINIC | Age: 71
End: 2021-03-09
Payer: MEDICARE

## 2021-03-09 ENCOUNTER — OFFICE VISIT (OUTPATIENT)
Dept: CARDIOLOGY CLINIC | Age: 71
End: 2021-03-09
Payer: MEDICARE

## 2021-03-09 VITALS
WEIGHT: 258 LBS | DIASTOLIC BLOOD PRESSURE: 90 MMHG | HEART RATE: 89 BPM | OXYGEN SATURATION: 96 % | BODY MASS INDEX: 44.05 KG/M2 | RESPIRATION RATE: 14 BRPM | HEIGHT: 64 IN | SYSTOLIC BLOOD PRESSURE: 122 MMHG

## 2021-03-09 DIAGNOSIS — I34.0 NONRHEUMATIC MITRAL VALVE REGURGITATION: ICD-10-CM

## 2021-03-09 DIAGNOSIS — I25.10 CORONARY ARTERY DISEASE INVOLVING NATIVE CORONARY ARTERY OF NATIVE HEART WITHOUT ANGINA PECTORIS: Primary | ICD-10-CM

## 2021-03-09 DIAGNOSIS — I50.22 SYSTOLIC CHF, CHRONIC (HCC): ICD-10-CM

## 2021-03-09 DIAGNOSIS — I65.23 BILATERAL CAROTID ARTERY STENOSIS: ICD-10-CM

## 2021-03-09 PROCEDURE — 1090F PRES/ABSN URINE INCON ASSESS: CPT | Performed by: INTERNAL MEDICINE

## 2021-03-09 PROCEDURE — 1100F PTFALLS ASSESS-DOCD GE2>/YR: CPT | Performed by: INTERNAL MEDICINE

## 2021-03-09 PROCEDURE — G9717 DOC PT DX DEP/BP F/U NT REQ: HCPCS | Performed by: INTERNAL MEDICINE

## 2021-03-09 PROCEDURE — 3017F COLORECTAL CA SCREEN DOC REV: CPT | Performed by: INTERNAL MEDICINE

## 2021-03-09 PROCEDURE — G8752 SYS BP LESS 140: HCPCS | Performed by: INTERNAL MEDICINE

## 2021-03-09 PROCEDURE — 99215 OFFICE O/P EST HI 40 MIN: CPT | Performed by: INTERNAL MEDICINE

## 2021-03-09 PROCEDURE — 3288F FALL RISK ASSESSMENT DOCD: CPT | Performed by: INTERNAL MEDICINE

## 2021-03-09 PROCEDURE — G8755 DIAS BP > OR = 90: HCPCS | Performed by: INTERNAL MEDICINE

## 2021-03-09 PROCEDURE — G8536 NO DOC ELDER MAL SCRN: HCPCS | Performed by: INTERNAL MEDICINE

## 2021-03-09 PROCEDURE — 93880 EXTRACRANIAL BILAT STUDY: CPT | Performed by: INTERNAL MEDICINE

## 2021-03-09 PROCEDURE — G8427 DOCREV CUR MEDS BY ELIG CLIN: HCPCS | Performed by: INTERNAL MEDICINE

## 2021-03-09 PROCEDURE — G0463 HOSPITAL OUTPT CLINIC VISIT: HCPCS | Performed by: INTERNAL MEDICINE

## 2021-03-09 PROCEDURE — G8417 CALC BMI ABV UP PARAM F/U: HCPCS | Performed by: INTERNAL MEDICINE

## 2021-03-09 RX ORDER — ALIROCUMAB 150 MG/ML
150 INJECTION, SOLUTION SUBCUTANEOUS EVERY 2 WEEKS
Qty: 6 PEN | Refills: 1 | Status: SHIPPED | OUTPATIENT
Start: 2021-03-09 | End: 2022-06-08

## 2021-03-09 NOTE — PROGRESS NOTES
CHIEF COMPLAINT      Deshawn Ta is a 78 y.o. female who was seen today for CHF. History of Present Illness:    She has dyspnea with exertion, no PND, sleeping on 2 pillows  No chest pain or palpitations  Wakes up at night with leg cramps  Bystolic caused alopecia and more dyspnea with exertion  Doing fine on repatha  LE edema doing well   No diziness or syncope    Bp high last time and entresto was increased then it was too low and she was dizzzy/ not good so went back down. Hasnt used her metolazone but she will restart it. She continues to have a lot of personal stress but seems to be doing better than at her last visit with managing it all. She is due to follow-up with Dr. Chandni Monson as well as scheduled for pacemaker check in about 2 months. It is been 4 years now since we did a stress test to evaluate her for progression of coronary artery disease. Given the complexity of her disease it is important to keep a close eye on that some scheduling her for a Lexiscan stress test as well as an echocardiogram to look at her heart function. She has stable congestive heart failure and is fairly well compensated on exam this point. She will continue on GDMT as currently prescribed. Her blood pressure looks good today    Assessment/Plan:  1. Chronic systolic heart failure - LVEF 25-30% by last TTE, s/p AICD placement with Dr. Iva Tian and subsequent lead revisions and repeat procedures due to non-healing midsternal incision  -last revision for AICD in November 2016 with Dr. Iva Tian, last ICD check without arrhythmias   -continue Entresto to 49/51 one tablet BID and Toprol XL, had hair loss on coreg and bystolic   -off spironolactone due to hypotension, continue bumex 2mg BID, using Metolazone PRN, not very often, once every other week  2.  CAD - hx of MI x 2 and multiple stents, she believes she may have 6 or 7 stents, last cardiac cath without progression of CAD and stress test in 4/17 showed possible anterior ischemia vs artifact but asymptomatic currently so will just continue to watch, continue ASA and beta blocker, see lipid plan below  -reports having an MI in 11/2011 and received PCI to RCA at that time, previous MI in 2006 or 2007  3. Mitral regurgitation - mod by TTE   4. Asthma - x 15 - 16 years, has not seen pulmonologist in years  11. HTN - well controlled on current medications   6. Primary Hyperlipidemia - statin failures, simvastatin caused muscle spasms and cramps, atorvastatin caused pain shooting all over her body, pravastatin 40mg and 20mg caused myalgias and leg cramps, had myalgias on Livalo, could not tolerate zetia either   -not able to take any statin at any dose  -now on repatha, LDL 49 in 8/20  7. DM Type 2 - resolved with gastric bypass  8. Hypokalemia -off spironolactone and on KCL 40meq daily   9. Hypomagnesemia - on OTC magnesium       10. Morbid obesity - Body mass index is 44.29 kg/m². Marcial Monae weighed 416 lbs at her heaviest weight, s/p gastric bypass in 2007 with revision a few years later, weight down to 239 lbs at one point, weighed 251 lbs at her last visit, encouraged regular exercise up to 258 today  11. PUD - had EGD and cauterized bleeding ulcer, not on PPI anymore  12. Vitamin D deficiency - on 10,000 units daily    13. Anxiety - plans to see psychiatry for management   14. PAD/Carotid stenosis - 10-49% bilateral ICA stenosis, continue ASA and repatha    Echo 9/20 - LVEF 25-30%, mod MR  Echo 10/18 - LVEF 35%, mild to mod MR   Echo 5/18 - LVEF 25 %-30 %, akinesis of the basal-mid inferoseptal and basal-mid inferolateral wall(s), severe hypokinesis of the entire inferior wall(s), grade 1 dd, mild to mod dilated LA, mild to mod MR  Carotid duplex 5/18 - 10-49% bilateral ICA stenosis  Echo: 4/17, EF 35-40%, inf HK gr1dd  Nuclear: 4/17, EF 36%, anterior ischemia, lateral infarct.   TTE 9/16 - LVEF 35%, moderate hypokinesis of the basal-mid inferolateral wall(s), grd 1 dd, dilated LA, mod MR, mild TR  16 - RV lead revision by Dr. Haven Gann  16 - single chamber AICD placed by Dr. Haven Gann ([address] VR, serial # [id number], model # [id number]. The ventricular lead: model # R634643 , serial # [id number])  MUGA  - LVEF 27%  Holter  - no arrhythmias  Echo  - LV mildly dilated, LVEF 40%, moderate diffuse hypokinesis with regional variations, severe hypokinesis of the basal-mid inferior wall(s), grd 1 dd, mod dilated LA, atrial septum bows from left to right, consistent with increased left atrial pressure, mildly dilated RA, mild to near moderate MR    OLIVER  - normal  Nuc 5/15 EF 31% large anterolateral infarct with periinfarct reversibility, 13% LV reversible(32% infarct at rest, 45% at stress)    Echo 2015 - LVEF 30-35%, grd 1 dd, mod LAE, mild to mod MR  Nuc Stress  - small reversibility in anteroapical wall, LVEF 31%  Cardiac Cath 3/13 - patent stents in LAD, LCx and RCA, LVEF 30%, severe inferior HK, LCx relatively small vessel with patent stent in proximal LCx, RCA is large and dominant with patent stents in proximal and mid RCA, distal RCA free of disease, LAD normal and large vessel which coursed around apex  Echo 2011 - LVEF 30%, mild MR  Cardiac Cath 2011 - s/p PCI with AMRIT to 100% mid RCA, also had 40% mid LAD lesion, 50% diagonal 1 lesion, 100% distal LCx lesion, 60% OM1 lesion, 100% proximal Ramus lesion      Soc Hx: quit tobacco use x 13 years ago, used to smoke 1ppd, no etoh use, no drug use, lives with , son, daughter in law, grandson, retired/on disability  Fam Hx: father in his 62s when he had a MI, had 3 vessel CABG in his 62s, passed from fall but had cancer too, mother lived to age 80 and  from Alzheimer's, brother had MI in his 62s, sister had aortic repair for aneurysm in her 76s    We discussed the expected course, resolution and complications of the diagnosis(es) in detail.   Medication risks, benefits, costs, interactions, and alternatives were discussed as indicated. I advised her to contact the office if her condition worsens, changes or fails to improve as anticipated. She expressed understanding with the diagnosis(es) and plan    Patient was made aware and verbalized understanding that an appointment will be scheduled for them for a virtual visit and/or office visit within the above time frame. Patient understanding his/her responsibility to call and change time/date if he/she so chooses. PAST MEDICAL HISTORY     Past Medical History:   Diagnosis Date    Acute right ankle pain 6/8/2018 5/29/18: X-ray showed possible right ankle sprain. 6/6/18: saw Dr. Abebe Peter ([facility]), diagnosed with peroneal tendonitis. Prescribed an ASO    Asthma     Cardiomy[facility] ([facility]     Coronary artery disease 2008    s/p RCA stent (AMRIT) on 11/26/11    Depression with anxiety     2011    DVT (deep venous thrombosis) ([facility] 7/27/2010    Family history of early CAD     GERD (gastroesophageal reflux disease)     H/O gastric bypass 2006    Revision in 2009    Hepatitis C antibody test positive     Does not have chronic hep C (labs 10/6/15: neg HCV RNA)     HTN (hypertension) 7/27/2010    Hyperlipidemia 07/27/2010    Incontinence of feces 9/3/2018    Dr. Herminio Collins. 8/20/18: Improving with pelvic physical therapy and psyllium husk treatment. Saw Dr. Celso Baumann who suggested PT first. MR defecography showed pelvic floor weakness with pelvic descensus, small anterior  Rectocele, and vaginal prolapse. To have pelvic PT weekly for 8 wks and to see Dr. Celso Baumann again in Oct 2018.     Joint pain 7/27/2010    MI (myocardial infarction) ([address] 7/27/2010    Mitral valve regurgitation     Mild to moderate    Morbid obesity ([address] 7/27/2010    Myocardial infarction [facility]) 2006 and 2011    Dr. Kellen Graf disorder     PUD (peptic ulcer disease)     gi bleed 2008; ulcer n gastric bypass pouch    Urinary incontinence, stress 7/27/2010 PAST SURGICAL HISTORY     Past Surgical History:   Procedure Laterality Date    COLONOSCOPY N/A 6/29/2018    COLONOSCOPY performed by Saad Michael MD at [facility]COPY; redundant colon, int hemorrhoids, pathology: normal colonic mucosa    HX COLONOSCOPY  9/5/14    Dr. Yahir Prabhakar; normal, repeat in 5 yrs    HX GASTRIC BYPASS      HX IMPLANTABLE CARDIOVERTER DEFIBRILLATOR  08/24/2016    HX LAP GASTRIC BYPASS  2006    revision 2009/Dr. Sue Styles    HX OTHER SURGICAL  09/19/2016    Removal of right ventricular ICD lead & single chamber transvenous AICD    HX OTHER SURGICAL  10/12/2016    pocket revison of ICD; Dr. Sergio Flood  06/07/2018    Dr Sadie Alvarez; high resolution anorectal manaometry-abnormal study. Study done for eval of fecal incontinence    HX OTHER SURGICAL  12/14/2018    Dr. Sadie Alvarez. Rectal endoscopic US (EUS) for rectal incontinence. Normal rectal mucosa, no fistulas.     HX PACEMAKER      sicd/left side of chest under arm    INS PPM/ICD LED SING ONLY  8/24/2016         INS PPM/ICD LED SING ONLY  8/26/2016         INS PPM/ICD LED SING ONLY  9/21/2016         TX CARDIAC SURG PROCEDURE UNLIST      Stent x 5-6,Most recent 2011    TX LAP,STOMACH,OTHER,W/O TUBE  04/05/2010    Revision GBP          ALLERGIES     Allergies   Allergen Reactions    Amoxicillin Anaphylaxis    Amoxicillin Anaphylaxis    Lipitor [Atorvastatin] Other (comments)     Severe muscle pain and spasms    Zocor [Simvastatin] Other (comments)     Cramps, muscle spasms    Buspar [Buspirone] Other (comments)     Drunk sensation, headaches    Hydroxyzine Other (comments)     Blurred vision, lightheadedness    Livalo [Pitavastatin] Myalgia    Pravastatin Other (comments)     Leg cramps          FAMILY HISTORY     Family History   Problem Relation Age of Onset    Dementia Mother     Cancer Mother         colon    Alzheimer Mother     Cancer Father         stomach    Heart Disease Father     Hypertension Father     Heart Disease Sister     Stroke Sister     Heart Attack Brother            SOCIAL HISTORY     Social History     Socioeconomic History    Marital status:      Spouse name: Not on file    Number of children: Not on file    Years of education: Not on file    Highest education level: Not on file   Tobacco Use    Smoking status: Former Smoker     Packs/day: 1.00     Years: 30.00     Pack years: 30.00     Start date: 1970     Quit date: 2004     Years since quittin.8    Smokeless tobacco: Never Used   Substance and Sexual Activity    Alcohol use: No     Alcohol/week: 0.0 standard drinks    Drug use: No     Types: Prescription    Sexual activity: Never     Partners: Male   Social History Narrative    ** Merged History Encounter **              MEDICATIONS     Current Outpatient Medications   Medication Sig    albuterol (PROVENTIL HFA, VENTOLIN HFA, PROAIR HFA) 90 mcg/actuation inhaler TAKE 2 PUFFS BY INHALATION EVERY FOUR (4) HOURS AS NEEDED FOR WHEEZING OR SHORTNESS OF BREATH.  zolpidem (AMBIEN) 10 mg tablet TAKE 1 TABLET BY MOUTH EVERYDAY AT BEDTIME    potassium chloride (Klor-Con M20) 20 mEq tablet TAKE TWO TABLETS BY MOUTH DAILY    FLUoxetine (PROzac) 40 mg capsule Take 1 Cap by mouth two (2) times a day.  sacubitriL-valsartan (Entresto)  mg tablet Take 1 Tab by mouth two (2) times a day.  metoprolol succinate (TOPROL-XL) 25 mg XL tablet TAKE 1 TABLET BY MOUTH EVERY DAY AT NIGHT    alirocumab (Praluent Pen) 150 mg/mL injector pen 1 mL by SubCUTAneous route Once every 2 weeks.  bumetanide (BUMEX) 2 mg tablet TAKE 1 TABLET BY MOUTH TWICE A DAY    psyllium (METAMUCIL) powd Take  by mouth. 2 tsp daily    cholecalciferol, VITAMIN D3, (VITAMIN D3) 5,000 unit tab tablet Take 10,000 Units by mouth daily.  biotin 10,000 mcg cap Take  by mouth daily.  OTHER Complete multi formula, bariatric advantage    magnesium 250 mg tab Take 1 Tab by mouth daily.     ferrous sulfate (IRON, FERROUS SULFATE,) 325 mg (65 mg Iron) tablet Take  by mouth daily (before breakfast).  CALCIUM PO Take 600 mg by mouth daily. No current facility-administered medications for this visit. I have reviewed the vitals, medical history, surgical history, allergies, family history, social history and medications. REVIEW OF SYMPTOMS     Constitutional: Negative for fever, chills and diaphoresis. Respiratory: Negative for cough, sputum production and wheezing. Cardiovascular: Negative for chest pain, palpitations, orthopnea, claudication and PND. Gastrointestinal: Negative for heartburn, nausea, vomiting, blood in stool   Musculoskeletal: Negative for back pain. Skin: Negative for rash. Neurological: Negative for focal weakness, loss of consciousness, weakness and headaches. Endo/Heme/Allergies: Negative for abnormal bleeding. Psychiatric/Behavioral: Negative for memory loss. Positive for anxiety     PHYSICAL EXAMINATION      Due to this being a TeleHealth evaluation, many elements of the physical examination are unable to be assessed. General: Well developed, in no acute distress, cooperative and alert  HEENT: Pupils equal/round. No marked JVD visible on video. Respiratory: No audible wheezing, no signs of respiratory distress, lips not cyanotic  Extremities:  No edema  Neuro: A&Ox3, speech clear, no facial droop, answering questions appropriately  Skin: Skin color is normal. No rashes or lesions.  Non diaphoretic on visible skin during exam     LABORATORY DATA      Lab Results   Component Value Date/Time    WBC 6.1 08/10/2020 11:28 AM    Hemoglobin (POC) 14.3 11/26/2011 09:50 PM    HGB 12.3 08/10/2020 11:28 AM    Hematocrit (POC) 42 11/26/2011 09:50 PM    HCT 36.9 08/10/2020 11:28 AM    PLATELET 031 22/22/5256 11:28 AM    MCV 83 08/10/2020 11:28 AM      Lab Results   Component Value Date/Time    Sodium 141 08/10/2020 11:28 AM    Potassium 4.1 08/10/2020 11:28 AM Chloride 107 (H) 08/10/2020 11:28 AM    CO2 23 08/10/2020 11:28 AM    Anion gap 9 02/23/2017 07:26 AM    Glucose 91 08/10/2020 11:28 AM    BUN 14 08/10/2020 11:28 AM    Creatinine 0.64 08/10/2020 11:28 AM    BUN/Creatinine ratio 22 08/10/2020 11:28 AM    GFR est  08/10/2020 11:28 AM    GFR est non-AA 92 08/10/2020 11:28 AM    Calcium 8.5 (L) 08/10/2020 11:28 AM    Bilirubin, total 0.5 08/10/2020 11:28 AM    Alk.  phosphatase 67 08/10/2020 11:28 AM    Protein, total 5.5 (L) 08/10/2020 11:28 AM    Albumin 3.8 08/10/2020 11:28 AM    Globulin 3.4 02/23/2017 07:26 AM    A-G Ratio 2.2 08/10/2020 11:28 AM    ALT (SGPT) 9 08/10/2020 11:28 AM        Jocelien Garcia MD    [facility]  [address] , [address] [facility] [address]  [place]  [phone] / [phone] Fax

## 2021-03-11 LAB
LEFT CCA DIST DIAS: 17.7 CENTIMETER/SECOND
LEFT CCA DIST SYS: 65.8 CENTIMETER/SECOND
LEFT CCA PROX DIAS: 13.2 CENTIMETER/SECOND
LEFT CCA PROX SYS: 78.1 CENTIMETER/SECOND
LEFT ECA DIAS: 10.41 CENTIMETER/SECOND
LEFT ECA SYS: 106.6 CENTIMETER/SECOND
LEFT ICA DIST DIAS: 23.1 CENTIMETER/SECOND
LEFT ICA DIST SYS: 102.5 CENTIMETER/SECOND
LEFT ICA MID DIAS: 27.2 CENTIMETER/SECOND
LEFT ICA MID SYS: 91.8 CENTIMETER/SECOND
LEFT ICA PROX DIAS: 40.4 CENTIMETER/SECOND
LEFT ICA PROX SYS: 129.5 CENTIMETER/SECOND
LEFT ICA/CCA SYS: 1.66
LEFT VERTEBRAL DIAS: 21.65 CENTIMETER/SECOND
LEFT VERTEBRAL SYS: 69 CENTIMETER/SECOND
RIGHT CCA DIST DIAS: 17.4 CENTIMETER/SECOND
RIGHT CCA DIST SYS: 69.3 CENTIMETER/SECOND
RIGHT CCA PROX DIAS: 14.1 CENTIMETER/SECOND
RIGHT CCA PROX SYS: 73.8 CENTIMETER/SECOND
RIGHT ECA DIAS: 13.63 CENTIMETER/SECOND
RIGHT ECA SYS: 154 CENTIMETER/SECOND
RIGHT ICA DIST DIAS: 21.2 CENTIMETER/SECOND
RIGHT ICA DIST SYS: 61.2 CENTIMETER/SECOND
RIGHT ICA MID DIAS: 38.9 CENTIMETER/SECOND
RIGHT ICA MID SYS: 133.6 CENTIMETER/SECOND
RIGHT ICA PROX DIAS: 23.3 CENTIMETER/SECOND
RIGHT ICA PROX SYS: 73.7 CENTIMETER/SECOND
RIGHT ICA/CCA SYS: 1.8
RIGHT VERTEBRAL DIAS: 13 CENTIMETER/SECOND
RIGHT VERTEBRAL SYS: 46.1 CENTIMETER/SECOND

## 2021-03-16 RX ORDER — METOLAZONE 2.5 MG/1
2.5 TABLET ORAL
Qty: 30 TAB | Refills: 1 | Status: SHIPPED | OUTPATIENT
Start: 2021-03-16 | End: 2021-03-18

## 2021-03-20 DIAGNOSIS — F33.1 MODERATE EPISODE OF RECURRENT MAJOR DEPRESSIVE DISORDER (HCC): ICD-10-CM

## 2021-03-20 DIAGNOSIS — F41.1 GENERALIZED ANXIETY DISORDER: ICD-10-CM

## 2021-03-20 DIAGNOSIS — F41.8 DEPRESSION WITH ANXIETY: ICD-10-CM

## 2021-03-20 RX ORDER — FLUOXETINE HYDROCHLORIDE 40 MG/1
40 CAPSULE ORAL 2 TIMES DAILY
Qty: 180 CAP | Refills: 3 | Status: SHIPPED | OUTPATIENT
Start: 2021-03-20 | End: 2021-07-21 | Stop reason: SDUPTHER

## 2021-03-20 RX ORDER — FLUOXETINE 20 MG/1
TABLET ORAL
Qty: 90 TAB | Refills: 11 | OUTPATIENT
Start: 2021-03-20

## 2021-05-03 DIAGNOSIS — F51.04 CHRONIC INSOMNIA: ICD-10-CM

## 2021-05-04 RX ORDER — ZOLPIDEM TARTRATE 10 MG/1
TABLET ORAL
Qty: 30 TAB | Refills: 2 | Status: SHIPPED | OUTPATIENT
Start: 2021-05-04 | End: 2021-07-21 | Stop reason: SDUPTHER

## 2021-05-21 ENCOUNTER — OFFICE VISIT (OUTPATIENT)
Dept: URGENT CARE | Age: 71
End: 2021-05-21
Payer: MEDICARE

## 2021-05-21 VITALS — RESPIRATION RATE: 16 BRPM | HEART RATE: 65 BPM | TEMPERATURE: 98.2 F | OXYGEN SATURATION: 98 %

## 2021-05-21 DIAGNOSIS — B96.89 ACUTE BACTERIAL SINUSITIS: ICD-10-CM

## 2021-05-21 DIAGNOSIS — H65.92 OME (OTITIS MEDIA WITH EFFUSION), LEFT: Primary | ICD-10-CM

## 2021-05-21 DIAGNOSIS — J01.90 ACUTE BACTERIAL SINUSITIS: ICD-10-CM

## 2021-05-21 PROCEDURE — G8756 NO BP MEASURE DOC: HCPCS | Performed by: NURSE PRACTITIONER

## 2021-05-21 PROCEDURE — 3017F COLORECTAL CA SCREEN DOC REV: CPT | Performed by: NURSE PRACTITIONER

## 2021-05-21 PROCEDURE — G8417 CALC BMI ABV UP PARAM F/U: HCPCS | Performed by: NURSE PRACTITIONER

## 2021-05-21 PROCEDURE — 1090F PRES/ABSN URINE INCON ASSESS: CPT | Performed by: NURSE PRACTITIONER

## 2021-05-21 PROCEDURE — 99213 OFFICE O/P EST LOW 20 MIN: CPT | Performed by: NURSE PRACTITIONER

## 2021-05-21 PROCEDURE — G9717 DOC PT DX DEP/BP F/U NT REQ: HCPCS | Performed by: NURSE PRACTITIONER

## 2021-05-21 PROCEDURE — 1100F PTFALLS ASSESS-DOCD GE2>/YR: CPT | Performed by: NURSE PRACTITIONER

## 2021-05-21 PROCEDURE — 3288F FALL RISK ASSESSMENT DOCD: CPT | Performed by: NURSE PRACTITIONER

## 2021-05-21 PROCEDURE — G8536 NO DOC ELDER MAL SCRN: HCPCS | Performed by: NURSE PRACTITIONER

## 2021-05-21 PROCEDURE — G8427 DOCREV CUR MEDS BY ELIG CLIN: HCPCS | Performed by: NURSE PRACTITIONER

## 2021-05-21 RX ORDER — DOXYCYCLINE 100 MG/1
100 TABLET ORAL 2 TIMES DAILY
Qty: 14 TABLET | Refills: 0 | Status: SHIPPED | OUTPATIENT
Start: 2021-05-21 | End: 2021-05-28

## 2021-05-21 NOTE — PROGRESS NOTES
Subjective: (As above and below)       This patient was seen in [facility] at [address] [place] [address] Urgent Care while outdoors at their vehicle due to COVID-19 pandemic with PPE and focused examination in order to decrease community viral transmission. The patient/guardian gave verbal consent to treat. Chief Complaint   Patient presents with    Cold Symptoms     pt c/o runny nose/allergies over past 2 weeks with intermittent shooting pain left ear now hurts to swallow or open mouth     Loren Marin is a 73 y.o. female who presents for evaluation of : left ear pain, nasal congestion, sinus pressure. Symptom onset 2 week ago that seemed like allergies but now ear pain getting worse . Preceding illness: none. No other identified aggravating or alleviating factors. Promotes no decrease in PO intake of fluids. Denies: fever, chills, rash, SOB, chest pain, cough, malaise, body aches    Review of Systems - negative except as listed above    Reviewed PmHx, RxHx, FmHx, SocHx, AllgHx and updated in chart. Family History   Problem Relation Age of Onset    Dementia Mother     Cancer Mother         colon    Alzheimer Mother     Cancer Father         stomach    Heart Disease Father     Hypertension Father     Heart Disease Sister     Stroke Sister     Heart Attack Brother        Past Medical History:   Diagnosis Date    Acute right ankle pain 6/8/2018 5/29/18: X-ray showed possible right ankle sprain. 6/6/18: saw Dr. Earna Seip ([facility]), diagnosed with peroneal tendonitis.  Prescribed an ASO    Asthma     Cardiomy[facility] ([facility] [address] [facility]     Coronary artery disease 2008    s/p RCA stent ([facility]) on 11/26/11    Depression with anxiety     2011    DVT (deep venous thrombosis) ([facility] [address] 7/27/2010    Family history of early CAD     GERD (gastroesophageal reflux disease)     H/O gastric bypass 2006    Revision in 2009    Hepatitis C antibody test positive     Does not have chronic hep C (labs 10/6/15: neg HCV RNA)     HTN (hypertension) 2010    Hyperlipidemia 2010    Incontinence of feces 9/3/2018    Dr. Bryson Waite. 18: Improving with pelvic physical therapy and psyllium husk treatment. Saw Dr. Edin Blake who suggested PT first. MR defecography showed pelvic floor weakness with pelvic descensus, small anterior  Rectocele, and vaginal prolapse. To have pelvic PT weekly for 8 wks and to see Dr. Edin Blake again in Oct 2018.  Joint pain 2010    MI (myocardial infarction) ([facility] [address] 2010    Mitral valve regurgitation     Mild to moderate    Morbid obesity ([facility] [address] 2010    Myocardial infarction [facility])  and     Dr. Michelle Cushing    Psychiatric disorder     PUD (peptic ulcer disease)     gi bleed ; ulcer n gastric bypass pouch    Urinary incontinence, stress 2010      Social History     Socioeconomic History    Marital status:      Spouse name: Not on file    Number of children: Not on file    Years of education: Not on file    Highest education level: Not on file   Tobacco Use    Smoking status: Former Smoker     Packs/day: 1.00     Years: 30.00     Pack years: 30.00     Start date: 1970     Quit date: 2004     Years since quittin.0    Smokeless tobacco: Never Used   Vaping Use    Vaping Use: Never used   Substance and Sexual Activity    Alcohol use: No     Alcohol/week: 0.0 standard drinks    Drug use: No     Types: Prescription    Sexual activity: Never     Partners: Male   Social History Narrative    ** Merged History Encounter **          Social Determinants of Health     Financial Resource Strain:     Difficulty of Paying Living Expenses:    Food Insecurity:     Worried About Running Out of Food in the Last Year:     Ran Out of Food in the Last Year:    Transportation Needs:     Lack of Transportation (Medical):      Lack of Transportation (Non-Medical):    Physical Activity:     Days of Exercise per Week:     Minutes of Exercise per Session: Stress:     Feeling of Stress :    Social Connections:     Frequency of Communication with Friends and Family:     Frequency of Social Gatherings with Friends and Family:     Attends [employer]oration[employer] Services:     Active Member of Clubs or Organizations:     Attends Club or Organization Meetings:     Marital Status:           Current Outpatient Medications   Medication Sig    doxycycline (ADOXA) 100 mg tablet Take 1 Tablet by mouth two (2) times a day for 7 days.  zolpidem (AMBIEN) 10 mg tablet TAKE 1 TABLET BY MOUTH EVERYDAY AT BEDTIME    FLUoxetine (PROzac) 40 mg capsule Take 1 Cap by mouth two (2) times a day.  alirocumab (Praluent Pen) 150 mg/mL injector pen 1 mL by SubCUTAneous route Once every 2 weeks.  albuterol (PROVENTIL HFA, VENTOLIN HFA, PROAIR HFA) 90 mcg/actuation inhaler TAKE 2 PUFFS BY INHALATION EVERY FOUR (4) HOURS AS NEEDED FOR WHEEZING OR SHORTNESS OF BREATH.  potassium chloride (Klor-Con M20) 20 mEq tablet TAKE TWO TABLETS BY MOUTH DAILY    sacubitriL-valsartan (Entresto)  mg tablet Take 1 Tab by mouth two (2) times a day.  metoprolol succinate (TOPROL-XL) 25 mg XL tablet TAKE 1 TABLET BY MOUTH EVERY DAY AT NIGHT    bumetanide (BUMEX) 2 mg tablet TAKE 1 TABLET BY MOUTH TWICE A DAY    psyllium (METAMUCIL) powd Take  by mouth. 2 tsp daily    cholecalciferol, VITAMIN D3, (VITAMIN D3) 5,000 unit tab tablet Take 10,000 Units by mouth daily.  biotin 10,000 mcg cap Take  by mouth daily.  OTHER Complete multi formula, bariatric advantage    magnesium 250 mg tab Take 1 Tab by mouth daily.  ferrous sulfate (IRON, FERROUS SULFATE,) 325 mg (65 mg Iron) tablet Take  by mouth daily (before breakfast).  CALCIUM PO Take 600 mg by mouth daily. No current facility-administered medications for this visit.        Objective:     Vitals:    05/21/21 1335   Pulse: 65   Resp: 16   Temp: 98.2 °F (36.8 °C)   SpO2: 98%       Physical Exam  General appearance  appears well hydrated and does not appear toxic, no acute distress  Eyes - EOMs intact. Non injected. No scleral icterus   Ears - no external swelling. Left TM dull with clear effusion. Right TM normal without effusion  Nose - nasal congestion. No purulent drainage  Mouth - OP clear without swelling, exudate or lesion. Mucus membranes moist. Uvula midline. Neck/Lymphatics  trachea midline, full AROM  Chest - Normal breathing effort no wheeze rales, rhonchi or diminishments bilaterally. Heart - RRR, no murmurs  Skin - no observable rashes or pallor  Neurologic- alert and oriented x 3  Psychiatric- normal mood, behavior and though content. Assessment/ Plan:     1. OME (otitis media with effusion), left      2. Acute bacterial sinusitis    - doxycycline (ADOXA) 100 mg tablet; Take 1 Tablet by mouth two (2) times a day for 7 days. Dispense: 14 Tablet; Refill: 0      Start doxycycline for bacterial sinus infection- will treat based on worsening and duration of symptoms  OME likely 2/2 sinusitis; expect improvement with sinus treatment  See PCP if not improving over next 1 week      Follow up: We have reviewed worsening/concerning signs and symptoms that may warrant seeking immediate care in the ED setting. For other non-severe changes, non-improvement or questions, patient aware to contact PCP office or consider a virtual online medical consultation.         Sarina Rubi NP

## 2021-05-27 ENCOUNTER — OFFICE VISIT (OUTPATIENT)
Dept: CARDIOLOGY CLINIC | Age: 71
End: 2021-05-27

## 2021-05-27 DIAGNOSIS — Z95.810 AUTOMATIC IMPLANTABLE CARDIAC DEFIBRILLATOR IN SITU: Primary | ICD-10-CM

## 2021-06-02 ENCOUNTER — OFFICE VISIT (OUTPATIENT)
Dept: CARDIOLOGY CLINIC | Age: 71
End: 2021-06-02
Payer: MEDICARE

## 2021-06-02 DIAGNOSIS — Z95.810 AUTOMATIC IMPLANTABLE CARDIAC DEFIBRILLATOR IN SITU: Primary | ICD-10-CM

## 2021-06-02 PROCEDURE — 93295 DEV INTERROG REMOTE 1/2/MLT: CPT | Performed by: INTERNAL MEDICINE

## 2021-06-02 PROCEDURE — 93296 REM INTERROG EVL PM/IDS: CPT | Performed by: INTERNAL MEDICINE

## 2021-06-02 NOTE — LETTER
2021 2:42 PM 
 
Ms. Gabby Briceno [address] [facility] [address] Dear Patient, This letter is to inform you that as of  [facility] will no longer be sending out confirmation letters for remote transmissions through the [employer]. All letters in the future will be posted in an electronic medical records format therefore we highly encourage enrollment in [employer] patient's portal. Once enrolled you will have access to any letters we send as well as appointment information that can be found in your medical record. Our EP team would contact you via phone if there are significant abnormal findings so you can discuss with Dr. Temi Mendes further in office. Missed transmission letters will also be digitized in [employer] but we will continue to send those out through the mail as well. How to register for [employer]: - Visit [employer] - Click Create an Account - Provide your name, address, and  
- You will then be asked a short series of questions to verify your identity. This helps to ensure that no one else can access your information.  
- If you have any further questions, please contact our office at [phone]. If you choose not to enroll in [employer] or do not have internet access, please kindly let device clinic know and we will accommodate your preference. We are grateful for your understanding and looking forward to this new, improved and more efficient way of communication. Warm Regards, [facility] Staff Sincerely, Anamaria Harp

## 2021-06-02 NOTE — LETTER
6/2/2021 2:41 PM 
 
[address] [facility] [address] After careful review of your remote check of your implated device on [id number]. I have concluded that your device is working properly. Your next in - clinic device check is scheduled for   9-2-2021 at  1:00PM. If you have any questions, please call [facility] [address] at [phone]. Additional Comments: ________________________________________________ 
 
__________________________________________________________________ 
 
__________________________________________________________________ Sincerely, Truman Nyhan, MD [facility]

## 2021-06-03 ENCOUNTER — DOCUMENTATION ONLY (OUTPATIENT)
Dept: CARDIOLOGY CLINIC | Age: 71
End: 2021-06-03

## 2021-06-03 NOTE — PROGRESS NOTES
Bg Sci SubQ ICD remote check shows 4 % remaining battery longevity.  Approximally 3 months to WILDER.  In clinic appointment is scheduled for 9-2-21 Need to schedule gen change for September due to EP lab congestion on scheduling

## 2021-06-04 NOTE — PROGRESS NOTES
Called pt spoke with pt  Mr. Marisela Raya. Mr Marisela Raya stated the pt is currently in [place] and will be back next Thursday. Notified Mr. Marisela Raya will call back then.

## 2021-06-07 NOTE — PROGRESS NOTES
Pt called back 2 pt identifiers confirmed. Notified pt about device reaching WILDER and scheduled pt for gen change on 9/10/2021 @ 1:00 pm. Notified pt will go over instructions at follow up on 9/2/2021. Pt verbalized understanding of information discussed w/ no further questions at this time.

## 2021-06-22 ENCOUNTER — DOCUMENTATION ONLY (OUTPATIENT)
Dept: CARDIOLOGY CLINIC | Age: 71
End: 2021-06-22

## 2021-06-22 NOTE — PROGRESS NOTES
Alert received for patient reaching WILDER as of 6-20-21 on her SICD.  Patient missed last month in clinic appt

## 2021-07-21 ENCOUNTER — OFFICE VISIT (OUTPATIENT)
Dept: INTERNAL MEDICINE CLINIC | Age: 71
End: 2021-07-21
Payer: MEDICARE

## 2021-07-21 VITALS
BODY MASS INDEX: 44.76 KG/M2 | WEIGHT: 262.2 LBS | RESPIRATION RATE: 16 BRPM | TEMPERATURE: 98.7 F | HEIGHT: 64 IN | DIASTOLIC BLOOD PRESSURE: 83 MMHG | OXYGEN SATURATION: 95 % | HEART RATE: 65 BPM | SYSTOLIC BLOOD PRESSURE: 132 MMHG

## 2021-07-21 DIAGNOSIS — F33.1 MODERATE EPISODE OF RECURRENT MAJOR DEPRESSIVE DISORDER (HCC): ICD-10-CM

## 2021-07-21 DIAGNOSIS — Z00.00 MEDICARE ANNUAL WELLNESS VISIT, SUBSEQUENT: Primary | ICD-10-CM

## 2021-07-21 DIAGNOSIS — F41.1 GENERALIZED ANXIETY DISORDER: ICD-10-CM

## 2021-07-21 DIAGNOSIS — E66.01 MORBID OBESITY WITH BMI OF 45.0-49.9, ADULT (HCC): ICD-10-CM

## 2021-07-21 DIAGNOSIS — M17.0 PRIMARY OSTEOARTHRITIS OF BOTH KNEES: ICD-10-CM

## 2021-07-21 DIAGNOSIS — I10 ESSENTIAL HYPERTENSION: ICD-10-CM

## 2021-07-21 DIAGNOSIS — I50.22 SYSTOLIC CHF, CHRONIC (HCC): ICD-10-CM

## 2021-07-21 DIAGNOSIS — Z71.89 ADVANCE CARE PLANNING: ICD-10-CM

## 2021-07-21 DIAGNOSIS — F51.04 CHRONIC INSOMNIA: ICD-10-CM

## 2021-07-21 DIAGNOSIS — E78.2 MIXED HYPERLIPIDEMIA: ICD-10-CM

## 2021-07-21 DIAGNOSIS — M81.0 AGE-RELATED OSTEOPOROSIS WITHOUT CURRENT PATHOLOGICAL FRACTURE: ICD-10-CM

## 2021-07-21 DIAGNOSIS — Z12.31 ENCOUNTER FOR SCREENING MAMMOGRAM FOR MALIGNANT NEOPLASM OF BREAST: ICD-10-CM

## 2021-07-21 PROCEDURE — G9717 DOC PT DX DEP/BP F/U NT REQ: HCPCS | Performed by: INTERNAL MEDICINE

## 2021-07-21 PROCEDURE — G8752 SYS BP LESS 140: HCPCS | Performed by: INTERNAL MEDICINE

## 2021-07-21 PROCEDURE — 1090F PRES/ABSN URINE INCON ASSESS: CPT | Performed by: INTERNAL MEDICINE

## 2021-07-21 PROCEDURE — 3017F COLORECTAL CA SCREEN DOC REV: CPT | Performed by: INTERNAL MEDICINE

## 2021-07-21 PROCEDURE — G8754 DIAS BP LESS 90: HCPCS | Performed by: INTERNAL MEDICINE

## 2021-07-21 PROCEDURE — 99215 OFFICE O/P EST HI 40 MIN: CPT | Performed by: INTERNAL MEDICINE

## 2021-07-21 PROCEDURE — 1101F PT FALLS ASSESS-DOCD LE1/YR: CPT | Performed by: INTERNAL MEDICINE

## 2021-07-21 PROCEDURE — G8417 CALC BMI ABV UP PARAM F/U: HCPCS | Performed by: INTERNAL MEDICINE

## 2021-07-21 PROCEDURE — G8536 NO DOC ELDER MAL SCRN: HCPCS | Performed by: INTERNAL MEDICINE

## 2021-07-21 PROCEDURE — G0439 PPPS, SUBSEQ VISIT: HCPCS | Performed by: INTERNAL MEDICINE

## 2021-07-21 PROCEDURE — G9899 SCRN MAM PERF RSLTS DOC: HCPCS | Performed by: INTERNAL MEDICINE

## 2021-07-21 PROCEDURE — G8427 DOCREV CUR MEDS BY ELIG CLIN: HCPCS | Performed by: INTERNAL MEDICINE

## 2021-07-21 RX ORDER — ZOLPIDEM TARTRATE 10 MG/1
10 TABLET ORAL
Qty: 30 TABLET | Refills: 2 | Status: SHIPPED | OUTPATIENT
Start: 2021-07-21 | End: 2021-11-09 | Stop reason: SDUPTHER

## 2021-07-21 RX ORDER — FLUOXETINE HYDROCHLORIDE 40 MG/1
40 CAPSULE ORAL 2 TIMES DAILY
Qty: 180 CAPSULE | Refills: 1 | Status: SHIPPED | OUTPATIENT
Start: 2021-07-21 | End: 2021-12-12 | Stop reason: SDUPTHER

## 2021-07-21 NOTE — PROGRESS NOTES
Advance Care Planning     Advance Care Planning (ACP) Physician/NP/PA Conversation      Date of Conversation: 7/21/2021  Conducted with: Patient with Decision Making Capacity    Healthcare Decision Maker:     Primary Decision Maker: Reji Huddleston - Spouse - [phone]      Care Preferences:    Hospitalization: \"If your health worsens and it becomes clear that your chance of recovery is unlikely, what would be your preference regarding hospitalization? \"  The patient would prefer hospitalization. Ventilation: \"If you were unable to breathe on your own and your chance of recovery was unlikely, what would be your preference about the use of a ventilator (breathing machine) if it was available to you? \"   The patient would desire the use of a ventilator. Resuscitation: \"In the event your heart stopped as a result of an underlying serious health condition, would you want attempts to be made to restart your heart, or would you prefer a natural death? \"   Yes, attempt to resuscitate. Additional topics discussed: treatment goals    Conversation Outcomes / Follow-Up Plan:   Reports she has an Advance Directive at home. Asked her to bring in a copy for her chart.   Reviewed DNR/DNI and patient elects Full Code (Attempt Resuscitation)     Length of Voluntary ACP Conversation in minutes:  <16 minutes (Non-Billable)    Marek Calvert MD

## 2021-07-21 NOTE — LETTER
8/6/2021 7:59 AM    Ms. Jose Miranda [id number]    Results for orders placed or performed in visit on [birth year]   METABOLIC PANEL, COMPREHENSIVE   Result Value Ref Range    Glucose 98 65 - 99 mg/dL    BUN 14 8 - 27 mg/dL    Creatinine 0.79 0.57 - 1.00 mg/dL    GFR est non-AA 77 >59 mL/min/1.73    GFR est AA 88 >59 mL/min/1.73    BUN/Creatinine ratio 18 12 - 28    Sodium 145 (H) 134 - 144 mmol/L    Potassium 4.9 3.5 - 5.2 mmol/L    Chloride 108 (H) 96 - 106 mmol/L    CO2 24 20 - 29 mmol/L    Calcium 8.9 8.7 - 10.3 mg/dL    Protein, total 5.9 (L) 6.0 - 8.5 g/dL    Albumin 4.0 3.8 - 4.8 g/dL    GLOBULIN, TOTAL 1.9 1.5 - 4.5 g/dL    A-G Ratio 2.1 1.2 - 2.2    Bilirubin, total 0.3 0.0 - 1.2 mg/dL    Alk. phosphatase 68 48 - 121 IU/L    AST (SGOT) 14 0 - 40 IU/L    ALT (SGPT) 10 0 - 32 IU/L   CBC WITH AUTOMATED DIFF   Result Value Ref Range    WBC 6.9 3.4 - 10.8 x10E3/uL    RBC 4.84 3.77 - 5.28 x10E6/uL    HGB 13.0 11.1 - 15.9 g/dL    HCT 41.9 34.0 - 46.6 %    MCV 87 79 - 97 fL    MCH 26.9 26.6 - 33.0 pg    MCHC 31.0 (L) 31.5 - 35.7 g/dL    RDW 14.0 11.7 - 15.4 %    PLATELET 620 561 - 004 x10E3/uL    NEUTROPHILS 63 Not Estab. %    Lymphocytes 26 Not Estab. %    MONOCYTES 8 Not Estab. %    EOSINOPHILS 2 Not Estab. %    BASOPHILS 1 Not Estab. %    ABS. NEUTROPHILS 4.4 1.4 - 7.0 x10E3/uL    Abs Lymphocytes 1.8 0.7 - 3.1 x10E3/uL    ABS. MONOCYTES 0.5 0.1 - 0.9 x10E3/uL    ABS. EOSINOPHILS 0.1 0.0 - 0.4 x10E3/uL    ABS. BASOPHILS 0.1 0.0 - 0.2 x10E3/uL    IMMATURE GRANULOCYTES 0 Not Estab. %    ABS. IMM.  GRANS. 0.0 0.0 - 0.1 x10E3/uL   LIPID PANEL   Result Value Ref Range    Cholesterol, total 150 100 - 199 mg/dL    Triglyceride 92 0 - 149 mg/dL    HDL Cholesterol 63 >39 mg/dL    VLDL, calculated 17 5 - 40 mg/dL    LDL, calculated 70 0 - 99 mg/dL   VITAMIN D, 25 HYDROXY   Result Value Ref Range    VITAMIN D, 25-HYDROXY 26.4 (L) 30.0 - 100.0 ng/mL     RECOMMENDATIONS  Message sent to patient by My Chart:  Your labs showed normal kidney and liver tests, blood counts, and cholesterol.  Your sodium level was a little high.  This is often due to mild dehydration, so try to drink more water.  Your vitamin D level was a little low.  Make sure you are taking vitamin D 5000 units daily.         Sincerely,      Joao Mcgowan MD

## 2021-07-21 NOTE — PROGRESS NOTES
CC:   Chief Complaint   Patient presents with   [facility] Annual Wellness Visit     3C//     Knee Pain       HISTORY OF PRESENT ILLNESS  Eriac Stevens is a 73 y.o. female. Presents for follow up evaluation. Last seen by me 8 months ago. She has HTN, CAD s/p 2 MI's in 2006 and 2011, valvular heart disease, CHF (EF 35% in 9/16), ICD in place with hx lead revisions, depression with anxiety, obesity s/p gastric bypass in 2006. Complains of bilateral knee pain. No relief with cortisone injections, laser treatments, and gel injections. Had stem cell injection in right knee and it worsened pain so did not return to stand-alone pain center. A former orthopedic doctor recommended knee replacements but her cardiologist said she should not have surgery at that time. Takes ibuprofen 4 tablets at a time but does not help. Uses ice and heat and a pain gel without much relief. Has knee braces that she occasionally uses. Also does some leg lifts for the knees. Walks with a cane sometimes. Denies HA's, CP, SOB, dizziness, heart palpitations, or leg swelling. Home BP monitoring: not done. Echo 9/29/20: LVEF 25-30%. Severely dilated LA. Moderate MR. Scheduled for removal and replacement of ICD lead on 8/13/21. Other Providers: Lamont Freeman NP/Dr. Manuel Alcantara (Cardiology)     [place]  Jose Holland 2 living children; lives with , son, son's girlfriend, and 2 grandchildren. 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  Former smoker; quit in 2002.  Denies alcohol or recreational drug use. ROS  A complete review of systems was performed and is negative except for those mentioned in the HPI.     Patient Active Problem List   Diagnosis Code    HTN (hypertension) I10    History of gastric bypass Z98.84    Moderate episode of recurrent major depressive disorder ([facility] F33.1    CAD (coronary artery disease) V15.23    Systolic CHF, chronic (HCC) I50.22    Hyperlipidemia E78.5    Mitral valve regurgitation I34.0    History of MI (myocardial infarction) I25.2    Cardiomyopathy (HCC) I42.9    Osteoporosis M81.0    Morbid obesity with BMI of 40.0-44.9, adult ([facility]) E66.01, Z68.41    Chronic insomnia F51.04    S/P ICD (internal cardiac defibrillator) procedure Z95.810    Primary osteoarthritis of both knees M17.0    Mild intermittent asthma without complication R09.55    Generalized anxiety disorder F41.1     Past Medical History:   Diagnosis Date    Acute right ankle pain 6/8/2018 5/29/18: X-ray showed possible right ankle sprain. 6/6/18: saw Dr. Gilda Warren ([facility]), diagnosed with peroneal tendonitis. Prescribed an ASO    Asthma     Cardiomyopathy ([address]     Coronary artery disease 2008    s/p RCA stent ([facility]) on 11/26/11    Depression with anxiety     2011    DVT (deep venous thrombosis) ([address] 7/27/2010    Family history of early CAD     GERD (gastroesophageal reflux disease)     H/O gastric bypass 2006    Revision in 2009    Hepatitis C antibody test positive     Does not have chronic hep C (labs 10/6/15: neg HCV RNA)     HTN (hypertension) 7/27/2010    Hyperlipidemia 07/27/2010    Incontinence of feces 9/3/2018    Dr. Fracisco Quinteros. 8/20/18: Improving with pelvic physical therapy and psyllium husk treatment. Saw Dr. Raulito Dixon who suggested PT first. MR defecography showed pelvic floor weakness with pelvic descensus, small anterior  Rectocele, and vaginal prolapse. To have pelvic PT weekly for 8 wks and to see Dr. Raulito Dixon again in Oct 2018.     Joint pain 7/27/2010    MI (myocardial infarction) ([address] 7/27/2010    Mitral valve regurgitation     Mild to moderate    Morbid obesity ([address] 7/27/2010    Myocardial infarction [facility]) 2006 and 2011    Dr. Kwon An    Psychiatric disorder     PUD (peptic ulcer disease)     gi bleed 2008; ulcer n gastric bypass pouch    Urinary incontinence, stress 7/27/2010     Allergies   Allergen Reactions    Amoxicillin Anaphylaxis    Amoxicillin Anaphylaxis    Lipitor [Atorvastatin] Other (comments)     Severe muscle pain and spasms    Zocor [Simvastatin] Other (comments)     Cramps, muscle spasms    Buspar [Buspirone] Other (comments)     Drunk sensation, headaches    Hydroxyzine Other (comments)     Blurred vision, lightheadedness    Livalo [Pitavastatin] Myalgia    Pravastatin Other (comments)     Leg cramps       Current Outpatient Medications   Medication Sig Dispense Refill    FLUoxetine (PROzac) 40 mg capsule Take 1 Capsule by mouth two (2) times a day. 180 Capsule 1    zolpidem (AMBIEN) 10 mg tablet Take 1 Tablet by mouth nightly as needed for Sleep. Max Daily Amount: 10 mg. 30 Tablet 2    alirocumab (Praluent Pen) 150 mg/mL injector pen 1 mL by SubCUTAneous route Once every 2 weeks. 6 Pen 1    albuterol (PROVENTIL HFA, VENTOLIN HFA, PROAIR HFA) 90 mcg/actuation inhaler TAKE 2 PUFFS BY INHALATION EVERY FOUR (4) HOURS AS NEEDED FOR WHEEZING OR SHORTNESS OF BREATH. 18 Inhaler 11    potassium chloride (Klor-Con M20) 20 mEq tablet TAKE TWO TABLETS BY MOUTH DAILY 180 Tab 3    sacubitriL-valsartan (Entresto)  mg tablet Take 1 Tab by mouth two (2) times a day. 60 Tab 5    metoprolol succinate (TOPROL-XL) 25 mg XL tablet TAKE 1 TABLET BY MOUTH EVERY DAY AT NIGHT 30 Tab 11    bumetanide (BUMEX) 2 mg tablet TAKE 1 TABLET BY MOUTH TWICE A DAY 60 Tab 11    psyllium (METAMUCIL) powd Take  by mouth. 2 tsp daily      cholecalciferol, VITAMIN D3, (VITAMIN D3) 5,000 unit tab tablet Take 10,000 Units by mouth daily.  biotin 10,000 mcg cap Take  by mouth daily.  OTHER Complete multi formula, bariatric advantage      magnesium 250 mg tab Take 1 Tab by mouth daily.  ferrous sulfate (IRON, FERROUS SULFATE,) 325 mg (65 mg Iron) tablet Take  by mouth daily (before breakfast).  CALCIUM PO Take 600 mg by mouth daily.            PHYSICAL EXAM  Visit Vitals  /83 (BP 1 Location: Right arm, BP Patient Position: Sitting, BP Cuff Size: Large adult)   Pulse 65   Temp 98.7 °F (37.1 °C) (Oral)   Resp 16   Ht 5' 4\" (1.626 m)   Wt 262 lb 3.2 oz (118.9 kg)   SpO2 95%   BMI 45.01 kg/m²       General: Morbidly obese, no distress. HEENT:  Head normocephalic/atraumatic, no scleral icterus  Neck: Supple. No carotid bruits, JVD, lymphadenopathy, or thyromegaly. Lungs:  Clear to ausculation bilaterally. Good air movement. Heart:  Regular rate and rhythm, normal S1 and S2, no murmur, gallop, or rub  Extremities: No clubbing, cyanosis, or edema. Neurological: Alert and oriented. Psychiatric: Normal mood and affect. Behavior is normal.        ASSESSMENT AND PLAN    ICD-10-CM ICD-9-CM    1. Medicare annual wellness visit, subsequent  Z00.00 V70.0    2. Essential hypertension  B73 086.4 METABOLIC PANEL, COMPREHENSIVE      CBC WITH AUTOMATED DIFF      METABOLIC PANEL, COMPREHENSIVE      CBC WITH AUTOMATED DIFF   3. Mixed hyperlipidemia  E78.2 272.2 LIPID PANEL      LIPID PANEL   4. Primary osteoarthritis of both knees  M17.0 715.16 REFERRAL TO ORTHOPEDICS   5. Moderate episode of recurrent major depressive disorder (HCC)  F33.1 296.32 FLUoxetine (PROzac) 40 mg capsule   6. Generalized anxiety disorder  F41.1 300.02 FLUoxetine (PROzac) 40 mg capsule   7. Systolic CHF, chronic (HCC)  I50.22 428.22      428.0    8. Chronic insomnia  F51.04 780.52 zolpidem (AMBIEN) 10 mg tablet   9. Age-related osteoporosis without current pathological fracture  M81.0 733.01 VITAMIN D, 25 HYDROXY      VITAMIN D, 25 HYDROXY   10. Morbid obesity with BMI of 45.0-49.9, adult (HCC)  E66.01 278.01     Z68.42 V85.42    11. Encounter for screening mammogram for malignant neoplasm of breast  Z12.31 V76.12 BELEN MAMMO BI SCREENING INCL CAD   12. Advance care planning  Z71.89 V65.49      Diagnoses and all orders for this visit:    1. Medicare annual wellness visit, subsequent    2. Essential hypertension  Controlled. Continue metoprolol and Entresto.   -     METABOLIC PANEL, COMPREHENSIVE; Future  -     CBC WITH AUTOMATED DIFF; Future    3. Mixed hyperlipidemia  Stable on Praluent.  -     LIPID PANEL; Future    4. Primary osteoarthritis of both knees  Will refer to Dr. Jaime Olmedo for evaluation and management.  -     REFERRAL TO ORTHOPEDICS    5. Moderate episode of recurrent major depressive disorder ([facility]  Never found a new psychiatrist.  Recommended she does. -     Refill FLUoxetine (PROzac) 40 mg capsule; Take 1 Capsule by mouth two (2) times a day. For a psychiatrist, call:  [facility]  [address], [address] [facility]  [phone]    6. Generalized anxiety disorder  Stable. -     FLUoxetine (PROzac) 40 mg capsule; Take 1 Capsule by mouth two (2) times a day. 7. Systolic CHF, chronic ([facility]  Continue present management and follow up with Cardiology. 8. Chronic insomnia  -     Refill zolpidem (AMBIEN) 10 mg tablet; Take 1 Tablet by mouth nightly as needed for Sleep. Max Daily Amount: 10 mg.    9. Age-related osteoporosis without current pathological fracture  -     VITAMIN D, 25 HYDROXY; Future    10. Morbid obesity with BMI of 45.0-49.9, adult ([facility]    11. Encounter for screening mammogram for malignant neoplasm of breast  -     BELEN MAMMO BI SCREENING INCL CAD; Future    12. Advance care planning      Time Spent: 45 mins reviewing medical records, face-to-face time, placing orders, and documenting note    Follow-up and Dispositions    · Return in about 4 months (around 11/21/2021), or if symptoms worsen or fail to improve, for HTN, OA, anxiety. I have discussed the diagnosis with the patient and the intended plan as seen in the above orders. Patient is in agreement. The patient has received an after-visit summary and questions were answered concerning future plans. I have discussed medication side effects and warnings with the patient as well.

## 2021-07-21 NOTE — PATIENT INSTRUCTIONS
Medicare Wellness Visit, Female     The best way to live healthy is to have a lifestyle where you eat a well-balanced diet, exercise regularly, limit alcohol use, and quit all forms of tobacco/nicotine, if applicable. Regular preventive services are another way to keep healthy. Preventive services (vaccines, screening tests, monitoring & exams) can help personalize your care plan, which helps you manage your own care. Screening tests can find health problems at the earliest stages, when they are easiest to treat. [facility] follows the current, evidence-based guidelines published by the [facility] Mike Maldonado ([facility]) when recommending preventive services for our patients. Because we follow these guidelines, sometimes recommendations change over time as research supports it. (For example, mammograms used to be recommended annually. Even though Medicare will still pay for an annual mammogram, the newer guidelines recommend a mammogram every two years for women of average risk). Of course, you and your doctor may decide to screen more often for some diseases, based on your risk and your co-morbidities (chronic disease you are already diagnosed with). Preventive services for you include:  - Medicare offers their members a free annual wellness visit, which is time for you and your primary care provider to discuss and plan for your preventive service needs. Take advantage of this benefit every year!  -All adults over the age of 72 should receive the recommended pneumonia vaccines. Current USPSTF guidelines recommend a series of two vaccines for the best pneumonia protection.   -All adults should have a flu vaccine yearly and a tetanus vaccine every 10 years.   -All adults age 48 and older should receive the shingles vaccines (series of two vaccines).       -All adults age 38-68 who are overweight should have a diabetes screening test once every three years.   -All adults born between [birth year] and 1963 should be screened once for Hepatitis C.  -Other screening tests and preventive services for persons with diabetes include: an eye exam to screen for diabetic retinopathy, a kidney function test, a foot exam, and stricter control over your cholesterol.   -Cardiovascular screening for adults with routine risk involves an electrocardiogram (ECG) at intervals determined by your doctor.   -Colorectal cancer screenings should be done for adults age 54-65 with no increased risk factors for colorectal cancer. There are a number of acceptable methods of screening for this type of cancer. Each test has its own benefits and drawbacks. Discuss with your doctor what is most appropriate for you during your annual wellness visit. The different tests include: colonoscopy (considered the best screening method), a fecal occult blood test, a fecal DNA test, and sigmoidoscopy.    -A bone mass density test is recommended when a woman turns 65 to screen for osteoporosis. This test is only recommended one time, as a screening. Some providers will use this same test as a disease monitoring tool if you already have osteoporosis. -Breast cancer screenings are recommended every other year for women of normal risk, age 54-69.  -Cervical cancer screenings for women over age 72 are only recommended with certain risk factors.      Here is a list of your current Health Maintenance items (your personalized list of preventive services) with a due date:  Health Maintenance Due   Topic Date Due    COVID-19 Vaccine (1) Never done    Mammogram  2020         [facility]    [facility] [address], [place], [address] [facility]  [phone]

## 2021-07-21 NOTE — PROGRESS NOTES
Isac Briceno  Identified pt with two pt identifiers(name and ). Chief Complaint   Patient presents with    Follow-up     3C//     Knee Pain       Reviewed record In preparation for visit and have obtained necessary documentation. 1. Have you been to the ER, urgent care clinic or hospitalized since your last visit? No     2. Have you seen or consulted any other health care providers outside of the [address] [place] [address] since your last visit? Include any pap smears or colon screening. No    Patient does not have an advance directive. Vitals reviewed with provider.     Health Maintenance reviewed:     Health Maintenance Due   Topic    COVID-19 Vaccine (1)    Breast Cancer Screen Mammogram     Medicare Yearly Exam           Wt Readings from Last 3 Encounters:   21 262 lb 3.2 oz (118.9 kg)   21 258 lb (117 kg)   20 251 lb (113.9 kg)        Temp Readings from Last 3 Encounters:   21 98.7 °F (37.1 °C) (Oral)   21 98.2 °F (36.8 °C)   20 98.1 °F (36.7 °C)        BP Readings from Last 3 Encounters:   21 132/83   21 (!) 122/90   20 (!) 148/88        Pulse Readings from Last 3 Encounters:   21 65   21 65   21 89        Vitals:    21 0852   BP: 132/83   Pulse: 65   Resp: 16   Temp: 98.7 °F (37.1 °C)   TempSrc: Oral   SpO2: 95%   Weight: 262 lb 3.2 oz (118.9 kg)   Height: 5' 4\" (1.626 m)   PainSc:   6   PainLoc: Knee          Learning Assessment:   :       Learning Assessment 10/26/2017 2015   PRIMARY LEARNER Patient Patient   HIGHEST LEVEL OF EDUCATION - PRIMARY LEARNER  - 2 YEARS OF COLLEGE   BARRIERS PRIMARY LEARNER - NONE   CO-LEARNER CAREGIVER - No   PRIMARY LANGUAGE ENGLISH ENGLISH   LEARNER PREFERENCE PRIMARY READING DEMONSTRATION   ANSWERED BY patient pateint   RELATIONSHIP SELF SELF        Depression Screening:   :       3 most recent PHQ Screens 2021   PHQ Not Done -   Little interest or pleasure in doing things Not at all   Feeling down, depressed, irritable, or hopeless Not at all   Total Score PHQ 2 0        Fall Risk Assessment:   :       Fall Risk Assessment, last 12 mths 7/21/2021   Able to walk? Yes   Fall in past 12 months? 0   Do you feel unsteady? 1   Are you worried about falling 1   Is TUG test greater than 12 seconds? 0   Is the gait abnormal? 0   Number of falls in past 12 months -   Fall with injury? -        Abuse Screening:   :       Abuse Screening Questionnaire 7/13/2020 5/29/2018 7/26/2016 6/30/2015   Do you ever feel afraid of your partner? N N N N   Are you in a relationship with someone who physically or mentally threatens you? N N N N   Is it safe for you to go home?  Y Y Y Y        ADL Screening:   :       ADL Assessment 5/29/2018   Feeding yourself No Help Needed   Getting from bed to chair No Help Needed   Getting dressed No Help Needed   Bathing or showering No Help Needed   Walk across the room (includes cane/walker) No Help Needed   Using the telphone No Help Needed   Taking your medications No Help Needed   Preparing meals No Help Needed   Managing money (expenses/bills) No Help Needed   Moderately strenuous housework (laundry) No Help Needed   Shopping for personal items (toiletries/medicines) No Help Needed   Shopping for groceries No Help Needed   Driving No Help Needed   Climbing a flight of stairs No Help Needed   Getting to places beyond walking distances No Help Needed 113

## 2021-07-21 NOTE — PROGRESS NOTES
This is the Subsequent Medicare Annual Wellness Exam, performed 12 months or more after the Initial AWV or the last Subsequent AWV    I have reviewed the patient's medical history in detail and updated the computerized patient record. Assessment/Plan   Education and counseling provided:  Are appropriate based on today's review and evaluation  End-of-Life planning (with patient's consent)  Screening Mammography    1. Medicare annual wellness visit, subsequent  2. Moderate episode of recurrent major depressive disorder (HCC)  -     FLUoxetine (PROzac) 40 mg capsule; Take 1 Capsule by mouth two (2) times a day., Normal, Disp-180 Capsule, R-3  3. Generalized anxiety disorder  -     FLUoxetine (PROzac) 40 mg capsule; Take 1 Capsule by mouth two (2) times a day., Normal, Disp-180 Capsule, R-3  4. Chronic insomnia  -     zolpidem (AMBIEN) 10 mg tablet; Normal, Disp-30 Tablet, R-2Not to exceed 3 additional fills before 08/02/2021  5. Morbid obesity with BMI of 45.0-49.9, adult ([facility] [address]       Depression Risk Factor Screening     3 most recent PHQ Screens 7/21/2021   PHQ Not Done -   Little interest or pleasure in doing things Not at all   Feeling down, depressed, irritable, or hopeless Not at all   Total Score PHQ 2 0       Alcohol Risk Screen    Do you average more than 1 drink per night or more than 7 drinks a week:  No    On any one occasion in the past three months have you have had more than 3 drinks containing alcohol:  No        Functional Ability and Level of Safety    Hearing: Hearing is good. Activities of Daily Living: The home contains: handrails and grab bars  Patient does total self care      Ambulation: with mild difficulty     Fall Risk:  Fall Risk Assessment, last 12 mths 7/21/2021   Able to walk? Yes   Fall in past 12 months? 0   Do you feel unsteady? 1   Are you worried about falling 1   Is TUG test greater than 12 seconds?  0   Is the gait abnormal? 0   Number of falls in past 12 months -   Fall with injury? -      Abuse Screen:  Patient is not abused       Cognitive Screening    Has your family/caregiver stated any concerns about your memory: no     Cognitive Screening: normal on exam    Health Maintenance Due     Health Maintenance Due   Topic Date Due    COVID-19 Vaccine (1) Never done    Breast Cancer Screen Mammogram  09/11/2020       Patient Care Team   Patient Care Team:  eHidi Lyles MD as PCP - General (Internal Medicine)  Heidi Lyles MD as PCP - [address] [place] [address] Provider  Melba Aguilar MD (Cardiology)  Renita Morrison MD (Cardiology)  Abdiel Martínez NP (Nurse Practitioner)  Taet Gutierrez MD (Orthopedic Surgery)    History     Patient Active Problem List   Diagnosis Code    HTN (hypertension) I10    History of gastric bypass Z98.84    Moderate episode of recurrent major depressive disorder ([facility] [address] F33.1    CAD (coronary artery disease) W63.91    Systolic CHF, chronic ([address] I50.22    Hyperlipidemia E78.5    Mitral valve regurgitation I34.0    History of MI (myocardial infarction) I25.2    Cardiomyopathy ([address] I42.9    Osteoporosis M81.0    Morbid obesity with BMI of 40.0-44.9, adult ([facility] [address] E66.01, Z68.41    Chronic insomnia F51.04    S/P ICD (internal cardiac defibrillator) procedure Z95.810    Primary osteoarthritis of both knees M17.0    Mild intermittent asthma without complication C14.98    Generalized anxiety disorder F41.1     Past Medical History:   Diagnosis Date    Acute right ankle pain 6/8/2018 5/29/18: X-ray showed possible right ankle sprain. 6/6/18: saw Dr. Ginger Anderson ([facility]), diagnosed with peroneal tendonitis.  Prescribed an ASO    Asthma     Cardiomyopathy ([address]     Coronary artery disease 2008    s/p RCA stent ([facility]) on 11/26/11    Depression with anxiety     2011    DVT (deep venous thrombosis) ([address] 7/27/2010    Family history of early CAD     GERD (gastroesophageal reflux disease)     H/O gastric bypass 2006    Revision in 2009    Hepatitis C antibody test positive     Does not have chronic hep C (labs 10/6/15: neg HCV RNA)     HTN (hypertension) 7/27/2010    Hyperlipidemia 07/27/2010    Incontinence of feces 9/3/2018    Dr. Ashley Vega. 8/20/18: Improving with pelvic physical therapy and psyllium husk treatment. Saw Dr. Fabiano Armendariz who suggested PT first. MR defecography showed pelvic floor weakness with pelvic descensus, small anterior  Rectocele, and vaginal prolapse. To have pelvic PT weekly for 8 wks and to see Dr. Fabiano Armendariz again in Oct 2018.  Joint pain 7/27/2010    MI (myocardial infarction) ([facility] [address] 7/27/2010    Mitral valve regurgitation     Mild to moderate    Morbid obesity ([facility] [address] 7/27/2010    Myocardial infarction [facility]) 2006 and 2011    Dr. Gay Gorman disorder     PUD (peptic ulcer disease)     gi bleed 2008; ulcer n gastric bypass pouch    Urinary incontinence, stress 7/27/2010      Past Surgical History:   Procedure Laterality Date    COLONOSCOPY N/A 6/29/2018    COLONOSCOPY performed by Carrie Iglesias MD at [facility] ENDOSCOPY; redundant colon, int hemorrhoids, pathology: normal colonic mucosa    HX COLONOSCOPY  9/5/14    Dr. Daugherty Batch; normal, repeat in 5 yrs    HX GASTRIC BYPASS      HX IMPLANTABLE CARDIOVERTER DEFIBRILLATOR  08/24/2016    HX LAP GASTRIC BYPASS  2006    revision 2009/Dr. Sharon Marroquin    HX OTHER SURGICAL  09/19/2016    Removal of right ventricular ICD lead & single chamber transvenous AICD    HX OTHER SURGICAL  10/12/2016    pocket revison of ICD; Dr. Hershal Gowers  06/07/2018    Dr Ashley Vega; high resolution anorectal manaometry-abnormal study. Study done for eval of fecal incontinence    HX OTHER SURGICAL  12/14/2018    Dr. Ashley Vega. Rectal endoscopic US (EUS) for rectal incontinence. Normal rectal mucosa, no fistulas.     HX PACEMAKER      sicd/left side of chest under arm    INS PPM/ICD LED SING ONLY  8/24/2016         INS PPM/ICD LED SING ONLY  8/26/2016         INS PPM/ICD LED SING ONLY  9/21/2016         GA CARDIAC SURG PROCEDURE UNLIST      Stent x 5-6,Most recent 2011    GA LAP,STOMACH,OTHER,W/O TUBE  04/05/2010    Revision GBP     Current Outpatient Medications   Medication Sig Dispense Refill    zolpidem (AMBIEN) 10 mg tablet TAKE 1 TABLET BY MOUTH EVERYDAY AT BEDTIME 30 Tab 2    FLUoxetine (PROzac) 40 mg capsule Take 1 Cap by mouth two (2) times a day. 180 Cap 3    alirocumab (Praluent Pen) 150 mg/mL injector pen 1 mL by SubCUTAneous route Once every 2 weeks. 6 Pen 1    albuterol (PROVENTIL HFA, VENTOLIN HFA, PROAIR HFA) 90 mcg/actuation inhaler TAKE 2 PUFFS BY INHALATION EVERY FOUR (4) HOURS AS NEEDED FOR WHEEZING OR SHORTNESS OF BREATH. 18 Inhaler 11    potassium chloride (Klor-Con M20) 20 mEq tablet TAKE TWO TABLETS BY MOUTH DAILY 180 Tab 3    sacubitriL-valsartan (Entresto)  mg tablet Take 1 Tab by mouth two (2) times a day. 60 Tab 5    metoprolol succinate (TOPROL-XL) 25 mg XL tablet TAKE 1 TABLET BY MOUTH EVERY DAY AT NIGHT 30 Tab 11    bumetanide (BUMEX) 2 mg tablet TAKE 1 TABLET BY MOUTH TWICE A DAY 60 Tab 11    psyllium (METAMUCIL) powd Take  by mouth. 2 tsp daily      cholecalciferol, VITAMIN D3, (VITAMIN D3) 5,000 unit tab tablet Take 10,000 Units by mouth daily.  biotin 10,000 mcg cap Take  by mouth daily.  OTHER Complete multi formula, bariatric advantage      magnesium 250 mg tab Take 1 Tab by mouth daily.  ferrous sulfate (IRON, FERROUS SULFATE,) 325 mg (65 mg Iron) tablet Take  by mouth daily (before breakfast).  CALCIUM PO Take 600 mg by mouth daily.        Allergies   Allergen Reactions    Amoxicillin Anaphylaxis    Amoxicillin Anaphylaxis    Lipitor [Atorvastatin] Other (comments)     Severe muscle pain and spasms    Zocor [Simvastatin] Other (comments)     Cramps, muscle spasms    Buspar [Buspirone] Other (comments)     Drunk sensation, headaches    Hydroxyzine Other (comments)     Blurred vision, lightheadedness    Livalo [Pitavastatin] Myalgia    Pravastatin Other (comments)     Leg cramps       Family History   Problem Relation Age of Onset    Dementia Mother     Cancer Mother         colon    Alzheimer Mother     Cancer Father         stomach    Heart Disease Father     Hypertension Father     Heart Disease Sister     Stroke Sister     Heart Attack Brother      Social History     Tobacco Use    Smoking status: Former Smoker     Packs/day: 1.00     Years: 30.00     Pack years: 30.00     Start date: 1970     Quit date: 2004     Years since quittin.1    Smokeless tobacco: Never Used   Substance Use Topics    Alcohol use: No     Alcohol/week: 0.0 standard drinks         Parth Alaniz MD 71yoF presents with upper back pain radiating to chest, worsened this am. Will obtain EKG, labs, CTA chest/abd to r/o dissection. Given morphine/tylenol for pain. Given premedication meds for IV contrast 2/2 possible anaphylactic allergy to naproxen/ibuprofen.

## 2021-07-26 ENCOUNTER — DOCUMENTATION ONLY (OUTPATIENT)
Dept: CARDIOLOGY CLINIC | Age: 71
End: 2021-07-26

## 2021-07-26 NOTE — PROGRESS NOTES
Device battery has reached End of Life (EOL) on Jul 20, 2021 11:02 EDT and device should be replaced. Remote monitoring disabled. Patient scheduled for generator change 8-13-21.        Future Appointments   Date Time Provider Denis Natali   8/9/2021  1:30 PM JOSE ALBERTO RIVERS AMB   8/9/2021  4:00 PM ECHOTWJOSE ALBERTO PHELPS AMB   8/10/2021  1:30 PM JOSE ALBERTO RIVERS AMB   8/10/2021  3:20 PM MD RANDOLPH Kellogg AMB   8/27/2021 10:00 AM [email or web address] ALBERTO AMB   8/27/2021 10:30 AM WOUND CHECKS, JOSE ALBERTO VILCHIS AMB

## 2021-07-31 LAB
25(OH)D3+25(OH)D2 SERPL-MCNC: 26.4 NG/ML (ref 30–100)
ALBUMIN SERPL-MCNC: 4 G/DL (ref 3.8–4.8)
ALBUMIN/GLOB SERPL: 2.1 {RATIO} (ref 1.2–2.2)
ALP SERPL-CCNC: 68 IU/L (ref 48–121)
ALT SERPL-CCNC: 10 IU/L (ref 0–32)
AST SERPL-CCNC: 14 IU/L (ref 0–40)
BASOPHILS # BLD AUTO: 0.1 X10E3/UL (ref 0–0.2)
BASOPHILS NFR BLD AUTO: 1 %
BILIRUB SERPL-MCNC: 0.3 MG/DL (ref 0–1.2)
BUN SERPL-MCNC: 14 MG/DL (ref 8–27)
BUN/CREAT SERPL: 18 (ref 12–28)
CALCIUM SERPL-MCNC: 8.9 MG/DL (ref 8.7–10.3)
CHLORIDE SERPL-SCNC: 108 MMOL/L (ref 96–106)
CHOLEST SERPL-MCNC: 150 MG/DL (ref 100–199)
CO2 SERPL-SCNC: 24 MMOL/L (ref 20–29)
CREAT SERPL-MCNC: 0.79 MG/DL (ref 0.57–1)
EOSINOPHIL # BLD AUTO: 0.1 X10E3/UL (ref 0–0.4)
EOSINOPHIL NFR BLD AUTO: 2 %
ERYTHROCYTE [DISTWIDTH] IN BLOOD BY AUTOMATED COUNT: 14 % (ref 11.7–15.4)
GLOBULIN SER CALC-MCNC: 1.9 G/DL (ref 1.5–4.5)
GLUCOSE SERPL-MCNC: 98 MG/DL (ref 65–99)
HCT VFR BLD AUTO: 41.9 % (ref 34–46.6)
HDLC SERPL-MCNC: 63 MG/DL
HGB BLD-MCNC: 13 G/DL (ref 11.1–15.9)
IMM GRANULOCYTES # BLD AUTO: 0 X10E3/UL (ref 0–0.1)
IMM GRANULOCYTES NFR BLD AUTO: 0 %
LDLC SERPL CALC-MCNC: 70 MG/DL (ref 0–99)
LYMPHOCYTES # BLD AUTO: 1.8 X10E3/UL (ref 0.7–3.1)
LYMPHOCYTES NFR BLD AUTO: 26 %
MCH RBC QN AUTO: 26.9 PG (ref 26.6–33)
MCHC RBC AUTO-ENTMCNC: 31 G/DL (ref 31.5–35.7)
MCV RBC AUTO: 87 FL (ref 79–97)
MONOCYTES # BLD AUTO: 0.5 X10E3/UL (ref 0.1–0.9)
MONOCYTES NFR BLD AUTO: 8 %
NEUTROPHILS # BLD AUTO: 4.4 X10E3/UL (ref 1.4–7)
NEUTROPHILS NFR BLD AUTO: 63 %
PLATELET # BLD AUTO: 240 X10E3/UL (ref 150–450)
POTASSIUM SERPL-SCNC: 4.9 MMOL/L (ref 3.5–5.2)
PROT SERPL-MCNC: 5.9 G/DL (ref 6–8.5)
RBC # BLD AUTO: 4.84 X10E6/UL (ref 3.77–5.28)
SODIUM SERPL-SCNC: 145 MMOL/L (ref 134–144)
TRIGL SERPL-MCNC: 92 MG/DL (ref 0–149)
VLDLC SERPL CALC-MCNC: 17 MG/DL (ref 5–40)
WBC # BLD AUTO: 6.9 X10E3/UL (ref 3.4–10.8)

## 2021-08-01 NOTE — PROGRESS NOTES
Message sent to patient by My Chart:Your labs showed normal kidney and liver tests, blood counts, and cholesterol. Your sodium level was a little high. This is often due to mild dehydration, so try to drink more water. Your vitamin D level was a little low. Make sure you are taking vitamin D 5000 units daily.     Dr. Shamir Berger

## 2021-08-04 RX ORDER — SODIUM CHLORIDE 0.9 % (FLUSH) 0.9 %
5-40 SYRINGE (ML) INJECTION EVERY 8 HOURS
Status: CANCELLED | OUTPATIENT
Start: 2021-08-04

## 2021-08-04 RX ORDER — VANCOMYCIN 2 GRAM/500 ML IN 0.9 % SODIUM CHLORIDE INTRAVENOUS
2 ONCE
Status: CANCELLED | OUTPATIENT
Start: 2021-08-04 | End: 2021-08-04

## 2021-08-04 RX ORDER — SODIUM CHLORIDE 0.9 % (FLUSH) 0.9 %
5-40 SYRINGE (ML) INJECTION AS NEEDED
Status: CANCELLED | OUTPATIENT
Start: 2021-08-04

## 2021-08-09 ENCOUNTER — ANCILLARY PROCEDURE (OUTPATIENT)
Dept: CARDIOLOGY CLINIC | Age: 71
End: 2021-08-09
Payer: MEDICARE

## 2021-08-09 VITALS
BODY MASS INDEX: 44.73 KG/M2 | SYSTOLIC BLOOD PRESSURE: 124 MMHG | HEIGHT: 64 IN | WEIGHT: 262 LBS | DIASTOLIC BLOOD PRESSURE: 82 MMHG

## 2021-08-09 DIAGNOSIS — I42.9 CARDIOMYOPATHY, UNSPECIFIED TYPE (HCC): ICD-10-CM

## 2021-08-09 DIAGNOSIS — I25.2 HISTORY OF MI (MYOCARDIAL INFARCTION): ICD-10-CM

## 2021-08-09 DIAGNOSIS — I34.0 NONRHEUMATIC MITRAL VALVE REGURGITATION: ICD-10-CM

## 2021-08-09 DIAGNOSIS — I25.10 CORONARY ARTERY DISEASE INVOLVING NATIVE CORONARY ARTERY OF NATIVE HEART WITHOUT ANGINA PECTORIS: ICD-10-CM

## 2021-08-09 DIAGNOSIS — I50.22 SYSTOLIC CHF, CHRONIC (HCC): ICD-10-CM

## 2021-08-09 DIAGNOSIS — E66.01 MORBID OBESITY WITH BMI OF 40.0-44.9, ADULT (HCC): ICD-10-CM

## 2021-08-09 DIAGNOSIS — Z95.810 S/P ICD (INTERNAL CARDIAC DEFIBRILLATOR) PROCEDURE: ICD-10-CM

## 2021-08-09 DIAGNOSIS — E78.2 MIXED HYPERLIPIDEMIA: ICD-10-CM

## 2021-08-09 DIAGNOSIS — I10 ESSENTIAL HYPERTENSION: ICD-10-CM

## 2021-08-09 PROCEDURE — 78452 HT MUSCLE IMAGE SPECT MULT: CPT | Performed by: INTERNAL MEDICINE

## 2021-08-09 PROCEDURE — 93306 TTE W/DOPPLER COMPLETE: CPT | Performed by: INTERNAL MEDICINE

## 2021-08-09 PROCEDURE — 93018 CV STRESS TEST I&R ONLY: CPT | Performed by: INTERNAL MEDICINE

## 2021-08-09 PROCEDURE — 93016 CV STRESS TEST SUPVJ ONLY: CPT | Performed by: INTERNAL MEDICINE

## 2021-08-09 PROCEDURE — A9500 TC99M SESTAMIBI: HCPCS | Performed by: INTERNAL MEDICINE

## 2021-08-09 PROCEDURE — 93017 CV STRESS TEST TRACING ONLY: CPT | Performed by: INTERNAL MEDICINE

## 2021-08-09 RX ORDER — TETRAKIS(2-METHOXYISOBUTYLISOCYANIDE)COPPER(I) TETRAFLUOROBORATE 1 MG/ML
40 INJECTION, POWDER, LYOPHILIZED, FOR SOLUTION INTRAVENOUS ONCE
Status: COMPLETED | OUTPATIENT
Start: 2021-08-09 | End: 2021-08-09

## 2021-08-09 RX ADMIN — TETRAKIS(2-METHOXYISOBUTYLISOCYANIDE)COPPER(I) TETRAFLUOROBORATE 25.1 MILLICURIE: 1 INJECTION, POWDER, LYOPHILIZED, FOR SOLUTION INTRAVENOUS at 13:50

## 2021-08-09 RX ADMIN — REGADENOSON 0.4 MG: 0.08 INJECTION, SOLUTION INTRAVENOUS at 13:50

## 2021-08-10 ENCOUNTER — APPOINTMENT (OUTPATIENT)
Dept: CARDIOLOGY CLINIC | Age: 71
End: 2021-08-10

## 2021-08-10 ENCOUNTER — OFFICE VISIT (OUTPATIENT)
Dept: CARDIOLOGY CLINIC | Age: 71
End: 2021-08-10
Payer: MEDICARE

## 2021-08-10 VITALS
HEIGHT: 64 IN | SYSTOLIC BLOOD PRESSURE: 122 MMHG | HEART RATE: 76 BPM | WEIGHT: 262 LBS | OXYGEN SATURATION: 98 % | BODY MASS INDEX: 44.73 KG/M2 | DIASTOLIC BLOOD PRESSURE: 80 MMHG | RESPIRATION RATE: 14 BRPM

## 2021-08-10 DIAGNOSIS — I20.9 ANGINA PECTORIS, UNSPECIFIED (HCC): Primary | ICD-10-CM

## 2021-08-10 DIAGNOSIS — I10 ESSENTIAL HYPERTENSION: ICD-10-CM

## 2021-08-10 DIAGNOSIS — I25.10 CORONARY ARTERY DISEASE INVOLVING NATIVE CORONARY ARTERY OF NATIVE HEART WITHOUT ANGINA PECTORIS: ICD-10-CM

## 2021-08-10 DIAGNOSIS — I50.22 SYSTOLIC CHF, CHRONIC (HCC): ICD-10-CM

## 2021-08-10 LAB
ECHO AO ROOT DIAM: 2.92 CM
ECHO AV AREA PEAK VELOCITY: 1.4 CM2
ECHO AV AREA VTI: 1.26 CM2
ECHO AV AREA/BSA PEAK VELOCITY: 0.6 CM2/M2
ECHO AV AREA/BSA VTI: 0.6 CM2/M2
ECHO AV MEAN GRADIENT: 11.18 MMHG
ECHO AV PEAK GRADIENT: 17.64 MMHG
ECHO AV PEAK VELOCITY: 209.97 CM/S
ECHO AV VTI: 42.81 CM
ECHO IVC PROX: 1.3 CM
ECHO LA AREA 4C: 31.41 CM2
ECHO LA MAJOR AXIS: 5.29 CM
ECHO LA MINOR AXIS: 2.42 CM
ECHO LA VOL 2C: 97.89 ML (ref 22–52)
ECHO LA VOL 4C: 118.63 ML (ref 22–52)
ECHO LA VOL BP: 117.21 ML (ref 22–52)
ECHO LA VOL/BSA BIPLANE: 53.52 ML/M2 (ref 16–28)
ECHO LA VOLUME INDEX A2C: 44.7 ML/M2 (ref 16–28)
ECHO LA VOLUME INDEX A4C: 54.17 ML/M2 (ref 16–28)
ECHO LV E' LATERAL VELOCITY: 8.26 CM/S
ECHO LV E' SEPTAL VELOCITY: 3.76 CM/S
ECHO LV EDV A2C: 160.6 ML
ECHO LV EDV A4C: 170.87 ML
ECHO LV EDV BP: 166.56 ML (ref 56–104)
ECHO LV EDV INDEX A4C: 78 ML/M2
ECHO LV EDV INDEX BP: 76.1 ML/M2
ECHO LV EDV NDEX A2C: 73.3 ML/M2
ECHO LV EJECTION FRACTION A2C: 28 PERCENT
ECHO LV EJECTION FRACTION A4C: 29 PERCENT
ECHO LV EJECTION FRACTION BIPLANE: 28 PERCENT (ref 55–100)
ECHO LV ESV A2C: 115.85 ML
ECHO LV ESV A4C: 121.94 ML
ECHO LV ESV BP: 119.98 ML (ref 19–49)
ECHO LV ESV INDEX A2C: 52.9 ML/M2
ECHO LV ESV INDEX A4C: 55.7 ML/M2
ECHO LV ESV INDEX BP: 54.8 ML/M2
ECHO LV INTERNAL DIMENSION DIASTOLIC: 6.71 CM (ref 3.9–5.3)
ECHO LV INTERNAL DIMENSION SYSTOLIC: 5.45 CM
ECHO LV IVSD: 1.26 CM (ref 0.6–0.9)
ECHO LV MASS 2D: 384.5 G (ref 67–162)
ECHO LV MASS INDEX 2D: 175.6 G/M2 (ref 43–95)
ECHO LV POSTERIOR WALL DIASTOLIC: 1.17 CM (ref 0.6–0.9)
ECHO LVOT DIAM: 1.91 CM
ECHO LVOT PEAK GRADIENT: 4.25 MMHG
ECHO LVOT PEAK VELOCITY: 103.12 CM/S
ECHO LVOT SV: 53.9 ML
ECHO LVOT VTI: 18.89 CM
ECHO MV A VELOCITY: 103.33 CM/S
ECHO MV E DECELERATION TIME (DT): 170.81 MS
ECHO MV E VELOCITY: 65.7 CM/S
ECHO MV E/A RATIO: 0.64
ECHO MV E/E' LATERAL: 7.95
ECHO MV E/E' RATIO (AVERAGED): 12.71
ECHO MV E/E' SEPTAL: 17.47
ECHO PV MAX VELOCITY: 106.06 CM/S
ECHO PV PEAK INSTANTANEOUS GRADIENT SYSTOLIC: 4.5 MMHG
ECHO RA MINOR AXIS: 3.37 CM
ECHO RV INTERNAL DIMENSION: 3.19 CM
ECHO TV REGURGITANT MAX VELOCITY: 247.42 CM/S
ECHO TV REGURGITANT PEAK GRADIENT: 24.49 MMHG
LA VOL DISK BP: 110.69 ML (ref 22–52)

## 2021-08-10 PROCEDURE — 1101F PT FALLS ASSESS-DOCD LE1/YR: CPT | Performed by: INTERNAL MEDICINE

## 2021-08-10 PROCEDURE — 1090F PRES/ABSN URINE INCON ASSESS: CPT | Performed by: INTERNAL MEDICINE

## 2021-08-10 PROCEDURE — 3017F COLORECTAL CA SCREEN DOC REV: CPT | Performed by: INTERNAL MEDICINE

## 2021-08-10 PROCEDURE — G8417 CALC BMI ABV UP PARAM F/U: HCPCS | Performed by: INTERNAL MEDICINE

## 2021-08-10 PROCEDURE — G8754 DIAS BP LESS 90: HCPCS | Performed by: INTERNAL MEDICINE

## 2021-08-10 PROCEDURE — 99214 OFFICE O/P EST MOD 30 MIN: CPT | Performed by: INTERNAL MEDICINE

## 2021-08-10 PROCEDURE — G0463 HOSPITAL OUTPT CLINIC VISIT: HCPCS | Performed by: INTERNAL MEDICINE

## 2021-08-10 PROCEDURE — G8427 DOCREV CUR MEDS BY ELIG CLIN: HCPCS | Performed by: INTERNAL MEDICINE

## 2021-08-10 PROCEDURE — G8752 SYS BP LESS 140: HCPCS | Performed by: INTERNAL MEDICINE

## 2021-08-10 PROCEDURE — G9899 SCRN MAM PERF RSLTS DOC: HCPCS | Performed by: INTERNAL MEDICINE

## 2021-08-10 PROCEDURE — G9717 DOC PT DX DEP/BP F/U NT REQ: HCPCS | Performed by: INTERNAL MEDICINE

## 2021-08-10 PROCEDURE — G8536 NO DOC ELDER MAL SCRN: HCPCS | Performed by: INTERNAL MEDICINE

## 2021-08-10 RX ORDER — TETRAKIS(2-METHOXYISOBUTYLISOCYANIDE)COPPER(I) TETRAFLUOROBORATE 1 MG/ML
40 INJECTION, POWDER, LYOPHILIZED, FOR SOLUTION INTRAVENOUS ONCE
Status: COMPLETED | OUTPATIENT
Start: 2021-08-10 | End: 2021-08-10

## 2021-08-10 RX ORDER — TETRAKIS(2-METHOXYISOBUTYLISOCYANIDE)COPPER(I) TETRAFLUOROBORATE 1 MG/ML
10 INJECTION, POWDER, LYOPHILIZED, FOR SOLUTION INTRAVENOUS ONCE
Status: DISCONTINUED | OUTPATIENT
Start: 2021-08-10 | End: 2021-08-10

## 2021-08-10 RX ORDER — TETRAKIS(2-METHOXYISOBUTYLISOCYANIDE)COPPER(I) TETRAFLUOROBORATE 1 MG/ML
30 INJECTION, POWDER, LYOPHILIZED, FOR SOLUTION INTRAVENOUS ONCE
Status: DISCONTINUED | OUTPATIENT
Start: 2021-08-10 | End: 2021-08-10

## 2021-08-10 RX ADMIN — TECHNETIUM TC 99M SESTAMIBI 25.8 MILLICURIE: 1 INJECTION INTRAVENOUS at 13:30

## 2021-08-10 NOTE — PROGRESS NOTES
CHIEF COMPLAINT      Iasc Briceno is a 73 y.o. female who was seen today for CHF. History of Present Illness:    She is getting more short of breath and more tired. Lateral infarct and ischemia. S-icd replacement planned. Feeling more tired an heart racing after walking. No PND or orthopnea  [facility], tired during the day but not sleeping well. Just no energy. She has dyspnea with exertion, no PND, sleeping on 2 pillows  No chest pain or palpitations  Wakes up at night with leg cramps  Bystolic caused alopecia and more dyspnea with exertion  Doing fine on repatha  LE edema doing well   No diziness or syncope    Bp high last time and entresto was increased then it was too low and she was dizzzy/ not good so went back down. Hasnt used her metolazone but she will restart it. She continues to have a lot of personal stress but seems to be doing better than at her last visit with managing it all. She is due to follow-up with Dr. Litzy Singletary- replace single lead ICD. Had her stress testin 8/21 then cath which showed chronic stable CAD. She will continue on GDMT as currently prescribed. Her blood pressure looks good today    Assessment/Plan:  1. Chronic systolic heart failure - LVEF 25-30% by last TTE, s/p AICD placement with Dr. Litzy Singletary and subsequent lead revisions and repeat procedures due to non-healing midsternal incision  -last revision for AICD in November 2016 with Dr. Litzy Singletary, last ICD check without arrhythmias   -continue Entresto to 49/51 one tablet BID and Toprol XL, had hair loss on coreg and bystolic   -off spironolactone due to hypotension, continue bumex 2mg BID, using Metolazone PRN, not very often, once every other week  2. CAD - hx of MI x 2 and multiple stents, she believes she may have 6 or 7 stents, last cardiac cath without progression of CAD   -reports having an MI in 11/2011 and received PCI to RCA at that time, previous MI in 2006 or 2007  3.  Mitral regurgitation - mod by TTE   4. Asthma - x 15 - 16 years, has not seen pulmonologist in years  11. HTN - well controlled on current medications   6. Primary Hyperlipidemia - statin failures, simvastatin caused muscle spasms and cramps, atorvastatin caused pain shooting all over her body, pravastatin 40mg and 20mg caused myalgias and leg cramps, had myalgias on Livalo, could not tolerate zetia either   -not able to take any statin at any dose  -now on repatha, LDL <50  7. DM Type 2 - resolved with gastric bypass  8. Hypokalemia -off spironolactone and on KCL 40meq daily   9. Hypomagnesemia - on OTC magnesium       10. Morbid obesity - Body mass index is 44.97 kg/m². Briseida Jones weighed 416 lbs at her heaviest weight, s/p gastric bypass in 2007 with revision a few years later, weight down to 239 lbs at one point, weighed 251 lbs at her last visit, encouraged regular exercise up to 258 today  11. PUD - had EGD and cauterized bleeding ulcer, not on PPI anymore  12. Vitamin D deficiency - on 10,000 units daily    13. Anxiety - plans to see psychiatry for management   14. PAD/Carotid stenosis - 10-49% bilateral ICA stenosis, continue ASA and repatha    Cath 8/21  RI with occluded distal LCx, dista LAD->RI collaterals  Echo 8/21 EF 25-30% ghk, lae mild AS mod MR  Echo 9/20 - LVEF 25-30%, mod MR  Echo 10/18 - LVEF 35%, mild to mod MR   Echo 5/18 - LVEF 25 %-30 %, akinesis of the basal-mid inferoseptal and basal-mid inferolateral wall(s), severe hypokinesis of the entire inferior wall(s), grade 1 dd, mild to mod dilated LA, mild to mod MR  Carotid duplex 5/18 - 10-49% bilateral ICA stenosis  Echo: 4/17, EF 35-40%, inf HK gr1dd  Nuclear: 4/17, EF 36%, anterior ischemia, lateral infarct.   TTE 9/16 - LVEF 35%, moderate hypokinesis of the basal-mid inferolateral wall(s), grd 1 dd, dilated LA, mod MR, mild TR  8/26/16 - RV lead revision by Dr. Pradeep Ferris  8/24/16 - single chamber AICD placed by Dr. Pradeep Ferris [facility], serial # [id number], model # [id number]. The ventricular lead: model # S6698642 , serial # [id number])  MUGA  - LVEF 27%  Holter  - no arrhythmias  Echo  - LV mildly dilated, LVEF 40%, moderate diffuse hypokinesis with regional variations, severe hypokinesis of the basal-mid inferior wall(s), grd 1 dd, mod dilated LA, atrial septum bows from left to right, consistent with increased left atrial pressure, mildly dilated RA, mild to near moderate MR    OLIVER  - normal  Nuc 5/15 EF 31% large anterolateral infarct with periinfarct reversibility, 13% LV reversible(32% infarct at rest, 45% at stress)    Echo 2015 - LVEF 30-35%, grd 1 dd, mod LAE, mild to mod MR  Nuc Stress  - small reversibility in anteroapical wall, LVEF 31%  Cardiac Cath 3/13 - patent stents in LAD, LCx and RCA, LVEF 30%, severe inferior HK, LCx relatively small vessel with patent stent in proximal LCx, RCA is large and dominant with patent stents in proximal and mid RCA, distal RCA free of disease, LAD normal and large vessel which coursed around apex  Echo 2011 - LVEF 30%, mild MR  Cardiac Cath 2011 - s/p PCI with AMRIT to 100% mid RCA, also had 40% mid LAD lesion, 50% diagonal 1 lesion, 100% distal LCx lesion, 60% OM1 lesion, 100% proximal Ramus lesion      Soc Hx: quit tobacco use x 13 years ago, used to smoke 1ppd, no etoh use, no drug use, lives with , son, daughter in law, grandson, retired/on disability  Fam Hx: father in his 62s when he had a MI, had 3 vessel CABG in his 62s, passed from fall but had cancer too, mother lived to age 80 and  from Alzheimer's, brother had MI in his 62s, sister had aortic repair for aneurysm in her 76s    We discussed the expected course, resolution and complications of the diagnosis(es) in detail. Medication risks, benefits, costs, interactions, and alternatives were discussed as indicated.   I advised her to contact the office if her condition worsens, changes or fails to improve as anticipated. She expressed understanding with the diagnosis(es) and plan    Patient was made aware and verbalized understanding that an appointment will be scheduled for them for a virtual visit and/or office visit within the above time frame. Patient understanding his/her responsibility to call and change time/date if he/she so chooses. PAST MEDICAL HISTORY     Past Medical History:   Diagnosis Date    Acute right ankle pain 6/8/2018 5/29/18: X-ray showed possible right ankle sprain. 6/6/18: saw [facility] Tona Coyne ([facility]), diagnosed with peroneal tendonitis. Prescribed an ASO    Asthma     Cardiomy[facility]     Coronary artery disease 2008    s/p RCA stent (AMRIT) on 11/26/11    Depression with anxiety     2011    [facility] 7/27/2010    Family history of early CAD     GERD (gastroesophageal reflux disease)     H/O gastric bypass 2006    Revision in 2009    Hepatitis C antibody test positive     Does not have chronic hep C (labs 10/6/15: neg HCV RNA)     HTN (hypertension) 7/27/2010    Hyperlipidemia 07/27/2010    Incontinence of feces 9/3/2018    Dr. Reid Cassette. 8/20/18: Improving with pelvic physical therapy and psyllium husk treatment. Saw Dr. Priyanka Cordoba who suggested PT first. MR defecography showed pelvic floor weakness with pelvic descensus, small anterior  Rectocele, and vaginal prolapse. To have pelvic PT weekly for 8 wks and to see Dr. Priyanka Cordoba again in Oct 2018.     Joint pain 7/27/2010    MI (myocardial infarction) ([facility] 7/27/2010    Mitral valve regurgitation     Mild to moderate    Morbid obesity ([facility] 7/27/2010    Myocardial infarction [facility]) 2006 and 2011    Dr. Solo Sandhu disorder     PUD (peptic ulcer disease)     gi bleed 2008; ulcer n gastric bypass pouch    Urinary incontinence, stress 7/27/2010           PAST SURGICAL HISTORY     Past Surgical History:   Procedure Laterality Date    COLONOSCOPY N/A 6/29/2018    COLONOSCOPY performed by Nitin Rob [facility] at [facility]COPY; redundant colon, int hemorrhoids, pathology: normal colonic mucosa    HX COLONOSCOPY  9/5/14    Dr. Tara Chambers; normal, repeat in 5 yrs    HX GASTRIC BYPASS      HX IMPLANTABLE CARDIOVERTER DEFIBRILLATOR  08/24/2016    HX LAP GASTRIC BYPASS  2006    revision 2009/Dr. Bebeto Warren    HX OTHER SURGICAL  09/19/2016    Removal of right ventricular ICD lead & single chamber transvenous AICD    HX OTHER SURGICAL  10/12/2016    pocket revison of ICD; Dr. Blum Cheese  06/07/2018    Dr Montana Gould; high resolution anorectal manaometry-abnormal study. Study done for eval of fecal incontinence    HX OTHER SURGICAL  12/14/2018    Dr. Montana Gould. Rectal endoscopic US (EUS) for rectal incontinence. Normal rectal mucosa, no fistulas.     HX PACEMAKER      sicd/left side of chest under arm    INS PPM/ICD LED SING ONLY  8/24/2016         INS PPM/ICD LED SING ONLY  8/26/2016         INS PPM/ICD LED SING ONLY  9/21/2016         MO CARDIAC SURG PROCEDURE UNLIST      Stent x 5-6,Most recent 2011    MO LAP,STOMACH,OTHER,W/O TUBE  04/05/2010    Revision GBP          ALLERGIES     Allergies   Allergen Reactions    Amoxicillin Anaphylaxis    Amoxicillin Anaphylaxis    Lipitor [Atorvastatin] Other (comments)     Severe muscle pain and spasms    Zocor [Simvastatin] Other (comments)     Cramps, muscle spasms    Buspar [Buspirone] Other (comments)     Drunk sensation, headaches    Hydroxyzine Other (comments)     Blurred vision, lightheadedness    Livalo [Pitavastatin] Myalgia    Pravastatin Other (comments)     Leg cramps          FAMILY HISTORY     Family History   Problem Relation Age of Onset    Dementia Mother     Cancer Mother         colon    Alzheimer Mother     Cancer Father         stomach    Heart Disease Father     Hypertension Father     Heart Disease Sister     Stroke Sister     Heart Attack Brother            SOCIAL HISTORY     Social History Socioeconomic History    Marital status:      Spouse name: Not on file    Number of children: Not on file    Years of education: Not on file    Highest education level: Not on file   Tobacco Use    Smoking status: Former Smoker     Packs/day: 1.00     Years: 30.00     Pack years: 30.00     Start date: 1970     Quit date: 2004     Years since quittin.2    Smokeless tobacco: Never Used   Vaping Use    Vaping Use: Never used   Substance and Sexual Activity    Alcohol use: No     Alcohol/week: 0.0 standard drinks    Drug use: No     Types: Prescription    Sexual activity: Never     Partners: Male   Social History Narrative    ** Merged History Encounter **          Social Determinants of Health     Financial Resource Strain:     Difficulty of Paying Living Expenses:    Food Insecurity:     Worried About Running Out of Food in the Last Year:     Ran Out of Food in the Last Year:    Transportation Needs:     Lack of Transportation (Medical):  Lack of Transportation (Non-Medical):    Physical Activity:     Days of Exercise per Week:     Minutes of Exercise per Session:    Stress:     Feeling of Stress :    Social Connections:     Frequency of Communication with Friends and Family:     Frequency of Social Gatherings with Friends and Family:     Attends [employer] Services:     Active Member of Clubs or Organizations:     Attends Club or Organization Meetings:     Marital Status:          MEDICATIONS     Current Outpatient Medications   Medication Sig    FLUoxetine (PROzac) 40 mg capsule Take 1 Capsule by mouth two (2) times a day.  zolpidem (AMBIEN) 10 mg tablet Take 1 Tablet by mouth nightly as needed for Sleep. Max Daily Amount: 10 mg.    alirocumab (Praluent Pen) 150 mg/mL injector pen 1 mL by SubCUTAneous route Once every 2 weeks.     albuterol (PROVENTIL HFA, VENTOLIN HFA, PROAIR HFA) 90 mcg/actuation inhaler TAKE 2 PUFFS BY INHALATION EVERY FOUR (4) HOURS AS NEEDED FOR WHEEZING OR SHORTNESS OF BREATH.  potassium chloride (Klor-Con M20) 20 mEq tablet TAKE TWO TABLETS BY MOUTH DAILY    sacubitriL-valsartan (Entresto)  mg tablet Take 1 Tab by mouth two (2) times a day.  metoprolol succinate (TOPROL-XL) 25 mg XL tablet TAKE 1 TABLET BY MOUTH EVERY DAY AT NIGHT    bumetanide (BUMEX) 2 mg tablet TAKE 1 TABLET BY MOUTH TWICE A DAY    psyllium (METAMUCIL) powd Take  by mouth. 2 tsp daily    cholecalciferol, VITAMIN D3, (VITAMIN D3) 5,000 unit tab tablet Take 10,000 Units by mouth daily.  biotin 10,000 mcg cap Take  by mouth daily.  OTHER Complete multi formula, bariatric advantage    magnesium 250 mg tab Take 1 Tab by mouth daily.  ferrous sulfate (IRON, FERROUS SULFATE,) 325 mg (65 mg Iron) tablet Take  by mouth daily (before breakfast).  CALCIUM PO Take 600 mg by mouth daily. No current facility-administered medications for this visit. I have reviewed the vitals, medical history, surgical history, allergies, family history, social history and medications. REVIEW OF SYMPTOMS     Constitutional: Negative for fever, chills and diaphoresis. Respiratory: Negative for cough, sputum production and wheezing. Cardiovascular: Negative for chest pain, palpitations, orthopnea, claudication and PND. Gastrointestinal: Negative for heartburn, nausea, vomiting, blood in stool   Musculoskeletal: Negative for back pain. Skin: Negative for rash. Neurological: Negative for focal weakness, loss of consciousness, weakness and headaches. Endo/Heme/Allergies: Negative for abnormal bleeding. Psychiatric/Behavioral: Negative for memory loss. Positive for anxiety     PHYSICAL EXAMINATION      Due to this being a TeleHealth evaluation, many elements of the physical examination are unable to be assessed. General: Well developed, in no acute distress, cooperative and alert  HEENT: Pupils equal/round. No marked JVD visible on video.   Respiratory: No audible wheezing, no signs of respiratory distress, lips not cyanotic  Extremities:  No edema  Neuro: A&Ox3, speech clear, no facial droop, answering questions appropriately  Skin: Skin color is normal. No rashes or lesions. Non diaphoretic on visible skin during exam     LABORATORY DATA      Lab Results   Component Value Date/Time    WBC 6.9 07/30/2021 11:25 AM    Hemoglobin (POC) 14.3 11/26/2011 09:50 PM    HGB 13.0 07/30/2021 11:25 AM    Hematocrit (POC) 42 11/26/2011 09:50 PM    HCT 41.9 07/30/2021 11:25 AM    PLATELET 375 58/84/8640 11:25 AM    MCV 87 07/30/2021 11:25 AM      Lab Results   Component Value Date/Time    Sodium 145 (H) 07/30/2021 11:25 AM    Potassium 4.9 07/30/2021 11:25 AM    Chloride 108 (H) 07/30/2021 11:25 AM    CO2 24 07/30/2021 11:25 AM    Anion gap 9 02/23/2017 07:26 AM    Glucose 98 07/30/2021 11:25 AM    BUN 14 07/30/2021 11:25 AM    Creatinine 0.79 07/30/2021 11:25 AM    BUN/Creatinine ratio 18 07/30/2021 11:25 AM    GFR est AA 88 07/30/2021 11:25 AM    GFR est non-AA 77 07/30/2021 11:25 AM    Calcium 8.9 07/30/2021 11:25 AM    Bilirubin, total 0.3 07/30/2021 11:25 AM    Alk.  phosphatase 68 07/30/2021 11:25 AM    Protein, total 5.9 (L) 07/30/2021 11:25 AM    Albumin 4.0 07/30/2021 11:25 AM    Globulin 3.4 02/23/2017 07:26 AM    A-G Ratio 2.1 07/30/2021 11:25 AM    ALT (SGPT) 10 07/30/2021 11:25 AM        Chaitanya Sandhu MD    [facility]  [address] , [address] [facility] [address]  [facility], [facility]  [phone] / [phone] Fax

## 2021-08-10 NOTE — PATIENT INSTRUCTIONS
[place] address:  [address], [address] [facility]    Procedure: Left Heart Cath with Dr. Jeevan Barrera    Your procedure is scheduled for Friday 8/13/21. You need to arrive about 2 hours early, so please arrive by 6:30 am to [address] [place] [address] from the [address] [facility] parking courtyard entrance. Once inside, go to the left. You will need to check in at the out-patient registration desk. Bring your insurance information and anything else pertinent. You may not have anything to eat or drink after midnight    Medication Instructions: You may take morning medications except for any supplements and please hold your Diuretic (Bumex). Please do take an Aspirin 81 mg.     Have labs drawn prior to procedure (a couple of days prior if possible).  You will need an adult to drive you home after the procedure. Plan to be at [facility] a total of 6-8 hours.  Arrange for a responsible adult to help you at home for at least 24 hours.  Wear comfortable clothing. Leave jewelry, money, and other valuables at home. You may wear dentures, eyeglasses, and/or hearing aids.  Bring an overnight bag with you (just in case you need to spend the night). Post Procedure Instructions:   No heavy lifting (over 10 pounds) for 24-48 hours.  No driving for 24 hours.  No strenuous activity for 24-48 hours.  No tub baths, swimming, hot tubs, or spas for 1 week. The band aid over cath site may be removed the day after the procedure and site washed gently with soap and water.  The site may appear bruised/ discolored for a couple of weeks. A small knot may be present. You may experience tenderness or soreness in groin area. This may be relieved with the use of Tylenol.  If there is any visible blood at the site, hold pressure for 20 minutes.  Call the office if you should notice numbness, tingling, coldness, or loss of feeling in the area.     CALL THE OFFICE if you have a fever within 2-3 days after the procedure! Remember, when you begin lifting things, use proper body mechanics and bend with your knees, centering the weight on your legs.     You are scheduled for a post cath follow up with JOVAN Ovalle: 8/26/21 @ 1120 am.

## 2021-08-10 NOTE — PATIENT INSTRUCTIONS
Please begin isosorbide mononitrate 30mg daily for your angina  After a week if you are tolerating it well please increase your dose to 60mg daily and then let me know how you are feeling 2 weeks later     Your SICD Generator (battery) change procedure has been re-scheduled for 9/15/21 at 1:00 pm, at [facility].    Please report to Admitting Department by 11:00 am, or 2 hours prior to your scheduled procedure. Please bring a list of your current medications and medication bottles, if able, to the hospital on this day. You will be unable to drive after your procedure so please make sure to bring someone with you to your procedure. You will need to have nothing to eat or drink after midnight, the night prior to your procedure. You may have small sips of water, if needed, to take with your medication. You will need labs drawn prior to your procedure. Please go to [facility] to have this done as soon as you are able. You should not stop your medication prior to your scheduled procedure. After your procedure, you will need to follow up with Dr. Konrad Miranda nurse for a wound and device check. Your follow-up appointment has been scheduled for 9/24/21 at 10:20 am.     Hibiclens 4% topical solution has been ordered and sent into your pharmacy  Patient it start Hibiclens application 5 days prior to procedure date    Directions Hibiclens 4%: Start cleanse 5 days prior to procedure   1. Rinse area with water. 2. Apply minimum amount necessary to scrub the left side each of  5 nights before the day of the procedure  3. Let solution dry.

## 2021-08-11 RX ORDER — SODIUM CHLORIDE 9 MG/ML
100 INJECTION, SOLUTION INTRAVENOUS CONTINUOUS
Status: CANCELLED | OUTPATIENT
Start: 2021-08-13

## 2021-08-11 RX ORDER — DIPHENHYDRAMINE HYDROCHLORIDE 50 MG/ML
25 INJECTION, SOLUTION INTRAMUSCULAR; INTRAVENOUS
Status: CANCELLED | OUTPATIENT
Start: 2021-08-13 | End: 2021-08-14

## 2021-08-13 ENCOUNTER — HOSPITAL ENCOUNTER (OUTPATIENT)
Age: 71
Setting detail: OUTPATIENT SURGERY
Discharge: HOME | End: 2021-08-13
Attending: INTERNAL MEDICINE | Admitting: INTERNAL MEDICINE
Payer: MEDICARE

## 2021-08-13 VITALS
TEMPERATURE: 97.6 F | HEART RATE: 68 BPM | WEIGHT: 255 LBS | BODY MASS INDEX: 43.54 KG/M2 | SYSTOLIC BLOOD PRESSURE: 123 MMHG | OXYGEN SATURATION: 86 % | HEIGHT: 64 IN | DIASTOLIC BLOOD PRESSURE: 67 MMHG | RESPIRATION RATE: 24 BRPM

## 2021-08-13 DIAGNOSIS — I42.9 CARDIOMYOPATHY, UNSPECIFIED TYPE (HCC): ICD-10-CM

## 2021-08-13 DIAGNOSIS — I25.10 CORONARY ARTERY DISEASE DUE TO LIPID RICH PLAQUE: ICD-10-CM

## 2021-08-13 DIAGNOSIS — I25.83 CORONARY ARTERY DISEASE DUE TO LIPID RICH PLAQUE: ICD-10-CM

## 2021-08-13 LAB
STRESS BASELINE DIAS BP: 82 MMHG
STRESS BASELINE HR: 77 BPM
STRESS BASELINE SYS BP: 124 MMHG
STRESS O2 SAT PEAK: 98 %
STRESS O2 SAT REST: 97 %
STRESS PEAK DIAS BP: 70 MMHG
STRESS PEAK SYS BP: 112 MMHG
STRESS PERCENT HR ACHIEVED: 62 %
STRESS POST PEAK HR: 93 BPM
STRESS RATE PRESSURE PRODUCT: NORMAL BPM*MMHG
STRESS ST DEPRESSION: 0 MM
STRESS ST ELEVATION: 0 MM
STRESS TARGET HR: 151 BPM

## 2021-08-13 PROCEDURE — C1769 GUIDE WIRE: HCPCS | Performed by: INTERNAL MEDICINE

## 2021-08-13 PROCEDURE — 85347 COAGULATION TIME ACTIVATED: CPT

## 2021-08-13 PROCEDURE — C1894 INTRO/SHEATH, NON-LASER: HCPCS | Performed by: INTERNAL MEDICINE

## 2021-08-13 PROCEDURE — 74011000250 HC RX REV CODE- 250: Performed by: INTERNAL MEDICINE

## 2021-08-13 PROCEDURE — 2709999900 HC NON-CHARGEABLE SUPPLY: Performed by: INTERNAL MEDICINE

## 2021-08-13 PROCEDURE — 77030013715 HC INFL SYS MRTM -B: Performed by: INTERNAL MEDICINE

## 2021-08-13 PROCEDURE — 74011000636 HC RX REV CODE- 636: Performed by: INTERNAL MEDICINE

## 2021-08-13 PROCEDURE — 99153 MOD SED SAME PHYS/QHP EA: CPT | Performed by: INTERNAL MEDICINE

## 2021-08-13 PROCEDURE — 77030004532 HC CATH ANGI DX IMP BSC -A: Performed by: INTERNAL MEDICINE

## 2021-08-13 PROCEDURE — C1887 CATHETER, GUIDING: HCPCS | Performed by: INTERNAL MEDICINE

## 2021-08-13 PROCEDURE — 93458 L HRT ARTERY/VENTRICLE ANGIO: CPT | Performed by: INTERNAL MEDICINE

## 2021-08-13 PROCEDURE — 74011250636 HC RX REV CODE- 250/636: Performed by: INTERNAL MEDICINE

## 2021-08-13 PROCEDURE — 99152 MOD SED SAME PHYS/QHP 5/>YRS: CPT | Performed by: INTERNAL MEDICINE

## 2021-08-13 PROCEDURE — 77030013744: Performed by: INTERNAL MEDICINE

## 2021-08-13 PROCEDURE — 77030012468 HC VLV BLEEDBK CNTRL ABBT -B: Performed by: INTERNAL MEDICINE

## 2021-08-13 PROCEDURE — 77030010221 HC SPLNT WR POS TELE -B: Performed by: INTERNAL MEDICINE

## 2021-08-13 PROCEDURE — 77030019569 HC BND COMPR RAD TERU -B: Performed by: INTERNAL MEDICINE

## 2021-08-13 RX ORDER — HEPARIN SODIUM 200 [USP'U]/100ML
INJECTION, SOLUTION INTRAVENOUS
Status: COMPLETED | OUTPATIENT
Start: 2021-08-13 | End: 2021-08-13

## 2021-08-13 RX ORDER — MIDAZOLAM HYDROCHLORIDE 1 MG/ML
INJECTION, SOLUTION INTRAMUSCULAR; INTRAVENOUS AS NEEDED
Status: DISCONTINUED | OUTPATIENT
Start: 2021-08-13 | End: 2021-08-13 | Stop reason: HOSPADM

## 2021-08-13 RX ORDER — LIDOCAINE HYDROCHLORIDE 10 MG/ML
INJECTION INFILTRATION; PERINEURAL AS NEEDED
Status: DISCONTINUED | OUTPATIENT
Start: 2021-08-13 | End: 2021-08-13 | Stop reason: HOSPADM

## 2021-08-13 RX ORDER — HEPARIN SODIUM 1000 [USP'U]/ML
INJECTION, SOLUTION INTRAVENOUS; SUBCUTANEOUS AS NEEDED
Status: DISCONTINUED | OUTPATIENT
Start: 2021-08-13 | End: 2021-08-13 | Stop reason: HOSPADM

## 2021-08-13 RX ORDER — SODIUM CHLORIDE 9 MG/ML
100 INJECTION, SOLUTION INTRAVENOUS CONTINUOUS
Status: DISCONTINUED | OUTPATIENT
Start: 2021-08-13 | End: 2021-08-13 | Stop reason: HOSPADM

## 2021-08-13 RX ORDER — DIPHENHYDRAMINE HYDROCHLORIDE 50 MG/ML
25 INJECTION, SOLUTION INTRAMUSCULAR; INTRAVENOUS
Status: DISCONTINUED | OUTPATIENT
Start: 2021-08-13 | End: 2021-08-13 | Stop reason: HOSPADM

## 2021-08-13 RX ORDER — FENTANYL CITRATE 50 UG/ML
INJECTION, SOLUTION INTRAMUSCULAR; INTRAVENOUS AS NEEDED
Status: DISCONTINUED | OUTPATIENT
Start: 2021-08-13 | End: 2021-08-13 | Stop reason: HOSPADM

## 2021-08-13 RX ADMIN — SODIUM CHLORIDE 100 ML/HR: 9 INJECTION, SOLUTION INTRAVENOUS at 07:30

## 2021-08-13 NOTE — PROGRESS NOTES
Cardiac Cath Lab Procedure Area Arrival Note:    Cosme Richardson arrived to Cardiac Cath Lab, Procedure Area. Patient identifiers verified with NAME and DATE OF BIRTH. Procedure verified with patient. Consent forms verified. Allergies verified. Patient informed of procedure and plan of care. Questions answered with review. Patient voiced understanding of procedure and plan of care. Patient on cardiac monitor, non-invasive blood pressure, SPO2 monitor. On RA and placed O2 @ 2 lpm via NC.  IV of NS on pump at 75 ml/hr. Patient status doing well without problems. Patient is A&Ox 4. Patient reports no chest pain or dyspnea. Patient medicated during procedure with orders obtained and verified by Dr. Celestine Peres. Refer to patients Cardiac Cath Lab PROCEDURE REPORT for vital signs, assessment, status, and response during procedure, printed at end of case. Printed report on chart or scanned into chart. 10:01- Transfer to [facility] from Procedure Area    Verbal report given to Torey Coles on Cosme Richardson being transferred to Cardiac Cath Lab  for routine progression of care   Patient is post    procedure. Patient stable upon transfer to . Report consisted of patients Situation, Background, Assessment and   Recommendations(SBAR). Information from the following report(s) SBAR and Procedure Summary was reviewed with the receiving nurse. Opportunity for questions and clarification was provided. Patient medicated during procedure with orders obtained and verified by Dr. Celestine Peres. Refer to patient PROCEDURE REPORT for vital signs, assessment, status, and response during procedure.

## 2021-08-13 NOTE — PROGRESS NOTES
Ambulated 100 ft, gait steady, voided, back to stretcher,right radial cath site dsg D&I. Assisted with dressing  Reviewed D/C instructions with pt, teach back method used    1240 D/C'd via w/c with ,instructions, right radial splint for cath site and belongings.

## 2021-08-13 NOTE — PROGRESS NOTES
TRANSFER - IN REPORT:    Verbal report received from Erika Matthews 411, tech on Tyson Fitch  being received from procedure for routine progression of care. Report consisted of patients Situation, Background, Assessment and Recommendations(SBAR). Information from the following report(s) Procedure Summary, MAR, Accordion and Recent Results was reviewed with the receiving clinician. Opportunity for questions and clarification was provided. Assessment completed upon patients arrival to [address] [place] [address] and care assumed. Cardiac Cath Lab Recovery Arrival Note:    Tyson Fitch arrived to [facility] recovery area. Patient procedure= C. Patient on cardiac monitor, non-invasive blood pressure, SPO2 monitor. On O2  or O2 @ 2 lpm via n/c. IV  of nacl on pump at 25 ml/hr. Patient status doing well without problems. Patient is A&Ox 4. Patient reports no complaints. PROCEDURE SITE CHECK:    Procedure site:without any bleeding and or hematoma, no pain/discomfort reported at procedure site. No change in patient status. Continue to monitor patient and status. Dr Emmy Petty talked with pt.

## 2021-08-13 NOTE — PROGRESS NOTES
Cardiac Cath Lab Recovery Arrival Note:      Enzo Lopez arrived to Cardiac Cath Lab, Recovery Area. Staff introduced to patient. Patient identifiers verified with NAME and DATE OF BIRTH. Procedure verified with patient. Consent forms reviewed and signed by patient or authorized representative and verified. Allergies verified. Patient and family oriented to department. Patient and family informed of procedure and plan of care. Questions answered with review. Patient prepped for procedure, per orders from physician, prior to arrival.    Patient on cardiac monitor, non-invasive blood pressure, SPO2 monitor. On room air. Patient is A&Ox 4. Patient reports no complaints. Patient in stretcher, in low position, with side rails up, call bell within reach, patient instructed to call if assistance as needed. Patient prep in: [address], [place] [address]. Patient family has pager # [phone]  Family in:  in hospital and has no cell phone, will come back to cath lab @ 1000 am to check on pt.    Prep by: Osorio Richardson RN    871 pre cath teaching completed

## 2021-08-13 NOTE — Clinical Note
Sheath #1: Presents as clean, dry, & intact, no bleeding and no hematoma. atul diclofenac (VOLTAREN) 1 % gel Apply topically 4 times daily. Apply to the  Affected area qid. 300 g 0   • sucralfate (CARAFATE) 1 g tablet Take 1 g by mouth 4 times daily.     • omeprazole 20 MG tablet Take 1 tablet by mouth daily. Take 1/2 hour before first meal 30 tablet 11   • acetaminophen (TYLENOL) 325 MG tablet Take 650 mg by mouth every 4 hours as needed for Pain.     • albuterol 108 (90 BASE) MCG/ACT inhaler Inhale 2 puffs into the lungs every 4 hours as needed for Shortness of Breath or Wheezing. 1 Inhaler 0   • Norgestimate-Ethinyl Estradiol (TRINESSA, 28,) 0.18/0.215/0.25 MG-35 MCG tablet Take 1 tablet by mouth daily.     • acyclovir (ZOVIRAX) 5 % Cream Apply 5/day as needed 60 g 2   • amoxicillin-clavulanate (AUGMENTIN) 875-125 MG per tablet Take 1 tablet by mouth 2 times daily. 20 tablet 0     No current facility-administered medications for this visit.      Family History   Problem Relation Age of Onset   • Hypertension Father      History   Smoking Status   • Never Smoker   Smokeless Tobacco   • Never Used     History   Alcohol Use No     History   Drug Use No       ROS:   Constitutional: Negative for fever and chills.   Skin: Negative for rash.   HEENT: positive for ear pain and sore throat.  Respiratory: positive cough no  shortness of breath.    Cardiovascular: Negative for chest pain or chest pressure.   Gastrointestinal: Negative for nausea, vomiting, diarrhea or abdominal pain.   Genitourinary: Negative for dysuria, urgency or frequency.  Extremities:  Negative for joint swelling or joint pain.        EXAM:    OBJECTIVE:     Visit Vitals  /77   Pulse 91   Temp 96.9 °F (36.1 °C) (Oral)   Resp 13   Wt 70.8 kg   LMP 10/16/2017   SpO2 98%   BMI 25.18 kg/m²     GENERAL: The patient appears in no acute distress.   HEENT: Head is normocephalic, atraumatic. Pupils equal and reacting to light. Extraocular muscles intact.   Nose midline septum, normal nasal mucosa. Clear drainage. Right max  sinus  TTP  Ears TM  reflective of light. No erythema  Mouth moist oral mucosa.PN  drainage noted  NECK: Neck is supple, midline trachea. No JVD. No lymphadenopathy.  CHEST: Clear to auscultation bilaterally. Respiratory system clear.  CARDIOVASCULAR SYSTEM: S1-S2 heard.  SKIN: Devoid of any rashes.    A/P:  1.right max sinusitis; augmentin BID for 10days,  cepacol, warm gargles, steam inhalation, tyelnol, cough meds prn    If symptoms are not better need to fu here or if worse need to go to ED.  FU with PMD in 1 week    Patient explained of the pathophysiology of the disease process and treatment plan.  Medication side effects vs benefits discussed with patient. Patient verbalizes understanding and agreement of the treatment plan.

## 2021-08-13 NOTE — Clinical Note
Right groin and right radial prepped with ChloraPrep and draped. Wet prep solution applied at: 855. Wet prep solution dried at: 858. Wet prep elapsed drying time: 3 mins.

## 2021-08-13 NOTE — PROCEDURES
Findings:  1)Normal LVEDP  2)Severe native 1 vessel CAD with  of ramus intermedius. LCx  Is small with occluded distal LCx. OM2 and distal ramus fill from collaterals from LAD, diagonal and RCA. 3)Limited wire probing suggests no micro channel in Ramus ISR . Proximal cap start before the stent    Access: Right radial no issues  Contrast 40 cc    Recommendations  1)Continue GDMT and weight loss. If dyspnea continue may consider Ramus  PCI.  Uncertain if benefit will be substantial.  2)GDMT for CAD  3)Follow up with Dr. Seven Cervantes, with NP Charli Peña in 2 weeks

## 2021-08-17 LAB — ACT BLD: 235 SECS (ref 79–138)

## 2021-08-19 ENCOUNTER — OFFICE VISIT (OUTPATIENT)
Dept: CARDIOLOGY CLINIC | Age: 71
End: 2021-08-19
Payer: MEDICARE

## 2021-08-19 VITALS
OXYGEN SATURATION: 97 % | HEART RATE: 74 BPM | BODY MASS INDEX: 43.36 KG/M2 | HEIGHT: 64 IN | WEIGHT: 254 LBS | DIASTOLIC BLOOD PRESSURE: 80 MMHG | RESPIRATION RATE: 18 BRPM | SYSTOLIC BLOOD PRESSURE: 120 MMHG

## 2021-08-19 DIAGNOSIS — I10 ESSENTIAL HYPERTENSION: ICD-10-CM

## 2021-08-19 DIAGNOSIS — I34.0 NONRHEUMATIC MITRAL VALVE REGURGITATION: ICD-10-CM

## 2021-08-19 DIAGNOSIS — I50.22 SYSTOLIC CHF, CHRONIC (HCC): Primary | ICD-10-CM

## 2021-08-19 DIAGNOSIS — I65.23 BILATERAL CAROTID ARTERY STENOSIS: ICD-10-CM

## 2021-08-19 DIAGNOSIS — E78.2 MIXED HYPERLIPIDEMIA: ICD-10-CM

## 2021-08-19 DIAGNOSIS — I25.119 ATHEROSCLEROSIS OF NATIVE CORONARY ARTERY OF NATIVE HEART WITH ANGINA PECTORIS (HCC): ICD-10-CM

## 2021-08-19 PROBLEM — I20.9 ANGINA PECTORIS, UNSPECIFIED (HCC): Status: ACTIVE | Noted: 2021-08-19

## 2021-08-19 PROCEDURE — G0463 HOSPITAL OUTPT CLINIC VISIT: HCPCS | Performed by: NURSE PRACTITIONER

## 2021-08-19 PROCEDURE — G8752 SYS BP LESS 140: HCPCS | Performed by: NURSE PRACTITIONER

## 2021-08-19 PROCEDURE — G8754 DIAS BP LESS 90: HCPCS | Performed by: NURSE PRACTITIONER

## 2021-08-19 PROCEDURE — 1090F PRES/ABSN URINE INCON ASSESS: CPT | Performed by: NURSE PRACTITIONER

## 2021-08-19 PROCEDURE — G8427 DOCREV CUR MEDS BY ELIG CLIN: HCPCS | Performed by: NURSE PRACTITIONER

## 2021-08-19 PROCEDURE — 1101F PT FALLS ASSESS-DOCD LE1/YR: CPT | Performed by: NURSE PRACTITIONER

## 2021-08-19 PROCEDURE — 3017F COLORECTAL CA SCREEN DOC REV: CPT | Performed by: NURSE PRACTITIONER

## 2021-08-19 PROCEDURE — 99214 OFFICE O/P EST MOD 30 MIN: CPT | Performed by: NURSE PRACTITIONER

## 2021-08-19 PROCEDURE — G9899 SCRN MAM PERF RSLTS DOC: HCPCS | Performed by: NURSE PRACTITIONER

## 2021-08-19 PROCEDURE — G8417 CALC BMI ABV UP PARAM F/U: HCPCS | Performed by: NURSE PRACTITIONER

## 2021-08-19 PROCEDURE — G8536 NO DOC ELDER MAL SCRN: HCPCS | Performed by: NURSE PRACTITIONER

## 2021-08-19 PROCEDURE — G9717 DOC PT DX DEP/BP F/U NT REQ: HCPCS | Performed by: NURSE PRACTITIONER

## 2021-08-19 RX ORDER — ISOSORBIDE MONONITRATE 30 MG/1
30 TABLET, EXTENDED RELEASE ORAL DAILY
Qty: 30 TABLET | Refills: 1 | Status: SHIPPED | OUTPATIENT
Start: 2021-08-19 | End: 2021-09-13

## 2021-08-19 NOTE — PROGRESS NOTES
Vanesa Kalyan is a 73 y.o. female  Chief Complaint   Patient presents with    Follow-up     cath     Health Maintenance Due   Topic Date Due    Breast Cancer Screen Mammogram  09/11/2020     Visit Vitals  /80   Pulse 74   Resp 18   Ht 5' 4\" (1.626 m)   Wt 254 lb (115.2 kg)   SpO2 97%   BMI 43.60 kg/m²

## 2021-08-19 NOTE — PROGRESS NOTES
CHIEF COMPLAINT      Roosevelt Blanco is a 73 y.o. female who was seen today for CHF. History of Present Illness:    Lateral infarct and ischemia on stress test  Cath 8/13/21 showed severe native 1 vessel CAD with  of ramus intermedius. LCx  Is small with occluded distal LCx. OM2 and distal ramus fill from collaterals from LAD, diagonal and RCA. Dr. Vira Early recommended medical management, could attempt PCI of  Ramus in the future if symptoms persist but it would be of \"unclear benefit\"  Discussed her cath results with her today   She continues to feel tired and have a lot of dyspnea  She has a lot of diaphoresis  No chest pain   Supposed to have ICD generator replaced with Dr. Joel Love next month   No PND, sleeps on 2 pillow chronically   No LE edema, take metolazone as needed, not often   Bystolic and coreg caused alopecia and more dyspnea with exertion, she thinks all the beta blockers make her feel more short of breath too and wants to know if she can stop her Toprol XL, we discussed the indications for her Toprol XL and agreed to try to maximize her anti-anginal medications first  No diziness or syncope  She continues to have a lot of personal stress     Assessment/Plan:  1. Chronic systolic heart failure - LVEF 25-30% by last TTE, s/p AICD placement with Dr. Joel Love and subsequent lead revisions and repeat procedures due to non-healing midsternal incision  -last revision for AICD in November 2016 with Dr. Joel Love, last ICD check without arrhythmias   -continue Entresto 97/103 one tablet BID and Toprol XL, had hair loss on coreg and bystolic   -off spironolactone due to hypotension, continue bumex 2mg BID, using Metolazone PRN, not very often  2.  CAD - hx of MI x 2 and multiple stents, she believes she may have 6 or 7 stents  -reported having an MI in 11/2011 and received PCI to RCA at that time, previous MI in 2006 or 2007  -last cardiac cath showed  of Ramus but no PCI performed, medical management recommended  -continue to watch, continue ASA and beta blocker, praluent   -will start imdur 30mg daily today and advised her to increase her dose to 60mg daily in one week and then call me 2 weeks later to report her symptoms  -she will follow up with me in 6 weeks, still trying to decide what to do with her long term follow up   3. Mitral regurgitation - mod by TTE   4. Asthma - x 15 - 16 years, has not seen pulmonologist in years  11. HTN - well controlled on current medications   6. Hyperlipidemia - hx of statin failures, simvastatin caused muscle spasms and cramps, atorvastatin caused pain shooting all over her body, pravastatin 40mg and 20mg caused myalgias and leg cramps, had myalgias on Livalo, could not tolerate zetia either   -not able to take any statin at any dose  -now on praluent, LDL 70  7. DM Type 2 - resolved with gastric bypass  8. Hypokalemia -off spironolactone and on KCL 40meq daily   9. Hypomagnesemia - on OTC magnesium       10. Morbid obesity - Body mass index is 43.6 kg/m². Jackeline Nelson weighed 416 lbs at her heaviest weight, s/p gastric bypass in 2007 with revision a few years later, weight down to 239 lbs at one point, weighed 254 lbs today   11. PUD - had EGD and cauterized bleeding ulcer, not on PPI anymore  12. Vitamin D deficiency - on 10,000 units daily    13. Anxiety - plans to see psychiatry for management   14. PAD/Carotid stenosis - right 50-79% stenosis, left 50% stenosis by duplex 3/21, continue ASA and praluent      Cardiac Cath 8/21 - Severe native 1 vessel CAD with  of ramus intermedius. LCx  Is small with occluded distal LCx. OM2 and distal ramus fill from collaterals from LAD, diagonal and RCA.   Echo 8/21 - EF 25-30%, rWMA, sev dilated LA, mod MR, mild AS  Nuc Stress 8/21 - lateral infarct, esther-infarct ischemia, EF 22%  Carotid duplex 3/21 - right ICA 50-79% stenosis, left ICA approximately 50% stenosis  Echo 9/20 - LVEF 25-30%, mod MR  Echo 10/18 - LVEF 35%, mild to mod MR   Echo 5/18 - LVEF 25 %-30 %, akinesis of the basal-mid inferoseptal and basal-mid inferolateral wall(s), severe hypokinesis of the entire inferior wall(s), grade 1 dd, mild to mod dilated LA, mild to mod MR  Carotid duplex 5/18 - 10-49% bilateral ICA stenosis  Echo: 4/17, EF 35-40%, inf HK gr1dd  Nuclear: 4/17, EF 36%, anterior ischemia, lateral infarct. TTE 9/16 - LVEF 35%, moderate hypokinesis of the basal-mid inferolateral wall(s), grd 1 dd, dilated LA, mod MR, mild TR  8/26/16 - RV lead revision by Dr. Mario Urrutia  8/24/16 - single chamber AICD placed by Dr. Mario Urrutia ([address] [place] VR, serial # [id number], model # [id number].  The ventricular lead: model # [id number] , serial # [id number])  MUGA 6/16 - LVEF 27%  Holter 6/16 - no arrhythmias  Echo 5/16 - LV mildly dilated, LVEF 40%, moderate diffuse hypokinesis with regional variations, severe hypokinesis of the basal-mid inferior wall(s), grd 1 dd, mod dilated LA, atrial septum bows from left to right, consistent with increased left atrial pressure, mildly dilated RA, mild to near moderate MR    OLIVER 5/16 - normal  Nuc 5/15 EF 31% large anterolateral infarct with periinfarct reversibility, 13% LV reversible(32% infarct at rest, 45% at stress)    Echo 7/2015 - LVEF 30-35%, grd 1 dd, mod LAE, mild to mod MR  Nuc Stress 7/13 - small reversibility in anteroapical wall, LVEF 31%  Cardiac Cath 3/13 - patent stents in LAD, LCx and RCA, LVEF 30%, severe inferior HK, LCx relatively small vessel with patent stent in proximal LCx, RCA is large and dominant with patent stents in proximal and mid RCA, distal RCA free of disease, LAD normal and large vessel which coursed around apex  Echo 11/2011 - LVEF 30%, mild MR  Cardiac Cath 11/2011 - s/p PCI with AMRIT to 100% mid RCA, also had 40% mid LAD lesion, 50% diagonal 1 lesion, 100% distal LCx lesion, 60% OM1 lesion, 100% proximal Ramus lesion      Soc Hx: quit tobacco use x 13 years ago, used to smoke 1ppd, no etoh use, no drug use, lives with , son, daughter in law, grandson, retired/on disability  Fam Hx: father in his 62s when he had a MI, had 3 vessel CABG in his 62s, passed from fall but had cancer too, mother lived to age 80 and  from Alzheimer's, brother had MI in his 62s, sister had aortic repair for aneurysm in her 76s     PAST MEDICAL HISTORY     Past Medical History:   Diagnosis Date    Acute right ankle pain 2018: X-ray showed possible right ankle sprain. 18: saw Dr. Gisele Faust ([facility]), diagnosed with peroneal tendonitis. Prescribed an ASO    Asthma     Cardiomyopathy ([address]     Coronary artery disease     s/p RCA stent (AMRIT) on 11    Depression with anxiety         DVT (deep venous thrombosis) ([address] 2010    Family history of early CAD     GERD (gastroesophageal reflux disease)     H/O gastric bypass     Revision in     Hepatitis C antibody test positive     Does not have chronic hep C (labs 10/6/15: neg HCV RNA)     HTN (hypertension) 2010    Hyperlipidemia 2010    Incontinence of feces 9/3/2018    Dr. Chano Nicholson. 18: Improving with pelvic physical therapy and psyllium husk treatment. Saw Dr. Nirali Kinsey who suggested PT first. MR defecography showed pelvic floor weakness with pelvic descensus, small anterior  Rectocele, and vaginal prolapse. To have pelvic PT weekly for 8 wks and to see Dr. Nirali Kinsey again in Oct 2018.     Joint pain 2010    MI [facility] infarction) [facility] 2010    Mitral valve regurgitation     Mild to moderate    Morbid obesity ([address] 2010    Myocardial infarction [facility])  and     Dr. Naranjo Stabs disorder     PUD (peptic ulcer disease)     gi bleed ; ulcer n gastric bypass pouch    Urinary incontinence, stress 2010           PAST SURGICAL HISTORY     Past Surgical History:   Procedure Laterality Date    COLONOSCOPY N/A 2018    COLONOSCOPY performed by Betsy Madsen MD at [facility]; redundant colon, int hemorrhoids, pathology: normal colonic mucosa    HX COLONOSCOPY  9/5/14    Dr. Maria D Foster; normal, repeat in 5 yrs    HX GASTRIC BYPASS      HX IMPLANTABLE CARDIOVERTER DEFIBRILLATOR  08/24/2016    HX LAP GASTRIC BYPASS  2006    revision 2009/Dr. Leena Cohen    HX OTHER SURGICAL  09/19/2016    Removal of right ventricular ICD lead & single chamber transvenous AICD    HX OTHER SURGICAL  10/12/2016    pocket revison of ICD; Dr. Tamie Martinez OTHER SURGICAL  06/07/2018    Dr Maximus Jordan; high resolution anorectal manaometry-abnormal study. Study done for eval of fecal incontinence    HX OTHER SURGICAL  12/14/2018    Dr. Maximus Jordan. Rectal endoscopic US (EUS) for rectal incontinence. Normal rectal mucosa, no fistulas.     HX PACEMAKER      sicd/left side of chest under arm    INS PPM/ICD LED SING ONLY  8/24/2016         INS PPM/ICD LED SING ONLY  8/26/2016         INS PPM/ICD LED SING ONLY  9/21/2016         [place] CARDIAC SURG PROCEDURE UNLIST      Stent x 5-6,Most recent 2011    [place] LAP,STOMACH,OTHER,W/O TUBE  04/05/2010    Revision GBP          ALLERGIES     Allergies   Allergen Reactions    Amoxicillin Anaphylaxis    Amoxicillin Anaphylaxis    Lipitor [Atorvastatin] Other (comments)     Severe muscle pain and spasms    Zocor [Simvastatin] Other (comments)     Cramps, muscle spasms    Buspar [Buspirone] Other (comments)     Drunk sensation, headaches    Hydroxyzine Other (comments)     Blurred vision, lightheadedness    Livalo [Pitavastatin] Myalgia    Pravastatin Other (comments)     Leg cramps          FAMILY HISTORY     Family History   Problem Relation Age of Onset    Dementia Mother     Cancer Mother         colon    Alzheimer Mother     Cancer Father         stomach    Heart Disease Father     Hypertension Father     Heart Disease Sister     Stroke Sister     Heart Attack Brother            SOCIAL HISTORY     Social History     Socioeconomic History    Marital status:      Spouse name: Not on file    Number of children: Not on file    Years of education: Not on file    Highest education level: Not on file   Tobacco Use    Smoking status: Former Smoker     Packs/day: 1.00     Years: 30.00     Pack years: 30.00     Start date: 1970     Quit date: 2004     Years since quittin.2    Smokeless tobacco: Never Used   Vaping Use    Vaping Use: Never used   Substance and Sexual Activity    Alcohol use: No     Alcohol/week: 0.0 standard drinks    Drug use: No     Types: Prescription    Sexual activity: Never     Partners: Male   Social History Narrative    ** Merged History Encounter **          Social Determinants of Health     Financial Resource Strain:     Difficulty of Paying Living Expenses:    Food Insecurity:     Worried About Running Out of Food in the Last Year:     Ran Out of Food in the Last Year:    Transportation Needs:     Lack of Transportation (Medical):  Lack of Transportation (Non-Medical):    Physical Activity:     Days of Exercise per Week:     Minutes of Exercise per Session:    Stress:     Feeling of Stress :    Social Connections:     Frequency of Communication with Friends and Family:     Frequency of Social Gatherings with Friends and Family:     Attends Religion Services:     Active Member of Clubs or Organizations:     Attends Club or Organization Meetings:     Marital Status:          MEDICATIONS     Current Outpatient Medications   Medication Sig    isosorbide mononitrate ER (IMDUR) 30 mg tablet Take 1 Tablet by mouth daily.  FLUoxetine (PROzac) 40 mg capsule Take 1 Capsule by mouth two (2) times a day.  zolpidem (AMBIEN) 10 mg tablet Take 1 Tablet by mouth nightly as needed for Sleep. Max Daily Amount: 10 mg.    alirocumab (Praluent Pen) 150 mg/mL injector pen 1 mL by SubCUTAneous route Once every 2 weeks.     albuterol (PROVENTIL HFA, VENTOLIN HFA, PROAIR HFA) 90 mcg/actuation inhaler TAKE 2 PUFFS BY INHALATION EVERY FOUR (4) HOURS AS NEEDED FOR WHEEZING OR SHORTNESS OF BREATH.  potassium chloride (Klor-Con M20) 20 mEq tablet TAKE TWO TABLETS BY MOUTH DAILY    sacubitriL-valsartan (Entresto)  mg tablet Take 1 Tab by mouth two (2) times a day.  metoprolol succinate (TOPROL-XL) 25 mg XL tablet TAKE 1 TABLET BY MOUTH EVERY DAY AT NIGHT    bumetanide (BUMEX) 2 mg tablet TAKE 1 TABLET BY MOUTH TWICE A DAY    psyllium (METAMUCIL) powd Take  by mouth. 2 tsp daily    cholecalciferol, VITAMIN D3, (VITAMIN D3) 5,000 unit tab tablet Take 10,000 Units by mouth daily.  biotin 10,000 mcg cap Take  by mouth daily.  OTHER Complete multi formula, bariatric advantage    magnesium 250 mg tab Take 1 Tab by mouth daily.  ferrous sulfate (IRON, FERROUS SULFATE,) 325 mg (65 mg Iron) tablet Take  by mouth daily (before breakfast).  CALCIUM PO Take 600 mg by mouth daily. No current facility-administered medications for this visit. I have reviewed the vitals, medical history, surgical history, allergies, family history, social history and medications. REVIEW OF SYMPTOMS     Constitutional: Negative for fever, chills and diaphoresis. Respiratory: Negative for cough, sputum production and wheezing. Cardiovascular: Negative for chest pain, palpitations, orthopnea, claudication and PND. Gastrointestinal: Negative for heartburn, nausea, vomiting, blood in stool   Musculoskeletal: Negative for back pain. Skin: Negative for rash. Neurological: Negative for focal weakness, loss of consciousness, headaches. Endo/Heme/Allergies: Negative for abnormal bleeding. Psychiatric/Behavioral: Negative for memory loss.      PHYSICAL EXAMINATION       General appearance - alert, well appearing, and in no distress  Mental status - affect appropriate to mood  Eyes - sclera anicteric, moist mucous membranes  Neck - supple  Lymphatics - not assessed  Chest - clear to auscultation, no wheezes, rales or rhonchi  Heart - normal rate, regular rhythm, normal S1, S2, no murmurs, rubs, clicks or gallops  Abdomen - soft, nontender, nondistended  Back exam - full range of motion, no tenderness  Neurological - cranial nerves II through XII grossly intact, no focal deficit  Musculoskeletal - no muscular tenderness noted, normal strength  Extremities - peripheral pulses normal, no pedal edema  Skin - normal coloration  no rashes       LABORATORY DATA      Lab Results   Component Value Date/Time    WBC 6.9 07/30/2021 11:25 AM    Hemoglobin (POC) 14.3 11/26/2011 09:50 PM    HGB 13.0 07/30/2021 11:25 AM    Hematocrit (POC) 42 11/26/2011 09:50 PM    HCT 41.9 07/30/2021 11:25 AM    PLATELET 159 85/32/8395 11:25 AM    MCV 87 07/30/2021 11:25 AM      Lab Results   Component Value Date/Time    Sodium 145 (H) 07/30/2021 11:25 AM    Potassium 4.9 07/30/2021 11:25 AM    Chloride 108 (H) 07/30/2021 11:25 AM    CO2 24 07/30/2021 11:25 AM    Anion gap 9 02/23/2017 07:26 AM    Glucose 98 07/30/2021 11:25 AM    BUN 14 07/30/2021 11:25 AM    Creatinine 0.79 07/30/2021 11:25 AM    BUN/Creatinine ratio 18 07/30/2021 11:25 AM    GFR est AA 88 07/30/2021 11:25 AM    GFR est non-AA 77 07/30/2021 11:25 AM    Calcium 8.9 07/30/2021 11:25 AM    Bilirubin, total 0.3 07/30/2021 11:25 AM    Alk.  phosphatase 68 07/30/2021 11:25 AM    Protein, total 5.9 (L) 07/30/2021 11:25 AM    Albumin 4.0 07/30/2021 11:25 AM    Globulin 3.4 02/23/2017 07:26 AM    A-G Ratio 2.1 07/30/2021 11:25 AM    ALT (SGPT) 10 07/30/2021 11:25 AM        Leopold Piano, NP [address] [facility] [place]  [address] , [address]  [place]  [phone] / [phone] Fax

## 2021-08-20 NOTE — PROGRESS NOTES
CathI registry clarification:   Prior cath Date:    05/19/16 tyrone, Krystin Lopez:  --  CORONARY CIRCULATION:  --  1st diagonal: There was a 100 % stenosis. --  Mid circumflex: There was a 100 % stenosis. _______________________________________________  Most recent PCI:    Study date: 26-Nov-2011   Diagnostic Cardiologist: Ethan Muñoz MD   Interventional Cardiologist: Ethan Muñoz MD     PROCEDURES PERFORMED:   --  Left coronary angiography. --  Right coronary angiography. --  Coronary Angiography Only 22495. --  Vascular Closure. --  Drug Eluting Stent placement. --  GpIIb/IIIa. --  Intervention on mid RCA: percutaneous intervention. SUMMARY:   --  CORONARY CIRCULATION:   --  Mid LAD: There was a 40 % stenosis. --  1st diagonal: There was a 50 % stenosis. --  Distal circumflex: There was a 100 % stenosis. --  1st obtuse marginal: There was a 60 % stenosis. --  Proximal ramus intermedius: There was a 100 % stenosis. --  Mid RCA: There was a 100 % stenosis. There was AUBREY grade 0 flow   through the vessel (no flow).

## 2021-08-26 DIAGNOSIS — I50.22 SYSTOLIC CHF, CHRONIC (HCC): ICD-10-CM

## 2021-08-26 RX ORDER — POTASSIUM CHLORIDE 20 MEQ/1
TABLET, EXTENDED RELEASE ORAL
Qty: 180 TABLET | Refills: 3 | Status: SHIPPED | OUTPATIENT
Start: 2021-08-26 | End: 2022-09-08

## 2021-08-26 NOTE — TELEPHONE ENCOUNTER
PCP: Dionne Mcdonald MD     Last appt: 7/21/2021   Future Appointments   Date Time Provider Denis Hurst   9/24/2021 10:20 AM PACEMAKER3, JOSE ALBERTO VILCHIS AMB   9/24/2021 10:30 AM WOUND CHECKS, JOSE ALBERTO VILCHIS AMB   9/30/2021 10:00 AM Ariadne Donovan NP [facility] AMB        Requested Prescriptions     Pending Prescriptions Disp Refills    potassium chloride (Klor-Con M20) 20 mEq tablet 180 Tablet 3     Sig: TAKE TWO TABLETS BY MOUTH DAILY

## 2021-09-02 ENCOUNTER — TELEPHONE (OUTPATIENT)
Dept: CARDIOLOGY CLINIC | Age: 71
End: 2021-09-02

## 2021-09-02 DIAGNOSIS — Z01.812 PRE-PROCEDURE LAB EXAM: Primary | ICD-10-CM

## 2021-09-02 NOTE — TELEPHONE ENCOUNTER
Identifiers x 2. Informed of need for BMP/CBC/covid test prior to procedure. Verbalized understanding.

## 2021-09-10 ENCOUNTER — HOSPITAL ENCOUNTER (OUTPATIENT)
Dept: PREADMISSION TESTING | Age: 71
Discharge: HOME | End: 2021-09-10
Payer: MEDICARE

## 2021-09-10 ENCOUNTER — TRANSCRIBE ORDER (OUTPATIENT)
Dept: REGISTRATION | Age: 71
End: 2021-09-10

## 2021-09-10 DIAGNOSIS — Z01.812 PRE-PROCEDURE LAB EXAM: Primary | ICD-10-CM

## 2021-09-10 DIAGNOSIS — Z01.812 PRE-PROCEDURE LAB EXAM: ICD-10-CM

## 2021-09-10 PROCEDURE — U0005 INFEC AGEN DETEC AMPLI PROBE: HCPCS

## 2021-09-10 RX ORDER — SODIUM CHLORIDE 0.9 % (FLUSH) 0.9 %
5-40 SYRINGE (ML) INJECTION EVERY 8 HOURS
Status: CANCELLED | OUTPATIENT
Start: 2021-09-10

## 2021-09-10 RX ORDER — VANCOMYCIN 2 GRAM/500 ML IN 0.9 % SODIUM CHLORIDE INTRAVENOUS
2 ONCE
Status: CANCELLED | OUTPATIENT
Start: 2021-09-10 | End: 2021-09-10

## 2021-09-10 RX ORDER — SODIUM CHLORIDE 0.9 % (FLUSH) 0.9 %
5-40 SYRINGE (ML) INJECTION AS NEEDED
Status: CANCELLED | OUTPATIENT
Start: 2021-09-10

## 2021-09-11 LAB
SARS-COV-2, XPLCVT: NOT DETECTED
SOURCE, COVRS: NORMAL

## 2021-09-14 ENCOUNTER — TELEPHONE (OUTPATIENT)
Dept: CARDIOLOGY CLINIC | Age: 71
End: 2021-09-14

## 2021-09-14 NOTE — TELEPHONE ENCOUNTER
Verified patient with two types of identifiers. Notified patient anesthesiology will be at her procedure tomorrow. Patient states Tramadol makes her dizzy. Will update MD for procedure tomorrow. Patient verbalized understanding and will call with any other questions.

## 2021-09-14 NOTE — TELEPHONE ENCOUNTER
Patient scheduled to have a procedure on 9/15/21 and would like the doctor to know she can't take tramadol and also she needed a anaesthesiologist in the past due to local anaesthesia not working, please advise            [phone]

## 2021-09-15 ENCOUNTER — ANESTHESIA EVENT (OUTPATIENT)
Dept: CARDIAC CATH/INVASIVE PROCEDURES | Age: 71
End: 2021-09-15
Payer: MEDICARE

## 2021-09-15 ENCOUNTER — ANESTHESIA (OUTPATIENT)
Dept: CARDIAC CATH/INVASIVE PROCEDURES | Age: 71
End: 2021-09-15
Payer: MEDICARE

## 2021-09-15 ENCOUNTER — HOSPITAL ENCOUNTER (OUTPATIENT)
Age: 71
Setting detail: OUTPATIENT SURGERY
Discharge: HOME | End: 2021-09-15
Attending: INTERNAL MEDICINE | Admitting: INTERNAL MEDICINE
Payer: MEDICARE

## 2021-09-15 VITALS
DIASTOLIC BLOOD PRESSURE: 58 MMHG | RESPIRATION RATE: 13 BRPM | HEART RATE: 65 BPM | OXYGEN SATURATION: 95 % | TEMPERATURE: 98 F | SYSTOLIC BLOOD PRESSURE: 126 MMHG

## 2021-09-15 DIAGNOSIS — Z95.810 S/P ICD (INTERNAL CARDIAC DEFIBRILLATOR) PROCEDURE: ICD-10-CM

## 2021-09-15 DIAGNOSIS — I25.2 HISTORY OF MI (MYOCARDIAL INFARCTION): ICD-10-CM

## 2021-09-15 DIAGNOSIS — I25.5 ISCHEMIC CARDIOMYOPATHY: ICD-10-CM

## 2021-09-15 DIAGNOSIS — I42.9 CARDIOMYOPATHY, UNSPECIFIED TYPE (HCC): ICD-10-CM

## 2021-09-15 PROCEDURE — 33262 RMVL& REPLC PULSE GEN 1 LEAD: CPT | Performed by: INTERNAL MEDICINE

## 2021-09-15 PROCEDURE — 33270 INS/REP SUBQ DEFIBRILLATOR: CPT | Performed by: INTERNAL MEDICINE

## 2021-09-15 PROCEDURE — 77030039046 HC PAD DEFIB RADIOTRNSPNT CNMD -B: Performed by: INTERNAL MEDICINE

## 2021-09-15 PROCEDURE — 74011000250 HC RX REV CODE- 250: Performed by: INTERNAL MEDICINE

## 2021-09-15 PROCEDURE — 93641 EP EVL 1/2CHMB PAC CVDFB TST: CPT | Performed by: INTERNAL MEDICINE

## 2021-09-15 PROCEDURE — 74011000250 HC RX REV CODE- 250: Performed by: NURSE ANESTHETIST, CERTIFIED REGISTERED

## 2021-09-15 PROCEDURE — C1722 AICD, SINGLE CHAMBER: HCPCS | Performed by: INTERNAL MEDICINE

## 2021-09-15 PROCEDURE — 77030028700 HC BLD TISS PLSM MEDT -E: Performed by: INTERNAL MEDICINE

## 2021-09-15 PROCEDURE — C1781 MESH (IMPLANTABLE): HCPCS | Performed by: INTERNAL MEDICINE

## 2021-09-15 PROCEDURE — 77030018547 HC SUT ETHBND1 J&J -B: Performed by: INTERNAL MEDICINE

## 2021-09-15 PROCEDURE — 77030022704 HC SUT VLOC COVD -B: Performed by: INTERNAL MEDICINE

## 2021-09-15 PROCEDURE — 77030041075 HC DRSG AG OPTIFRM MDII -B: Performed by: INTERNAL MEDICINE

## 2021-09-15 PROCEDURE — 76060000032 HC ANESTHESIA 0.5 TO 1 HR: Performed by: INTERNAL MEDICINE

## 2021-09-15 PROCEDURE — 74011250636 HC RX REV CODE- 250/636: Performed by: INTERNAL MEDICINE

## 2021-09-15 PROCEDURE — 74011250636 HC RX REV CODE- 250/636: Performed by: NURSE ANESTHETIST, CERTIFIED REGISTERED

## 2021-09-15 PROCEDURE — 2709999900 HC NON-CHARGEABLE SUPPLY: Performed by: INTERNAL MEDICINE

## 2021-09-15 PROCEDURE — 77030032060 HC PWDR HEMSTAT ARISTA ASRB 3GM BARD -C: Performed by: INTERNAL MEDICINE

## 2021-09-15 DEVICE — ENVELOPE CMRM6133 ABSORB LRG MR
Type: IMPLANTABLE DEVICE | Site: CHEST | Status: FUNCTIONAL
Brand: TYRX™

## 2021-09-15 DEVICE — SUBCUTANEOUS IMPLANTABLE CARDIOVERTER DEFIBRILLATOR
Type: IMPLANTABLE DEVICE | Status: FUNCTIONAL
Brand: EMBLEM™ MRI S-ICD

## 2021-09-15 RX ORDER — SODIUM CHLORIDE, SODIUM LACTATE, POTASSIUM CHLORIDE, CALCIUM CHLORIDE 600; 310; 30; 20 MG/100ML; MG/100ML; MG/100ML; MG/100ML
INJECTION, SOLUTION INTRAVENOUS
Status: DISCONTINUED | OUTPATIENT
Start: 2021-09-15 | End: 2021-09-15 | Stop reason: HOSPADM

## 2021-09-15 RX ORDER — VANCOMYCIN/0.9 % SOD CHLORIDE 1.5G/250ML
1500 PLASTIC BAG, INJECTION (ML) INTRAVENOUS ONCE
Status: COMPLETED | OUTPATIENT
Start: 2021-09-15 | End: 2021-09-15

## 2021-09-15 RX ORDER — LIDOCAINE HYDROCHLORIDE 20 MG/ML
INJECTION, SOLUTION EPIDURAL; INFILTRATION; INTRACAUDAL; PERINEURAL AS NEEDED
Status: DISCONTINUED | OUTPATIENT
Start: 2021-09-15 | End: 2021-09-15 | Stop reason: HOSPADM

## 2021-09-15 RX ORDER — SODIUM CHLORIDE 0.9 % (FLUSH) 0.9 %
5-40 SYRINGE (ML) INJECTION EVERY 8 HOURS
Status: DISCONTINUED | OUTPATIENT
Start: 2021-09-15 | End: 2021-09-15 | Stop reason: HOSPADM

## 2021-09-15 RX ORDER — PROPOFOL 10 MG/ML
INJECTION, EMULSION INTRAVENOUS AS NEEDED
Status: DISCONTINUED | OUTPATIENT
Start: 2021-09-15 | End: 2021-09-15 | Stop reason: HOSPADM

## 2021-09-15 RX ORDER — PROPOFOL 10 MG/ML
INJECTION, EMULSION INTRAVENOUS
Status: DISCONTINUED | OUTPATIENT
Start: 2021-09-15 | End: 2021-09-15 | Stop reason: HOSPADM

## 2021-09-15 RX ORDER — FENTANYL CITRATE 50 UG/ML
INJECTION, SOLUTION INTRAMUSCULAR; INTRAVENOUS AS NEEDED
Status: DISCONTINUED | OUTPATIENT
Start: 2021-09-15 | End: 2021-09-15 | Stop reason: HOSPADM

## 2021-09-15 RX ORDER — ONDANSETRON 2 MG/ML
INJECTION INTRAMUSCULAR; INTRAVENOUS AS NEEDED
Status: DISCONTINUED | OUTPATIENT
Start: 2021-09-15 | End: 2021-09-15 | Stop reason: HOSPADM

## 2021-09-15 RX ORDER — LIDOCAINE HYDROCHLORIDE 10 MG/ML
INJECTION, SOLUTION EPIDURAL; INFILTRATION; INTRACAUDAL; PERINEURAL AS NEEDED
Status: DISCONTINUED | OUTPATIENT
Start: 2021-09-15 | End: 2021-09-15 | Stop reason: HOSPADM

## 2021-09-15 RX ORDER — SODIUM CHLORIDE 0.9 % (FLUSH) 0.9 %
5-40 SYRINGE (ML) INJECTION AS NEEDED
Status: DISCONTINUED | OUTPATIENT
Start: 2021-09-15 | End: 2021-09-15 | Stop reason: HOSPADM

## 2021-09-15 RX ADMIN — SODIUM CHLORIDE, POTASSIUM CHLORIDE, SODIUM LACTATE AND CALCIUM CHLORIDE: 600; 310; 30; 20 INJECTION, SOLUTION INTRAVENOUS at 15:21

## 2021-09-15 RX ADMIN — FENTANYL CITRATE 25 MCG: 50 INJECTION, SOLUTION INTRAMUSCULAR; INTRAVENOUS at 15:45

## 2021-09-15 RX ADMIN — VANCOMYCIN HYDROCHLORIDE 1500 MG: 10 INJECTION, POWDER, LYOPHILIZED, FOR SOLUTION INTRAVENOUS at 15:27

## 2021-09-15 RX ADMIN — LIDOCAINE HYDROCHLORIDE 100 MG: 20 INJECTION, SOLUTION EPIDURAL; INFILTRATION; INTRACAUDAL; PERINEURAL at 15:41

## 2021-09-15 RX ADMIN — PROPOFOL 75 MCG/KG/MIN: 10 INJECTION, EMULSION INTRAVENOUS at 15:21

## 2021-09-15 RX ADMIN — PROPOFOL 20 MG: 10 INJECTION, EMULSION INTRAVENOUS at 15:45

## 2021-09-15 RX ADMIN — FENTANYL CITRATE 25 MCG: 50 INJECTION, SOLUTION INTRAMUSCULAR; INTRAVENOUS at 15:42

## 2021-09-15 RX ADMIN — ONDANSETRON HYDROCHLORIDE 4 MG: 2 INJECTION, SOLUTION INTRAMUSCULAR; INTRAVENOUS at 16:03

## 2021-09-15 NOTE — DISCHARGE INSTRUCTIONS
Patient Instructions Post-S ICD Generator Change    1. No arm range of motion or lifting restrictions associated with today's procedure. 2.  With the Aquacel dressing in place, you may shower. 3.  You may remove your dressing in 1 week, or it can be removed in clinic at follow up if you prefer. 4.  Do not rub or massage your ICD site. 5.  Do not drive for 3 days. 6.  Call Dr. Bernarda Snellen at [phone] if you experience any of the following symptoms:  Redness at the ICD site        7. Call Dr. Bernarda Snellen at [phone] if your ICD delivers a shock. The physician may   or may not want to see you in the office (that decision will be made when you call). 8.  Follow-up with Dr. Diana Borden office for wound check on 09/24/2021 at 10:20 AM.    Future Appointments   Date Time Provider Denis Hurst   9/24/2021 10:20 AM PACEMAKER3, JOSE ALBERTO VILCHIS AMB   9/24/2021 10:30 AM WOUND CHECKS, JOSE ALBERTO VILCHIS AMB   9/30/2021 10:00 AM Diefenderfer, Sybil Heimlich, NP CAVREY BS AMB     9. You may use over the counter pain medication (Tylenol) and/or ice for discomfort. Pili Francois M.D.  [facility]  Electrophysiology/Cardiology  [facility] [place]  [address] , [facility] [address]  [address]  [phone]

## 2021-09-15 NOTE — PROCEDURES
Cardiac Procedure Note   Patient: Vanesa Biggs  MRN: [id number]  SSN: [id number]   YOB: 1950 Age: 73 y.o. Sex: female    Date of Procedure: 9/15/2021   Pre-procedure Diagnosis: old MI CAD, ischemic cardiomyopathy morbid obesity, chronic systolic CHF, S ICD WILDER  Post-procedure Diagnosis: same  Procedure:   1. S ICD generator change  2.  DFT of S ICD  :  Dr. Christina Swift MD    Assistant(s):  None  Anesthesia: Moderate Sedation by CRNA  Estimated Blood Loss: Less than 10 mL   Specimens Removed: BS S ICD generator  Findings: DFT < 74N  Complications: None   Implants: [address] S ICD  Signed by:  Christina Swift MD  9/15/2021  4:11 PM

## 2021-09-15 NOTE — PROGRESS NOTES
TRANSFER - OUT REPORT:    Verbal report given to Noé Hoff on Ricardo Thompson being transferred to  for routine progression of care       Report consisted of patients Situation, Background, Assessment and   Recommendations(SBAR). Information from the following report(s) SBAR, Procedure Summary and MAR was reviewed with the receiving nurse. Opportunity for questions and clarification was provided.

## 2021-09-15 NOTE — ANESTHESIA PREPROCEDURE EVALUATION
Relevant Problems   RESPIRATORY SYSTEM   (+) Mild intermittent asthma without complication      NEUROLOGY   (+) Generalized anxiety disorder   (+) Moderate episode of recurrent major depressive disorder (HCC)      CARDIOVASCULAR   (+) Angina pectoris, unspecified   (+) Atherosclerotic heart disease of native coronary artery with unspecified angina pectoris   (+) CAD (coronary artery disease)   (+) HTN (hypertension)   (+) Mitral valve regurgitation       Anesthetic History   No history of anesthetic complications            Review of Systems / Medical History  Patient summary reviewed, nursing notes reviewed and pertinent labs reviewed    Pulmonary  Within defined limits          Asthma        Neuro/Psych   Within defined limits           Cardiovascular  Within defined limits  Hypertension          Pacemaker and CAD    Exercise tolerance: <4 METS     GI/Hepatic/Renal  Within defined limits   GERD: well controlled           Endo/Other  Within defined limits      Morbid obesity and arthritis     Other Findings              Physical Exam    Airway  Mallampati: II  TM Distance: > 6 cm  Neck ROM: normal range of motion   Mouth opening: Normal     Cardiovascular  Regular rate and rhythm,  S1 and S2 normal,  no murmur, click, rub, or gallop             Dental  No notable dental hx       Pulmonary  Breath sounds clear to auscultation               Abdominal  GI exam deferred       Other Findings            Anesthetic Plan    ASA: 3  Anesthesia type: MAC          Induction: Intravenous  Anesthetic plan and risks discussed with: Patient

## 2021-09-15 NOTE — PROGRESS NOTES
1610  TRANSFER - IN REPORT:    Verbal report received from Mable Epley on Onel Miller  being received from [facility] for routine progression of care. Report consisted of patients Situation, Background, Assessment and Recommendations(SBAR). Information from the following report(s) SBAR, Procedure Summary, Intake/Output, MAR and Recent Results was reviewed with the receiving clinician. Opportunity for questions and clarification was provided. Assessment completed upon patients arrival to [address] [place] [address] and care assumed. Cardiac Cath Lab Recovery Arrival Note:    Onel Miller arrived to [facility] recovery area. Patient procedure= Generator Change. Patient on cardiac monitor, non-invasive blood pressure, SPO2 monitor. On Room Air. IV  of 0.9%NS on pump at 0 ml/hr. Patient status doing well without problems. Patient is A&Ox 4. Patient reports No complaints. PROCEDURE SITE CHECK:    Procedure site:without any bleeding and no hematoma, NO pain/discomfort reported at procedure site. No change in patient status. Continue to monitor patient and status. RN Applied Ice to incision site    0899  RN reviewed discharge instructions with patient. Patient had an opportunity to ask questions. 1820  Pt walked to bathroom and voided successfully. IV removed. 1830  Pt discharged to  at main entrance via wheelchair.

## 2021-09-15 NOTE — ANESTHESIA POSTPROCEDURE EVALUATION
Post-Anesthesia Evaluation and Assessment    Patient: Mariola Vogt MRN: [id number]  SSN: [id number]    YOB: 1950  Age: 73 y.o. Sex: female      I have evaluated the patient and they are stable and ready for discharge from the PACU. Cardiovascular Function/Vital Signs  Visit Vitals  /65   Pulse 68   Temp 36.7 °C (98 °F)   Resp 16   SpO2 99%   Breastfeeding No       Patient is status post MAC anesthesia for Procedure(s):  REMOVE & REPLACE ICD SINGLE LEAD. Nausea/Vomiting: None    Postoperative hydration reviewed and adequate. Pain:  Pain Scale 1: Numeric (0 - 10) (09/15/21 1115)  Pain Intensity 1: 0 (09/15/21 1115)   Managed    Neurological Status: At baseline    Mental Status, Level of Consciousness: Alert and  oriented to person, place, and time    Pulmonary Status:   O2 Device: Oxygen mask (09/15/21 1608)   Adequate oxygenation and airway patent    Complications related to anesthesia: None    Post-anesthesia assessment completed. No concerns    Signed By: Jalen Mendoza MD     September 15, 2021              Procedure(s):  REMOVE & REPLACE ICD SINGLE LEAD. MAC    <BSHSIANPOST>    INITIAL Post-op Vital signs:   Vitals Value Taken Time   BP 99/55 09/15/21 1612   Temp     Pulse 69 09/15/21 1614   Resp 14 09/15/21 1614   SpO2 100 % 09/15/21 1614   Vitals shown include unvalidated device data.

## 2021-09-15 NOTE — PROGRESS NOTES
Cardiac Cath Lab Procedure Area Arrival Note:    Rob Davis arrived to Cardiac Cath Lab, Procedure Area. Patient identifiers verified with NAME and DATE OF BIRTH. Procedure verified with patient. Consent forms verified. Allergies verified. Patient informed of procedure and plan of care. Questions answered with review. Patient voiced understanding of procedure and plan of care. Patient on cardiac monitor, non-invasive blood pressure, SPO2 monitor. On RA. Anesthesia here for sedation and airway management. .  IV of ns on pump at 25 ml/hr. Patient status doing well without problems. Patient is A&Ox 4. Patient reports no pain. Patient medicated during procedure with orders obtained and verified by Dr. Brandon Briscoe. Refer to patients Cardiac Cath Lab PROCEDURE REPORT for vital signs, assessment, status, and response during procedure, printed at end of case. Printed report on chart or scanned into chart.

## 2021-09-15 NOTE — ROUTINE PROCESS
1115    Cardiac Cath Lab Recovery Arrival Note:      Didier Baptiste arrived to Cardiac Cath Lab, Recovery Area. Staff introduced to patient. Patient identifiers verified with NAME and DATE OF BIRTH. Procedure verified with patient. Consent forms reviewed and signed by patient or authorized representative and verified. Allergies verified. Patient and family oriented to department. Patient and family informed of procedure and plan of care. Questions answered with review. Patient prepped for procedure, per orders from physician, prior to arrival.    Patient on cardiac monitor, non-invasive blood pressure, SPO2 monitor. On room air. Patient is A&Ox 4. Patient reports no complaints. Patient in stretcher, in low position, with side rails up, call bell within reach, patient instructed to call if assistance as needed. Patient prep in: [address], [place]. Patient family has provided phon number Zenaida Burks [phone]  Family in: hospital.   Prep by: Giselle Bloom, [address]  RN called Anesthesia, Dr Jolene Sylvester will be in to see pt.

## 2021-09-19 RX ORDER — SACUBITRIL AND VALSARTAN 97; 103 MG/1; MG/1
TABLET, FILM COATED ORAL
Qty: 60 TABLET | Refills: 5 | Status: SHIPPED | OUTPATIENT
Start: 2021-09-19

## 2021-09-24 ENCOUNTER — CLINICAL SUPPORT (OUTPATIENT)
Dept: CARDIOLOGY CLINIC | Age: 71
End: 2021-09-24

## 2021-09-24 ENCOUNTER — CLINICAL SUPPORT (OUTPATIENT)
Dept: CARDIOLOGY CLINIC | Age: 71
End: 2021-09-24
Payer: MEDICARE

## 2021-09-24 DIAGNOSIS — Z95.810 AUTOMATIC IMPLANTABLE CARDIAC DEFIBRILLATOR IN SITU: Primary | ICD-10-CM

## 2021-09-24 PROCEDURE — 93282 PRGRMG EVAL IMPLANTABLE DFB: CPT | Performed by: INTERNAL MEDICINE

## 2021-09-24 NOTE — PROGRESS NOTES
Cardiac Electrophysiology Wound Check Note      Wound Check   Patient is here for wound check. No fever, drainage   Physical Exam   Constitutional: well-developed and well-nourished. Skin: Left side pocket is healing without redness, drainage, hematoma. The wound is intact with skin glue. ASSESSMENT and PLAN   The incision is healing without redness, drainage, hematoma. Intact with skin glue. The patient has not had arm restrictions. Current treatment plan is effective, no change in therapy   Device check shows proper lead and generator functions    Follow-up Disposition:  Return 3 months I will check via device clinic or remote monitoring in the future    Discussed placing 4 x 4 gauze at bra band to decrease pressure on site. Discussed not wearing bra. Patient states increase discomfort when she does now wear bra.

## 2021-10-02 ENCOUNTER — TELEPHONE (OUTPATIENT)
Dept: CARDIOLOGY CLINIC | Age: 71
End: 2021-10-02

## 2021-10-02 NOTE — TELEPHONE ENCOUNTER
Seen today for CHF followup, last cath med mgmt recommended. Today will begin to maximize her antianginal therapy and make changes before considering PCI : start l-arginine supplements twice a day(over the counter form [employer] or the pharmacy)   stop your metoprolol and start diltiazem CD in its place.    Continue your other medications and cont your breathing treatments and chair exercises

## 2021-10-25 ENCOUNTER — DOCUMENTATION ONLY (OUTPATIENT)
Dept: CARDIOLOGY | Age: 71
End: 2021-10-25

## 2021-10-25 NOTE — PROGRESS NOTES
PCP: Dr. Chris Felton  Mrs. Marci Person is a 73year-old female who returns today for follow-up for hospital follow up, post cath and recently admitted for s-ICD replacement. At her last visit she was feeling worse, more tired, more out of breath. Nuclear stress test showed a drop in her EF and lateral and inferior ischemia. she had a cath 8/13 : severe native 1 vessel CAD with  of ramus intermedius. LCx  Is small with occluded distal LCx. OM2 and distal ramus fill from collaterals from LAD, diagonal and RCA. Dr. Vinicius Pantoja rec med mgmt, could attempt PCI of  [place] in the future if symptoms persist.    S-ICD placement went well and the site looks good today. Still tired, with exertional dyspnea, sleeping on 2 pillows. Feeling more tired and heart racing after walking. No PND or orthopnea  Takes the [facility] to go to sleep, tired during the day but not sleeping well. Just no energy. No chest pain or palpitations  Wakes up at night with leg cramps  Bystolic caused alopecia and more dyspnea with exertion  Tolerating repatha    A little edema today       Bp high last time and entresto was increased then it was too low and she was dizzy/ not good so went back down. Hasnt used her metolazone recently  She continues to have a lot of personal stress but seems to be doing better than at her last visit with managing it all. Sees Dr. Hai Middleton in 3 mos for ICD fuv. Today will begin to maximize her antianginal therapy and make changes before considering PCI : start l-arginine supplements twice a day(over the counter form [employer] or the pharmacy)  Please stop your metoprolol and start diltiazem CD in its place. Continue your other medications and cont your breathing treatments and chair exercises       Assessment/Plan:  1.  Chronic systolic heart failure - LVEF 25-30% by last TTE, s/p AICD placement with Dr. Hai Middleton and subsequent lead revisions and repeat procedures due to non-healing midsternal incision  -s-ICD placed 9/21 Dr. Riaz Carlos  -continue Entresto 97/103(prev not tolerated due to dizziness)     one tablet BID and Toprol XL, had hair loss on coreg and bystolic   -off spironolactone due to hypotension, continue bumex 2mg BID, using Metolazone PRN, not very often, once every other week  2. CAD - hx of MI x 2 and multiple stents(6-7)  - MI in 11/2011 and received PCI to RCA at that time, previous MI in 2006  3. Mitral regurgitation - moderate, following  4. Asthma - x 15 - 16 years, no recent pulm fuv  5. HTN - well controlled on current medications   6. Primary Hyperlipidemia - statin failures, simvastatin caused muscle spasms and cramps, atorvastatin caused pain shooting all over her body, pravastatin 40mg and 20mg caused myalgias and leg cramps, had myalgias on Livalo, could not tolerate zetia either   -not able to take any statin at any dose  -now on repatha, LDL <50  7. DM Type 2 - resolved with gastric bypass  8. Hypokalemia -off spironolactone and on KCL 40meq daily   9. Hypomagnesemia - on OTC magnesium       10. Morbid obesity - Body mass index is 44.97 kg/m². Marcial Monae weighed 416 lbs at her heaviest weight, s/p gastric bypass in 2007 with revision a few years later, weight down to 239 lbs at lowest  11. PUD - had EGD and cauterized bleeding ulcer remote  12. Vitamin D deficiency - on 10k u daily    13. Anxiety - plans to see psychiatry for management   14.  PAD/Carotid stenosis - 10-49% bilateral ICA stenosis, continue ASA and repatha     Cath 8/21  RI with occluded distal LCx, distal LAD->RI collaterals  Echo 8/21 EF 25-30% ghk, lae mild AS mod MR  Echo 9/20 - LVEF 25-30%, mod MR  Echo 10/18 - LVEF 35%, mild to mod MR   Echo 5/18 - LVEF 25 %-30 %, akinesis of the basal-mid inferoseptal and basal-mid inferolateral wall(s), severe hypokinesis of the entire inferior wall(s), grade 1 dd, mild to mod dilated LA, mild to mod MR  Carotid duplex 5/18 - 10-49% bilateral ICA stenosis  Echo: 4/17, EF 35-40%, inf HK gr1dd  Nuclear: , EF 36%, anterior ischemia, lateral infarct. TTE  - LVEF 35%, moderate hypokinesis of the basal-mid inferolateral wall(s), grd 1 dd, dilated LA, mod MR, mild TR  16 - RV lead revision by Dr. Lian Deshpande  16 - single chamber AICD placed by Dr. Lian Deshpande ([address] VR, serial # [id number], model # [id number].  The ventricular lead: model # H0364726 , serial # [id number])  MUGA  - LVEF 27%  Holter  - no arrhythmias  Echo  - LV mildly dilated, LVEF 40%, moderate diffuse hypokinesis with regional variations, severe hypokinesis of the basal-mid inferior wall(s), grd 1 dd, mod dilated LA, atrial septum bows from left to right, consistent with increased left atrial pressure, mildly dilated RA, mild to near moderate MR    OLIVER  - normal  Nuc 5/15 EF 31% large anterolateral infarct with periinfarct reversibility, 13% LV reversible(32% infarct at rest, 45% at stress)    Echo 2015 - LVEF 30-35%, grd 1 dd, mod LAE, mild to mod MR  Nuc Stress  - small reversibility in anteroapical wall, LVEF 31%  Cardiac Cath 3/13 - patent stents in LAD, LCx and RCA, LVEF 30%, severe inferior HK, LCx relatively small vessel with patent stent in proximal LCx, RCA is large and dominant with patent stents in proximal and mid RCA, distal RCA free of disease, LAD normal and large vessel which coursed around apex  Echo 2011 - LVEF 30%, mild MR  Cardiac Cath 2011 - s/p PCI with AMRIT to 100% mid RCA, also had 40% mid LAD lesion, 50% diagonal 1 lesion, 100% distal LCx lesion, 60% OM1 lesion, 100% proximal Ramus lesion      Soc Hx: quit tobacco use x 13 years ago, used to smoke 1ppd, no etoh use, no drug use, lives with , son, daughter in law, grandson, retired/on disability  Fam Hx: father in his 62s when he had a MI, had 3 vessel CABG in his 62s, passed from fall but had cancer too, mother lived to age 80 and  from Alzheimer's, brother had MI in his 62s, sister had aortic repair for aneurysm in her 76s        Complete review of systems performed, pertinents noted above, all other systems are negative.

## 2021-11-09 DIAGNOSIS — F51.04 CHRONIC INSOMNIA: ICD-10-CM

## 2021-11-09 RX ORDER — ZOLPIDEM TARTRATE 10 MG/1
10 TABLET ORAL
Qty: 30 TABLET | Refills: 1 | Status: SHIPPED | OUTPATIENT
Start: 2021-11-09 | End: 2021-12-13 | Stop reason: SDUPTHER

## 2021-11-09 NOTE — TELEPHONE ENCOUNTER
----- Message from [facility] (MALIHA ANGEL) sent at 11/9/2021  2:59 PM EST -----  Subject: Refill Request    QUESTIONS  Name of Medication? zolpidem (AMBIEN) 10 mg tablet  Patient-reported dosage and instructions? 10 mg nightly  How many days do you have left? 2  Preferred Pharmacy? [id number]  Pharmacy phone number (if available)? [phone]  ---------------------------------------------------------------------------  --------------  Amparo BAIRES  What is the best way for the office to contact you? OK to leave message on   voicemail  Preferred Call Back Phone Number?  [phone]

## 2021-11-09 NOTE — TELEPHONE ENCOUNTER
PCP: Sachin Cintron MD     Last appt: 7/21/2021     Future Appointments   Date Time Provider Denis Hurst   12/13/2021  3:30 PM BLAKE Frye BS AMB   1/18/2022 10:20 AM [email or web address], Savanna DICKSON BS AMB   1/18/2022 11:00 AM MD RANDOLPH Beltrán BS AMB          Requested Prescriptions     Pending Prescriptions Disp Refills    zolpidem (AMBIEN) 10 mg tablet 30 Tablet 2     Sig: Take 1 Tablet by mouth nightly as needed for Sleep. Max Daily Amount: 10 mg.

## 2021-11-10 ENCOUNTER — DOCUMENTATION ONLY (OUTPATIENT)
Dept: CARDIOLOGY | Age: 71
End: 2021-11-10

## 2021-11-10 NOTE — PROGRESS NOTES
PCP: Dr. Cielo White  HPI: Patt Pedro 1950 is a 73 yrs old female who presents for 6 week follow up. Last seen 6 weeks ago. States shortness of breath has greatly improved switching off metoprolol to diltiazem. Minimal dyspnea. Feels like her blood pressure is still elevated. Has been getting headaches and taking her blood pressure and it has been -150. She is used to SBP 110s  and has not been feeling well from that aspect. Every once and a while notices a palpitation, no high risk features     Notes dizziness on position change. Has still  been able to tolerate chair exercises. We have maximize her antianginal therapy and make changes before considering PCI : start l-arginine supplements twice a day(over the counter form [employer] or the pharmacy)  Continue your other medications and cont your breathing treatments and chair exercises    Today:  uptitrate diltiazem to 180mg for better bp control and she will check daily and email me her readings, fu 6 weeks       Assessment/Plan:  1. Chronic systolic heart failure - LVEF 25-30% by last TTE, s/p AICD placement with Dr. Shannon Urban and subsequent lead revisions and repeat procedures due to non-healing midsternal incision  -s-ICD placed 9/21 Dr. Shannon Urban, continue fu- Dec  -continue Entresto 97/103(prev not tolerated due to dizziness)     one tablet BID and Diltiazem, had hair loss on coreg and bystolic   -off spironolactone due to hypotension, continue bumex 2mg BID, using Metolazone PRN rarely  2. CAD - hx of MI x 2 and multiple stents(6-7)  - MI in 11/2011 and received PCI to RCA at that time, previous MI in 2006  3. Mitral regurgitation - moderate, following  4. Asthma - x 15 - 16 years, no recent pulm fuv  5. HTN - well controlled on current medications   6.  Primary Hyperlipidemia - statin failures, simvastatin caused muscle spasms and cramps, atorvastatin caused pain shooting all over her body, pravastatin 40mg and 20mg caused myalgias and leg cramps, had myalgias on Livalo, could not tolerate zetia either   -not able to take any statin at any dose  -now on repatha, LDL <50, doing great! 7. DM Type 2 - resolved with gastric bypass  8. Hypokalemia -off spironolactone and on KCL 40meq daily   9. Hypomagnesemia - on OTC magnesium       10. Morbid obesity - Body mass index is >40, weighed 416 lbs at her heaviest weight, s/p gastric bypass in 2007 with revision a few years later, weight down to 239 lbs at lowest  11. PUD - had EGD and cauterized bleeding ulcer remote  12. Vitamin D deficiency - on 10k u daily    13. Anxiety - plans to see psychiatry for management   14. PAD/Carotid stenosis - 10-49% bilateral ICA stenosis, continue ASA and repatha     Cath 8/21  RI with occluded distal LCx, distal LAD->RI collaterals  Echo 8/21 EF 25-30% ghk, lae mild AS mod MR  Echo 9/20 - LVEF 25-30%, mod MR  Echo 10/18 - LVEF 35%, mild to mod MR   Echo 5/18 - LVEF 25 %-30 %, akinesis of the basal-mid inferoseptal and basal-mid inferolateral wall(s), severe hypokinesis of the entire inferior wall(s), grade 1 dd, mild to mod dilated LA, mild to mod MR  Carotid duplex 5/18 - 10-49% bilateral ICA stenosis  Echo: 4/17, EF 35-40%, inf HK gr1dd  Nuclear: 4/17, EF 36%, anterior ischemia, lateral infarct. TTE 9/16 - LVEF 35%, moderate hypokinesis of the basal-mid inferolateral wall(s), grd 1 dd, dilated LA, mod MR, mild TR  8/26/16 - RV lead revision by Dr. Kelsy Guthrie  8/24/16 - single chamber AICD placed by Dr. Kelsy Guthrie ([address] [facility] VR, serial # [id number], model # [id number].  The ventricular lead: model # R3022439 , serial # [id number])  MUGA 6/16 - LVEF 27%  Holter 6/16 - no arrhythmias  Echo 5/16 - LV mildly dilated, LVEF 40%, moderate diffuse hypokinesis with regional variations, severe hypokinesis of the basal-mid inferior wall(s), grd 1 dd, mod dilated LA, atrial septum bows from left to right, consistent with increased left atrial pressure, mildly dilated RA, mild to near moderate MR    OLIVER  - normal  Nuc 5/15 EF 31% large anterolateral infarct with periinfarct reversibility, 13% LV reversible(32% infarct at rest, 45% at stress)    Echo 2015 - LVEF 30-35%, grd 1 dd, mod LAE, mild to mod MR  Nuc Stress  - small reversibility in anteroapical wall, LVEF 31%  Cardiac Cath 3/13 - patent stents in LAD, LCx and RCA, LVEF 30%, severe inferior HK, LCx relatively small vessel with patent stent in proximal LCx, RCA is large and dominant with patent stents in proximal and mid RCA, distal RCA free of disease, LAD normal and large vessel which coursed around apex  Echo 2011 - LVEF 30%, mild MR  Cardiac Cath 2011 - s/p PCI with AMRIT to 100% mid RCA, also had 40% mid LAD lesion, 50% diagonal 1 lesion, 100% distal LCx lesion, 60% OM1 lesion, 100% proximal Ramus lesion      Soc Hx: quit tobacco use x 13 years ago, used to smoke 1ppd, no etoh use, no drug use, lives with , son, daughter in law, grandson, retired/on disability  Fam Hx: father in his 62s when he had a MI, had 3 vessel CABG in his 62s, passed from fall but had cancer too, mother lived to age 80 and  from Alzheimer's, brother had MI in his 62s, sister had aortic repair for aneurysm in her 76s        Complete review of systems performed, pertinents noted above, all other systems are negative.

## 2021-12-11 DIAGNOSIS — F33.1 MODERATE EPISODE OF RECURRENT MAJOR DEPRESSIVE DISORDER (HCC): ICD-10-CM

## 2021-12-11 DIAGNOSIS — F41.8 DEPRESSION WITH ANXIETY: ICD-10-CM

## 2021-12-11 DIAGNOSIS — F41.1 GENERALIZED ANXIETY DISORDER: ICD-10-CM

## 2021-12-12 RX ORDER — FLUOXETINE 20 MG/1
TABLET ORAL
Qty: 90 TABLET | Refills: 3 | OUTPATIENT
Start: 2021-12-12

## 2021-12-12 RX ORDER — FLUOXETINE HYDROCHLORIDE 40 MG/1
40 CAPSULE ORAL 2 TIMES DAILY
Qty: 180 CAPSULE | Refills: 3 | Status: SHIPPED | OUTPATIENT
Start: 2021-12-12 | End: 2021-12-13 | Stop reason: SDUPTHER

## 2021-12-13 ENCOUNTER — OFFICE VISIT (OUTPATIENT)
Dept: INTERNAL MEDICINE CLINIC | Age: 71
End: 2021-12-13
Payer: MEDICARE

## 2021-12-13 VITALS
DIASTOLIC BLOOD PRESSURE: 79 MMHG | HEIGHT: 64 IN | WEIGHT: 253.6 LBS | SYSTOLIC BLOOD PRESSURE: 131 MMHG | RESPIRATION RATE: 18 BRPM | OXYGEN SATURATION: 95 % | BODY MASS INDEX: 43.29 KG/M2 | TEMPERATURE: 99 F | HEART RATE: 89 BPM

## 2021-12-13 DIAGNOSIS — F41.1 GENERALIZED ANXIETY DISORDER: ICD-10-CM

## 2021-12-13 DIAGNOSIS — I10 PRIMARY HYPERTENSION: Primary | ICD-10-CM

## 2021-12-13 DIAGNOSIS — E66.01 MORBID OBESITY WITH BMI OF 40.0-44.9, ADULT (HCC): ICD-10-CM

## 2021-12-13 DIAGNOSIS — I50.22 SYSTOLIC CHF, CHRONIC (HCC): ICD-10-CM

## 2021-12-13 DIAGNOSIS — Z95.810 S/P ICD (INTERNAL CARDIAC DEFIBRILLATOR) PROCEDURE: ICD-10-CM

## 2021-12-13 DIAGNOSIS — Z23 NEEDS FLU SHOT: ICD-10-CM

## 2021-12-13 DIAGNOSIS — F51.04 CHRONIC INSOMNIA: ICD-10-CM

## 2021-12-13 DIAGNOSIS — I25.119 ATHEROSCLEROSIS OF NATIVE CORONARY ARTERY OF NATIVE HEART WITH ANGINA PECTORIS (HCC): ICD-10-CM

## 2021-12-13 DIAGNOSIS — F33.1 MODERATE EPISODE OF RECURRENT MAJOR DEPRESSIVE DISORDER (HCC): ICD-10-CM

## 2021-12-13 DIAGNOSIS — I25.5 ISCHEMIC CARDIOMYOPATHY: ICD-10-CM

## 2021-12-13 DIAGNOSIS — Z98.84 HISTORY OF GASTRIC BYPASS: ICD-10-CM

## 2021-12-13 DIAGNOSIS — E78.2 MIXED HYPERLIPIDEMIA: ICD-10-CM

## 2021-12-13 PROCEDURE — G8754 DIAS BP LESS 90: HCPCS | Performed by: PHYSICIAN ASSISTANT

## 2021-12-13 PROCEDURE — G9899 SCRN MAM PERF RSLTS DOC: HCPCS | Performed by: PHYSICIAN ASSISTANT

## 2021-12-13 PROCEDURE — 3017F COLORECTAL CA SCREEN DOC REV: CPT | Performed by: PHYSICIAN ASSISTANT

## 2021-12-13 PROCEDURE — 90694 VACC AIIV4 NO PRSRV 0.5ML IM: CPT | Performed by: PHYSICIAN ASSISTANT

## 2021-12-13 PROCEDURE — G9717 DOC PT DX DEP/BP F/U NT REQ: HCPCS | Performed by: PHYSICIAN ASSISTANT

## 2021-12-13 PROCEDURE — 1090F PRES/ABSN URINE INCON ASSESS: CPT | Performed by: PHYSICIAN ASSISTANT

## 2021-12-13 PROCEDURE — 1101F PT FALLS ASSESS-DOCD LE1/YR: CPT | Performed by: PHYSICIAN ASSISTANT

## 2021-12-13 PROCEDURE — G0008 ADMIN INFLUENZA VIRUS VAC: HCPCS | Performed by: PHYSICIAN ASSISTANT

## 2021-12-13 PROCEDURE — G8417 CALC BMI ABV UP PARAM F/U: HCPCS | Performed by: PHYSICIAN ASSISTANT

## 2021-12-13 PROCEDURE — G8536 NO DOC ELDER MAL SCRN: HCPCS | Performed by: PHYSICIAN ASSISTANT

## 2021-12-13 PROCEDURE — G8752 SYS BP LESS 140: HCPCS | Performed by: PHYSICIAN ASSISTANT

## 2021-12-13 PROCEDURE — 99214 OFFICE O/P EST MOD 30 MIN: CPT | Performed by: PHYSICIAN ASSISTANT

## 2021-12-13 PROCEDURE — G8427 DOCREV CUR MEDS BY ELIG CLIN: HCPCS | Performed by: PHYSICIAN ASSISTANT

## 2021-12-13 RX ORDER — FLUOXETINE HYDROCHLORIDE 40 MG/1
40 CAPSULE ORAL 2 TIMES DAILY
Qty: 180 CAPSULE | Refills: 3 | Status: SHIPPED | OUTPATIENT
Start: 2021-12-13

## 2021-12-13 RX ORDER — DILTIAZEM HYDROCHLORIDE 120 MG/1
120 CAPSULE, EXTENDED RELEASE ORAL DAILY
COMMUNITY
Start: 2021-10-07 | End: 2022-06-08

## 2021-12-13 RX ORDER — ZOLPIDEM TARTRATE 10 MG/1
10 TABLET ORAL
Qty: 30 TABLET | Refills: 1 | Status: SHIPPED | OUTPATIENT
Start: 2021-12-13 | End: 2022-03-15

## 2021-12-13 NOTE — PATIENT INSTRUCTIONS
Vaccine Information Statement    Influenza (Flu) Vaccine (Inactivated or Recombinant): What You Need to Know    Many vaccine information statements are available in Georgian and other languages. See www.immunize.org/vis. Hojas de información sobre vacunas están disponibles en español y en muchos otros idiomas. Visite www.immunize.org/vis. 1. Why get vaccinated? Influenza vaccine can prevent influenza (flu). Flu is a contagious disease that spreads around the [facility] every year, usually between October and May. Anyone can get the flu, but it is more dangerous for some people. Infants and young children, people 72 years and older, pregnant people, and people with certain health conditions or a weakened immune system are at greatest risk of flu complications. Pneumonia, bronchitis, sinus infections, and ear infections are examples of flu-related complications. If you have a medical condition, such as heart disease, cancer, or diabetes, flu can make it worse. Flu can cause fever and chills, sore throat, muscle aches, fatigue, cough, headache, and runny or stuffy nose. Some people may have vomiting and diarrhea, though this is more common in children than adults. In an average year, thousands of people in the [facility] die from flu, and many more are hospitalized. Flu vaccine prevents millions of illnesses and flu-related visits to the doctor each year. 2. Influenza vaccines     CDC recommends everyone 6 months and older get vaccinated every flu season. Children 6 months through 6years of age may need 2 doses during a single flu season. Everyone else needs only 1 dose each flu season. It takes about 2 weeks for protection to develop after vaccination. There are many flu viruses, and they are always changing. Each year a new flu vaccine is made to protect against the influenza viruses believed to be likely to cause disease in the upcoming flu season.  Even when the vaccine doesnt exactly match these viruses, it may still provide some protection. Influenza vaccine does not cause flu. Influenza vaccine may be given at the same time as other vaccines. 3. Talk with your health care provider    Tell your vaccination provider if the person getting the vaccine:   Has had an allergic reaction after a previous dose of influenza vaccine, or has any severe, life-threatening allergies    Has ever had Guillain-Barré Syndrome (also called GBS)    In some cases, your health care provider may decide to postpone influenza vaccination until a future visit. Influenza vaccine can be administered at any time during pregnancy. People who are or will be pregnant during influenza season should receive inactivated influenza vaccine. People with minor illnesses, such as a cold, may be vaccinated. People who are moderately or severely ill should usually wait until they recover before getting influenza vaccine. Your health care provider can give you more information. 4. Risks of a vaccine reaction     Soreness, redness, and swelling where the shot is given, fever, muscle aches, and headache can happen after influenza vaccination.  There may be a very small increased risk of Guillain-Barré Syndrome (GBS) after inactivated influenza vaccine (the flu shot). Leoma March children who get the flu shot along with pneumococcal vaccine (PCV13) and/or DTaP vaccine at the same time might be slightly more likely to have a seizure caused by fever. Tell your health care provider if a child who is getting flu vaccine has ever had a seizure. People sometimes faint after medical procedures, including vaccination. Tell your provider if you feel dizzy or have vision changes or ringing in the ears. As with any medicine, there is a very remote chance of a vaccine causing a severe allergic reaction, other serious injury, or death. 5. What if there is a serious problem?     An allergic reaction could occur after the vaccinated person leaves the clinic. If you see signs of a severe allergic reaction (hives, swelling of the face and throat, difficulty breathing, a fast heartbeat, dizziness, or weakness), call 9-1-1 and get the person to the nearest hospital.    For other signs that concern you, call your health care provider. Adverse reactions should be reported to the Vaccine Adverse Event Reporting System (VAERS). Your health care provider will usually file this report, or you can do it yourself. Visit the VAERS website at www.vaers. [facility].gov or call 8-577.850.6329. VAERS is only for reporting reactions, and VAERS staff members do not give medical advice. 6. The National Vaccine Injury Compensation Program    The [facility] Vaccine Injury Compensation Program (VICP) is a federal program that was created to compensate people who may have been injured by certain vaccines. Claims regarding alleged injury or death due to vaccination have a time limit for filing, which may be as short as two years. Visit the VICP website at www.[facility].gov/vaccinecompensation or call 9-913.586.6717 to learn about the program and about filing a claim. 7. How can I learn more?  Ask your health care provider.  Call your local or state health department.  Visit the website of the Food and Drug Administration (FDA) for vaccine package inserts and additional information at www.fda.gov/vaccines-blood-biologics/vaccines.  Contact the Centers for Disease Control and Prevention (CDC):  - Call 8-577.140.5057 (1-800-CDC-INFO) or  - Visit CDCs influenza website at www.cdc.gov/flu. Vaccine Information Statement   Inactivated Influenza Vaccine   8/6/2021  42 SHANTHI Diamond [id number]   Department of Health and Human Services  Centers for Disease Control and Prevention    Office Use Only

## 2021-12-13 NOTE — PROGRESS NOTES
Identified pt with two pt identifiers(name and ). Reviewed record in preparation for visit and have obtained necessary documentation. Chief Complaint   Patient presents with    Follow-up        Health Maintenance Due   Topic    Shingrix Vaccine Age 50> (1 of 2)    Breast Cancer Screen Mammogram     Flu Vaccine (1)    COVID-19 Vaccine (3 - Booster for Wagner Peter series)        Visit Vitals  /79 (BP 1 Location: Left upper arm, BP Patient Position: Sitting, BP Cuff Size: Large adult)   Pulse 89   Temp 99 °F (37.2 °C) (Oral)   Resp 18   Ht 5' 4\" (1.626 m)   Wt 253 lb 9.6 oz (115 kg)   SpO2 95%   BMI 43.53 kg/m²     Pain Scale: 4/10    Coordination of Care Questionnaire:  :   1. Have you been to the ER, urgent care clinic since your last visit? Hospitalized since your last visit? No    2. Have you seen or consulted any other health care providers outside of the [address] [place] [address] since your last visit? Include any pap smears or colon screening.  Dr. Sherl Nissen at [facility]

## 2021-12-13 NOTE — PROGRESS NOTES
Kamar Pedro is a 73y.o. year old female seen in clinic today for   Chief Complaint   Patient presents with    Follow-up       she is here today to follow up for HTN, CHF/CAD. She follows with Dr. Micki Montaño for her CHF/CAD. She gets the Praluent injections for HLD. She had her Metoprolol D/c and Dilt-XR given due to a change in findings on her nuclear stress. She was found to have a new old MI on the stress test which she was unaware of. She had an unchanged ECHO afterwards. Her BP has been stable on Entresto and Bumex for fluid. Her last A1C was in low prediabetic levels in 2020. She uses Prozac 40 mg BID and Ambien nightly for sleep. She notes her depression is well controlled with this. Current Outpatient Medications on File Prior to Visit   Medication Sig Dispense Refill    DILT- mg capsule       FLUoxetine (PROzac) 40 mg capsule Take 1 Capsule by mouth two (2) times a day. 180 Capsule 3    zolpidem (AMBIEN) 10 mg tablet Take 1 Tablet by mouth nightly as needed for Sleep. Max Daily Amount: 10 mg. 30 Tablet 1    Entresto  mg tablet TAKE 1 TABLET BY MOUTH TWICE A DAY 60 Tablet 5    isosorbide mononitrate ER (IMDUR) 30 mg tablet TAKE 1 TABLET BY MOUTH EVERY DAY 90 Tablet 1    potassium chloride (Klor-Con M20) 20 mEq tablet TAKE TWO TABLETS BY MOUTH DAILY 180 Tablet 3    alirocumab (Praluent Pen) 150 mg/mL injector pen 1 mL by SubCUTAneous route Once every 2 weeks. 6 Pen 1    albuterol (PROVENTIL HFA, VENTOLIN HFA, PROAIR HFA) 90 mcg/actuation inhaler TAKE 2 PUFFS BY INHALATION EVERY FOUR (4) HOURS AS NEEDED FOR WHEEZING OR SHORTNESS OF BREATH. 18 Inhaler 11    bumetanide (BUMEX) 2 mg tablet TAKE 1 TABLET BY MOUTH TWICE A DAY 60 Tab 11    psyllium (METAMUCIL) powd Take  by mouth. 2 tsp daily      cholecalciferol, VITAMIN D3, (VITAMIN D3) 5,000 unit tab tablet Take 10,000 Units by mouth daily.  biotin 10,000 mcg cap Take  by mouth daily.       OTHER Complete multi formula, bariatric advantage      magnesium 250 mg tab Take 1 Tab by mouth daily.  ferrous sulfate (IRON, FERROUS SULFATE,) 325 mg (65 mg Iron) tablet Take  by mouth daily (before breakfast).  CALCIUM PO Take 600 mg by mouth daily.  metoprolol succinate (TOPROL-XL) 25 mg XL tablet TAKE 1 TABLET BY MOUTH EVERY DAY AT NIGHT (Patient not taking: Reported on 12/13/2021) 30 Tab 11     No current facility-administered medications on file prior to visit. Allergies   Allergen Reactions    Amoxicillin Anaphylaxis    Amoxicillin Anaphylaxis    Lipitor [Atorvastatin] Other (comments)     Severe muscle pain and spasms    Zocor [Simvastatin] Other (comments)     Cramps, muscle spasms    Buspar [Buspirone] Other (comments)     Drunk sensation, headaches    Hydroxyzine Other (comments)     Blurred vision, lightheadedness    Livalo [Pitavastatin] Myalgia    Pravastatin Other (comments)     Leg cramps    Tramadol Vertigo     Past Medical History:   Diagnosis Date    Acute right ankle pain 6/8/2018 5/29/18: X-ray showed possible right ankle sprain. 6/6/18: [facility] diagnosed with peroneal tendonitis. Prescribed an ASO    Asthma     Cardiomy[facility] ([facility]     Coronary artery disease 2008    s/p RCA stent ([facility]) on 11/26/11    Depression with anxiety     2011    DVT (deep venous thrombosis) ([facility] 7/27/2010    Family history of early CAD     GERD (gastroesophageal reflux disease)     H/O gastric bypass 2006    Revision in 2009    Hepatitis C antibody test positive     Does not have chronic hep C (labs 10/6/15: neg HCV RNA)     HTN (hypertension) 7/27/2010    Hyperlipidemia 07/27/2010    Incontinence of feces 9/3/2018    Dr. Lucien Yu. 8/20/18: Improving with pelvic physical therapy and psyllium husk treatment. Saw Dr. Sydni Pabon who suggested PT first. MR defecography showed pelvic floor weakness with pelvic descensus, small anterior  Rectocele, and vaginal prolapse.  To have pelvic PT weekly for 8 wks and to see Dr. Jayden Saenz again in Oct 2018.  Joint pain 7/27/2010    MI (myocardial infarction) ([facility] 7/27/2010    Mitral valve regurgitation     Mild to moderate    Morbid obesity ([facility] 7/27/2010    Myocardial infarction [facility]) 2006 and 2011    Dr. Hillary Nickerson disorder     PUD (peptic ulcer disease)     gi bleed 2008; ulcer n gastric bypass pouch    Urinary incontinence, stress 7/27/2010      Past Surgical History:   Procedure Laterality Date    COLONOSCOPY N/A 6/29/2018    COLONOSCOPY performed by Argelia Gunderson MD at [facility]COPY; redundant colon, int hemorrhoids, pathology: normal colonic mucosa    HX COLONOSCOPY  9/5/14    Dr. Sarina Willis; normal, repeat in 5 yrs    HX GASTRIC BYPASS      HX IMPLANTABLE CARDIOVERTER DEFIBRILLATOR  08/24/2016    HX LAP GASTRIC BYPASS  2006    revision 2009/Dr. Ashtyn Bowen    HX OTHER SURGICAL  09/19/2016    Removal of right ventricular ICD lead & single chamber transvenous AICD    HX OTHER SURGICAL  10/12/2016    pocket revison of ICD; Dr. Samy Garcia  06/07/2018    Dr Ahmet Grissom; high resolution anorectal manaometry-abnormal study. Study done for eval of fecal incontinence    HX OTHER SURGICAL  12/14/2018    Dr. Ahmet Grissom. Rectal endoscopic US (EUS) for rectal incontinence. Normal rectal mucosa, no fistulas.     HX PACEMAKER      sicd/left side of chest under arm    INS PPM/ICD LED SING ONLY  8/24/2016         INS PPM/ICD LED SING ONLY  8/26/2016         INS PPM/ICD LED SING ONLY  9/21/2016         IA CARDIAC SURG PROCEDURE UNLIST      Stent x 5-6,Most recent 2011    IA LAP,STOMACH,OTHER,W/O TUBE  04/05/2010    Revision GBP        Family History   Problem Relation Age of Onset    Dementia Mother     Cancer Mother         colon    Alzheimer's Disease Mother     Cancer Father         stomach    Heart Disease Father     Hypertension Father     Heart Disease Sister     Stroke Sister     Heart Attack Brother         Social History     Socioeconomic History    Marital status:      Spouse name: Not on file    Number of children: Not on file    Years of education: Not on file    Highest education level: Not on file   Occupational History    Not on file   Tobacco Use    Smoking status: Former Smoker     Packs/day: 1.00     Years: 30.00     Pack years: 30.00     Start date: 1970     Quit date: 2004     Years since quittin.5    Smokeless tobacco: Never Used   Vaping Use    Vaping Use: Never used   Substance and Sexual Activity    Alcohol use: No     Alcohol/week: 0.0 standard drinks    Drug use: No     Types: Prescription    Sexual activity: Never     Partners: Male   Other Topics Concern    Not on file   Social History Narrative    ** Merged History Encounter **          Social Determinants of Health     Financial Resource Strain:     Difficulty of Paying Living Expenses: Not on file   Food Insecurity:     Worried About Running Out of Food in the Last Year: Not on file    [place] of Food in the Last Year: Not on file   Transportation Needs:     Lack of Transportation (Medical): Not on file    Lack of Transportation (Non-Medical):  Not on file   Physical Activity:     Days of Exercise per Week: Not on file    Minutes of Exercise per Session: Not on file   Stress:     Feeling of Stress : Not on file   Social Connections:     Frequency of Communication with Friends and Family: Not on file    Frequency of Social Gatherings with Friends and Family: Not on file    Attends Latter day Services: Not on file    Active Member of Clubs or Organizations: Not on file    Attends Club or Organization Meetings: Not on file    Marital Status: Not on file   Intimate Partner Violence:     Fear of Current or Ex-Partner: Not on file    Emotionally Abused: Not on file    Physically Abused: Not on file    Sexually Abused: Not on file   Housing Stability:     Unable to Pay for Housing in the Last Year: Not on file    Number of Places Lived in the Last Year: Not on file    Unstable Housing in the Last Year: Not on file           Visit Vitals  /79 (BP 1 Location: Left upper arm, BP Patient Position: Sitting, BP Cuff Size: Large adult)   Pulse 89   Temp 99 °F (37.2 °C) (Oral)   Resp 18   Ht 5' 4\" (1.626 m)   Wt 253 lb 9.6 oz (115 kg)   SpO2 95%   BMI 43.53 kg/m²       Review of Systems   Constitutional: Negative for chills, fever and weight loss. HENT: Negative. Eyes: Negative. Respiratory: Negative for cough, sputum production and shortness of breath. Cardiovascular: Negative for chest pain and claudication. Gastrointestinal: Negative for constipation and diarrhea. Genitourinary: Negative for dysuria, flank pain, frequency, hematuria and urgency. Musculoskeletal: Negative for back pain, myalgias and neck pain. Skin: Negative for rash. Neurological: Negative for tingling, sensory change and weakness. Psychiatric/Behavioral: Negative for depression. Physical Exam  Vitals and nursing note reviewed. Constitutional:       Appearance: Normal appearance. She is normal weight. HENT:      Head: Normocephalic and atraumatic. Right Ear: External ear normal.      Left Ear: External ear normal.      Nose: Nose normal.      Mouth/Throat:      Mouth: Mucous membranes are moist.      Pharynx: Oropharynx is clear. Eyes:      Conjunctiva/sclera: Conjunctivae normal.      Pupils: Pupils are equal, round, and reactive to light. Neck:      Vascular: No carotid bruit. Cardiovascular:      Rate and Rhythm: Normal rate and regular rhythm. Pulses: Normal pulses. Heart sounds: Normal heart sounds. Pulmonary:      Effort: Pulmonary effort is normal.      Breath sounds: Normal breath sounds. No wheezing, rhonchi or rales. Abdominal:      General: Abdomen is flat. Bowel sounds are normal. There is no distension. Palpations: There is no mass. Tenderness:  There is no abdominal tenderness. There is no guarding or rebound. Musculoskeletal:         General: Normal range of motion. Cervical back: Normal range of motion and neck supple. No rigidity. Skin:     General: Skin is warm and dry. Capillary Refill: Capillary refill takes less than 2 seconds. Neurological:      General: No focal deficit present. Mental Status: She is alert and oriented to person, place, and time. Sensory: No sensory deficit. Gait: Gait normal.   Psychiatric:         Mood and Affect: Mood normal.         Behavior: Behavior normal.         Thought Content: Thought content normal.          No results found for this or any previous visit (from the past 24 hour(s)). ASSESSMENT AND PLAN  Diagnoses and all orders for this visit:    1. Primary hypertension  Well controlled   2. Systolic CHF, chronic (HCC)  On entresto, Bumex  3. Ischemic cardiomyopathy  On Entresto  4. Atherosclerosis of native coronary artery of native heart with angina pectoris ([facility]    5. History of gastric bypass    6. Moderate episode of recurrent major depressive disorder (HCC)  -     FLUoxetine (PROzac) 40 mg capsule; Take 1 Capsule by mouth two (2) times a day. 7. Mixed hyperlipidemia  Given Praluent injections   8. Morbid obesity with BMI of 40.0-44.9, adult ([facility]    9. S/P ICD (internal cardiac defibrillator) procedure  Followed by Dr. Frias Gravely regularly  10. Generalized anxiety disorder  -     FLUoxetine (PROzac) 40 mg capsule; Take 1 Capsule by mouth two (2) times a day. Continue High dose prozac for KARINA/MDD  11. Chronic insomnia  -     zolpidem (AMBIEN) 10 mg tablet; Take 1 Tablet by mouth nightly as needed for Sleep. Max Daily Amount: 10 mg. Continue 10 mg ambien daily  12. Needs flu shot  -     FLU (FLUAD QUAD INFLUENZA VACCINE,QUAD,ADJUVANTED)  Given today       I have discussed the diagnosis with the patient and the intended plan as seen in the above orders. Patient is in agreement.   The patient has received an after-visit summary and questions were answered concerning future plans. I have discussed medication side effects and warnings with the patient as well.     Sunny Dukes PA-C

## 2021-12-21 ENCOUNTER — DOCUMENTATION ONLY (OUTPATIENT)
Dept: CARDIOLOGY | Age: 71
End: 2021-12-21

## 2021-12-21 NOTE — PROGRESS NOTES
PCP: Dr. Yamil Sibley  HPI: Yvette Caicedo 1950 is a 73 yrs old female who presents for 6 week follow up. Last seen 6 weeks ago. She reports a lot of shortness of breath. Edema is also noted. She is having a lot of depression her daughter passed away 18 years ago in a car accident but this time of year is rough for her. Lots of anxiety due to loss of family and the holiday season     Shortness of breath is the same. Due to traveling has missed days of her diuretics . But overall improved with changing medicines. Reports Blood pressure SBP 120s at home. Feels like it is better controlled. No further headaches. No dizziness    Still doing her chair exercises     No chest pain / pressures         Praulent refill sent in to 12/16. Was off for a month. Rare palpitations but she thinks it is anxiety and she feels stressed at times. Sleeping at night, [place] helps. No dizziness no falls. We have maximize her antianginal therapy and make changes before considering PCI : start l-arginine supplements twice a day(over the counter form [employer] or the pharmacy)  Continue your other medications and cont your breathing treatments and chair exercises    Previously :  up titrate diltiazem to 180mg for better bp control and she will check daily and email me         ->overall doing well physically at this time an will not uptitrate her meds further or proceed with more procedures right now. Assessment/Plan:  1. Chronic systolic heart failure - LVEF 25-30% by last TTE, s/p AICD placement with Dr. Zach Ruelas and subsequent lead revisions and repeat procedures due to non-healing midsternal incision  -s-ICD placed 9/21 Dr. Zach Ruelas, continue fu- Dec  -continue Entresto 97/103(prev not tolerated due to dizziness)     one tablet BID and Diltiazem, had hair loss on coreg and bystolic   -off spironolactone due to hypotension, continue bumex 2mg BID, using Metolazone PRN rarely  2.  CAD - hx of MI x 2 and multiple stents(6-7)  - MI in 11/2011 and received PCI to RCA at that time, previous MI in 2006  3. Mitral regurgitation - moderate, following  4. Asthma - x 15 - 16 years, no recent pulm fuv  5. HTN - well controlled on current medications   6. Primary Hyperlipidemia - statin failures, simvastatin caused muscle spasms and cramps, atorvastatin caused pain shooting all over her body, pravastatin 40mg and 20mg caused myalgias and leg cramps, had myalgias on Livalo, could not tolerate zetia either   -not able to take any statin at any dose  -now on repatha, LDL <50, doing great! 7. DM Type 2 - resolved with gastric bypass  8. Hypokalemia -off spironolactone and on KCL 40meq daily   9. Hypomagnesemia - on OTC magnesium       10. Morbid obesity - Body mass index is >40, weighed 416 lbs at her heaviest weight, s/p gastric bypass in 2007 with revision a few years later, weight down to 239 lbs at lowest  11. PUD - had EGD and cauterized bleeding ulcer remote  12. Vitamin D deficiency - on 10k u daily    13. Anxiety - plans to see psychiatry for management   14. PAD/Carotid stenosis - 10-49% bilateral ICA stenosis, continue ASA and repatha     Cath 8/21  RI with occluded distal LCx, distal LAD->RI collaterals  Echo 8/21 EF 25-30% ghk, lae mild AS mod MR  Echo 9/20 - LVEF 25-30%, mod MR  Echo 10/18 - LVEF 35%, mild to mod MR   Echo 5/18 - LVEF 25 %-30 %, akinesis of the basal-mid inferoseptal and basal-mid inferolateral wall(s), severe hypokinesis of the entire inferior wall(s), grade 1 dd, mild to mod dilated LA, mild to mod MR  Carotid duplex 5/18 - 10-49% bilateral ICA stenosis  Echo: 4/17, EF 35-40%, inf HK gr1dd  Nuclear: 4/17, EF 36%, anterior ischemia, lateral infarct.   TTE 9/16 - LVEF 35%, moderate hypokinesis of the basal-mid inferolateral wall(s), grd 1 dd, dilated LA, mod MR, mild TR  8/26/16 - RV lead revision by Dr. Clemencia Guerrero  8/24/16 - single chamber AICD placed by Dr. Clemencia Guerrero [facility] Vince Velasquez VR, serial # [id number], model # [id number]. The ventricular lead: model # [id number] , serial # [id number])  MUGA  - LVEF 27%  Holter  - no arrhythmias  Echo  - LV mildly dilated, LVEF 40%, moderate diffuse hypokinesis with regional variations, severe hypokinesis of the basal-mid inferior wall(s), grd 1 dd, mod dilated LA, atrial septum bows from left to right, consistent with increased left atrial pressure, mildly dilated RA, mild to near moderate MR    OLIVER  - normal  Nuc 5/15 EF 31% large anterolateral infarct with periinfarct reversibility, 13% LV reversible(32% infarct at rest, 45% at stress)    Echo 2015 - LVEF 30-35%, grd 1 dd, mod LAE, mild to mod MR  Nuc Stress  - small reversibility in anteroapical wall, LVEF 31%  Cardiac Cath 3/13 - patent stents in LAD, LCx and RCA, LVEF 30%, severe inferior HK, LCx relatively small vessel with patent stent in proximal LCx, RCA is large and dominant with patent stents in proximal and mid RCA, distal RCA free of disease, LAD normal and large vessel which coursed around apex  Echo 2011 - LVEF 30%, mild MR  Cardiac Cath 2011 - s/p PCI with AMRIT to 100% mid RCA, also had 40% mid LAD lesion, 50% diagonal 1 lesion, 100% distal LCx lesion, 60% OM1 lesion, 100% proximal Ramus lesion      Soc Hx: quit tobacco use x 13 years ago, used to smoke 1ppd, no etoh use, no drug use, lives with , son, daughter in law, grandson, retired/on disability  Fam Hx: father in his 62s when he had a MI, had 3 vessel CABG in his 62s, passed from fall but had cancer too, mother lived to age 80 and  from Alzheimer's, brother had MI in his 62s, sister had aortic repair for aneurysm in her 76s        Complete review of systems performed, pertinents noted above, all other systems are negative.

## 2022-01-20 ENCOUNTER — TELEPHONE (OUTPATIENT)
Dept: CARDIOLOGY CLINIC | Age: 72
End: 2022-01-20

## 2022-01-20 RX ORDER — ISOSORBIDE MONONITRATE 30 MG/1
TABLET, EXTENDED RELEASE ORAL
Qty: 90 TABLET | Refills: 1 | Status: SHIPPED | OUTPATIENT
Start: 2022-01-20 | End: 2022-06-08

## 2022-01-20 RX ORDER — FLUOXETINE 20 MG/1
TABLET ORAL
Qty: 90 TABLET | Refills: 11 | OUTPATIENT
Start: 2022-01-20

## 2022-02-07 PROCEDURE — 93295 DEV INTERROG REMOTE 1/2/MLT: CPT | Performed by: INTERNAL MEDICINE

## 2022-02-08 ENCOUNTER — OFFICE VISIT (OUTPATIENT)
Dept: CARDIOLOGY CLINIC | Age: 72
End: 2022-02-08
Payer: MEDICARE

## 2022-02-08 DIAGNOSIS — Z95.810 AUTOMATIC IMPLANTABLE CARDIAC DEFIBRILLATOR IN SITU: Primary | ICD-10-CM

## 2022-02-08 NOTE — LETTER
2/8/2022 9:01 AM    Ms. Jose Miranda [id number]              This letter confirms that we have received your scheduled remote check of your implanted     device on 2-8-22  . Our EP team will contact you via phone if there are significant abnormal    findings. Your next remote check from home is scheduled for 6-1-22  . If you have any questions, please call [address] [place] [address] at [phone].                Sincerely,    Chelo Garcia MD [facility]

## 2022-02-23 DIAGNOSIS — J45.20 REACTIVE AIRWAY DISEASE, MILD INTERMITTENT, UNCOMPLICATED: ICD-10-CM

## 2022-02-23 RX ORDER — ALBUTEROL SULFATE 90 UG/1
AEROSOL, METERED RESPIRATORY (INHALATION)
Qty: 18 EACH | Refills: 11 | Status: SHIPPED | OUTPATIENT
Start: 2022-02-23

## 2022-03-15 DIAGNOSIS — F51.04 CHRONIC INSOMNIA: ICD-10-CM

## 2022-03-15 RX ORDER — ZOLPIDEM TARTRATE 10 MG/1
10 TABLET ORAL
Qty: 30 TABLET | Refills: 1 | Status: SHIPPED | OUTPATIENT
Start: 2022-03-15 | End: 2022-05-11

## 2022-03-18 PROBLEM — M17.0 PRIMARY OSTEOARTHRITIS OF BOTH KNEES: Status: ACTIVE | Noted: 2017-02-28

## 2022-03-18 PROBLEM — J45.20 MILD INTERMITTENT ASTHMA WITHOUT COMPLICATION: Status: ACTIVE | Noted: 2017-05-19

## 2022-03-19 PROBLEM — I20.9 ANGINA PECTORIS, UNSPECIFIED (HCC): Status: ACTIVE | Noted: 2021-08-19

## 2022-03-19 PROBLEM — I25.119 ATHEROSCLEROTIC HEART DISEASE OF NATIVE CORONARY ARTERY WITH UNSPECIFIED ANGINA PECTORIS (HCC): Status: ACTIVE | Noted: 2021-08-19

## 2022-03-19 PROBLEM — F41.1 GENERALIZED ANXIETY DISORDER: Status: ACTIVE | Noted: 2019-10-14

## 2022-03-28 DIAGNOSIS — I50.22 SYSTOLIC CHF, CHRONIC (HCC): ICD-10-CM

## 2022-03-28 RX ORDER — BUMETANIDE 2 MG/1
TABLET ORAL
Qty: 60 TABLET | Refills: 11 | Status: SHIPPED | OUTPATIENT
Start: 2022-03-28

## 2022-04-08 NOTE — LETTER
2022 5:22 PM    Ms. Jose Miranda [id number]      To Whom It May Concern: This letter is concerning jury duty for Roosevelt Blanco, : 1950, followed at  [address] [place] [address]. Due to medical problems, this patient must be excused from jury duty at this time. She has coronary artery disease with angina, cardiomyopathy, congestive heart failure, major depression, and generalized anxiety disorder. Please contact the office if you need further information or have any questions.         Sincerely,      Joao Mcgowan MD

## 2022-05-10 DIAGNOSIS — F51.04 CHRONIC INSOMNIA: ICD-10-CM

## 2022-05-11 RX ORDER — ZOLPIDEM TARTRATE 10 MG/1
10 TABLET ORAL
Qty: 30 TABLET | Refills: 1 | Status: SHIPPED | OUTPATIENT
Start: 2022-05-11 | End: 2022-07-06

## 2022-05-25 ENCOUNTER — HOSPITAL ENCOUNTER (OUTPATIENT)
Dept: MAMMOGRAPHY | Age: 72
Discharge: HOME | End: 2022-05-25
Attending: INTERNAL MEDICINE
Payer: MEDICARE

## 2022-05-25 DIAGNOSIS — Z12.31 ENCOUNTER FOR SCREENING MAMMOGRAM FOR MALIGNANT NEOPLASM OF BREAST: ICD-10-CM

## 2022-05-25 PROCEDURE — 77063 BREAST TOMOSYNTHESIS BI: CPT

## 2022-05-31 ENCOUNTER — HOSPITAL ENCOUNTER (OUTPATIENT)
Dept: MAMMOGRAPHY | Age: 72
Discharge: HOME | End: 2022-05-31
Attending: INTERNAL MEDICINE
Payer: MEDICARE

## 2022-05-31 DIAGNOSIS — R92.8 ABNORMAL MAMMOGRAM OF RIGHT BREAST: ICD-10-CM

## 2022-05-31 PROCEDURE — 76642 ULTRASOUND BREAST LIMITED: CPT

## 2022-05-31 PROCEDURE — 77061 BREAST TOMOSYNTHESIS UNI: CPT

## 2022-06-01 ENCOUNTER — OFFICE VISIT (OUTPATIENT)
Dept: CARDIOLOGY CLINIC | Age: 72
End: 2022-06-01
Payer: MEDICARE

## 2022-06-01 DIAGNOSIS — Z95.810 AUTOMATIC IMPLANTABLE CARDIAC DEFIBRILLATOR IN SITU: Primary | ICD-10-CM

## 2022-06-01 NOTE — LETTER
6/2/2022 7:47 AM    Ms. Jose Miranda [id number]          This letter confirms that we have received your scheduled remote check of your implanted     device on 6-2-22  . Our EP team will contact you via phone if there are significant abnormal    findings. Your next remote check from home is scheduled for 9-7-22  . If you have any questions, please call [address] [place] [address] Drive at [phone].                Sincerely,    Tricia Burnett MD [facility]

## 2022-06-02 PROCEDURE — 93295 DEV INTERROG REMOTE 1/2/MLT: CPT | Performed by: INTERNAL MEDICINE

## 2022-06-08 ENCOUNTER — HOSPITAL ENCOUNTER (OUTPATIENT)
Dept: MAMMOGRAPHY | Age: 72
Discharge: HOME | End: 2022-06-08
Attending: INTERNAL MEDICINE
Payer: MEDICARE

## 2022-06-08 ENCOUNTER — OFFICE VISIT (OUTPATIENT)
Dept: INTERNAL MEDICINE CLINIC | Age: 72
End: 2022-06-08
Payer: MEDICARE

## 2022-06-08 VITALS
TEMPERATURE: 98.2 F | DIASTOLIC BLOOD PRESSURE: 75 MMHG | HEIGHT: 64 IN | WEIGHT: 235 LBS | HEART RATE: 86 BPM | RESPIRATION RATE: 12 BRPM | OXYGEN SATURATION: 95 % | SYSTOLIC BLOOD PRESSURE: 121 MMHG | BODY MASS INDEX: 40.12 KG/M2

## 2022-06-08 DIAGNOSIS — E66.01 OBESITY, CLASS III, BMI 40-49.9 (MORBID OBESITY) (HCC): ICD-10-CM

## 2022-06-08 DIAGNOSIS — R92.8 ABNORMAL MAMMOGRAM OF RIGHT BREAST: ICD-10-CM

## 2022-06-08 DIAGNOSIS — I25.10 CORONARY ARTERY DISEASE INVOLVING NATIVE CORONARY ARTERY OF NATIVE HEART WITHOUT ANGINA PECTORIS: ICD-10-CM

## 2022-06-08 DIAGNOSIS — N63.10 MASS OF RIGHT BREAST, UNSPECIFIED QUADRANT: ICD-10-CM

## 2022-06-08 DIAGNOSIS — E78.2 MIXED HYPERLIPIDEMIA: ICD-10-CM

## 2022-06-08 DIAGNOSIS — I10 PRIMARY HYPERTENSION: Primary | ICD-10-CM

## 2022-06-08 DIAGNOSIS — I50.22 SYSTOLIC CHF, CHRONIC (HCC): ICD-10-CM

## 2022-06-08 DIAGNOSIS — E55.9 VITAMIN D DEFICIENCY: ICD-10-CM

## 2022-06-08 DIAGNOSIS — L98.9 SKIN LESION: ICD-10-CM

## 2022-06-08 DIAGNOSIS — F33.1 MODERATE EPISODE OF RECURRENT MAJOR DEPRESSIVE DISORDER (HCC): ICD-10-CM

## 2022-06-08 DIAGNOSIS — M17.0 PRIMARY OSTEOARTHRITIS OF BOTH KNEES: ICD-10-CM

## 2022-06-08 PROBLEM — I25.119 ATHEROSCLEROTIC HEART DISEASE OF NATIVE CORONARY ARTERY WITH UNSPECIFIED ANGINA PECTORIS (HCC): Status: RESOLVED | Noted: 2021-08-19 | Resolved: 2022-06-08

## 2022-06-08 PROBLEM — I20.9 ANGINA PECTORIS, UNSPECIFIED (HCC): Status: RESOLVED | Noted: 2021-08-19 | Resolved: 2022-06-08

## 2022-06-08 PROCEDURE — G8427 DOCREV CUR MEDS BY ELIG CLIN: HCPCS | Performed by: INTERNAL MEDICINE

## 2022-06-08 PROCEDURE — G9717 DOC PT DX DEP/BP F/U NT REQ: HCPCS | Performed by: INTERNAL MEDICINE

## 2022-06-08 PROCEDURE — 77065 DX MAMMO INCL CAD UNI: CPT

## 2022-06-08 PROCEDURE — 3017F COLORECTAL CA SCREEN DOC REV: CPT | Performed by: INTERNAL MEDICINE

## 2022-06-08 PROCEDURE — 1101F PT FALLS ASSESS-DOCD LE1/YR: CPT | Performed by: INTERNAL MEDICINE

## 2022-06-08 PROCEDURE — 88305 TISSUE EXAM BY PATHOLOGIST: CPT

## 2022-06-08 PROCEDURE — G8754 DIAS BP LESS 90: HCPCS | Performed by: INTERNAL MEDICINE

## 2022-06-08 PROCEDURE — G8752 SYS BP LESS 140: HCPCS | Performed by: INTERNAL MEDICINE

## 2022-06-08 PROCEDURE — G8417 CALC BMI ABV UP PARAM F/U: HCPCS | Performed by: INTERNAL MEDICINE

## 2022-06-08 PROCEDURE — G9899 SCRN MAM PERF RSLTS DOC: HCPCS | Performed by: INTERNAL MEDICINE

## 2022-06-08 PROCEDURE — G8536 NO DOC ELDER MAL SCRN: HCPCS | Performed by: INTERNAL MEDICINE

## 2022-06-08 PROCEDURE — 99214 OFFICE O/P EST MOD 30 MIN: CPT | Performed by: INTERNAL MEDICINE

## 2022-06-08 PROCEDURE — 88360 TUMOR IMMUNOHISTOCHEM/MANUAL: CPT

## 2022-06-08 PROCEDURE — 74011000250 HC RX REV CODE- 250: Performed by: RADIOLOGY

## 2022-06-08 PROCEDURE — 19083 BX BREAST 1ST LESION US IMAG: CPT

## 2022-06-08 PROCEDURE — 1090F PRES/ABSN URINE INCON ASSESS: CPT | Performed by: INTERNAL MEDICINE

## 2022-06-08 PROCEDURE — 1123F ACP DISCUSS/DSCN MKR DOCD: CPT | Performed by: INTERNAL MEDICINE

## 2022-06-08 RX ORDER — LIDOCAINE HYDROCHLORIDE AND EPINEPHRINE 10; 10 MG/ML; UG/ML
20 INJECTION, SOLUTION INFILTRATION; PERINEURAL ONCE
Status: COMPLETED | OUTPATIENT
Start: 2022-06-08 | End: 2022-06-08

## 2022-06-08 RX ORDER — LIDOCAINE HYDROCHLORIDE 10 MG/ML
5 INJECTION INFILTRATION; PERINEURAL
Status: COMPLETED | OUTPATIENT
Start: 2022-06-08 | End: 2022-06-08

## 2022-06-08 RX ORDER — DILTIAZEM HYDROCHLORIDE 180 MG/1
180 CAPSULE, COATED, EXTENDED RELEASE ORAL DAILY
COMMUNITY
Start: 2022-05-09 | End: 2022-10-11

## 2022-06-08 RX ADMIN — LIDOCAINE HYDROCHLORIDE,EPINEPHRINE BITARTRATE 200 MG: 10; .01 INJECTION, SOLUTION INFILTRATION; PERINEURAL at 10:30

## 2022-06-08 RX ADMIN — LIDOCAINE HYDROCHLORIDE 5 ML: 10 INJECTION, SOLUTION INFILTRATION; PERINEURAL at 10:30

## 2022-06-08 NOTE — PROGRESS NOTES
CC:   Chief Complaint   Patient presents with    Hypertension    Cholesterol Problem    Depression       HISTORY OF PRESENT ILLNESS  Roosevelt Blanco is a 81 y.o. female. Presents for 6 month follow up evaluation. She has HTN, CAD s/p 2 MI's in 2006 and 2011, valvular heart disease, CHF (EF 35% in 9/16), ICD in place with hx lead revisions, depression with anxiety, obesity s/p gastric bypass in 2006.     Had US-guided right breast biopsy this morning for suspicious right breat mass seen on mammography on 5/31/22. Mild chronic SOB. Denies HA's, CP, dizziness, heart palpitations, leg swelling, orthopnea, and PND. Home BP monitoring: not done. Echo 9/29/20: LVEF 25-30%. Severely dilated LA. Moderate MRShayna Stopped taking Imdur and Praluent on her own. Praluent caused diffuse muscle aches. Follows with Dr. Dottie Masters; next appointment in July. ROS  A complete review of systems was performed and is negative except for those mentioned in the HPI.        Patient Active Problem List   Diagnosis Code    HTN (hypertension) I10    History of gastric bypass Z98.84    Moderate episode of recurrent major depressive disorder ([facility] F33.1    CAD (coronary artery disease) S06.04    Systolic CHF, chronic (HCC) I50.22    Hyperlipidemia E78.5    Mitral valve regurgitation I34.0    History of MI (myocardial infarction) I25.2    Cardiomyopathy ([facility] I42.9    Osteoporosis M81.0    Morbid obesity with BMI of 40.0-44.9, adult ([facility] E66.01, Z68.41    Chronic insomnia F51.04    S/P ICD (internal cardiac defibrillator) procedure Z95.810    Primary osteoarthritis of both knees M17.0    Mild intermittent asthma without complication M01.07    Generalized anxiety disorder F41.1    Angina pectoris, unspecified I20.9    Atherosclerotic heart disease of native coronary artery with unspecified angina pectoris I25.119     Past Medical History:   Diagnosis Date    Acute right ankle pain 6/8/2018 5/29/18: X-ray showed possible right ankle sprain. 6/6/18: saw Dr. Abdulaziz Alcantar ([facility]), diagnosed with peroneal tendonitis. Prescribed an ASO    Asthma     Cardiomyopathy ([facility]     Coronary artery disease 2008    s/p RCA stent (AMRIT) on 11/26/11    Depression with anxiety     2011    DVT (deep venous thrombosis) ([facility] 7/27/2010    Family history of early CAD     GERD (gastroesophageal reflux disease)     H/O gastric bypass 2006    Revision in 2009    Hepatitis C antibody test positive     Does not have chronic hep C (labs 10/6/15: neg HCV RNA)     HTN (hypertension) 7/27/2010    Hyperlipidemia 07/27/2010    Incontinence of feces 9/3/2018    Dr. Yoana Sears. 8/20/18: Improving with pelvic physical therapy and psyllium husk treatment. Saw Dr. Charlotte Gonzalez who suggested PT first. MR defecography showed pelvic floor weakness with pelvic descensus, small anterior  Rectocele, and vaginal prolapse. To have pelvic PT weekly for 8 wks and to see Dr. Charlotte Gonzalez again in Oct 2018.     Joint pain 7/27/2010    MI (myocardial infarction) ([facility] 7/27/2010    Mitral valve regurgitation     Mild to moderate    Morbid obesity ([facility] 7/27/2010    Myocardial infarction [facility]) 2006 and 2011    Dr. Josefina Reddy    Psychiatric disorder     PUD (peptic ulcer disease)     gi bleed 2008; ulcer n gastric bypass pouch    Urinary incontinence, stress 7/27/2010     Allergies   Allergen Reactions    Amoxicillin Anaphylaxis    Amoxicillin Anaphylaxis    Lipitor [Atorvastatin] Other (comments)     Severe muscle pain and spasms    Zocor [Simvastatin] Other (comments)     Cramps, muscle spasms    Buspar [Buspirone] Other (comments)     Drunk sensation, headaches    Hydroxyzine Other (comments)     Blurred vision, lightheadedness    Livalo [Pitavastatin] Myalgia    Pravastatin Other (comments)     Leg cramps    Tramadol Vertigo       Current Outpatient Medications   Medication Sig Dispense Refill    dilTIAZem ER (CARDIZEM CD) 180 mg capsule Take 180 mg by mouth daily.  zolpidem (AMBIEN) 10 mg tablet TAKE 1 TABLET BY MOUTH NIGHTLY AS NEEDED FOR SLEEP. MAX DAILY AMOUNT: 10 MG. 30 Tablet 1    bumetanide (BUMEX) 2 mg tablet TAKE 1 TABLET BY MOUTH TWICE A DAY 60 Tablet 11    albuterol (PROVENTIL HFA, VENTOLIN HFA, PROAIR HFA) 90 mcg/actuation inhaler TAKE 2 PUFFS BY INHALATION EVERY FOUR HOURS AS NEEDED FOR WHEEZING OR SHORTNESS OF BREATH. 18 Each 11    FLUoxetine (PROzac) 40 mg capsule Take 1 Capsule by mouth two (2) times a day. 180 Capsule 3    Entresto  mg tablet TAKE 1 TABLET BY MOUTH TWICE A DAY 60 Tablet 5    potassium chloride (Klor-Con M20) 20 mEq tablet TAKE TWO TABLETS BY MOUTH DAILY 180 Tablet 3    psyllium (METAMUCIL) powd Take  by mouth. 2 tsp daily      cholecalciferol, VITAMIN D3, (VITAMIN D3) 5,000 unit tab tablet Take 10,000 Units by mouth daily.  biotin 10,000 mcg cap Take  by mouth daily.  OTHER Complete multi formula, bariatric advantage      magnesium 250 mg tab Take 1 Tab by mouth daily.  ferrous sulfate (IRON, FERROUS SULFATE,) 325 mg (65 mg Iron) tablet Take  by mouth daily (before breakfast).  CALCIUM PO Take 600 mg by mouth daily. PHYSICAL EXAM  Visit Vitals  /75 (BP 1 Location: Left upper arm, BP Patient Position: Sitting)   Pulse 86   Temp 98.2 °F (36.8 °C) (Oral)   Resp 12   Ht 5' 4\" (1.626 m)   Wt 235 lb (106.6 kg)   SpO2 95%   BMI 40.34 kg/m²       General: Obese, no distress. HEENT:  Head normocephalic/atraumatic, no scleral icterus  Lungs:  Clear to ausculation bilaterally. Good air movement. Heart:  Regular rate and rhythm, normal S1 and S2, no murmur, gallop, or rub  Extremities: No clubbing, cyanosis, or edema. Normal ROM with mild crepitus at both knees. Neurological: Alert and oriented. Psychiatric: Normal mood and affect. Behavior is normal.         ASSESSMENT AND PLAN    ICD-10-CM ICD-9-CM    1.  Primary hypertension  V76 912.9 METABOLIC PANEL, COMPREHENSIVE      CBC WITH AUTOMATED DIFF      METABOLIC PANEL, COMPREHENSIVE      CBC WITH AUTOMATED DIFF   2. Mixed hyperlipidemia  E78.2 272.2 LIPID PANEL      LIPID PANEL   3. Mass of right breast, unspecified quadrant  N63.10 611.72    4. Primary osteoarthritis of both knees  M17.0 715.16 REFERRAL TO ORTHOPEDICS   5. Systolic CHF, chronic (HCC)  I50.22 428.22      428.0    6. Coronary artery disease involving native coronary artery of native heart without angina pectoris  I25.10 414.01    7. Moderate episode of recurrent major depressive disorder (HCC)  F33.1 296.32    8. Skin lesion  L98.9 709.9 REFERRAL TO DERMATOLOGY   9. Obesity, Class III, BMI 40-49.9 (morbid obesity) ([facility])  E66.01 278.01    10. Vitamin D deficiency  E55.9 268.9 VITAMIN D, 25 HYDROXY      VITAMIN D, 25 HYDROXY     Diagnoses and all orders for this visit:    1. Primary hypertension  Controlled on Entresto and diltiazem ER.  -     METABOLIC PANEL, COMPREHENSIVE; Future  -     CBC WITH AUTOMATED DIFF; Future    2. Mixed hyperlipidemia  Stopped Praluent on her own. Instructed her to let Dr. Morris Natarajan know and discuss alternative treatments.  -     LIPID PANEL; Future    3. Mass of right breast, unspecified quadrant  Had breast biopsy this morning. Await results. 4. Primary osteoarthritis of both knees  -     REFERRAL TO ORTHOPEDICS    5. Systolic CHF, chronic (HCC)  Stable on Entresto and bumetanide. 6. Coronary artery disease involving native coronary artery of native heart without angina pectoris    7. Moderate episode of recurrent major depressive disorder (HCC)  Controlled on Prozac. 8. Skin lesion  -     REFERRAL TO DERMATOLOGY    9. Obesity, Class III, BMI 40-49.9 (morbid obesity) ([facility]  Counseled on diet, exercise, and weight loss.     10. Vitamin D deficiency  -     VITAMIN D, 25 HYDROXY; Future      Follow-up and Dispositions    · Return in about 6 weeks (around 7/20/2022), or if symptoms worsen or fail to improve, for Medicare AWV; have fasting labs 1 week before appointment. I have discussed the diagnosis with the patient and the intended plan as seen in the above orders. Patient is in agreement. The patient has received an after-visit summary and questions were answered concerning future plans. I have discussed medication side effects and warnings with the patient as well.

## 2022-06-08 NOTE — PROGRESS NOTES
Star Aguilera  Identified pt with two pt identifiers(name and ). Chief Complaint   Patient presents with    Hypertension    Cholesterol Problem    Depression       Reviewed record In preparation for visit and have obtained necessary documentation. 1. Have you been to the ER, urgent care clinic or hospitalized since your last visit? No     2. Have you seen or consulted any other health care providers outside of the [address] [place] [address] since your last visit? Include any pap smears or colon screening. No    Vitals reviewed with provider.     Health Maintenance reviewed:     Health Maintenance Due   Topic    Shingrix Vaccine Age 49> (1 of 2)    COVID-19 Vaccine (3 - Booster for Wagner Peter series)          Wt Readings from Last 3 Encounters:   22 235 lb (106.6 kg)   21 253 lb 9.6 oz (115 kg)   21 254 lb (115.2 kg)        Temp Readings from Last 3 Encounters:   22 98.2 °F (36.8 °C) (Oral)   21 99 °F (37.2 °C) (Oral)   09/15/21 98 °F (36.7 °C)        BP Readings from Last 3 Encounters:   22 121/75   21 131/79   09/15/21 (!) 126/58        Pulse Readings from Last 3 Encounters:   22 86   21 89   09/15/21 65        Vitals:    22 1308   BP: 121/75   Pulse: 86   Resp: 12   Temp: 98.2 °F (36.8 °C)   TempSrc: Oral   SpO2: 95%   Weight: 235 lb (106.6 kg)   Height: 5' 4\" (1.626 m)   PainSc:   3   PainLoc: Breast          Learning Assessment:   :       Learning Assessment 10/26/2017 2015   PRIMARY LEARNER Patient Patient   HIGHEST LEVEL OF EDUCATION - PRIMARY LEARNER  - 2 YEARS OF COLLEGE   BARRIERS PRIMARY LEARNER - NONE   CO-LEARNER CAREGIVER - No   PRIMARY LANGUAGE ENGLISH ENGLISH   LEARNER PREFERENCE PRIMARY READING DEMONSTRATION   ANSWERED BY patient pateint   RELATIONSHIP SELF SELF        Depression Screening:   :       3 most recent PHQ Screens 2022   PHQ Not Done -   Little interest or pleasure in doing things Not at all   Feeling down, depressed, irritable, or hopeless Several days   Total Score PHQ 2 1        Fall Risk Assessment:   :       Fall Risk Assessment, last 12 mths 12/13/2021   Able to walk? Yes   Fall in past 12 months? 0   Do you feel unsteady? 0   Are you worried about falling 1   Is TUG test greater than 12 seconds? -   Is the gait abnormal? -   Number of falls in past 12 months -   Fall with injury? -        Abuse Screening:   :       Abuse Screening Questionnaire 7/21/2021 7/13/2020 5/29/2018 7/26/2016 6/30/2015   Do you ever feel afraid of your partner? N N N N N   Are you in a relationship with someone who physically or mentally threatens you? N N N N N   Is it safe for you to go home?  Y Y Y Y Y        ADL Screening:   :       ADL Assessment 7/21/2021   Feeding yourself No Help Needed   Getting from bed to chair No Help Needed   Getting dressed No Help Needed   Bathing or showering No Help Needed   Walk across the room (includes cane/walker) No Help Needed   Using the telphone No Help Needed   Taking your medications No Help Needed   Preparing meals No Help Needed   Managing money (expenses/bills) No Help Needed   Moderately strenuous housework (laundry) No Help Needed   Shopping for personal items (toiletries/medicines) No Help Needed   Shopping for groceries No Help Needed   Driving No Help Needed   Climbing a flight of stairs No Help Needed   Getting to places beyond walking distances No Help Needed

## 2022-06-10 NOTE — PROGRESS NOTES
Dr. Harmony Deng called patient with pathology. Appointment was made with Dr. Emil Wiley on  1/65 at [phone] for surgical consultation. Called patient with appointment information. She reports that the biopsy site is healing well and she has no concerns. Encouraged her to call with any questions.

## 2022-06-13 ENCOUNTER — DOCUMENTATION ONLY (OUTPATIENT)
Dept: CARDIOLOGY | Age: 72
End: 2022-06-13

## 2022-06-13 ENCOUNTER — TELEPHONE (OUTPATIENT)
Dept: SURGERY | Age: 72
End: 2022-06-13

## 2022-06-13 ENCOUNTER — NURSE NAVIGATOR (OUTPATIENT)
Dept: CASE MANAGEMENT | Age: 72
End: 2022-06-13

## 2022-06-13 NOTE — TELEPHONE ENCOUNTER
Patient called and asked if she needed to have blood work done before this appointment. She had received a reminder call asking her to have her blood work done. She is a new breast talk and [facility] so everything is in the chart. I called her back to let her know that she was good and didn't need to do blood work at this time. She was appreciative of return phone call.

## 2022-06-13 NOTE — PROGRESS NOTES
PCP: Dr. Joao Mcgowan  HPI: Manuel Higgins 1950 is a 73 yrs old female who presents for 6 week follow up. Last seen 2 Months ago. Patient reports no new symptoms. Had a biopsy done in right breast, patient has a malignant mass that measures 1cm. Heart is fluttering and palpitating no dizziness. NO edema. Weight is coming down, 25 pounds since december and 5 since her last visit here. Breathing getting slightly better. Mammo done and had a spot, went back and had biopsy and found a mass, malignant and seeing Dr. Radha Smith to consult. So not sure about lumpectomy or XRT or chemo at this point. No more headaches. No swelling. EF today up slightly to 35%        States shortness of breath has greatly improved switching off metoprolol to diltiazem. Minimal dyspnea. Feels like her blood pressure is still elevated. Has been getting headaches and taking her blood pressure and it has been -150. She is used to SBP 110s  and has not been feeling well from that aspect. Every once and a while notices a palpitation, no high risk features     Notes dizziness on position change. Has still  been able to tolerate chair exercises. We have maximize her antianginal therapy and make changes before considering PCI : start l-arginine supplements twice a day(over the counter form [employer] or the pharmacy) no plans for now  Continue your other medications and cont your breathing treatments and chair exercises        Assessment/Plan:  1.  Chronic systolic heart failure - LVEF 25-30% by last TTE, s/p AICD placement with Dr. Joel Love and subsequent lead revisions and repeat procedures due to non-healing midsternal incision  -s-ICD placed 9/21 Dr. Joel Love, continue fu- Dec  -continue Entresto 97/103(prev not tolerated due to dizziness)     one tablet BID and Diltiazem, had hair loss on coreg and bystolic   -off spironolactone due to hypotension, continue bumex 2mg BID, using Metolazone PRN rarely  2. CAD - hx of MI x 2 and multiple stents(6-7)  - MI in 11/2011 and received PCI to RCA at that time, previous MI in 2006  3. Mitral regurgitation - moderate, following  4. Asthma - x 15 - 16 years, no recent pulm fuv  5. HTN - well controlled on current medications   6. Primary Hyperlipidemia - statin failures, simvastatin caused muscle spasms and cramps, atorvastatin caused pain shooting all over her body, pravastatin 40mg and 20mg caused myalgias and leg cramps, had myalgias on Livalo, could not tolerate zetia either   -not able to take any statin at any dose  -now on praluent, LDL <50, doing great! 7. DM Type 2 - resolved with gastric bypass  8. Hypokalemia -off spironolactone and on KCL 40meq daily   9. Hypomagnesemia - on OTC magnesium       10. Morbid obesity - Body mass index is >40, weighed 416 lbs at her heaviest weight, s/p gastric bypass in 2007 with revision a few years later, weight down to 239 lbs at lowest  11. PUD - had EGD and cauterized bleeding ulcer remote  12. Vitamin D deficiency - on 10k u daily    13. Anxiety - plans to see psychiatry for management   14. PAD/Carotid stenosis - 10-49% bilateral ICA stenosis, continue ASA and repatha       Echo 2/21 EF 35% mild TR mod MR  Cath 8/21  RI with occluded distal LCx, distal LAD->RI collaterals  Echo 8/21 EF 25-30% ghk, lae mild AS mod MR  Echo 9/20 - LVEF 25-30%, mod MR  Echo 10/18 - LVEF 35%, mild to mod MR   Echo 5/18 - LVEF 25 %-30 %, akinesis of the basal-mid inferoseptal and basal-mid inferolateral wall(s), severe hypokinesis of the entire inferior wall(s), grade 1 dd, mild to mod dilated LA, mild to mod MR  Carotid duplex 5/18 - 10-49% bilateral ICA stenosis  Echo: 4/17, EF 35-40%, inf HK gr1dd  Nuclear: 4/17, EF 36%, anterior ischemia, lateral infarct.   TTE 9/16 - LVEF 35%, moderate hypokinesis of the basal-mid inferolateral wall(s), grd 1 dd, dilated LA, mod MR, mild TR  8/26/16 - RV lead revision by  Carrion  16 - single chamber AICD placed by Dr. Jarrett Apley ([address] VR, serial # [id number], model # [id number]. The ventricular lead: model # [id number] , serial # [id number])  MUGA  - LVEF 27%  Holter  - no arrhythmias  Echo  - LV mildly dilated, LVEF 40%, moderate diffuse hypokinesis with regional variations, severe hypokinesis of the basal-mid inferior wall(s), grd 1 dd, mod dilated LA, atrial septum bows from left to right, consistent with increased left atrial pressure, mildly dilated RA, mild to near moderate MR    OLIVER  - normal  Nuc 5/15 EF 31% large anterolateral infarct with periinfarct reversibility, 13% LV reversible(32% infarct at rest, 45% at stress)    Echo 2015 - LVEF 30-35%, grd 1 dd, mod LAE, mild to mod MR  Nuc Stress  - small reversibility in anteroapical wall, LVEF 31%  Cardiac Cath 3/13 - patent stents in LAD, LCx and RCA, LVEF 30%, severe inferior HK, LCx relatively small vessel with patent stent in proximal LCx, RCA is large and dominant with patent stents in proximal and mid RCA, distal RCA free of disease, LAD normal and large vessel which coursed around apex  Echo 2011 - LVEF 30%, mild MR  Cardiac Cath 2011 - s/p PCI with AMRIT to 100% mid RCA, also had 40% mid LAD lesion, 50% diagonal 1 lesion, 100% distal LCx lesion, 60% OM1 lesion, 100% proximal Ramus lesion      Soc Hx: quit tobacco use x 13 years ago, used to smoke 1ppd, no etoh use, no drug use, lives with , son, daughter in law, grandson, retired/on disability  Fam Hx: father in his 62s when he had a MI, had 3 vessel CABG in his 62s, passed from fall but had cancer too, mother lived to age 80 and  from Alzheimer's, brother had MI in his 62s, sister had aortic repair for aneurysm in her 76s        Complete review of systems performed, pertinents noted above, all other systems are negative. OBJECTIVE  HEENT - normal  Neck - supple, normal JVP  Lungs - clear lungs.  No rales, rhonchi or wheezing  Heart - regular rhythm, normal S1 S2, 2/6 MYKE, distant heart sounds  Abd - soft, nontender, no HSM, no abd bruits  Extremities - no clubbing or cyanosis.  1+ lower extremity edema  Vascular - no carotid bruits, normal upstroke  radial pulses nl, L=R  pedal pulses 1+, L=R  Neuro - alert, oriented, speech normal. nonfocal.

## 2022-06-13 NOTE — PROGRESS NOTES
[address]  Breast Navigator Encounter    Name:    Az Muñoz  Age:    81 y.o. Diagnosis:    RIGHT breast cancer    Interdisciplinary Team:  Med-Onc:      Surg-Onc:    Dr. Camila Yan:      Plastics:      :      Nurse Navigator:  Ariel Morel RN, BSN, [facility]      Encounter type:  []Patient Initiated  []Navigator Follow-up []Pre-op  []Post-op  []Check-in Prior to First Treatment []Treatment Modality Change  [x]Initial Navigator Encounter []Other:       Narrative: Attempted to reach out to patient prior to her appointment later this week with Dr. Kg Kruse. She was referred on Friday from [address] [place] [address]. She was not available, and her cell phone number did not have a name on it and her voicemail at home was not accepting messages. Will try to reach out to her again tomorrow.           Ariel Morel RN, BSN, [facility]  Oncology Breast Navigator     [address]  [address] [facility] [address].  W: [phone]  F: [phone]  [email or web address]  Good Help to Those in [facility]

## 2022-06-15 ENCOUNTER — OFFICE VISIT (OUTPATIENT)
Dept: SURGERY | Age: 72
End: 2022-06-15
Payer: MEDICARE

## 2022-06-15 ENCOUNTER — NURSE NAVIGATOR (OUTPATIENT)
Dept: CASE MANAGEMENT | Age: 72
End: 2022-06-15

## 2022-06-15 ENCOUNTER — DOCUMENTATION ONLY (OUTPATIENT)
Dept: SURGERY | Age: 72
End: 2022-06-15

## 2022-06-15 VITALS
DIASTOLIC BLOOD PRESSURE: 75 MMHG | BODY MASS INDEX: 40.12 KG/M2 | WEIGHT: 235 LBS | HEIGHT: 64 IN | SYSTOLIC BLOOD PRESSURE: 121 MMHG

## 2022-06-15 DIAGNOSIS — Z17.0 MALIGNANT NEOPLASM OF RIGHT BREAST IN FEMALE, ESTROGEN RECEPTOR POSITIVE, UNSPECIFIED SITE OF BREAST (HCC): Primary | ICD-10-CM

## 2022-06-15 DIAGNOSIS — C50.911 MALIGNANT NEOPLASM OF RIGHT BREAST IN FEMALE, ESTROGEN RECEPTOR POSITIVE, UNSPECIFIED SITE OF BREAST (HCC): Primary | ICD-10-CM

## 2022-06-15 PROCEDURE — 1090F PRES/ABSN URINE INCON ASSESS: CPT | Performed by: SURGERY

## 2022-06-15 PROCEDURE — 3017F COLORECTAL CA SCREEN DOC REV: CPT | Performed by: SURGERY

## 2022-06-15 PROCEDURE — G8752 SYS BP LESS 140: HCPCS | Performed by: SURGERY

## 2022-06-15 PROCEDURE — G8417 CALC BMI ABV UP PARAM F/U: HCPCS | Performed by: SURGERY

## 2022-06-15 PROCEDURE — G8536 NO DOC ELDER MAL SCRN: HCPCS | Performed by: SURGERY

## 2022-06-15 PROCEDURE — G9717 DOC PT DX DEP/BP F/U NT REQ: HCPCS | Performed by: SURGERY

## 2022-06-15 PROCEDURE — G8427 DOCREV CUR MEDS BY ELIG CLIN: HCPCS | Performed by: SURGERY

## 2022-06-15 PROCEDURE — G8754 DIAS BP LESS 90: HCPCS | Performed by: SURGERY

## 2022-06-15 PROCEDURE — 1101F PT FALLS ASSESS-DOCD LE1/YR: CPT | Performed by: SURGERY

## 2022-06-15 PROCEDURE — 1123F ACP DISCUSS/DSCN MKR DOCD: CPT | Performed by: SURGERY

## 2022-06-15 PROCEDURE — 99205 OFFICE O/P NEW HI 60 MIN: CPT | Performed by: SURGERY

## 2022-06-15 PROCEDURE — 76642 ULTRASOUND BREAST LIMITED: CPT | Performed by: SURGERY

## 2022-06-15 PROCEDURE — G9899 SCRN MAM PERF RSLTS DOC: HCPCS | Performed by: SURGERY

## 2022-06-15 NOTE — PROGRESS NOTES
HISTORY OF PRESENT ILLNESS  Cosme Richardson is a 81 y.o. female. HPI  NEW patient consult referred by  Dr. Kristel Santacruz for RIGHT breast invasive mammary carcinoma. Denies pain or changes to the breast area.             Family History: Mother- Colon cancer  Father- testicular and stomach cancer       Breast imaging-   BELEN Results (most recent):  Results from Hospital Encounter encounter on 06/08/22     [facility] IMAGING RT INCL CAD     Addendum 6/10/2022 11:02 AM  Addendum: Addendum to the right breast biopsy:     Findings: Pathology of the right breast mass is consistent with invasive mammary  carcinoma with ductal and lobular features, in addition to LCIS . The imaging  findings are concordant with the histology results. Recommend surgical  consultation and excision . The patient was contacted by Dr. Marbella Ledbetter at 11:00  AM 6/10/2022 . We will inform the office of the referring physician and assist  in scheduling a breast surgical appointment.     Narrative  STUDY:  ULTRASOUND-GUIDED CORE NEEDLE BIOPSY OF THE RIGHT BREAST     INDICATION: 74-year-old female with a right breast mass, presenting for biopsy.     PROCEDURE:     The risks and benefits of the procedure were explained to the patient, questions  were answered, and informed consent was obtained.     The mass at 11 o'clock in the right breast was localized with ultrasound. The  breast was prepped in the usual sterile manner. Following the administration of  a local anesthetic and dermatotomy, and using ultrasound guidance,  a 12 gauge  vacuum-assisted Atec needle was advanced to the lesion, and 5 cores were  obtained. Pre and post-fire images confirmed accurate needle trajectory. A  metallic clip was placed at the biopsy site. Post-biopsy digital mammogram  confirms the presence of the clip at the intended biopsy site.   The patient  tolerated the procedure well without complication.     Final pathology is pending.     Impression  Successful ultrasound-guided biopsy yielding cores submitted for histologic  analysis. A clip was placed at the biopsy site. Post-biopsy digital mammogram  confirms the presence of the clip at the intended biopsy site. Further  recommendations will be based on final pathology results. 06/08/2022: RIGHT breast core bx. PATH: IMC with ductal and lobular features, 10mm, grade 1, ER+(90%), ID-(<1), HER2-, Ki-67(12%). LCIS: present. Past Medical History:   Diagnosis Date    Acute right ankle pain 6/8/2018 5/29/18: X-ray showed possible right ankle sprain. 6/6/18: saw Dr. Marylene Prows ([facility]), diagnosed with peroneal tendonitis. Prescribed an ASO    Asthma     Cardiomyopathy ([address]     Coronary artery disease 2008    s/p RCA stent (AMRIT) on 11/26/11    Depression with anxiety     2011    DVT (deep venous thrombosis) ([address] 7/27/2010    Family history of early CAD     GERD (gastroesophageal reflux disease)     H/O gastric bypass 2006    Revision in 2009    Hepatitis C antibody test positive     Does not have chronic hep C (labs 10/6/15: neg HCV RNA)     HTN (hypertension) 7/27/2010    Hyperlipidemia 07/27/2010    Incontinence of feces 9/3/2018    Dr. Adi Brown. 8/20/18: Improving with pelvic physical therapy and psyllium husk treatment. Saw Dr. Romana Justin who suggested PT first. MR defecography showed pelvic floor weakness with pelvic descensus, small anterior  Rectocele, and vaginal prolapse. To have pelvic PT weekly for 8 wks and to see Dr. Romana Justin again in Oct 2018.     Joint pain 7/27/2010    MI (myocardial infarction) ([address] 7/27/2010    Mitral valve regurgitation     Mild to moderate    Morbid obesity ([address] 7/27/2010    Myocardial infarction [facility]) 2006 and 2011    Dr. Jorge West disorder     PUD (peptic ulcer disease)     gi bleed 2008; ulcer n gastric bypass pouch    Urinary incontinence, stress 7/27/2010       Past Surgical History:   Procedure Laterality Date    COLONOSCOPY N/A 2018    COLONOSCOPY [facility] Renato Matt [facility]COPY; redundant colon, int hemorrhoids, pathology: normal colonic mucosa    HX BREAST BIOPSY Left     benign    HX COLONOSCOPY  14    Dr. Eve Friedman; normal, repeat in 5 yrs    HX GASTRIC BYPASS      HX IMPLANTABLE CARDIOVERTER DEFIBRILLATOR  2016    HX LAP GASTRIC BYPASS  2006    revision /Dr. Juan Luis Jefferson    HX OTHER SURGICAL  2016    Removal of right ventricular ICD lead & single chamber transvenous AICD    HX OTHER SURGICAL  10/12/2016    pocket revison of ICD; Dr. Teja Sorto  2018    Dr Rikki Albarran; high resolution anorectal manaometry-abnormal study. Study done for eval of fecal incontinence    HX OTHER SURGICAL  2018    Dr. Rikki Albarran. Rectal endoscopic US (EUS) for rectal incontinence. Normal rectal mucosa, no fistulas.     HX PACEMAKER      sicd/left side of chest under arm    INS PPM/ICD LED SING ONLY  2016         INS PPM/ICD LED SING ONLY  2016         INS PPM/ICD LED SING ONLY  2016         CA CARDIAC SURG PROCEDURE UNLIST      Stent x 5-6,Most recent     [place] LAP,STOMACH,OTHER,W/O TUBE  2010    Revision GBP       Social History     Socioeconomic History    Marital status:      Spouse name: Not on file    Number of children: Not on file    Years of education: Not on file    Highest education level: Not on file   Occupational History    Not on file   Tobacco Use    Smoking status: Former Smoker     Packs/day: 1.00     Years: 30.00     Pack years: 30.00     Start date: 1970     Quit date: 2004     Years since quittin.0    Smokeless tobacco: Never Used   Vaping Use    Vaping Use: Never used   Substance and Sexual Activity    Alcohol use: No     Alcohol/week: 0.0 standard drinks    Drug use: No     Types: Prescription    Sexual activity: Never     Partners: Male   Other Topics Concern    Not on file   Social History Narrative    ** Merged History Encounter **          Social Determinants of Health     Financial Resource Strain:     Difficulty of Paying Living Expenses: Not on file   Food Insecurity:     Worried About Running Out of Food in the Last Year: Not on file    [place] of Food in the Last Year: Not on file   Transportation Needs:     Lack of Transportation (Medical): Not on file    Lack of Transportation (Non-Medical): Not on file   Physical Activity:     Days of Exercise per Week: Not on file    Minutes of Exercise per Session: Not on file   Stress:     Feeling of Stress : Not on file   Social Connections:     Frequency of Communication with Friends and Family: Not on file    Frequency of Social Gatherings with Friends and Family: Not on file    Attends Taoism Services: Not on file    Active Member of [address] [place] [address] or Organizations: Not on file    Attends Club or Organization Meetings: Not on file    Marital Status: Not on file   Intimate Partner Violence:     Fear of Current or Ex-Partner: Not on file    Emotionally Abused: Not on file    Physically Abused: Not on file    Sexually Abused: Not on file   Housing Stability:     Unable to Pay for Housing in the Last Year: Not on file    Number of [place] in the Last Year: Not on file    Unstable Housing in the Last Year: Not on file       Current Outpatient Medications on File Prior to Visit   Medication Sig Dispense Refill    dilTIAZem ER (CARDIZEM CD) 180 mg capsule Take 180 mg by mouth daily.  zolpidem (AMBIEN) 10 mg tablet TAKE 1 TABLET BY MOUTH NIGHTLY AS NEEDED FOR SLEEP. MAX DAILY AMOUNT: 10 MG. 30 Tablet 1    bumetanide (BUMEX) 2 mg tablet TAKE 1 TABLET BY MOUTH TWICE A DAY 60 Tablet 11    albuterol (PROVENTIL HFA, VENTOLIN HFA, PROAIR HFA) 90 mcg/actuation inhaler TAKE 2 PUFFS BY INHALATION EVERY FOUR HOURS AS NEEDED FOR WHEEZING OR SHORTNESS OF BREATH. 18 Each 11    FLUoxetine (PROzac) 40 mg capsule Take 1 Capsule by mouth two (2) times a day.  [address] [address] Capsule 3    Entresto  mg tablet TAKE 1 TABLET BY MOUTH TWICE A DAY 60 Tablet 5    potassium chloride (Klor-Con M20) 20 mEq tablet TAKE TWO TABLETS BY MOUTH DAILY 180 Tablet 3    psyllium (METAMUCIL) powd Take  by mouth. 2 tsp daily      cholecalciferol, VITAMIN D3, (VITAMIN D3) 5,000 unit tab tablet Take 10,000 Units by mouth daily.  biotin 10,000 mcg cap Take  by mouth daily.  OTHER Complete multi formula, bariatric advantage      magnesium 250 mg tab Take 1 Tab by mouth daily.  ferrous sulfate (IRON, FERROUS SULFATE,) 325 mg (65 mg Iron) tablet Take  by mouth daily (before breakfast).  CALCIUM PO Take 600 mg by mouth daily. No current facility-administered medications on file prior to visit. Allergies   Allergen Reactions    Amoxicillin Anaphylaxis    Amoxicillin Anaphylaxis    Lipitor [Atorvastatin] Other (comments)     Severe muscle pain and spasms    Zocor [Simvastatin] Other (comments)     Cramps, muscle spasms    Buspar [Buspirone] Other (comments)     Drunk sensation, headaches    Hydroxyzine Other (comments)     Blurred vision, lightheadedness    Livalo [Pitavastatin] Myalgia    Pravastatin Other (comments)     Leg cramps    Tramadol Vertigo       OB History        0    Para   0    Term   0       0    AB   0    Living           SAB   0    IAB   0    Ectopic   0    Molar        Multiple        Live Births   4                ROS      Physical Exam  Constitutional:       Appearance: She is well-developed. She is not diaphoretic. HENT:      Head: Normocephalic and atraumatic. Right Ear: External ear normal.      Left Ear: External ear normal.   Eyes:      General: No scleral icterus. Right eye: No discharge. Left eye: No discharge. Pupils: Pupils are equal, round, and reactive to light. Neck:      Thyroid: No thyromegaly. Vascular: No JVD. Trachea: No tracheal deviation.    Cardiovascular:      Rate and Rhythm: Normal rate and regular rhythm. Heart sounds: Normal heart sounds. Pulmonary:      Effort: Pulmonary effort is normal. No tachypnea, accessory muscle usage or respiratory distress. Breath sounds: Normal breath sounds. No stridor. Chest:   Breasts: Breasts are symmetrical.      Right: No inverted nipple, mass, nipple discharge, skin change or tenderness. Left: No inverted nipple, mass, nipple discharge, skin change or tenderness. Abdominal:      General: There is no distension. Palpations: Abdomen is soft. There is no mass. Tenderness: There is no abdominal tenderness. Musculoskeletal:         General: Normal range of motion. Cervical back: Normal range of motion and neck supple. Lymphadenopathy:      Cervical: No cervical adenopathy. Skin:     General: Skin is warm and dry. Neurological:      Mental Status: She is alert and oriented to person, place, and time. Psychiatric:         Speech: Speech normal.         Behavior: Behavior normal.         Thought Content: Thought content normal.         Judgment: Judgment normal.          BREAST ULTRASOUND, Pre-op Planning  Indication: Pre-op planning, RIGHT breast  Technique: The area was scanned using a high-frequency linear-array near-field transducer  Findings: Irregular hypoechoic mass with biopsy clip. Impression: Breast cancer  Disposition: Surgery      ASSESSMENT and PLAN    ICD-10-CM ICD-9-CM    1. Malignant neoplasm of right breast in female, estrogen receptor positive, unspecified site of breast ([facility]  C50.911 174.9     Z17.0 V86.0      New pt presents for consultation for treatment of RIGHT breast IMC, ER+(90%)/AL-/HER2-, Ki-67 (12%), clinical stage 1. Upon physical examination noted palpable mass at upper outer quadrant of RIGHT breast. Ultrasound visualizes irregular hypoechoic mass with biopsy clip. We had a long discussion of options for treatment.  The patient was accompanied by her daughter during this encounter, and over half the time was spent on counseling and coordination of care. We discussed in depth the pathology results, and surgical planning. The goals of treatment are to treat the breast, and to reduce risk of local or distant recurrence. Discussed treatment options with risks, complications, benefits, and limitations, including lumpectomy with XRT, and mastectomy with optional reconstruction, both having the same cure rate. Explained the importance of negative margins, and the need for re-excision in the case of positive margins. Will plan to mobilize breast tissue during lumpectomy to minimize cosmetic defect. Due to patient being 81years old and lymph nodes having no worrisome findings, SLNbx is not needed. We also covered risk reduction strategies including XRT, chemotherapy, and hormonal therapy with estrogen blocker. Will order MammaPrint to help determine treatement. Depending on further evaluation of tumor size during surgery, will help determine the need for XRT. Chemotherapy is not recommended due to patient's medical history. Discussed estrogen blocker for further risk reduction for ER+ disease. We also discussed the pts questions and concerns regarding type of treatment and surgical procedures. Pt has elected to proceed with lumpectomy. Pt should feel free to call Otto Weeks or Kylee Caba, nurse navigators, with any further questions. Total time spent was 60 minutes.     Written by Antonette Malloy, as dictated by Dr. Joanie Dick MD.

## 2022-06-15 NOTE — LETTER
6/15/2022 4:00 PM    Patient:  Az Muñoz   YOB: 1950  Date of Visit: 6/15/2022      Dear Dr. Michele Long: Thank you for referring Ms. Annalee Garcia to me for evaluation/treatment. Below are the relevant portions of my assessment and plan of care. If you have questions, please do not hesitate to call me. I look forward to following Ms. Huddleston along with you.         Sincerely,      Ally Wren MD No fever/chills, No photophobia/eye pain/changes in vision, No ear pain/sore throat/dysphagia, No chest pain/palpitations, no SOB/cough/wheeze/stridor, + abdominal discomfort, + n/v/d, no dysuria/frequency/discharge, No neck/back pain, no rash, no changes in neurological status/function.

## 2022-06-15 NOTE — PROGRESS NOTES
[address]  Breast Navigator Encounter    Name:    Roosevelt Blanco  Age:    81 y.o. Diagnosis:   RIGHT breast cancer    Met patient and her daughter in the office at the time of her appointment with Dr. Radha Mccloud. They felt like all of their questions were answered. She is not a candidate for MRI due to her pacemaker. Is happy that she can have a lumpectomy, and this is the plan. Provided business card and [employer]. I will continue to follow her.           Sade Winn RN, BSN, [facility]  Oncology Breast Navigator     [address]  [address] [place] [address].  W: [phone]  F: [phone]  [email or web address]  Good Help to Those in [facility]

## 2022-06-22 ENCOUNTER — DOCUMENTATION ONLY (OUTPATIENT)
Dept: SURGERY | Age: 72
End: 2022-06-22

## 2022-06-22 NOTE — PROGRESS NOTES
Rec'd fax from [place] requesting records to support LN status and tumor size. Office note and mammogram/us results faxed. Confirmation fax rec'd.

## 2022-06-23 ENCOUNTER — TELEPHONE (OUTPATIENT)
Dept: SURGERY | Age: 72
End: 2022-06-23

## 2022-06-24 ENCOUNTER — DOCUMENTATION ONLY (OUTPATIENT)
Dept: SURGERY | Age: 72
End: 2022-06-24

## 2022-06-24 NOTE — PROGRESS NOTES
I called and left a message for the patient . I let her know Keren Serna was out of the office until 07/28/2022. She would try to reach out again to schedule her surgery.

## 2022-06-28 DIAGNOSIS — C50.911 MALIGNANT NEOPLASM OF RIGHT FEMALE BREAST, UNSPECIFIED ESTROGEN RECEPTOR STATUS, UNSPECIFIED SITE OF BREAST (HCC): Primary | ICD-10-CM

## 2022-07-06 DIAGNOSIS — F51.04 CHRONIC INSOMNIA: ICD-10-CM

## 2022-07-07 RX ORDER — ZOLPIDEM TARTRATE 10 MG/1
10 TABLET ORAL
Qty: 30 TABLET | Refills: 1 | Status: SHIPPED | OUTPATIENT
Start: 2022-07-07 | End: 2022-09-06

## 2022-07-08 ENCOUNTER — HOSPITAL ENCOUNTER (OUTPATIENT)
Dept: PREADMISSION TESTING | Age: 72
Discharge: HOME | End: 2022-07-08
Payer: MEDICARE

## 2022-07-08 VITALS
WEIGHT: 231.7 LBS | BODY MASS INDEX: 39.56 KG/M2 | HEART RATE: 68 BPM | HEIGHT: 64 IN | SYSTOLIC BLOOD PRESSURE: 114 MMHG | DIASTOLIC BLOOD PRESSURE: 77 MMHG | TEMPERATURE: 98.1 F

## 2022-07-08 LAB
ALBUMIN SERPL-MCNC: 3.3 G/DL (ref 3.5–5)
ALBUMIN/GLOB SERPL: 1.2 {RATIO} (ref 1.1–2.2)
ALP SERPL-CCNC: 60 U/L (ref 45–117)
ALT SERPL-CCNC: 14 U/L (ref 12–78)
ANION GAP SERPL CALC-SCNC: 6 MMOL/L (ref 5–15)
AST SERPL-CCNC: 15 U/L (ref 15–37)
BASOPHILS # BLD: 0.1 K/UL (ref 0–0.1)
BASOPHILS NFR BLD: 1 % (ref 0–1)
BILIRUB SERPL-MCNC: 0.4 MG/DL (ref 0.2–1)
BUN SERPL-MCNC: 13 MG/DL (ref 6–20)
BUN/CREAT SERPL: 17 (ref 12–20)
CALCIUM SERPL-MCNC: 8.6 MG/DL (ref 8.5–10.1)
CHLORIDE SERPL-SCNC: 109 MMOL/L (ref 97–108)
CO2 SERPL-SCNC: 25 MMOL/L (ref 21–32)
CREAT SERPL-MCNC: 0.75 MG/DL (ref 0.55–1.02)
DIFFERENTIAL METHOD BLD: ABNORMAL
EOSINOPHIL # BLD: 0.1 K/UL (ref 0–0.4)
EOSINOPHIL NFR BLD: 1 % (ref 0–7)
ERYTHROCYTE [DISTWIDTH] IN BLOOD BY AUTOMATED COUNT: 14.6 % (ref 11.5–14.5)
GLOBULIN SER CALC-MCNC: 2.7 G/DL (ref 2–4)
GLUCOSE SERPL-MCNC: 93 MG/DL (ref 65–100)
HCT VFR BLD AUTO: 38 % (ref 35–47)
HGB BLD-MCNC: 11.7 G/DL (ref 11.5–16)
IMM GRANULOCYTES # BLD AUTO: 0.1 K/UL (ref 0–0.04)
IMM GRANULOCYTES NFR BLD AUTO: 1 % (ref 0–0.5)
LYMPHOCYTES # BLD: 1.7 K/UL (ref 0.8–3.5)
LYMPHOCYTES NFR BLD: 21 % (ref 12–49)
MCH RBC QN AUTO: 25.8 PG (ref 26–34)
MCHC RBC AUTO-ENTMCNC: 30.8 G/DL (ref 30–36.5)
MCV RBC AUTO: 83.7 FL (ref 80–99)
MONOCYTES # BLD: 0.6 K/UL (ref 0–1)
MONOCYTES NFR BLD: 8 % (ref 5–13)
NEUTS SEG # BLD: 5.5 K/UL (ref 1.8–8)
NEUTS SEG NFR BLD: 68 % (ref 32–75)
NRBC # BLD: 0 K/UL (ref 0–0.01)
NRBC BLD-RTO: 0 PER 100 WBC
PLATELET # BLD AUTO: 191 K/UL (ref 150–400)
POTASSIUM SERPL-SCNC: 4.2 MMOL/L (ref 3.5–5.1)
PROT SERPL-MCNC: 6 G/DL (ref 6.4–8.2)
RBC # BLD AUTO: 4.54 M/UL (ref 3.8–5.2)
RBC MORPH BLD: ABNORMAL
SODIUM SERPL-SCNC: 140 MMOL/L (ref 136–145)
WBC # BLD AUTO: 8.1 K/UL (ref 3.6–11)

## 2022-07-08 PROCEDURE — 85025 COMPLETE CBC W/AUTO DIFF WBC: CPT

## 2022-07-08 PROCEDURE — 80053 COMPREHEN METABOLIC PANEL: CPT

## 2022-07-08 RX ORDER — ISOSORBIDE DINITRATE 30 MG/1
20 TABLET ORAL DAILY
COMMUNITY

## 2022-07-08 RX ORDER — ASPIRIN 81 MG/1
81 TABLET ORAL AS NEEDED
COMMUNITY

## 2022-07-08 NOTE — PERIOP NOTES
[address] INSTRUCTIONS    Surgery Date:   7/14/22    Your surgeon's office or [address] [place] [address] staff will call you between 4 PM- 8 PM the day before surgery with your arrival time. If your surgery is on a Monday, you will receive a call the preceding Friday. 1. Please report to [facility] Patient Access/Admitting on the 1st floor. Bring your insurance card, photo identification, and any copayment ( if applicable). 2. If you are going home the same day of your surgery, you must have a responsible adult to drive you home. You need to have a responsible adult to stay with you the first 24 hours after surgery and you should not drive a car for 24 hours following your surgery. 3. Do NOT eat any solid foods after midnight the night before surgery including candy, mint or gum. You may drink clear liquids from midnight until 1 hour prior to your arrival. You may drink up to 12 ounces at one time every 4 hours. Please note special instructions, if applicable, below for medications. 4. Do NOT drink alcohol or smoke 24 hours before surgery. STOP smoking for 14 days prior as it helps with breathing and healing after surgery. 5. If you are being admitted to the hospital, please leave personal belongings/luggage in your car until you have an assigned hospital room number. 6. Please wear comfortable clothes. Wear your glasses instead of contacts. We ask that all money, jewelry and valuables be left at home. Wear no make up, particularly mascara, the day of surgery. 7.  All body piercings, rings, and jewelry need to be removed and left at home. Please remove any nail polish or artifical nails from your fingernails. Please wear your hair loose or down. Please no pony-tails, buns, or any metal hair accessories. If you shower the morning of surgery, please do not apply any lotions or powders afterwards. You may wear deodorant, unless having breast surgery.   Do not shave any body area within 24 hours of your surgery. 8. Please follow all instructions to avoid any potential surgical cancellation. 9. Should your physical condition change, (i.e. fever, cold, flu, etc.) please notify your surgeon as soon as possible. 10. It is important to be on time. If a situation occurs where you may be delayed, please call:  [phone] / [phone] on the day of surgery. 11. The Preadmission Testing staff can be reached at [phone]. 12. Special instructions: ANY INSTRUCTIONS PER DR. PIERRE       Current Outpatient Medications   Medication Sig    isosorbide dinitrate (ISORDIL) 30 mg tablet Take 20 mg by mouth daily.  IBUPROFEN PO Take  by mouth as needed.  aspirin delayed-release 81 mg tablet Take 81 mg by mouth as needed for Pain.  zolpidem (AMBIEN) 10 mg tablet TAKE 1 TABLET BY MOUTH NIGHTLY AS NEEDED FOR SLEEP. MAX DAILY AMOUNT: 10 MG.  dilTIAZem ER (CARDIZEM CD) 180 mg capsule Take 180 mg by mouth daily.  bumetanide (BUMEX) 2 mg tablet TAKE 1 TABLET BY MOUTH TWICE A DAY    albuterol (PROVENTIL HFA, VENTOLIN HFA, PROAIR HFA) 90 mcg/actuation inhaler TAKE 2 PUFFS BY INHALATION EVERY FOUR HOURS AS NEEDED FOR WHEEZING OR SHORTNESS OF BREATH.    FLUoxetine (PROzac) 40 mg capsule Take 1 Capsule by mouth two (2) times a day.  Entresto  mg tablet TAKE 1 TABLET BY MOUTH TWICE A DAY    potassium chloride (Klor-Con M20) 20 mEq tablet TAKE TWO TABLETS BY MOUTH DAILY    psyllium (METAMUCIL) powd Take  by mouth. 2 tsp daily    cholecalciferol, VITAMIN D3, (VITAMIN D3) 5,000 unit tab tablet Take 10,000 Units by mouth daily.  biotin 10,000 mcg cap Take  by mouth daily.  OTHER Complete multi formula, bariatric advantage    magnesium 250 mg tab Take 1 Tab by mouth daily.  ferrous sulfate (IRON, FERROUS SULFATE,) 325 mg (65 mg Iron) tablet Take  by mouth daily (before breakfast).  CALCIUM PO Take 600 mg by mouth daily.      No current facility-administered medications for this encounter. 1. YOU MUST ONLY TAKE THESE MEDICATIONS THE MORNING OF SURGERY WITH A SIP OF WATER: DILTIAZEM, FLUOXETINE, ISOSORBIDE PLEASE TAKE BEFORE COMING TO [facility] [address], ALBUEROL INHALER USE IF NEEDED DOS. BRING ONLY YOUR INHALER TO HOSPITAL WITH YOU DAY OF SURGERY. 2. MEDICATIONS TO TAKE THE MORNING OF SURGERY ONLY IF NEEDED: TYLENOL   3. HOLD these prescription medications BEFORE Surgery: MULTIVITAMIN , BIOTIN STOP 7 DAYS PRIOR TO SURGERY, SKIP BUMEX AND ENTRESTO THE MORNING OF SURGERY ONLY. 4. Ask your surgeon/prescribing physician about when/if to STOP taking these medications: ANY YOU HAVE QUESTIONS ON. 5. Stop all vitamins, herbal medicines and Aspirin containing products 7 days prior to surgery. Stop any non-steroidal anti-inflammatory drugs (i.e. Ibuprofen, Naproxen, Advil, Aleve) 3 days before surgery. You may take Tylenol. 6. If you are currently taking Plavix, Coumadin,or any other blood-thinning/anticoagulant medication contact your prescribing physician for instructions. Eating and Drinking Before Surgery     You may eat a regular dinner at the usual time on the day before your surgery.  Do NOT eat any solid foods after midnight unless your arrival time at the hospital is 3pm or later.  You may drink clear liquids only from 12 midnight until 1 hours prior to your arrival time at the hospital on the day of your surgery. Do NOT drink alcohol.    Clear liquids include:  o Water  o Fruit juices without pulp( i.e. apple juice)  o Carbonated beverages  o Black coffee (no cream/milk)  o Tea (no cream/milk)  o Gatorade   You may drink up to 12-16 ounces at one time every 4 hours between the hours of midnight and 1 hour before your arrival time at the hospital. Example- if your arrival time at the hospital is 6am, you may drink 12-16 ounces of clear liquids no later than 5am.   If your arrival time at the hospital is 3pm or later, you may eat a light breakfast before 8am.   A light breakfast includes:  o Toast or bagel (no butter)  o Black coffee (no cream/milk)  o Tea (no cream/milk)  o Fruit juices without pulp ( i.e. apple juice)  o Do NOT eat meat, eggs, vegetables or fruit   If you have any questions, please contact your surgeon's office. Preventing Infections Before and After - Your Surgery    IMPORTANT INSTRUCTIONS    You play an important role in your health and preparation for surgery. To reduce the germs on your skin you will need to shower with CHG soap (Chorhexidine gluconate 4%) two times before surgery. CHG soap (Hibiclens, Hex-A-Clens or store brand)   CHG soap will be provided at your Preadmission Testing (PAT) appointment.  If you do not have a PAT appointment before surgery, you may arrange to  CHG soap from our office or purchase CHG soap at a pharmacy, grocery or department store.  You need to purchase TWO 4 ounce bottles to use for your 2 showers. Steps to follow:  1. Wash your hair with your normal shampoo and your body with regular soap and rinse well to remove shampoo and soap from your skin. 2. Wet a clean washcloth and turn off the shower. 3. Put CHG soap on washcloth and apply to your entire body from the neck down. Do not use on your head, face or private parts(genitals). Do not use CHG soap on open sores, wounds or areas of skin irritation. 4. Wash you body gently for 5 minutes. Do not wash your skin too hard. This soap does not create lather. Pay special attention to your underarms and from your belly button to your feet. 5. Turn the shower back on and rinse well to get CHG soap off your body. 6. Pat your skin dry with a clean, dry towel. Do not apply lotions or moisturizer. 7. Put on clean clothes and sleep on fresh bed sheets and do not allow pets to sleep with you.     Shower with CHG soap 2 times before your surgery   The evening before your surgery   The morning of your surgery      Tips to help prevent infections after your surgery:  1. Protect your surgical wound from germs:  ? Hand washing is the most important thing you and your caregivers can do to prevent infections. ? Keep your bandage clean and dry! ? Do not touch your surgical wound. 2. Use clean, freshly washed towels and washcloths every time you shower; do not share bath linens with others. 3. Until your surgical wound is healed, wear clothing and sleep on bed linens each day that are clean and freshly washed. 4. Do not allow pets to sleep in your bed with you or touch your surgical wound. 5. Do not smoke - smoking delays wound healing. This may be a good time to stop smoking. 6. If you have diabetes, it is important for you to manage your blood sugar levels properly before your surgery as well as after your surgery. Poorly managed blood sugar levels slow down wound healing and prevent you from healing completely. Patient Information Regarding COVID Restrictions      Day of Procedure     Please park in the parking deck or any designated visitor parking lot.  Enter the facility through the [facility] of the [facility].   On the day of surgery, please provide the cell phone number of the person who will be waiting for you to the Patient Access representative at the time of registration.  Please wear a mask on the day of your procedure.  We are now allowing two designated visitors per stay. Pediatric patients may have 2 designated visitors. These two people may come in with you on the day of your procedure.  The designated visitor must also wear a mask.  Once your procedure and the immediate recovery period is completed, a nurse in the recovery area will contact your designated visitor to inform them of your room number or to otherwise review other pertinent information regarding your care.  Social distancing practices are to be adhered to in waiting areas and the cafeteria. The patient was contacted  in person.    She verbalized understanding of all instructions does not  need reinforcement.

## 2022-07-08 NOTE — PERIOP NOTES
PATIENT GIVEN SURGICAL SITE INFECTION FAQ HANDOUT AND HAND WASHING TIP SHEET. PREOP INSTRUCTIONS REVIEWED AND PATIENT VERBALIZES UNDERSTANDING OF INSTRUCTIONS. PATIENT HAS BEEN GIVEN THE OPPORTUNITY TO ASK ADDITIONAL QUESTIONS. CALLED DR. ROSE CABALLERO OFFICE  [phone] FOR LAST OV AND EKG DONE 6/2022, OFFICE TO FAX TO [facility]

## 2022-07-14 ENCOUNTER — HOSPITAL ENCOUNTER (OUTPATIENT)
Age: 72
Setting detail: OUTPATIENT SURGERY
Discharge: HOME | End: 2022-07-14
Attending: SURGERY | Admitting: SURGERY
Payer: MEDICARE

## 2022-07-14 ENCOUNTER — ANESTHESIA (OUTPATIENT)
Dept: MEDSURG UNIT | Age: 72
End: 2022-07-14
Payer: MEDICARE

## 2022-07-14 ENCOUNTER — ANESTHESIA EVENT (OUTPATIENT)
Dept: MEDSURG UNIT | Age: 72
End: 2022-07-14
Payer: MEDICARE

## 2022-07-14 VITALS
TEMPERATURE: 97.8 F | SYSTOLIC BLOOD PRESSURE: 111 MMHG | DIASTOLIC BLOOD PRESSURE: 60 MMHG | WEIGHT: 231.7 LBS | HEIGHT: 64 IN | OXYGEN SATURATION: 97 % | HEART RATE: 85 BPM | BODY MASS INDEX: 39.56 KG/M2 | RESPIRATION RATE: 15 BRPM

## 2022-07-14 DIAGNOSIS — C50.911 MALIGNANT NEOPLASM OF RIGHT BREAST IN FEMALE, ESTROGEN RECEPTOR POSITIVE, UNSPECIFIED SITE OF BREAST (HCC): ICD-10-CM

## 2022-07-14 DIAGNOSIS — Z17.0 MALIGNANT NEOPLASM OF RIGHT BREAST IN FEMALE, ESTROGEN RECEPTOR POSITIVE, UNSPECIFIED SITE OF BREAST (HCC): ICD-10-CM

## 2022-07-14 DIAGNOSIS — C50.911 MALIGNANT NEOPLASM OF RIGHT FEMALE BREAST, UNSPECIFIED ESTROGEN RECEPTOR STATUS, UNSPECIFIED SITE OF BREAST (HCC): ICD-10-CM

## 2022-07-14 PROCEDURE — 76030000001 HC AMB SURG OR TIME 1 TO 1.5: Performed by: SURGERY

## 2022-07-14 PROCEDURE — 77030040361 HC SLV COMPR DVT MDII -B: Performed by: SURGERY

## 2022-07-14 PROCEDURE — 74011000250 HC RX REV CODE- 250: Performed by: NURSE ANESTHETIST, CERTIFIED REGISTERED

## 2022-07-14 PROCEDURE — 77030002996 HC SUT SLK J&J -A: Performed by: SURGERY

## 2022-07-14 PROCEDURE — 76060000062 HC AMB SURG ANES 1 TO 1.5 HR: Performed by: SURGERY

## 2022-07-14 PROCEDURE — 74011250637 HC RX REV CODE- 250/637: Performed by: ANESTHESIOLOGY

## 2022-07-14 PROCEDURE — 77030002933 HC SUT MCRYL J&J -A: Performed by: SURGERY

## 2022-07-14 PROCEDURE — 76210000035 HC AMBSU PH I REC 1 TO 1.5 HR: Performed by: SURGERY

## 2022-07-14 PROCEDURE — 74011000250 HC RX REV CODE- 250: Performed by: SURGERY

## 2022-07-14 PROCEDURE — 77030008552 HC TBNG SMK EVAC BFLF -A: Performed by: SURGERY

## 2022-07-14 PROCEDURE — 74011250636 HC RX REV CODE- 250/636: Performed by: NURSE ANESTHETIST, CERTIFIED REGISTERED

## 2022-07-14 PROCEDURE — 77030031139 HC SUT VCRL2 J&J -A: Performed by: SURGERY

## 2022-07-14 PROCEDURE — 77030040922 HC BLNKT HYPOTHRM STRY -A

## 2022-07-14 PROCEDURE — 88307 TISSUE EXAM BY PATHOLOGIST: CPT

## 2022-07-14 PROCEDURE — 14301 TIS TRNFR ANY 30.1-60 SQ CM: CPT | Performed by: SURGERY

## 2022-07-14 PROCEDURE — 77030010509 HC AIRWY LMA MSK TELE -A: Performed by: ANESTHESIOLOGY

## 2022-07-14 PROCEDURE — 77030041680 HC PNCL ELECSURG SMK EVAC CNMD -B: Performed by: SURGERY

## 2022-07-14 PROCEDURE — 2709999900 HC NON-CHARGEABLE SUPPLY: Performed by: SURGERY

## 2022-07-14 PROCEDURE — 76998 US GUIDE INTRAOP: CPT | Performed by: SURGERY

## 2022-07-14 PROCEDURE — 77030039266 HC ADH SKN EXOFIN S2SG -A: Performed by: SURGERY

## 2022-07-14 PROCEDURE — 74011250636 HC RX REV CODE- 250/636: Performed by: SURGERY

## 2022-07-14 PROCEDURE — 19301 PARTIAL MASTECTOMY: CPT | Performed by: SURGERY

## 2022-07-14 RX ORDER — EPHEDRINE SULFATE/0.9% NACL/PF 50 MG/5 ML
SYRINGE (ML) INTRAVENOUS AS NEEDED
Status: DISCONTINUED | OUTPATIENT
Start: 2022-07-14 | End: 2022-07-14 | Stop reason: HOSPADM

## 2022-07-14 RX ORDER — MORPHINE SULFATE 2 MG/ML
2 INJECTION, SOLUTION INTRAMUSCULAR; INTRAVENOUS
Status: DISCONTINUED | OUTPATIENT
Start: 2022-07-14 | End: 2022-07-14 | Stop reason: HOSPADM

## 2022-07-14 RX ORDER — LIDOCAINE HYDROCHLORIDE 20 MG/ML
INJECTION, SOLUTION EPIDURAL; INFILTRATION; INTRACAUDAL; PERINEURAL AS NEEDED
Status: DISCONTINUED | OUTPATIENT
Start: 2022-07-14 | End: 2022-07-14 | Stop reason: HOSPADM

## 2022-07-14 RX ORDER — VANCOMYCIN/0.9 % SOD CHLORIDE 1.5G/250ML
1500 PLASTIC BAG, INJECTION (ML) INTRAVENOUS
Status: COMPLETED | OUTPATIENT
Start: 2022-07-14 | End: 2022-07-14

## 2022-07-14 RX ORDER — FENTANYL CITRATE 50 UG/ML
INJECTION, SOLUTION INTRAMUSCULAR; INTRAVENOUS AS NEEDED
Status: DISCONTINUED | OUTPATIENT
Start: 2022-07-14 | End: 2022-07-14 | Stop reason: HOSPADM

## 2022-07-14 RX ORDER — SODIUM CHLORIDE, SODIUM LACTATE, POTASSIUM CHLORIDE, CALCIUM CHLORIDE 600; 310; 30; 20 MG/100ML; MG/100ML; MG/100ML; MG/100ML
125 INJECTION, SOLUTION INTRAVENOUS CONTINUOUS
Status: DISCONTINUED | OUTPATIENT
Start: 2022-07-14 | End: 2022-07-14 | Stop reason: HOSPADM

## 2022-07-14 RX ORDER — MIDAZOLAM HYDROCHLORIDE 1 MG/ML
INJECTION, SOLUTION INTRAMUSCULAR; INTRAVENOUS AS NEEDED
Status: DISCONTINUED | OUTPATIENT
Start: 2022-07-14 | End: 2022-07-14 | Stop reason: HOSPADM

## 2022-07-14 RX ORDER — FENTANYL CITRATE 50 UG/ML
50 INJECTION, SOLUTION INTRAMUSCULAR; INTRAVENOUS AS NEEDED
Status: DISCONTINUED | OUTPATIENT
Start: 2022-07-14 | End: 2022-07-14 | Stop reason: HOSPADM

## 2022-07-14 RX ORDER — FENTANYL CITRATE 50 UG/ML
25 INJECTION, SOLUTION INTRAMUSCULAR; INTRAVENOUS
Status: DISCONTINUED | OUTPATIENT
Start: 2022-07-14 | End: 2022-07-14 | Stop reason: HOSPADM

## 2022-07-14 RX ORDER — MIDAZOLAM HYDROCHLORIDE 1 MG/ML
0.5 INJECTION, SOLUTION INTRAMUSCULAR; INTRAVENOUS
Status: DISCONTINUED | OUTPATIENT
Start: 2022-07-14 | End: 2022-07-14 | Stop reason: HOSPADM

## 2022-07-14 RX ORDER — GLYCOPYRROLATE 0.2 MG/ML
INJECTION INTRAMUSCULAR; INTRAVENOUS AS NEEDED
Status: DISCONTINUED | OUTPATIENT
Start: 2022-07-14 | End: 2022-07-14 | Stop reason: HOSPADM

## 2022-07-14 RX ORDER — MIDAZOLAM HYDROCHLORIDE 1 MG/ML
1 INJECTION, SOLUTION INTRAMUSCULAR; INTRAVENOUS AS NEEDED
Status: DISCONTINUED | OUTPATIENT
Start: 2022-07-14 | End: 2022-07-14 | Stop reason: HOSPADM

## 2022-07-14 RX ORDER — SODIUM CHLORIDE, SODIUM LACTATE, POTASSIUM CHLORIDE, CALCIUM CHLORIDE 600; 310; 30; 20 MG/100ML; MG/100ML; MG/100ML; MG/100ML
INJECTION, SOLUTION INTRAVENOUS
Status: DISCONTINUED | OUTPATIENT
Start: 2022-07-14 | End: 2022-07-14 | Stop reason: HOSPADM

## 2022-07-14 RX ORDER — SODIUM CHLORIDE 0.9 % (FLUSH) 0.9 %
5-40 SYRINGE (ML) INJECTION AS NEEDED
Status: DISCONTINUED | OUTPATIENT
Start: 2022-07-14 | End: 2022-07-14 | Stop reason: HOSPADM

## 2022-07-14 RX ORDER — PROPOFOL 10 MG/ML
INJECTION, EMULSION INTRAVENOUS AS NEEDED
Status: DISCONTINUED | OUTPATIENT
Start: 2022-07-14 | End: 2022-07-14 | Stop reason: HOSPADM

## 2022-07-14 RX ORDER — SODIUM CHLORIDE 9 MG/ML
25 INJECTION, SOLUTION INTRAVENOUS CONTINUOUS
Status: DISCONTINUED | OUTPATIENT
Start: 2022-07-14 | End: 2022-07-14 | Stop reason: HOSPADM

## 2022-07-14 RX ORDER — DIPHENHYDRAMINE HYDROCHLORIDE 50 MG/ML
12.5 INJECTION, SOLUTION INTRAMUSCULAR; INTRAVENOUS AS NEEDED
Status: DISCONTINUED | OUTPATIENT
Start: 2022-07-14 | End: 2022-07-14 | Stop reason: HOSPADM

## 2022-07-14 RX ORDER — OXYCODONE AND ACETAMINOPHEN 5; 325 MG/1; MG/1
1 TABLET ORAL AS NEEDED
Status: DISCONTINUED | OUTPATIENT
Start: 2022-07-14 | End: 2022-07-14 | Stop reason: HOSPADM

## 2022-07-14 RX ORDER — DEXAMETHASONE SODIUM PHOSPHATE 4 MG/ML
INJECTION, SOLUTION INTRA-ARTICULAR; INTRALESIONAL; INTRAMUSCULAR; INTRAVENOUS; SOFT TISSUE AS NEEDED
Status: DISCONTINUED | OUTPATIENT
Start: 2022-07-14 | End: 2022-07-14 | Stop reason: HOSPADM

## 2022-07-14 RX ORDER — LIDOCAINE HYDROCHLORIDE 10 MG/ML
0.1 INJECTION, SOLUTION EPIDURAL; INFILTRATION; INTRACAUDAL; PERINEURAL AS NEEDED
Status: DISCONTINUED | OUTPATIENT
Start: 2022-07-14 | End: 2022-07-14 | Stop reason: HOSPADM

## 2022-07-14 RX ORDER — SODIUM CHLORIDE 0.9 % (FLUSH) 0.9 %
5-40 SYRINGE (ML) INJECTION EVERY 8 HOURS
Status: DISCONTINUED | OUTPATIENT
Start: 2022-07-14 | End: 2022-07-14 | Stop reason: HOSPADM

## 2022-07-14 RX ORDER — ACETAMINOPHEN 325 MG/1
650 TABLET ORAL ONCE
Status: COMPLETED | OUTPATIENT
Start: 2022-07-14 | End: 2022-07-14

## 2022-07-14 RX ORDER — LIDOCAINE HYDROCHLORIDE AND EPINEPHRINE 10; 10 MG/ML; UG/ML
30 INJECTION, SOLUTION INFILTRATION; PERINEURAL ONCE
Status: CANCELLED | OUTPATIENT
Start: 2022-07-14 | End: 2022-07-14

## 2022-07-14 RX ORDER — HYDROMORPHONE HYDROCHLORIDE 1 MG/ML
0.2 INJECTION, SOLUTION INTRAMUSCULAR; INTRAVENOUS; SUBCUTANEOUS
Status: DISCONTINUED | OUTPATIENT
Start: 2022-07-14 | End: 2022-07-14 | Stop reason: HOSPADM

## 2022-07-14 RX ORDER — ONDANSETRON 2 MG/ML
INJECTION INTRAMUSCULAR; INTRAVENOUS AS NEEDED
Status: DISCONTINUED | OUTPATIENT
Start: 2022-07-14 | End: 2022-07-14 | Stop reason: HOSPADM

## 2022-07-14 RX ORDER — ONDANSETRON 2 MG/ML
4 INJECTION INTRAMUSCULAR; INTRAVENOUS AS NEEDED
Status: DISCONTINUED | OUTPATIENT
Start: 2022-07-14 | End: 2022-07-14 | Stop reason: HOSPADM

## 2022-07-14 RX ORDER — OXYCODONE AND ACETAMINOPHEN 5; 325 MG/1; MG/1
1 TABLET ORAL
Qty: 15 TABLET | Refills: 0 | Status: SHIPPED | OUTPATIENT
Start: 2022-07-14 | End: 2022-07-17

## 2022-07-14 RX ORDER — BUPIVACAINE HYDROCHLORIDE AND EPINEPHRINE 5; 5 MG/ML; UG/ML
30 INJECTION, SOLUTION EPIDURAL; INTRACAUDAL; PERINEURAL ONCE
Status: CANCELLED | OUTPATIENT
Start: 2022-07-14 | End: 2022-07-14

## 2022-07-14 RX ADMIN — PROPOFOL 130 MG: 10 INJECTION, EMULSION INTRAVENOUS at 12:34

## 2022-07-14 RX ADMIN — VANCOMYCIN HYDROCHLORIDE 1500 MG: 10 INJECTION, POWDER, LYOPHILIZED, FOR SOLUTION INTRAVENOUS at 10:32

## 2022-07-14 RX ADMIN — SODIUM CHLORIDE, POTASSIUM CHLORIDE, SODIUM LACTATE AND CALCIUM CHLORIDE: 600; 310; 30; 20 INJECTION, SOLUTION INTRAVENOUS at 10:32

## 2022-07-14 RX ADMIN — Medication 10 MG: at 12:58

## 2022-07-14 RX ADMIN — OXYCODONE AND ACETAMINOPHEN 1 TABLET: 5; 325 TABLET ORAL at 14:16

## 2022-07-14 RX ADMIN — GLYCOPYRROLATE 0.1 MG: 0.2 INJECTION, SOLUTION INTRAMUSCULAR; INTRAVENOUS at 12:28

## 2022-07-14 RX ADMIN — ONDANSETRON HYDROCHLORIDE 4 MG: 2 INJECTION, SOLUTION INTRAMUSCULAR; INTRAVENOUS at 13:28

## 2022-07-14 RX ADMIN — FENTANYL CITRATE 25 MCG: 50 INJECTION, SOLUTION INTRAMUSCULAR; INTRAVENOUS at 12:34

## 2022-07-14 RX ADMIN — ACETAMINOPHEN 650 MG: 325 TABLET ORAL at 10:33

## 2022-07-14 RX ADMIN — DEXAMETHASONE SODIUM PHOSPHATE 4 MG: 4 INJECTION, SOLUTION INTRAMUSCULAR; INTRAVENOUS at 13:01

## 2022-07-14 RX ADMIN — MIDAZOLAM 1 MG: 1 INJECTION INTRAMUSCULAR; INTRAVENOUS at 12:28

## 2022-07-14 RX ADMIN — FENTANYL CITRATE 25 MCG: 50 INJECTION, SOLUTION INTRAMUSCULAR; INTRAVENOUS at 12:59

## 2022-07-14 RX ADMIN — LIDOCAINE HYDROCHLORIDE 100 MG: 20 INJECTION, SOLUTION EPIDURAL; INFILTRATION; INTRACAUDAL; PERINEURAL at 12:34

## 2022-07-14 RX ADMIN — Medication 10 MG: at 12:53

## 2022-07-14 RX ADMIN — PROPOFOL 70 MG: 10 INJECTION, EMULSION INTRAVENOUS at 12:59

## 2022-07-14 NOTE — ANESTHESIA PREPROCEDURE EVALUATION
Relevant Problems   RESPIRATORY SYSTEM   (+) Mild intermittent asthma without complication      NEUROLOGY   (+) Generalized anxiety disorder   (+) Moderate episode of recurrent major depressive disorder (HCC)      CARDIOVASCULAR   (+) CAD (coronary artery disease)   (+) HTN (hypertension)   (+) Mitral valve regurgitation       Anesthetic History   No history of anesthetic complications            Review of Systems / Medical History  Patient summary reviewed, nursing notes reviewed and pertinent labs reviewed    Pulmonary  Within defined limits          Asthma        Neuro/Psych   Within defined limits           Cardiovascular  Within defined limits  Hypertension          Pacemaker and CAD    Exercise tolerance: <4 METS  Comments: EF 25%   GI/Hepatic/Renal  Within defined limits   GERD: well controlled           Endo/Other  Within defined limits      Morbid obesity and arthritis     Other Findings              Physical Exam    Airway  Mallampati: II  TM Distance: > 6 cm  Neck ROM: normal range of motion   Mouth opening: Normal     Cardiovascular  Regular rate and rhythm,  S1 and S2 normal,  no murmur, click, rub, or gallop             Dental  No notable dental hx       Pulmonary  Breath sounds clear to auscultation               Abdominal  GI exam deferred       Other Findings            Anesthetic Plan    ASA: 3  Anesthesia type: general          Induction: Intravenous  Anesthetic plan and risks discussed with: Patient

## 2022-07-14 NOTE — H&P
HISTORY OF PRESENT ILLNESS  Ale Pisano is a 81 y.o. female.     HPI  NEW patient consult referred by  Dr. Jocelynn El for RIGHT breast invasive mammary carcinoma. Denies pain or changes to the breast area.               Family History: Mother- Colon cancer  Father- testicular and stomach cancer       Breast imaging-   BELEN Results (most recent):  Results from Hospital Encounter encounter on 06/08/22     [facility] IMAGING RT INCL CAD     Addendum 6/10/2022 11:02 AM  Addendum: Addendum to the right breast biopsy:     Findings: Pathology of the right breast mass is consistent with invasive mammary  carcinoma with ductal and lobular features, in addition to LCIS . The imaging  findings are concordant with the histology results. Recommend surgical  consultation and excision . The patient was contacted by Dr. Radha Arita at 11:00  AM 6/10/2022 . We will inform the office of the referring physician and assist  in scheduling a breast surgical appointment.     Narrative  STUDY:  ULTRASOUND-GUIDED CORE NEEDLE BIOPSY OF THE RIGHT BREAST     INDICATION: 61-year-old female with a right breast mass, presenting for biopsy.     PROCEDURE:     The risks and benefits of the procedure were explained to the patient, questions  were answered, and informed consent was obtained.     The mass at 11 o'clock in the right breast was localized with ultrasound.  The  breast was prepped in the usual sterile manner.  Following the administration of  a local anesthetic and dermatotomy, and using ultrasound guidance,  a 12 gauge  vacuum-assisted Atec needle was advanced to the lesion, and 5 cores were  obtained.  Pre and post-fire images confirmed accurate needle trajectory.   A  metallic clip was placed at the biopsy site.  Post-biopsy digital mammogram  confirms the presence of the clip at the intended biopsy site.  The patient  tolerated the procedure well without complication.     Final pathology is pending.     Impression  Successful ultrasound-guided biopsy yielding cores submitted for histologic  analysis.  A clip was placed at the biopsy site. Post-biopsy digital mammogram  confirms the presence of the clip at the intended biopsy site.  Further  recommendations will be based on final pathology results.        06/08/2022: RIGHT breast core bx. PATH: IMC with ductal and lobular features, 10mm, grade 1, ER+(90%), TX-(<1), HER2-, Ki-67(12%). LCIS: present.             Past Medical History:   Diagnosis Date    Acute right ankle pain 6/8/2018 5/29/18: X-ray showed possible right ankle sprain. 6/6/18: saw Dr. Daljit Rivera ([facility]), diagnosed with peroneal tendonitis. Prescribed an ASO    Asthma      Cardiomyopathy ([address]      Coronary artery disease 2008     s/p RCA stent ([facility]) on 11/26/11    Depression with anxiety       2011    DVT (deep venous thrombosis) ([address] 7/27/2010    Family history of early CAD      GERD (gastroesophageal reflux disease)      H/O gastric bypass 2006     Revision in 2009    Hepatitis C antibody test positive       Does not have chronic hep C (labs 10/6/15: neg HCV RNA)     HTN (hypertension) 7/27/2010    Hyperlipidemia 07/27/2010    Incontinence of feces 9/3/2018     Dr. Odin Rosado. 8/20/18: Improving with pelvic physical therapy and psyllium husk treatment. Saw Dr. Jens Swartz who suggested PT first. MR defecography showed pelvic floor weakness with pelvic descensus, small anterior  Rectocele, and vaginal prolapse. To have pelvic PT weekly for 8 wks and to see Dr. Jens Swartz again in Oct 2018.     Joint pain 7/27/2010    MI (myocardial infarction) ([address] 7/27/2010    Mitral valve regurgitation       Mild to moderate    Morbid obesity ([address] 7/27/2010    Myocardial infarction [facility]) 2006 and 2011     Dr. Long Baez    Psychiatric disorder      PUD (peptic ulcer disease)       gi bleed 2008; ulcer n gastric bypass pouch    Urinary incontinence, stress 7/27/2010        Past Surgical History:   Procedure Laterality Date    COLONOSCOPY N/A 2018     COLONOSCOPY performed by Heike Haq MD at [facility] ENDOSCOPY; redundant colon, int hemorrhoids, pathology: normal colonic mucosa    HX BREAST BIOPSY Left       benign    HX COLONOSCOPY   14     Dr. Chris Childress; normal, repeat in 5 yrs    HX GASTRIC BYPASS        HX IMPLANTABLE CARDIOVERTER DEFIBRILLATOR   2016    HX LAP GASTRIC BYPASS   2006     revision /Dr. Vania Huff    HX OTHER SURGICAL   2016     Removal of right ventricular ICD lead & single chamber transvenous AICD    HX OTHER SURGICAL   10/12/2016     pocket revison of ICD; Dr. Sanjay Blanco OTHER SURGICAL   2018     Dr Viet Croft; high resolution anorectal manaometry-abnormal study. Study done for eval of fecal incontinence    HX OTHER SURGICAL   2018     Dr. Viet Croft. Rectal endoscopic US (EUS) for rectal incontinence. Normal rectal mucosa, no fistulas.  HX PACEMAKER         sicd/left side of chest under arm    INS PPM/ICD LED SING ONLY   2016          INS PPM/ICD LED SING ONLY   2016          INS PPM/ICD LED SING ONLY   2016          AL CARDIAC SURG PROCEDURE UNLIST         Stent x 5-6,Most recent     AL LAP,STOMACH,OTHER,W/O TUBE   2010     Revision GBP         Social History            Socioeconomic History    Marital status:        Spouse name: Not on file    Number of children: Not on file    Years of education: Not on file    Highest education level: Not on file   Occupational History    Not on file   Tobacco Use    Smoking status: Former Smoker       Packs/day: 1.00       Years: 30.00       Pack years: 30.00       Start date: 1970       Quit date: 2004       Years since quittin.0    Smokeless tobacco: Never Used   Vaping Use    Vaping Use: Never used   Substance and Sexual Activity    Alcohol use: No       Alcohol/week: 0.0 standard drinks    Drug use:  No       Types: Prescription    Sexual activity: Never       Partners: Male   Other Topics Concern    Not on file   Social History Narrative     ** Merged History Encounter **            Social Determinants of Health          Financial Resource Strain:     Difficulty of Paying Living Expenses: Not on file   Food Insecurity:     Worried About Running Out of Food in the Last Year: Not on file    [place] of Food in the Last Year: Not on file   Transportation Needs:     Lack of Transportation (Medical): Not on file    Lack of Transportation (Non-Medical): Not on file   Physical Activity:     Days of Exercise per Week: Not on file    Minutes of Exercise per Session: Not on file   Stress:     Feeling of Stress : Not on file   Social Connections:     Frequency of Communication with Friends and Family: Not on file    Frequency of Social Gatherings with Friends and Family: Not on file    Attends Yarsani Services: Not on file    Active Member of [address] [place] [address] [employer] or Organizations: Not on file    Attends Club or Organization Meetings: Not on file    Marital Status: Not on file   Intimate Partner Violence:     Fear of Current or Ex-Partner: Not on file    Emotionally Abused: Not on file    Physically Abused: Not on file    Sexually Abused: Not on file   Housing Stability:     Unable to Pay for Housing in the Last Year: Not on file    Number of [place] in the Last Year: Not on file    Unstable Housing in the Last Year: Not on file                Current Outpatient Medications on File Prior to Visit   Medication Sig Dispense Refill    dilTIAZem ER (CARDIZEM CD) 180 mg capsule Take 180 mg by mouth daily.        zolpidem (AMBIEN) 10 mg tablet TAKE 1 TABLET BY MOUTH NIGHTLY AS NEEDED FOR SLEEP. MAX DAILY AMOUNT: 10 MG.  30 Tablet 1    bumetanide (BUMEX) 2 mg tablet TAKE 1 TABLET BY MOUTH TWICE A DAY 60 Tablet 11    albuterol (PROVENTIL HFA, VENTOLIN HFA, PROAIR HFA) 90 mcg/actuation inhaler TAKE 2 PUFFS BY INHALATION EVERY FOUR HOURS AS NEEDED FOR WHEEZING OR SHORTNESS OF BREATH. 18 Each 11    FLUoxetine (PROzac) 40 mg capsule Take 1 Capsule by mouth two (2) times a day. 180 Capsule 3    Entresto  mg tablet TAKE 1 TABLET BY MOUTH TWICE A DAY 60 Tablet 5    potassium chloride (Klor-Con M20) 20 mEq tablet TAKE TWO TABLETS BY MOUTH DAILY 180 Tablet 3    psyllium (METAMUCIL) powd Take  by mouth. 2 tsp daily        cholecalciferol, VITAMIN D3, (VITAMIN D3) 5,000 unit tab tablet Take 10,000 Units by mouth daily.        biotin 10,000 mcg cap Take  by mouth daily.        OTHER Complete multi formula, bariatric advantage        magnesium 250 mg tab Take 1 Tab by mouth daily.        ferrous sulfate (IRON, FERROUS SULFATE,) 325 mg (65 mg Iron) tablet Take  by mouth daily (before breakfast).        CALCIUM PO Take 600 mg by mouth daily.          No current facility-administered medications on file prior to visit.               Allergies   Allergen Reactions    Amoxicillin Anaphylaxis    Amoxicillin Anaphylaxis    Lipitor [Atorvastatin] Other (comments)       Severe muscle pain and spasms    Zocor [Simvastatin] Other (comments)       Cramps, muscle spasms    Buspar [Buspirone] Other (comments)       Drunk sensation, headaches    Hydroxyzine Other (comments)       Blurred vision, lightheadedness    Livalo [Pitavastatin] Myalgia    Pravastatin Other (comments)       Leg cramps    Tramadol Vertigo                  OB History         0    Para   0    Term   0       0    AB   0    Living            SAB   0    IAB   0    Ectopic   0    Molar        Multiple        Live Births   4                   ROS        Physical Exam  Constitutional:       Appearance: She is well-developed. She is not diaphoretic. HENT:      Head: Normocephalic and atraumatic. Right Ear: External ear normal.      Left Ear: External ear normal.   Eyes:      General: No scleral icterus. Right eye: No discharge.          Left eye: No discharge. Pupils: Pupils are equal, round, and reactive to light. Neck:      Thyroid: No thyromegaly. Vascular: No JVD. Trachea: No tracheal deviation. Cardiovascular:      Rate and Rhythm: Normal rate and regular rhythm. Heart sounds: Normal heart sounds. Pulmonary:      Effort: Pulmonary effort is normal. No tachypnea, accessory muscle usage or respiratory distress. Breath sounds: Normal breath sounds. No stridor. Chest:   Breasts: Breasts are symmetrical.      Right: No inverted nipple, mass, nipple discharge, skin change or tenderness. Left: No inverted nipple, mass, nipple discharge, skin change or tenderness.          Abdominal:      General: There is no distension. Palpations: Abdomen is soft. There is no mass. Tenderness: There is no abdominal tenderness. Musculoskeletal:         General: Normal range of motion. Cervical back: Normal range of motion and neck supple. Lymphadenopathy:      Cervical: No cervical adenopathy. Skin:     General: Skin is warm and dry. Neurological:      Mental Status: She is alert and oriented to person, place, and time. Psychiatric:         Speech: Speech normal.         Behavior: Behavior normal.         Thought Content: Thought content normal.         Judgment: Judgment normal.            BREAST ULTRASOUND, Pre-op Planning  Indication: Pre-op planning, RIGHT breast  Technique: The area was scanned using a high-frequency linear-array near-field transducer  Findings: Irregular hypoechoic mass with biopsy clip. Impression: Breast cancer  Disposition: Surgery        ASSESSMENT and PLAN      ICD-10-CM ICD-9-CM     1. Malignant neoplasm of right breast in female, estrogen receptor positive, unspecified site of breast ([facility]  C50.911 174. 9       Z17.0 V86.0        New pt presents for consultation for treatment of RIGHT breast IMC, ER+(90%)/[place]-/HER2-, Ki-67 (12%), clinical stage 1.  Upon physical examination noted palpable mass at upper outer quadrant of RIGHT breast. Ultrasound visualizes irregular hypoechoic mass with biopsy clip.     We had a long discussion of options for treatment. The patient was accompanied by her daughter during this encounter, and over half the time was spent on counseling and coordination of care. We discussed in depth the pathology results, and surgical planning. The goals of treatment are to treat the breast, and to reduce risk of local or distant recurrence.     Discussed treatment options with risks, complications, benefits, and limitations, including lumpectomy with XRT, and mastectomy with optional reconstruction, both having the same cure rate. Explained the importance of negative margins, and the need for re-excision in the case of positive margins. Will plan to mobilize breast tissue during lumpectomy to minimize cosmetic defect. Due to patient being 81years old and lymph nodes having no worrisome findings, SLNbx is not needed.     We also covered risk reduction strategies including XRT, chemotherapy, and hormonal therapy with estrogen blocker. Will order MammaPrint to help determine treatement. Depending on further evaluation of tumor size during surgery, will help determine the need for XRT. Chemotherapy is not recommended due to patient's medical history. Discussed estrogen blocker for further risk reduction for ER+ disease.      We also discussed the pts questions and concerns regarding type of treatment and surgical procedures.      Pt has elected to proceed with lumpectomy.     Pt should feel free to call Mac Mccormack or Sonny Hassan, nurse navigators, with any further questions.

## 2022-07-14 NOTE — ANESTHESIA POSTPROCEDURE EVALUATION
Procedure(s):  RIGHT BREAST LUMPECTOMY WITH ULTRASOUND. general    Anesthesia Post Evaluation        Patient participation: complete - patient participated  Level of consciousness: awake  Pain management: adequate  Airway patency: patent  Anesthetic complications: no  Cardiovascular status: hemodynamically stable  Respiratory status: acceptable  Hydration status: acceptable  Comments: The patient is ready for PACU discharge. Monika Alonzo DO                   Post anesthesia nausea and vomiting:  controlled      INITIAL Post-op Vital signs:   Vitals Value Taken Time   /60 07/14/22 1415   Temp 36.6 °C (97.8 °F) 07/14/22 1340   Pulse 83 07/14/22 1423   Resp 14 07/14/22 1423   SpO2 97 % 07/14/22 1423   Vitals shown include unvalidated device data.

## 2022-07-14 NOTE — PERIOP NOTES
Patient's daughter verbalized understanding of discharge instructions. She denies further questions at this time.

## 2022-07-14 NOTE — DISCHARGE INSTRUCTIONS
Discharge Instructions from Dr. Bridget Farr    · I will call you with the pathology results, typically within 1 week from today. · You may shower, but no hot tubs, swimming pools, or baths until your incision is healed. · No heavy lifting with the affected extremity (nothing greater than 5 pounds), and limit its use for the next 4-5 days. · You may use an ice pack for comfort for the next couple of days, but do not place ice directly on the skin. Rather, use a towel or clothing to serve as a barrier between skin and ice to prevent injury. · If I placed a drain, follow the drain instructions provided, especially as you keep a record of the drain output. · Follow medication instructions carefully. · Watch for signs of infection as listed below. · Redness  · Swelling  · Drainage from the incision or from your nipple that appears infected  · Fever over 101 degrees for consecutive readings, or over 99.5 if you are currently undergoing chemotherapy. · Call our office (number is below) for a follow-up appointment. · If you have any problems, our phone number is [phone]. DISCHARGE SUMMARY from Nurse    You received 650 mg of tylenol (acetaminophen) during your procedure today at 10:30 AM. Please do not have any additional tylenol (acetaminophen) or tylenol containing products for 6 hours, or no sooner than 4:30 PM.    PATIENT INSTRUCTIONS:    After general anesthesia or intravenous sedation, for 24 hours or while taking prescription Narcotics:  · Limit your activities  · Do not drive and operate hazardous machinery  · Do not make important personal or business decisions  · Do  not drink alcoholic beverages  · If you have not urinated within 8 hours after discharge, please contact your surgeon on call.     Report the following to your surgeon:  · Excessive pain, swelling, redness or odor of or around the surgical area  · Temperature over 100.5  · Nausea and vomiting lasting longer than 4 hours or if unable to take medications  · Any signs of decreased circulation or nerve impairment to extremity: change in color, persistent  numbness, tingling, coldness or increase pain  · Any questions    What to do at Home:  Recommended activity: See surgical instructions. If you experience any of the following symptoms as noted above, please follow up with Dr. Fuentes Yepez. *  Please give a list of your current medications to your Primary Care Provider. *  Please update this list whenever your medications are discontinued, doses are      changed, or new medications (including over-the-counter products) are added. *  Please carry medication information at all times in case of emergency situations. These are general instructions for a healthy lifestyle:    No smoking/ No tobacco products/ Avoid exposure to second hand smoke  Surgeon General's Warning:  Quitting smoking now greatly reduces serious risk to your health. Obesity, smoking, and sedentary lifestyle greatly increases your risk for illness    A healthy diet, regular physical exercise & weight monitoring are important for maintaining a healthy lifestyle    You may be retaining fluid if you have a history of heart failure or if you experience any of the following symptoms:  Weight gain of 3 pounds or more overnight or 5 pounds in a week, increased swelling in our hands or feet or shortness of breath while lying flat in bed. Please call your doctor as soon as you notice any of these symptoms; do not wait until your next office visit. The discharge information has been reviewed with the patient and caregiver. The patient and caregiver verbalized understanding.   Discharge medications reviewed with the patient and caregiver and appropriate educational materials and side effects teaching were provided.   ___________________________________________________________________________________________________________________________________

## 2022-07-14 NOTE — BRIEF OP NOTE
Brief Postoperative Note    Patient: Romulo Ellis  YOB: 1950  MRN: [id number]    Date of Procedure: 7/14/2022     Pre-Op Diagnosis: RIGHT BREAST CANCER    Post-Op Diagnosis: Same as preoperative diagnosis.       Procedure(s):  RIGHT BREAST LUMPECTOMY WITH ULTRASOUND    Surgeon(s):  Maddison Ruiz MD    Surgical Assistant: Surg Asst-1: Danitza MUNROE    Anesthesia: General     Estimated Blood Loss (mL): Minimal    Complications: None    Specimens:   ID Type Source Tests Collected by Time Destination   1 : RIGHT BREAST LUMPECTOMY Fresh Breast  Maddison Ruiz MD 7/14/2022 1154 Pathology   2 : RIGHT BREAST LUMPECTOMY - Deep Margin Fresh Breast  Maddison Ruiz MD 7/14/2022 1308 Pathology   3 : RIGHT BREAST LUMPECTOMY - Medial Margin Enio Breast  Maddison Ruiz MD 7/14/2022 1316 Pathology   4 : RIGHT BREAST LUMPECTOMY - Anterior Medial Margin Fresh Breast  Maddison Ruiz MD 7/14/2022 1317 Pathology        Implants: * No implants in log *    Drains: * No LDAs found *    Findings: spec sono pos mass and clip, tissue advancement 33 sq cm    Electronically Signed by Tang Alva MD on 2/16/0162 at 3:04 PM

## 2022-07-15 NOTE — OP NOTES
[facility]  OPERATIVE REPORT    Name:  Shweta Lewis  MR#:  [id number]  :  1950  ACCOUNT #:  [de-identified]  DATE OF SERVICE:  2022    PREOPERATIVE DIAGNOSIS:  Carcinoma of the right breast.    POSTOPERATIVE DIAGNOSIS:  Carcinoma of the right breast.    PROCEDURE PERFORMED:  Right lumpectomy with intraoperative ultrasound. SURGEON:  Juma Kline MD    ASSISTANT:  Ricci Arredondo. ANESTHESIA:  General.    COMPLICATIONS:  None. SPECIMENS REMOVED:  Right breast mass. IMPLANTS:  None. ESTIMATED BLOOD LOSS:  Minimal.    INDICATIONS:  The patient is a 79-year-old female with biopsy-proven carcinoma of the right breast.    PROCEDURE:  After satisfactory induction of general endotracheal anesthesia, the patient was prepped and draped in sterile fashion. An intraoperative ultrasound was performed and the breast was marked. A curvilinear incision was made in the upper outer quadrant and deepened through subcutaneous tissue with Bovie cautery. A standard lumpectomy was then performed down to the chest wall and the specimen was removed and oriented, sent to Pathology. Specimen ultrasound revealed the presence of a mass and clip within the center of the specimen, grossly clear margins. Additional margins were taken, however and oriented, sent to Pathology. All dissection planes were hemostatic and the wound was anesthetized with 0.5% Marcaine. Tissue advancement flaps were created over a distance of 6 x 3 cm superomedially and 5 x 3 cm laterally for a total tissue advancement of approximately 33 cm2. Separate incisions were made in the parenchymal flaps and they were advanced into the lumpectomy defect and secured with interrupted 3-0 Vicryl sutures. Deep subcutaneous tissue was reapproximated with interrupted 3-0 Vicryl and skin closed with running subcuticular 4-0 Monocryl. The patient tolerated the procedure well. No complications.   She was taken to the recovery room in stable condition.       Radha Noel MD      [id number]  D:  07/14/2022 15:16  T:  07/15/2022 1:31  JOB #:  [id number]  CC:  JOVAN Felix MD

## 2022-07-18 ENCOUNTER — TELEPHONE (OUTPATIENT)
Dept: INTERNAL MEDICINE CLINIC | Age: 72
End: 2022-07-18

## 2022-07-21 ENCOUNTER — TELEPHONE (OUTPATIENT)
Dept: SURGERY | Age: 72
End: 2022-07-21

## 2022-08-01 NOTE — PROGRESS NOTES
HISTORY OF PRESENT ILLNESS  Arnav Olmstead is a 81 y.o. female. HPI  ESTABLISHED patient here for POST OP of RIGHT breast lumpectomy on 7/14/22. Patient mentioned some soreness but it is healing well.         06/08/2022: RIGHT breast core bx. PATH: IMC with ductal and lobular features, 10mm, grade 1, ER+(90%), AZ-(<1), HER2-, Ki-67(12%). LCIS: present. Mammaprint: LOW RISK, Luminal type A, +0.226.    07/14/2022: RIGHT breast lumpectomy. PATH: IMC with ductal and lobular features, 26mm, grade 1, negative margins. Pathologic stage: pT2, pNX.  RIGHT breast deep margin, lumpectomy: Benign breast tissue. RIGHT breast medial margin, lumpectomy: Benign breast tissue. RIGHT breast anterior medial margin, lumpectomy: Fibrocystic changes, duct ectasia. Past Medical History:   Diagnosis Date    Acute right ankle pain 6/8/2018 5/29/18: X-ray showed possible right ankle sprain. 6/6/18: saw Dr. Vanessa Mccann ([facility]), diagnosed with peroneal tendonitis. Prescribed an ASO    Asthma     Cardiomyopathy ([facility]     Coronary artery disease 2008    s/p RCA stent ([facility]) on 11/26/11    Depression with anxiety     2011    DVT (deep venous thrombosis) ([address] 7/27/2010    Family history of early CAD     GERD (gastroesophageal reflux disease)     H/O gastric bypass 2006    Revision in 2009    Hepatitis C antibody test positive     Does not have chronic hep C (labs 10/6/15: neg HCV RNA)     HTN (hypertension) 7/27/2010    Hyperlipidemia 07/27/2010    Incontinence of feces 9/3/2018    Dr. Mildred Hall. 8/20/18: Improving with pelvic physical therapy and psyllium husk treatment. Saw Dr. Cristopher Dobbins who suggested PT first. MR defecography showed pelvic floor weakness with pelvic descensus, small anterior  Rectocele, and vaginal prolapse. To have pelvic PT weekly for 8 wks and to see Dr. Cristopher Dobbins again in Oct 2018.     Joint pain 7/27/2010    MI (myocardial infarction) ([facility] [address] 7/27/2010    Mitral valve regurgitation     Mild to moderate    Morbid obesity ([address] 7/27/2010    Myocardial infarction [facility]) 2006 and 2011    Dr. Brandon Puckett disorder     PUD (peptic ulcer disease)     gi bleed 2008; ulcer n gastric bypass pouch    Urinary incontinence, stress 7/27/2010       Past Surgical History:   Procedure Laterality Date    COLONOSCOPY N/A 6/29/2018    COLONOSCOPY performed by Allen Montaño MD at [facility] ENDOSCOPY; redundant colon, int hemorrhoids, pathology: normal colonic mucosa    HX BREAST BIOPSY Left     benign    HX BREAST LUMPECTOMY Right 7/14/2022    RIGHT BREAST LUMPECTOMY WITH ULTRASOUND performed by Dianna Villeda MD at [place] [address]    HX COLONOSCOPY  9/5/14    Dr. Krystina Pleitez; normal, repeat in 5 yrs    HX GASTRIC BYPASS      HX IMPLANTABLE CARDIOVERTER DEFIBRILLATOR  08/24/2016    HX LAP GASTRIC BYPASS  2006    revision 2009/Dr. Adri Cruz    HX OTHER SURGICAL  09/19/2016    Removal of right ventricular ICD lead & single chamber transvenous AICD    HX OTHER SURGICAL  10/12/2016    pocket revison of ICD; Dr. Damien Ibanez  06/07/2018    Dr Efrain Garza; high resolution anorectal manaometry-abnormal study. Study done for eval of fecal incontinence    HX OTHER SURGICAL  12/14/2018    Dr. Efrain Garza. Rectal endoscopic US (EUS) for rectal incontinence. Normal rectal mucosa, no fistulas.     HX PACEMAKER      sicd/left side of chest under arm    INS PPM/ICD LED SING ONLY  8/24/2016         INS PPM/ICD LED SING ONLY  8/26/2016         INS PPM/ICD LED SING ONLY  9/21/2016         IN CARDIAC SURG PROCEDURE UNLIST      Stent x 5-6,Most recent 2011    IN LAP,STOMACH,OTHER,W/O TUBE  04/05/2010    Revision GBP       Social History     Socioeconomic History    Marital status:      Spouse name: Not on file    Number of children: Not on file    Years of education: Not on file    Highest education level: Not on file   Occupational History    Not on file   Tobacco Use    Smoking status: Former     Packs/day: 0.00     Years: 30.00 Pack years: 0.00     Types: Cigarettes     Start date: 1970     Quit date: 2004     Years since quittin.2    Smokeless tobacco: Never   Vaping Use    Vaping Use: Never used   Substance and Sexual Activity    Alcohol use: No     Alcohol/week: 0.0 standard drinks    Drug use: No     Types: Prescription    Sexual activity: Not Currently     Partners: Female     Birth control/protection: Condom, Pill, Diaphragm, I.U.D., Sponge, None   Other Topics Concern    Not on file   Social History Narrative    ** Merged History Encounter **          Social Determinants of Health     Financial Resource Strain: Not on file   Food Insecurity: Not on file   Transportation Needs: Not on file   Physical Activity: Not on file   Stress: Not on file   Social Connections: Not on file   Intimate Partner Violence: Not on file   Housing Stability: Not on file       Current Outpatient Medications on File Prior to Visit   Medication Sig Dispense Refill    isosorbide dinitrate (ISORDIL) 30 mg tablet Take 20 mg by mouth daily. IBUPROFEN PO Take  by mouth as needed. aspirin delayed-release 81 mg tablet Take 81 mg by mouth as needed for Pain.      zolpidem (AMBIEN) 10 mg tablet TAKE 1 TABLET BY MOUTH NIGHTLY AS NEEDED FOR SLEEP. MAX DAILY AMOUNT: 10 MG. 30 Tablet 1    dilTIAZem ER (CARDIZEM CD) 180 mg capsule Take 180 mg by mouth daily. bumetanide (BUMEX) 2 mg tablet TAKE 1 TABLET BY MOUTH TWICE A DAY 60 Tablet 11    albuterol (PROVENTIL HFA, VENTOLIN HFA, PROAIR HFA) 90 mcg/actuation inhaler TAKE 2 PUFFS BY INHALATION EVERY FOUR HOURS AS NEEDED FOR WHEEZING OR SHORTNESS OF BREATH. 18 Each 11    FLUoxetine (PROzac) 40 mg capsule Take 1 Capsule by mouth two (2) times a day. 180 Capsule 3    Entresto  mg tablet TAKE 1 TABLET BY MOUTH TWICE A DAY 60 Tablet 5    potassium chloride (Klor-Con M20) 20 mEq tablet TAKE TWO TABLETS BY MOUTH DAILY 180 Tablet 3    psyllium (METAMUCIL) powd Take  by mouth.  2 tsp daily cholecalciferol, VITAMIN D3, (VITAMIN D3) 5,000 unit tab tablet Take 10,000 Units by mouth daily. biotin 10,000 mcg cap Take  by mouth daily. OTHER Complete multi formula, bariatric advantage      magnesium 250 mg tab Take 1 Tab by mouth daily. ferrous sulfate (IRON, FERROUS SULFATE,) 325 mg (65 mg Iron) tablet Take  by mouth daily (before breakfast). CALCIUM PO Take 600 mg by mouth daily. No current facility-administered medications on file prior to visit. Allergies   Allergen Reactions    Amoxicillin Anaphylaxis    Amoxicillin Anaphylaxis    Lipitor [Atorvastatin] Other (comments)     Severe muscle pain and spasms    Zocor [Simvastatin] Other (comments)     Cramps, muscle spasms    Buspar [Buspirone] Other (comments)     Drunk sensation, headaches    Hydroxyzine Other (comments)     Blurred vision, lightheadedness    Livalo [Pitavastatin] Myalgia    Pravastatin Other (comments)     Leg cramps    Tramadol Vertigo       OB History          0    Para   0    Term   0       0    AB   0    Living             SAB   0    IAB   0    Ectopic   0    Molar        Multiple        Live Births   4          Obstetric Comments   Menarche 10, LMP , # of children 3, age of 4st delivery 27, Hysterectomy/oophorectomy No/No, Breast bx No, history of breast feeding Yes, BCP Yes, Hormone therapy NO               ROS      Physical Exam  Exam conducted with a chaperone present. Constitutional:       Appearance: She is well-developed. She is not diaphoretic. HENT:      Head: Normocephalic and atraumatic. Right Ear: External ear normal.      Left Ear: External ear normal.   Eyes:      General: No scleral icterus. Right eye: No discharge. Left eye: No discharge. Pupils: Pupils are equal, round, and reactive to light. Neck:      Thyroid: No thyromegaly. Vascular: No JVD. Trachea: No tracheal deviation.    Cardiovascular:      Rate and Rhythm: Normal rate and regular rhythm. Heart sounds: Normal heart sounds. Pulmonary:      Effort: Pulmonary effort is normal. No tachypnea, accessory muscle usage or respiratory distress. Breath sounds: Normal breath sounds. No stridor. Chest:   Breasts:     Breasts are symmetrical.      Right: No inverted nipple, mass, nipple discharge, skin change or tenderness. Left: No inverted nipple, mass, nipple discharge, skin change or tenderness. Abdominal:      General: There is no distension. Palpations: Abdomen is soft. There is no mass. Tenderness: There is no abdominal tenderness. Musculoskeletal:         General: Normal range of motion. Cervical back: Normal range of motion and neck supple. Lymphadenopathy:      Cervical: No cervical adenopathy. Skin:     General: Skin is warm and dry. Neurological:      Mental Status: She is alert and oriented to person, place, and time. Psychiatric:         Speech: Speech normal.         Behavior: Behavior normal.         Thought Content: Thought content normal.         Judgment: Judgment normal.         ASSESSMENT and PLAN    ICD-10-CM ICD-9-CM    1. Malignant neoplasm of right breast in female, estrogen receptor positive, unspecified site of breast ([facility]  C50.911 174.9     Z17.0 V86.0         Patient presents for f/u s/p RIGHT breast lumpectomy on 07/14/22, and is doing well overall. Upon physical examination noted well healed incision. I will put a referral in for patient to meet with a medical oncologist. Follow up in 6 months with Ally Segura, as well as advised to have mammogram of RIGHT breast in 6 months. This plan was reviewed with the patient and patient agrees. All questions were answered.       Written by Dennis Bowden, as dictated by Dr. Clay Terry MD.

## 2022-08-03 ENCOUNTER — OFFICE VISIT (OUTPATIENT)
Dept: SURGERY | Age: 72
End: 2022-08-03
Payer: MEDICARE

## 2022-08-03 ENCOUNTER — DOCUMENTATION ONLY (OUTPATIENT)
Dept: SURGERY | Age: 72
End: 2022-08-03

## 2022-08-03 VITALS — BODY MASS INDEX: 39.65 KG/M2 | WEIGHT: 231 LBS

## 2022-08-03 DIAGNOSIS — Z17.0 MALIGNANT NEOPLASM OF RIGHT BREAST IN FEMALE, ESTROGEN RECEPTOR POSITIVE, UNSPECIFIED SITE OF BREAST (HCC): ICD-10-CM

## 2022-08-03 DIAGNOSIS — Z17.0 MALIGNANT NEOPLASM OF RIGHT BREAST IN FEMALE, ESTROGEN RECEPTOR POSITIVE, UNSPECIFIED SITE OF BREAST (HCC): Primary | ICD-10-CM

## 2022-08-03 DIAGNOSIS — C50.911 MALIGNANT NEOPLASM OF RIGHT BREAST IN FEMALE, ESTROGEN RECEPTOR POSITIVE, UNSPECIFIED SITE OF BREAST (HCC): ICD-10-CM

## 2022-08-03 DIAGNOSIS — C50.911 MALIGNANT NEOPLASM OF RIGHT BREAST IN FEMALE, ESTROGEN RECEPTOR POSITIVE, UNSPECIFIED SITE OF BREAST (HCC): Primary | ICD-10-CM

## 2022-08-03 PROCEDURE — 99024 POSTOP FOLLOW-UP VISIT: CPT | Performed by: SURGERY

## 2022-08-03 NOTE — PROGRESS NOTES
HISTORY OF PRESENT ILLNESS  Eloina Valencia is a 81 y.o. female. HPI ESTABLISHED patient here for POST OP for RIGHT breast lumpectomy Done 7/14/22. POD#20. Patient mentioned some soreness but says it is healing well.     06/08/2022: RIGHT breast core bx. PATH: IMC with ductal and lobular features, 10mm, grade 1, ER+(90%), [place]-(<1), HER2-, Ki-67(12%). LCIS: present. Mammaprint: LOW RISK, Luminal type A, +0.226.     07/14/2022: RIGHT breast lumpectomy. PATH: IMC with ductal and lobular features, 26mm, grade 1, negative margins. Pathologic stage: pT2, pNX.  RIGHT breast deep margin, lumpectomy: Benign breast tissue. RIGHT breast medial margin, lumpectomy: Benign breast tissue. RIGHT breast anterior medial margin, lumpectomy: Fibrocystic changes, duct ectasia.      ROS    Physical Exam    ASSESSMENT and PLAN  {ASSESSMENT/PLAN:[id number]}

## 2022-08-08 ENCOUNTER — OFFICE VISIT (OUTPATIENT)
Dept: ONCOLOGY | Age: 72
End: 2022-08-08
Payer: MEDICARE

## 2022-08-08 ENCOUNTER — DOCUMENTATION ONLY (OUTPATIENT)
Dept: ONCOLOGY | Age: 72
End: 2022-08-08

## 2022-08-08 VITALS
OXYGEN SATURATION: 95 % | HEART RATE: 94 BPM | HEIGHT: 64 IN | WEIGHT: 233.7 LBS | TEMPERATURE: 96.9 F | BODY MASS INDEX: 39.9 KG/M2 | SYSTOLIC BLOOD PRESSURE: 121 MMHG | DIASTOLIC BLOOD PRESSURE: 79 MMHG

## 2022-08-08 DIAGNOSIS — I10 PRIMARY HYPERTENSION: ICD-10-CM

## 2022-08-08 DIAGNOSIS — I25.5 ISCHEMIC CARDIOMYOPATHY: ICD-10-CM

## 2022-08-08 DIAGNOSIS — I25.10 CORONARY ARTERY DISEASE INVOLVING NATIVE CORONARY ARTERY OF NATIVE HEART WITHOUT ANGINA PECTORIS: ICD-10-CM

## 2022-08-08 DIAGNOSIS — E66.01 MORBID OBESITY WITH BMI OF 40.0-44.9, ADULT (HCC): ICD-10-CM

## 2022-08-08 DIAGNOSIS — C50.911 MALIGNANT NEOPLASM OF RIGHT BREAST IN FEMALE, ESTROGEN RECEPTOR POSITIVE, UNSPECIFIED SITE OF BREAST (HCC): Primary | ICD-10-CM

## 2022-08-08 DIAGNOSIS — Z95.810 S/P ICD (INTERNAL CARDIAC DEFIBRILLATOR) PROCEDURE: ICD-10-CM

## 2022-08-08 DIAGNOSIS — Z17.0 MALIGNANT NEOPLASM OF RIGHT BREAST IN FEMALE, ESTROGEN RECEPTOR POSITIVE, UNSPECIFIED SITE OF BREAST (HCC): Primary | ICD-10-CM

## 2022-08-08 PROCEDURE — G8754 DIAS BP LESS 90: HCPCS | Performed by: INTERNAL MEDICINE

## 2022-08-08 PROCEDURE — G8536 NO DOC ELDER MAL SCRN: HCPCS | Performed by: INTERNAL MEDICINE

## 2022-08-08 PROCEDURE — G8752 SYS BP LESS 140: HCPCS | Performed by: INTERNAL MEDICINE

## 2022-08-08 PROCEDURE — 1123F ACP DISCUSS/DSCN MKR DOCD: CPT | Performed by: INTERNAL MEDICINE

## 2022-08-08 PROCEDURE — G9717 DOC PT DX DEP/BP F/U NT REQ: HCPCS | Performed by: INTERNAL MEDICINE

## 2022-08-08 PROCEDURE — G9899 SCRN MAM PERF RSLTS DOC: HCPCS | Performed by: INTERNAL MEDICINE

## 2022-08-08 PROCEDURE — G0463 HOSPITAL OUTPT CLINIC VISIT: HCPCS | Performed by: INTERNAL MEDICINE

## 2022-08-08 PROCEDURE — 3017F COLORECTAL CA SCREEN DOC REV: CPT | Performed by: INTERNAL MEDICINE

## 2022-08-08 PROCEDURE — 1101F PT FALLS ASSESS-DOCD LE1/YR: CPT | Performed by: INTERNAL MEDICINE

## 2022-08-08 PROCEDURE — 99205 OFFICE O/P NEW HI 60 MIN: CPT | Performed by: INTERNAL MEDICINE

## 2022-08-08 PROCEDURE — G8427 DOCREV CUR MEDS BY ELIG CLIN: HCPCS | Performed by: INTERNAL MEDICINE

## 2022-08-08 PROCEDURE — 1090F PRES/ABSN URINE INCON ASSESS: CPT | Performed by: INTERNAL MEDICINE

## 2022-08-08 PROCEDURE — G8417 CALC BMI ABV UP PARAM F/U: HCPCS | Performed by: INTERNAL MEDICINE

## 2022-08-08 RX ORDER — LETROZOLE 2.5 MG/1
2.5 TABLET, FILM COATED ORAL DAILY
Qty: 90 TABLET | Refills: 3 | Status: SHIPPED | OUTPATIENT
Start: 2022-08-08

## 2022-08-08 NOTE — PROGRESS NOTES
[facility] [place] [address] [address], [address]  W: [phone]  F: [phone]    Reason for Visit:   Theo Guillermo is a 81 y.o. female who is seen in consultation at the request of Dr. Rigo Cervantes for evaluation of breast cancer. Treatment History:   06/08/2022: RIGHT breast core bx. PATH: IMC with ductal and lobular features, 10mm, grade 1, ER+(90%), IN-(<1), HER2-, Ki-67(12%). LCIS: present. Mammaprint: LOW RISK, Luminal type A, +0.226.     07/14/2022: RIGHT breast lumpectomy. PATH: IMC with ductal and lobular features, 26mm, grade 1, negative margins. Pathologic stage: pT2, pNX.  RIGHT breast deep margin, lumpectomy: Benign breast tissue. RIGHT breast medial margin, lumpectomy: Benign breast tissue. RIGHT breast anterior medial margin, lumpectomy: Fibrocystic changes, duct ectasia. STAGE: PATHOLOGIC STAGE CLASSIFICATION (pTNM, AJCC 8th Edition)       Primary Tumor (pT): pT2       Regional Lymph Nodes (pN): pNX   Invasive carcinoma   ER          IN   Interpretation:     Positive     Interpretation:     Negative   Nuclear Staining %:     90     Nuclear Staining %:     <1   Intensity:     3     Intensity     N/A   HER-2/antoine  Immunohistochemistry for Breast tumors   Results:     Negative, Score = 1+  Mammaprint low risk    History of Present Illness:     Pt seen today for office consult for new RIGHT breast cancer post lumpectomy 7/14/22. Seen today for adjuvant therapy discussion. Initially found on mammo. Had RIGHT breast biopsy followed by lumpectomy/ no LN eval done. MP low risk. Did well with surgery. Knows does not need chemo or radiation. Feels well today. Has cardiomyopathy/ AICD. No fevers/ chills/ chest pain/ SOB/ nausea/ vomiting/diarrhea/ pain/fatigue     FamHX none cancer      Past Medical History:   Diagnosis Date    Acute right ankle pain 6/8/2018 5/29/18: X-ray showed possible right ankle sprain.  6/6/18: saw  Dean Camejo ([facility]), diagnosed with peroneal tendonitis. Prescribed an ASO    Asthma     Cardiomyopathy ([facility] [address]     Coronary artery disease 2008    s/p RCA stent (AMRIT) on 11/26/11    Depression with anxiety     2011    DVT (deep venous thrombosis) ([facility] [address] 7/27/2010    Family history of early CAD     GERD (gastroesophageal reflux disease)     H/O gastric bypass 2006    Revision in 2009    Hepatitis C antibody test positive     Does not have chronic hep C (labs 10/6/15: neg HCV RNA)     HTN (hypertension) 7/27/2010    Hyperlipidemia 07/27/2010    Incontinence of feces 9/3/2018    Dr. Garrett Salinas. 8/20/18: Improving with pelvic physical therapy and psyllium husk treatment. Saw Dr. Sammy Deras who suggested PT first. MR defecography showed pelvic floor weakness with pelvic descensus, small anterior  Rectocele, and vaginal prolapse. To have pelvic PT weekly for 8 wks and to see Dr. Sammy Deras again in Oct 2018.     Joint pain 7/27/2010    MI (myocardial infarction) ([facility] [address] 7/27/2010    Mitral valve regurgitation     Mild to moderate    Morbid obesity ([facility] [address] 7/27/2010    Myocardial infarction [facility]) 2006 and 2011    Dr. Su Roch disorder     PUD (peptic ulcer disease)     gi bleed 2008; ulcer n gastric bypass pouch    Urinary incontinence, stress 7/27/2010      Past Surgical History:   Procedure Laterality Date    COLONOSCOPY N/A 6/29/2018    COLONOSCOPY performed by Erica Brown MD at [facility]COPY; redundant colon, int hemorrhoids, pathology: normal colonic mucosa    HX BREAST BIOPSY Left     benign    HX BREAST LUMPECTOMY Right 7/14/2022    RIGHT BREAST LUMPECTOMY WITH ULTRASOUND performed by Florinda Garcia MD at [place] [address]    HX COLONOSCOPY  9/5/14     [facility]; normal, repeat in 5 yrs    HX GASTRIC BYPASS      HX IMPLANTABLE CARDIOVERTER DEFIBRILLATOR  08/24/2016    HX LAP GASTRIC BYPASS  2006    revision 2009/Dr. Delonte Alexander    HX OTHER SURGICAL  09/19/2016    Removal of right ventricular ICD lead & single chamber transvenous AICD    HX OTHER SURGICAL  10/12/2016    pocket revison of ICD; Dr. Rozina Orosco  2018    Dr Odin Rosado; high resolution anorectal manaometry-abnormal study. Study done for eval of fecal incontinence    HX OTHER SURGICAL  2018    Dr. Odin Rosado. Rectal endoscopic US (EUS) for rectal incontinence. Normal rectal mucosa, no fistulas. HX PACEMAKER      sicd/left side of chest under arm    INS PPM/ICD LED SING ONLY  2016         INS PPM/ICD LED SING ONLY  2016         INS PPM/ICD LED SING ONLY  2016         [place] CARDIAC SURG PROCEDURE UNLIST      Stent x 5-6,Most recent     TN LAP,STOMACH,OTHER,W/O TUBE  2010    Revision GBP      Social History     Tobacco Use    Smoking status: Former     Packs/day: 0.00     Years: 30.00     Pack years: 0.00     Types: Cigarettes     Start date: 1970     Quit date: 2004     Years since quittin.2    Smokeless tobacco: Never   Substance Use Topics    Alcohol use: No     Alcohol/week: 0.0 standard drinks      Family History   Problem Relation Age of Onset    Dementia Mother     Cancer Mother         Colon    Alzheimer's Disease Mother     Cancer Father         Testical and stomach cancer    Heart Disease Father         Triple by pass    Hypertension Father         High blood pressure    Heart Disease Sister         Aortic replacement    Stroke Sister         Mini strokes    Stroke Sister     Heart Attack Brother     Anesth Problems Neg Hx      Current Outpatient Medications   Medication Sig    isosorbide dinitrate (ISORDIL) 30 mg tablet Take 20 mg by mouth daily. IBUPROFEN PO Take  by mouth as needed. aspirin delayed-release 81 mg tablet Take 81 mg by mouth as needed for Pain.    zolpidem (AMBIEN) 10 mg tablet TAKE 1 TABLET BY MOUTH NIGHTLY AS NEEDED FOR SLEEP. MAX DAILY AMOUNT: 10 MG. dilTIAZem ER (CARDIZEM CD) 180 mg capsule Take 180 mg by mouth daily.     bumetanide (BUMEX) 2 mg tablet TAKE 1 TABLET BY MOUTH TWICE A DAY    albuterol (PROVENTIL HFA, VENTOLIN HFA, PROAIR HFA) 90 mcg/actuation inhaler TAKE 2 PUFFS BY INHALATION EVERY FOUR HOURS AS NEEDED FOR WHEEZING OR SHORTNESS OF BREATH. FLUoxetine (PROzac) 40 mg capsule Take 1 Capsule by mouth two (2) times a day. Entresto  mg tablet TAKE 1 TABLET BY MOUTH TWICE A DAY    potassium chloride (Klor-Con M20) 20 mEq tablet TAKE TWO TABLETS BY MOUTH DAILY    psyllium (METAMUCIL) powd Take  by mouth. 2 tsp daily    cholecalciferol, VITAMIN D3, (VITAMIN D3) 5,000 unit tab tablet Take 10,000 Units by mouth daily. biotin 10,000 mcg cap Take  by mouth daily. OTHER Complete multi formula, bariatric advantage    magnesium 250 mg tab Take 1 Tab by mouth daily. ferrous sulfate 325 mg (65 mg iron) tablet Take  by mouth daily (before breakfast). CALCIUM PO Take 600 mg by mouth daily. No current facility-administered medications for this visit. Allergies   Allergen Reactions    Amoxicillin Anaphylaxis    Amoxicillin Anaphylaxis    Lipitor [Atorvastatin] Other (comments)     Severe muscle pain and spasms    Zocor [Simvastatin] Other (comments)     Cramps, muscle spasms    Buspar [Buspirone] Other (comments)     Drunk sensation, headaches    Hydroxyzine Other (comments)     Blurred vision, lightheadedness    Livalo [Pitavastatin] Myalgia    Pravastatin Other (comments)     Leg cramps    Tramadol Vertigo        A complete review of systems was obtained, negative except as described above and as reported on ROS sheet scanned into system.      Physical Exam:   Visit Vitals  /79 (BP 1 Location: Left upper arm, BP Patient Position: Sitting)   Pulse 94   Temp 96.9 °F (36.1 °C) (Temporal)   Ht 5' 4\" (1.626 m)   Wt 233 lb 11.2 oz (106 kg)   SpO2 95%   BMI 40.11 kg/m²     ECOG PS: 1  General: No distress  Eyes: PERRLA, anicteric sclerae  HENT: Atraumatic, wearing mask  Neck: Supple  Respiratory: CTAB, normal respiratory effort  CV: Normal rate, regular rhythm, no murmurs, no peripheral edema  GI: Soft, nontender, non distended  MS: Normal gait and station. Digits without clubbing or cyanosis. Skin: No rashes, ecchymoses, or petechiae. Normal temperature, turgor, and texture. Psych: Alert, oriented, appropriate affect, normal judgment/insight  Neuro non focal  Breast RIGHT lump scar healing well, no masses b/l  AICD LEFT outer chest wall. Results:     Lab Results   Component Value Date/Time    WBC 8.1 07/08/2022 08:39 AM    HGB 11.7 07/08/2022 08:39 AM    HCT 38.0 07/08/2022 08:39 AM    PLATELET 780 93/34/1962 08:39 AM    MCV 83.7 07/08/2022 08:39 AM    ABS. NEUTROPHILS 5.5 07/08/2022 08:39 AM    Hemoglobin (POC) 14.3 11/26/2011 09:50 PM    Hematocrit (POC) 42 11/26/2011 09:50 PM     Lab Results   Component Value Date/Time    Sodium 140 07/08/2022 08:39 AM    Potassium 4.2 07/08/2022 08:39 AM    Chloride 109 (H) 07/08/2022 08:39 AM    CO2 25 07/08/2022 08:39 AM    Glucose 93 07/08/2022 08:39 AM    BUN 13 07/08/2022 08:39 AM    Creatinine 0.75 07/08/2022 08:39 AM    GFR est AA >60 07/08/2022 08:39 AM    GFR est non-AA >60 07/08/2022 08:39 AM    Calcium 8.6 07/08/2022 08:39 AM    Sodium (POC) 142 11/26/2011 09:50 PM    Potassium (POC) 3.2 (L) 11/26/2011 09:50 PM    Chloride (POC) 106 11/26/2011 09:50 PM    Glucose (POC) 167 (H) 11/26/2011 09:50 PM    Glucose (POC) 91 04/07/2010 06:25 AM    BUN (POC) 24 (H) 11/26/2011 09:50 PM    Creatinine (POC) 0.7 11/26/2011 09:50 PM    Calcium, ionized (POC) 1.09 (L) 11/26/2011 09:50 PM     Lab Results   Component Value Date/Time    Bilirubin, total 0.4 07/08/2022 08:39 AM    ALT (SGPT) 14 07/08/2022 08:39 AM    Alk.  phosphatase 60 07/08/2022 08:39 AM    Protein, total 6.0 (L) 07/08/2022 08:39 AM    Albumin 3.3 (L) 07/08/2022 08:39 AM    Globulin 2.7 07/08/2022 08:39 AM     [facility] Results (most recent):  Results from Hospital Encounter encounter on 06/08/22    [facility] POST BX IMAGING RT INCL CAD    Addendum 6/10/2022 11:02 AM  Addendum: Addendum to the right breast biopsy:    Findings: Pathology of the right breast mass is consistent with invasive mammary  carcinoma with ductal and lobular features, in addition to LCIS . The imaging  findings are concordant with the histology results. Recommend surgical  consultation and excision . The patient was contacted by Dr. Suad Hidalgo at 11:00  AM 6/10/2022 . We will inform the office of the referring physician and assist  in scheduling a breast surgical appointment. Narrative  STUDY:  ULTRASOUND-GUIDED CORE NEEDLE BIOPSY OF THE RIGHT BREAST    INDICATION: 74-year-old female with a right breast mass, presenting for biopsy. PROCEDURE:    The risks and benefits of the procedure were explained to the patient, questions  were answered, and informed consent was obtained. The mass at 11 o'clock in the right breast was localized with ultrasound. The  breast was prepped in the usual sterile manner. Following the administration of  a local anesthetic and dermatotomy, and using ultrasound guidance,  a 12 gauge  vacuum-assisted Atec needle was advanced to the lesion, and 5 cores were  obtained. Pre and post-fire images confirmed accurate needle trajectory. A  metallic clip was placed at the biopsy site. Post-biopsy digital mammogram  confirms the presence of the clip at the intended biopsy site. The patient  tolerated the procedure well without complication. Final pathology is pending. Impression  Successful ultrasound-guided biopsy yielding cores submitted for histologic  analysis. A clip was placed at the biopsy site. Post-biopsy digital mammogram  confirms the presence of the clip at the intended biopsy site. Further  recommendations will be based on final pathology results. Records reviewed and summarized above. Pathology report(s) reviewed above. Radiology report(s) reviewed above.       Assessment:/PLAN     1)  RIGHT breast cancer post lumpectomy 7/22. STAGE: PATHOLOGIC STAGE CLASSIFICATION (pTNM, AJCC 8th Edition)       Primary Tumor (pT): pT2       Regional Lymph Nodes (pN): pNX   Invasive carcinoma   ER          GA   Interpretation:     Positive     Interpretation:     Negative   Nuclear Staining %:     90     Nuclear Staining %:     <1   Intensity:     3     Intensity     N/A   HER-2/antoine  Immunohistochemistry for Breast tumors   Results:     Negative, Score = 1+    Records and history reviewed with pt today. Reviewed path and data specifically with pt. Discussed dx, stage and treatment of breast cancer. Discussed adjuvant chemo and hormonal therapy options today. Discussed low risk MP and no chemo. Pt does not want chemo due to heart issues. Not seeing radiation due to heart issues. Discussed adjuvant hormonal therapy with AI    The risks and benefits of aromatase inhibitors (anastrozole, letrozole, and exemestane) were discussed in detail and the patient was informed of the following: Risks include the development of painful muscles and joints (arthralgia/myalgia) and bone loss. Muscle and joint pain can be severe but rarely result in any tissue damage; symptoms usually resolve in several weeks when the medication is stopped. Bone loss is common and a bone density test is recommended as a baseline and then yearly to every several years depending on initial results. The risk of fractures is increased by a few percent in patients taking these drugs, but careful monitoring of bone density and using bone protecting agents when indicated can minimize these risks. Unlike tamoxifen there is no increased risk of blood clots or endometrial cancer. AIs can cause or worsen vaginal dryness but women using these drugs should not use vaginal estrogen preparations for these symptoms. AIs can also cause or increase hot flashes. Any other symptoms should be reported. Pt is open to trying AI.    Ordered letrozole today and pt will start in a week.   Clinically pt stable today with lots of medical problems. Labs and routine HM with PCP  Fu with us in 3 months    2) HTN/ cardiomyopathy/ AICD. Sees cardio. 3) arthritis/ depression/ anxiety/ GERD/obesity/ hx of gastric bypass/hx of DVT. Per PCP. 4) Psychosocial. Mood good. Coping well. Has support. SW support today. Fu here in 3 months  Call if questions    I appreciate the opportunity to participate in Ms. Enedelia Huddleston's care.     Signed By: Judah Herrera,

## 2022-08-08 NOTE — PROGRESS NOTES
Oncology Social Work  Psychosocial Assessment    Reason for Assessment:      [] Social Work Referral [x] Initial Assessment [x] New Diagnosis [] Other    Advance Care Planning:  Advance Care Planning 7/8/2022   Patient's [employer] is: -   Primary Decision Maker Name -   Primary Decision Maker Phone Number -   Primary Decision Maker Relationship to Patient -   Confirm Advance Directive None   Patient Would Like to Complete Advance Directive No   Does the patient have other document types (No Data)       Sources of Information:    [x]Patient  []Family  []Staff  [x]Medical Record    Mental Status:    [x]Alert  []Lethargic  []Unresponsive   [] Unable to assess   Oriented to:  [x]Person  [x]Place  [x]Time  [x]Situation      Relationship Status:  []Single  [x]  []Significant Other/Life Partner  []  []  []    Living Circumstances:  []Lives Alone  [x]Family/Significant Other in Household  []Roommates  []Children in the Home  []Paid Caregivers  []Assisted Living Facility/Group Home  []Skilled [address]  []Homeless  []Incarcerated  []Environmental/Care Concerns  []Other:    Employment Status:  []Employed Full-time []Employed Part-time []Homemaker  [x] Retired [] Short-Term Disability [] Long-Term Disability  [] Unemployed   [] [place]   [] SSDI  [] Self-Employment    Barriers to Learning:    []Language  []Developmental  []Cognitive  []Altered Mental Status  []Visual/Hearing Impairment  []Unable to Read/Write  []Motivational  [] Challenges Understanding Medical Jargon [x]No Barriers Identified      Financial/Legal Concerns:    []Uninsured  []Limited Income/Resources  []Non-Citizen  []Food Insecurity [x]No Concerns Identified   []Other:    Roman Catholic/Spiritual/Existential:  Does patient have any spiritual or Taoist beliefs? [] Yes [] No  Is the patient involved in a spiritual, kyaw or Taoist community?  [] Yes [] No  Patient expressing spiritual/existential angst? [] Yes [] No  Notes:    Support System:    Identified Support Person/Group:  []Strong  [x]Fair  []Limited    Coping with Illness:   [x]  Coping Well  [] Challenges Coping with Serious Illness [] Situational Depression [] Situational Anxiety [] Anticipatory Grief  [] Recent Loss [] Caregiver [place]            Narrative: SW met with patient to introduce SW role and to initiate rapport building. Patient was recently diagnosed with breast cancer, had recent lumpectomy. Patient is coping well and reports that she feels good. Patient lives with her , she has 3 children. Her daughter passed in a car accident 18 years ago. Patient has transportation. She retired from the [employer] and receives [employer]. Patient denies the need for any resources at this time.          Plan: SW will follow for support and resources as needed    Referral/Handouts:         Isac Lara

## 2022-08-08 NOTE — PROGRESS NOTES
Fco Chávez is a 81 y.o. female  Chief Complaint   Patient presents with    New Patient     of RIGHT breast lumpectomy     1. Have you been to the ER, urgent care clinic since your last visit? Hospitalized since your last visit? No    2. Have you seen or consulted any other health care providers outside of the [address] [place] [address] since your last visit? Include any pap smears or colon screening.  No

## 2022-08-08 NOTE — PATIENT INSTRUCTIONS
Letrozole [facility])   This information is about a hormonal therapy used to treat breast cancer called letrozole, which is also called Femara®. Throughout this information we refer to it by its more commonly used name, Terence Mabry. On this page     Femara   Aromatase inhibitors   How it is taken   When it may be given   Length of treatment   Possible side effects   Things to remember about Femara tablets   References  This information should ideally be read with our general information about breast cancer or secondary breast cancer. Femara Back to top  Femara is a type of hormonal therapy used to treat breast cancer in women who have been the through menopause (change of life). You will see your doctor regularly while you have this treatment so they can monitor its effects. Hormonal therapies interfere with the production or action of particular hormones in the body. Hormones are substances produced naturally in the body. They act as chemical messengers and help control the activity of cells and organs. Aromatase inhibitors Back to top  Many breast cancers rely on the hormone oestrogen to grow. These cancers are known as hormone-sensitive breast cancers. In women who have had their menopause, the main source of oestrogen is through the conversion of androgens (sex hormones produced by the adrenal glands) into oestrogens. This is carried out by an enzyme called aromatase. The conversion process is known as aromatisation and happens mainly in the fatty tissues of the body. Femara is a drug that blocks the process of aromatisation and so reduces the amount of oestrogen in the body. As less oestrogen reaches the cancer cells, they grow more slowly or stop growing altogether. Drugs that work in this way are known as aromatase inhibitors. Other aromatase inhibitors include anastrozole (Arimidex®) and exemestane (Aromasin®).      How it is taken Back to top  Femara is a tablet that is taken once a day, ideally at about the same time each day. It doesn't matter whether this is in the morning or the evening. When it may be given Back to top  Femara is used to treat postmenopausal women with hormone-sensitive breast cancer. Your doctor will take into account a number of different factors when planning your treatment. Early breast cancer   Femara may be given to women with early breast cancer (cancer that hasn't spread) after they have had surgery to remove the cancer. Giving treatment after surgery to reduce the risk of the cancer coming back is known as adjuvant therapy. Hormonal therapy is usually given for five years, but in some situations it may be given for longer. Femara may sometimes be given to women after they have had five years treatment with another hormonal drug called tamoxifen. Sometimes Femara is given before surgery to women with localised early breast cancer, to allow them to have a lumpectomy (removal of the lump) rather than a mastectomy (removal of the breast). Giving treatment before surgery is known as renetta-adjuvant therapy. Advanced breast cancer   Femara may be used to treat women whose breast cancer has spread to other parts of the body (advanced or metastatic breast cancer). It can also be used to treat women whose cancer has come back after treatment with other hormonal therapies. You might find it helpful to see our information about the staging and grading of breast cancer. Length of treatment Back to top  Your doctors will discuss the length of treatment they feel is appropriate for your situation. Femara is often given over a period of years or for as long as it is effective in controlling your cancer, depending on your individual situation. Possible side effects Back to top  Each person's reaction to any medicine is different. Some people have very few side effects while others may experience more.  The side effects described here will not affect everyone and may be different if you are having more than one drug. We have outlined the most common side effects but haven't included those that are rare and unlikely to affect you. If you notice any effects that are not listed here, discuss them with your doctor or nurse. You may have some of the following side effects, to varying degrees:   Hot flushes   These are usually mild and may wear off after a period of time. Some people find it helpful to cut down on tea, coffee, nicotine and alcohol. Research suggests that hormones called progestogens or some types of antidepressants may be helpful in controlling this side effect. Your nurse or doctor can discuss this with you. Some people find complementary therapies, such as acupuncture, helpful. Your GP may be able to give you details about having these on the NHS. You can read more about treatments for menopausal symptoms like hot flushes in our information about breast cancer treatment and menopausal symptoms. Feeling sick (nausea) and being sick (vomiting)   This can usually be treated effectively, so let your doctor know if it occurs. Feelings of sickness can often be relieved by taking your tablet with food or milk, or at night. Tiredness and headaches   It's important to get enough rest. Let your doctor know if you are getting headaches, as medicines can be prescribed to help. You may find our information on fatigue helpful. Muscular aches and joint pain   Some women have pain and stiffness in their joints while taking Femara. Let your doctor know if these effects are a problem. You may find it helpful to take mild painkillers. Hair thinning   Some women notice that their hair thins while taking Femara, but this is usually mild. Vaginal dryness   This may occur while using Femara. Gels that can help to overcome the dryness are available. These can be bought from a  or be prescribed by your doctor.    Risk of osteoporosis   Women who have, or are at risk of, osteoporosis (weakened bones), should have their bones assessed before and during treatment with Femara. Some women may need to take bone-strengthening drugs to help prevent osteoporosis from developing. Always let your doctor or nurse know about any side effects you have. There are usually ways in which they can be controlled or improved. Things to remember about Femara tablets Back to top  Femara may interact with other medicines. Let your doctor know about any medication you are taking, including non-prescribed drugs such as complementary therapies, vitamins and herbal drugs. Keep the tablets in a safe place, out of the reach of children. Keep the tablets in the original packaging and store them at room temperature, away from direct heat and sunlight. Don't worry if you forget to take your tablet. Do not take a double dose. The levels of the drug in your blood will not change very much, but try not to miss more than one or two tablets in a row. If your doctor decides to stop the treatment, return any remaining tablets to the pharmacist. Caridad Peper not flush them down the toilet or throw them away. Remember to get a new prescription a few weeks before you run out of tablets and make sure that you have plenty for holidays. References Back to top  This information is based on our letrozole fact sheet and has been compiled using information from a number of reliable sources, including:   Gabriel Guthrie: The Complete Drug Reference. 36th edition. 2009. [employer]. Apoll[employer]. 59th edition. 2010. [employer] and [address] [facility]). www.medicines. org.uk (accessed September 2010). Early and locally-advanced breast cancer: diagnosis and treatment. 2009. [employer] (NICE).

## 2022-08-13 LAB
25(OH)D3+25(OH)D2 SERPL-MCNC: 22.9 NG/ML (ref 30–100)
ALBUMIN SERPL-MCNC: 4 G/DL (ref 3.8–4.8)
ALBUMIN/GLOB SERPL: 1.8 {RATIO} (ref 1.2–2.2)
ALP SERPL-CCNC: 61 IU/L (ref 44–121)
ALT SERPL-CCNC: 12 IU/L (ref 0–32)
AST SERPL-CCNC: 16 IU/L (ref 0–40)
BASOPHILS # BLD AUTO: 0 X10E3/UL (ref 0–0.2)
BASOPHILS NFR BLD AUTO: 1 %
BILIRUB SERPL-MCNC: 0.3 MG/DL (ref 0–1.2)
BUN SERPL-MCNC: 15 MG/DL (ref 8–27)
BUN/CREAT SERPL: 21 (ref 12–28)
CALCIUM SERPL-MCNC: 9.1 MG/DL (ref 8.7–10.3)
CHLORIDE SERPL-SCNC: 109 MMOL/L (ref 96–106)
CHOLEST SERPL-MCNC: 205 MG/DL (ref 100–199)
CO2 SERPL-SCNC: 24 MMOL/L (ref 20–29)
CREAT SERPL-MCNC: 0.72 MG/DL (ref 0.57–1)
EGFR: 90 ML/MIN/1.73
EOSINOPHIL # BLD AUTO: 0.1 X10E3/UL (ref 0–0.4)
EOSINOPHIL NFR BLD AUTO: 2 %
ERYTHROCYTE [DISTWIDTH] IN BLOOD BY AUTOMATED COUNT: 14.5 % (ref 11.7–15.4)
GLOBULIN SER CALC-MCNC: 2.2 G/DL (ref 1.5–4.5)
GLUCOSE SERPL-MCNC: 97 MG/DL (ref 65–99)
HCT VFR BLD AUTO: 35.7 % (ref 34–46.6)
HDLC SERPL-MCNC: 68 MG/DL
HGB BLD-MCNC: 11 G/DL (ref 11.1–15.9)
IMM GRANULOCYTES # BLD AUTO: 0 X10E3/UL (ref 0–0.1)
IMM GRANULOCYTES NFR BLD AUTO: 0 %
LDLC SERPL CALC-MCNC: 114 MG/DL (ref 0–99)
LYMPHOCYTES # BLD AUTO: 1.9 X10E3/UL (ref 0.7–3.1)
LYMPHOCYTES NFR BLD AUTO: 29 %
MCH RBC QN AUTO: 24.9 PG (ref 26.6–33)
MCHC RBC AUTO-ENTMCNC: 30.8 G/DL (ref 31.5–35.7)
MCV RBC AUTO: 81 FL (ref 79–97)
MONOCYTES # BLD AUTO: 0.5 X10E3/UL (ref 0.1–0.9)
MONOCYTES NFR BLD AUTO: 8 %
NEUTROPHILS # BLD AUTO: 3.9 X10E3/UL (ref 1.4–7)
NEUTROPHILS NFR BLD AUTO: 60 %
PLATELET # BLD AUTO: 227 X10E3/UL (ref 150–450)
POTASSIUM SERPL-SCNC: 4.5 MMOL/L (ref 3.5–5.2)
PROT SERPL-MCNC: 6.2 G/DL (ref 6–8.5)
RBC # BLD AUTO: 4.41 X10E6/UL (ref 3.77–5.28)
SODIUM SERPL-SCNC: 145 MMOL/L (ref 134–144)
TRIGL SERPL-MCNC: 131 MG/DL (ref 0–149)
VLDLC SERPL CALC-MCNC: 23 MG/DL (ref 5–40)
WBC # BLD AUTO: 6.5 X10E3/UL (ref 3.4–10.8)

## 2022-08-14 NOTE — PROGRESS NOTES
Will discuss at 8/15/22 appt. Normal kidney and liver tests. Mildly elevated Na and Cl-, likely due to dehydration. Mild anemia. Mildly low vit. D. Tot chol 205, ; was normal last year. Statin-intolerant.

## 2022-08-15 ENCOUNTER — OFFICE VISIT (OUTPATIENT)
Dept: INTERNAL MEDICINE CLINIC | Age: 72
End: 2022-08-15
Payer: MEDICARE

## 2022-08-15 VITALS
RESPIRATION RATE: 12 BRPM | OXYGEN SATURATION: 96 % | TEMPERATURE: 98.3 F | HEIGHT: 64 IN | WEIGHT: 239 LBS | DIASTOLIC BLOOD PRESSURE: 75 MMHG | HEART RATE: 77 BPM | SYSTOLIC BLOOD PRESSURE: 131 MMHG | BODY MASS INDEX: 40.8 KG/M2

## 2022-08-15 DIAGNOSIS — Z17.0 MALIGNANT NEOPLASM OF RIGHT BREAST IN FEMALE, ESTROGEN RECEPTOR POSITIVE, UNSPECIFIED SITE OF BREAST (HCC): ICD-10-CM

## 2022-08-15 DIAGNOSIS — Z00.00 MEDICARE ANNUAL WELLNESS VISIT, SUBSEQUENT: Primary | ICD-10-CM

## 2022-08-15 DIAGNOSIS — C50.911 MALIGNANT NEOPLASM OF RIGHT BREAST IN FEMALE, ESTROGEN RECEPTOR POSITIVE, UNSPECIFIED SITE OF BREAST (HCC): ICD-10-CM

## 2022-08-15 DIAGNOSIS — F33.1 MODERATE EPISODE OF RECURRENT MAJOR DEPRESSIVE DISORDER (HCC): ICD-10-CM

## 2022-08-15 DIAGNOSIS — E78.2 MIXED HYPERLIPIDEMIA: ICD-10-CM

## 2022-08-15 DIAGNOSIS — I10 PRIMARY HYPERTENSION: ICD-10-CM

## 2022-08-15 PROCEDURE — G8417 CALC BMI ABV UP PARAM F/U: HCPCS | Performed by: INTERNAL MEDICINE

## 2022-08-15 PROCEDURE — 99213 OFFICE O/P EST LOW 20 MIN: CPT | Performed by: INTERNAL MEDICINE

## 2022-08-15 PROCEDURE — 3017F COLORECTAL CA SCREEN DOC REV: CPT | Performed by: INTERNAL MEDICINE

## 2022-08-15 PROCEDURE — 1101F PT FALLS ASSESS-DOCD LE1/YR: CPT | Performed by: INTERNAL MEDICINE

## 2022-08-15 PROCEDURE — G9899 SCRN MAM PERF RSLTS DOC: HCPCS | Performed by: INTERNAL MEDICINE

## 2022-08-15 PROCEDURE — G0439 PPPS, SUBSEQ VISIT: HCPCS | Performed by: INTERNAL MEDICINE

## 2022-08-15 PROCEDURE — 1090F PRES/ABSN URINE INCON ASSESS: CPT | Performed by: INTERNAL MEDICINE

## 2022-08-15 PROCEDURE — G8536 NO DOC ELDER MAL SCRN: HCPCS | Performed by: INTERNAL MEDICINE

## 2022-08-15 PROCEDURE — 1123F ACP DISCUSS/DSCN MKR DOCD: CPT | Performed by: INTERNAL MEDICINE

## 2022-08-15 PROCEDURE — G8754 DIAS BP LESS 90: HCPCS | Performed by: INTERNAL MEDICINE

## 2022-08-15 PROCEDURE — G8427 DOCREV CUR MEDS BY ELIG CLIN: HCPCS | Performed by: INTERNAL MEDICINE

## 2022-08-15 PROCEDURE — G9717 DOC PT DX DEP/BP F/U NT REQ: HCPCS | Performed by: INTERNAL MEDICINE

## 2022-08-15 PROCEDURE — G8752 SYS BP LESS 140: HCPCS | Performed by: INTERNAL MEDICINE

## 2022-08-15 RX ORDER — EZETIMIBE 10 MG/1
10 TABLET ORAL DAILY
Qty: 30 TABLET | Refills: 0 | Status: SHIPPED | OUTPATIENT
Start: 2022-08-15 | End: 2022-09-14

## 2022-08-15 NOTE — ACP (ADVANCE CARE PLANNING)
Advance Care Planning     Advance Care Planning (ACP) Physician/NP/PA Conversation      Date of Conversation: 8/15/2022  Conducted with: Patient with Decision Making Capacity    Healthcare Decision Maker:     Primary Decision Maker: Reji Huddleston - Spouse - [phone]  Click here to complete [address] including selection of the Healthcare Decision Maker Relationship (ie \"Primary\")    Care Preferences:    Hospitalization: \"If your health worsens and it becomes clear that your chance of recovery is unlikely, what would be your preference regarding hospitalization? \"  The patient would prefer hospitalization. Ventilation: \"If you were unable to breathe on your own and your chance of recovery was unlikely, what would be your preference about the use of a ventilator (breathing machine) if it was available to you? \"   The patient would desire the use of a ventilator. Resuscitation: \"In the event your heart stopped as a result of an underlying serious health condition, would you want attempts to be made to restart your heart, or would you prefer a natural death? \"   Yes, attempt to resuscitate.     Additional topics discussed: treatment goals    Conversation Outcomes / Follow-Up Plan:   ACP in process - information provided, considering goals and options  Reviewed DNR/DNI and patient elects Full Code (Attempt Resuscitation)     Length of Voluntary ACP Conversation in minutes:  <16 minutes (Non-Billable)    Sarahi Astorga MD

## 2022-08-15 NOTE — PATIENT INSTRUCTIONS
Medicare Wellness Visit, Female     The best way to live healthy is to have a lifestyle where you eat a well-balanced diet, exercise regularly, limit alcohol use, and quit all forms of tobacco/nicotine, if applicable. Regular preventive services are another way to keep healthy. Preventive services (vaccines, screening tests, monitoring & exams) can help personalize your care plan, which helps you manage your own care. Screening tests can find health problems at the earliest stages, when they are easiest to treat. Robert follows the current, evidence-based guidelines published by the [facility] Mike Maldonado ([facility]) when recommending preventive services for our patients. Because we follow these guidelines, sometimes recommendations change over time as research supports it. (For example, mammograms used to be recommended annually. Even though Medicare will still pay for an annual mammogram, the newer guidelines recommend a mammogram every two years for women of average risk). Of course, you and your doctor may decide to screen more often for some diseases, based on your risk and your co-morbidities (chronic disease you are already diagnosed with). Preventive services for you include:  - Medicare offers their members a free annual wellness visit, which is time for you and your primary care provider to discuss and plan for your preventive service needs. Take advantage of this benefit every year!  -All adults over the age of 72 should receive the recommended pneumonia vaccines. Current USPSTF guidelines recommend a series of two vaccines for the best pneumonia protection.   -All adults should have a flu vaccine yearly and a tetanus vaccine every 10 years.   -All adults age 48 and older should receive the shingles vaccines (series of two vaccines).       -All adults age 38-68 who are overweight should have a diabetes screening test once every three years.   -All adults born between [birth year] and 1963 should be screened once for Hepatitis C.  -Other screening tests and preventive services for persons with diabetes include: an eye exam to screen for diabetic retinopathy, a kidney function test, a foot exam, and stricter control over your cholesterol.   -Cardiovascular screening for adults with routine risk involves an electrocardiogram (ECG) at intervals determined by your doctor.   -Colorectal cancer screenings should be done for adults age 54-65 with no increased risk factors for colorectal cancer. There are a number of acceptable methods of screening for this type of cancer. Each test has its own benefits and drawbacks. Discuss with your doctor what is most appropriate for you during your annual wellness visit. The different tests include: colonoscopy (considered the best screening method), a fecal occult blood test, a fecal DNA test, and sigmoidoscopy.    -A bone mass density test is recommended when a woman turns 65 to screen for osteoporosis. This test is only recommended one time, as a screening. Some providers will use this same test as a disease monitoring tool if you already have osteoporosis. -Breast cancer screenings are recommended every other year for women of normal risk, age 54-69.  -Cervical cancer screenings for women over age 72 are only recommended with certain risk factors.      Here is a list of your current Health Maintenance items (your personalized list of preventive services) with a due date:  Health Maintenance Due   Topic Date Due    COVID-19 Vaccine (3 - Booster for [employer] series) 10/04/2021

## 2022-08-15 NOTE — PROGRESS NOTES
This is the Subsequent Medicare Annual Wellness Exam, performed 12 months or more after the Initial AWV or the last Subsequent AWV    I have reviewed the patient's medical history in detail and updated the computerized patient record. Assessment/Plan   Education and counseling provided:  Are appropriate based on today's review and evaluation    1. Medicare annual wellness visit, subsequent     Depression Risk Factor Screening     3 most recent PHQ Screens 8/15/2022   PHQ Not Done -   Little interest or pleasure in doing things Not at all   Feeling down, depressed, irritable, or hopeless Several days   Total Score PHQ 2 1       Alcohol & Drug Abuse Risk Screen    Do you average more than 1 drink per night or more than 7 drinks a week:  No    On any one occasion in the past three months have you have had more than 3 drinks containing alcohol:  No          Functional Ability and Level of Safety    Hearing: Hearing is good. Activities of Daily Living: The home contains: no safety equipment. Patient does total self care      Ambulation: with no difficulty     Fall Risk:  Fall Risk Assessment, last 12 mths 8/15/2022   Able to walk? Yes   Fall in past 12 months? 0   Do you feel unsteady? 1   Are you worried about falling 1   Is TUG test greater than 12 seconds? -   Is the gait abnormal? -   Number of falls in past 12 months -   Fall with injury?  -      Abuse Screen:  Patient is not abused       Cognitive Screening    Has your family/caregiver stated any concerns about your memory: no     Cognitive Screening: Normal - recall of 3/3 objects after 5 mins    Health Maintenance Due     Health Maintenance Due   Topic Date Due    COVID-19 Vaccine (3 - Booster for Wagner Peter series) 10/04/2021       Patient Care Team   Patient Care Team:  Zackary Callejas MD as PCP - General (Internal Medicine Physician)  Zackary Callejas MD as PCP - [facility] Empaneled Provider  Avel Palacios MD (Cardiovascular Disease Physician)  Avel Bauman, Wendy Donald MD (Cardiovascular Disease Physician)  Pk Webb NP (Nurse Practitioner)  Phil Guillory MD (Orthopedic Surgery)  Addison Alcantar MD (Surgery Physician)  Rickey Delaney DO (Hematology and Oncology)    History     Patient Active Problem List   Diagnosis Code    HTN (hypertension) I10    History of gastric bypass Z98.84    Moderate episode of recurrent major depressive disorder ([facility] [address] F33.1    CAD (coronary artery disease) U96.78    Systolic CHF, chronic ([address] I50.22    Hyperlipidemia E78.5    Mitral valve regurgitation I34.0    History of MI (myocardial infarction) I25.2    Cardiomyopathy ([address] I42.9    Osteoporosis M81.0    Morbid obesity with BMI of 40.0-44.9, adult ([address] E66.01, Z68.41    Chronic insomnia F51.04    S/P ICD (internal cardiac defibrillator) procedure Z95.810    Primary osteoarthritis of both knees M17.0    Mild intermittent asthma without complication M43.24    Generalized anxiety disorder F41.1    Malignant neoplasm of right breast in female, estrogen receptor positive ([facility] [address] C50.911, Z17.0     Past Medical History:   Diagnosis Date    Acute right ankle pain 6/8/2018 5/29/18: X-ray showed possible right ankle sprain. 6/6/18: saw Dr. Leticia Mendoza ([facility]), diagnosed with peroneal tendonitis. Prescribed an ASO    Asthma     Cardiomyopathy ([address]     Coronary artery disease 2008    s/p RCA stent ([facility]) on 11/26/11    Depression with anxiety     2011    DVT (deep venous thrombosis) ([address] 7/27/2010    Family history of early CAD     GERD (gastroesophageal reflux disease)     H/O gastric bypass 2006    Revision in 2009    Hepatitis C antibody test positive     Does not have chronic hep C (labs 10/6/15: neg HCV RNA)     HTN (hypertension) 7/27/2010    Hyperlipidemia 07/27/2010    Incontinence of feces 9/3/2018    Dr. Sher Patterson. 8/20/18: Improving with pelvic physical therapy and psyllium husk treatment.  Saw Dr. Lizzette Ivory who suggested PT first. MR defecography showed pelvic floor weakness with pelvic descensus, small anterior  Rectocele, and vaginal prolapse. To have pelvic PT weekly for 8 wks and to see Dr. Sarah Ring again in Oct 2018. Joint pain 7/27/2010    MI (myocardial infarction) ([facility] [address] 7/27/2010    Mitral valve regurgitation     Mild to moderate    Morbid obesity ([facility] [address] 7/27/2010    Myocardial infarction [facility]) 2006 and 2011    Dr. Molina Simpler disorder     PUD (peptic ulcer disease)     gi bleed 2008; ulcer n gastric bypass pouch    Urinary incontinence, stress 7/27/2010      Past Surgical History:   Procedure Laterality Date    COLONOSCOPY N/A 6/29/2018    COLONOSCOPY performed by Marisa Carter MD at hospitals ENDOSCOPY; redundant colon, int hemorrhoids, pathology: normal colonic mucosa    HX BREAST BIOPSY Left     benign    HX BREAST LUMPECTOMY Right 7/14/2022    RIGHT BREAST LUMPECTOMY WITH ULTRASOUND performed by Calvin Lares MD at [place] [address]    HX COLONOSCOPY  9/5/14    Dr. Rosamaria Howell; normal, repeat in 5 yrs    HX GASTRIC BYPASS      HX IMPLANTABLE CARDIOVERTER DEFIBRILLATOR  08/24/2016    HX LAP GASTRIC BYPASS  2006    revision 2009/Dr. Jacqueline Correia    HX OTHER SURGICAL  09/19/2016    Removal of right ventricular ICD lead & single chamber transvenous AICD    HX OTHER SURGICAL  10/12/2016    pocket revison of ICD; Dr. Mayela Prieot  06/07/2018    Dr Pete West; high resolution anorectal manaometry-abnormal study. Study done for eval of fecal incontinence    HX OTHER SURGICAL  12/14/2018    Dr. Pete West. Rectal endoscopic US (EUS) for rectal incontinence. Normal rectal mucosa, no fistulas.     HX PACEMAKER      sicd/left side of chest under arm    INS PPM/ICD LED SING ONLY  8/24/2016         INS PPM/ICD LED SING ONLY  8/26/2016         INS PPM/ICD LED SING ONLY  9/21/2016         [place] CARDIAC SURG PROCEDURE UNLIST      Stent x 5-6,Most recent 2011    OH LAP,STOMACH,OTHER,W/O TUBE  04/05/2010    Revision GBP     Current Outpatient Medications   Medication Sig Dispense Refill    letrozole (FEMARA) 2.5 mg tablet Take 1 Tablet by mouth in the morning. Indications: hormone receptor positive postmenopausal early breast cancer 90 Tablet 3    isosorbide dinitrate (ISORDIL) 30 mg tablet Take 20 mg by mouth daily. IBUPROFEN PO Take  by mouth as needed. aspirin delayed-release 81 mg tablet Take 81 mg by mouth as needed for Pain.      zolpidem (AMBIEN) 10 mg tablet TAKE 1 TABLET BY MOUTH NIGHTLY AS NEEDED FOR SLEEP. MAX DAILY AMOUNT: 10 MG. 30 Tablet 1    dilTIAZem ER (CARDIZEM CD) 180 mg capsule Take 180 mg by mouth daily. bumetanide (BUMEX) 2 mg tablet TAKE 1 TABLET BY MOUTH TWICE A DAY 60 Tablet 11    albuterol (PROVENTIL HFA, VENTOLIN HFA, PROAIR HFA) 90 mcg/actuation inhaler TAKE 2 PUFFS BY INHALATION EVERY FOUR HOURS AS NEEDED FOR WHEEZING OR SHORTNESS OF BREATH. 18 Each 11    FLUoxetine (PROzac) 40 mg capsule Take 1 Capsule by mouth two (2) times a day. 180 Capsule 3    Entresto  mg tablet TAKE 1 TABLET BY MOUTH TWICE A DAY 60 Tablet 5    potassium chloride (Klor-Con M20) 20 mEq tablet TAKE TWO TABLETS BY MOUTH DAILY 180 Tablet 3    psyllium (METAMUCIL) powd Take  by mouth. 2 tsp daily      cholecalciferol, VITAMIN D3, (VITAMIN D3) 5,000 unit tab tablet Take 10,000 Units by mouth daily. biotin 10,000 mcg cap Take  by mouth daily. OTHER Complete multi formula, bariatric advantage      magnesium 250 mg tab Take 1 Tab by mouth daily. ferrous sulfate 325 mg (65 mg iron) tablet Take  by mouth daily (before breakfast). CALCIUM PO Take 600 mg by mouth daily.        Allergies   Allergen Reactions    Amoxicillin Anaphylaxis    Amoxicillin Anaphylaxis    Lipitor [Atorvastatin] Other (comments)     Severe muscle pain and spasms    Zocor [Simvastatin] Other (comments)     Cramps, muscle spasms    Buspar [Buspirone] Other (comments)     Drunk sensation, headaches    Hydroxyzine Other (comments)     Blurred vision, lightheadedness    Livalo [Pitavastatin] Myalgia    Pravastatin Other (comments)     Leg cramps    Tramadol Vertigo       Family History   Problem Relation Age of Onset    Dementia Mother     Cancer Mother         Colon    Alzheimer's Disease Mother     Cancer Father         Testical and stomach cancer    Heart Disease Father         Triple by pass    Hypertension Father         High blood pressure    Heart Disease Sister         Aortic replacement    Stroke Sister         Mini strokes    Stroke Sister     Heart Attack Brother     Anesth Problems Neg Hx      Social History     Tobacco Use    Smoking status: Former     Packs/day: 0.00     Years: 30.00     Pack years: 0.00     Types: Cigarettes     Start date: 1970     Quit date: 2004     Years since quittin.2    Smokeless tobacco: Never   Substance Use Topics    Alcohol use: No     Alcohol/week: 0.0 standard drinks         Juan Diego Oreilly MD

## 2022-08-15 NOTE — PROGRESS NOTES
CC:   Chief Complaint   Patient presents with    Annual Wellness Visit    Hypertension    Cholesterol Problem       HISTORY OF PRESENT ILLNESS  Luda Friday is a 81 y.o. female. Presents for Medicare AWV and 2 month follow up evaluation. She has HTN, R breast cancer s/p lumpectomy on 7/14/22, CAD s/p 2 MI's in 2006 and 2011, valvular heart disease, CHF (EF 25-30% in 9/20), ICD in place with hx lead revisions, depression with anxiety, obesity s/p gastric bypass in 2006. No complaints. Reports mood has been good. Less anxiety since having lumpectomy. Saw Dr. Roberto Mohr; will start adjuvant therapy with letrozole this week. Unchanged mild chronic SOB. Mild leg swelling. Denies CP, dizziness, heart palpitations, orthopnea, and PND. Home BP monitoring: not done. Appointment with Cardiology on 9/7/22. ROS  A complete review of systems was performed and is negative except for those mentioned in the HPI. Patient Active Problem List   Diagnosis Code    HTN (hypertension) I10    History of gastric bypass Z98.84    Moderate episode of recurrent major depressive disorder (HCC) F33.1    CAD (coronary artery disease) D89.12    Systolic CHF, chronic (HCC) I50.22    Hyperlipidemia E78.5    Mitral valve regurgitation I34.0    History of MI (myocardial infarction) I25.2    Cardiomyopathy ([address] I42.9    Osteoporosis M81.0    Morbid obesity with BMI of 40.0-44.9, adult ([address] E66.01, Z68.41    Chronic insomnia F51.04    S/P ICD (internal cardiac defibrillator) procedure Z95.810    Primary osteoarthritis of both knees M17.0    Mild intermittent asthma without complication Z94.49    Generalized anxiety disorder F41.1    Malignant neoplasm of right breast in female, estrogen receptor positive ([facility] C50.911, Z17.0     Past Medical History:   Diagnosis Date    Acute right ankle pain 6/8/2018 5/29/18: X-ray showed possible right ankle sprain.  6/6/18: saw Dr. Bob Scott ([facility]), diagnosed with peroneal tendonitis. Prescribed an ASO    Asthma     Cardiomyopathy ([facility]     Coronary artery disease 2008    s/p RCA stent (AMRIT) on 11/26/11    Depression with anxiety     2011    DVT (deep venous thrombosis) ([facility] 7/27/2010    Family history of early CAD     GERD (gastroesophageal reflux disease)     H/O gastric bypass 2006    Revision in 2009    Hepatitis C antibody test positive     Does not have chronic hep C (labs 10/6/15: neg HCV RNA)     HTN (hypertension) 7/27/2010    Hyperlipidemia 07/27/2010    Incontinence of feces 9/3/2018    Dr. Elana Jimenes. 8/20/18: Improving with pelvic physical therapy and psyllium husk treatment. Saw Dr. Ashley Sewell who suggested PT first. MR defecography showed pelvic floor weakness with pelvic descensus, small anterior  Rectocele, and vaginal prolapse. To have pelvic PT weekly for 8 wks and to see Dr. Ashley Sewell again in Oct 2018. Joint pain 7/27/2010    MI (myocardial infarction) ([facility] 7/27/2010    Mitral valve regurgitation     Mild to moderate    Morbid obesity ([facility] 7/27/2010    Myocardial infarction [facility]) 2006 and 2011    Dr. Kristine Richardson    Psychiatric disorder     PUD (peptic ulcer disease)     gi bleed 2008; ulcer n gastric bypass pouch    Urinary incontinence, stress 7/27/2010     Allergies   Allergen Reactions    Amoxicillin Anaphylaxis    Amoxicillin Anaphylaxis    Lipitor [Atorvastatin] Other (comments)     Severe muscle pain and spasms    Zocor [Simvastatin] Other (comments)     Cramps, muscle spasms    Buspar [Buspirone] Other (comments)     Drunk sensation, headaches    Hydroxyzine Other (comments)     Blurred vision, lightheadedness    Livalo [Pitavastatin] Myalgia    Pravastatin Other (comments)     Leg cramps    Tramadol Vertigo       Current Outpatient Medications   Medication Sig Dispense Refill    letrozole (FEMARA) 2.5 mg tablet Take 1 Tablet by mouth in the morning.  Indications: hormone receptor positive postmenopausal early breast cancer 90 Tablet 3    isosorbide dinitrate (ISORDIL) 30 mg tablet Take 20 mg by mouth daily. IBUPROFEN PO Take  by mouth as needed. aspirin delayed-release 81 mg tablet Take 81 mg by mouth as needed for Pain.      zolpidem (AMBIEN) 10 mg tablet TAKE 1 TABLET BY MOUTH NIGHTLY AS NEEDED FOR SLEEP. MAX DAILY AMOUNT: 10 MG. 30 Tablet 1    dilTIAZem ER (CARDIZEM CD) 180 mg capsule Take 180 mg by mouth daily. bumetanide (BUMEX) 2 mg tablet TAKE 1 TABLET BY MOUTH TWICE A DAY 60 Tablet 11    albuterol (PROVENTIL HFA, VENTOLIN HFA, PROAIR HFA) 90 mcg/actuation inhaler TAKE 2 PUFFS BY INHALATION EVERY FOUR HOURS AS NEEDED FOR WHEEZING OR SHORTNESS OF BREATH. 18 Each 11    FLUoxetine (PROzac) 40 mg capsule Take 1 Capsule by mouth two (2) times a day. 180 Capsule 3    Entresto  mg tablet TAKE 1 TABLET BY MOUTH TWICE A DAY 60 Tablet 5    potassium chloride (Klor-Con M20) 20 mEq tablet TAKE TWO TABLETS BY MOUTH DAILY 180 Tablet 3    psyllium (METAMUCIL) powd Take  by mouth. 2 tsp daily      cholecalciferol, VITAMIN D3, (VITAMIN D3) 5,000 unit tab tablet Take 10,000 Units by mouth daily. biotin 10,000 mcg cap Take  by mouth daily. OTHER Complete multi formula, bariatric advantage      magnesium 250 mg tab Take 1 Tab by mouth daily. ferrous sulfate 325 mg (65 mg iron) tablet Take  by mouth daily (before breakfast). CALCIUM PO Take 600 mg by mouth daily. PHYSICAL EXAM  Visit Vitals  /75 (BP 1 Location: Left upper arm, BP Patient Position: Sitting)   Pulse 77   Temp 98.3 °F (36.8 °C) (Oral)   Resp 12   Ht 5' 4\" (1.626 m)   Wt 239 lb (108.4 kg)   SpO2 96%   BMI 41.02 kg/m²       General: Obese, no distress. HEENT:  Head normocephalic/atraumatic, no scleral icterus  Lungs:  Clear to ausculation bilaterally. Good air movement. Heart:  Regular rate and rhythm, normal S1 and S2, no murmur, gallop, or rub  Extremities: No clubbing, cyanosis, or edema. Normal ROM with mild crepitus at both knees.    Neurological: Alert and oriented. Psychiatric: Normal mood and affect. Behavior is normal.    Labs drawn 8/12/22:  Results for orders placed or performed during the hospital encounter of 07/08/22   CBC WITH AUTOMATED DIFF   Result Value Ref Range    WBC 8.1 3.6 - 11.0 K/uL    RBC 4.54 3.80 - 5.20 M/uL    HGB 11.7 11.5 - 16.0 g/dL    HCT 38.0 35.0 - 47.0 %    MCV 83.7 80.0 - 99.0 FL    MCH 25.8 (L) 26.0 - 34.0 PG    MCHC 30.8 30.0 - 36.5 g/dL    RDW 14.6 (H) 11.5 - 14.5 %    PLATELET 903 078 - 596 K/uL    NRBC 0.0 0  WBC    ABSOLUTE NRBC 0.00 0.00 - 0.01 K/uL    NEUTROPHILS 68 32 - 75 %    LYMPHOCYTES 21 12 - 49 %    MONOCYTES 8 5 - 13 %    EOSINOPHILS 1 0 - 7 %    BASOPHILS 1 0 - 1 %    IMMATURE GRANULOCYTES 1 (H) 0.0 - 0.5 %    ABS. NEUTROPHILS 5.5 1.8 - 8.0 K/UL    ABS. LYMPHOCYTES 1.7 0.8 - 3.5 K/UL    ABS. MONOCYTES 0.6 0.0 - 1.0 K/UL    ABS. EOSINOPHILS 0.1 0.0 - 0.4 K/UL    ABS. BASOPHILS 0.1 0.0 - 0.1 K/UL    ABS. IMM. GRANS. 0.1 (H) 0.00 - 0.04 K/UL    DF SMEAR SCANNED      RBC COMMENTS NORMOCYTIC, NORMOCHROMIC     METABOLIC PANEL, COMPREHENSIVE   Result Value Ref Range    Sodium 140 136 - 145 mmol/L    Potassium 4.2 3.5 - 5.1 mmol/L    Chloride 109 (H) 97 - 108 mmol/L    CO2 25 21 - 32 mmol/L    Anion gap 6 5 - 15 mmol/L    Glucose 93 65 - 100 mg/dL    BUN 13 6 - 20 MG/DL    Creatinine 0.75 0.55 - 1.02 MG/DL    BUN/Creatinine ratio 17 12 - 20      GFR est AA >60 >60 ml/min/1.73m2    GFR est non-AA >60 >60 ml/min/1.73m2    Calcium 8.6 8.5 - 10.1 MG/DL    Bilirubin, total 0.4 0.2 - 1.0 MG/DL    ALT (SGPT) 14 12 - 78 U/L    AST (SGOT) 15 15 - 37 U/L    Alk. phosphatase 60 45 - 117 U/L    Protein, total 6.0 (L) 6.4 - 8.2 g/dL    Albumin 3.3 (L) 3.5 - 5.0 g/dL    Globulin 2.7 2.0 - 4.0 g/dL    A-G Ratio 1.2 1.1 - 2.2           ASSESSMENT AND PLAN    ICD-10-CM ICD-9-CM    1. Medicare annual wellness visit, subsequent  Z00.00 V70.0       2. Primary hypertension  I10 401.9       3.  Mixed hyperlipidemia  E78.2 272.2 ezetimibe (ZETIA) 10 mg tablet      LIPID PANEL      LIPID PANEL      4. Moderate episode of recurrent major depressive disorder (HCC)  F33.1 296.32       5. Malignant neoplasm of right breast in female, estrogen receptor positive, unspecified site of breast ([facility]  C50.911 174.9     Z17.0 V86.0         Discussed lab results from 8/12/22. Normal kidney and liver tests. Mildly elevated Na and Cl-, likely due to dehydration. Mild anemia. Mildly low vit. D. Tot chol 205, ; was normal last year. Statin-intolerant. Diagnoses and all orders for this visit:    1. Medicare annual wellness visit, subsequent    2. Primary hypertension  Controlled. Continue Entresto and diltiazem. 3. Mixed hyperlipidemia  Not controlled. Unable to tolerate simvastatin and pravastatin due to myalgias. She is agreeable to starting Zetia. Start Zetia in 2 weeks since she is starting letrozole this week. -     Start ezetimibe (ZETIA) 10 mg tablet; Take 1 Tablet by mouth in the morning.  -     LIPID PANEL; Future    4. Moderate episode of recurrent major depressive disorder (HCC)  Depression and anxiety both controlled on Prozac 40 mg daily. 5. Malignant neoplasm of right breast in female, estrogen receptor positive, unspecified site of breast ([facility]  S/p lumpectomy. To start letrozole today. Follow-up and Dispositions    Return in about 4 months (around 12/15/2022), or if symptoms worsen or fail to improve, for Chol, dep/anx; have fasting lab 1 week before appointment. I have discussed the diagnosis with the patient and the intended plan as seen in the above orders. Patient is in agreement. The patient has received an after-visit summary and questions were answered concerning future plans. I have discussed medication side effects and warnings with the patient as well.

## 2022-08-15 NOTE — LETTER
12/9/2022 1:04 PM    Ms. Jose Miranda [id number]    Results for orders placed or performed in visit on 08/15/22   LIPID PANEL   Result Value Ref Range    Cholesterol, total 220 (H) 100 - 199 mg/dL    Triglyceride 102 0 - 149 mg/dL    HDL Cholesterol 72 >39 mg/dL    VLDL, calculated 18 5 - 40 mg/dL    LDL, calculated 130 (H) 0 - 99 mg/dL     RECOMMENDATIONS  Your cholesterol was high on 11/28/22 with total cholesterol 220 (should be less than 200) and  (should be less than 70). What cholesterol-lowering medication are you taking? We will discuss this more at your next appointment on 12/20/22.           Sincerely,      Joseph Lofton MD

## 2022-08-15 NOTE — PROGRESS NOTES
Raquel Horowitz  Identified pt with two pt identifiers(name and ). Chief Complaint   Patient presents with    Annual Wellness Visit    Hypertension    Cholesterol Problem       Reviewed record In preparation for visit and have obtained necessary documentation. 1. Have you been to the ER, urgent care clinic or hospitalized since your last visit? No     2. Have you seen or consulted any other health care providers outside of the [address] [place] [address] since your last visit? Include any pap smears or colon screening. No    Vitals reviewed with provider.     Health Maintenance reviewed:     Health Maintenance Due   Topic    COVID-19 Vaccine (3 - Booster for Pfizer series)    Medicare Yearly Exam           Wt Readings from Last 3 Encounters:   08/15/22 239 lb (108.4 kg)   22 233 lb 11.2 oz (106 kg)   22 231 lb (104.8 kg)        Temp Readings from Last 3 Encounters:   08/15/22 98.3 °F (36.8 °C) (Oral)   22 96.9 °F (36.1 °C) (Temporal)   22 97.8 °F (36.6 °C)        BP Readings from Last 3 Encounters:   08/15/22 131/75   22 121/79   22 111/60        Pulse Readings from Last 3 Encounters:   08/15/22 77   22 94   22 85        Vitals:    08/15/22 1047   BP: 131/75   Pulse: 77   Resp: 12   Temp: 98.3 °F (36.8 °C)   TempSrc: Oral   SpO2: 96%   Weight: 239 lb (108.4 kg)   Height: 5' 4\" (1.626 m)   PainSc:   0 - No pain          Learning Assessment:   :       Learning Assessment 10/26/2017 2015   PRIMARY LEARNER Patient Patient   HIGHEST LEVEL OF EDUCATION - PRIMARY LEARNER  - 2 YEARS OF COLLEGE   BARRIERS PRIMARY LEARNER - NONE   CO-LEARNER CAREGIVER - No   PRIMARY LANGUAGE ENGLISH ENGLISH   LEARNER PREFERENCE PRIMARY READING DEMONSTRATION   ANSWERED BY patient pateint   RELATIONSHIP SELF SELF        Depression Screening:   :       3 most recent PHQ Screens 8/15/2022   PHQ Not Done -   Little interest or pleasure in doing things Not at all   Feeling down, depressed, irritable, or hopeless Several days   Total Score PHQ 2 1        Fall Risk Assessment:   :       Fall Risk Assessment, last 12 mths 8/15/2022   Able to walk? Yes   Fall in past 12 months? 0   Do you feel unsteady? 1   Are you worried about falling 1   Is TUG test greater than 12 seconds? -   Is the gait abnormal? -   Number of falls in past 12 months -   Fall with injury? -        Abuse Screening:   :       Abuse Screening Questionnaire 8/15/2022 7/21/2021 7/13/2020 5/29/2018 7/26/2016 6/30/2015   Do you ever feel afraid of your partner? N N N N N N   Are you in a relationship with someone who physically or mentally threatens you? N N N N N N   Is it safe for you to go home?  Y Y Y Y Y Y        ADL Screening:   :       ADL Assessment 8/15/2022   Feeding yourself No Help Needed   Getting from bed to chair No Help Needed   Getting dressed No Help Needed   Bathing or showering No Help Needed   Walk across the room (includes cane/walker) No Help Needed   Using the telphone No Help Needed   Taking your medications No Help Needed   Preparing meals No Help Needed   Managing money (expenses/bills) No Help Needed   Moderately strenuous housework (laundry) No Help Needed   Shopping for personal items (toiletries/medicines) No Help Needed   Shopping for groceries No Help Needed   Driving No Help Needed   Climbing a flight of stairs No Help Needed   Getting to places beyond walking distances No Help Needed

## 2022-09-06 DIAGNOSIS — F51.04 CHRONIC INSOMNIA: ICD-10-CM

## 2022-09-06 RX ORDER — ZOLPIDEM TARTRATE 10 MG/1
10 TABLET ORAL
Qty: 30 TABLET | Refills: 1 | Status: SHIPPED | OUTPATIENT
Start: 2022-09-06 | End: 2022-11-01

## 2022-09-07 ENCOUNTER — OFFICE VISIT (OUTPATIENT)
Dept: CARDIOLOGY CLINIC | Age: 72
End: 2022-09-07
Payer: MEDICARE

## 2022-09-07 DIAGNOSIS — Z95.810 AUTOMATIC IMPLANTABLE CARDIAC DEFIBRILLATOR IN SITU: Primary | ICD-10-CM

## 2022-09-07 PROCEDURE — 93295 DEV INTERROG REMOTE 1/2/MLT: CPT | Performed by: INTERNAL MEDICINE

## 2022-09-07 RX ORDER — ISOSORBIDE MONONITRATE 30 MG/1
TABLET, EXTENDED RELEASE ORAL
Qty: 90 TABLET | Refills: 1 | Status: SHIPPED | OUTPATIENT
Start: 2022-09-07

## 2022-09-07 NOTE — LETTER
9/21/2022 3:10 PM    Ms. Jose Miranda [id number]    Dear Ms. Beryl Kamara,    We have received your recent remote monitor check of your implanted device on 9/7/22. Your remaining estimated battery life is 90% and your device is working normally & appropriately. Your next remote monitor check is scheduled for 3/15/22. This is NOT an in-clinic appointment. This transmission is sent from your home monitor. Please make sure your home monitor is plugged into power and within 10 feet of where you sleep. If you are using the phone applications, please make sure it is open on your smart phone. If you have difficulty sending a transmission, please do NOT call our office. Instead, call tech support for your device as they are better able to assist.    [facility])  [phone]    Your next clinic/office check is scheduled for Thursday, 12/15/22 at 8:40 am.  You will have your device checked then see the provider. Please bring a complete list of your medications with strengths and dosages to this appointment. If you have any questions, please call the Pacemaker/ICD clinic that follows you. We appreciate you staying remotely connected!     Sincerely,    José Miguel Gage RN, BSN  Device Coordinator RN  [facility]  [phone]  YT MARVIN  [phone]

## 2022-09-08 DIAGNOSIS — I50.22 SYSTOLIC CHF, CHRONIC (HCC): ICD-10-CM

## 2022-09-08 RX ORDER — POTASSIUM CHLORIDE 20 MEQ/1
TABLET, EXTENDED RELEASE ORAL
Qty: 180 TABLET | Refills: 3 | Status: SHIPPED | OUTPATIENT
Start: 2022-09-08

## 2022-09-21 NOTE — PROGRESS NOTES
Chargeable subq icd remote    Normal function with no tachy detections or therapies delivered. See scanned report in  for details.

## 2022-09-23 ENCOUNTER — DOCUMENTATION ONLY (OUTPATIENT)
Dept: CARDIOLOGY | Age: 72
End: 2022-09-23

## 2022-09-23 NOTE — PROGRESS NOTES
PCP: Dr. Kwadwo Soto  HPI: Zohra Romero 1950 is a 73 yrs old female who presents for 6 week follow up. Last seen 2 Months ago. She c/o feeling exhausted, heart skipping beats frequently and sometimes pounding of her heart in her throat and fluttering. She has a significant amount of dizziness and shortness of breath. She has been feeling this way for the past 3 weeks, and getting worse since 3 weeks ago. She admits to feeling a heaviness in her chest that is worsening, persistent and exacerbated by exertion. Denies leg edema and has lost some weight. Had a biopsy done in right breast, patient has a malignant mass that measures 1cm. 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 Often times getting flushed and feeling hot flashes. No more headaches. No swelling. EF 35% on last echo        Historical    Shortness of breath has greatly improved switching off metoprolol to diltiazem. Minimal dyspnea. Feels like her blood pressure is still elevated. headaches when over 140s    rare palps, no high risk features     Notes dizziness on position change. She is on appropriate medical therapy for CAD and CHF. Maximized as she can tolerate. Her symptoms of SOB, chest heaviness, fatigue, palpitations and dizziness have worsened in the last 3 weeks. She is not volume overloaded. D/w her the need to proceed with cardiac cath. She admits she knows she needs one as well. Reviewed risks and benefits as well as the results of her last cath about 1 year ago which showed significant CAD without targets for intervention, she is agreeable to proceed scheduled for [facility] next week       Assessment/Plan:  1.  Chronic systolic heart failure - LVEF 35% by last TTE, s/p AICD placement with Dr. Judah Lopez and subsequent lead revisions and repeat procedures due to non-healing midsternal incision  -s-ICD placed 9/21 Dr. Judah Lopez, continue fu- Dec  -continue Entresto 97/103 (prev not tolerated due to dizziness)     one tablet BID and Diltiazem, had hair loss on Coreg and Bystolic   -off spironolactone due to hypotension, continue Bumex 2mg BID, using Metolazone PRN rarely  2. CAD - hx of MI x 2 and multiple stents(6-7)  - MI in 11/2011 and received PCI to RCA at that time, previous MI in 2006    -Cath 8/21  RI with occluded distal LCx, distal LAD->RI collaterals. Will need repeat cath. Will schedule    3. Mitral regurgitation - moderate, following  4. Asthma - x 15 - 16 years, no recent pulm fuv  5. HTN - well controlled on current medications   6. Primary Hyperlipidemia - statin failures, simvastatin caused muscle spasms and cramps, atorvastatin caused pain shooting all over her body, pravastatin 40mg and 20mg caused myalgias and leg cramps, had myalgias on Livalo, could not tolerate zetia either   -not able to take any statin at any dose  -now on praluent, LDL <50, doing great! 7. DM Type 2 - resolved with gastric bypass  8. Hypokalemia -off spironolactone and on KCL 40meq daily   9. Hypomagnesemia - on OTC magnesium       10. Morbid obesity - Body mass index is >40, weighed 416 lbs at her heaviest weight, s/p gastric bypass in 2007 with revision a few years later, weight down to 239 lbs at lowest.  today weight is 230  11. PUD - had EGD and cauterized bleeding ulcer remote  12. Vitamin D deficiency - on 10k u daily    13. Anxiety - plans to see psychiatry for management   14.  PAD/Carotid stenosis - 10-49% bilateral ICA stenosis, continue ASA and repatha       Echo 2/21 EF 35% mild TR mod MR  Cath 8/21  RI with occluded distal LCx, distal LAD->RI collaterals  Echo 8/21 EF 25-30% ghk, lae mild AS mod MR  Echo 9/20 - LVEF 25-30%, mod MR  Echo 10/18 - LVEF 35%, mild to mod MR   Echo 5/18 - LVEF 25 %-30 %, akinesis of the basal-mid inferoseptal and basal-mid inferolateral wall(s), severe hypokinesis of the entire inferior wall(s), grade 1 dd, mild to mod dilated LA, mild to mod MR  Carotid duplex 5/18 - 10-49% bilateral ICA stenosis  Echo: 4/17, EF 35-40%, inf HK gr1dd  Nuclear: 4/17, EF 36%, anterior ischemia, lateral infarct. TTE 9/16 - LVEF 35%, moderate hypokinesis of the basal-mid inferolateral wall(s), grd 1 dd, dilated LA, mod MR, mild TR  8/26/16 - RV lead revision by Dr. Kelsy Guthrie  8/24/16 - single chamber AICD placed by Dr. Kelsy Guthrie ([address], serial # [id number], model # [id number].  The ventricular lead: model # [id number] , serial # [id number])  MUGA 6/16 - LVEF 27%  Holter 6/16 - no arrhythmias  Echo 5/16 - LV mildly dilated, LVEF 40%, moderate diffuse hypokinesis with regional variations, severe hypokinesis of the basal-mid inferior wall(s), grd 1 dd, mod dilated LA, atrial septum bows from left to right, consistent with increased left atrial pressure, mildly dilated RA, mild to near moderate MR    OLIVER 5/16 - normal  Nuc 5/15 EF 31% large anterolateral infarct with periinfarct reversibility, 13% LV reversible(32% infarct at rest, 45% at stress)    Echo 7/2015 - LVEF 30-35%, grd 1 dd, mod LAE, mild to mod MR  Nuc Stress 7/13 - small reversibility in anteroapical wall, LVEF 31%  Cardiac Cath 3/13 - patent stents in LAD, LCx and RCA, LVEF 30%, severe inferior HK, LCx relatively small vessel with patent stent in proximal LCx, RCA is large and dominant with patent stents in proximal and mid RCA, distal RCA free of disease, LAD normal and large vessel which coursed around apex  Echo 11/2011 - LVEF 30%, mild MR  Cardiac Cath 11/2011 - s/p PCI with AMRIT to 100% mid RCA, also had 40% mid LAD lesion, 50% diagonal 1 lesion, 100% distal LCx lesion, 60% OM1 lesion, 100% proximal Ramus lesion      Soc Hx: quit tobacco use x 13 years ago, used to smoke 1ppd, no etoh use, no drug use, lives with , son, daughter in law, grandson, retired/on disability  Fam Hx: father in his 62s when he had a MI, had 3 vessel CABG in his 62s, passed from fall but had cancer too, mother lived to age 80 and  from Alzheimer's, brother had MI in his 62s, sister had aortic repair for aneurysm in her 76s        Complete review of systems performed, pertinents noted above, all other systems are negative. Objective  Objective note  HEENT - normal  Neck - supple, normal JVP  Lungs - clear lungs. No rales, rhonchi or wheezing  Heart - regular rhythm, normal S1 S2, 2/6 MYKE, distant heart sounds  Abd - soft, nontender, no HSM, no abd bruits  Extremities - no clubbing or cyanosis.  trace lower extremity edema  Vascular - no carotid bruits, normal upstroke  radial pulses nl, L=R  pedal pulses 1+, L=R  Neuro - alert, oriented, speech normal. non focal.       Documents attached to Encounter

## 2022-09-24 ENCOUNTER — DOCUMENTATION ONLY (OUTPATIENT)
Dept: CARDIOLOGY CLINIC | Age: 72
End: 2022-09-24

## 2022-09-24 NOTE — PROGRESS NOTES
? AFIB on S ICD tracing  Not yet on [address]  She saw Dr Nemesio Carmen 9/23/22 and had palpitation sx known moderate MR  Last ECG in July    Had a biopsy done in right breast, patient has a malignant mass that measures 1cm.   She has started hormone therapy for receptor + BrCA    Will ask if she can come by for ECG, otherwise holter   She will need NOAC if confirmed having AFIB

## 2022-09-26 ENCOUNTER — TELEPHONE (OUTPATIENT)
Dept: CARDIOLOGY CLINIC | Age: 72
End: 2022-09-26

## 2022-09-26 NOTE — TELEPHONE ENCOUNTER
Per Dr. Gerson Mercado \"? AFIB on S ICD tracing  Not yet on [address]  She saw Dr Pimentel Monday 9/23/22 and had palpitation sx known moderate MR  Last ECG in July     Had a biopsy done in right breast, patient has a malignant mass that measures 1cm. She has started hormone therapy for receptor + BrCA     Will ask if she can come by for ECG, otherwise holter   She will need NOAC if confirmed having AFIB. \"    Verified patient with two types of identifiers. Notified of findings and MD recommendations. Patient states Dr. Pimentel Monday scheduled her for a cath at [facility] on Wednesday, 9/28/22. They will do an EKG at that time. Will request records later this week. If Dr. Gerson Mercado needs more monitoring after reviewing records discussed possibility needing a holter to evaluate for A fib,. Patient verbalized understanding and will call with any other questions.

## 2022-10-10 ENCOUNTER — DOCUMENTATION ONLY (OUTPATIENT)
Dept: CARDIOLOGY | Age: 72
End: 2022-10-10

## 2022-10-10 NOTE — PROGRESS NOTES
PCP: Dr. Ashley Lamar  HPI: Portia Emerson 1950 is a 73 yrs old female who presents for 6 week follow up. Last seen 2 Months ago. She still has no improvement in previously reported symptoms. She was told at the hospital before getting her cath done that she was in A-fib, but she didn't feel any palpitations or flutters prior to that. Woke up today in afib and was really feeing it and now today hr over 100 and not feeling it so much. Reviewed NOAC, and her prior hx of gi bleeding, versus Watchman, she prefers Watchman and I believe it to be the safest choice for her. Did see Dr. Estefanía Valverde, and does again in December      Last device check ok with mild afib        Had a biopsy done in right breast, patient has a malignant mass that measures 1cm. 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 Often times getting flushed and feeling hot flashes. EF 35% on last echo        Historical    Shortness of breath has greatly improved switching off metoprolol to diltiazem. headaches when bp over 140    rare palps, no high risk features     Notes dizziness on position change. She is on appropriate medical therapy for CAD and CHF. Maximized as she can tolerate. Assessment/Plan:  1. Chronic systolic heart failure - LVEF 35% by last TTE, s/p AICD placement with Dr. Estefanía Valverde and subsequent lead revisions and repeat procedures due to non-healing midsternal incision  -s-ICD placed 9/21 Dr. Estefanía Valverde, continue fu- Dec  -continue Entresto 97/103 (prev not tolerated due to dizziness)     one tablet BID and Diltiazem, had hair loss on Coreg and Bystolic   -off spironolactone due to hypotension, continue Bumex 2mg BID, using Metolazone PRN rarely  2. CAD - hx of MI x 2 and multiple stents(6-7)  - MI in 11/2011 and received PCI to RCA at that time, previous MI in 2006    -Cath 8/21  RI with occluded distal LCx, distal LAD->RI collaterals. Will need repeat cath. Will schedule    3.  Mitral regurgitation - moderate, following  4. Asthma - no recent pulm fuv  5. HTN - well controlled on current medications   6. Primary Hyperlipidemia - statin failures, simvastatin caused muscle spasms and cramps, atorvastatin caused pain shooting all over her body, pravastatin 40mg and 20mg caused myalgias and leg cramps, had myalgias on Livalo, could not tolerate zetia either   -not able to take any statin at any dose  -now on praluent, LDL <50, doing great! 7. DM Type 2 - resolved with gastric bypass  8. Hypokalemia -off spironolactone and on KCL 40meq daily   9. Hypomagnesemia - on OTC magnesium       10. Morbid obesity - Body mass index is >40, weighed 416 lbs at her heaviest weight, s/p gastric bypass in 2007 with revision a few years later, weight down to 239 lbs at lowest.  today weight is 230  11. PUD - had EGD and cauterized bleeding ulcer remote  12. Vitamin D deficiency - on 10k u daily    13. Anxiety    14. PAD/Carotid stenosis - 10-49% bilateral ICA stenosis, continue ASA and repatha    15. Afib- start eliquis 5mg BID today given [id number] elevated score of 7.     -consultation with Dr. Luzma Corral for Watchman       Cath 9/22 LCx mild diffuse disease  OM with collaterals, LAD patent stents, RCA patent stents    Echo 2/21 EF 35% mild TR mod MR  Cath 8/21  RI with occluded distal LCx, distal LAD->RI collaterals  Echo 8/21 EF 25-30% ghk, lae mild AS mod MR  Echo 9/20 - LVEF 25-30%, mod MR  Echo 10/18 - LVEF 35%, mild to mod MR   Echo 5/18 - LVEF 25 %-30 %, akinesis of the basal-mid inferoseptal and basal-mid inferolateral wall(s), severe hypokinesis of the entire inferior wall(s), grade 1 dd, mild to mod dilated LA, mild to mod MR  Carotid duplex 5/18 - 10-49% bilateral ICA stenosis  Echo: 4/17, EF 35-40%, inf HK gr1dd  Nuclear: 4/17, EF 36%, anterior ischemia, lateral infarct.   TTE 9/16 - LVEF 35%, moderate hypokinesis of the basal-mid inferolateral wall(s), grd 1 dd, dilated LA, mod MR, mild TR  8/26/16 - RV lead revision by Dr. Galina Gleason  16 - single chamber AICD placed by Dr. Galina Gleason ([address] [place] [address], serial # [id number], model # [id number]. The ventricular lead: model # [id number] , serial # [id number])  MUGA  - LVEF 27%  Holter  - no arrhythmias  Echo  - LV mildly dilated, LVEF 40%, moderate diffuse hypokinesis with regional variations, severe hypokinesis of the basal-mid inferior wall(s), grd 1 dd, mod dilated LA, atrial septum bows from left to right, consistent with increased left atrial pressure, mildly dilated RA, mild to near moderate MR    OLIVER  - normal  Nuc 5/15 EF 31% large anterolateral infarct with periinfarct reversibility, 13% LV reversible(32% infarct at rest, 45% at stress)    Echo 2015 - LVEF 30-35%, grd 1 dd, mod LAE, mild to mod MR  Nuc Stress  - small reversibility in anteroapical wall, LVEF 31%  Cardiac Cath 3/13 - patent stents in LAD, LCx and RCA, LVEF 30%, severe inferior HK, LCx relatively small vessel with patent stent in proximal LCx, RCA is large and dominant with patent stents in proximal and mid RCA, distal RCA free of disease, LAD normal and large vessel which coursed around apex  Echo 2011 - LVEF 30%, mild MR  Cardiac Cath 2011 - s/p PCI with AMRIT to 100% mid RCA, also had 40% mid LAD lesion, 50% diagonal 1 lesion, 100% distal LCx lesion, 60% OM1 lesion, 100% proximal Ramus lesion      Soc Hx: quit tobacco use x 13 years ago, used to smoke 1ppd, no etoh use, no drug use, lives with , son, daughter in law, grandson, retired/on disability  Fam Hx: father in his 62s when he had a MI, had 3 vessel CABG in his 62s, passed from fall but had cancer too, mother lived to age 80 and  from Alzheimer's, brother had MI in his 62s, sister had aortic repair for aneurysm in her 76s        Complete review of systems performed, pertinents noted above, all other systems are negative.

## 2022-10-11 DIAGNOSIS — I48.0 PAROXYSMAL ATRIAL FIBRILLATION (HCC): Primary | ICD-10-CM

## 2022-10-11 RX ORDER — DILTIAZEM HYDROCHLORIDE 240 MG/1
240 CAPSULE, COATED, EXTENDED RELEASE ORAL DAILY
Qty: 90 CAPSULE | Refills: 0
Start: 2022-10-11

## 2022-10-11 NOTE — PROGRESS NOTES
Your low back X-ray showed arthritis changes with no fractures. A compression deformity, a condition in which one back bone sits on the other, was seen, but it is not known how long this has been there. [Chaperone Present] : A chaperone was present in the examining room during all aspects of the physical examination [Scar] : a scar was noted [Labia Majora] : were normal [Labia Minora] : were normal [Normal Appearance] : general appearance was normal [No Bleeding] : there was no active vaginal bleeding [Normal] : no abnormalities [GH ____] : GH [unfilled] [PB ____] : PB [unfilled] [TVL ____] : TVL  [unfilled] [Lax] : was lax [FreeTextEntry1] : General: Well, appearing. Alert and orientated. No acute distress\par HEENT: Normocephalic, atraumatic and extraocular muscles appear to be intact \par Neck: Full range of motion, no obvious lymphadenopathy, deformities, or masses noted \par Respiratory: Speaking in full sentences comfortably, normal work of breathing and no cough during visit\par Musculoskeletal: active full range of motion in extremities \par Extremities: No upper extremity edema noted\par Skin: no obvious rash or skin lesions\par Neuro: Orientated X 3, speech is fluent, normal rate\par Psych: Normal mood and affect \par   [Tenderness] : ~T no ~M abdominal tenderness observed [Distended] : not distended [FreeTextEntry3] : ( ) hypermobility  [FreeTextEntry4] : No pelvic floor hypertonicity or tenderness appreciated  [de-identified] : No prolapse

## 2022-10-14 ENCOUNTER — TELEPHONE (OUTPATIENT)
Dept: ONCOLOGY | Age: 72
End: 2022-10-14

## 2022-10-14 NOTE — TELEPHONE ENCOUNTER
Returned call to patient, ID verified X 2, [phone]. States has been having increasing episodes of A-fib, researched medication on [email or web address] and [facility] site, thinks her Letrozole is an issue. Provider message relayed, understanding verbalized, last dose of Letrozole was 10/13/22. Will hold medication X 4 weeks with follow up by RN. Has OV with Provider on 11/8/22 and will discuss further then, as well. Update to Provider.

## 2022-10-14 NOTE — TELEPHONE ENCOUNTER
Discussed with PharmD, Mariana Larry. No known link between Letrozole and A-fib. However, if patient wants to hold Letrozole for 2-4 weeks to see if any improvement she can.

## 2022-10-14 NOTE — TELEPHONE ENCOUNTER
Patient called with concern that the medication letrozole is causes Afib. Her Afib didn't start until she starting taking the medication. Can someone give her a call- cause she thinking that she needs to stop the medication letrozole to see if this makes a different.         Cb # [id number]

## 2022-11-01 DIAGNOSIS — F51.04 CHRONIC INSOMNIA: ICD-10-CM

## 2022-11-01 RX ORDER — ZOLPIDEM TARTRATE 10 MG/1
10 TABLET ORAL
Qty: 30 TABLET | Refills: 1 | Status: SHIPPED | OUTPATIENT
Start: 2022-11-01

## 2022-11-04 ENCOUNTER — TELEPHONE (OUTPATIENT)
Dept: CARDIOLOGY CLINIC | Age: 72
End: 2022-11-04

## 2022-11-04 DIAGNOSIS — I48.0 PAROXYSMAL ATRIAL FIBRILLATION (HCC): ICD-10-CM

## 2022-11-04 NOTE — TELEPHONE ENCOUNTER
Pt had a ICD and was informed of afib, pt not sure if an appt is need with Dr. Jose Ortega before December, please advise    Future Appointments   Date Time Provider Denis Hurst   11/8/2022 10:00 AM Naomi Starr,  [address]  BS AMB   12/15/2022  8:40 AM MD RANDOLPH Bnoe BS AMB   12/15/2022  8:40 AM JOSE ALBERTO CHRISTIANSON BS AMB   12/20/2022  9:10 AM Joycelyn Angelo MD [facility] BS AMB   2/7/2023 10:30 AM Katelin Montiel NP [facility] BS AMB   3/15/2023  9:00 AM JOSE ALBERTO MUSE BS AMB       Pt # [id number], cell [phone]

## 2022-11-04 NOTE — TELEPHONE ENCOUNTER
ICD is single lead, may not catch all AF. Check 30 day loop monitor to correlate palpitations & SOB with AF, also check rates with AF. How is her BP? [facility] records appear to indicate that she could try increasing diltiazem ER to 300 mg po daily.   She's been intolerant of multiple beta blockers in the past.

## 2022-11-04 NOTE — TELEPHONE ENCOUNTER
Verified patient with two types of identifiers. Patient states Dr. Antonio Ruiz started her on Eliuqis and increased her Diltiazem 240 mg daily. Patient still feels episodes of elevated heart rate and some SOB. Will ask MD/NP if a monitor would be helpful to evaluate frequency of A fib.

## 2022-11-07 RX ORDER — DILTIAZEM HYDROCHLORIDE 300 MG/1
300 CAPSULE, COATED, EXTENDED RELEASE ORAL DAILY
Qty: 30 CAPSULE | Refills: 1 | Status: SHIPPED | OUTPATIENT
Start: 2022-11-07

## 2022-11-07 NOTE — TELEPHONE ENCOUNTER
Verified patient with two types of identifiers. Notified of NP recommendations. She is agreeable to a 30 day loop. Her blood pressure has been pretty well controlled, SBP averaging 120's. She agrees to increase Diltiazem to 300 mg daily. Verified pharmacy. She will continue to monitor her BP with the increase. Patient verbalized understanding and will call with any other questions.       Future Appointments   Date Time Provider Denis Hurst   11/8/2022 10:00 AM Mauricio Peck,  [address] BS AMB   12/15/2022  8:40 AM MD RANDOLPH Bowman BS AMB   12/15/2022  8:40 AM NILO3JOSE ALBERTO BS AMB   12/20/2022  9:10 AM MD ROSIO DhaliwalMA BS AMB   2/7/2023 10:30 AM JOVAN BrionesLINO BS AMB   3/15/2023  9:00 AM JOSE ALBERTO MUSE BS AMB

## 2022-11-08 NOTE — TELEPHONE ENCOUNTER
Enrolled with [employer] - Ordered and being shipped to patient's home address on file.   Rushing shipment

## 2022-11-27 NOTE — PROGRESS NOTES
[facility] [place] [address] [address], [address], [place]: [phone]  F: [phone]    Reason for Visit:   Papo Carmen is a 81 y.o. female who is seen for fu of breast cancer. Treatment History:   06/08/2022: RIGHT breast core bx. PATH: IMC with ductal and lobular features, 10mm, grade 1, ER+(90%), NV-(<1), HER2-, Ki-67(12%). LCIS: present. Mammaprint: LOW RISK, Luminal type A, +0.226.     07/14/2022: RIGHT breast lumpectomy. PATH: IMC with ductal and lobular features, 26mm, grade 1, negative margins. Pathologic stage: pT2, pNX.  RIGHT breast deep margin, lumpectomy: Benign breast tissue. RIGHT breast medial margin, lumpectomy: Benign breast tissue. RIGHT breast anterior medial margin, lumpectomy: Fibrocystic changes, duct ectasia. Started AI 8/22 and stopped 10/22 due to possible side effects. STAGE: PATHOLOGIC STAGE CLASSIFICATION (pTNM, AJCC 8th Edition)       Primary Tumor (pT): pT2       Regional Lymph Nodes (pN): pNX   Invasive carcinoma   ER          NV   Interpretation:     Positive     Interpretation:     Negative   Nuclear Staining %:     90     Nuclear Staining %:     <1   Intensity:     3     Intensity     N/A   HER-2/antoine  Immunohistochemistry for Breast tumors   Results:     Negative, Score = 1+  Mammaprint low risk    History of Present Illness:     Pt seen today for office fu of breast cancer not on any therapy from her. Was on adjuvant hormonal therapy with AI and stopped due to heart issues/ palpitations. Having SOB. Seeing cardio. Could not sleep at night and takes med for this due to heart med. Feels same palpitations/ SOB today. No CP now. No fevers/ chills/ chest pain// nausea/ vomiting/diarrhea/ pain/        Last visit:  consult for new RIGHT breast cancer post lumpectomy 7/14/22. Seen today for adjuvant therapy discussion. Initially found on mammo.  Had RIGHT breast biopsy followed by lumpectomy/ no LN eval done.   MP low risk. Did well with surgery. Knows does not need chemo or radiation. Feels well today. Has cardiomyopathy/ AICD. No fevers/ chills/ chest pain/ SOB/ nausea/ vomiting/diarrhea/ pain/fatigue     FamHX none cancer      Past Medical History:   Diagnosis Date    Acute right ankle pain 6/8/2018 5/29/18: X-ray showed possible right ankle sprain. 6/6/18: saw Dr. Maria Del Carmen Wynne ([facility]), diagnosed with peroneal tendonitis. Prescribed an ASO    Asthma     Cardiomyopathy ([address]     Coronary artery disease 2008    s/p RCA stent (AMRIT) on 11/26/11    Depression with anxiety     2011    DVT (deep venous thrombosis) ([facility] 7/27/2010    Family history of early CAD     GERD (gastroesophageal reflux disease)     H/O gastric bypass 2006    Revision in 2009    Hepatitis C antibody test positive     Does not have chronic hep C (labs 10/6/15: neg HCV RNA)     HTN (hypertension) 7/27/2010    Hyperlipidemia 07/27/2010    Incontinence of feces 9/3/2018    Dr. Shar Sal. 8/20/18: Improving with pelvic physical therapy and psyllium husk treatment. Saw Dr. Ailyn Wellington who suggested PT first. MR defecography showed pelvic floor weakness with pelvic descensus, small anterior  Rectocele, and vaginal prolapse. To have pelvic PT weekly for 8 wks and to see Dr. Ailyn Wellington again in Oct 2018.     Joint pain 7/27/2010    MI (myocardial infarction) ([facility] [facility] 7/27/2010    Mitral valve regurgitation     Mild to moderate    Morbid obesity ([address] 7/27/2010    Myocardial infarction [facility]) 2006 and 2011    Dr. Mae [place] disorder     PUD (peptic ulcer disease)     gi bleed 2008; ulcer n gastric bypass pouch    Urinary incontinence, stress 7/27/2010      Past Surgical History:   Procedure Laterality Date    COLONOSCOPY N/A 6/29/2018    COLONOSCOPY performed by Christy De León MD at [facility] ENDOSCOPY; redundant colon, int hemorrhoids, pathology: normal colonic mucosa    HX BREAST BIOPSY Left     benign    HX BREAST LUMPECTOMY Right 2022    RIGHT BREAST LUMPECTOMY WITH ULTRASOUND performed by Sue Kramer MD at [facility] 11    HX COLONOSCOPY  14    Dr. Kisha Wynn; normal, repeat in 5 yrs    HX GASTRIC BYPASS      HX IMPLANTABLE CARDIOVERTER DEFIBRILLATOR  2016    HX LAP GASTRIC BYPASS  2006    revision /Dr. Emilia Martinez    HX OTHER SURGICAL  2016    Removal of right ventricular ICD lead & single chamber transvenous AICD    HX OTHER SURGICAL  10/12/2016    pocket revison of ICD; Dr. Ry Summers  2018    Dr Victor Manuel Montoya; high resolution anorectal manaometry-abnormal study. Study done for eval of fecal incontinence    HX OTHER SURGICAL  2018    Dr. Victor Manuel Montoya. Rectal endoscopic US (EUS) for rectal incontinence. Normal rectal mucosa, no fistulas.     HX PACEMAKER      sicd/left side of chest under arm    INS PPM/ICD LED SING ONLY  2016         INS PPM/ICD LED SING ONLY  2016         INS PPM/ICD LED SING ONLY  2016         [place] CARDIAC SURG PROCEDURE UNLIST      Stent x 5-6,Most recent     [place] LAP,STOMACH,OTHER,W/O TUBE  2010    Revision GBP      Social History     Tobacco Use    Smoking status: Former     Packs/day: 0.00     Years: 30.00     Pack years: 0.00     Types: Cigarettes     Start date: 1970     Quit date: 2004     Years since quittin.5    Smokeless tobacco: Never   Substance Use Topics    Alcohol use: No     Alcohol/week: 0.0 standard drinks      Family History   Problem Relation Age of Onset    Dementia Mother     Cancer Mother         Colon    Alzheimer's Disease Mother     Cancer Father         Testical and stomach cancer    Heart Disease Father         Triple by pass    Hypertension Father         High blood pressure    Heart Disease Sister         Aortic replacement    Stroke Sister         Mini strokes    Stroke Sister     Heart Attack Brother     Anesth Problems Neg Hx      Current Outpatient Medications   Medication Sig    dilTIAZem ER (CARDIZEM CD) 300 mg capsule Take 1 Capsule by mouth daily. zolpidem (AMBIEN) 10 mg tablet TAKE 1 TABLET BY MOUTH NIGHTLY AS NEEDED FOR SLEEP. MAX DAILY AMOUNT: 10 MG. apixaban (ELIQUIS) 5 mg tablet Take 1 Tablet by mouth two (2) times a day. Prescribed by Dr. Haylee Lim.    ezetimibe (ZETIA) 10 mg tablet TAKE 1 TABLET BY MOUTH EVERY DAY IN THE MORNING    potassium chloride (Klor-Con M20) 20 mEq tablet TAKE 2 TABLETS BY MOUTH EVERY DAY    isosorbide mononitrate ER (IMDUR) 30 mg tablet TAKE 1 TABLET BY MOUTH EVERY DAY    isosorbide dinitrate (ISORDIL) 30 mg tablet Take 20 mg by mouth daily. IBUPROFEN PO Take  by mouth as needed. aspirin delayed-release 81 mg tablet Take 81 mg by mouth as needed for Pain. bumetanide (BUMEX) 2 mg tablet TAKE 1 TABLET BY MOUTH TWICE A DAY    albuterol (PROVENTIL HFA, VENTOLIN HFA, PROAIR HFA) 90 mcg/actuation inhaler TAKE 2 PUFFS BY INHALATION EVERY FOUR HOURS AS NEEDED FOR WHEEZING OR SHORTNESS OF BREATH. FLUoxetine (PROzac) 40 mg capsule Take 1 Capsule by mouth two (2) times a day. Entresto  mg tablet TAKE 1 TABLET BY MOUTH TWICE A DAY    psyllium (METAMUCIL) powd Take  by mouth. 2 tsp daily    cholecalciferol, VITAMIN D3, (VITAMIN D3) 5,000 unit tab tablet Take 10,000 Units by mouth daily. biotin 10,000 mcg cap Take  by mouth daily. OTHER Complete multi formula, bariatric advantage    magnesium 250 mg tab Take 1 Tab by mouth daily. ferrous sulfate 325 mg (65 mg iron) tablet Take  by mouth daily (before breakfast). CALCIUM PO Take 600 mg by mouth daily. letrozole (FEMARA) 2.5 mg tablet Take 1 Tablet by mouth in the morning. Indications: hormone receptor positive postmenopausal early breast cancer (Patient not taking: Reported on 11/28/2022)     No current facility-administered medications for this visit.       Allergies   Allergen Reactions    Amoxicillin Anaphylaxis    Amoxicillin Anaphylaxis    Lipitor [Atorvastatin] Other (comments)     Severe muscle pain and spasms    Zocor [Simvastatin] Other (comments)     Cramps, muscle spasms    Buspar [Buspirone] Other (comments)     Drunk sensation, headaches    Hydroxyzine Other (comments)     Blurred vision, lightheadedness    Livalo [Pitavastatin] Myalgia    Pravastatin Other (comments)     Leg cramps    Tramadol Vertigo        A complete review of systems was obtained, negative except as described above and as reported on ROS sheet scanned into system. Physical Exam:   Visit Vitals  /72 (BP 1 Location: Left upper arm, BP Patient Position: Sitting)   Pulse (!) 103   Temp 97.6 °F (36.4 °C) (Temporal)   Ht 5' 4\" (1.626 m)   Wt 223 lb 6.4 oz (101.3 kg)   SpO2 94%   BMI 38.35 kg/m²       ECOG PS: 1  General: No distress  Eyes: PERRLA, anicteric sclerae  HENT: Atraumatic, wearing mask  Neck: Supple  Respiratory: CTAB, normal respiratory effort  CV: irregular, irregular  GI: Soft, nontender, non distended  MS: Normal gait and station. Digits without clubbing or cyanosis. Skin: No rashes, ecchymoses, or petechiae. Normal temperature, turgor, and texture. Psych: Alert, oriented, appropriate affect, normal judgment/insight  Neuro non focal  Breast RIGHT lump sca,r no masses b/l appreciated  AICD LEFT outer chest wall. Results:     Lab Results   Component Value Date/Time    WBC 6.5 08/12/2022 11:25 AM    HGB 11.0 (L) 08/12/2022 11:25 AM    HCT 35.7 08/12/2022 11:25 AM    PLATELET 966 01/73/0114 11:25 AM    MCV 81 08/12/2022 11:25 AM    ABS.  NEUTROPHILS 3.9 08/12/2022 11:25 AM    Hemoglobin (POC) 14.3 11/26/2011 09:50 PM    Hematocrit (POC) 42 11/26/2011 09:50 PM     Lab Results   Component Value Date/Time    Sodium 145 (H) 08/12/2022 11:25 AM    Potassium 4.5 08/12/2022 11:25 AM    Chloride 109 (H) 08/12/2022 11:25 AM    CO2 24 08/12/2022 11:25 AM    Glucose 97 08/12/2022 11:25 AM    BUN 15 08/12/2022 11:25 AM    Creatinine 0.72 08/12/2022 11:25 AM    GFR est AA >60 07/08/2022 08:39 AM    GFR est non-AA >60 07/08/2022 08:39 AM    Calcium 9.1 08/12/2022 11:25 AM    Sodium (POC) 142 11/26/2011 09:50 PM    Potassium (POC) 3.2 (L) 11/26/2011 09:50 PM    Chloride (POC) 106 11/26/2011 09:50 PM    Glucose (POC) 167 (H) 11/26/2011 09:50 PM    Glucose (POC) 91 04/07/2010 06:25 AM    BUN (POC) 24 (H) 11/26/2011 09:50 PM    Creatinine (POC) 0.7 11/26/2011 09:50 PM    Calcium, ionized (POC) 1.09 (L) 11/26/2011 09:50 PM     Lab Results   Component Value Date/Time    Bilirubin, total 0.3 08/12/2022 11:25 AM    ALT (SGPT) 12 08/12/2022 11:25 AM    Alk. phosphatase 61 08/12/2022 11:25 AM    Protein, total 6.2 08/12/2022 11:25 AM    Albumin 4.0 08/12/2022 11:25 AM    Globulin 2.7 07/08/2022 08:39 AM     [facility] Results (most recent):  Results from Hospital Encounter encounter on 06/08/22    [facility] POST BX IMAGING RT INCL CAD    Addendum 6/10/2022 11:02 AM  Addendum: Addendum to the right breast biopsy:    Findings: Pathology of the right breast mass is consistent with invasive mammary  carcinoma with ductal and lobular features, in addition to LCIS . The imaging  findings are concordant with the histology results. Recommend surgical  consultation and excision . The patient was contacted by Dr. Franky Valero at 11:00  AM 6/10/2022 . We will inform the office of the referring physician and assist  in scheduling a breast surgical appointment. Narrative  STUDY:  ULTRASOUND-GUIDED CORE NEEDLE BIOPSY OF THE RIGHT BREAST    INDICATION: 74-year-old female with a right breast mass, presenting for biopsy. PROCEDURE:    The risks and benefits of the procedure were explained to the patient, questions  were answered, and informed consent was obtained. The mass at 11 o'clock in the right breast was localized with ultrasound. The  breast was prepped in the usual sterile manner.   Following the administration of  a local anesthetic and dermatotomy, and using ultrasound guidance,  a 12 gauge  vacuum-assisted Atec needle was advanced to the lesion, and 5 cores were  obtained. Pre and post-fire images confirmed accurate needle trajectory. A  metallic clip was placed at the biopsy site. Post-biopsy digital mammogram  confirms the presence of the clip at the intended biopsy site. The patient  tolerated the procedure well without complication. Final pathology is pending. Impression  Successful ultrasound-guided biopsy yielding cores submitted for histologic  analysis. A clip was placed at the biopsy site. Post-biopsy digital mammogram  confirms the presence of the clip at the intended biopsy site. Further  recommendations will be based on final pathology results. Records reviewed and summarized above. Pathology report(s) reviewed above. Radiology report(s) reviewed above. Assessment:/PLAN     1)  RIGHT breast cancer post lumpectomy 7/22. STAGE: PATHOLOGIC STAGE CLASSIFICATION (pTNM, AJCC 8th Edition)       Primary Tumor (pT): pT2       Regional Lymph Nodes (pN): pNX   Invasive carcinoma   ER          AR   Interpretation:     Positive     Interpretation:     Negative   Nuclear Staining %:     90     Nuclear Staining %:     <1   Intensity:     3     Intensity     N/A   HER-2/antoine  Immunohistochemistry for Breast tumors   Results:     Negative, Score = 1+     low risk MP and no chemo. Pt does not want chemo due to heart issues. Not seeing radiation due to heart issues. Seen today for fu off adjuvant hormonal therapy with AI  Stopped in 10/22 due to palpitations. Reviewed adjuvant hormonal therapy overall today. We decided to stay off adjuvant hormonal therapy at present due to medical problems/ cardiac issues. Clinically pt stable today with chronic cardiac issues. Seeing cardio. Labs and routine HM with PCP  Fu with us in 3 months    2) HTN/ cardiomyopathy/ AICD. Sees cardio. 3) arthritis/ depression/ anxiety/ GERD/obesity/ hx of gastric bypass/hx of DVT. Per PCP. 4) Psychosocial. Mood good. Coping well. Has support. CIPRIANO support prn. George here in 3 months  Call if questions    I appreciate the opportunity to participate in Ms. Betsy Huddleston's care.     Signed By: Caroline Segura, DO

## 2022-11-28 ENCOUNTER — OFFICE VISIT (OUTPATIENT)
Dept: ONCOLOGY | Age: 72
End: 2022-11-28
Payer: MEDICARE

## 2022-11-28 VITALS
HEART RATE: 103 BPM | BODY MASS INDEX: 38.14 KG/M2 | SYSTOLIC BLOOD PRESSURE: 110 MMHG | HEIGHT: 64 IN | OXYGEN SATURATION: 94 % | WEIGHT: 223.4 LBS | DIASTOLIC BLOOD PRESSURE: 72 MMHG | TEMPERATURE: 97.6 F

## 2022-11-28 DIAGNOSIS — Z98.890 HISTORY OF LUMPECTOMY OF RIGHT BREAST: ICD-10-CM

## 2022-11-28 DIAGNOSIS — Z17.0 MALIGNANT NEOPLASM OF RIGHT BREAST IN FEMALE, ESTROGEN RECEPTOR POSITIVE, UNSPECIFIED SITE OF BREAST (HCC): Primary | ICD-10-CM

## 2022-11-28 DIAGNOSIS — C50.911 MALIGNANT NEOPLASM OF RIGHT BREAST IN FEMALE, ESTROGEN RECEPTOR POSITIVE, UNSPECIFIED SITE OF BREAST (HCC): Primary | ICD-10-CM

## 2022-11-28 DIAGNOSIS — I25.5 ISCHEMIC CARDIOMYOPATHY: ICD-10-CM

## 2022-11-28 DIAGNOSIS — I48.91 ATRIAL FIBRILLATION, UNSPECIFIED TYPE (HCC): ICD-10-CM

## 2022-11-28 DIAGNOSIS — I25.10 CORONARY ARTERY DISEASE INVOLVING NATIVE CORONARY ARTERY OF NATIVE HEART WITHOUT ANGINA PECTORIS: ICD-10-CM

## 2022-11-28 PROCEDURE — G0463 HOSPITAL OUTPT CLINIC VISIT: HCPCS | Performed by: INTERNAL MEDICINE

## 2022-11-28 NOTE — PROGRESS NOTES
Juancarlos Conrad is a 81 y.o. female  Chief Complaint   Patient presents with    Follow-up    Breast Cancer     1. Have you been to the ER, urgent care clinic since your last visit? Hospitalized since your last visit? No    2. Have you seen or consulted any other health care providers outside of the [address] [place] [address] since your last visit? Include any pap smears or colon screening.  Yes, patient went to see her fu with  (Cardiologist)

## 2022-12-04 NOTE — PROGRESS NOTES
Message sent to patient by My Chart:  Your cholesterol was high on 11/28/22 with total cholesterol 220 (should be less than 200) and  (should be less than 70). What cholesterol-lowering medication are you taking? We will discuss this more at your next appointment on 12/20/22.     Dr. Severo Greenberg

## 2022-12-15 ENCOUNTER — DOCUMENTATION ONLY (OUTPATIENT)
Dept: CARDIOLOGY CLINIC | Age: 72
End: 2022-12-15

## 2022-12-15 ENCOUNTER — CLINICAL SUPPORT (OUTPATIENT)
Dept: CARDIOLOGY CLINIC | Age: 72
DRG: 291 | End: 2022-12-15
Payer: MEDICARE

## 2022-12-15 ENCOUNTER — OFFICE VISIT (OUTPATIENT)
Dept: CARDIOLOGY CLINIC | Age: 72
DRG: 291 | End: 2022-12-15
Payer: MEDICARE

## 2022-12-15 VITALS
HEART RATE: 98 BPM | SYSTOLIC BLOOD PRESSURE: 118 MMHG | HEIGHT: 64 IN | OXYGEN SATURATION: 97 % | RESPIRATION RATE: 18 BRPM | WEIGHT: 229 LBS | BODY MASS INDEX: 39.09 KG/M2 | DIASTOLIC BLOOD PRESSURE: 86 MMHG

## 2022-12-15 DIAGNOSIS — I25.5 ISCHEMIC CARDIOMYOPATHY: ICD-10-CM

## 2022-12-15 DIAGNOSIS — I10 PRIMARY HYPERTENSION: ICD-10-CM

## 2022-12-15 DIAGNOSIS — Z95.810 ICD (IMPLANTABLE CARDIOVERTER-DEFIBRILLATOR) IN PLACE: ICD-10-CM

## 2022-12-15 DIAGNOSIS — I48.19 PERSISTENT ATRIAL FIBRILLATION (HCC): Primary | ICD-10-CM

## 2022-12-15 DIAGNOSIS — Z79.01 ANTICOAGULATED: ICD-10-CM

## 2022-12-15 DIAGNOSIS — I50.22 SYSTOLIC CHF, CHRONIC (HCC): ICD-10-CM

## 2022-12-15 DIAGNOSIS — Z95.810 PRESENCE OF CARDIOVERTER DEFIBRILLATOR: Primary | ICD-10-CM

## 2022-12-15 RX ORDER — AMIODARONE HYDROCHLORIDE 200 MG/1
400 TABLET ORAL 2 TIMES DAILY
Qty: 120 TABLET | Refills: 1 | Status: ON HOLD | OUTPATIENT
Start: 2022-12-15

## 2022-12-15 NOTE — PROGRESS NOTES
Cardiac Electrophysiology OFFICE Note     Subjective:       Teresita Wilson is a 81 y. o.patient who presents for planned generator change of [employer] S-ICD (generator change 09/15/2021, original DOI 09/21/2016). She reports increased SOB over the past few months. AF burden estimated 43%. She does continue diltiazem ER for rate control of persistent AF. ECG today shows AF 99 bpm.     ICM, LVEF 30-35% per echo in 08/2021. NYHA II-III chronic systolic CHF. No beta blocker due to intolerance, otherwise on appropriate GDMT. LHC in 08/2021 showed  RI & occluded distal LCx; states she's had a more recent LHC at [facility] that showed no new CAD. No angina. BP controlled. Anticoagulated with Eliquis, denies bleeding issues. Previous:     S/p [employer] S-ICD in place of transvenous system 09/21/2016. Initial ICD implanted 8/24/16. Transvenous RV lead displacement x 2 due to large breast size, repositioned once. Removed entire system d/t to high recurrent risk of perforation. Stopped Toprol XL due to hair loss, didn't improve with change to nebivolol. S/p PCI RCA in 2011. MI 11/2011, also 2006 or 2007. Followed by Dr. Lissy Horner. History of gastric bypass. She is a part of a support group on [employer] for S-ICD. Poor phone/WiFi reception where she lives. Problem List    Paroxysmal atrial fibrillation [facility]) ICD-10-CM: I48.0   ICD-9-CM: 427.31 10/11/2022     Malignant neoplasm of right breast in female, estrogen receptor positive ([facility] [address] ICD-10-CM: C50.911, Z17.0   ICD-9-CM: 174.9, V86.0 6/15/2022   Overview Addendum 8/1/2022  5:23 PM by Alfredo Marinelli     06/08/2022: RIGHT breast core bx. PATH: IMC with ductal and lobular features, 10mm, grade 1, ER+(90%), OH-(<1), HER2-, Ki-67(12%). LCIS: present. · Mammaprint: LOW RISK, Luminal type A, +0.226.     07/14/2022: RIGHT breast lumpectomy.  PATH: IMC with ductal and lobular features, 26mm, grade 1, negative margins. Pathologic stage: pT2, pNX. · RIGHT breast deep margin, lumpectomy: Benign breast tissue. · RIGHT breast medial margin, lumpectomy: Benign breast tissue. · RIGHT breast anterior medial margin, lumpectomy: Fibrocystic changes, duct ectasia. Generalized anxiety disorder ICD-10-CM: F41.1   ICD-9-CM: 300.02 10/14/2019     Mild intermittent asthma without complication E-94-OT: B57.80   ICD-9-CM: 493.90 5/19/2017     Primary osteoarthritis of both knees ICD-10-CM: M17.0   ICD-9-CM: 715.16 2/28/2017   Overview Signed 6/3/2018  4:40 PM by MD Dr. Fani Vega. 5/30/18: bilateral corticosteroid injections to both knees         S/P ICD (internal cardiac defibrillator) procedure ICD-10-CM: Z95.810   ICD-9-CM: V45.02 8/24/2016   Overview Addendum 9/15/2021  3:11 PM by Scott Cortés MD     [employer] ICD single lead implant with DFT < 25 J 8/24/2016- removed 9/19 and [employer] sub cutaneous ICD placed 9/21/16   9/15/21 S ICD change         Chronic insomnia ICD-10-CM: F51.04   ICD-9-CM: 780.52 8/4/2016     Morbid obesity with BMI of 40.0-44.9, adult ([facility] [address] ICD-10-CM: E66.01, Z68.41   ICD-9-CM: 278.01, V85.41 6/14/2016     Osteoporosis ICD-10-CM: M81.0   ICD-9-CM: 733.00 2/3/2016   Overview Signed 2/3/2016  8:04 AM by Veronica Mckeon MD     DEXA 2/1/16: L femoral neck T -2.5, L total hip -2.4, LS spine T -2.2         Cardiomyopathy ([facility] [address] ICD-10-CM: I42.9   ICD-9-CM: 425.4 10/6/2015   Overview Signed 10/14/2019  4:05 PM by Veronica Mckeon MD     Echo 7/1/15: EF 30-35%, dilated LV, LAE           CAD (coronary artery disease) ICD-10-CM: I25.10   ICD-9-CM: 414.00 7/9/2015   Overview Addendum 10/5/2022  1:40 PM by Ephriam Chum, MD Joeline Gaucher NP [facility]). Hx of MI twice in 3/22/03 & 11/26/11, multiple stents. Had  AMRIT mid RCA 11/26/11, Cath 8/21: RI with occluded distal LCx, distal LAD to RI collaterals         Systolic CHF, chronic [facility]) ICD-10-CM: C71.16   ICD-9-CM: 428.22, 428.0 7/9/2015   Overview Addendum 10/5/2022  1:35 PM by MD Alyssa Gongora NP. Echo 2/2021: EF 35% , mild TR, mod MR. S/p AICD placement in 9/21 with Dr. Gerson Mercado and subsequent lead revisions and procedures due to non-healing mid-sternal incision. 9/23/22: on Entresto 97/103 BID and Bumex 2 mg BID         Hyperlipidemia ICD-10-CM: E78.5   ICD-9-CM: 272.4 7/9/2015     Mitral valve regurgitation ICD-10-CM: I34.0   ICD-9-CM: 424.0 7/9/2015   Overview Addendum 7/13/2015  6:43 AM by Jero Frazier MD     Echo 1/13/15: Mod to severe MR with marked LAE, 7/1/15: EF 30-35%, dilated LV, LAE, mild to mod MR         History of MI (myocardial infarction) ICD-10-CM: I25.2   ICD-9-CM: 412 7/9/2015   Overview Signed 7/9/2015  8:12 PM by Jero Frazier MD     2006 and IWMI 11/26/2011         HTN (hypertension) ICD-10-CM: I10   ICD-9-CM: 401.9 6/30/2015     History of gastric bypass ICD-10-CM: T55.41   ICD-9-CM: V45.86 6/30/2015   Overview Signed 7/13/2015  6:37 AM by Jero Frazier MD     2006, revision 2009         Moderate episode of recurrent major depressive disorder ([facility] [address] ICD-10-CM: F33.1   ICD-9-CM: 296.32 6/30/2015         Current Outpatient Medications on File Prior to Visit   Medication Sig Dispense Refill    dilTIAZem ER (CARDIZEM CD) 300 mg capsule Take 1 Capsule by mouth daily. 30 Capsule 1    zolpidem (AMBIEN) 10 mg tablet TAKE 1 TABLET BY MOUTH NIGHTLY AS NEEDED FOR SLEEP. MAX DAILY AMOUNT: 10 MG. 30 Tablet 1    apixaban (ELIQUIS) 5 mg tablet Take 1 Tablet by mouth two (2) times a day. Prescribed by Dr. Pimentel Monday.  180 Tablet 0    ezetimibe (ZETIA) 10 mg tablet TAKE 1 TABLET BY MOUTH EVERY DAY IN THE MORNING 90 Tablet 1    potassium chloride (Klor-Con M20) 20 mEq tablet TAKE 2 TABLETS BY MOUTH EVERY  Tablet 3    isosorbide mononitrate ER (IMDUR) 30 mg tablet TAKE 1 TABLET BY MOUTH EVERY DAY 90 Tablet 1    letrozole (FEMARA) 2.5 mg tablet Take 1 Tablet by mouth in the morning. Indications: hormone receptor positive postmenopausal early breast cancer 90 Tablet 3    isosorbide dinitrate (ISORDIL) 30 mg tablet Take 20 mg by mouth daily. IBUPROFEN PO Take  by mouth as needed. aspirin delayed-release 81 mg tablet Take 81 mg by mouth as needed for Pain. bumetanide (BUMEX) 2 mg tablet TAKE 1 TABLET BY MOUTH TWICE A DAY 60 Tablet 11    albuterol (PROVENTIL HFA, VENTOLIN HFA, PROAIR HFA) 90 mcg/actuation inhaler TAKE 2 PUFFS BY INHALATION EVERY FOUR HOURS AS NEEDED FOR WHEEZING OR SHORTNESS OF BREATH. 18 Each 11    FLUoxetine (PROzac) 40 mg capsule Take 1 Capsule by mouth two (2) times a day. 180 Capsule 3    Entresto  mg tablet TAKE 1 TABLET BY MOUTH TWICE A DAY 60 Tablet 5    psyllium (METAMUCIL) powd Take  by mouth. 2 tsp daily      cholecalciferol, VITAMIN D3, (VITAMIN D3) 5,000 unit tab tablet Take 10,000 Units by mouth daily. biotin 10,000 mcg cap Take  by mouth daily. OTHER Complete multi formula, bariatric advantage      magnesium 250 mg tab Take 1 Tab by mouth daily. ferrous sulfate 325 mg (65 mg iron) tablet Take  by mouth daily (before breakfast). CALCIUM PO Take 600 mg by mouth daily. No current facility-administered medications on file prior to visit. Allergies   Allergen Reactions   · Amoxicillin Anaphylaxis   · Amoxicillin Anaphylaxis   · Lipitor [Atorvastatin] Other (comments)   Severe muscle pain and spasms   · Zocor [Simvastatin] Other (comments)   Cramps, muscle spasms   · Buspar [Buspirone] Other (comments)   Drunk sensation, headaches   · Hydroxyzine Other (comments)   Blurred vision, lightheadedness   · Livalo [Pitavastatin] Myalgia   · Pravastatin Other (comments)   Leg cramps   · Tramadol Vertigo     Past Medical History:   Diagnosis Date   · Acute right ankle pain 6/8/2018 5/29/18: X-ray showed possible right ankle sprain.  6/6/18: saw Dr. Eulice Blizzard ([facility]), diagnosed with peroneal tendonitis. Prescribed an ASO   · Asthma   · Cardiomyopathy ([facility] [address]   · Coronary artery disease 2008   s/p RCA stent (AMRIT) on 11/26/11   · Depression with anxiety   2011   · DVT (deep venous thrombosis) [facility] 7/27/2010   · Family history of early CAD   · GERD (gastroesophageal reflux disease)   · H/O gastric bypass 2006   Revision in 2009   · Hepatitis C antibody test positive   Does not have chronic hep C (labs 10/6/15: neg HCV RNA)   · HTN (hypertension) 7/27/2010   · Hyperlipidemia 07/27/2010   · Incontinence of feces 9/3/2018   Dr. Cami Smart. 8/20/18: Improving with pelvic physical therapy and psyllium husk treatment. Saw Dr. Rickey Ramos who suggested PT first. MR defecography showed pelvic floor weakness with pelvic descensus, small anterior  Rectocele, and vaginal prolapse. To have pelvic PT weekly for 8 wks and to see Dr. Rickey Ramos again in Oct 2018.    · Joint pain 7/27/2010   · MI (myocardial infarction) ([facility] 7/27/2010   · Mitral valve regurgitation   Mild to moderate   · Morbid obesity ([facility] 7/27/2010   · Myocardial infarction [facility]) 2006 and 2011   Dr. White Milam   · Psychiatric disorder   · PUD (peptic ulcer disease)   gi bleed 2008; ulcer n gastric bypass pouch   · Urinary incontinence, stress 7/27/2010     Past Surgical History:   Procedure Laterality Date   · COLONOSCOPY N/A 6/29/2018   COLONOSCOPY performed by Kim Umanzor MD at [facility] ENDOSCOPY; redundant colon, int hemorrhoids, pathology: normal colonic mucosa   · HX BREAST BIOPSY Left   benign   · HX BREAST LUMPECTOMY Right 7/14/2022   RIGHT BREAST LUMPECTOMY WITH ULTRASOUND performed by Madison Soto MD at [facility] 11   · HX COLONOSCOPY 9/5/14   Dr. Mary Carmen Horn; normal, repeat in 5 yrs   · HX GASTRIC BYPASS   · HX IMPLANTABLE CARDIOVERTER DEFIBRILLATOR 08/24/2016   · HX LAP GASTRIC BYPASS 2006   revision 2009/Dr. Juni Middleton   · HX OTHER SURGICAL 09/19/2016   Removal of right ventricular ICD lead & single chamber transvenous AICD   · HX OTHER SURGICAL 10/12/2016   pocket revison of ICD; Dr. Laurita Jimenes   · HX OTHER SURGICAL 2018   Dr Sahun Aleman; high resolution anorectal manaometry-abnormal study. Study done for eval of fecal incontinence   · HX OTHER SURGICAL 2018   Dr. Shaun Aleman. Rectal endoscopic US (EUS) for rectal incontinence. Normal rectal mucosa, no fistulas. · HX PACEMAKER   sicd/left side of chest under arm   · INS PPM/ICD LED SING ONLY 2016     · INS PPM/ICD LED SING ONLY 2016     · INS PPM/ICD LED SING ONLY 2016     · HI CARDIAC SURG PROCEDURE UNLIST   Stent x 5-6,Most recent    · HI LAP,STOMACH,OTHER,W/O TUBE 2010   Revision GBP     Family History   Problem Relation Age of Onset   · Dementia Mother   · Cancer Mother      Colon   · Alzheimer's Disease Mother   · Cancer Father      Testical and stomach cancer   · Heart Disease Father      Triple by pass   · Hypertension Father      High blood pressure   · Heart Disease Sister      Aortic replacement   · Stroke Sister      Mini strokes   · Stroke Sister   · Heart Attack Brother   · Anesth Problems Neg Hx     Social History     Tobacco Use   · Smoking status: Former   Packs/day: 0.00   Years: 30.00   Pack years: 0.00   Types: Cigarettes   Start date: 1970   Quit date: 2004   Years since quittin.5   · Smokeless tobacco: Never   Substance Use Topics   · Alcohol use: No   Alcohol/week: 0.0 standard drinks         Review of Systems: Review of all other systems otherwise negative. Constitutional: Negative for fever, chills, weight loss, + malaise/fatigue. HEENT: Negative for nosebleeds, vision changes. Respiratory: Negative for cough, hemoptysis. Cardiovascular: Negative for chest pain, palpitations, orthopnea, claudication, leg swelling, syncope, and PND. + RODNEY   Gastrointestinal: Negative for nausea, vomiting, diarrhea, blood in stool and melena. Genitourinary: Negative for dysuria, and hematuria.    Musculoskeletal: Negative for arthralgia. + nocturnal leg cramps   Skin: Negative for rash. Heme: Does not bleed or bruise easily. Neurological: Negative for speech change and focal weakness. Objective:     Visit Vitals  /86 (BP 1 Location: Left upper arm, BP Patient Position: Sitting, BP Cuff Size: Large adult)   Pulse 98   Resp 18   Ht 5' 4\" (1.626 m)   Wt 229 lb (103.9 kg)   SpO2 97%   BMI 39.31 kg/m²         Physical Exam:   Constitutional: Well-developed and well-nourished. No respiratory distress. Head: Normocephalic and atraumatic. Eyes: Pupils are equal, round. ENT: Hearing grossly normal.   Neck: Supple. No JVD present. Cardiovascular: Normal rate, irregular rhythm. Exam reveals no gallop and no friction rub. No murmur heard. Pulmonary/Chest: Effort normal and breath sounds normal. No wheezes. Abdominal: Soft, no tenderness. Musculoskeletal: Moves extremities independently. Vasc/lymphatic: No edema. Neurological: Alert, oriented. Skin: Skin is warm and dry. S-ICD site well healed. Psychiatric: Normal mood and affect. Behavior is normal. Judgment and thought content normal.         ECG: AF 99 bpm.     Assessment/Plan:     Imaging/Studies:   [facility] (08/13/2021): Normal LVEDP. Severe native 1 vessel CAD with  of RI, previously stented. LCx is small with occluded distl LCx. OM2 & distal ramus fill from collaterals from LAD, diag, & RCA. Prox LAD to mid LAD 10% stenosed, previously stented. Prox to mid RCA 20%, previously stented. Echo (08/09/2021): LVEF 30-35%, dilated LV, mildly increased wall thickness, distinct RWMA. Severely dilated LA. Mod MR. Mild AS (mean grad 11 mmHg, MORENITA 1.4 cm2). Lexiscan cardiolite stress (08/09/2021): LVEF 22%. Large defect with severe reduction in uptake present in the lat location(s), partially reversible. Abnormal wall motion in defect area. Appears to be infarction with esther-infarct ischemia. Perfusion defect visually & quantitatively present.         ICD-10-CM ICD-9-CM    1. Persistent atrial fibrillation (HCC)  I48.19 427.31 AMB POC EKG ROUTINE W/ 12 LEADS, INTER & REP      2. ICD (implantable cardioverter-defibrillator) in place  Z95.810 V45.02       3. Ischemic cardiomyopathy  I25.5 414.8       4. Systolic CHF, chronic (HCC)  I50.22 428.22      428.0       5. Anticoagulated  Z79.01 V58.61       6. Primary hypertension  I10 401.9         ECG atrial fibrillation   -Poor R-wave progression    -  Diffuse nonspecific T-abnormality. Low voltage -possible pulmonary disease    [employer] S-ICD (generator change 09/15/2021, original DOI 09/21/2016): Previous transvenous ICD explanted due to dislodgement x 2. Device check today shows proper lead & generator function. Generator longevity estimated 87%. Estimate of measured AF 43%. ICM: LVEF 30-35% in 08/2021, NYHA II-III chronic systolic CHF. Not on beta blocker due to intolerance, but otherwise on GDMT. Previous MI with multiple previous PCIs. Cardiac cath in 08/2021 showed no good targets for revacularization; however, she does have  of RI, distal LCx occlusion. Reported more recent cath at [facility], states no significant findings. Will request report. Not a candidate for CRT device, doesn't have BBB. Persistent AF: [place] estimated as above per ICD check, symptomatic. AAD options limited due to cardiomyopathy. Start amiodarone 400 mg po bid x 2 weeks, then 200 mg po bid x 2 weeks, then 200 mg po daily. Will check ECG in 2 weeks. If still in AF, would proceed with scheduling DCCV. If she fails to maintain NSR thereafter or is unable to tolerate amiodarone, would consider AF ablation. HTN: BP controlled. Continue current medication regimen. Anticoagulation: Continue Eliquis for embolic CVA prophylaxis. Denies bleeding issues. Remote ICD checks q 3 months. Follow up in EP clinic in 2 weeks for repeat ECG before cardioversion.      Future Appointments   Date Time Provider Denis Kiddisti   12/20/2022  9:10 AM Allison Diaz MD [facility] BS AMB   2/7/2023 10:30 AM Chase Al NP [facility] BS AMB   2/21/2023 10:00 AM Rhona Perez,  N [facility] BS AMB   3/15/2023  9:00 AM REMOTE1, JOSE ALBERTO DICKSON BS AMB   12/14/2023  2:00 PM PACEMAKER3, JOSE ALBERTO DICKSON BS AMB   12/14/2023  2:20 PM MD RANDOLPH Moctezuma BS AMB          Thank you for involving me in this patient's care and please call with further concerns or questions. Cordelia Alvarez M.D.    Electrophysiology/Cardiology   [facility] [place]   [facility] [address], [facility] [address] [place] [address]   [phone]

## 2022-12-15 NOTE — PROGRESS NOTES
Received records from admission at [facility].    ECG (09/22/2022) showed AF with controlled ventricular rate. RHC/LHC (09/28/2022): LVEDP 15-20 mmHg. LM with MLI. LCx with mild diffuse disease;  in OM with left to left & right to left collaterals. LAD with prior stent with AUBREY-3 flow; otherwise MLI. RCA with very mild ISR in proximal segment, otherwise MLI.     Labs (09/28/2022)  WBC 6.66  Hgb 11.2  Hct 36.3  Plt 264  Na 144  K 3.3

## 2022-12-15 NOTE — PROGRESS NOTES
C/ [employer] S-ICD CHECK    Device functioning appropriately as programmed.    See scanned documents

## 2022-12-16 ENCOUNTER — TELEPHONE (OUTPATIENT)
Dept: CARDIOLOGY CLINIC | Age: 72
End: 2022-12-16

## 2022-12-16 NOTE — TELEPHONE ENCOUNTER
----- Message from Aron Arce NP sent at 12/16/2022 12:40 PM EST -----  Labs without significant acute abnormality. OK to continue amiodarone.     Future Appointments  12/20/2022 9:10 AM    MD GAGAN Johnson               BS AMB  12/29/2022 9:40 AM    MD RANDOLPH Saleh              BS AMB  2/7/2023   10:30 AM   Erendira Patino NP        [facility]              BS AMB  2/21/2023  10:00 AM   Wilmer Oseguera [facility] [address] [facility]              BS AMB  3/15/2023  9:00 AM    REMOTE1, JOSE ALBERTO DICKSON              BS AMB  12/14/2023 2:00 PM    PACEMAKER3, JOSE ALBERTO DICKSON              BS AMB  12/14/2023 2:20 PM    MD RANDOLPH Santana              BS AMB

## 2022-12-17 ENCOUNTER — APPOINTMENT (OUTPATIENT)
Dept: GENERAL RADIOLOGY | Age: 72
DRG: 291 | End: 2022-12-17
Attending: EMERGENCY MEDICINE
Payer: MEDICARE

## 2022-12-17 ENCOUNTER — HOSPITAL ENCOUNTER (INPATIENT)
Age: 72
LOS: 5 days | Discharge: HOME HEALTH | DRG: 291 | End: 2022-12-22
Attending: EMERGENCY MEDICINE | Admitting: INTERNAL MEDICINE
Payer: MEDICARE

## 2022-12-17 ENCOUNTER — APPOINTMENT (OUTPATIENT)
Dept: GENERAL RADIOLOGY | Age: 72
DRG: 291 | End: 2022-12-17
Attending: INTERNAL MEDICINE
Payer: MEDICARE

## 2022-12-17 DIAGNOSIS — R09.02 HYPOXIA: ICD-10-CM

## 2022-12-17 DIAGNOSIS — I48.91 ATRIAL FIBRILLATION WITH RAPID VENTRICULAR RESPONSE (HCC): Primary | ICD-10-CM

## 2022-12-17 DIAGNOSIS — I50.22 SYSTOLIC CHF, CHRONIC (HCC): ICD-10-CM

## 2022-12-17 LAB
ALBUMIN SERPL-MCNC: 3.5 G/DL (ref 3.5–5)
ALBUMIN SERPL-MCNC: 3.7 G/DL (ref 3.5–5)
ALBUMIN/GLOB SERPL: 0.9 {RATIO} (ref 1.1–2.2)
ALBUMIN/GLOB SERPL: 1.1 {RATIO} (ref 1.1–2.2)
ALP SERPL-CCNC: 94 U/L (ref 45–117)
ALP SERPL-CCNC: 98 U/L (ref 45–117)
ALT SERPL-CCNC: 82 U/L (ref 12–78)
ALT SERPL-CCNC: 87 U/L (ref 12–78)
ANION GAP SERPL CALC-SCNC: 11 MMOL/L (ref 5–15)
ANION GAP SERPL CALC-SCNC: 5 MMOL/L (ref 5–15)
AST SERPL-CCNC: 136 U/L (ref 15–37)
AST SERPL-CCNC: 44 U/L (ref 15–37)
ATRIAL RATE: 113 BPM
B PERT DNA SPEC QL NAA+PROBE: NOT DETECTED
BASOPHILS # BLD: 0 K/UL (ref 0–0.1)
BASOPHILS # BLD: 0.1 K/UL (ref 0–0.1)
BASOPHILS NFR BLD: 0 % (ref 0–1)
BASOPHILS NFR BLD: 1 % (ref 0–1)
BILIRUB SERPL-MCNC: 0.4 MG/DL (ref 0.2–1)
BILIRUB SERPL-MCNC: 0.5 MG/DL (ref 0.2–1)
BNP SERPL-MCNC: 6290 PG/ML (ref 0–125)
BNP SERPL-MCNC: ABNORMAL PG/ML
BORDETELLA PARAPERTUSSIS PCR, BORPAR: NOT DETECTED
BUN SERPL-MCNC: 15 MG/DL (ref 6–20)
BUN SERPL-MCNC: 18 MG/DL (ref 6–20)
BUN/CREAT SERPL: 13 (ref 12–20)
BUN/CREAT SERPL: 15 (ref 12–20)
C PNEUM DNA SPEC QL NAA+PROBE: NOT DETECTED
CALCIUM SERPL-MCNC: 8.6 MG/DL (ref 8.5–10.1)
CALCIUM SERPL-MCNC: 8.7 MG/DL (ref 8.5–10.1)
CALCULATED R AXIS, ECG10: -14 DEGREES
CALCULATED T AXIS, ECG11: 176 DEGREES
CHLORIDE SERPL-SCNC: 105 MMOL/L (ref 97–108)
CHLORIDE SERPL-SCNC: 106 MMOL/L (ref 97–108)
CO2 SERPL-SCNC: 22 MMOL/L (ref 21–32)
CO2 SERPL-SCNC: 23 MMOL/L (ref 21–32)
COMMENT, HOLDF: NORMAL
COMMENT, HOLDF: NORMAL
CORTIS SERPL-MCNC: 92.3 UG/DL
COVID-19 RAPID TEST, COVR: NOT DETECTED
CREAT SERPL-MCNC: 0.97 MG/DL (ref 0.55–1.02)
CREAT SERPL-MCNC: 1.42 MG/DL (ref 0.55–1.02)
DIAGNOSIS, 93000: NORMAL
DIFFERENTIAL METHOD BLD: ABNORMAL
DIFFERENTIAL METHOD BLD: ABNORMAL
EOSINOPHIL # BLD: 0 K/UL (ref 0–0.4)
EOSINOPHIL # BLD: 0 K/UL (ref 0–0.4)
EOSINOPHIL NFR BLD: 0 % (ref 0–7)
EOSINOPHIL NFR BLD: 0 % (ref 0–7)
ERYTHROCYTE [DISTWIDTH] IN BLOOD BY AUTOMATED COUNT: 16.4 % (ref 11.5–14.5)
ERYTHROCYTE [DISTWIDTH] IN BLOOD BY AUTOMATED COUNT: 16.9 % (ref 11.5–14.5)
FLUAV AG NPH QL IA: NEGATIVE
FLUAV H1 2009 PAND RNA SPEC QL NAA+PROBE: NOT DETECTED
FLUAV H1 RNA SPEC QL NAA+PROBE: DETECTED
FLUAV H3 RNA SPEC QL NAA+PROBE: NOT DETECTED
FLUAV SUBTYP SPEC NAA+PROBE: NOT DETECTED
FLUBV AG NOSE QL IA: NEGATIVE
FLUBV RNA SPEC QL NAA+PROBE: NOT DETECTED
GLOBULIN SER CALC-MCNC: 3.5 G/DL (ref 2–4)
GLOBULIN SER CALC-MCNC: 3.9 G/DL (ref 2–4)
GLUCOSE SERPL-MCNC: 170 MG/DL (ref 65–100)
GLUCOSE SERPL-MCNC: 251 MG/DL (ref 65–100)
HADV DNA SPEC QL NAA+PROBE: NOT DETECTED
HCOV 229E RNA SPEC QL NAA+PROBE: NOT DETECTED
HCOV HKU1 RNA SPEC QL NAA+PROBE: NOT DETECTED
HCOV NL63 RNA SPEC QL NAA+PROBE: NOT DETECTED
HCOV OC43 RNA SPEC QL NAA+PROBE: NOT DETECTED
HCT VFR BLD AUTO: 36.4 % (ref 35–47)
HCT VFR BLD AUTO: 41.7 % (ref 35–47)
HGB BLD-MCNC: 11.1 G/DL (ref 11.5–16)
HGB BLD-MCNC: 12.5 G/DL (ref 11.5–16)
HMPV RNA SPEC QL NAA+PROBE: NOT DETECTED
HPIV1 RNA SPEC QL NAA+PROBE: NOT DETECTED
HPIV2 RNA SPEC QL NAA+PROBE: NOT DETECTED
HPIV3 RNA SPEC QL NAA+PROBE: NOT DETECTED
HPIV4 RNA SPEC QL NAA+PROBE: NOT DETECTED
IMM GRANULOCYTES # BLD AUTO: 0 K/UL (ref 0–0.04)
IMM GRANULOCYTES # BLD AUTO: 0.1 K/UL (ref 0–0.04)
IMM GRANULOCYTES NFR BLD AUTO: 0 % (ref 0–0.5)
IMM GRANULOCYTES NFR BLD AUTO: 1 % (ref 0–0.5)
INR PPP: 1.2 (ref 0.9–1.1)
LACTATE SERPL-SCNC: 1.7 MMOL/L (ref 0.4–2)
LYMPHOCYTES # BLD: 0.3 K/UL (ref 0.8–3.5)
LYMPHOCYTES # BLD: 0.7 K/UL (ref 0.8–3.5)
LYMPHOCYTES NFR BLD: 4 % (ref 12–49)
LYMPHOCYTES NFR BLD: 5 % (ref 12–49)
M PNEUMO DNA SPEC QL NAA+PROBE: NOT DETECTED
MAGNESIUM SERPL-MCNC: 2.1 MG/DL (ref 1.6–2.4)
MCH RBC QN AUTO: 23.7 PG (ref 26–34)
MCH RBC QN AUTO: 24.4 PG (ref 26–34)
MCHC RBC AUTO-ENTMCNC: 30 G/DL (ref 30–36.5)
MCHC RBC AUTO-ENTMCNC: 30.5 G/DL (ref 30–36.5)
MCV RBC AUTO: 77.6 FL (ref 80–99)
MCV RBC AUTO: 81.4 FL (ref 80–99)
MONOCYTES # BLD: 0.4 K/UL (ref 0–1)
MONOCYTES # BLD: 0.7 K/UL (ref 0–1)
MONOCYTES NFR BLD: 5 % (ref 5–13)
MONOCYTES NFR BLD: 5 % (ref 5–13)
NEUTS SEG # BLD: 12.8 K/UL (ref 1.8–8)
NEUTS SEG # BLD: 7.5 K/UL (ref 1.8–8)
NEUTS SEG NFR BLD: 89 % (ref 32–75)
NEUTS SEG NFR BLD: 90 % (ref 32–75)
NRBC # BLD: 0 K/UL (ref 0–0.01)
NRBC # BLD: 0.02 K/UL (ref 0–0.01)
NRBC BLD-RTO: 0 PER 100 WBC
NRBC BLD-RTO: 0.1 PER 100 WBC
PHOSPHATE SERPL-MCNC: 6.6 MG/DL (ref 2.6–4.7)
PLATELET # BLD AUTO: 246 K/UL (ref 150–400)
PLATELET # BLD AUTO: 326 K/UL (ref 150–400)
PMV BLD AUTO: 11.7 FL (ref 8.9–12.9)
PMV BLD AUTO: 12 FL (ref 8.9–12.9)
POTASSIUM SERPL-SCNC: 3.6 MMOL/L (ref 3.5–5.1)
POTASSIUM SERPL-SCNC: 4 MMOL/L (ref 3.5–5.1)
PROT SERPL-MCNC: 7.2 G/DL (ref 6.4–8.2)
PROT SERPL-MCNC: 7.4 G/DL (ref 6.4–8.2)
PROTHROMBIN TIME: 12.8 SEC (ref 9–11.1)
Q-T INTERVAL, ECG07: 314 MS
QRS DURATION, ECG06: 118 MS
QTC CALCULATION (BEZET), ECG08: 462 MS
RBC # BLD AUTO: 4.69 M/UL (ref 3.8–5.2)
RBC # BLD AUTO: 5.12 M/UL (ref 3.8–5.2)
RBC MORPH BLD: ABNORMAL
RSV RNA SPEC QL NAA+PROBE: NOT DETECTED
RV+EV RNA SPEC QL NAA+PROBE: NOT DETECTED
SAMPLES BEING HELD,HOLD: NORMAL
SAMPLES BEING HELD,HOLD: NORMAL
SARS-COV-2 PCR, COVPCR: NOT DETECTED
SODIUM SERPL-SCNC: 133 MMOL/L (ref 136–145)
SODIUM SERPL-SCNC: 139 MMOL/L (ref 136–145)
SOURCE, COVRS: NORMAL
TROPONIN-HIGH SENSITIVITY: 22 NG/L (ref 0–51)
TROPONIN-HIGH SENSITIVITY: 31 NG/L (ref 0–51)
TROPONIN-HIGH SENSITIVITY: 56 NG/L (ref 0–51)
TSH SERPL DL<=0.05 MIU/L-ACNC: 2.81 UIU/ML (ref 0.36–3.74)
VENTRICULAR RATE, ECG03: 130 BPM
WBC # BLD AUTO: 14.3 K/UL (ref 3.6–11)
WBC # BLD AUTO: 8.3 K/UL (ref 3.6–11)

## 2022-12-17 PROCEDURE — 83880 ASSAY OF NATRIURETIC PEPTIDE: CPT

## 2022-12-17 PROCEDURE — 74011000250 HC RX REV CODE- 250: Performed by: STUDENT IN AN ORGANIZED HEALTH CARE EDUCATION/TRAINING PROGRAM

## 2022-12-17 PROCEDURE — 84484 ASSAY OF TROPONIN QUANT: CPT

## 2022-12-17 PROCEDURE — 99223 1ST HOSP IP/OBS HIGH 75: CPT | Performed by: SPECIALIST

## 2022-12-17 PROCEDURE — 5A09357 ASSISTANCE WITH RESPIRATORY VENTILATION, LESS THAN 24 CONSECUTIVE HOURS, CONTINUOUS POSITIVE AIRWAY PRESSURE: ICD-10-PCS | Performed by: INTERNAL MEDICINE

## 2022-12-17 PROCEDURE — 85610 PROTHROMBIN TIME: CPT

## 2022-12-17 PROCEDURE — 94640 AIRWAY INHALATION TREATMENT: CPT

## 2022-12-17 PROCEDURE — 87804 INFLUENZA ASSAY W/OPTIC: CPT

## 2022-12-17 PROCEDURE — 83735 ASSAY OF MAGNESIUM: CPT

## 2022-12-17 PROCEDURE — 74011000250 HC RX REV CODE- 250: Performed by: INTERNAL MEDICINE

## 2022-12-17 PROCEDURE — 85025 COMPLETE CBC W/AUTO DIFF WBC: CPT

## 2022-12-17 PROCEDURE — 82533 TOTAL CORTISOL: CPT

## 2022-12-17 PROCEDURE — 74011250637 HC RX REV CODE- 250/637: Performed by: INTERNAL MEDICINE

## 2022-12-17 PROCEDURE — 74011250636 HC RX REV CODE- 250/636: Performed by: STUDENT IN AN ORGANIZED HEALTH CARE EDUCATION/TRAINING PROGRAM

## 2022-12-17 PROCEDURE — 74011000250 HC RX REV CODE- 250: Performed by: EMERGENCY MEDICINE

## 2022-12-17 PROCEDURE — 94660 CPAP INITIATION&MGMT: CPT

## 2022-12-17 PROCEDURE — 83605 ASSAY OF LACTIC ACID: CPT

## 2022-12-17 PROCEDURE — 0202U NFCT DS 22 TRGT SARS-COV-2: CPT

## 2022-12-17 PROCEDURE — 74011000258 HC RX REV CODE- 258: Performed by: INTERNAL MEDICINE

## 2022-12-17 PROCEDURE — 99285 EMERGENCY DEPT VISIT HI MDM: CPT

## 2022-12-17 PROCEDURE — 84100 ASSAY OF PHOSPHORUS: CPT

## 2022-12-17 PROCEDURE — 71045 X-RAY EXAM CHEST 1 VIEW: CPT

## 2022-12-17 PROCEDURE — 84443 ASSAY THYROID STIM HORMONE: CPT

## 2022-12-17 PROCEDURE — 93005 ELECTROCARDIOGRAM TRACING: CPT

## 2022-12-17 PROCEDURE — 87635 SARS-COV-2 COVID-19 AMP PRB: CPT

## 2022-12-17 PROCEDURE — 80053 COMPREHEN METABOLIC PANEL: CPT

## 2022-12-17 PROCEDURE — 94664 DEMO&/EVAL PT USE INHALER: CPT

## 2022-12-17 PROCEDURE — 74011250637 HC RX REV CODE- 250/637: Performed by: STUDENT IN AN ORGANIZED HEALTH CARE EDUCATION/TRAINING PROGRAM

## 2022-12-17 PROCEDURE — 74011250636 HC RX REV CODE- 250/636: Performed by: INTERNAL MEDICINE

## 2022-12-17 PROCEDURE — 65620000000 HC RM CCU GENERAL

## 2022-12-17 PROCEDURE — 36415 COLL VENOUS BLD VENIPUNCTURE: CPT

## 2022-12-17 PROCEDURE — 74011000258 HC RX REV CODE- 258: Performed by: EMERGENCY MEDICINE

## 2022-12-17 RX ORDER — BUMETANIDE 0.25 MG/ML
2 INJECTION INTRAMUSCULAR; INTRAVENOUS
Status: COMPLETED | OUTPATIENT
Start: 2022-12-17 | End: 2022-12-17

## 2022-12-17 RX ORDER — ACETAMINOPHEN 650 MG/1
650 SUPPOSITORY RECTAL
Status: DISCONTINUED | OUTPATIENT
Start: 2022-12-17 | End: 2022-12-22 | Stop reason: HOSPADM

## 2022-12-17 RX ORDER — METOPROLOL TARTRATE 5 MG/5ML
10 INJECTION INTRAVENOUS ONCE
Status: COMPLETED | OUTPATIENT
Start: 2022-12-17 | End: 2022-12-17

## 2022-12-17 RX ORDER — SODIUM CHLORIDE 0.9 % (FLUSH) 0.9 %
5-40 SYRINGE (ML) INJECTION AS NEEDED
Status: DISCONTINUED | OUTPATIENT
Start: 2022-12-17 | End: 2022-12-22 | Stop reason: HOSPADM

## 2022-12-17 RX ORDER — ONDANSETRON 2 MG/ML
4 INJECTION INTRAMUSCULAR; INTRAVENOUS
Status: DISCONTINUED | OUTPATIENT
Start: 2022-12-17 | End: 2022-12-17 | Stop reason: SDUPTHER

## 2022-12-17 RX ORDER — MAGNESIUM SULFATE HEPTAHYDRATE 40 MG/ML
2 INJECTION, SOLUTION INTRAVENOUS ONCE
Status: COMPLETED | OUTPATIENT
Start: 2022-12-17 | End: 2022-12-17

## 2022-12-17 RX ORDER — DILTIAZEM HYDROCHLORIDE 5 MG/ML
10 INJECTION INTRAVENOUS
Status: COMPLETED | OUTPATIENT
Start: 2022-12-17 | End: 2022-12-17

## 2022-12-17 RX ORDER — SODIUM CHLORIDE 0.9 % (FLUSH) 0.9 %
5-40 SYRINGE (ML) INJECTION EVERY 8 HOURS
Status: DISCONTINUED | OUTPATIENT
Start: 2022-12-17 | End: 2022-12-18 | Stop reason: SDUPTHER

## 2022-12-17 RX ORDER — IPRATROPIUM BROMIDE AND ALBUTEROL SULFATE 2.5; .5 MG/3ML; MG/3ML
3 SOLUTION RESPIRATORY (INHALATION)
Status: DISCONTINUED | OUTPATIENT
Start: 2022-12-17 | End: 2022-12-21

## 2022-12-17 RX ORDER — BUMETANIDE 0.25 MG/ML
1 INJECTION INTRAMUSCULAR; INTRAVENOUS 2 TIMES DAILY
Status: DISCONTINUED | OUTPATIENT
Start: 2022-12-17 | End: 2022-12-18

## 2022-12-17 RX ORDER — OSELTAMIVIR PHOSPHATE 75 MG/1
75 CAPSULE ORAL ONCE
Status: COMPLETED | OUTPATIENT
Start: 2022-12-17 | End: 2022-12-17

## 2022-12-17 RX ORDER — ONDANSETRON 2 MG/ML
4 INJECTION INTRAMUSCULAR; INTRAVENOUS
Status: DISCONTINUED | OUTPATIENT
Start: 2022-12-17 | End: 2022-12-22 | Stop reason: HOSPADM

## 2022-12-17 RX ORDER — METOPROLOL TARTRATE 5 MG/5ML
INJECTION INTRAVENOUS
Status: DISPENSED
Start: 2022-12-17 | End: 2022-12-18

## 2022-12-17 RX ORDER — ASPIRIN 81 MG/1
81 TABLET ORAL AS NEEDED
Status: DISCONTINUED | OUTPATIENT
Start: 2022-12-17 | End: 2022-12-18

## 2022-12-17 RX ORDER — ACETAMINOPHEN 325 MG/1
650 TABLET ORAL
Status: DISCONTINUED | OUTPATIENT
Start: 2022-12-17 | End: 2022-12-22 | Stop reason: HOSPADM

## 2022-12-17 RX ORDER — FLUOXETINE HYDROCHLORIDE 20 MG/1
40 CAPSULE ORAL DAILY
Status: DISCONTINUED | OUTPATIENT
Start: 2022-12-18 | End: 2022-12-18

## 2022-12-17 RX ORDER — ACETAMINOPHEN 500 MG
1000 TABLET ORAL
Status: ACTIVE | OUTPATIENT
Start: 2022-12-17 | End: 2022-12-17

## 2022-12-17 RX ORDER — ONDANSETRON 4 MG/1
4 TABLET, ORALLY DISINTEGRATING ORAL
Status: DISCONTINUED | OUTPATIENT
Start: 2022-12-17 | End: 2022-12-22 | Stop reason: HOSPADM

## 2022-12-17 RX ORDER — AMOXICILLIN 250 MG
1 CAPSULE ORAL 2 TIMES DAILY
Status: DISCONTINUED | OUTPATIENT
Start: 2022-12-17 | End: 2022-12-22 | Stop reason: HOSPADM

## 2022-12-17 RX ORDER — POLYETHYLENE GLYCOL 3350 17 G/17G
17 POWDER, FOR SOLUTION ORAL DAILY PRN
Status: DISCONTINUED | OUTPATIENT
Start: 2022-12-17 | End: 2022-12-22 | Stop reason: HOSPADM

## 2022-12-17 RX ORDER — METOPROLOL TARTRATE 5 MG/5ML
INJECTION INTRAVENOUS
Status: CANCELLED | OUTPATIENT
Start: 2022-12-17

## 2022-12-17 RX ORDER — SODIUM CHLORIDE 0.9 % (FLUSH) 0.9 %
5-40 SYRINGE (ML) INJECTION EVERY 8 HOURS
Status: DISCONTINUED | OUTPATIENT
Start: 2022-12-17 | End: 2022-12-22 | Stop reason: HOSPADM

## 2022-12-17 RX ORDER — LETROZOLE 2.5 MG/1
2.5 TABLET, FILM COATED ORAL DAILY
Status: DISCONTINUED | OUTPATIENT
Start: 2022-12-18 | End: 2022-12-21

## 2022-12-17 RX ORDER — OSELTAMIVIR PHOSPHATE 30 MG/1
30 CAPSULE ORAL 2 TIMES DAILY
Status: DISCONTINUED | OUTPATIENT
Start: 2022-12-18 | End: 2022-12-20

## 2022-12-17 RX ORDER — POLYETHYLENE GLYCOL 3350 17 G/17G
17 POWDER, FOR SOLUTION ORAL DAILY
Status: DISCONTINUED | OUTPATIENT
Start: 2022-12-18 | End: 2022-12-22 | Stop reason: HOSPADM

## 2022-12-17 RX ORDER — EZETIMIBE 10 MG/1
10 TABLET ORAL DAILY
Status: DISCONTINUED | OUTPATIENT
Start: 2022-12-18 | End: 2022-12-18

## 2022-12-17 RX ORDER — ADHESIVE BANDAGE
30 BANDAGE TOPICAL DAILY PRN
Status: DISCONTINUED | OUTPATIENT
Start: 2022-12-17 | End: 2022-12-22 | Stop reason: HOSPADM

## 2022-12-17 RX ADMIN — OSELTAMIVIR PHOSPHATE 75 MG: 75 CAPSULE ORAL at 21:14

## 2022-12-17 RX ADMIN — ACETAMINOPHEN 650 MG: 325 TABLET ORAL at 21:13

## 2022-12-17 RX ADMIN — AMIODARONE HYDROCHLORIDE 1 MG/MIN: 50 INJECTION, SOLUTION INTRAVENOUS at 17:31

## 2022-12-17 RX ADMIN — AMIODARONE HYDROCHLORIDE 0.5 MG/MIN: 50 INJECTION, SOLUTION INTRAVENOUS at 23:36

## 2022-12-17 RX ADMIN — SENNOSIDES AND DOCUSATE SODIUM 1 TABLET: 50; 8.6 TABLET ORAL at 21:14

## 2022-12-17 RX ADMIN — APIXABAN 5 MG: 5 TABLET, FILM COATED ORAL at 18:23

## 2022-12-17 RX ADMIN — METOPROLOL TARTRATE 10 MG: 5 INJECTION, SOLUTION INTRAVENOUS at 16:23

## 2022-12-17 RX ADMIN — MAGNESIUM SULFATE HEPTAHYDRATE 2 G: 40 INJECTION, SOLUTION INTRAVENOUS at 17:29

## 2022-12-17 RX ADMIN — SODIUM CHLORIDE, PRESERVATIVE FREE 10 ML: 5 INJECTION INTRAVENOUS at 15:12

## 2022-12-17 RX ADMIN — DILTIAZEM HYDROCHLORIDE 2.5 MG/HR: 5 INJECTION INTRAVENOUS at 16:52

## 2022-12-17 RX ADMIN — AMIODARONE HYDROCHLORIDE 1 MG/MIN: 50 INJECTION, SOLUTION INTRAVENOUS at 15:09

## 2022-12-17 RX ADMIN — BUMETANIDE 1 MG: 0.25 INJECTION, SOLUTION INTRAMUSCULAR; INTRAVENOUS at 16:07

## 2022-12-17 RX ADMIN — DEXTROSE MONOHYDRATE 150 MG: 50 INJECTION, SOLUTION INTRAVENOUS at 14:41

## 2022-12-17 RX ADMIN — IPRATROPIUM BROMIDE AND ALBUTEROL SULFATE 3 ML: .5; 3 SOLUTION RESPIRATORY (INHALATION) at 20:48

## 2022-12-17 RX ADMIN — SODIUM CHLORIDE, PRESERVATIVE FREE 10 ML: 5 INJECTION INTRAVENOUS at 21:15

## 2022-12-17 RX ADMIN — DILTIAZEM HYDROCHLORIDE 10 MG/HR: 5 INJECTION, SOLUTION INTRAVENOUS at 10:32

## 2022-12-17 RX ADMIN — BUMETANIDE 2 MG: 0.25 INJECTION INTRAMUSCULAR; INTRAVENOUS at 10:21

## 2022-12-17 RX ADMIN — DILTIAZEM HYDROCHLORIDE 10 MG: 5 INJECTION INTRAVENOUS at 10:30

## 2022-12-17 NOTE — ROUTINE PROCESS
TRANSFER - OUT REPORT:    Verbal report given to RN(name) on Teresita Wilson  being transferred to CCU(unit) for urgent transfer       Report consisted of patients Situation, Background, Assessment and   Recommendations(SBAR). Information from the following report(s) SBAR, ED Summary, Intake/Output, MAR, Recent Results, and Cardiac Rhythm    was reviewed with the receiving nurse. Lines:   Peripheral IV 12/17/22 Anterior;Right Hand (Active)       Peripheral IV 12/17/22 Right Hand (Active)   Site Assessment Clean, dry, & intact 12/17/22 0958   Phlebitis Assessment 0 12/17/22 0958   Infiltration Assessment 0 12/17/22 0958   Dressing Status Clean, dry, & intact 12/17/22 0958       Peripheral IV 12/17/22 (Active)   Site Assessment Clean, dry, & intact 12/17/22 1127   Phlebitis Assessment 1 12/17/22 1127   Infiltration Assessment 1 12/17/22 1127   Dressing Status Clean, dry, & intact 12/17/22 1127   Dressing Type Tape 12/17/22 1127        Opportunity for questions and clarification was provided.       Patient transported with:  Monitor  BiPAP  RN

## 2022-12-17 NOTE — ED NOTES
TRANSFER - OUT REPORT:    Verbal report given to Lexus Patel RN(name) on Frank Lara  being transferred to (unit) for routine progression of care       Report consisted of patients Situation, Background, Assessment and   Recommendations(SBAR). Information from the following report(s) SBAR, Kardex, Intake/Output, MAR, Accordion, Recent Results, and Med Rec Status was reviewed with the receiving nurse. Lines:   Peripheral IV 12/17/22 Anterior;Right Hand (Active)       Peripheral IV 12/17/22 Right Hand (Active)   Site Assessment Clean, dry, & intact 12/17/22 0958   Phlebitis Assessment 0 12/17/22 0958   Infiltration Assessment 0 12/17/22 0958   Dressing Status Clean, dry, & intact 12/17/22 0958       Peripheral IV 12/17/22 (Active)   Site Assessment Clean, dry, & intact 12/17/22 1127   Phlebitis Assessment 1 12/17/22 1127   Infiltration Assessment 1 12/17/22 1127   Dressing Status Clean, dry, & intact 12/17/22 1127   Dressing Type Tape 12/17/22 1127        Opportunity for questions and clarification was provided.       Patient transported with:   Monitor  Vanda  Registered Nurse

## 2022-12-17 NOTE — ED TRIAGE NOTES
Patient arrives ambulatory to the ED with complaints of fatigue, shortness of breath, chest tightness, and cough. She states that she started feeling sick yesterday. She reports that her grandson has the flu. Upon arrival to the ED, her O2 sats were 88% on room air. Placed on 2L nasal cannula with improvement to 93%.

## 2022-12-17 NOTE — ED TRIAGE NOTES
Pt arrives as transfer from [facility] for admit for afib w/ rvr and hypoxia. Pt arrives on bipap in no acute distress. A&O x 4.

## 2022-12-17 NOTE — CONSULTS
[facility]  Cardiology Care Note                  []Initial visit     []Established visit     Patient Name: Charlette Dakins - TGY:[id number]  Primary Cardiologist: Gonzalo Louie MD  Consulting Cardiologist: Erica Correa MD     Reason for initial visit: rapid afib and pulmonary edema    HPI:     She is a morbidly obese 24-year-old female with an ischemic cardiomyopathy and AICD, paroxysmal A. fib, hypertension, and coronary artery disease. Her most recent heart catheterization was in late September at [facility] and I have reviewed and summarized that study below. Earlier in the week she had some nasal congestion and a mild cough but no real fever. 2 days ago she actually felt fairly good and had an office visit with Dr. Griselda Mariee. Her her vitals were stable and she was not hypoxic and she was found to be in A. fib with a heart rate of about 100 bpm.  Her device was interrogated and it turned out that she has been in A. fib about half the time since it was last checked. She was started on amiodarone in addition to her Cardizem with the thought that if she did not convert on her own she be cardioverted on drug and if that failed she would be offered an AV node ablation. Yesterday she began to feel bad with fatigue shortness of breath intermittently productive cough body aches and a headache. Her grandson took her to urgent care this morning and her COVID test was negative. She still was not feeling well so she came to the emergency room here where she was noted to be hypoxic and in rapid A. fib. Her flu test was negative. She was given a milligram of IV Bumex and started on a Cardizem drip. When she arrived to the floor she was given a bolus of amiodarone and her Cardizem drip was stopped. She is taken no of her usual p.o. meds today.   Shortly after all this she became more tachypneic and hypoxic and she was put back on BiPAP and at the time I saw her her heart rate was about 110 her oxygen saturations were 97% and her blood pressure was 170/100. SUBJECTIVE:    She is still fairly short of breath and not able to provide a lot of history though she does feel better since she got here. She says she has been having a lot of urine output but nothing is been recorded as best I can tell she does not have a Hdz. Assessment and Plan     I suspect there is some sort of upper respiratory infection driving her A. fib rate and is caused her to go into congestive heart failure. I could not question her closely about recent potential dietary indiscretion. She did tell me that she weighs her self every day and her weight has been stable. Her EKG and troponins thus far do not suggest an ischemic event which I think would be unusual given her recent cath findings. For now I would continue to support her with oxygen and diurese her as aggressively as her blood pressure and renal function will allow. We should resume her usual outpatient medications as soon as possible. It sounds like an echocardiogram has been ordered but I do not think that is going to change her management in any significant way as            ____________________________________________________________    Cardiac testing    Received records from admission at [facility].     ECG (09/22/2022) showed AF with controlled ventricular rate. RHC/LHC (09/28/2022): LVEDP 15-20 mmHg. LM with MLI. LCx with mild diffuse disease;  in OM with left to left & right to left collaterals. LAD with prior stent with AUBREY-3 flow; otherwise MLI. RCA with very mild ISR in proximal segment, otherwise MLI. Labs (09/28/2022)  WBC 6.66  Hgb 11.2  Hct 36.3  Plt 264  Na 144  K 3.3    08/09/21    ECHO ADULT COMPLETE 08/10/2021 8/10/2021    Interpretation Summary  · LV: Estimated LVEF is 25 - 30%. Dilated left ventricle.  Mildly increased wall thickness. Severely reduced systolic function. There is global dysfunction with distinct regional wall motion abnormalities. · LA: Severely dilated left atrium. Left Atrium volume index is 53 mL/m2. · MV: Mitral valve thickening. Moderate mitral valve regurgitation is present. · AV: Aortic valve leaflet calcification present. Aortic valve mean gradient is 11 mmHg. Aortic valve area is 1.4 cm2. Mild aortic valve stenosis is present. · Poor parasternal windows    Signed by: Angelica Connors MD on 8/10/2021  5:25 PM        NUCLEAR CARDIAC STRESS TEST 08/13/2021 8/16/2021    Interpretation Summary  · SPECT: Left ventricular function post-stress was abnormal. Calculated ejection fraction is 22%. There is no evidence of transient ischemic dilation (TID). The TID ratio is 0.86. · Baseline ECG: Normal sinus rhythm. · SPECT: Myocardial perfusion imaging defect 1: There is a defect that is large in size with a severe reduction in uptake present in the lateral location(s) that is partially reversible. There is abnormal wall motion in the defect area. The defect appears to be infarction with esther-infarct ischemia. Perfusion defect was visually and quantitatively present. · SPECT: Left ventricular perfusion is probably abnormal. Myocardial perfusion imaging supports a high risk stress test.    Signed by: Angelica Connors MD on 8/13/2021  8:26 AM    08/13/21    CARDIAC PROCEDURE 08/13/2021 8/13/2021    Conclusion  Findings:  1)Normal LVEDP  2)Severe native 1 vessel CAD with  of ramus intermedius. LCx  Is small with occluded distal LCx. OM2 and distal ramus fill from collaterals from LAD, diagonal and RCA. 3)Limited wire probing suggests no micro channel in Ramus ISR . Proximal cap start before the stent    Access: Right radial no issues  Contrast 40 cc    Recommendations  1)Continue GDMT and weight loss. If dyspnea continue may consider Ramus  PCI.  Uncertain if benefit will be substantial.  2)GDMT for CAD    Signed by: Pham Rubalcava MD on 8/13/2021 10:34 AM        Most recent HS troponins:  Recent Labs     12/17/22  1317 12/17/22  0938   TROPHS 31 22     ECG: atrial fibrillation, rate 110  Review of Systems    []All other systems reviewed and all negative except as written in HPI    [] Patient unable to provide secondary to condition         Past Medical History:   Diagnosis Date    Acute right ankle pain 06/08/2018 5/29/18: X-ray showed possible right ankle sprain. 6/6/18: saw Dr. Jonny Thompson ([facility]), diagnosed with peroneal tendonitis. Prescribed an ASO    Asthma     Atrial fibrillation ([address]     Cardiomyopathy ([address]     Coronary artery disease 2008    s/p RCA stent ([facility]) on 11/26/11    Depression with anxiety     2011    DVT (deep venous thrombosis) ([address] 07/27/2010    Family history of early CAD     GERD (gastroesophageal reflux disease)     H/O gastric bypass 2006    Revision in 2009    Hepatitis C antibody test positive     Does not have chronic hep C (labs 10/6/15: neg HCV RNA)     HTN (hypertension) 07/27/2010    Hyperlipidemia 07/27/2010    Incontinence of feces 09/03/2018    Dr. Pedro Haider. 8/20/18: Improving with pelvic physical therapy and psyllium husk treatment. Saw Dr. Leonor Navas who suggested PT first. MR defecography showed pelvic floor weakness with pelvic descensus, small anterior  Rectocele, and vaginal prolapse. To have pelvic PT weekly for 8 wks and to see Dr. Leonor Navas again in Oct 2018.     Joint pain 07/27/2010    MI (myocardial infarction) ([address] 07/27/2010    Mitral valve regurgitation     Mild to moderate    Morbid obesity ([address] 07/27/2010    Myocardial infarction [facility]) 2006 and 2011    Dr. Laura August disorder     PUD (peptic ulcer disease)     gi bleed 2008; ulcer n gastric bypass pouch    Urinary incontinence, stress 07/27/2010     Past Surgical History:   Procedure Laterality Date    COLONOSCOPY N/A 6/29/2018    COLONOSCOPY performed by Radha Valiente MD at [facility]; redundant colon, int hemorrhoids, pathology: normal colonic mucosa    HX BREAST BIOPSY Left     benign    HX BREAST LUMPECTOMY Right 7/14/2022    RIGHT BREAST LUMPECTOMY WITH ULTRASOUND performed by Tammie Arroyo MD at [facility]    HX COLONOSCOPY  9/5/14    Dr. Radha Alegre; normal, repeat in 5 yrs    HX GASTRIC BYPASS      HX IMPLANTABLE CARDIOVERTER DEFIBRILLATOR  08/24/2016    HX LAP GASTRIC BYPASS  2006    revision 2009/Dr. Malka Rosales    HX OTHER SURGICAL  09/19/2016    Removal of right ventricular ICD lead & single chamber transvenous AICD    HX OTHER SURGICAL  10/12/2016    pocket revison of ICD; Dr. Fitzgerald Lips  06/07/2018    Dr Xander Smalls; high resolution anorectal manaometry-abnormal study. Study done for eval of fecal incontinence    HX OTHER SURGICAL  12/14/2018    Dr. Xander Smalls. Rectal endoscopic US (EUS) for rectal incontinence. Normal rectal mucosa, no fistulas. HX PACEMAKER      sicd/left side of chest under arm    INS PPM/ICD LED SING ONLY  8/24/2016         INS PPM/ICD LED SING ONLY  8/26/2016         INS PPM/ICD LED SING ONLY  9/21/2016         ME CARDIAC SURG PROCEDURE UNLIST      Stent x 5-6,Most recent 2011    ME LAP,STOMACH,OTHER,W/O TUBE  04/05/2010    Revision GBP     Social Hx:  reports that she quit smoking about 18 years ago. Her smoking use included cigarettes. She started smoking about 52 years ago. She has never used smokeless tobacco. She reports that she does not drink alcohol and does not use drugs. Family Hx: family history includes Alzheimer's Disease in her mother; Cancer in her father and mother; Dementia in her mother; Heart Attack in her brother; Heart Disease in her father and sister; Hypertension in her father; Stroke in her sister and sister.   Allergies   Allergen Reactions    Amoxicillin Anaphylaxis    Amoxicillin Anaphylaxis    Lipitor [Atorvastatin] Other (comments)     Severe muscle pain and spasms    Zocor [Simvastatin] Other (comments)     Cramps, muscle spasms    Buspar [Buspirone] Other (comments)     Drunk sensation, headaches    Hydroxyzine Other (comments)     Blurred vision, lightheadedness    Livalo [Pitavastatin] Myalgia    Pravastatin Other (comments)     Leg cramps    Tramadol Vertigo          OBJECTIVE:  Wt Readings from Last 3 Encounters:   12/17/22 230 lb 13.2 oz (104.7 kg)   12/15/22 229 lb (103.9 kg)   11/28/22 223 lb 6.4 oz (101.3 kg)       Intake/Output Summary (Last 24 hours) at 12/17/2022 1656  Last data filed at 12/17/2022 1451  Gross per 24 hour   Intake 149 ml   Output --   Net 149 ml         Physical Exam    Vitals:   Vitals:    12/17/22 1345 12/17/22 1400 12/17/22 1443 12/17/22 1519   BP: 129/84 126/88 (!) 132/99 (!) 144/94   Pulse: (!) 117 (!) 125 (!) 118 (!) 116   Resp: 21 22 17 24   Temp:    98 °F (36.7 °C)   SpO2: 96% 94% 97% 97%   Weight:         Telemetry: AFIB    Neck: supple, symmetrical, trachea midline, no adenopathy, no carotid bruit, and no JVD    General:    Alert, cooperative, no distress, appears stated age. Neck:   Supple, no carotid bruit and no JVD. Back:     Symmetric,    Lungs:     Coarse rhonchi throughout. Heart[de-identified]    irregular rate and rhythm, S1, S2 normal, no murmur, click, rub or gallop. Abdomen:     Soft, non-tender. Bowel sounds normal. Morbidly obese   Extremities:   Extremities normal, atraumatic, no cyanosis or edema. Vascular:   Pulses - pedal not palpable   Skin:   Skin color normal. No rashes or lesions on visible areas   Neurologic:   Alert, Moves all extremities. Data Review:     Radiology:   XR Results (most recent):  Results from Hospital Encounter encounter on 12/17/22    XR CHEST PORT    Narrative  EXAM: Portable CXR. 1620 hours. COMPARISON: 0912 hours. INDICATION: chest pain    FINDINGS:  Unchanged interstitial pulmonary edema. No focal consolidation. Borderline  cardiomegaly. Subcutaneous ICD.  No pneumothorax, midline shift or pleural  effusion. Impression  Stable interstitial pulmonary edema. CT Results (most recent):  Results from Hospital Encounter encounter on 02/23/17    CT SPINE CERV WO CONT    Narrative  EXAM:  CT CERVICAL SPINE WITHOUT CONTRAST    INDICATION: Ground-level fall this morning. COMPARISON: None. TECHNIQUE: Multislice helical CT of the cervical spine was performed without  intravenous contrast administration. Sagittal and coronal reconstructions were  generated. CT dose reduction was achieved through use of a standardized  protocol tailored for this examination and automatic exposure control for dose  modulation. Adaptive statistical iterative reconstruction (ASIR) was utilized. CONTRAST: None. FINDINGS:    The alignment is within normal limits. There is no fracture or subluxation. There is degenerative disc narrowing and marginal osteophyte formation at the  C5-6 and C6-7 levels. The odontoid process is intact. The craniocervical  junction is within normal limits. The prevertebral soft tissues are within  normal limits. There is no spinal canal or neural foraminal stenosis. The  surrounding soft tissue and musculoskeletal structures appear unremarkable. The  visualized lung apices are unremarkable. Impression  IMPRESSION: No acute findings. MRI Results (most recent):  Results from [place] encounter on 07/02/18    MRI PELVIC FLOOR STUDY     Narrative  EXAM: MR PELVIC FLOOR  INDICATION: Full incontinence of feces. CONTRAST: Sterile ultrasound gel was placed into the vagina and rectum. No  intravenous contrast was administered. Oral Volumen was administered. TECHNIQUE: MR of the pelvis was performed according to standard departmental  protocol. The patient voided prior to imaging. Following three plane localizer  images, sagittal, axial and coronal breath-hold T2 (HASTE) images as well as  breath-hold axial T1 in and out of phase images were acquired.  Static and  dynamic MR of the pelvic floor was performed using static sagittal axial and  coronal high-resolution T2 weighted images followed by dynamic sagittal midline  SSFSE images at rest, during squeezing (Kegel maneuver), during straining  (Valsalva maneuver) and during defecation. FINDINGS:  H line = width of the levator hiatus  M line = descent of the levator hiatus    At rest:  H line measures 5.7 cm  M line measures 23 mm. The anorectal angle measures 131 degrees. (Normal 108 to 127 degrees.)    With squeezing:  H line measures 4.7 cm  M line measures 8 mm. The anorectal angle measures 112 degrees. With straining:  H line measures 5.5 cm  M line measures 5 mm. The anorectal angle measures 121 degrees. With evacuation:  H line measures 5.3 cm  M line measures 4.5 mm. The anorectal angle measures 140 degrees. The puborectalis sling is intact. There is there is elevation of the pelvic  floor with squeezing and pelvic floor weakness with abnormal descent of the  pelvic floor with straining and evacuation. POSTERIOR COMPARTMENT: There is pelvic floor weakness with pelvic descensus with  an anterior rectocele and the patient is unable to fully evacuate the rectum. There is no rectal intussusception or navin rectal prolapse. ANTERIOR COMPARTMENT: There is minimal descent of the bladder neck below the  sacrococcygeal line. MIDDLE COMPARTMENT: The uterus is absent. There is prolapse of the vagina below  the sacrococcygeal line. Impression  IMPRESSION: Pelvic floor weakness with pelvic descensus. There is a small  anterior rectocele and prolapse of the vagina below the sacrococcygeal line. The  patient is unable to fully evacuate the rectum. No results for input(s): CPK, TROIQ in the last 72 hours.     No lab exists for component: CKQMB, CPKMB, BMPP  Recent Labs     12/17/22  0938 12/15/22  0958    139   K 3.6 4.6    107   CO2 23 27   BUN 15 17   CREA 0.97 0.90   * 101*   CA 8.7 9.4 Recent Labs     12/17/22  0938   WBC 8.3   HGB 11.1*   HCT 36.4        Recent Labs     12/17/22  0938 12/15/22  0958   AP 98 77     No results for input(s): CHOL, LDLC in the last 72 hours.     No lab exists for component: TGL, HDLC,  HBA1C  Recent Labs     12/15/22  0958   TSH 2.35           Current meds:    Current Facility-Administered Medications:     sodium chloride (NS) flush 5-40 mL, 5-40 mL, IntraVENous, Q8H, Feliz Freed MD    sodium chloride (NS) flush 5-40 mL, 5-40 mL, IntraVENous, PRN, Susan Wise MD    acetaminophen (TYLENOL) tablet 1,000 mg, 1,000 mg, Oral, NOW, Feliz Freed MD    sodium chloride (NS) flush 5-40 mL, 5-40 mL, IntraVENous, Q8H, Krish Jeffrey MD, 10 mL at 12/17/22 1512    sodium chloride (NS) flush 5-40 mL, 5-40 mL, IntraVENous, PRN, Krish Jeffrey MD    acetaminophen (TYLENOL) tablet 650 mg, 650 mg, Oral, Q6H PRN **OR** acetaminophen (TYLENOL) suppository 650 mg, 650 mg, Rectal, Q6H PRN, Krish Jeffrey MD    polyethylene glycol (MIRALAX) packet 17 g, 17 g, Oral, DAILY PRN, Krish Jeffrey MD    ondansetron (ZOFRAN ODT) tablet 4 mg, 4 mg, Oral, Q8H PRN **OR** ondansetron (ZOFRAN) injection 4 mg, 4 mg, IntraVENous, Q6H PRN, Krish Jeffrey MD    apixaban (ELIQUIS) tablet 5 mg, 5 mg, Oral, BID, Krish Jeffrey MD    bumetanide (BUMEX) injection 1 mg, 1 mg, IntraVENous, BID, Krish Jeffrey MD, 1 mg at 12/17/22 1607    amiodarone (CORDARONE) 375 mg/250 mL D5W infusion, 0.5-1 mg/min, IntraVENous, TITRATE, Krish Jeffrey MD, Stopped at 12/17/22 1614    dilTIAZem (CARDIZEM) 125 mg in dextrose 5% 125 mL infusion, 0-15 mg/hr, IntraVENous, TITRATE, Krish Jeffrey MD, Last Rate: 2.5 mL/hr at 12/17/22 1652, 2.5 mg/hr at 12/17/22 1652    [START ON 12/18/2022] FLUoxetine (PROzac) capsule 40 mg, 40 mg, Oral, DAILY, Derik Koo G, DO    magnesium sulfate 2 g/50 ml IVPB (premix or compounded), 2 g, IntraVENous, ONCE, Koo, Derik G, DO    [START ON 12/18/2022] polyethylene glycol (MIRALAX) packet 17 g, 17 g, Oral, DAILY, Koo, Derik G, DO    senna-docusate (PERICOLACE) 8.6-50 mg per tablet 1 Tablet, 1 Tablet, Oral, BID, Koo, Derik G, DO    magnesium hydroxide (MILK OF MAGNESIA) 400 mg/5 mL oral suspension 30 mL, 30 mL, Oral, DAILY PRN, Valetta Salt, DO    albuterol-ipratropium (DUO-NEB) 2.5 MG-0.5 MG/3 ML, 3 mL, Nebulization, Q6H RT, Koo, Derik G, DO    aspirin delayed-release tablet 81 mg, 81 mg, Oral, PRN, Valetta Salt, DO    [START ON 12/18/2022] ezetimibe (ZETIA) tablet 10 mg, 10 mg, Oral, DAILY, Valetta Salt, DO    [START ON 12/18/2022] letrozole [facility]) tablet 2.5 mg, 2.5 mg, Oral, DAILY, Valetta Salt, DO    Des Viera MD  [facility] [address] [place] [address] 13, [address] [place] [address],[address]  [facility]  [phone]      Clinton Mesa MD

## 2022-12-17 NOTE — ED NOTES
TRANSFER - OUT REPORT:    Verbal report given to Ollie Damian RN(name) on Shan Segura  being transferred to [address] [place] [address] ED(unit) for urgent transfer       Report consisted of patients Situation, Background, Assessment and   Recommendations(SBAR). Information from the following report(s) ED Summary was reviewed with the receiving nurse. Lines:   Peripheral IV 12/17/22 Anterior;Right Hand (Active)       Peripheral IV 12/17/22 Right Hand (Active)   Site Assessment Clean, dry, & intact 12/17/22 0958   Phlebitis Assessment 0 12/17/22 0958   Infiltration Assessment 0 12/17/22 0958   Dressing Status Clean, dry, & intact 12/17/22 0958       Peripheral IV 12/17/22 (Active)   Site Assessment Clean, dry, & intact 12/17/22 1127   Phlebitis Assessment 1 12/17/22 1127   Infiltration Assessment 1 12/17/22 1127   Dressing Status Clean, dry, & intact 12/17/22 1127   Dressing Type Tape 12/17/22 1127        Opportunity for questions and clarification was provided.       Patient transported with:   Monitor  O2 @ 40% liters

## 2022-12-17 NOTE — ED PROVIDER NOTES
History of hypertension, hyperlipidemia, coronary disease status post MI, atrial fibrillation, cardiomyopathy, obesity, asthma, depression/anxiety, DVT, GERD, status post gastric bypass, hepatitis C. She presents accompanied by her son with complaints of shortness of breath. She states that she developed \"cold symptoms\" with cough and congestion earlier in the week. She started to feel better 2 days ago but worsened yesterday. Since yesterday, she has had weakness/fatigue, dyspnea, body aches, headache, worsening cough and congestion. Her O2 sats were low upon arrival.  Her grandchild recently tested positive for influenza. Past Medical History:   Diagnosis Date    Acute right ankle pain 06/08/2018 5/29/18: X-ray showed possible right ankle sprain. 6/6/18: saw Dr. Nadia Lau ([facility]), diagnosed with peroneal tendonitis. Prescribed an ASO    Asthma     Atrial fibrillation ([address]     Cardiomyopathy ([facility]     Coronary artery disease 2008    s/p RCA stent (AMRIT) on 11/26/11    Depression with anxiety     2011    DVT (deep venous thrombosis) ([facility] [address] 07/27/2010    Family history of early CAD     GERD (gastroesophageal reflux disease)     H/O gastric bypass 2006    Revision in 2009    Hepatitis C antibody test positive     Does not have chronic hep C (labs 10/6/15: neg HCV RNA)     HTN (hypertension) 07/27/2010    Hyperlipidemia 07/27/2010    Incontinence of feces 09/03/2018    Dr. Valorie Kennedy. 8/20/18: Improving with pelvic physical therapy and psyllium husk treatment. Saw Dr. Malissa Moise who suggested PT first. MR defecography showed pelvic floor weakness with pelvic descensus, small anterior  Rectocele, and vaginal prolapse. To have pelvic PT weekly for 8 wks and to see Dr. Malissa Moise again in Oct 2018.     Joint pain 07/27/2010    MI (myocardial infarction) ([facility] 07/27/2010    Mitral valve regurgitation     Mild to moderate    Morbid obesity ([address] 07/27/2010    Myocardial infarction [facility]) 2006 and 2011    Dr. Nael Sen Jam    Psychiatric disorder     PUD (peptic ulcer disease)     gi bleed 2008; ulcer n gastric bypass pouch    Urinary incontinence, stress 07/27/2010       Past Surgical History:   Procedure Laterality Date    COLONOSCOPY N/A 6/29/2018    COLONOSCOPY performed by Pat Felix MD at [facility] ENDOSCOPY; redundant colon, int hemorrhoids, pathology: normal colonic mucosa    HX BREAST BIOPSY Left     benign    HX BREAST LUMPECTOMY Right 7/14/2022    RIGHT BREAST LUMPECTOMY WITH ULTRASOUND performed by Zulay Hansen MD at [place] [id number]    HX COLONOSCOPY  9/5/14    Dr. Romelia Mccall; normal, repeat in 5 yrs    HX GASTRIC BYPASS      HX IMPLANTABLE CARDIOVERTER DEFIBRILLATOR  08/24/2016    HX LAP GASTRIC BYPASS  2006    revision 2009/Dr. Christiane Desai    HX OTHER SURGICAL  09/19/2016    Removal of right ventricular ICD lead & single chamber transvenous AICD    HX OTHER SURGICAL  10/12/2016    pocket revison of ICD; Dr. Debo Limon  06/07/2018    Dr [place] Garcias; high resolution anorectal manaometry-abnormal study. Study done for eval of fecal incontinence    HX OTHER SURGICAL  12/14/2018    Dr. [place] Garcias. Rectal endoscopic US (EUS) for rectal incontinence. Normal rectal mucosa, no fistulas.     HX PACEMAKER      sicd/left side of chest under arm    INS PPM/ICD LED SING ONLY  8/24/2016         INS PPM/ICD LED SING ONLY  8/26/2016         INS PPM/ICD LED SING ONLY  9/21/2016         SD CARDIAC SURG PROCEDURE UNLIST      Stent x 5-6,Most recent 2011    SD LAP,STOMACH,OTHER,W/O TUBE  04/05/2010    Revision GBP         Family History:   Problem Relation Age of Onset    Dementia Mother     Cancer Mother         Colon    Alzheimer's Disease Mother     Cancer Father         Testical and stomach cancer    Heart Disease Father         Triple by pass    Hypertension Father         High blood pressure    Heart Disease Sister         Aortic replacement    Stroke Sister         Mini strokes    Stroke Sister     Heart Attack Brother     Anesth Problems Neg Hx        Social History     Socioeconomic History    Marital status:      Spouse name: Not on file    Number of children: Not on file    Years of education: Not on file    Highest education level: Not on file   Occupational History    Not on file   Tobacco Use    Smoking status: Former     Packs/day: 0.00     Years: 30.00     Pack years: 0.00     Types: Cigarettes     Start date: 1970     Quit date: 2004     Years since quittin.5    Smokeless tobacco: Never   Vaping Use    Vaping Use: Never used   Substance and Sexual Activity    Alcohol use: No     Alcohol/week: 0.0 standard drinks    Drug use: No     Types: Prescription    Sexual activity: Not Currently     Partners: Female     Birth control/protection: Condom, Pill, Diaphragm, I.U.D., Sponge, None   Other Topics Concern    Not on file   Social History Narrative    ** Merged History Encounter **          Social Determinants of Health     Financial Resource Strain: Not on file   Food Insecurity: Not on file   Transportation Needs: Not on file   Physical Activity: Not on file   Stress: Not on file   Social Connections: Not on file   Intimate Partner Violence: Not on file   Housing Stability: Not on file         ALLERGIES: Amoxicillin, Amoxicillin, Lipitor [atorvastatin], Zocor [simvastatin], Buspar [buspirone], Hydroxyzine, Livalo [pitavastatin], Pravastatin, and Tramadol    Review of Systems   All other systems reviewed and are negative. Vitals:    22 0859   BP: (!) 165/116   Pulse: (!) 128   Resp: 22   Temp: 98.3 °F (36.8 °C)   SpO2: 93%   Weight: 104.7 kg (230 lb 13.2 oz)            Physical Exam  Vitals and nursing note reviewed. Constitutional:       Appearance: She is well-developed. She is obese. Comments: Mildly ill-appearing. HENT:      Head: Normocephalic and atraumatic. Eyes:      Conjunctiva/sclera: Conjunctivae normal.   Neck:      Trachea: No tracheal deviation. Cardiovascular:      Rate and Rhythm: Tachycardia present. Rhythm irregular. Heart sounds: Normal heart sounds. No murmur heard. No friction rub. No gallop. Pulmonary:      Effort: Pulmonary effort is normal.      Breath sounds: Normal breath sounds. Comments: Nasal cannula in place. Mild tachypnea/increased work of breathing. Left basilar crackles. Decreased breath sounds on the right. Abdominal:      Palpations: Abdomen is soft. Tenderness: There is no abdominal tenderness. Musculoskeletal:         General: No deformity. Cervical back: Neck supple. Skin:     General: Skin is warm and dry. Neurological:      Mental Status: She is alert. Comments: oriented        MDM         Procedures    EKG: Atrial fibrillation; rate of 130; incomplete left bundle branch block; nonspecific ST, T wave abnormalities. Malathi Boland MD  9:10 AM    Perfect Serve Consult for Admission  10:15 AM    ED Room Number: SER01/01  Patient Name and age:  Melly Ramirez 81 y.o.  female  Working Diagnosis: Atrial flutter with RVR/hypoxia    COVID-19 Suspicion:  no  Sepsis present:  no  Reassessment needed: Other  Code Status:  Full Code  Readmission: no  Isolation Requirements:  no  Recommended Level of Care:  telemetry  Department:[facility] - [phone]  Other: She presents with a 1 day history of worsening dyspnea. She has had recent cough, congestion, body aches, headache. She has had a sick contact with influenza but tested negative here. O2 sats 88% upon arrival on room air. She is now on 2 L. Heart rate has been in the 120s/130s. Cardizem bolus and drip. Chest x-ray shows interstitial edema. She has a low EF due to cardiomyopathy. Bumex in the ED. Consult note: I reached out to the hospitalist service at [facility] for admission. Malathi Boland MD  10:18 AM    Total critical care time (not including time spent performing separately reportable procedures): 37 minutes. Debbie Tian MD  10:18 AM

## 2022-12-17 NOTE — H&P
CRITICAL CARE NOTE      Name: Ana Steele   : 1950   MRN: [id number]   Date: 2022      REASON FOR ICU ADMISSION:  AHRF, Flashy Pulmonary Edema    PRINCIPAL ICU DIAGNOSIS   AHRF  HTN Emergency  Afib RVR  ADHF - LVEF 30%  Ischm CMP  CAD  Afib on Eliquis      HPI   72F with ischCMP LVEF30% with ICD, CAD s/p AMRIT   Afib on Eliquis, h.o gastric bypass, h/o DVT presenting to SPED found to be in Afib RVR, started on diltiazem gtt. On arrival to [facility] Telemetry unit still in RVR, received Amio bolus -- became acute hypoxic and HTN, placed on bipap, 1mg Bumex administered. Moved to CCU for continued care. Patient of Dr Damion Gamboa, recently started on Amio PO, as she had a moderate burden of asymptomatic afib on her recent icd interrogation at home while on her cardiazem. ASSESSMENT & PLAN     Neuro  Ativan PRN for anxiety  APAP prn for pain    Cardiac  Telemetry  EKG in RVR  Check additional troponin  Goal MAP>65, I am actively titrating pressors to goal  TTE pending  Begin IV amio load, IVP of metoprolol. [facility]  Cardiology - Dr Damion Gamboa consulted.   Holding home antiHTN while addressing pulmonary edema - restart in next 24 hours  Trend troponin - low suspicion of primary coronary disease driving presentation    Pulmonary  Head of Bed >30  IS   Goal SpO2 >92%, I am actively titrating oxygen to goal  Nebs PRN  CXR  Bipap 13/8, 100%, bumex IVP    GI  NPO    Renal  Diuresis  Watch lytes bid    Endocrine  SSI   Goal glucose 140-180    ID  No active issues    Heme  Eliquis  SCDs    Lines- PIV  Hdz - no  DVT- yes  SUP - no  Diet - DIET NPO  Mobility - in bed  Pt Contact - tbd  Dispo - ICU    Code Status - FULL    OBJECTIVE     Labs and Data: Reviewed 22  Medications: Reviewed 22  Imaging: Reviewed 22    Visit Vitals  BP (!) 144/94 (BP 1 Location: Left upper arm)   Pulse (!) 116   Temp 98 °F (36.7 °C)   Resp 24   Wt 104.7 kg (230 lb 13.2 oz)   SpO2 97%   BMI 39.62 kg/m²    O2 Flow Rate (L/min): 40 l/min O2 Device: BIPAP Temp (24hrs), Av.8 °F (36.6 °C), Min:97.1 °F (36.2 °C), Max:98.3 °F (36.8 °C)             Intake/Output:     Intake/Output Summary (Last 24 hours) at 2022 1631  Last data filed at 2022 1451  Gross per 24 hour   Intake 149 ml   Output --   Net 149 ml         General - in bed, diaphoretic, on bipap  HEENT- pupils equal, EOMI, anicteric  Neuro - moves all extrem spont, conversant, follows basic commands, no focal deficit  CV - RRR  Resp - rales, on bipap, no wheeze  Abd - soft, nontender, no guarding   - no jerome  MSK- intact, no sacral ulcer, ++ edema    All Micro Results       Procedure Component Value Units Date/Time    COVID-19 RAPID TEST [[id number]] Collected: 22 0935    Order Status: Completed Specimen: Nasopharyngeal Updated: 2259     Specimen source Nasopharyngeal        COVID-19 rapid test Not detected        Comment: Rapid Abbott ID Now       Rapid NAAT:  The specimen is NEGATIVE for SARS-CoV-2, the novel coronavirus associated with COVID-19. Negative results should be treated as presumptive and, if inconsistent with clinical signs and symptoms or necessary for patient management, should be tested with an alternative molecular assay. Negative results do not preclude SARS-CoV-2 infection and should not be used as the sole basis for patient management decisions. This test has been authorized by the FDA under an Emergency Use Authorization (EUA) for use by authorized laboratories.    Fact sheet for Healthcare Providers:  http://www.susan-lorri.biz/  Fact sheet for Patients: http://www.susan-blackmon.biz/       Methodology: Isothermal Nucleic Acid Amplification                  Imaging  Key imaging reviewed           CRITICAL CARE DOCUMENTATION  I had a face to face encounter with the patient, reviewed and interpreted patient data including clinical events, labs, images, vital signs, I/O's, and examined patient. I have discussed the case and the plan and management of the patient's care with the consulting services, the bedside nurses and the respiratory therapist.      NOTE OF PERSONAL INVOLVEMENT IN CARE   This patient has a high probability of imminent, clinically significant deterioration, which requires the highest level of preparedness to intervene urgently. I participated in the decision-making and personally managed or directed the management of the following life and organ supporting interventions that required my frequent assessment to treat or prevent imminent deterioration. I personally spent 45 minutes of critical care time. This is time spent at this critically ill patient's bedside actively involved in patient care as well as the coordination of care. This does not include any procedural time which has been billed separately.     Sapphire Corral DO  Staff Intensivist  [facility] Emergency Department Critical Care  12/17/2022

## 2022-12-17 NOTE — PROGRESS NOTES
1645 TRANSFER - IN REPORT:    Verbal report received from Armando Higgins RN(name) on Dorla Escort  being received from 4W(unit) for urgent transfer      Report consisted of patients Situation, Background, Assessment and   Recommendations(SBAR). Information from the following report(s) SBAR, Kardex, ED Summary, Procedure Summary, Intake/Output, MAR, Recent Results, Med Rec Status, and Cardiac Rhythm Atrial Flutter  was reviewed with the receiving nurse. Opportunity for questions and clarification was provided. Assessment completed upon patients arrival to unit and care assumed. [facility] Primary Nurse Ajay Corona RN and Joao Lacy RN performed a dual skin assessment on this patient No impairment noted    Current Bed:     Erick score is 16    1700 Labs drawn and sent. 1731 Cardizem gtt stopped and amio gtt started @ 1 mg/min per Carly Garza MD.    2639 Respiratory viral panel sent. 1930 Bedside shift change report given to Lenin Coates (oncoming nurse) by Bhumi Lopez RN (offgoing nurse). Report included the following information SBAR, Kardex, ED Summary, Procedure Summary, Intake/Output, MAR, Recent Results, Med Rec Status, and Cardiac Rhythm Aflutter .

## 2022-12-17 NOTE — H&P
[address] [facility]  History and Physical    Date of Service:  12/17/2022  Primary Care Provider: Aly Cardenas MD  Source of information: The patient    Chief Complaint: Shortness of Breath and Chest Pain      History of Presenting Illness:   Fernanda Jin is a 81 y.o. female with known past medical history of atrial fibrillation, coronary artery disease, ischemic cardiomyopathy with chronic systolic heart failure with left ventricular ejection fraction 30%, who presents to ED with complaining of shortness of breath for 5 days that gradually increase in severity, aggravated by activity, and associated with chest pain substernal pressure-like in severity 5/10 without radiation to the neck back or left shoulder. In the emergency department patient was evaluated, she was found in atrial fibrillation with rapid ventricular response and ventricular rate up to 137, she is hypoxic with oxygen saturation 80% on room air, high flow oxygen was provided, patient was placed on BiPAP. Chest x-ray was obtained was significant for interstitial pulmonary edema. BNP was elevated at 6000, troponin was 22 with slight increase to 31. IV Cardizem bolus was given, Cardizem drip was initiated, Bumex IV was provided, medicine was consulted for admission and further evaluation. The patient denies using any supplemental oxygen or CPAP BiPAP at home. The patient was evaluated by cardiologist Dr. Norma Osman 3 days ago, amiodarone was added, patient was informed if she continue experience A. fib RVR she might need cardioversion.     The patient denies any headache, blurry vision, sore throat, trouble swallowing, trouble with speech, cough, fever, chills, N/V/D, abd pain, urinary symptoms, constipation, recent travels, sick contacts, focal or generalized neurological symptoms, falls, injuries, rashes, contact with COVID-19 diagnosed patients, hematemesis, melena, hemoptysis, hematuria, rashes, denies starting any new medications and denies any other concerns or problems besides as mentioned above. REVIEW OF SYSTEMS:  A comprehensive review of systems was negative except for that written in the History of Present Illness. Past Medical History:   Diagnosis Date    Acute right ankle pain 06/08/2018 5/29/18: X-ray showed possible right ankle sprain. 6/6/18: saw Dr. Desiree Butts ([facility]), diagnosed with peroneal tendonitis. Prescribed an ASO    Asthma     Atrial fibrillation ([address]     Cardiomyopathy ([address]     Coronary artery disease 2008    s/p RCA stent ([facility]RIT) on 11/26/11    Depression with anxiety     2011    DVT (deep venous thrombosis) ([address] 07/27/2010    Family history of early CAD     GERD (gastroesophageal reflux disease)     H/O gastric bypass 2006    Revision in 2009    Hepatitis C antibody test positive     Does not have chronic hep C (labs 10/6/15: neg HCV RNA)     HTN (hypertension) 07/27/2010    Hyperlipidemia 07/27/2010    Incontinence of feces 09/03/2018    Dr. Guero Riojas. 8/20/18: Improving with pelvic physical therapy and psyllium husk treatment. Saw Dr. Shavonne Delacruz who suggested PT first. MR defecography showed pelvic floor weakness with pelvic descensus, small anterior  Rectocele, and vaginal prolapse. To have pelvic PT weekly for 8 wks and to see Dr. Shavonne Delacruz again in Oct 2018.     Joint pain 07/27/2010    MI (myocardial infarction) ([facility] 07/27/2010    Mitral valve regurgitation     Mild to moderate    Morbid obesity ([address] 07/27/2010    Myocardial infarction [facility]) 2006 and 2011    Dr. Kendall Gatica disorder     PUD (peptic ulcer disease)     gi bleed 2008; ulcer n gastric bypass pouch    Urinary incontinence, stress 07/27/2010      Past Surgical History:   Procedure Laterality Date    COLONOSCOPY N/A 6/29/2018    COLONOSCOPY performed by Laron Sahu MD at [facility] ENDOSCOPY; redundant colon, int hemorrhoids, pathology: normal colonic mucosa    HX BREAST BIOPSY Left     benign HX BREAST LUMPECTOMY Right 7/14/2022    RIGHT BREAST LUMPECTOMY WITH ULTRASOUND performed by Mary Hardin MD at [place] [address]    HX COLONOSCOPY  9/5/14    Dr. Charles Maynard; normal, repeat in 5 yrs    HX GASTRIC BYPASS      HX IMPLANTABLE CARDIOVERTER DEFIBRILLATOR  08/24/2016    HX LAP GASTRIC BYPASS  2006    revision 2009/Dr. Kimberlee Carl    HX OTHER SURGICAL  09/19/2016    Removal of right ventricular ICD lead & single chamber transvenous AICD    HX OTHER SURGICAL  10/12/2016    pocket revison of ICD; Dr. Cerda Connie  06/07/2018    Dr Makenna Beal; high resolution anorectal manaometry-abnormal study. Study done for eval of fecal incontinence    HX OTHER SURGICAL  12/14/2018    Dr. Makenna Beal. Rectal endoscopic US (EUS) for rectal incontinence. Normal rectal mucosa, no fistulas. HX PACEMAKER      sicd/left side of chest under arm    INS PPM/ICD LED SING ONLY  8/24/2016         INS PPM/ICD LED SING ONLY  8/26/2016         INS PPM/ICD LED SING ONLY  9/21/2016         [facility] CARDI[facility] SURG PROCEDURE UNLIST      Stent x 5-6,Most recent 2011    [facility] LAP,STOMACH,OTHER,W/O TUBE  04/05/2010    Revision GBP     Prior to Admission medications    Medication Sig Start Date End Date Taking? Authorizing Provider   amiodarone (CORDARONE) 200 mg tablet Take 2 Tablets by mouth two (2) times a day. 12/15/22   Amanda Tavarez MD   dilTIAZem ER (CARDIZEM CD) 300 mg capsule Take 1 Capsule by mouth daily. 11/7/22   Amanda Tavarez MD   zolpidem (AMBIEN) 10 mg tablet TAKE 1 TABLET BY MOUTH NIGHTLY AS NEEDED FOR SLEEP. MAX DAILY AMOUNT: 10 MG. 11/1/22   Abdiaziz Ramachandran MD   apixaban (ELIQUIS) 5 mg tablet Take 1 Tablet by mouth two (2) times a day. Prescribed by Dr. Maria Teresa Bar.  10/11/22   Abdiaziz Ramachandran MD   ezetimibe (ZETIA) 10 mg tablet TAKE 1 TABLET BY MOUTH EVERY DAY IN THE MORNING 9/14/22   Abdiaziz Ramachandran MD   potassium chloride (Klor-Con M20) 20 mEq tablet TAKE 2 TABLETS BY MOUTH EVERY DAY 9/8/22   Abdiaziz Ramachandran MD isosorbide mononitrate ER (IMDUR) 30 mg tablet TAKE 1 TABLET BY MOUTH EVERY DAY 9/7/22   Savanah Kauffman MD   letrozole [facility]) 2.5 mg tablet Take 1 Tablet by mouth in the morning. Indications: hormone receptor positive postmenopausal early breast cancer 8/8/22   Ricardo Wagner,    isosorbide dinitrate (ISORDIL) 30 mg tablet Take 20 mg by mouth daily. Provider, Historical   IBUPROFEN PO Take  by mouth as needed. Provider, Historical   aspirin delayed-release 81 mg tablet Take 81 mg by mouth as needed for Pain. Provider, Historical   bumetanide (BUMEX) 2 mg tablet TAKE 1 TABLET BY MOUTH TWICE A DAY 3/28/22   Ashkan El MD   albuterol (PROVENTIL HFA, VENTOLIN HFA, PROAIR HFA) 90 mcg/actuation inhaler TAKE 2 PUFFS BY INHALATION EVERY FOUR HOURS AS NEEDED FOR WHEEZING OR SHORTNESS OF BREATH. 2/23/22   Kit Tanner MD   FLUoxetine (PROzac) 40 mg capsule Take 1 Capsule by mouth two (2) times a day. 12/13/21   Annetta Cervantes PA-C   Entresto  mg tablet TAKE 1 TABLET BY MOUTH TWICE A DAY 9/19/21   Candace Donovan NP   psyllium (METAMUCIL) powd Take  by mouth. 2 tsp daily    Provider, Historical   cholecalciferol, VITAMIN D3, (VITAMIN D3) 5,000 unit tab tablet Take 10,000 Units by mouth daily. Provider, Historical   biotin 10,000 mcg cap Take  by mouth daily. Provider, Historical   OTHER Complete multi formula, bariatric advantage    Provider, Historical   magnesium 250 mg tab Take 1 Tab by mouth daily. Provider, Historical   ferrous sulfate 325 mg (65 mg iron) tablet Take  by mouth daily (before breakfast). Provider, Historical   CALCIUM PO Take 600 mg by mouth daily.     Provider, Historical     Allergies   Allergen Reactions    Amoxicillin Anaphylaxis    Amoxicillin Anaphylaxis    Lipitor [Atorvastatin] Other (comments)     Severe muscle pain and spasms    Zocor [Simvastatin] Other (comments)     Cramps, muscle spasms    Buspar [Buspirone] Other (comments)     Drunk sensation, headaches    Hydroxyzine Other (comments)     Blurred vision, lightheadedness    Livalo [Pitavastatin] Myalgia    Pravastatin Other (comments)     Leg cramps    Tramadol Vertigo      Family History   Problem Relation Age of Onset    Dementia Mother     Cancer Mother         Colon    Alzheimer's Disease Mother     Cancer Father         Testical and stomach cancer    Heart Disease Father         Triple by pass    Hypertension Father         High blood pressure    Heart Disease Sister         Aortic replacement    Stroke Sister         Mini strokes    Stroke Sister     Heart Attack Brother     Anesth Problems Neg Hx       Social History:  reports that she quit smoking about 18 years ago. Her smoking use included cigarettes. She started smoking about 52 years ago. She has never used smokeless tobacco. She reports that she does not drink alcohol and does not use drugs. Family and social history were personally reviewed, all pertinent and relevant details are outlined as above. Objective:   Visit Vitals  BP (!) 132/99   Pulse (!) 118   Temp 97.1 °F (36.2 °C)   Resp 17   Wt 104.7 kg (230 lb 13.2 oz)   SpO2 97%   BMI 39.62 kg/m²    O2 Flow Rate (L/min): 40 l/min O2 Device: BIPAP    PHYSICAL EXAM:   General: Alert x oriented x 3, awake, no acute distress, resting in bed, pleasant female, appears to be stated age  HEENT: PEERL, EOMI, moist mucus membranes  Neck: Supple, no JVD, no meningeal signs  Chest: Coarse to auscultation bilaterally   CVS: IRIR, tachy, S1 S2 heard, no rubs/gallops  Abd: Soft, non-tender, non-distended, +bowel sounds, obese  Ext: No clubbing, no cyanosis, 2+ edema  Neuro/Psych: Pleasant mood and affect, CN 2-12 grossly intact, sensory grossly within normal limit, Strength 5/5 in all extremities, DTR 1+ x 4  Cap refill: Brisk, less than 3 seconds  Pulses: 2+, symmetric in all extremities  Skin: Warm, dry, without rashes or lesions    Data Review:    All diagnostic labs and studies have been reviewed. Abnormal Labs Reviewed   CBC WITH AUTOMATED DIFF - Abnormal; Notable for the following components:       Result Value    HGB 11.1 (*)     MCV 77.6 (*)     MCH 23.7 (*)     RDW 16.4 (*)     NEUTROPHILS 90 (*)     LYMPHOCYTES 4 (*)     ABS. LYMPHOCYTES 0.3 (*)     All other components within normal limits   METABOLIC PANEL, COMPREHENSIVE - Abnormal; Notable for the following components:    Glucose 170 (*)     ALT (SGPT) 87 (*)     AST (SGOT) 44 (*)     All other components within normal limits   NT-PRO BNP - Abnormal; Notable for the following components:    NT pro-BNP 6,290 (*)     All other components within normal limits       All Micro Results       Procedure Component Value Units Date/Time    COVID-19 RAPID TEST [[id number]] Collected: 12/17/22 0935    Order Status: Completed Specimen: Nasopharyngeal Updated: 12/17/22 0959     Specimen source Nasopharyngeal        COVID-19 rapid test Not detected        Comment: Rapid Abbott ID Now       Rapid NAAT:  The specimen is NEGATIVE for SARS-CoV-2, the novel coronavirus associated with COVID-19. Negative results should be treated as presumptive and, if inconsistent with clinical signs and symptoms or necessary for patient management, should be tested with an alternative molecular assay. Negative results do not preclude SARS-CoV-2 infection and should not be used as the sole basis for patient management decisions. This test has been authorized by the FDA under an Emergency Use Authorization (EUA) for use by authorized laboratories. Fact sheet for Healthcare Providers:  http://www.neha.juan/  Fact sheet for Patients: http://www.susan-lorri.juan/       Methodology: Isothermal Nucleic Acid Amplification                 IMAGING:   XR CHEST PORT   Final Result   Interstitial pulmonary edema.                ECG/ECHO:    Results for orders placed or performed during the hospital encounter of 12/17/22   EKG, 12 LEAD, INITIAL   Result Value Ref Range    Ventricular Rate 130 BPM    Atrial Rate 113 BPM    QRS Duration 118 ms    Q-T Interval 314 ms    QTC Calculation (Bezet) 462 ms    Calculated R Axis -14 degrees    Calculated T Axis 176 degrees    Diagnosis       Atrial fibrillation with rapid ventricular response with premature   ventricular or aberrantly conducted complexes  Incomplete left bundle branch block  Marked ST abnormality, possible lateral subendocardial injury  Abnormal ECG  When compared with ECG of 23-FEB-2017 07:34,  Atrial fibrillation has replaced Sinus rhythm  Vent. rate has increased BY  61 BPM  ST now depressed in Lateral leads  Nonspecific T wave abnormality now evident in Inferior leads  Inverted T waves have replaced nonspecific T wave abnormality in Lateral   leads  Confirmed by Marisa Cabello ([id number]) on 12/17/2022 2:18:47 PM          Assessment:   Given the patient's current clinical presentation, there is a high level of concern for decompensation if discharged from the emergency department. Complex decision making was performed, which includes reviewing the patient's available past medical records, laboratory results, and imaging studies. Active Problems:    A-fib ([address] (12/17/2022)    Atrial fibrillation with rapid ventricular response. Acute respiratory failure with hypoxia related to pulmonary edema, due to acute heart failure exacerbation. Acute on chronic systolic heart failure, left ventricular ejection fraction 30%. Coronary artery disease. Chest pain possible related to demand ischemia in the light of atrial fibrillation with rapid ventricular response, hypoxia, cardiac troponin without significant uptrend. Ischemic cardiomyopathy. Plan:     The patient will be admitted to medicine, telemetry unit, [facility] as inpatient and will require at least 2 midnight for inpatient treatment.    Cardizem drip was initiated in the emergency department, the patient still in A. fib RVR. IV amiodarone bolus will be given and amiodarone IV drip will be initiated, Cardizem drip will be discontinued. Cardiologist consulted will follow up recommendation. Bumex IV will be provided. Due to hypoxia related to acute pulmonary edema BiPAP and supplemental oxygen were initiated, will gradually wean off of supplemental oxygen and BiPAP as patient tolerated. Home medication will be reconciled and reinstated. Will monitor urine output. Patient should have weight checked daily. Echocardiogram will be requested. Condition of patient is serious, if condition of patient deteriorate with worsening of respiratory failure and hypoxia, she might require endotracheal intubation and mechanical ventilation. DIET: ADULT DIET Clear Liquid   ISOLATION PRECAUTIONS: There are currently no Active Isolations  CODE STATUS: Full Code   DVT PROPHYLAXIS: Eliquis  FUNCTIONAL STATUS PRIOR TO HOSPITALIZATION: Fully active and ambulatory; able to carry on all self-care without restriction. Ambulatory status/function: By self   EARLY MOBILITY ASSESSMENT: Recommend an assessment from physical therapy and/or occupational therapy  ANTICIPATED DISCHARGE: 24-48 hours. ANTICIPATED DISPOSITION: Home  EMERGENCY CONTACT/SURROGATE DECISION MAKER: marta    CRITICAL CARE WAS PERFORMED FOR THIS ENCOUNTER: YES. I had a face to face encounter with the patient, reviewed and interpreted patient data including clinical events, labs, images, vital signs, I/O's, and examined patient. I have discussed the case and the plan and management of the patient's care with the consulting services, the bedside nurses and necessary ancillary providers. This patient has a high probability of imminent, clinically significant deterioration, which requires the highest level of preparedness to intervene urgently.  I participated in the decision-making and personally managed or directed the management of the following life and organ supporting interventions that required my frequent assessment to treat or prevent imminent deterioration. I personally spent 31 minutes of critical care time. This is time spent at this critically ill patient's bedside actively involved in patient care as well as the coordination of care and discussions with the patient's family. This does not include any procedural time which has been billed separately. Signed By: Laura Betts MD     December 17, 2022         Please note that this dictation may have been completed with Dragon, the computer voice recognition software. Quite often unanticipated grammatical, syntax, homophones, and other interpretive errors are inadvertently transcribed by the computer software. Please disregard these errors. Please excuse any errors that have escaped final proofreading.

## 2022-12-17 NOTE — ROUTINE PROCESS
Patient care tech notified RN of patient complaining of trouble breathing. Patient was immediately assessed and vitals were taken. Patient was hypertensive, tachycardic, tachypnic, and diaphoretic. Rapid response was called. Dr. Dickey Schlatter to bedside. Amiodarone drip was stopped and diltiazem ordered to resume. STAT ekg was done, reading of a flutter. IV metoprolol and bumex were administered. STAT chest xray was done. Patient remained on BiPAP and was transferred to the CCU. Report was called to RN.

## 2022-12-17 NOTE — PROGRESS NOTES
Came to see pt due to RR. The patient became with worsening of tachypnea, SOB, hypoxia and uncontrolled Aflutter. Cardiologist was called, recommended to Cardizem and will see pt, pt might need to be CV. Due to hypoxia and increase worse of breathing despite biPAP, ICU consulted, discussed, the patient will be transferred to ICU furfur care    Requested CXR, ABG, troponin, ECG. I provided 30 minutes of critical care time. During this entire length of time I was immediately available to the patient. The reason for providing this level of medical care was due to a critical illness that impaired one or more vital organ systems, such that there was a high probability of imminent or life threatening deterioration in the patient's condition. This care involved high complexity decision making which includes reviewing the patient's past medical records, current laboratory results, and actual Xray films in order to assess, support vital system function, and to treat this degree of vital organ system failure, and to prevent further life threatening deterioration of the patients condition.

## 2022-12-18 ENCOUNTER — APPOINTMENT (OUTPATIENT)
Dept: NON INVASIVE DIAGNOSTICS | Age: 72
DRG: 291 | End: 2022-12-18
Attending: INTERNAL MEDICINE
Payer: MEDICARE

## 2022-12-18 LAB
ALBUMIN SERPL-MCNC: 3.1 G/DL (ref 3.5–5)
ALBUMIN/GLOB SERPL: 0.9 {RATIO} (ref 1.1–2.2)
ALP SERPL-CCNC: 79 U/L (ref 45–117)
ALT SERPL-CCNC: 71 U/L (ref 12–78)
ANION GAP SERPL CALC-SCNC: 7 MMOL/L (ref 5–15)
AST SERPL-CCNC: 39 U/L (ref 15–37)
BASOPHILS # BLD: 0 K/UL (ref 0–0.1)
BASOPHILS NFR BLD: 0 % (ref 0–1)
BILIRUB SERPL-MCNC: 0.5 MG/DL (ref 0.2–1)
BNP SERPL-MCNC: ABNORMAL PG/ML
BUN SERPL-MCNC: 24 MG/DL (ref 6–20)
BUN/CREAT SERPL: 19 (ref 12–20)
CALCIUM SERPL-MCNC: 8.5 MG/DL (ref 8.5–10.1)
CHLORIDE SERPL-SCNC: 108 MMOL/L (ref 97–108)
CO2 SERPL-SCNC: 24 MMOL/L (ref 21–32)
CREAT SERPL-MCNC: 1.29 MG/DL (ref 0.55–1.02)
DIFFERENTIAL METHOD BLD: ABNORMAL
ECHO AO ROOT DIAM: 2.8 CM
ECHO AO ROOT INDEX: 1.37 CM/M2
ECHO AV AREA PEAK VELOCITY: 1.5 CM2
ECHO AV AREA PEAK VELOCITY: 1.8 CM2
ECHO AV AREA VTI: 1.4 CM2
ECHO AV AREA/BSA VTI: 0.7 CM2/M2
ECHO AV MEAN GRADIENT: 13 MMHG
ECHO AV MEAN VELOCITY: 1.7 M/S
ECHO AV PEAK GRADIENT: 16 MMHG
ECHO AV PEAK GRADIENT: 24 MMHG
ECHO AV PEAK VELOCITY: 2 M/S
ECHO AV PEAK VELOCITY: 2.5 M/S
ECHO AV VTI: 44.9 CM
ECHO EST RA PRESSURE: 15 MMHG
ECHO LA DIAMETER INDEX: 2.34 CM/M2
ECHO LA DIAMETER: 4.8 CM
ECHO LA TO AORTIC ROOT RATIO: 1.71
ECHO LA VOL 2C: 182 ML (ref 22–52)
ECHO LA VOL 4C: 92 ML (ref 22–52)
ECHO LA VOLUME AREA LENGTH: 137 ML
ECHO LA VOLUME INDEX A2C: 89 ML/M2 (ref 16–34)
ECHO LA VOLUME INDEX A4C: 45 ML/M2 (ref 16–34)
ECHO LA VOLUME INDEX AREA LENGTH: 67 ML/M2 (ref 16–34)
ECHO LV E' LATERAL VELOCITY: 9 CM/S
ECHO LV E' SEPTAL VELOCITY: 7 CM/S
ECHO LV EDV A2C: 236 ML
ECHO LV EDV A4C: 306 ML
ECHO LV EDV BP: 270 ML (ref 56–104)
ECHO LV EDV INDEX A4C: 149 ML/M2
ECHO LV EDV INDEX BP: 132 ML/M2
ECHO LV EDV NDEX A2C: 115 ML/M2
ECHO LV EJECTION FRACTION A2C: 33 %
ECHO LV EJECTION FRACTION A4C: 37 %
ECHO LV EJECTION FRACTION BIPLANE: 34 % (ref 55–100)
ECHO LV ESV A2C: 159 ML
ECHO LV ESV A4C: 194 ML
ECHO LV ESV BP: 179 ML (ref 19–49)
ECHO LV ESV INDEX A2C: 78 ML/M2
ECHO LV ESV INDEX A4C: 95 ML/M2
ECHO LV ESV INDEX BP: 87 ML/M2
ECHO LV FRACTIONAL SHORTENING: 12 % (ref 28–44)
ECHO LV INTERNAL DIMENSION DIASTOLE INDEX: 3.22 CM/M2
ECHO LV INTERNAL DIMENSION DIASTOLIC: 6.6 CM (ref 3.9–5.3)
ECHO LV INTERNAL DIMENSION SYSTOLIC INDEX: 2.83 CM/M2
ECHO LV INTERNAL DIMENSION SYSTOLIC: 5.8 CM
ECHO LV IVSD: 0.9 CM (ref 0.6–0.9)
ECHO LV MASS 2D: 220.1 G (ref 67–162)
ECHO LV MASS INDEX 2D: 107.4 G/M2 (ref 43–95)
ECHO LV POSTERIOR WALL DIASTOLIC: 0.7 CM (ref 0.6–0.9)
ECHO LV RELATIVE WALL THICKNESS RATIO: 0.21
ECHO LVOT AREA: 3.5 CM2
ECHO LVOT AV VTI INDEX: 0.4
ECHO LVOT DIAM: 2.1 CM
ECHO LVOT MEAN GRADIENT: 2 MMHG
ECHO LVOT PEAK GRADIENT: 4 MMHG
ECHO LVOT PEAK VELOCITY: 1 M/S
ECHO LVOT STROKE VOLUME INDEX: 30.4 ML/M2
ECHO LVOT SV: 62.3 ML
ECHO LVOT VTI: 18 CM
ECHO MV E VELOCITY: 1.32 M/S
ECHO MV E/E' LATERAL: 14.67
ECHO MV E/E' RATIO (AVERAGED): 16.76
ECHO MV E/E' SEPTAL: 18.86
ECHO MV REGURGITANT PEAK GRADIENT: 108 MMHG
ECHO MV REGURGITANT PEAK VELOCITY: 5.2 M/S
ECHO MV REGURGITANT VTIA: 148.9 CM
ECHO PV MAX VELOCITY: 1.1 M/S
ECHO PV PEAK GRADIENT: 5 MMHG
ECHO RIGHT VENTRICULAR SYSTOLIC PRESSURE (RVSP): 46 MMHG
ECHO RV TAPSE: 1.3 CM (ref 1.7–?)
ECHO TV REGURGITANT MAX VELOCITY: 2.79 M/S
ECHO TV REGURGITANT PEAK GRADIENT: 31 MMHG
EOSINOPHIL # BLD: 0 K/UL (ref 0–0.4)
EOSINOPHIL NFR BLD: 0 % (ref 0–7)
ERYTHROCYTE [DISTWIDTH] IN BLOOD BY AUTOMATED COUNT: 16.3 % (ref 11.5–14.5)
GLOBULIN SER CALC-MCNC: 3.4 G/DL (ref 2–4)
GLUCOSE SERPL-MCNC: 122 MG/DL (ref 65–100)
HCT VFR BLD AUTO: 36.8 % (ref 35–47)
HGB BLD-MCNC: 11.2 G/DL (ref 11.5–16)
IMM GRANULOCYTES # BLD AUTO: 0 K/UL (ref 0–0.04)
IMM GRANULOCYTES NFR BLD AUTO: 0 % (ref 0–0.5)
INR PPP: 1.2 (ref 0.9–1.1)
LACTATE SERPL-SCNC: 1.4 MMOL/L (ref 0.4–2)
LYMPHOCYTES # BLD: 1.1 K/UL (ref 0.8–3.5)
LYMPHOCYTES NFR BLD: 11 % (ref 12–49)
MAGNESIUM SERPL-MCNC: 2.6 MG/DL (ref 1.6–2.4)
MCH RBC QN AUTO: 24.1 PG (ref 26–34)
MCHC RBC AUTO-ENTMCNC: 30.4 G/DL (ref 30–36.5)
MCV RBC AUTO: 79.3 FL (ref 80–99)
MONOCYTES # BLD: 0.8 K/UL (ref 0–1)
MONOCYTES NFR BLD: 8 % (ref 5–13)
NEUTS SEG # BLD: 8.3 K/UL (ref 1.8–8)
NEUTS SEG NFR BLD: 81 % (ref 32–75)
NRBC # BLD: 0 K/UL (ref 0–0.01)
NRBC BLD-RTO: 0 PER 100 WBC
PLATELET # BLD AUTO: 234 K/UL (ref 150–400)
PMV BLD AUTO: 11.7 FL (ref 8.9–12.9)
POTASSIUM SERPL-SCNC: 3.5 MMOL/L (ref 3.5–5.1)
PROT SERPL-MCNC: 6.5 G/DL (ref 6.4–8.2)
PROTHROMBIN TIME: 12.7 SEC (ref 9–11.1)
RBC # BLD AUTO: 4.64 M/UL (ref 3.8–5.2)
SODIUM SERPL-SCNC: 139 MMOL/L (ref 136–145)
WBC # BLD AUTO: 10.3 K/UL (ref 3.6–11)

## 2022-12-18 PROCEDURE — 99233 SBSQ HOSP IP/OBS HIGH 50: CPT | Performed by: SPECIALIST

## 2022-12-18 PROCEDURE — 74011000258 HC RX REV CODE- 258: Performed by: INTERNAL MEDICINE

## 2022-12-18 PROCEDURE — 74011250636 HC RX REV CODE- 250/636: Performed by: NURSE PRACTITIONER

## 2022-12-18 PROCEDURE — 97161 PT EVAL LOW COMPLEX 20 MIN: CPT

## 2022-12-18 PROCEDURE — 74011250637 HC RX REV CODE- 250/637: Performed by: STUDENT IN AN ORGANIZED HEALTH CARE EDUCATION/TRAINING PROGRAM

## 2022-12-18 PROCEDURE — 97116 GAIT TRAINING THERAPY: CPT

## 2022-12-18 PROCEDURE — 74011250636 HC RX REV CODE- 250/636: Performed by: INTERNAL MEDICINE

## 2022-12-18 PROCEDURE — 74011250637 HC RX REV CODE- 250/637: Performed by: NURSE PRACTITIONER

## 2022-12-18 PROCEDURE — 74011250637 HC RX REV CODE- 250/637: Performed by: INTERNAL MEDICINE

## 2022-12-18 PROCEDURE — 93306 TTE W/DOPPLER COMPLETE: CPT | Performed by: SPECIALIST

## 2022-12-18 PROCEDURE — 74011000250 HC RX REV CODE- 250: Performed by: EMERGENCY MEDICINE

## 2022-12-18 PROCEDURE — 85025 COMPLETE CBC W/AUTO DIFF WBC: CPT

## 2022-12-18 PROCEDURE — 94640 AIRWAY INHALATION TREATMENT: CPT

## 2022-12-18 PROCEDURE — 51798 US URINE CAPACITY MEASURE: CPT

## 2022-12-18 PROCEDURE — 83605 ASSAY OF LACTIC ACID: CPT

## 2022-12-18 PROCEDURE — 83735 ASSAY OF MAGNESIUM: CPT

## 2022-12-18 PROCEDURE — 74011000250 HC RX REV CODE- 250: Performed by: STUDENT IN AN ORGANIZED HEALTH CARE EDUCATION/TRAINING PROGRAM

## 2022-12-18 PROCEDURE — 65660000001 HC RM ICU INTERMED STEPDOWN

## 2022-12-18 PROCEDURE — 74011250636 HC RX REV CODE- 250/636: Performed by: STUDENT IN AN ORGANIZED HEALTH CARE EDUCATION/TRAINING PROGRAM

## 2022-12-18 PROCEDURE — C8929 TTE W OR WO FOL WCON,DOPPLER: HCPCS

## 2022-12-18 PROCEDURE — 36415 COLL VENOUS BLD VENIPUNCTURE: CPT

## 2022-12-18 PROCEDURE — 85610 PROTHROMBIN TIME: CPT

## 2022-12-18 PROCEDURE — P9047 ALBUMIN (HUMAN), 25%, 50ML: HCPCS | Performed by: NURSE PRACTITIONER

## 2022-12-18 PROCEDURE — 80053 COMPREHEN METABOLIC PANEL: CPT

## 2022-12-18 PROCEDURE — 83880 ASSAY OF NATRIURETIC PEPTIDE: CPT

## 2022-12-18 RX ORDER — ALBUMIN HUMAN 250 G/1000ML
25 SOLUTION INTRAVENOUS ONCE
Status: COMPLETED | OUTPATIENT
Start: 2022-12-18 | End: 2022-12-18

## 2022-12-18 RX ORDER — LANOLIN ALCOHOL/MO/W.PET/CERES
3 CREAM (GRAM) TOPICAL
Status: DISCONTINUED | OUTPATIENT
Start: 2022-12-18 | End: 2022-12-22 | Stop reason: HOSPADM

## 2022-12-18 RX ORDER — BUMETANIDE 0.25 MG/ML
2 INJECTION INTRAMUSCULAR; INTRAVENOUS 2 TIMES DAILY
Status: DISCONTINUED | OUTPATIENT
Start: 2022-12-18 | End: 2022-12-21

## 2022-12-18 RX ORDER — FLUOXETINE HYDROCHLORIDE 20 MG/1
40 CAPSULE ORAL 2 TIMES DAILY
Status: DISCONTINUED | OUTPATIENT
Start: 2022-12-18 | End: 2022-12-22 | Stop reason: HOSPADM

## 2022-12-18 RX ORDER — FLUTICASONE PROPIONATE 50 MCG
2 SPRAY, SUSPENSION (ML) NASAL DAILY
Status: DISCONTINUED | OUTPATIENT
Start: 2022-12-19 | End: 2022-12-18

## 2022-12-18 RX ORDER — FLUTICASONE PROPIONATE 50 MCG
2 SPRAY, SUSPENSION (ML) NASAL DAILY
Status: DISCONTINUED | OUTPATIENT
Start: 2022-12-18 | End: 2022-12-22 | Stop reason: HOSPADM

## 2022-12-18 RX ORDER — ASPIRIN 81 MG/1
81 TABLET ORAL DAILY
Status: DISCONTINUED | OUTPATIENT
Start: 2022-12-18 | End: 2022-12-22 | Stop reason: HOSPADM

## 2022-12-18 RX ADMIN — ACETAMINOPHEN 650 MG: 325 TABLET ORAL at 05:51

## 2022-12-18 RX ADMIN — FLUTICASONE PROPIONATE 2 SPRAY: 50 SPRAY, METERED NASAL at 14:41

## 2022-12-18 RX ADMIN — APIXABAN 5 MG: 5 TABLET, FILM COATED ORAL at 17:27

## 2022-12-18 RX ADMIN — BUMETANIDE 2 MG: 0.25 INJECTION, SOLUTION INTRAMUSCULAR; INTRAVENOUS at 08:52

## 2022-12-18 RX ADMIN — ACETAMINOPHEN 650 MG: 325 TABLET ORAL at 16:45

## 2022-12-18 RX ADMIN — FLUOXETINE 40 MG: 20 CAPSULE ORAL at 17:27

## 2022-12-18 RX ADMIN — SODIUM CHLORIDE, PRESERVATIVE FREE 10 ML: 5 INJECTION INTRAVENOUS at 05:49

## 2022-12-18 RX ADMIN — Medication 3 MG: at 21:25

## 2022-12-18 RX ADMIN — IPRATROPIUM BROMIDE AND ALBUTEROL SULFATE 3 ML: .5; 3 SOLUTION RESPIRATORY (INHALATION) at 19:39

## 2022-12-18 RX ADMIN — OSELTAMIVIR PHOSPHATE 30 MG: 30 CAPSULE ORAL at 08:51

## 2022-12-18 RX ADMIN — IPRATROPIUM BROMIDE AND ALBUTEROL SULFATE 3 ML: .5; 3 SOLUTION RESPIRATORY (INHALATION) at 13:30

## 2022-12-18 RX ADMIN — IPRATROPIUM BROMIDE AND ALBUTEROL SULFATE 3 ML: .5; 3 SOLUTION RESPIRATORY (INHALATION) at 07:28

## 2022-12-18 RX ADMIN — ASPIRIN 81 MG: 81 TABLET, COATED ORAL at 08:53

## 2022-12-18 RX ADMIN — PERFLUTREN 1.5 ML: 6.52 INJECTION, SUSPENSION INTRAVENOUS at 11:00

## 2022-12-18 RX ADMIN — AMIODARONE HYDROCHLORIDE 0.5 MG/MIN: 50 INJECTION, SOLUTION INTRAVENOUS at 14:44

## 2022-12-18 RX ADMIN — ALBUMIN (HUMAN) 25 G: 0.25 INJECTION, SOLUTION INTRAVENOUS at 21:25

## 2022-12-18 RX ADMIN — POLYETHYLENE GLYCOL 3350 17 G: 17 POWDER, FOR SOLUTION ORAL at 08:52

## 2022-12-18 RX ADMIN — APIXABAN 5 MG: 5 TABLET, FILM COATED ORAL at 08:50

## 2022-12-18 RX ADMIN — FLUOXETINE 40 MG: 20 CAPSULE ORAL at 11:00

## 2022-12-18 RX ADMIN — AMIODARONE HYDROCHLORIDE 0.5 MG/MIN: 50 INJECTION, SOLUTION INTRAVENOUS at 23:59

## 2022-12-18 RX ADMIN — OSELTAMIVIR PHOSPHATE 30 MG: 30 CAPSULE ORAL at 17:34

## 2022-12-18 RX ADMIN — IPRATROPIUM BROMIDE AND ALBUTEROL SULFATE 3 ML: .5; 3 SOLUTION RESPIRATORY (INHALATION) at 03:11

## 2022-12-18 RX ADMIN — BUMETANIDE 2 MG: 0.25 INJECTION, SOLUTION INTRAMUSCULAR; INTRAVENOUS at 17:27

## 2022-12-18 RX ADMIN — SENNOSIDES AND DOCUSATE SODIUM 1 TABLET: 50; 8.6 TABLET ORAL at 21:25

## 2022-12-18 RX ADMIN — SENNOSIDES AND DOCUSATE SODIUM 1 TABLET: 50; 8.6 TABLET ORAL at 08:50

## 2022-12-18 NOTE — PROGRESS NOTES
CRITICAL CARE NOTE      Name: Martin Pacheco   : 1950   MRN: [id number]   Date: 2022      REASON FOR ICU ADMISSION:  AHRF, Flashy Pulmonary Edema    PRINCIPAL ICU DIAGNOSIS   AHRF  HTN Emergency  Afib RVR  ADHF - LVEF 30%  Ischm CMP  CAD  Afib on Eliquis      HPI   72F with ischCMP LVEF30% with ICD, CAD s/p AMRIT   Afib on Eliquis, h.o gastric bypass, h/o DVT presenting to SPED found to be in Afib RVR, started on diltiazem gtt. On arrival to [facility] Telemetry unit still in RVR, received Amio bolus -- became acute hypoxic and HTN, placed on bipap, 1mg Bumex administered. Moved to CCU for continued care. Patient of Dr Eunice Snider, recently started on Amio PO, as she had a moderate burden of asymptomatic afib on her recent icd interrogation at home while on her cardiazem.  - Flu A +, tamiflu started, on 5L NC    ASSESSMENT & PLAN     Neuro  Ativan PRN for anxiety  APAP prn for pain  prozac    Cardiac  Telemetry  EKG in RVR  Check additional troponin  Goal MAP>65, I am actively titrating pressors to goal  TTE pending  Begin IV amio load, IVP of metoprolol. Shashi Rush  Cardiology - Dr Eunice Snider consulted.   Holding home antiHTN while addressing pulmonary edema - restart in next 24 hours  ASA    Pulmonary  Head of Bed >30  IS   Goal SpO2 >92%, I am actively titrating oxygen to goal  Nebs PRN  CXR  Moved to 5L NC    GI  ADAT    Renal  Diuresis - 2mg bumex bid IV  LEMUEL - cardiorenal     Endocrine  SSI   Goal glucose 140-180    ID  + Flu A, Start Tamiflu 1 of 5 days    Heme  Eliquis  SCDs    Lines- PIV  Hdz - no  DVT- yes  SUP - no  Diet - DIET NPO  Mobility - in bed  Pt Contact - tbd  Dispo - transfer to Telemetry in next 24 hours    Code Status - FULL    OBJECTIVE     Labs and Data: Reviewed 22  Medications: Reviewed 22  Imaging: Reviewed 22    Visit Vitals  /80   Pulse 90   Temp (!) 96.7 °F (35.9 °C)   Resp 20   Ht 5' 4\" (1.626 m)   Wt 101.7 kg (224 lb 3.3 oz)   SpO2 94% BMI 38.49 kg/m²    O2 Flow Rate (L/min): 5 l/min O2 Device: Nasal cannula Temp (24hrs), Av.2 °F (36.2 °C), Min:96.5 °F (35.8 °C), Max:98.3 °F (36.8 °C)             Intake/Output:     Intake/Output Summary (Last 24 hours) at 2022 0720  Last data filed at 2022 0700  Gross per 24 hour   Intake 635.29 ml   Output 750 ml   Net -114.71 ml         General - in bed, comfortable on NC  HEENT- pupils equal, EOMI, anicteric  Neuro - moves all extrem spont, conversant, follows basic commands, no focal deficit  CV - RRR  Resp - rales, NC, no wheeze  Abd - soft, nontender, no guarding   - no jerome  MSK- intact, no sacral ulcer, ++ edema    All Micro Results       Procedure Component Value Units Date/Time    RESPIRATORY VIRUS PANEL W/COVID-19, PCR [[id number]]  (Abnormal) Collected: 22    Order Status: Completed Specimen: Nasopharyngeal Updated: 22     Adenovirus Not detected        Coronavirus 229E Not detected        Coronavirus HKU1 Not detected        Coronavirus CVNL63 Not detected        Coronavirus OC43 Not detected        SARS-CoV-2, PCR Not detected        Metapneumovirus Not detected        Rhinovirus and Enterovirus Not detected        Influenza A Not detected        Influenza A, subtype H1 Detected        Influenza A, subtype H3 Not detected        INFLUENZA A H1N1 PCR Not detected        Influenza B Not detected        Parainfluenza 1 Not detected        Parainfluenza 2 Not detected        Parainfluenza 3 Not detected        Parainfluenza virus 4 Not detected        RSV by PCR Not detected        B. parapertussis, PCR Not detected        Bordetella pertussis - PCR Not detected        Chlamydophila pneumoniae DNA, QL, PCR Not detected        Mycoplasma pneumoniae DNA, QL, PCR Not detected       COVID-19 RAPID TEST [[id number]] Collected: 2206    Order Status: Completed Specimen: Nasopharyngeal Updated: 22     Specimen source Nasopharyngeal        COVID-19 rapid test Not detected        Comment: Rapid Abbott ID Now       Rapid NAAT:  The specimen is NEGATIVE for SARS-CoV-2, the novel coronavirus associated with COVID-19. Negative results should be treated as presumptive and, if inconsistent with clinical signs and symptoms or necessary for patient management, should be tested with an alternative molecular assay. Negative results do not preclude SARS-CoV-2 infection and should not be used as the sole basis for patient management decisions. This test has been authorized by the FDA under an Emergency Use Authorization (EUA) for use by authorized laboratories. Fact sheet for Healthcare Providers:  http://www.susan-lorri.bilynn/  Fact sheet for Patients: http://www.susan-lorri.biz/       Methodology: Isothermal Nucleic Acid Amplification                  Imaging  Key imaging reviewed           CRITICAL CARE DOCUMENTATION  I had a face to face encounter with the patient, reviewed and interpreted patient data including clinical events, labs, images, vital signs, I/O's, and examined patient. I have discussed the case and the plan and management of the patient's care with the consulting services, the bedside nurses and the respiratory therapist.      NOTE OF PERSONAL INVOLVEMENT IN CARE   This patient has a high probability of imminent, clinically significant deterioration, which requires the highest level of preparedness to intervene urgently. I participated in the decision-making and personally managed or directed the management of the following life and organ supporting interventions that required my frequent assessment to treat or prevent imminent deterioration. I personally spent 30 minutes of critical care time. This is time spent at this critically ill patient's bedside actively involved in patient care as well as the coordination of care. This does not include any procedural time which has been billed separately.     Tereso Andrea FERNANDA [address] [facility]  12/18/2022

## 2022-12-18 NOTE — PROGRESS NOTES
Bedside and Verbal shift change report given to Terri Khanna (oncoming nurse) by Joselyn Pierce RN (offgoing nurse). Report included the following information SBAR, Kardex, Intake/Output, and Recent Results.

## 2022-12-18 NOTE — PROGRESS NOTES
Shift Summary:    2050: Pt's daughter Antionette to bedside. Gave number to staff. She is the point of contact for the family. Also brought mother's personal items to the bedside. Pt stable throughout the night. Interactive and pleasant. Pt had not voided. Inquired with pt if she had the need to void. Pt said she tried but could not. Bladder scanned and pt had greater than 838 ml. Straight cath and pt had 725ml out. Also transitioned pt from Bipap to NC 5L. Pt's daughter also updated via telephone about the night.

## 2022-12-18 NOTE — PROGRESS NOTES
SLP Contact Note    Consult received and appreciated. Patient chart reviewed and discussed with RN Mehdi Marques, appreciate her assistance. Pt and RN with no concerns swallowing, no hx of dysphagia, and passed swallow screen. No SLP needs indicated at this time, but please reconsult should SLP be of any assistance.       Thank you,  DIOGENES Latham.Ed, [address] [facility]  Speech-Language Pathologist

## 2022-12-18 NOTE — CONSULTS
[facility]  Cardiology Care Note                  []Initial visit     [x]Established visit     Patient Name: Henrique Person - [facility]:[id number]  Primary Cardiologist: Libia Gonzalez MD  Consulting Cardiologist: Yessica Medina MD     Reason for initial visit: rapid afib and pulmonary edema    HPI:     She is a morbidly obese 68-year-old female with an ischemic cardiomyopathy and AICD, paroxysmal A. fib, hypertension, and coronary artery disease. Her most recent heart catheterization was in late September at [facility] and I have reviewed and summarized that study below. Earlier in the week she had some nasal congestion and a mild cough but no real fever. 2 days ago she actually felt fairly good and had an office visit with Dr. Gurpreet Feliz. Her her vitals were stable and she was not hypoxic and she was found to be in A. fib with a heart rate of about 100 bpm.  Her device was interrogated and it turned out that she has been in A. fib about half the time since it was last checked. She was started on amiodarone in addition to her Cardizem with the thought that if she did not convert on her own she be cardioverted on drug and if that failed she would be offered an AV node ablation. Yesterday she began to feel bad with fatigue shortness of breath intermittently productive cough body aches and a headache. Her grandson took her to urgent care this morning and her COVID test was negative. She still was not feeling well so she came to the emergency room here where she was noted to be hypoxic and in rapid A. fib. Her flu test was negative. She was given a milligram of IV Bumex and started on a Cardizem drip. When she arrived to the floor she was given a bolus of amiodarone and her Cardizem drip was stopped. She is taken no of her usual p.o. meds today.   Shortly after all this she became more tachypneic and hypoxic and she was put back on BiPAP and at the time I saw her her heart rate was about 110 her oxygen saturations were 97% and her blood pressure was 170/100. SUBJECTIVE:    12/17  She is still fairly short of breath and not able to provide a lot of history though she does feel better since she got here. She says she has been having a lot of urine output but nothing is been recorded as best I can tell she does not have a Hdz. 12/18  She is off BiPAP and comfortable on nasal cannula now. Coughing has improved and she had good diuresis though she is having some problems with urinary retention. Her influenza screen at [facility] was negative however it was positive when repeated here at [facility]. Assessment and Plan     12/17  i suspect there is some sort of upper respiratory infection driving her A. fib rate and is caused her to go into congestive heart failure. I could not question her closely about recent potential dietary indiscretion. She did tell me that she weighs her self every day and her weight has been stable. Her EKG and troponins thus far do not suggest an ischemic event which I think would be unusual given her recent cath findings. For now I would continue to support her with oxygen and diurese her as aggressively as her blood pressure and renal function will allow. We should resume her usual outpatient medications as soon as possible. It sounds like an echocardiogram has been ordered but I do not think that is going to change her management in any significant way     12/18  Her decompensation is now better explained with the finding of a positive flu test.  She is already doing much better and on appropriate therapy and her A. fib rate is back to baseline about 100 bpm.  I would continue her IV amiodarone while she is here and resume her usual heart failure medications as tolerated. Can probably go back to her usual outpatient p.o. Bumex dose.   I do not think she needs any specific cardiac testing at this time. I will asked Dr. Liana Peguero to review her chart to see if he wants to do anything differently while she is in the hospital.           ____________________________________________________________    Cardiac testing    Received records from admission at [facility].     ECG (09/22/2022) showed AF with controlled ventricular rate. RHC/LHC (09/28/2022): LVEDP 15-20 mmHg. LM with MLI. LCx with mild diffuse disease;  in OM with left to left & right to left collaterals. LAD with prior stent with AUBREY-3 flow; otherwise MLI. RCA with very mild ISR in proximal segment, otherwise MLI. Labs (09/28/2022)  WBC 6.66  Hgb 11.2  Hct 36.3  Plt 264  Na 144  K 3.3    08/09/21    ECHO ADULT COMPLETE 08/10/2021 8/10/2021    Interpretation Summary  · LV: Estimated LVEF is 25 - 30%. Dilated left ventricle. Mildly increased wall thickness. Severely reduced systolic function. There is global dysfunction with distinct regional wall motion abnormalities. · LA: Severely dilated left atrium. Left Atrium volume index is 53 mL/m2. · MV: Mitral valve thickening. Moderate mitral valve regurgitation is present. · AV: Aortic valve leaflet calcification present. Aortic valve mean gradient is 11 mmHg. Aortic valve area is 1.4 cm2. Mild aortic valve stenosis is present. · Poor parasternal windows    Signed by: Foster Alexander MD on 8/10/2021  5:25 PM        NUCLEAR CARDIAC STRESS TEST 08/13/2021 8/16/2021    Interpretation Summary  · SPECT: Left ventricular function post-stress was abnormal. Calculated ejection fraction is 22%. There is no evidence of transient ischemic dilation (TID). The TID ratio is 0.86. · Baseline ECG: Normal sinus rhythm. · SPECT: Myocardial perfusion imaging defect 1: There is a defect that is large in size with a severe reduction in uptake present in the lateral location(s) that is partially reversible. There is abnormal wall motion in the defect area.  The defect appears to be infarction with esther-infarct ischemia. Perfusion defect was visually and quantitatively present. · SPECT: Left ventricular perfusion is probably abnormal. Myocardial perfusion imaging supports a high risk stress test.    Signed by: Laurinda Hatchet, MD on 8/13/2021  8:26 AM    08/13/21    CARDIAC PROCEDURE 08/13/2021 8/13/2021    Conclusion  Findings:  1)Normal LVEDP  2)Severe native 1 vessel CAD with  of ramus intermedius. LCx  Is small with occluded distal LCx. OM2 and distal ramus fill from collaterals from LAD, diagonal and RCA. 3)Limited wire probing suggests no micro channel in Ramus ISR . Proximal cap start before the stent    Access: Right radial no issues  Contrast 40 cc    Recommendations  1)Continue GDMT and weight loss. If dyspnea continue may consider Ramus  PCI. Uncertain if benefit will be substantial.  2)GDMT for CAD    Signed by: Tomy Hernández MD on 8/13/2021 10:34 AM        Most recent HS troponins:  Recent Labs     12/17/22  1703 12/17/22  1317 12/17/22  0938   TROPHS 56* 31 22       ECG: atrial fibrillation, rate 110  Review of Systems    [x]All other systems reviewed and all negative except as written in HPI    [] Patient unable to provide secondary to condition         Past Medical History:   Diagnosis Date    Acute right ankle pain 06/08/2018 5/29/18: X-ray showed possible right ankle sprain. 6/6/18: [facility] diagnosed with peroneal tendonitis.  Prescribed an ASO    Asthma     Atrial fibrillation ([facility]     Cardiomyopathy ([facility] [facility]     Coronary artery disease 2008    s/p RCA stent ([facility]) on 11/26/11    Depression with anxiety     2011    DVT (deep venous thrombosis) ([facility] 07/27/2010    Family history of early CAD     GERD (gastroesophageal reflux disease)     H/O gastric bypass 2006    Revision in 2009    Hepatitis C antibody test positive     Does not have chronic hep C (labs 10/6/15: neg HCV RNA)     HTN (hypertension) 07/27/2010    Hyperlipidemia 07/27/2010    Incontinence of feces 09/03/2018    Dr. Yohannes Schofield. 8/20/18: Improving with pelvic physical therapy and psyllium husk treatment. Saw Dr. Emmanuelle Dye who suggested PT first. MR defecography showed pelvic floor weakness with pelvic descensus, small anterior  Rectocele, and vaginal prolapse. To have pelvic PT weekly for 8 wks and to see Dr. Emmanuelle Dye again in Oct 2018. Joint pain 07/27/2010    MI (myocardial infarction) ([facility] [address] 07/27/2010    Mitral valve regurgitation     Mild to moderate    Morbid obesity ([facility] [address] 07/27/2010    Myocardial infarction [facility]) 2006 and 2011    Dr. Marino Sensing disorder     PUD (peptic ulcer disease)     gi bleed 2008; ulcer n gastric bypass pouch    Urinary incontinence, stress 07/27/2010     Past Surgical History:   Procedure Laterality Date    COLONOSCOPY N/A 6/29/2018    COLONOSCOPY performed by Jose Zavala MD at \[facility]\"" ENDOSCOPY; redundant colon, int hemorrhoids, pathology: normal colonic mucosa    HX BREAST BIOPSY Left     benign    HX BREAST LUMPECTOMY Right 7/14/2022    RIGHT BREAST LUMPECTOMY WITH ULTRASOUND performed by Kyle Ibrahim MD at [place] [address]    HX COLONOSCOPY  9/5/14    Dr. Brunilda Gonsalez; normal, repeat in 5 yrs    HX GASTRIC BYPASS      HX IMPLANTABLE CARDIOVERTER DEFIBRILLATOR  08/24/2016    HX LAP GASTRIC BYPASS  2006    revision 2009/Dr. Felicie Cheadle    HX OTHER SURGICAL  09/19/2016    Removal of right ventricular ICD lead & single chamber transvenous AICD    HX OTHER SURGICAL  10/12/2016    pocket revison of ICD; Dr. Concepcion Del Real  06/07/2018    Dr Yohannes Schofield; high resolution anorectal manaometry-abnormal study. Study done for eval of fecal incontinence    HX OTHER SURGICAL  12/14/2018    Dr. Yohannes Schofield. Rectal endoscopic US (EUS) for rectal incontinence. Normal rectal mucosa, no fistulas.     HX PACEMAKER      sicd/left side of chest under arm    INS PPM/ICD LED SING ONLY  8/24/2016         INS PPM/ICD LED SING ONLY  8/26/2016 INS PPM/ICD LED SING ONLY  9/21/2016         MD CARDIAC SURG PROCEDURE UNLIST      Stent x 5-6,Most recent 2011    MD LAP,STOMACH,OTHER,W/O TUBE  04/05/2010    Revision GBP     Social Hx:  reports that she quit smoking about 18 years ago. Her smoking use included cigarettes. She started smoking about 52 years ago. She has never used smokeless tobacco. She reports that she does not drink alcohol and does not use drugs. Family Hx: family history includes Alzheimer's Disease in her mother; Cancer in her father and mother; Dementia in her mother; Heart Attack in her brother; Heart Disease in her father and sister; Hypertension in her father; Stroke in her sister and sister. Allergies   Allergen Reactions    Amoxicillin Anaphylaxis    Amoxicillin Anaphylaxis    Lipitor [Atorvastatin] Other (comments)     Severe muscle pain and spasms    Zocor [Simvastatin] Other (comments)     Cramps, muscle spasms    Buspar [Buspirone] Other (comments)     Drunk sensation, headaches    Hydroxyzine Other (comments)     Blurred vision, lightheadedness    Livalo [Pitavastatin] Myalgia    Pravastatin Other (comments)     Leg cramps    Tramadol Vertigo          OBJECTIVE:  Wt Readings from Last 3 Encounters:   12/18/22 224 lb 3.3 oz (101.7 kg)   12/15/22 229 lb (103.9 kg)   11/28/22 223 lb 6.4 oz (101.3 kg)       Intake/Output Summary (Last 24 hours) at 12/18/2022 0946  Last data filed at 12/18/2022 0800  Gross per 24 hour   Intake 655.29 ml   Output 750 ml   Net -94.71 ml           Physical Exam    Vitals:   Vitals:    12/18/22 0700 12/18/22 0731 12/18/22 0800 12/18/22 0900   BP: 131/80  131/75 120/68   Pulse: 90  (!) 103 (!) 101   Resp: 20  16 21   Temp:   (!) 96.6 °F (35.9 °C)    SpO2: 94% 99% 98% 92%   Weight:       Height:         Telemetry: AFIB    Neck: supple, symmetrical, trachea midline, no adenopathy, no carotid bruit, and no JVD    General:    Alert, cooperative, no distress, appears stated age.    Neck:   Supple, no carotid bruit and no JVD. Lungs:     clear   Heart[de-identified]    irregular rate and rhythm, S1, S2 normal, no murmur, click, rub or gallop. Abdomen:     Soft, non-tender. Bowel sounds normal. Morbidly obese   Extremities:   Extremities normal, atraumatic, no cyanosis or edema. Vascular:   Pulses - 2+   Skin:   Skin color normal. No rashes or lesions on visible areas   Neurologic:   Alert, Moves all extremities. Data Review:     Radiology:   XR Results (most recent):  Results from Hospital Encounter encounter on 12/17/22    XR CHEST PORT    Narrative  EXAM: Portable CXR. 1620 hours. COMPARISON: 0912 hours. INDICATION: chest pain    FINDINGS:  Unchanged interstitial pulmonary edema. No focal consolidation. Borderline  cardiomegaly. Subcutaneous ICD. No pneumothorax, midline shift or pleural  effusion. Impression  Stable interstitial pulmonary edema. CT Results (most recent):  Results from Hospital Encounter encounter on 02/23/17    CT SPINE CERV WO CONT    Narrative  EXAM:  CT CERVICAL SPINE WITHOUT CONTRAST    INDICATION: Ground-level fall this morning. COMPARISON: None. TECHNIQUE: Multislice helical CT of the cervical spine was performed without  intravenous contrast administration. Sagittal and coronal reconstructions were  generated. CT dose reduction was achieved through use of a standardized  protocol tailored for this examination and automatic exposure control for dose  modulation. Adaptive statistical iterative reconstruction (ASIR) was utilized. CONTRAST: None. FINDINGS:    The alignment is within normal limits. There is no fracture or subluxation. There is degenerative disc narrowing and marginal osteophyte formation at the  C5-6 and C6-7 levels. The odontoid process is intact. The craniocervical  junction is within normal limits. The prevertebral soft tissues are within  normal limits. There is no spinal canal or neural foraminal stenosis.  The  surrounding soft tissue and musculoskeletal structures appear unremarkable. The  visualized lung apices are unremarkable. Impression  IMPRESSION: No acute findings. MRI Results (most recent):  Results from [place] encounter on 07/02/18    MRI PELVIC FLOOR STUDY     Narrative  EXAM: MR PELVIC FLOOR  INDICATION: Full incontinence of feces. CONTRAST: Sterile ultrasound gel was placed into the vagina and rectum. No  intravenous contrast was administered. Oral Volumen was administered. TECHNIQUE: MR of the pelvis was performed according to standard departmental  protocol. The patient voided prior to imaging. Following three plane localizer  images, sagittal, axial and coronal breath-hold T2 (HASTE) images as well as  breath-hold axial T1 in and out of phase images were acquired. Static and  dynamic MR of the pelvic floor was performed using static sagittal axial and  coronal high-resolution T2 weighted images followed by dynamic sagittal midline  SSFSE images at rest, during squeezing (Kegel maneuver), during straining  (Valsalva maneuver) and during defecation. FINDINGS:  H line = width of the levator hiatus  M line = descent of the levator hiatus    At rest:  H line measures 5.7 cm  M line measures 23 mm. The anorectal angle measures 131 degrees. (Normal 108 to 127 degrees.)    With squeezing:  H line measures 4.7 cm  M line measures 8 mm. The anorectal angle measures 112 degrees. With straining:  H line measures 5.5 cm  M line measures 5 mm. The anorectal angle measures 121 degrees. With evacuation:  H line measures 5.3 cm  M line measures 4.5 mm. The anorectal angle measures 140 degrees. The puborectalis sling is intact. There is there is elevation of the pelvic  floor with squeezing and pelvic floor weakness with abnormal descent of the  pelvic floor with straining and evacuation.     POSTERIOR COMPARTMENT: There is pelvic floor weakness with pelvic descensus with  an anterior rectocele and the patient is unable to fully evacuate the rectum. There is no rectal intussusception or navin rectal prolapse. ANTERIOR COMPARTMENT: There is minimal descent of the bladder neck below the  sacrococcygeal line. MIDDLE COMPARTMENT: The uterus is absent. There is prolapse of the vagina below  the sacrococcygeal line. Impression  IMPRESSION: Pelvic floor weakness with pelvic descensus. There is a small  anterior rectocele and prolapse of the vagina below the sacrococcygeal line. The  patient is unable to fully evacuate the rectum. No results for input(s): CPK, TROIQ in the last 72 hours. No lab exists for component: CKQMB, CPKMB, BMPP  Recent Labs     12/18/22  0436 12/17/22  1709    133*   K 3.5 4.0    106   CO2 24 22   BUN 24* 18   CREA 1.29* 1.42*   * 251*   PHOS  --  6.6*   CA 8.5 8.6       Recent Labs     12/18/22  0436 12/17/22  1709   WBC 10.3 14.3*   HGB 11.2* 12.5   HCT 36.8 41.7    326       Recent Labs     12/18/22  0436 12/17/22  1709   PTP 12.7* 12.8*   INR 1.2* 1.2*   AP 79 94       No results for input(s): CHOL, LDLC in the last 72 hours.     No lab exists for component: TGL, HDLC,  HBA1C  Recent Labs     12/17/22  1709 12/15/22  0958   TSH 2.81 2.35             Current meds:    Current Facility-Administered Medications:     bumetanide (BUMEX) injection 2 mg, 2 mg, IntraVENous, BID, Jean Carlos Felipe DO, 2 mg at 12/18/22 0421    aspirin delayed-release tablet 81 mg, 81 mg, Oral, DAILY, Jean Carlos Felipe DO, 81 mg at 12/18/22 0853    sodium chloride (NS) flush 5-40 mL, 5-40 mL, IntraVENous, Q8H, Feliz Freed MD, 10 mL at 12/18/22 0549    sodium chloride (NS) flush 5-40 mL, 5-40 mL, IntraVENous, PRN, Chai Madden MD    sodium chloride (NS) flush 5-40 mL, 5-40 mL, IntraVENous, PRN, Carlton Jeffrey MD    acetaminophen (TYLENOL) tablet 650 mg, 650 mg, Oral, Q6H PRN, 650 mg at 12/18/22 0551 **OR** acetaminophen (TYLENOL) suppository 650 mg, 650 mg, Rectal, Q6H PRN, Myra Verdugo MD    polyethylene glycol (MIRALAX) packet 17 g, 17 g, Oral, DAILY PRN, Neva Jeffrey MD    ondansetron (ZOFRAN ODT) tablet 4 mg, 4 mg, Oral, Q8H PRN **OR** ondansetron (ZOFRAN) injection 4 mg, 4 mg, IntraVENous, Q6H PRN, Neva Jeffrey MD    [facility]) tablet 5 mg, 5 mg, Oral, BID, Neva Jeffrey MD, 5 mg at 12/18/22 0620    amiodarone (CORDARONE) 375 mg/250 mL D5W infusion, 0.5-1 mg/min, IntraVENous, TITRATE, Neva Jeffrey MD, Last Rate: 20 mL/hr at 12/17/22 2336, 0.5 mg/min at 12/17/22 2336    FLUoxetine (PROzac) capsule 40 mg, 40 mg, Oral, DAILY, Derik Koo G, DO    polyethylene glycol (MIRALAX) packet 17 g, 17 g, Oral, DAILY, Sarah Haw, DO, 17 g at 12/18/22 8904    senna-docusate (PERICOLACE) 8.6-50 mg per tablet 1 Tablet, 1 Tablet, Oral, BID, Sarah Haw, DO, 1 Tablet at 12/18/22 0850    magnesium hydroxide (MILK OF MAGNESIA) 400 mg/5 mL oral suspension 30 mL, 30 mL, Oral, DAILY PRN, Sarah Haw, DO    albuterol-ipratropium (DUO-NEB) 2.5 MG-0.5 MG/3 ML, 3 mL, Nebulization, Q6H RT, Sarah Haw, DO, 3 mL at 12/18/22 8501    ezetimibe (ZETIA) tablet 10 mg, 10 mg, Oral, DAILY, Sarah Haw, DO    letrozole [facility]) tablet 2.5 mg, 2.5 mg, Oral, DAILY, Sarah Haw, DO    oseltamivir (TAMIFLU) capsule 30 mg, 30 mg, Oral, BID, Vianney Warren MD, 30 mg at 12/18/22 [address], MD  [facility] [address] [place] [address] [address] [place] [address]  [place], [facility] [address]   [phone]      Rudi Balderrama MD

## 2022-12-18 NOTE — PROGRESS NOTES
0730 Bedside shift change report given to [address] (oncoming nurse) by Shelley Pedro RN (offgoing nurse). Report included the following information SBAR, Kardex, ED Summary, Procedure Summary, Intake/Output, MAR, Recent Results, Med Rec Status, and Cardiac Rhythm Atrial Flutter . 1200 Pt OOB, ambulating in hallway with PT. VSS, some complaint of SOB. 1315 PT c/o of ear pain. Eugenio Huitron MD notified. Orders received for flonase nasal spray. 1930 Bedside shift change report given to [address] [place] [address] (oncoming nurse) by Dakota Chun RN (offgoing nurse). Report included the following information SBAR.

## 2022-12-18 NOTE — PROGRESS NOTES
[address] [facility] Adult  Hospitalist Group                                                                                          Hospitalist Progress Note  Lexie Shah MD  Answering service: [phone] OR [phone] from in house phone        Date of Service:  2022  NAME:  Stephanie Umanzor  :  1950  MRN:  [id number]      Admission Summary:   Per ICU: 72F with ischCMP LVEF30% with ICD, CAD s/p AMRIT   Afib on Eliquis, h.o gastric bypass, h/o DVT presenting to SPED found to be in Afib RVR, started on diltiazem gtt. On arrival to [facility] Telemetry unit still in RVR, received Amio bolus -- became acute hypoxic and HTN, placed on bipap, 1mg Bumex administered. Moved to CCU for continued care. Patient of Dr Belinda Arango, recently started on Amio PO, as she had a moderate burden of asymptomatic afib on her recent icd interrogation at home while on her cardiazem.        - Flu A +, tamiflu started, on 5L NC     ICU requested to transfer the patient to medicine on 2022    Interval history / Subjective:   NAD, event over night noted  Off BiPAP  Supplemental O2 NC 4L/min via NC  ICU and cards input appreciated  Continue amio drip  UOP -360     Assessment & Plan:     Acute respiratory failure with hypoxia  due to pulmonary edema and acute systolic HF exacerbation  Pulmonary edema required BiPAP and supplemental O2  HTN Emergency,   Afib RVR  Acute on chronic systolic HF - LVEF 08%   Ischm CMP  CAD  Afib on Eliquis  Continue amio drip  Supplemental O2, wean as tolerated  Bumex IV  Trop trending down  ECHO pending  Follow up cardiologist for further evaluation and recommendations    Influenza A  Continue Tamiflu    LEMUEL,   Cr is trending down        Code status: Full code  Prophylaxis: Eliquis  Care Plan discussed with: patient, RN  Anticipated Disposition: TBD, 24-48hrs, f/up ECHO, cards,      Hospital Problems  Date Reviewed: 8/15/2022            Codes Class Noted POA    A-fib ([address] ICD-10-CM: I48.91  ICD-9-CM: 427.31  12/17/2022 Unknown             Review of Systems:   A comprehensive review of systems was negative except for that written in the HPI. Vital Signs:    Last 24hrs VS reviewed since prior progress note. Most recent are:  Visit Vitals  /68   Pulse (!) 109   Temp (!) 96.6 °F (35.9 °C)   Resp 19   Ht 5' 4\" (1.626 m)   Wt 101 kg (222 lb 10.6 oz)   SpO2 97%   BMI 38.22 kg/m²         Intake/Output Summary (Last 24 hours) at 12/18/2022 1152  Last data filed at 12/18/2022 1000  Gross per 24 hour   Intake 655.29 ml   Output 850 ml   Net -194.71 ml        Physical Examination:     I had a face to face encounter with this patient and independently examined them on 12/18/2022 as outlined below:          Constitutional:  No acute distress, cooperative, pleasant    ENT:  Oral mucosa moist, oropharynx benign. Resp:  Coarse bilaterally. No wheezing/rhonchi/rales. No accessory muscle use. CV:  IRIR, tachy,  no gallops, rubs    GI:  Soft, non distended, non tender. normoactive bowel sounds, no hepatosplenomegaly, obese    Musculoskeletal:  No edema, warm, 2+ pulses throughout    Neurologic:  Moves all extremities.   AAOx3, CN II-XII reviewed            Data Review:    Review and/or order of clinical lab test      Labs:     Recent Labs     12/18/22 0436 12/17/22  1709   WBC 10.3 14.3*   HGB 11.2* 12.5   HCT 36.8 41.7    326     Recent Labs     12/18/22  0436 12/17/22  1709 12/17/22  0938    133* 139   K 3.5 4.0 3.6    106 105   CO2 24 22 23   BUN 24* 18 15   CREA 1.29* 1.42* 0.97   * 251* 170*   CA 8.5 8.6 8.7   MG 2.6* 2.1  --    PHOS  --  6.6*  --      Recent Labs     12/18/22  0436 12/17/22  1709 12/17/22  0938   ALT 71 82* 87*   AP 79 94 98   TBILI 0.5 0.5 0.4   TP 6.5 7.4 7.2   ALB 3.1* 3.5 3.7   GLOB 3.4 3.9 3.5     Recent Labs     12/18/22  0436 12/17/22  1709   INR 1.2* 1.2*   PTP 12.7* 12.8*      No results for input(s): FE, TIBC, PSAT, FERR in the last 72 hours. Lab Results   Component Value Date/Time    Folate 5.8 08/10/2020 11:28 AM      No results for input(s): PH, PCO2, PO2 in the last 72 hours. No results for input(s): CPK, CKNDX, TROIQ in the last 72 hours.     No lab exists for component: CPKMB  Lab Results   Component Value Date/Time    Cholesterol, total 220 (H) 11/28/2022 10:48 AM    HDL Cholesterol 72 11/28/2022 10:48 AM    LDL, calculated 130 (H) 11/28/2022 10:48 AM    LDL, calculated 49 08/10/2020 11:28 AM    Triglyceride 102 11/28/2022 10:48 AM     Lab Results   Component Value Date/Time    Glucose (POC) 167 (H) 11/26/2011 09:50 PM    Glucose (POC) 91 04/07/2010 06:25 AM    Glucose (POC) 129 (H) 04/06/2010 11:42 PM    Glucose (POC) 127 (H) 04/06/2010 06:18 PM     Lab Results   Component Value Date/Time    Color YELLOW 04/12/2010 12:05 PM    Appearance CLEAR 04/12/2010 12:05 PM    Specific gravity 1.019 04/12/2010 12:05 PM    pH (UA) 6.5 04/12/2010 12:05 PM    Protein NEGATIVE 04/12/2010 12:05 PM    Glucose NEGATIVE 04/12/2010 12:05 PM    Ketone 15 (A) 04/12/2010 12:05 PM    Bilirubin NEGATIVE 03/22/2010 05:10 PM    Urobilinogen 1.0 04/12/2010 12:05 PM    Nitrites NEGATIVE 04/12/2010 12:05 PM    Leukocyte Esterase NEGATIVE 04/12/2010 12:05 PM    Epithelial cells 0-5 04/12/2010 12:05 PM    Bacteria NEGATIVE 04/12/2010 12:05 PM    WBC 0-4 04/12/2010 12:05 PM    RBC 0-3 04/12/2010 12:05 PM         Medications Reviewed:     Current Facility-Administered Medications   Medication Dose Route Frequency    bumetanide (BUMEX) injection 2 mg  2 mg IntraVENous BID    aspirin delayed-release tablet 81 mg  81 mg Oral DAILY    FLUoxetine (PROzac) capsule 40 mg  40 mg Oral BID    [START ON 12/19/2022] fluticasone propionate (FLONASE) 50 mcg/actuation nasal spray 2 Spray  2 Spray Both Nostrils DAILY    sodium chloride (NS) flush 5-40 mL  5-40 mL IntraVENous Q8H    sodium chloride (NS) flush 5-40 mL  5-40 mL IntraVENous PRN    sodium chloride (NS) flush 5-40 mL 5-40 mL IntraVENous PRN    acetaminophen (TYLENOL) tablet 650 mg  650 mg Oral Q6H PRN    Or    acetaminophen (TYLENOL) suppository 650 mg  650 mg Rectal Q6H PRN    polyethylene glycol (MIRALAX) packet 17 g  17 g Oral DAILY PRN    ondansetron (ZOFRAN ODT) tablet 4 mg  4 mg Oral Q8H PRN    Or    ondansetron (ZOFRAN) injection 4 mg  4 mg IntraVENous Q6H PRN    apixaban (ELIQUIS) tablet 5 mg  5 mg Oral BID    amiodarone (CORDARONE) 375 mg/250 mL D5W infusion  0.5-1 mg/min IntraVENous TITRATE    polyethylene glycol (MIRALAX) packet 17 g  17 g Oral DAILY    senna-docusate (PERICOLACE) 8.6-50 mg per tablet 1 Tablet  1 Tablet Oral BID    magnesium hydroxide (MILK OF MAGNESIA) 400 mg/5 mL oral suspension 30 mL  30 mL Oral DAILY PRN    albuterol-ipratropium (DUO-NEB) 2.5 MG-0.5 MG/3 ML  3 mL Nebulization Q6H RT    letrozole (FEMARA) tablet 2.5 mg  2.5 mg Oral DAILY    oseltamivir (TAMIFLU) capsule 30 mg  30 mg Oral BID     ______________________________________________________________________  EXPECTED LENGTH OF STAY: - - -  ACTUAL LENGTH OF STAY:          1                 Olivier Ulloa MD

## 2022-12-18 NOTE — PROGRESS NOTES
Problem: Mobility Impaired (Adult and Pediatric)  Goal: *Acute Goals and Plan of Care (Insert Text)  Description: FUNCTIONAL STATUS PRIOR TO ADMISSION: Patient was independent and active without use of DME.    HOME SUPPORT PRIOR TO ADMISSION: The patient lived with , son but did not require assist.    Physical Therapy Goals  Initiated 12/18/2022  1. Patient will move from supine to sit and sit to supine  in bed with independence within 7 day(s). 2.  Patient will transfer from bed to chair and chair to bed with independence using the least restrictive device within 7 day(s). 3.  Patient will perform sit to stand with independence within 7 day(s). 4.  Patient will ambulate with independence for 150 feet with the least restrictive device within 7 day(s). Outcome: Progressing Towards Goal   PHYSICAL THERAPY EVALUATION  Patient: Shan Segura (62 y.o. female)  Date: 12/18/2022  Primary Diagnosis: A-fib [facility]) [I48.91]       Precautions:   Fall      ASSESSMENT  Based on the objective data described below, the patient presents with decreased activity tolerance, tachy with exertion, RODNEY, generalized weakness, increased need for assistance, impaired balance, and high risk for falls in setting of Flu +, hypoxia, CHF, and afib with RVR. Noted active complete bedrest order from 12/17. Spoke with MD who gave verbal clearance for PT interventions for patient, stating patient no longer on bedrest. Patient found supine in bed and agreeable to PT, spo2 95% on 4LPM. Patient completed supine > sit with modified independence with use of bed rail. Patient completed sit <> stand with min A x 1 and demonstrated wide stance and mild trunk sway. Patient ambulated up to 75 feet with CGA-min A x 1 2/2 trunk sway with high guard posture but receptive to cues for relaxation and reciprocal arm swing. While ambulating on 4LPM, spo2 stable 92% or higher, however HR fluctuating between 110s-130s.  Patient endorsed SOB with ambulation and requested return to room. Patient left seated in chair with all VSS, in NAD, and all needs in reach. Anticipate patient able to return home with [facility] PT pending continued progress towards mobility goals. 12/18/22 1133 12/18/22 1141 12/18/22 1149   Vital Signs   BP 99/62 113/78 106/66   MAP (Calculated) 74 90 79   BP Patient Position Supine Sitting Sitting  (post-activity)     Current Level of Function Impacting Discharge (mobility/balance): ambulates 75 feet with CGA-min A x 1     Functional Outcome Measure: The patient scored Total Score: 17/28 on the Tinetti outcome measure which is indicative of high fall risk. Other factors to consider for discharge: requires 4LPM for stable spo2 while ambulating, tachy with ambulation      Patient will benefit from skilled therapy intervention to address the above noted impairments. PLAN :  Recommendations and Planned Interventions: bed mobility training, transfer training, gait training, therapeutic exercises, neuromuscular re-education, patient and family training/education, and therapeutic activities      Frequency/Duration: Patient will be followed by physical therapy:  5 times a week to address goals. Recommendation for discharge: (in order for the patient to meet his/her long term goals)  Physical therapy at least 2 days/week in the home AND ensure assist and/or supervision for safety with all mobility    This discharge recommendation:  A follow-up discussion with the attending provider and/or case management is planned    IF patient discharges home will need the following DME: none         SUBJECTIVE:   Patient stated I just feel weak.     OBJECTIVE DATA SUMMARY:   HISTORY:    Past Medical History:   Diagnosis Date    Acute right ankle pain 06/08/2018 5/29/18: X-ray showed possible right ankle sprain. 6/6/18: saw Dr. Little García ([facility]), diagnosed with peroneal tendonitis.  Prescribed an ASO    Asthma     Atrial fibrillation ([address] Cardiomyopathy [facility])     Coronary artery disease 2008    s/p RCA stent (AMRIT) on 11/26/11    Depression with anxiety     2011    DVT (deep venous thrombosis) ([facility] 07/27/2010    Family history of early CAD     GERD (gastroesophageal reflux disease)     H/O gastric bypass 2006    Revision in 2009    Hepatitis C antibody test positive     Does not have chronic hep C (labs 10/6/15: neg HCV RNA)     HTN (hypertension) 07/27/2010    Hyperlipidemia 07/27/2010    Incontinence of feces 09/03/2018    Dr. Wild Davenport. 8/20/18: Improving with pelvic physical therapy and psyllium husk treatment. Saw Dr. Dorothea Reeder who suggested PT first. MR defecography showed pelvic floor weakness with pelvic descensus, small anterior  Rectocele, and vaginal prolapse. To have pelvic PT weekly for 8 wks and to see Dr. Dorothea Reeder again in Oct 2018.     Joint pain 07/27/2010    MI (myocardial infarction) ([facility] 07/27/2010    Mitral valve regurgitation     Mild to moderate    Morbid obesity ([facility] [address] 07/27/2010    Myocardial infarction [facility]) 2006 and 2011    Dr. Kobe Becerra disorder     PUD (peptic ulcer disease)     gi bleed 2008; ulcer n gastric bypass pouch    Urinary incontinence, stress 07/27/2010     Past Surgical History:   Procedure Laterality Date    COLONOSCOPY N/A 6/29/2018    COLONOSCOPY performed by Vicky Spain MD at [facility] ENDOSCOPY; redundant colon, int hemorrhoids, pathology: normal colonic mucosa    HX BREAST BIOPSY Left     benign    HX BREAST LUMPECTOMY Right 7/14/2022    RIGHT BREAST LUMPECTOMY WITH ULTRASOUND performed by Raya Saini MD at [place] [address]    HX COLONOSCOPY  9/5/14    Dr. Katherine Collado; normal, repeat in 5 yrs    HX GASTRIC BYPASS      HX IMPLANTABLE CARDIOVERTER DEFIBRILLATOR  08/24/2016    HX LAP GASTRIC BYPASS  2006    revision 2009/Dr. Luz Marina Valverde    HX OTHER SURGICAL  09/19/2016    Removal of right ventricular ICD lead & single chamber transvenous AICD    HX OTHER SURGICAL  10/12/2016 pocket revison of ICD; Dr. Josef Seo OTHER SURGICAL  06/07/2018    Dr Maryann Rene; high resolution anorectal manaometry-abnormal study. Study done for eval of fecal incontinence    HX OTHER SURGICAL  12/14/2018    Dr. Maryann Rene. Rectal endoscopic US (EUS) for rectal incontinence. Normal rectal mucosa, no fistulas. HX PACEMAKER      sicd/left side of chest under arm    INS PPM/ICD LED SING ONLY  8/24/2016         INS PPM/ICD LED SING ONLY  8/26/2016         INS PPM/ICD LED SING ONLY  9/21/2016         [place] CARDIAC SURG PROCEDURE UNLIST      Stent x 5-6,Most recent 2011    [place] LAP,STOMACH,OTHER,W/O TUBE  04/05/2010    Revision GBP       Personal factors and/or comorbidities impacting plan of care: afib with RVR, Flu +, CHF, HTN    Home Situation  Home Environment: Private residence  Wheelchair Ramp: Yes  One/Two Story Residence: One story  Living Alone: No  Support Systems: Spouse/Significant Other, Child(sherrill), Other Family Member(s)  Patient Expects to be Discharged to[de-identified] Home with family assistance  Current DME Used/Available at Home: Elverna Corti, rolling, Cane, straight  Tub or Shower Type: Tub/Shower combination    EXAMINATION/PRESENTATION/DECISION MAKING:   Critical Behavior:  Neurologic State: Alert  Orientation Level: Oriented X4  Cognition: Appropriate decision making  Safety/Judgement: Fall prevention, Home safety  Hearing:   Auditory  Auditory Impairment: None  Skin:  intact  Edema: none noted  Range Of Motion:  AROM: Generally decreased, functional                       Strength:    Strength: Generally decreased, functional                    Tone & Sensation:   Tone: Normal              Sensation: Intact               Coordination:  Coordination: Within functional limits  Vision:      Functional Mobility:  Bed Mobility:  Rolling: Modified independent  Supine to Sit: Modified independent     Scooting: Independent  Transfers:  Sit to Stand: Minimum assistance;Contact guard assistance;Assist x1  Stand to Sit: Contact guard assistance        Bed to Chair: Contact guard assistance              Balance:   Sitting: Intact; Without support  Standing: Impaired; Without support  Standing - Static: Fair  Standing - Dynamic : Fair  Ambulation/Gait Training:  Distance (ft): 75 Feet (ft)  Assistive Device: Gait belt  Ambulation - Level of Assistance: Minimal assistance;Contact guard assistance;Assist x1        Gait Abnormalities: Altered arm swing;Decreased step clearance;Trunk sway increased        Base of Support: Widened     Speed/Andree: Pace decreased (<100 feet/min)  Step Length: Right shortened;Left shortened            Functional Measure:  Tinetti test:    Sitting Balance: 1  Arises: 1  Attempts to Rise: 2  Immediate Standing Balance: 1  Standing Balance: 1  Nudged: 1  Eyes Closed: 0  Turn 360 Degrees - Continuous/Discontinuous: 1  Turn 360 Degrees - Steady/Unsteady: 0  Sitting Down: 1  Balance Score: 9 Balance total score  Indication of Gait: 1  R Step Length/Height: 1  L Step Length/Height: 1  R Foot Clearance: 1  L Foot Clearance: 1  Step Symmetry: 1  Step Continuity: 0  Path: 1  Trunk: 1  Walking Time: 0  Gait Score: 8 Gait total score  Total Score: 17/28 Overall total score         Tinetti Tool Score Risk of Falls  <19 = High Fall Risk  19-24 = Moderate Fall Risk  25-28 = Low Fall Risk  Tinetti ME. Performance-Oriented Assessment of Mobility Problems in Elderly Patients. [address]; [id number].  (Scoring Description: PT Bulletin Feb. 10, 1993)    Older adults: Jenifer Paredes et al, 2009; n = 1000 [facility] elderly evaluated with ABC, ARJUN, ADL, and IADL)  · Mean ARJUN score for males aged 69-68 years = 26.21(3.40)  · Mean ARJUN score for females age 69-68 years = 25.16(4.30)  · Mean ARJUN score for males over 80 years = 23.29(6.02)  · Mean ARJUN score for females over 80 years = 17.20(8.32)        Physical Therapy Evaluation Charge Determination   History Examination Presentation Decision-Making   MEDIUM  Complexity : 1-2 comorbidities / personal factors will impact the outcome/ POC  LOW Complexity : 1-2 Standardized tests and measures addressing body structure, function, activity limitation and / or participation in recreation  LOW Complexity : Stable, uncomplicated  Other outcome measures tinetti  HIGH       Based on the above components, the patient evaluation is determined to be of the following complexity level: LOW     Pain Ratin/10    Activity Tolerance:   Good, desaturates with exertion and requires oxygen, requires rest breaks, and observed SOB with activity      After treatment patient left in no apparent distress:   Sitting in chair and Call bell within reach    COMMUNICATION/EDUCATION:   The patients plan of care was discussed with: Registered nurse. Fall prevention education was provided and the patient/caregiver indicated understanding.     Thank you for this referral.  Vijay Waldron, PT   Time Calculation: 25 mins

## 2022-12-19 PROBLEM — Z92.89 HISTORY OF CARDIOVERSION: Status: ACTIVE | Noted: 2022-12-19

## 2022-12-19 PROBLEM — Z98.890 HISTORY OF CARDIOVERSION: Status: ACTIVE | Noted: 2022-12-19

## 2022-12-19 LAB
ALBUMIN SERPL-MCNC: 3.5 G/DL (ref 3.5–5)
ALBUMIN/GLOB SERPL: 1.3 {RATIO} (ref 1.1–2.2)
ALP SERPL-CCNC: 70 U/L (ref 45–117)
ALT SERPL-CCNC: 58 U/L (ref 12–78)
ANION GAP SERPL CALC-SCNC: 9 MMOL/L (ref 5–15)
AST SERPL-CCNC: 28 U/L (ref 15–37)
BASOPHILS # BLD: 0 K/UL (ref 0–0.1)
BASOPHILS NFR BLD: 0 % (ref 0–1)
BILIRUB SERPL-MCNC: 0.5 MG/DL (ref 0.2–1)
BNP SERPL-MCNC: ABNORMAL PG/ML
BUN SERPL-MCNC: 26 MG/DL (ref 6–20)
BUN/CREAT SERPL: 21 (ref 12–20)
CALCIUM SERPL-MCNC: 8.6 MG/DL (ref 8.5–10.1)
CHLORIDE SERPL-SCNC: 101 MMOL/L (ref 97–108)
CO2 SERPL-SCNC: 25 MMOL/L (ref 21–32)
CREAT SERPL-MCNC: 1.22 MG/DL (ref 0.55–1.02)
DIFFERENTIAL METHOD BLD: ABNORMAL
EOSINOPHIL # BLD: 0 K/UL (ref 0–0.4)
EOSINOPHIL NFR BLD: 0 % (ref 0–7)
ERYTHROCYTE [DISTWIDTH] IN BLOOD BY AUTOMATED COUNT: 16.5 % (ref 11.5–14.5)
GLOBULIN SER CALC-MCNC: 2.7 G/DL (ref 2–4)
GLUCOSE SERPL-MCNC: 183 MG/DL (ref 65–100)
HCT VFR BLD AUTO: 33.8 % (ref 35–47)
HGB BLD-MCNC: 10.2 G/DL (ref 11.5–16)
IMM GRANULOCYTES # BLD AUTO: 0.1 K/UL (ref 0–0.04)
IMM GRANULOCYTES NFR BLD AUTO: 1 % (ref 0–0.5)
INR PPP: 1.3 (ref 0.9–1.1)
LYMPHOCYTES # BLD: 0.7 K/UL (ref 0.8–3.5)
LYMPHOCYTES NFR BLD: 7 % (ref 12–49)
MAGNESIUM SERPL-MCNC: 1.9 MG/DL (ref 1.6–2.4)
MCH RBC QN AUTO: 24.5 PG (ref 26–34)
MCHC RBC AUTO-ENTMCNC: 30.2 G/DL (ref 30–36.5)
MCV RBC AUTO: 81.3 FL (ref 80–99)
MONOCYTES # BLD: 0.8 K/UL (ref 0–1)
MONOCYTES NFR BLD: 8 % (ref 5–13)
NEUTS SEG # BLD: 8.9 K/UL (ref 1.8–8)
NEUTS SEG NFR BLD: 84 % (ref 32–75)
NRBC # BLD: 0.02 K/UL (ref 0–0.01)
NRBC BLD-RTO: 0.2 PER 100 WBC
PLATELET # BLD AUTO: 224 K/UL (ref 150–400)
PMV BLD AUTO: 12.2 FL (ref 8.9–12.9)
POTASSIUM SERPL-SCNC: 3.2 MMOL/L (ref 3.5–5.1)
PROT SERPL-MCNC: 6.2 G/DL (ref 6.4–8.2)
PROTHROMBIN TIME: 13 SEC (ref 9–11.1)
RBC # BLD AUTO: 4.16 M/UL (ref 3.8–5.2)
RBC MORPH BLD: ABNORMAL
RBC MORPH BLD: ABNORMAL
SODIUM SERPL-SCNC: 135 MMOL/L (ref 136–145)
WBC # BLD AUTO: 10.5 K/UL (ref 3.6–11)

## 2022-12-19 PROCEDURE — 97165 OT EVAL LOW COMPLEX 30 MIN: CPT

## 2022-12-19 PROCEDURE — 97535 SELF CARE MNGMENT TRAINING: CPT

## 2022-12-19 PROCEDURE — 99223 1ST HOSP IP/OBS HIGH 75: CPT | Performed by: INTERNAL MEDICINE

## 2022-12-19 PROCEDURE — 36415 COLL VENOUS BLD VENIPUNCTURE: CPT

## 2022-12-19 PROCEDURE — 65660000001 HC RM ICU INTERMED STEPDOWN

## 2022-12-19 PROCEDURE — 74011000250 HC RX REV CODE- 250: Performed by: EMERGENCY MEDICINE

## 2022-12-19 PROCEDURE — 74011000258 HC RX REV CODE- 258: Performed by: INTERNAL MEDICINE

## 2022-12-19 PROCEDURE — 74011250636 HC RX REV CODE- 250/636: Performed by: INTERNAL MEDICINE

## 2022-12-19 PROCEDURE — 83735 ASSAY OF MAGNESIUM: CPT

## 2022-12-19 PROCEDURE — 74011250637 HC RX REV CODE- 250/637: Performed by: INTERNAL MEDICINE

## 2022-12-19 PROCEDURE — 74011000250 HC RX REV CODE- 250: Performed by: STUDENT IN AN ORGANIZED HEALTH CARE EDUCATION/TRAINING PROGRAM

## 2022-12-19 PROCEDURE — 77030018729 HC ELECTRD DEFIB PAD CARD -B

## 2022-12-19 PROCEDURE — 5A2204Z RESTORATION OF CARDIAC RHYTHM, SINGLE: ICD-10-PCS | Performed by: INTERNAL MEDICINE

## 2022-12-19 PROCEDURE — 94640 AIRWAY INHALATION TREATMENT: CPT

## 2022-12-19 PROCEDURE — 74011250637 HC RX REV CODE- 250/637: Performed by: STUDENT IN AN ORGANIZED HEALTH CARE EDUCATION/TRAINING PROGRAM

## 2022-12-19 PROCEDURE — 85610 PROTHROMBIN TIME: CPT

## 2022-12-19 PROCEDURE — 80053 COMPREHEN METABOLIC PANEL: CPT

## 2022-12-19 PROCEDURE — 99152 MOD SED SAME PHYS/QHP 5/>YRS: CPT | Performed by: INTERNAL MEDICINE

## 2022-12-19 PROCEDURE — 92960 CARDIOVERSION ELECTRIC EXT: CPT | Performed by: INTERNAL MEDICINE

## 2022-12-19 PROCEDURE — 85025 COMPLETE CBC W/AUTO DIFF WBC: CPT

## 2022-12-19 PROCEDURE — 83880 ASSAY OF NATRIURETIC PEPTIDE: CPT

## 2022-12-19 RX ORDER — INSULIN LISPRO 100 [IU]/ML
20 INJECTION, SOLUTION INTRAVENOUS; SUBCUTANEOUS ONCE
Status: DISCONTINUED | OUTPATIENT
Start: 2022-12-19 | End: 2022-12-19

## 2022-12-19 RX ORDER — ZOLPIDEM TARTRATE 5 MG/1
10 TABLET ORAL
Status: DISCONTINUED | OUTPATIENT
Start: 2022-12-19 | End: 2022-12-22 | Stop reason: HOSPADM

## 2022-12-19 RX ORDER — MIDAZOLAM HYDROCHLORIDE 1 MG/ML
5 INJECTION, SOLUTION INTRAMUSCULAR; INTRAVENOUS ONCE
Status: COMPLETED | OUTPATIENT
Start: 2022-12-19 | End: 2022-12-19

## 2022-12-19 RX ORDER — FENTANYL CITRATE 50 UG/ML
100 INJECTION, SOLUTION INTRAMUSCULAR; INTRAVENOUS ONCE
Status: COMPLETED | OUTPATIENT
Start: 2022-12-19 | End: 2022-12-19

## 2022-12-19 RX ORDER — AMIODARONE HYDROCHLORIDE 200 MG/1
400 TABLET ORAL 3 TIMES DAILY
Status: DISCONTINUED | OUTPATIENT
Start: 2022-12-19 | End: 2022-12-22 | Stop reason: HOSPADM

## 2022-12-19 RX ADMIN — ZOLPIDEM TARTRATE 10 MG: 5 TABLET ORAL at 21:04

## 2022-12-19 RX ADMIN — AMIODARONE HYDROCHLORIDE 1 MG/MIN: 50 INJECTION, SOLUTION INTRAVENOUS at 12:44

## 2022-12-19 RX ADMIN — IPRATROPIUM BROMIDE AND ALBUTEROL SULFATE 3 ML: .5; 3 SOLUTION RESPIRATORY (INHALATION) at 08:14

## 2022-12-19 RX ADMIN — OSELTAMIVIR PHOSPHATE 30 MG: 30 CAPSULE ORAL at 18:02

## 2022-12-19 RX ADMIN — FLUOXETINE 40 MG: 20 CAPSULE ORAL at 08:09

## 2022-12-19 RX ADMIN — AMIODARONE HYDROCHLORIDE 400 MG: 200 TABLET ORAL at 21:04

## 2022-12-19 RX ADMIN — DEXTROSE MONOHYDRATE 150 MG: 50 INJECTION, SOLUTION INTRAVENOUS at 13:00

## 2022-12-19 RX ADMIN — IPRATROPIUM BROMIDE AND ALBUTEROL SULFATE 3 ML: .5; 3 SOLUTION RESPIRATORY (INHALATION) at 02:09

## 2022-12-19 RX ADMIN — FENTANYL CITRATE 100 MCG: 50 INJECTION INTRAMUSCULAR; INTRAVENOUS at 12:27

## 2022-12-19 RX ADMIN — ACETAMINOPHEN 650 MG: 325 TABLET ORAL at 08:09

## 2022-12-19 RX ADMIN — LETROZOLE 2.5 MG: 2.5 TABLET ORAL at 08:09

## 2022-12-19 RX ADMIN — AMIODARONE HYDROCHLORIDE 400 MG: 200 TABLET ORAL at 15:43

## 2022-12-19 RX ADMIN — IPRATROPIUM BROMIDE AND ALBUTEROL SULFATE 3 ML: .5; 3 SOLUTION RESPIRATORY (INHALATION) at 20:17

## 2022-12-19 RX ADMIN — BUMETANIDE 2 MG: 0.25 INJECTION, SOLUTION INTRAMUSCULAR; INTRAVENOUS at 08:15

## 2022-12-19 RX ADMIN — SODIUM CHLORIDE, PRESERVATIVE FREE 10 ML: 5 INJECTION INTRAVENOUS at 14:21

## 2022-12-19 RX ADMIN — POTASSIUM BICARBONATE 40 MEQ: 782 TABLET, EFFERVESCENT ORAL at 08:15

## 2022-12-19 RX ADMIN — APIXABAN 5 MG: 5 TABLET, FILM COATED ORAL at 18:02

## 2022-12-19 RX ADMIN — BUMETANIDE 2 MG: 0.25 INJECTION, SOLUTION INTRAMUSCULAR; INTRAVENOUS at 18:02

## 2022-12-19 RX ADMIN — FLUTICASONE PROPIONATE 2 SPRAY: 50 SPRAY, METERED NASAL at 08:14

## 2022-12-19 RX ADMIN — APIXABAN 5 MG: 5 TABLET, FILM COATED ORAL at 08:09

## 2022-12-19 RX ADMIN — MIDAZOLAM 5 MG: 1 INJECTION INTRAMUSCULAR; INTRAVENOUS at 12:27

## 2022-12-19 RX ADMIN — ASPIRIN 81 MG: 81 TABLET, COATED ORAL at 08:09

## 2022-12-19 RX ADMIN — SENNOSIDES AND DOCUSATE SODIUM 1 TABLET: 50; 8.6 TABLET ORAL at 08:09

## 2022-12-19 RX ADMIN — OSELTAMIVIR PHOSPHATE 30 MG: 30 CAPSULE ORAL at 08:09

## 2022-12-19 RX ADMIN — POLYETHYLENE GLYCOL 3350 17 G: 17 POWDER, FOR SOLUTION ORAL at 08:15

## 2022-12-19 RX ADMIN — FLUOXETINE 40 MG: 20 CAPSULE ORAL at 18:02

## 2022-12-19 NOTE — NURSE NAVIGATOR
HEART FAILURE NURSE NAVIGATOR NOTE  [address]    Patient chart was reviewed by Heart Failure (HF) Nurse Navigators for compliance of prescribed treatment with guidelines directed medical therapy (GDMT) and HF database completed. Please, review beneath recommendations for symptomatic patients with HF with Reduced Ejection Fraction ? 40% (HFrEF)* and patients whose LVEF improved > 40% (HFimpEF)* for your consideration when taking care of this patient. Current Medical Therapy:    Name Heaven RADER 1950   LVEF 25/30%   NYHA Functional Class Need documentation   ARNi/ACEi/ARB Not prescribed - need documentation   Beta-blocker Not prescribed - need documentation   Aldosterone Antagonist Not prescribed - need documentation   SGLT2 inhibitor Not prescribed - need documentation   Hydralazine/Isosorbide Dinitrate    Consulting Cardiologist: Dr. Stephen Arzate ([facility])     Recommendations:    Please, add the following GDMT for HFrEF ? 40% [Class 1] or document in discharge summary/progress note why patient cannot take the medication:  ARNi/ACEi or ARB  Beta-blockers (carvedilol, sustained-release metoprolol succinate or bisoprolol)  Aldosterone antagonists GFR > 30 and K< 5  SGLT2 inhibitor  Hydralazine/Isosorbide dinitrate for  Americans with Class III/IV symptoms  Adjust diuretic dose at discharge if hospitalized for volume overload    Consider adding the following GDMT for HFrEF ? 40%, if appropriate [Class 2b]:   Ivabradine for patients on maximally tolerated beta-blocker dose in order to achieve HR 70-80bpm  Digoxin, goal level 0.5-0.9  Polyunsaturated fatty acids  Vericuguat  For patient with hyperkalemia while on RAASi > 5.5, consider adding potassium binders (patiromer, sodium zirconium cycosilicate)    Note: the following medications may be potentially harmful in heart failure [Class 3]:  Calcium channel blockers (doxazosin, diltiazem, verapamil, nifedipine)  Antiarrhythmics (flecanide, disopyrimide, sotalol, dronedarone)  Diabetes medications (thiasolidinediones, saxagliptin, alogliptin)  NSAIDs and NARVAEZ 2 inhibitors    Consider vaccinations for respiratory illnesses (flu, pneumonia, covid) [Class 2b]    For eligible patients with LVEF < 35% consider discharge with wearable defibrillation [Class 2b] and/or referral for ICD implantation [Class 1] for prevention of sudden cardiac death. Due to severely reduced LVEF < 25% and/or recurrent hospitalizations this patient may benefit from referral to Advanced Heart Failure Program to assess suitability for advanced therapies, such as left-ventricular assist device, heart transplantation, palliative inotropes or palliation [Class 1]. To obtain in-patient consultation or refer to [address] [place] [address], call [phone]    Patient Education:     Teach back in heart failure education provided, including information about medical therapy, lifestyle modifications, diet and fluid restrictions, physical activity. Educational resources provided: Living with Heart Failure booklet; Signs/Symptoms magnet; Weight Calendar; [facility] flyer; Preparation for Successful Discharge Checklist.  Information provided about HF support group. Heart failure avoiding triggers on discharge instructions. Plan for Transitional Care:    Post discharge follow up phone call to be made within 48-72 hours of discharge. Patient will follow-up with PCP. Patient will follow-up with Primary Cardiologist.  Obstructive sleep apnea screening done and patient was referred to Sleep Medicine. Referral/follow-up with Cardiac Rehabilitation.   Referral/follow-up with Advanced Heart Failure Specialist.  Referral/follow-up with Palliative Care Specialist.      Heart Failure Nurse Navigator  Phone: [phone]  /  [phone]    *Recommendations listed above are based on 2022 AHA/ACC/HFSA Guideline for the Management of Heart Failure: A Report of the [address] on Clinical Practice Guidelines. Circulation 2022; [id number]. and 2017 AHA/ACC/HRS guideline for management of patients with ventricular arrhythmias and the prevention of sudden cardiac death: A Report of the [facility] of Cardiology/American Heart Association Task Force on Clinical Practice Guidelines and the Heart Rhythm Society.  Heart Rhythm, Vol 15, No 10, October 2018 *Class of Recommendation: Class 1 (strong), Class 2a (moderate), Class 2b (weak), Class 3 (not recommended, potentially harmful)

## 2022-12-19 NOTE — PROGRESS NOTES
Problem: Self Care Deficits Care Plan (Adult)  Goal: *Acute Goals and Plan of Care (Insert Text)  Description: FUNCTIONAL STATUS PRIOR TO ADMISSION: Patient was independent and active without use of DME. Owns RW and cane. HOME SUPPORT: The patient lived with family but did not require assist.    Occupational Therapy Goals  Initiated 12/19/2022  1. Patient will perform preferred simple IADL task with independence within 7 day(s). 2.  Patient will perform bathing with self initiation of rest breaks with independence within 7 day(s). 3.  Patient will perform standing grooming with independence within 7 day(s). \  4.  Patient will participate in upper extremity therapeutic exercise/activities with independence for 5 minutes within 7 day(s). 5.  Patient will utilize energy conservation techniques during functional activities with verbal, visual, and tactile cues within 7 day(s). Outcome: Progressing Towards Goal   OCCUPATIONAL THERAPY EVALUATION  Patient: Tee Ignacio (56 y.o. female)  Date: 12/19/2022  Primary Diagnosis: A-fib [facility]) [I48.91]       Precautions:  Fall (Droplet (flu +))    ASSESSMENT  Based on the objective data described below, the patient presents with shortness of breath, decreased endurance, limited activity tolerance, and generalized weakness. Patient received supine in bed, RN at bedside reporting recent titration to 2L from 4L O2 via NC. Patient able to tolerate increased standing at sink to complete grooming tasks. Required cues to initiate standing and seated rest breaks 2/2 increased SOB, O2 desaturating to mid 80s on 2L, recovering with PLB. Patient with good understanding of PLB and energy conservation education however still requiring cues throughout session to initiate rest breaks with desaturation. Patient returned to supine in bed, all needs met, VSS at conclusion of session remaining on 2L O2 via NC.  Anticipate patient will progress well toward set goals, recommend home with HH. Suggested patient obtain shower chair for home as well to maximize safety and energy conservation, patient receptive. Current Level of Function Impacting Discharge (ADLs/self-care):   Feeding: Independent    Oral Facial Hygiene/Grooming: Independent    Bathing: Stand-by assistance    Upper Body Dressing: Independent    Lower Body Dressing: Independent    Toileting: Stand by assistance       Functional Outcome Measure: The patient scored Total: 70/100 on the Barthel Index outcome measure which is indicative of being minimally independent in basic self-care. Other factors to consider for discharge: below baseline, may req supplemental O2 at DC     Patient will benefit from skilled therapy intervention to address the above noted impairments. PLAN :  Recommendations and Planned Interventions: self care training, functional mobility training, therapeutic exercise, balance training, therapeutic activities, endurance activities, patient education, home safety training, and family training/education    Frequency/Duration: Patient will be followed by occupational therapy 3 times a week to address goals. Recommendation for discharge: (in order for the patient to meet his/her long term goals)  Occupational therapy at least 2 days/week in the home     This discharge recommendation:  Has been made in collaboration with the attending provider and/or case management    IF patient discharges home will need the following DME: shower chair       SUBJECTIVE:   Patient stated I do feel a little SOB.     OBJECTIVE DATA SUMMARY:   HISTORY:   Past Medical History:   Diagnosis Date    Acute right ankle pain 06/08/2018 5/29/18: X-ray showed possible right ankle sprain. 6/6/18: saw Dr. Mindy Gross ([facility]), diagnosed with peroneal tendonitis.  Prescribed an ASO    Asthma     Atrial fibrillation ([facility]     Cardiomyopathy ([facility]     Coronary artery disease 2008    s/p RCA stent ([facility]) on 11/26/11    Depression with anxiety     2011    DV[facility] 07/27/2010    Family history of early CAD     GERD (gastroesophageal reflux disease)     H/O gastric bypass 2006    Revision in 2009    Hepatitis C antibody test positive     Does not have chronic hep C (labs 10/6/15: neg HCV RNA)     HTN (hypertension) 07/27/2010    Hyperlipidemia 07/27/2010    Incontinence of feces 09/03/2018    Dr. Makenna Beal. 8/20/18: Improving with pelvic physical therapy and psyllium husk treatment. Saw Dr. Alexa Huff who suggested PT first. MR defecography showed pelvic floor weakness with pelvic descensus, small anterior  Rectocele, and vaginal prolapse. To have pelvic PT weekly for 8 wks and to see Dr. Alexa Huff again in Oct 2018.     Joint pain 07/27/2010    MI (myocardial infarction) ([facility] 07/27/2010    Mitral valve regurgitation     Mild to moderate    Morbid obesity ([facility] 07/27/2010    Myocardial infarction [facility]) 2006 and 2011    Dr. Nicolas Sam disorder     PUD (peptic ulcer disease)     gi bleed 2008; ulcer n gastric bypass pouch    Urinary incontinence, stress 07/27/2010     Past Surgical History:   Procedure Laterality Date    COLONOSCOPY N/A 6/29/2018    COLONOSCOPY performed by Shaggy Bashir MD at [facility] ENDOSCOPY; redundant colon, int hemorrhoids, pathology: normal colonic mucosa    HX BREAST BIOPSY Left     benign    HX BREAST LUMPECTOMY Right 7/14/2022    RIGHT BREAST LUMPECTOMY WITH ULTRASOUND performed by Mary Hardin MD at [facility] 11    HX COLONOSCOPY  9/5/14    Dr. Charles Maynard; normal, repeat in 5 yrs    HX GASTRIC BYPASS      HX IMPLANTABLE CARDIOVERTER DEFIBRILLATOR  08/24/2016    HX LAP GASTRIC BYPASS  2006    revision 2009/Dr. Kimberlee Carl    HX OTHER SURGICAL  09/19/2016    Removal of right ventricular ICD lead & single chamber transvenous AICD    HX OTHER SURGICAL  10/12/2016    pocket revison of ICD; Dr. Prashant Rosales  06/07/2018    Dr Makenna Beal; high resolution anorectal manaometry-abnormal study. Study done for eval of fecal incontinence    HX OTHER SURGICAL  12/14/2018    Dr. Catrina Smith. Rectal endoscopic US (EUS) for rectal incontinence. Normal rectal mucosa, no fistulas. HX PACEMAKER      sicd/left side of chest under arm    INS PPM/ICD LED SING ONLY  8/24/2016         INS PPM/ICD LED SING ONLY  8/26/2016         INS PPM/ICD LED SING ONLY  9/21/2016         LA CARDIAC SURG PROCEDURE UNLIST      Stent x 5-6,Most recent 2011    LA LAP,STOMACH,OTHER,W/O TUBE  04/05/2010    Revision GBP       Expanded or extensive additional review of patient history:     Home Situation  Home Environment: Private residence  Wheelchair Ramp: Yes  One/Two Story Residence: One story  Living Alone: No  Support Systems: Spouse/Significant Other, Child(sherrill), Other Family Member(s)  Patient Expects to be Discharged to[de-identified] Home with family assistance  Current DME Used/Available at Home: Walker, rolling, Cane, straight  Tub or Shower Type: Tub/Shower combination    Hand dominance: Right    EXAMINATION OF PERFORMANCE DEFICITS:  Cognitive/Behavioral Status:  Neurologic State: Alert  Orientation Level: Oriented X4  Cognition: Appropriate decision making  Perception: Appears intact  Perseveration: No perseveration noted  Safety/Judgement: Awareness of environment    Hearing: Auditory  Auditory Impairment: None    Vision/Perceptual:                           Acuity: Within Defined Limits    Corrective Lenses: Glasses    Range of Motion:    AROM: Generally decreased, functional                         Strength:    Strength: Within functional limits                Coordination:  Coordination: Within functional limits  Fine Motor Skills-Upper: Left Intact; Right Intact    Gross Motor Skills-Upper: Left Intact; Right Intact    Tone & Sensation:    Tone: Normal  Sensation: Intact                      Balance:  Sitting: Intact  Standing: Impaired  Standing - Static: Good  Standing - Dynamic : Good    Functional Mobility and Transfers for ADLs:  Bed Mobility:  Rolling: Independent  Supine to Sit: Independent  Sit to Supine: Minimum assistance (BLE assist)  Scooting: Independent    Transfers:  Sit to Stand: Supervision  Stand to Sit: Supervision  Bathroom Mobility: Supervision/set up  Toilet Transfer : Supervision    ADL Assessment:  Feeding: Independent    Oral Facial Hygiene/Grooming: Independent    Bathing: Stand-by assistance    Upper Body Dressing: Independent    Lower Body Dressing: Independent    Toileting: Stand by assistance                ADL Intervention and task modifications:       Grooming  Grooming Assistance: Set-up; Supervision (required cues to initiate rest break)  Position Performed: Standing  Washing Face: Supervision  Brushing Teeth: Set-up; Supervision  Brushing/Combing Hair: Set-up         Type of Bath: Chlorhexidine (CHG); Full              Lower Body Dressing Assistance  Socks: Set-up; Supervision         Cognitive Retraining  Safety/Judgement: Awareness of environment    Functional Measure:    Barthel Index:  Bathin  Bladder: 10  Bowels: 10  Groomin  Dressing: 10  Feeding: 10  Mobility: 5  Stairs: 5  Toilet Use: 5  Transfer (Bed to Chair and Back): 10  Total: 70/100      The Barthel ADL Index: Guidelines  1. The index should be used as a record of what a patient does, not as a record of what a patient could do. 2. The main aim is to establish degree of independence from any help, physical or verbal, however minor and for whatever reason. 3. The need for supervision renders the patient not independent. 4. A patient's performance should be established using the best available evidence. Asking the patient, friends/relatives and nurses are the usual sources, but direct observation and common sense are also important. However direct testing is not needed. 5. Usually the patient's performance over the preceding 24-48 hours is important, but occasionally longer periods will be relevant.   6. Middle categories imply that the patient supplies over 50 per cent of the effort. 7. Use of aids to be independent is allowed. Score Interpretation (from [facility])    Independent   60-79 Minimally independent   40-59 Partially dependent   20-39 Very dependent   <20 Totally dependent     -Kami Saldana., Barthel, D.W. (1965). Functional evaluation: the Barthel Index. [address] ([address] (1997). The Barthel activities of daily living index: self-reporting versus actual performance in the old (> or = 75 years). Journal of [address] [place] [address] 45(7), [facility], JONELLE, Nabor Costello., Sum[place] Jean-Baptiste. (1999). Measuring the change in disability after inpatient rehabilitation; comparison of the responsiveness of the Barthel Index and Functional [place] Measure. Journal of Neurology, Neurosurgery, and Psychiatry, 66(4), 733-239. ALIRIO Vazquez.SHANEKA, KUMAR López, & Derik Jonas, M.A. (2004) Assessment of post-stroke quality of life in cost-effectiveness studies: The usefulness of the Barthel Index and the EuroQoL-5D.  Quality of Life Research, 15, 143-77     Occupational Therapy Evaluation Charge Determination   History Examination Decision-Making   LOW Complexity : Brief history review  LOW Complexity : 1-3 performance deficits relating to physical, cognitive , or psychosocial skils that result in activity limitations and / or participation restrictions  LOW Complexity : No comorbidities that affect functional and no verbal or physical assistance needed to complete eval tasks       Based on the above components, the patient evaluation is determined to be of the following complexity level: LOW   Pain Rating:  Pt did not endorse pain during session     Activity Tolerance:   Fair, desaturates with exertion and requires oxygen, requires frequent rest breaks, and observed SOB with activity    After treatment patient left in no apparent distress:    Supine in bed, Call bell within reach, and Side rails x 3    COMMUNICATION/EDUCATION:   The patients plan of care was discussed with: Occupational therapist and Registered nurse. Patient/family have participated as able in goal setting and plan of care. This patients plan of care is appropriate for delegation to \[facility]\"".     Thank you for this referral.  Iraj Wagner OT  Time Calculation: 22 mins

## 2022-12-19 NOTE — PROGRESS NOTES
1230: MD and RN at bedside for cardioversion. Patient sedated with 5mg IV versed and 100mcg IV fentanyl. Cardioversion x2 at 360 J. 2nd cardioversion brings patient into NSR c  PVC & PAC. Amiodarone bolus ordered to be followed by amio gtt at 1 mg/min for 6 hours, then decrease to 0.5 mg/min. 1400: Patient remains in NSR. Amio gtt infusing at 1 mg/min. Patient denies needs currently. 1600: Patient continues in NSR. Starting PO amiodarone. 1700: Amiodarone gtt off. Patient stable on room air.

## 2022-12-19 NOTE — DISCHARGE INSTRUCTIONS
Future Appointments   Date Time Provider Denis Burksi   12/29/2022  9:40 AM MD RANDOLPH Guallpa BS AMB   1/26/2023  1:00 PM MD RANDOLPH Coto BS AMB   2/7/2023 10:30 AM Eunice Rivera NP [facility] BS AMB   2/21/2023 10:00 AM Kyree Sick Holly Brittle,  [address] BS AMB   3/15/2023  9:00 AM REMOTE1JOSE ALBERTO BS AMB   12/14/2023  2:00 PM PACEMAKER3, JOSE ALBERTO DICKSON BS AMB   12/14/2023  2:20 PM MD Chiki Coto M.D. Electrophysiology/Cardiology  [facility]  [address] [place] [address]                               [phone]                      Download the Heart Failure [place] Min: Search in your Google Play Store ([employer]) or [employer] Min Store ([employer]): Search for- HF [place] Min.    [employer]    HF [place] is a brand-new phone min that helps you track daily symptoms, vitals, mood, energy level and more. You can even add your heart failure care team members to view your data and monitor your condition at home.     HF [place] lets you:  Track symptoms, medications and more  Share health information with your health care team  Connect with others living with heart failure

## 2022-12-19 NOTE — PROCEDURES
Cardiac Procedure Note   Patient: Teresita Wilson  MRN: [id number]  SSN: [id number]   YOB: 1950 Age: 81 y.o.   Sex: female    Date of Procedure: 12/19/2022   Pre-procedure Diagnosis: persistent atrial fibrillation despite IV amiodarone, chronic systolic CHF, obesity  Post-procedure Diagnosis: same  Procedure: Cardioversion  :  Dr. Rony Mcarthur MD    Assistant(s):  None  Anesthesia: Moderate Sedation with CCU nurse under my supervision  Estimated Blood Loss: none  Specimens Removed: None  Findings: 360 J CV to NSR then AFIB again   Total 5 mg IV versed and 100 mcg IV fentanyl and repeated 360 J CV to NSR with PVC and PAC  Complications: None   Implants:  None  Signed by:  Rony Mcarthur MD  12/19/2022  2:02 PM

## 2022-12-19 NOTE — CONSULTS
[facility]   Cardiac Electrophysiology Care Note                   [x]Initial visit     [ ]Established visit     Patient Name: Jayce Yo - :1950 - Y:[id number]   Primary Cardiologist: Brennen Lilly MD   Consulting Cardiologist: Brennen Lilly MD     Reason for initial visit: rapid afib and pulmonary edema per Dr Carmine Nova  HPI:     She is a morbidly obese 68-year-old female with an ischemic cardiomyopathy and S AICD, paroxysmal A. fib, hypertension, and coronary artery disease. Her most recent heart catheterization was in late September at [facility] and I have reviewed and summarized that study below. Earlier in the week she had some nasal congestion and a mild cough but no real fever. 2 days ago she actually felt fairly good and had an office visit with Dr. Lito Tracey. Her her vitals were stable and she was not hypoxic and she was found to be in A. fib with a heart rate of about 100 bpm.  Her device was interrogated and it turned out that she has been in A. fib about half the time since it was last checked. She was started on amiodarone in addition to her Cardizem with the thought that if she did not convert on her own she be cardioverted on drug and if that failed she would be offered an AV node ablation with BIV ICD. Yesterday she began to feel bad with fatigue shortness of breath intermittently productive cough body aches and a headache. Her grandson took her to urgent care this morning and her COVID test was negative at [facility]. She still was not feeling well so she came to the emergency room here where she was noted to be hypoxic and in rapid A. fib. Her flu test was negative. She was given a milligram of IV Bumex and started on a Cardizem drip. When she arrived to the floor she was given a bolus of amiodarone and her Cardizem drip was stopped. She is taken no of her usual p.o. meds today.   Shortly after all this she became more tachypneic and hypoxic and she was put back on BiPAP and at the time I saw her her heart rate was about 110 her oxygen saturations were 97% and her blood pressure was 170/100. Influenza screen positive on repeat here at [facility]. Interim:     Remains in AF with RVR, rate 110s-120s despite amiodarone IV gtt. BP intermittently elevated. Echo on 12/18/2022 showed LVEF 25-30% with mod LA dilation & mod to severe MR. Still SOB when up & ambulating. Assessment and Plan       Persistent AF with RVR: Rates 110s-120s despite amiodarone IV gtt  Plan DCCV later today, then resume po amiodarone if able to maintain NSR    ICM: LVEF reduced to 25-30%, had been 30-35% in 2021. Interstitial pulmonary edema noted on CXR during admission. Suspect decompensation is driven by underlying influenza infection & AF with RVR. DCCV as above, plan to continue diuresis. Anticoagulation: Continue Eliquis for embolic CVA prophylaxis. Denies bleeding issues. Cordelia Alvarez M.D. [facility]  Electrophysiology/Cardiology  [facility]  [address] [place] [address]                              [phone]                                          ____________________________________________________________     Cardiac testing     Received records from admission at [facility].       ECG (09/22/2022) showed AF with controlled ventricular rate. RHC/LHC (09/28/2022): LVEDP 15-20 mmHg. LM with MLI. LCx with mild diffuse disease;  in OM with left to left & right to left collaterals. LAD with prior stent with AUBREY-3 flow; otherwise MLI. RCA with very mild ISR in proximal segment, otherwise MLI. 12/17/22     ECHO ADULT COMPLETE 12/18/2022 12/18/2022     Interpretation Summary   ·  Left Ventricle: Severely reduced left ventricular systolic function with a visually estimated EF of 25 - 30%. Left ventricle is moderately dilated. Normal wall thickness.  Severe global hypokinesis present. ·  Left Atrium: Left atrium is moderately dilated. ·  Aortic Valve: Mildly calcified cusp. Mild stenosis of the aortic valve. AV mean gradient is 13 mmHg. ·  Mitral Valve: Moderate to severe regurgitation. ·  Tricuspid Valve: Mild regurgitation. The estimated RVSP is 46 mmHg. ·  Contrast used: Definity. Signed by: Nitin Ta MD on 12/18/2022 11:39 AM         NUCLEAR CARDIAC STRESS TEST 08/13/2021 8/16/2021     Interpretation Summary   · SPECT: Left ventricular function post-stress was abnormal. Calculated ejection fraction is 22%. There is no evidence of transient ischemic dilation (TID). The TID ratio is 0.86. · Baseline ECG: Normal sinus rhythm. · SPECT: Myocardial perfusion imaging defect 1: There is a defect that is large in size with a severe reduction in uptake present in the lateral location(s) that is partially reversible. There is abnormal wall motion in the defect area. The defect appears to be infarction with esther-infarct ischemia. Perfusion defect was visually and quantitatively present. · SPECT: Left ventricular perfusion is probably abnormal. Myocardial perfusion imaging supports a high risk stress test.     Signed by: Nora Miner MD on 8/13/2021  8:26 AM     08/13/21     CARDIAC PROCEDURE 08/13/2021 8/13/2021     Conclusion   Findings:   1)Normal LVEDP   2)Severe native 1 vessel CAD with  of ramus intermedius. LCx  Is small with occluded distal LCx. OM2 and distal ramus fill from collaterals from LAD, diagonal and RCA. 3)Limited wire probing suggests no micro channel in Ramus ISR . Proximal cap start before the stent     Access: Right radial no issues   Contrast 40 cc     Recommendations   1)Continue GDMT and weight loss. If dyspnea continue may consider Ramus  PCI. Uncertain if benefit will be substantial.   2)GDMT for CAD     Signed by:  Antonio Azar MD on 8/13/2021 10:34 AM         Most recent HS troponins:   Recent Labs     12/17/22 1703 12/17/22   1317 12/17/22   0938   TROPHS 56* 31 22     ECG: atrial fibrillation, rate 110   Review of Systems     [x]All other systems reviewed and all negative except as written in HPI     [ ] Patient unable to provide secondary to condition           Past Medical History:   Diagnosis Date   · Acute right ankle pain 06/08/2018 5/29/18: X-ray showed possible right ankle sprain. 6/6/18: saw Dr. Mary Stevens ([facility]), diagnosed with peroneal tendonitis. Prescribed an ASO   · Asthma   · Atrial fibrillation (HCC)   · Cardiomyopathy ([facility]   · Coronary artery disease 2008   s/p RCA stent ([facility]) on 11/26/11   · Depression with anxiety   2011   · DVT (deep venous thrombosis) ([facility] 07/27/2010   · Family history of early CAD   · GERD (gastroesophageal reflux disease)   · H/O gastric bypass 2006   Revision in 2009   · Hepatitis C antibody test positive   Does not have chronic hep C (labs 10/6/15: neg HCV RNA)   · HTN (hypertension) 07/27/2010   · Hyperlipidemia 07/27/2010   · Incontinence of feces 09/03/2018   Dr. Dawit Gutierrez. 8/20/18: Improving with pelvic physical therapy and psyllium husk treatment. Saw Dr. Marvine Dubin who suggested PT first. MR defecography showed pelvic floor weakness with pelvic descensus, small anterior  Rectocele, and vaginal prolapse. To have pelvic PT weekly for 8 wks and to see Dr. Marvine Dubin again in Oct 2018.    · Joint pain 07/27/2010   · MI (myocardial infarction) ([facility] 07/27/2010   · Mitral valve regurgitation   Mild to moderate   · Morbid obesity ([facility] 07/27/2010   · Myocardial infarction [facility]) 2006 and 2011   Dr. Iris Paris   · Psychiatric disorder   · PUD (peptic ulcer disease)   gi bleed 2008; ulcer n gastric bypass pouch   · Urinary incontinence, stress 07/27/2010     Past Surgical History:   Procedure Laterality Date   · COLONOSCOPY N/A 6/29/2018   COLONOSCOPY performed by Erick Montoya MD at [facility] ENDOSCOPY; redundant colon, int hemorrhoids, pathology: normal colonic mucosa   · HX BREAST BIOPSY Left   benign   · HX BREAST LUMPECTOMY Right 7/14/2022   RIGHT BREAST LUMPECTOMY WITH ULTRASOUND performed by Sedrick Calixto MD at [facility]   · HX COLONOSCOPY 9/5/14   Dr. Jessica Molina; normal, repeat in 5 yrs   · HX GASTRIC BYPASS   · HX IMPLANTABLE CARDIOVERTER DEFIBRILLATOR 08/24/2016   · HX LAP GASTRIC BYPASS 2006   revision 2009/Dr. Reyes Lung   · HX OTHER SURGICAL 09/19/2016   Removal of right ventricular ICD lead & single chamber transvenous AICD   · HX OTHER SURGICAL 10/12/2016   pocket revison of ICD; Dr. Mayela Manzano   · HX OTHER SURGICAL 06/07/2018   Dr Jennifer Landa; high resolution anorectal manaometry-abnormal study. Study done for eval of fecal incontinence   · HX OTHER SURGICAL 12/14/2018   Dr. Jennifer Landa. Rectal endoscopic US (EUS) for rectal incontinence. Normal rectal mucosa, no fistulas. · HX PACEMAKER   sicd/left side of chest under arm   · INS PPM/ICD LED SING ONLY 8/24/2016     · INS PPM/ICD LED SING ONLY 8/26/2016     · INS PPM/ICD LED SING ONLY 9/21/2016     · MA CARDIAC SURG PROCEDURE UNLIST   Stent x 5-6,Most recent 2011   · MA LAP,STOMACH,OTHER,W/O TUBE 04/05/2010   Revision GBP     Social Hx:  reports that she quit smoking about 18 years ago. Her smoking use included cigarettes. She started smoking about 53 years ago. She has never used smokeless tobacco. She reports that she does not drink alcohol and does not use drugs. Family Hx: family history includes Alzheimer's Disease in her mother; Cancer in her father and mother; Dementia in her mother; Heart Attack in her brother; Heart Disease in her father and sister; Hypertension in her father; Stroke in her sister and sister.    Allergies   Allergen Reactions   · Amoxicillin Anaphylaxis   · Amoxicillin Anaphylaxis   · Lipitor [Atorvastatin] Other (comments)   Severe muscle pain and spasms   · Zocor [Simvastatin] Other (comments)   Cramps, muscle spasms   · Buspar [Buspirone] Other (comments)   Drunk sensation, headaches   · Hydroxyzine Other (comments)   Blurred vision, lightheadedness   · Livalo [Pitavastatin] Myalgia   · Pravastatin Other (comments)   Leg cramps   · Tramadol Vertigo            OBJECTIVE:   Wt Readings from Last 3 Encounters:   12/19/22 229 lb 0.9 oz (103.9 kg)   12/15/22 229 lb (103.9 kg)   11/28/22 223 lb 6.4 oz (101.3 kg)       Intake/Output Summary (Last 24 hours) at 12/19/2022 0836   Last data filed at 12/19/2022 0700   Gross per 24 hour   Intake 560 ml   Output 900 ml   Net -340 ml         Physical Exam     Vitals:   Vitals:   12/19/22 0400 12/19/22 0500 12/19/22 0600 12/19/22 0700   BP: (!) 141/83 132/85 (!) 143/87 (!) 140/96   Pulse: (!) 124 (!) 124 (!) 114 (!) 119   Resp: 20 19 22 18   Temp: 98.2 °F (36.8 °C)   SpO2: 96% 96% 95% 96%   Weight: 229 lb 0.9 oz (103.9 kg)   Height:     Telemetry: AFIB     Neck: supple, symmetrical, trachea midline, no adenopathy, no carotid bruit, and no JVD     General:  Alert, cooperative, no distress, appears stated age. Neck:  Supple, no carotid bruit and no JVD. Lungs: clear and no accessory muscle use  Heart[de-identified]  irregular rate and rhythm, S1, S2 normal, no murmur, click, rub or gallop. Abdomen:    Soft, non-tender. Bowel sounds normal. Morbidly obese   Extremities:  Extremities normal, atraumatic, no cyanosis or edema. Vascular:  Pulses - 2+   Skin:  Skin color normal. No rashes or lesions on visible areas   Neurologic:  Alert, Moves all extremities. Data Review:     Radiology:   XR Results (most recent):   Results from Hospital Encounter encounter on 12/17/22     XR CHEST PORT     Narrative   EXAM: Portable CXR. 1620 hours. COMPARISON: 0912 hours. INDICATION: chest pain     FINDINGS:   Unchanged interstitial pulmonary edema. No focal consolidation. Borderline   cardiomegaly. Subcutaneous ICD. No pneumothorax, midline shift or pleural   effusion. Impression   Stable interstitial pulmonary edema.      CT Results (most recent):   Results from [facility] Encounter encounter on 02/23/17     CT SPINE CERV WO CONT     Narrative   EXAM:  CT CERVICAL SPINE WITHOUT CONTRAST     INDICATION: Ground-level fall this morning. COMPARISON: None. TECHNIQUE: Multislice helical CT of the cervical spine was performed without   intravenous contrast administration. Sagittal and coronal reconstructions were   generated. CT dose reduction was achieved through use of a standardized   protocol tailored for this examination and automatic exposure control for dose   modulation. Adaptive statistical iterative reconstruction (ASIR) was utilized. CONTRAST: None. FINDINGS:     The alignment is within normal limits. There is no fracture or subluxation. There is degenerative disc narrowing and marginal osteophyte formation at the   C5-6 and C6-7 levels. The odontoid process is intact. The craniocervical   junction is within normal limits. The prevertebral soft tissues are within   normal limits. There is no spinal canal or neural foraminal stenosis. The   surrounding soft tissue and musculoskeletal structures appear unremarkable. The   visualized lung apices are unremarkable. Impression   IMPRESSION: No acute findings. MRI Results (most recent):   Results from [place] encounter on 07/02/18     MRI PELVIC FLOOR STUDY     Narrative   EXAM: MR PELVIC FLOOR   INDICATION: Full incontinence of feces. CONTRAST: Sterile ultrasound gel was placed into the vagina and rectum. No   intravenous contrast was administered. Oral Volumen was administered. TECHNIQUE: MR of the pelvis was performed according to standard departmental   protocol. The patient voided prior to imaging. Following three plane localizer   images, sagittal, axial and coronal breath-hold T2 (HASTE) images as well as   breath-hold axial T1 in and out of phase images were acquired.  Static and   dynamic MR of the pelvic floor was performed using static sagittal axial and   coronal high-resolution T2 weighted images followed by dynamic sagittal midline   SSFSE images at rest, during squeezing (Kegel maneuver), during straining   (Valsalva maneuver) and during defecation. FINDINGS:   H line = width of the levator hiatus   M line = descent of the levator hiatus     At rest:   H line measures 5.7 cm   M line measures 23 mm. The anorectal angle measures 131 degrees. (Normal 108 to 127 degrees.)     With squeezing:   H line measures 4.7 cm   M line measures 8 mm. The anorectal angle measures 112 degrees. With straining:   H line measures 5.5 cm   M line measures 5 mm. The anorectal angle measures 121 degrees. With evacuation:   H line measures 5.3 cm   M line measures 4.5 mm. The anorectal angle measures 140 degrees. The puborectalis sling is intact. There is there is elevation of the pelvic   floor with squeezing and pelvic floor weakness with abnormal descent of the   pelvic floor with straining and evacuation. POSTERIOR COMPARTMENT: There is pelvic floor weakness with pelvic descensus with   an anterior rectocele and the patient is unable to fully evacuate the rectum. There is no rectal intussusception or navin rectal prolapse. ANTERIOR COMPARTMENT: There is minimal descent of the bladder neck below the   sacrococcygeal line. MIDDLE COMPARTMENT: The uterus is absent. There is prolapse of the vagina below   the sacrococcygeal line. Impression   IMPRESSION: Pelvic floor weakness with pelvic descensus. There is a small   anterior rectocele and prolapse of the vagina below the sacrococcygeal line. The   patient is unable to fully evacuate the rectum. No results for input(s): CPK, TROIQ in the last 72 hours.      No lab exists for component: CKQMB, CPKMB, BMPP   Recent Labs     12/19/22   0319 12/18/22   0436 12/17/22   1709   * 139 133*   K 3.2* 3.5 4.0    108 106   CO2 25 24 22   BUN 26* 24* 18   CREA 1.22* 1.29* 1.42*   * 122* 251*   PHOS -- -- 6.6* CA 8.6 8.5 8.6     Recent Labs     12/19/22   0318 12/18/22   0436   WBC 10.5 10.3   HGB 10.2* 11.2*   HCT 33.8* 36.8    234     Recent Labs     12/19/22   0319 12/19/22   0318 12/18/22   0436   PTP -- 13.0* 12.7*   INR -- 1.3* 1.2*   AP 70 -- 79     No results for input(s): CHOL, LDLC in the last 72 hours.      No lab exists for component: TGL, HDLC,  HBA1C   Recent Labs     12/17/22   1709   TSH 2.81           Current meds:     Current Facility-Administered Medications:   ·  bumetanide (BUMEX) injection 2 mg, 2 mg, IntraVENous, BID, Alfredo Angry, DO, 2 mg at 12/19/22 0815   ·  aspirin delayed-release tablet 81 mg, 81 mg, Oral, DAILY, Alfredo Angry, DO, 81 mg at 12/19/22 0809   ·  FLUoxetine (PROzac) capsule 40 mg, 40 mg, Oral, BID, Alfredo Angry, DO, 40 mg at 12/19/22 0809   ·  fluticasone propionate (FLONASE) 50 mcg/actuation nasal spray 2 Spray, 2 Spray, Both Nostrils, DAILY, Alfredo Angry, DO, 2 Spray at 12/19/22 0814   ·  melatonin tablet 3 mg, 3 mg, Oral, QHS PRN, Ellen Slates, NP, 3 mg at 12/18/22 2125   ·  sodium chloride (NS) flush 5-40 mL, 5-40 mL, IntraVENous, Q8H, Clifford Lara MD, 10 mL at 12/18/22 0549   ·  sodium chloride (NS) flush 5-40 mL, 5-40 mL, IntraVENous, PRN, Clifford Lara MD   ·  sodium chloride (NS) flush 5-40 mL, 5-40 mL, IntraVENous, PRN, Keila Jeffrey MD   ·  acetaminophen (TYLENOL) tablet 650 mg, 650 mg, Oral, Q6H PRN, 650 mg at 12/19/22 0809 **OR** acetaminophen (TYLENOL) suppository 650 mg, 650 mg, Rectal, Q6H PRN, Keila Jeffrey MD   ·  polyethylene glycol (MIRALAX) packet 17 g, 17 g, Oral, DAILY PRN, Keila Jeffrey MD   ·  ondansetron (ZOFRAN ODT) tablet 4 mg, 4 mg, Oral, Q8H PRN **OR** ondansetron (ZOFRAN) injection 4 mg, 4 mg, IntraVENous, Q6H PRN, Keila Jeffrey MD   ·  apixaban (ELIQUIS) tablet 5 mg, 5 mg, Oral, BID, Keila Jeffrey MD, 5 mg at 12/19/22 0809   ·  amiodarone (CORDARONE) 375 mg/250 mL D5W infusion, 0.5-1 mg/min, IntraVENous, TITRATE, Deena Jeffrey MD, Last Rate: 20 mL/hr at 12/18/22 2359, 0.5 mg/min at 12/18/22 2359   ·  polyethylene glycol (MIRALAX) packet 17 g, 17 g, Oral, DAILY, Mitcheal Cane, DO, 17 g at 12/19/22 0815   ·  senna-docusate (PERICOLACE) 8.6-50 mg per tablet 1 Tablet, 1 Tablet, Oral, BID, Mitcheal Cane, DO, 1 Tablet at 12/19/22 0809   ·  magnesium hydroxide (MILK OF MAGNESIA) 400 mg/5 mL oral suspension 30 mL, 30 mL, Oral, DAILY PRN, Terence Hedrick G, DO   ·  albuterol-ipratropium (DUO-NEB) 2.5 MG-0.5 MG/3 ML, 3 mL, Nebulization, Q6H RT, Derik Koo G, DO, 3 mL at 12/19/22 0814   ·  letrozole [facility]) tablet 2.5 mg, 2.5 mg, Oral, DAILY, Mitcheal Cane, DO, 2.5 mg at 12/19/22 0809   ·  oseltamivir (TAMIFLU) capsule 30 mg, 30 mg, Oral, BID, Kierra Arita MD, 30 mg at 12/19/22 Adal Case M.D.    Electrophysiology/Cardiology   [facility] [place]   [address] [place] [address]                                [phone]         Ruslan Thurston MD

## 2022-12-19 NOTE — PROGRESS NOTES
PHILIP: Anticipate discharge home with [facility] PT/OT (referrals submitted) pending medical progress. HH orders needed. Transportation likely in car with family. Reason for Admission:  a-fib                  RUR Score:   15%              PCP: First and Last name:   Raimundo Cuadra MD     Name of Practice:    Are you a current patient: Yes/No: yes   Approximate date of last visit:    Can you participate in a virtual visit if needed:     Do you (patient/family) have any concerns for transition/discharge? None noted               Plan for utilizing home health:   open to any/all referral; no preference    Current Advanced Directive/Advance Care Plan:  Full Code      Healthcare Decision Maker:   Click here to complete [address] including selection of the Healthcare Decision Maker Relationship (ie \"Primary\")            Primary Decision Maker: Reji Huddleston - Spouse - [phone]    Transition of Care Plan:   CM met with patient at bedside. Patient is alert and oriented x4. Demographics confirmed. Patient resides in a one story home with her , son and grandson. There is a ramp to enter the home. DME: cane, walker, raised toilet. Patient noted that she does not use this equipment, but has it if needed. Patient is independent in ADLs and ambulation. Hx: LVEF 30%, CAD, has ICD, afib on Eliquis, gastric bypass. PCP confirmed and pharmacy used is [facility] in [place]. No history of HH, SNF, IPR. No barriers to discharge noted at this time. Care Management Interventions  PCP Verified by CM: Yes (Dr. Julianne Mcnamara)  Mode of Transport at Discharge:  Other (see comment) (in car with family)  MyChart Signup: Yes  Discharge Durable Medical Equipment: No  Physical Therapy Consult: Yes  Occupational Therapy Consult: Yes  Speech Therapy Consult: Yes  Support Systems: Spouse/Significant Other, Child(sherrill)  Confirm Follow Up Transport: Family  The Plan for Transition of Care is Related to the Following Treatment Goals : home with [facility]  The Patient and/or Patient Representative was Provided with a Choice of Provider and Agrees with the Discharge Plan?: Yes  Freedom of Choice List was Provided with Basic Dialogue that Supports the Patient's Individualized Plan of Care/Goals, Treatment Preferences and Shares the Quality Data Associated with the Providers?: Yes  Discharge Location  Patient Expects to be Discharged to[de-identified] Home with home health     Deadra Councilman, [facility],[address]  [phone]

## 2022-12-19 NOTE — PROGRESS NOTES
[address] [facility]  Hospitalist Group                                                                                          Hospitalist Progress Note  Dmitry Conrad MD  Answering service: [phone] OR [phone] from in house phone        Date of Service:  2022  NAME:  Kaykay Kwok  :  1950  MRN:  [id number]      Admission Summary:   Per ICU: 72F with ischCMP LVEF30% with ICD, CAD s/p AMRIT   Afib on Eliquis, h.o gastric bypass, h/o DVT presenting to SPED found to be in Afib RVR, started on diltiazem gtt. On arrival to [facility] Telemetry unit still in RVR, received Amio bolus -- became acute hypoxic and HTN, placed on bipap, 1mg Bumex administered. Moved to CCU for continued care. Patient of Dr Dvaid Verdugo, recently started on Amio PO, as she had a moderate burden of asymptomatic afib on her recent icd interrogation at home while on her cardiazem.  - Flu A +, tamiflu started, on 5L NC  ICU requested to transfer the patient to medicine on 2022    Interval history / Subjective:   Patient was still in AFib with RVR as of this morning. She underwent a successful cardioversion and is currently in NSR, rate controlled. She is still on Amiodarone drip, to be transitioned to PO Amiodarone later today. She feels much better after the cardioversion. She is requesting night time Ambien, which she takes at home, \"I could not sleep at all last night\".       Assessment & Plan:     Acute respiratory failure with hypoxia:  - multifactorial: pulmonary edema in the setting of acute on chronic systolic HF exacerbation and hypertensive urgency, AFib with RVR;   - initially on BIPAP;   - improved, on NC;   - continue addressing underlying issues;  - : tested positive for the Flu;     Hypertensive Emergency:  - BP now better controlled, continue current Rx;     Paroxysmal Atrial fibrillation with Rapid Ventricular Response:  - initially placed on Cardizem drip for rate control; - Cardiology was consulted;  - 12/17: she received a bolus dose of IV Amiodarone, and was taken off the Cardizem drip, and started Amiodarone drip instead;   - s/p successful Cardioversion on 12/19;   - transition to PO Amiodarone per Cardiology;   - continue Eliquis for [facility]:     Acute on chronic systolic HF, NYHA III:  - LVEF 25-30%;  - underlying CAD/ Ischemic cardiomyopathy;   - s/p AICD;   - continue current cardiac medications: Aspirin, Bumex;  - Entresto, Isordil: currently on hold because of low BP; resume as tolerated;   - BB: currently on hold, to avoid bradycardia and hypotension, as patient is already on Amiodarone;  - not on Aldosterone antagonist due to renal insufficiency;  - not on a statin due to allergy; Influenza A positive:   Continue Tamiflu    LEMUEL:   - trend Creatinine; Code status: Full code  Prophylaxis: Eliquis  Care Plan discussed with: patient, RN  Anticipated Disposition:   Home tomorrow if cleared by Cardiology; Hospital Problems  Date Reviewed: 8/15/2022            Codes Class Noted POA    History of cardioversion ICD-10-CM: Z98.890  ICD-9-CM: V15.1  12/19/2022 Unknown        A-fib [facility]) ICD-10-CM: I48.91  ICD-9-CM: 427.31  12/17/2022 Unknown           Review of Systems:   A comprehensive review of systems was negative except for that written in the HPI. Vital Signs:    Last 24hrs VS reviewed since prior progress note.  Most recent are:  Visit Vitals  BP (!) 103/55   Pulse 84   Temp 98.2 °F (36.8 °C)   Resp 14   Ht 5' 4\" (1.626 m)   Wt 103.9 kg (229 lb 0.9 oz)   SpO2 95%   BMI 39.32 kg/m²         Intake/Output Summary (Last 24 hours) at 12/19/2022 1624  Last data filed at 12/19/2022 1000  Gross per 24 hour   Intake 620 ml   Output 500 ml   Net 120 ml          Physical Examination:     I had a face to face encounter with this patient and independently examined them on 12/19/2022 as outlined below:          Constitutional:  No acute distress, cooperative, pleasant ENT:  Oral mucosa moist, oropharynx benign. Resp:  Coarse bilaterally. No wheezing/rhonchi/rales. No accessory muscle use. CV:  IRIR, tachy,  no gallops, rubs    GI:  Soft, non distended, non tender. normoactive bowel sounds, no hepatosplenomegaly, obese    Musculoskeletal:  No edema, warm, 2+ pulses throughout    Neurologic:  Moves all extremities. AAOx3, CN II-XII reviewed            Data Review:    Review and/or order of clinical lab test      Labs:     Recent Labs     12/19/22 0318 12/18/22 0436   WBC 10.5 10.3   HGB 10.2* 11.2*   HCT 33.8* 36.8    234       Recent Labs     12/19/22 0319 12/19/22 0318 12/18/22 0436 12/17/22  1709   *  --  139 133*   K 3.2*  --  3.5 4.0     --  108 106   CO2 25  --  24 22   BUN 26*  --  24* 18   CREA 1.22*  --  1.29* 1.42*   *  --  122* 251*   CA 8.6  --  8.5 8.6   MG  --  1.9 2.6* 2.1   PHOS  --   --   --  6.6*       Recent Labs     12/19/22 0319 12/18/22 0436 12/17/22  1709   ALT 58 71 82*   AP 70 79 94   TBILI 0.5 0.5 0.5   TP 6.2* 6.5 7.4   ALB 3.5 3.1* 3.5   GLOB 2.7 3.4 3.9       Recent Labs     12/19/22 0318 12/18/22 0436 12/17/22  1709   INR 1.3* 1.2* 1.2*   PTP 13.0* 12.7* 12.8*        No results for input(s): FE, TIBC, PSAT, FERR in the last 72 hours. Lab Results   Component Value Date/Time    Folate 5.8 08/10/2020 11:28 AM        No results for input(s): PH, PCO2, PO2 in the last 72 hours. No results for input(s): CPK, CKNDX, TROIQ in the last 72 hours.     No lab exists for component: CPKMB  Lab Results   Component Value Date/Time    Cholesterol, total 220 (H) 11/28/2022 10:48 AM    HDL Cholesterol 72 11/28/2022 10:48 AM    LDL, calculated 130 (H) 11/28/2022 10:48 AM    LDL, calculated 49 08/10/2020 11:28 AM    Triglyceride 102 11/28/2022 10:48 AM     Lab Results   Component Value Date/Time    Glucose (POC) 167 (H) 11/26/2011 09:50 PM    Glucose (POC) 91 04/07/2010 06:25 AM    Glucose (POC) 129 (H) 04/06/2010 11:42 PM Glucose (POC) 127 (H) 04/06/2010 06:18 PM     Lab Results   Component Value Date/Time    Color YELLOW 04/12/2010 12:05 PM    Appearance CLEAR 04/12/2010 12:05 PM    Specific gravity 1.019 04/12/2010 12:05 PM    pH (UA) 6.5 04/12/2010 12:05 PM    Protein NEGATIVE 04/12/2010 12:05 PM    Glucose NEGATIVE 04/12/2010 12:05 PM    Ketone 15 (A) 04/12/2010 12:05 PM    Bilirubin NEGATIVE 03/22/2010 05:10 PM    Urobilinogen 1.0 04/12/2010 12:05 PM    Nitrites NEGATIVE 04/12/2010 12:05 PM    Leukocyte Esterase NEGATIVE 04/12/2010 12:05 PM    Epithelial cells 0-5 04/12/2010 12:05 PM    Bacteria NEGATIVE 04/12/2010 12:05 PM    WBC 0-4 04/12/2010 12:05 PM    RBC 0-3 04/12/2010 12:05 PM         Medications Reviewed:     Current Facility-Administered Medications   Medication Dose Route Frequency    amiodarone (CORDARONE) tablet 400 mg  400 mg Oral TID    bumetanide (BUMEX) injection 2 mg  2 mg IntraVENous BID    aspirin delayed-release tablet 81 mg  81 mg Oral DAILY    FLUoxetine (PROzac) capsule 40 mg  40 mg Oral BID    fluticasone propionate (FLONASE) 50 mcg/actuation nasal spray 2 Spray  2 Spray Both Nostrils DAILY    melatonin tablet 3 mg  3 mg Oral QHS PRN    sodium chloride (NS) flush 5-40 mL  5-40 mL IntraVENous Q8H    sodium chloride (NS) flush 5-40 mL  5-40 mL IntraVENous PRN    sodium chloride (NS) flush 5-40 mL  5-40 mL IntraVENous PRN    acetaminophen (TYLENOL) tablet 650 mg  650 mg Oral Q6H PRN    Or    acetaminophen (TYLENOL) suppository 650 mg  650 mg Rectal Q6H PRN    polyethylene glycol (MIRALAX) packet 17 g  17 g Oral DAILY PRN    ondansetron (ZOFRAN ODT) tablet 4 mg  4 mg Oral Q8H PRN    Or    ondansetron (ZOFRAN) injection 4 mg  4 mg IntraVENous Q6H PRN    apixaban (ELIQUIS) tablet 5 mg  5 mg Oral BID    polyethylene glycol (MIRALAX) packet 17 g  17 g Oral DAILY    senna-docusate (PERICOLACE) 8.6-50 mg per tablet 1 Tablet  1 Tablet Oral BID    magnesium hydroxide (MILK OF MAGNESIA) 400 mg/5 mL oral suspension 30 mL  30 mL Oral DAILY PRN    albuterol-ipratropium (DUO-NEB) 2.5 MG-0.5 MG/3 ML  3 mL Nebulization Q6H RT    letrozole (FEMARA) tablet 2.5 mg  2.5 mg Oral DAILY    oseltamivir (TAMIFLU) capsule 30 mg  30 mg Oral BID     ______________________________________________________________________  EXPECTED LENGTH OF STAY: 3d 9h  ACTUAL LENGTH OF STAY:          2                 Lori Tolbert MD

## 2022-12-19 NOTE — PROGRESS NOTES
Patient/family seen: YES       Informed patient/family of BPCI-A Bundle Program. Also advised of potential outreach by Care Transitions Team.    Bundle Payment Care Improvement Beneficiary Letter Delivered to Beneficiary or Representative:YES.  Date BCPI -A was given:12/19/2022

## 2022-12-20 LAB
ALBUMIN SERPL-MCNC: 3.2 G/DL (ref 3.5–5)
ALBUMIN/GLOB SERPL: 1 {RATIO} (ref 1.1–2.2)
ALP SERPL-CCNC: 58 U/L (ref 45–117)
ALT SERPL-CCNC: 48 U/L (ref 12–78)
ANION GAP SERPL CALC-SCNC: 7 MMOL/L (ref 5–15)
AST SERPL-CCNC: 23 U/L (ref 15–37)
BILIRUB SERPL-MCNC: 0.5 MG/DL (ref 0.2–1)
BNP SERPL-MCNC: 8886 PG/ML
BNP SERPL-MCNC: 9365 PG/ML
BUN SERPL-MCNC: 20 MG/DL (ref 6–20)
BUN/CREAT SERPL: 21 (ref 12–20)
CALCIUM SERPL-MCNC: 8.9 MG/DL (ref 8.5–10.1)
CHLORIDE SERPL-SCNC: 100 MMOL/L (ref 97–108)
CO2 SERPL-SCNC: 29 MMOL/L (ref 21–32)
CREAT SERPL-MCNC: 0.95 MG/DL (ref 0.55–1.02)
ERYTHROCYTE [DISTWIDTH] IN BLOOD BY AUTOMATED COUNT: 16.4 % (ref 11.5–14.5)
EST. AVERAGE GLUCOSE BLD GHB EST-MCNC: 126 MG/DL
GLOBULIN SER CALC-MCNC: 3.1 G/DL (ref 2–4)
GLUCOSE SERPL-MCNC: 101 MG/DL (ref 65–100)
HBA1C MFR BLD: 6 % (ref 4–5.6)
HCT VFR BLD AUTO: 32.1 % (ref 35–47)
HGB BLD-MCNC: 9.7 G/DL (ref 11.5–16)
MAGNESIUM SERPL-MCNC: 1.8 MG/DL (ref 1.6–2.4)
MAGNESIUM SERPL-MCNC: 1.9 MG/DL (ref 1.6–2.4)
MCH RBC QN AUTO: 24.2 PG (ref 26–34)
MCHC RBC AUTO-ENTMCNC: 30.2 G/DL (ref 30–36.5)
MCV RBC AUTO: 80 FL (ref 80–99)
NRBC # BLD: 0 K/UL (ref 0–0.01)
NRBC BLD-RTO: 0 PER 100 WBC
PHOSPHATE SERPL-MCNC: 3.3 MG/DL (ref 2.6–4.7)
PLATELET # BLD AUTO: 205 K/UL (ref 150–400)
PMV BLD AUTO: 12.9 FL (ref 8.9–12.9)
POTASSIUM SERPL-SCNC: 3.2 MMOL/L (ref 3.5–5.1)
PROT SERPL-MCNC: 6.3 G/DL (ref 6.4–8.2)
RBC # BLD AUTO: 4.01 M/UL (ref 3.8–5.2)
SODIUM SERPL-SCNC: 136 MMOL/L (ref 136–145)
WBC # BLD AUTO: 7.3 K/UL (ref 3.6–11)

## 2022-12-20 PROCEDURE — 83735 ASSAY OF MAGNESIUM: CPT

## 2022-12-20 PROCEDURE — 74011250637 HC RX REV CODE- 250/637: Performed by: INTERNAL MEDICINE

## 2022-12-20 PROCEDURE — 94660 CPAP INITIATION&MGMT: CPT

## 2022-12-20 PROCEDURE — 97116 GAIT TRAINING THERAPY: CPT

## 2022-12-20 PROCEDURE — 83036 HEMOGLOBIN GLYCOSYLATED A1C: CPT

## 2022-12-20 PROCEDURE — 83880 ASSAY OF NATRIURETIC PEPTIDE: CPT

## 2022-12-20 PROCEDURE — 74011250637 HC RX REV CODE- 250/637: Performed by: STUDENT IN AN ORGANIZED HEALTH CARE EDUCATION/TRAINING PROGRAM

## 2022-12-20 PROCEDURE — 80053 COMPREHEN METABOLIC PANEL: CPT

## 2022-12-20 PROCEDURE — 65270000046 HC RM TELEMETRY

## 2022-12-20 PROCEDURE — 99233 SBSQ HOSP IP/OBS HIGH 50: CPT | Performed by: INTERNAL MEDICINE

## 2022-12-20 PROCEDURE — 85027 COMPLETE CBC AUTOMATED: CPT

## 2022-12-20 PROCEDURE — 36415 COLL VENOUS BLD VENIPUNCTURE: CPT

## 2022-12-20 PROCEDURE — 74011000250 HC RX REV CODE- 250: Performed by: EMERGENCY MEDICINE

## 2022-12-20 PROCEDURE — 84100 ASSAY OF PHOSPHORUS: CPT

## 2022-12-20 PROCEDURE — 74011000250 HC RX REV CODE- 250: Performed by: STUDENT IN AN ORGANIZED HEALTH CARE EDUCATION/TRAINING PROGRAM

## 2022-12-20 RX ORDER — OSELTAMIVIR PHOSPHATE 75 MG/1
75 CAPSULE ORAL 2 TIMES DAILY
Status: COMPLETED | OUTPATIENT
Start: 2022-12-20 | End: 2022-12-22

## 2022-12-20 RX ORDER — GUAIFENESIN 100 MG/5ML
100 SOLUTION ORAL
Status: DISCONTINUED | OUTPATIENT
Start: 2022-12-20 | End: 2022-12-22 | Stop reason: HOSPADM

## 2022-12-20 RX ORDER — POTASSIUM CHLORIDE 750 MG/1
40 TABLET, FILM COATED, EXTENDED RELEASE ORAL DAILY
Status: COMPLETED | OUTPATIENT
Start: 2022-12-20 | End: 2022-12-21

## 2022-12-20 RX ORDER — GUAIFENESIN 600 MG/1
600 TABLET, EXTENDED RELEASE ORAL EVERY 12 HOURS
Status: DISCONTINUED | OUTPATIENT
Start: 2022-12-20 | End: 2022-12-22 | Stop reason: HOSPADM

## 2022-12-20 RX ADMIN — OSELTAMIVIR PHOSPHATE 75 MG: 75 CAPSULE ORAL at 18:08

## 2022-12-20 RX ADMIN — AMIODARONE HYDROCHLORIDE 400 MG: 200 TABLET ORAL at 22:06

## 2022-12-20 RX ADMIN — ZOLPIDEM TARTRATE 10 MG: 5 TABLET ORAL at 22:10

## 2022-12-20 RX ADMIN — SACUBITRIL AND VALSARTAN 1 TABLET: 24; 26 TABLET, FILM COATED ORAL at 22:06

## 2022-12-20 RX ADMIN — ASPIRIN 81 MG: 81 TABLET, COATED ORAL at 08:38

## 2022-12-20 RX ADMIN — AMIODARONE HYDROCHLORIDE 400 MG: 200 TABLET ORAL at 16:49

## 2022-12-20 RX ADMIN — FLUOXETINE 40 MG: 20 CAPSULE ORAL at 08:39

## 2022-12-20 RX ADMIN — OSELTAMIVIR PHOSPHATE 30 MG: 30 CAPSULE ORAL at 08:39

## 2022-12-20 RX ADMIN — SACUBITRIL AND VALSARTAN 1 TABLET: 24; 26 TABLET, FILM COATED ORAL at 09:27

## 2022-12-20 RX ADMIN — APIXABAN 5 MG: 5 TABLET, FILM COATED ORAL at 18:04

## 2022-12-20 RX ADMIN — IPRATROPIUM BROMIDE AND ALBUTEROL SULFATE 3 ML: .5; 3 SOLUTION RESPIRATORY (INHALATION) at 06:27

## 2022-12-20 RX ADMIN — AMIODARONE HYDROCHLORIDE 400 MG: 200 TABLET ORAL at 08:38

## 2022-12-20 RX ADMIN — BUMETANIDE 2 MG: 0.25 INJECTION, SOLUTION INTRAMUSCULAR; INTRAVENOUS at 08:41

## 2022-12-20 RX ADMIN — FLUOXETINE 40 MG: 20 CAPSULE ORAL at 18:04

## 2022-12-20 RX ADMIN — POTASSIUM CHLORIDE 40 MEQ: 750 TABLET, FILM COATED, EXTENDED RELEASE ORAL at 09:27

## 2022-12-20 RX ADMIN — APIXABAN 5 MG: 5 TABLET, FILM COATED ORAL at 08:39

## 2022-12-20 RX ADMIN — SODIUM CHLORIDE, PRESERVATIVE FREE 10 ML: 5 INJECTION INTRAVENOUS at 13:50

## 2022-12-20 RX ADMIN — FLUTICASONE PROPIONATE 2 SPRAY: 50 SPRAY, METERED NASAL at 08:45

## 2022-12-20 RX ADMIN — GUAIFENESIN 600 MG: 600 TABLET, EXTENDED RELEASE ORAL at 22:06

## 2022-12-20 RX ADMIN — BUMETANIDE 2 MG: 0.25 INJECTION, SOLUTION INTRAMUSCULAR; INTRAVENOUS at 18:04

## 2022-12-20 NOTE — PROGRESS NOTES
Pharmacy Renal Dose Protocol  Medication: oseltamivir   Current regimen:  30 mg po bid  Recent Labs     12/20/22  0621 12/19/22  0319 12/18/22  0436   CREA 0.95 1.22* 1.29*   BUN 20 26* 24*     Estimated CrCl:  65 ml/min  Plan: Change to 75 mg po bid for CrCl > 60 ml/min

## 2022-12-20 NOTE — PROGRESS NOTES
[facility]   Cardiac Electrophysiology Care Note                [x ]Established visit     Patient Name: Matt Sahni - [facility]:[id number]   Primary Cardiologist: Chacha Sampson MD   Consulting Cardiologist: Chacha Sampson MD     Reason for initial visit: rapid afib and pulmonary edema    HPI:     She is a morbidly obese 81 y.o. female who was cardioverted yesterday to NSR from AF RVR  She was on amiodarone IV and now loaded with PO amiodarone  She is in NSR    She has hx of an ischemic cardiomyopathy and S AICD, paroxysmal A. fib, hypertension, and coronary artery disease. Her most recent heart catheterization was in late September at [facility] and I have reviewed and summarized that study below. Earlier in the week she had some nasal congestion and a mild cough but no real fever. 2 days ago she actually felt fairly good and had an office visit with Dr. Chantale Knowles. Her her vitals were stable and she was not hypoxic and she was found to be in A. fib with a heart rate of about 100 bpm.  Her device was interrogated and it turned out that she has been in A. fib about half the time since it was last checked. She was started on amiodarone in addition to her Cardizem with the thought that if she did not convert on her own she be cardioverted on drug and if that failed she would be offered an AV node ablation with BIV ICD. Yesterday she began to feel bad with fatigue shortness of breath intermittently productive cough body aches and a headache. Her grandson took her to urgent care this morning and her COVID test was negative at [facility]. She still was not feeling well so she came to the emergency room here where she was noted to be hypoxic and in rapid A. fib. Her flu test was negative. She was given a milligram of IV Bumex and started on a Cardizem drip. When she arrived to the floor she was given a bolus of amiodarone and her Cardizem drip was stopped.   She is taken no of her usual p.o. meds today. Shortly after all this she became more tachypneic and hypoxic and she was put back on BiPAP and at the time I saw her her heart rate was about 110 her oxygen saturations were 97% and her blood pressure was 170/100. Influenza screen positive on repeat here at [facility]. Interim:     Remains in NSR    Echo on 12/18/2022 showed LVEF 25-30% with mod LA dilation & mod to severe MR. Still on O2 NC this am       ECHO ADULT COMPLETE 12/18/2022 12/18/2022    Interpretation Summary    Left Ventricle: Severely reduced left ventricular systolic function with a visually estimated EF of 25 - 30%. Left ventricle is moderately dilated. Normal wall thickness. Severe global hypokinesis present. Left Atrium: Left atrium is moderately dilated. Aortic Valve: Mildly calcified cusp. Mild stenosis of the aortic valve. AV mean gradient is 13 mmHg. Mitral Valve: Moderate to severe regurgitation. Tricuspid Valve: Mild regurgitation. The estimated RVSP is 46 mmHg. Contrast used: Definity.     Signed by: Haze Castleman, MD on 12/18/2022 11:39 AM       Assessment and Plan       Persistent AF with RVR: Rates 110s-120s despite amiodarone IV gtt  DCCV POD 1  400 mg tid po amiodarone 10 days and then cut down to 200 mg bid for 2 weeks and then 200 mg every day to maintain NSR  Stop diltiazem PO    I have appt to follow up with her:  she will come in office 12/29 for ECG check up  Dr Dodd Promise will review ECG as needed  She usually sees Dr Triny Chung term, I may recommend ablation of AF and try to get her off long term amiodarone use      Future Appointments   Date Time Provider Denis Hurst   12/29/2022  9:40 AM MD RANDOLPH Barclay BS AMB   2/7/2023 10:30 AM Aimee Churchill NP [facility] BS AMB   2/21/2023 10:00 AM Mauricio Peck  [address] BS AMB   3/15/2023  9:00 AM REMOTE1, JOSE ALBERTO DICKSON BS AMB   12/14/2023  2:00 PM PACEMAKER3, JOSE ALBERTO DICKSON BS AMB   12/14/2023  2:20 PM Marlena Goodpasture, MD [facility] BS AMB         LVEF reduced to 25%, had been 30-35% in 2021. Interstitial pulmonary edema noted on CXR during admission. Suspect decompensation is driven by underlying influenza infection & AF with RVR. Hence try to maintain NSR and will reevaluate MR and LVEF in NSR with LAKSHMI   Recommend entresto if BP can afford  Annemarie Byrd had been to 97/103 (prev not tolerated due to dizziness)   she had hair loss on coreg and bystolic   -off spironolactone due to hypotension)    Usually followed up on CHF with Dr Antonio Ruiz    I manage her S ICD    Anticoagulation: Continue Eliquis for embolic CVA prophylaxis. Denies bleeding issues. Sudha Dobbs M.D. [address] [facility]  Electrophysiology/Cardiology  [facility] [place]  [address]                              [phone]                                          ____________________________________________________________     Cardiac testing     Received records from admission at [facility].       ECG (09/22/2022) showed AF with controlled ventricular rate. RHC/LHC (09/28/2022): LVEDP 15-20 mmHg. LM with MLI. LCx with mild diffuse disease;  in OM with left to left & right to left collaterals. LAD with prior stent with AUBREY-3 flow; otherwise MLI. RCA with very mild ISR in proximal segment, otherwise MLI. 12/17/22     ECHO ADULT COMPLETE 12/18/2022 12/18/2022     Interpretation Summary   ·  Left Ventricle: Severely reduced left ventricular systolic function with a visually estimated EF of 25 - 30%. Left ventricle is moderately dilated. Normal wall thickness. Severe global hypokinesis present. ·  Left Atrium: Left atrium is moderately dilated. ·  Aortic Valve: Mildly calcified cusp. Mild stenosis of the aortic valve. AV mean gradient is 13 mmHg. ·  Mitral Valve: Moderate to severe regurgitation. ·  Tricuspid Valve: Mild regurgitation.  The estimated RVSP is 46 mmHg. ·  Contrast used: Definity. Signed by: Roxanna Hartman MD on 12/18/2022 11:39 AM         NUCLEAR CARDIAC STRESS TEST 08/13/2021 8/16/2021     Interpretation Summary   · SPECT: Left ventricular function post-stress was abnormal. Calculated ejection fraction is 22%. There is no evidence of transient ischemic dilation (TID). The TID ratio is 0.86. · Baseline ECG: Normal sinus rhythm. · SPECT: Myocardial perfusion imaging defect 1: There is a defect that is large in size with a severe reduction in uptake present in the lateral location(s) that is partially reversible. There is abnormal wall motion in the defect area. The defect appears to be infarction with esther-infarct ischemia. Perfusion defect was visually and quantitatively present. · SPECT: Left ventricular perfusion is probably abnormal. Myocardial perfusion imaging supports a high risk stress test.     Signed by: Richi Schmitt MD on 8/13/2021  8:26 AM     08/13/21     CARDIAC PROCEDURE 08/13/2021 8/13/2021     Conclusion   Findings:   1)Normal LVEDP   2)Severe native 1 vessel CAD with  of ramus intermedius. LCx  Is small with occluded distal LCx. OM2 and distal ramus fill from collaterals from LAD, diagonal and RCA. 3)Limited wire probing suggests no micro channel in Ramus ISR . Proximal cap start before the stent     Access: Right radial no issues   Contrast 40 cc     Recommendations   1)Continue GDMT and weight loss. If dyspnea continue may consider Ramus  PCI. Uncertain if benefit will be substantial.   2)GDMT for CAD     Signed by:  Lee Blair MD on 8/13/2021 10:34 AM         Most recent HS troponins:   Recent Labs     12/17/22   1703 12/17/22   1317 12/17/22   0938   TROPHS 56* 31 22     ECG: atrial fibrillation, rate 110   Review of Systems     [x]All other systems reviewed and all negative except as written in HPI     [ ] Patient unable to provide secondary to condition           Past Medical History: Diagnosis Date   · Acute right ankle pain 06/08/2018 5/29/18: X-ray showed possible right ankle sprain. 6/6/18: saw Dr. Desiree Butts ([facility]), diagnosed with peroneal tendonitis. Prescribed an ASO   · Asthma   · Atrial fibrillation (HCC)   · Cardiomyopathy ([facility].)   · Coronary artery disease 2008   s/p RCA stent ([facility]) on 11/26/11   · Depression with anxiety   2011   · DVT (deep venous thrombosis) ([facility] 07/27/2010   · Family history of early CAD   · GERD (gastroesophageal reflux disease)   · H/O gastric bypass 2006   Revision in 2009   · Hepatitis C antibody test positive   Does not have chronic hep C (labs 10/6/15: neg HCV RNA)   · HTN (hypertension) 07/27/2010   · Hyperlipidemia 07/27/2010   · Incontinence of feces 09/03/2018   Dr. Guero Riojas. 8/20/18: Improving with pelvic physical therapy and psyllium husk treatment. Saw Dr. Shavnone Delacruz who suggested PT first. MR defecography showed pelvic floor weakness with pelvic descensus, small anterior  Rectocele, and vaginal prolapse. To have pelvic PT weekly for 8 wks and to see Dr. Shavonne Delacruz again in Oct 2018.    · Joint pain 07/27/2010   · MI (myocardial infarction) ([facility] 07/27/2010   · Mitral valve regurgitation   Mild to moderate   · [facility] 07/27/2010   · Myocardial infarction [facility]) 2006 and 2011   Dr. Hannah Hastings   · Psychiatric disorder   · PUD (peptic ulcer disease)   gi bleed 2008; ulcer n gastric bypass pouch   · Urinary incontinence, stress 07/27/2010     Past Surgical History:   Procedure Laterality Date   · COLONOSCOPY N/A 6/29/2018   COLONOSCOPY performed by Laron Sahu MD at [facility] ENDOSCOPY; redundant colon, int hemorrhoids, pathology: normal colonic mucosa   · HX BREAST BIOPSY Left   benign   · HX BREAST LUMPECTOMY Right 7/14/2022   RIGHT BREAST LUMPECTOMY WITH ULTRASOUND performed by Magi Jo MD at [facility] 11   · HX COLONOSCOPY 9/5/14   Dr. Jerrod Bey; normal, repeat in 5 yrs   · HX GASTRIC BYPASS   · HX IMPLANTABLE CARDIOVERTER DEFIBRILLATOR 08/24/2016   · HX LAP GASTRIC BYPASS 2006   revision 2009/Dr. Jaime Jeffrey   · HX OTHER SURGICAL 09/19/2016   Removal of right ventricular ICD lead & single chamber transvenous AICD   · HX OTHER SURGICAL 10/12/2016   pocket revison of ICD; Dr. Liana Peguero   · HX OTHER SURGICAL 06/07/2018   Dr Lloyd Mccarthy; high resolution anorectal manaometry-abnormal study. Study done for eval of fecal incontinence   · HX OTHER SURGICAL 12/14/2018   Dr. Lloyd cMcarthy. Rectal endoscopic US (EUS) for rectal incontinence. Normal rectal mucosa, no fistulas. · HX PACEMAKER   sicd/left side of chest under arm   · INS PPM/ICD LED SING ONLY 8/24/2016     · INS PPM/ICD LED SING ONLY 8/26/2016     · INS PPM/ICD LED SING ONLY 9/21/2016     · AR CARDIAC SURG PROCEDURE UNLIST   Stent x 5-6,Most recent 2011   · AR LAP,STOMACH,OTHER,W/O TUBE 04/05/2010   Revision GBP     Social Hx:  reports that she quit smoking about 18 years ago. Her smoking use included cigarettes. She started smoking about 53 years ago. She has never used smokeless tobacco. She reports that she does not drink alcohol and does not use drugs. Family Hx: family history includes Alzheimer's Disease in her mother; Cancer in her father and mother; Dementia in her mother; Heart Attack in her brother; Heart Disease in her father and sister; Hypertension in her father; Stroke in her sister and sister.    Allergies   Allergen Reactions   · Amoxicillin Anaphylaxis   · Amoxicillin Anaphylaxis   · Lipitor [Atorvastatin] Other (comments)   Severe muscle pain and spasms   · Zocor [Simvastatin] Other (comments)   Cramps, muscle spasms   · Buspar [Buspirone] Other (comments)   Drunk sensation, headaches   · Hydroxyzine Other (comments)   Blurred vision, lightheadedness   · Livalo [Pitavastatin] Myalgia   · Pravastatin Other (comments)   Leg cramps   · Tramadol Vertigo            OBJECTIVE:   Wt Readings from Last 3 Encounters:   12/19/22 229 lb 0.9 oz (103.9 kg)   12/15/22 229 lb (103.9 kg)   11/28/22 223 lb 6.4 oz (101.3 kg)       Intake/Output Summary (Last 24 hours) at 12/19/2022 0836   Last data filed at 12/19/2022 0700   Gross per 24 hour   Intake 560 ml   Output 900 ml   Net -340 ml       Physical Exam   Visit Vitals  /76   Pulse 94   Temp 98.8 °F (37.1 °C)   Resp 22   Ht 5' 4\" (1.626 m)   Wt 227 lb 4.7 oz (103.1 kg)   SpO2 92%   BMI 39.01 kg/m²       Telemetry: NSR    Neck: supple, no JVD     General:  Alert, cooperative, no distress, appears stated age. Neck:  Supple, no carotid bruit and no JVD. Lungs: clear and no accessory muscle use  Heart:  regular rate and rhythm  Abdomen:    Soft, non-tender. Bowel sounds normal. Morbidly obese   Extremities: no edema. Vascular:  Pulses - 2+   Skin:  Skin color normal. No rashes    Neurologic:  Alert, Moves all extremities. Data Review:     Radiology:   XR Results (most recent):   Results from Hospital Encounter encounter on 12/17/22     XR CHEST PORT     Narrative   EXAM: Portable CXR. 1620 hours. COMPARISON: 0912 hours. INDICATION: chest pain     FINDINGS:   Unchanged interstitial pulmonary edema. No focal consolidation. Borderline   cardiomegaly. Subcutaneous ICD. No pneumothorax, midline shift or pleural   effusion. Impression   Stable interstitial pulmonary edema. CT Results (most recent):   Results from Hospital Encounter encounter on 02/23/17     CT SPINE CERV WO CONT     Narrative   EXAM:  CT CERVICAL SPINE WITHOUT CONTRAST     INDICATION: Ground-level fall this morning. COMPARISON: None. TECHNIQUE: Multislice helical CT of the cervical spine was performed without   intravenous contrast administration. Sagittal and coronal reconstructions were   generated. CT dose reduction was achieved through use of a standardized   protocol tailored for this examination and automatic exposure control for dose   modulation. Adaptive statistical iterative reconstruction (ASIR) was utilized.      CONTRAST: None.     FINDINGS:     The alignment is within normal limits. There is no fracture or subluxation. There is degenerative disc narrowing and marginal osteophyte formation at the   C5-6 and C6-7 levels. The odontoid process is intact. The craniocervical   junction is within normal limits. The prevertebral soft tissues are within   normal limits. There is no spinal canal or neural foraminal stenosis. The   surrounding soft tissue and musculoskeletal structures appear unremarkable. The   visualized lung apices are unremarkable. Impression   IMPRESSION: No acute findings. MRI Results (most recent):   Results from [place] encounter on 07/02/18     MRI PELVIC FLOOR STUDY     Narrative   EXAM: MR PELVIC FLOOR   INDICATION: Full incontinence of feces. CONTRAST: Sterile ultrasound gel was placed into the vagina and rectum. No   intravenous contrast was administered. Oral Volumen was administered. TECHNIQUE: MR of the pelvis was performed according to standard departmental   protocol. The patient voided prior to imaging. Following three plane localizer   images, sagittal, axial and coronal breath-hold T2 (HASTE) images as well as   breath-hold axial T1 in and out of phase images were acquired. Static and   dynamic MR of the pelvic floor was performed using static sagittal axial and   coronal high-resolution T2 weighted images followed by dynamic sagittal midline   SSFSE images at rest, during squeezing (Kegel maneuver), during straining   (Valsalva maneuver) and during defecation. FINDINGS:   H line = width of the levator hiatus   M line = descent of the levator hiatus     At rest:   H line measures 5.7 cm   M line measures 23 mm. The anorectal angle measures 131 degrees. (Normal 108 to 127 degrees.)     With squeezing:   H line measures 4.7 cm   M line measures 8 mm. The anorectal angle measures 112 degrees. With straining:   H line measures 5.5 cm   M line measures 5 mm.    The anorectal angle measures 121 degrees. With evacuation:   H line measures 5.3 cm   M line measures 4.5 mm. The anorectal angle measures 140 degrees. The puborectalis sling is intact. There is there is elevation of the pelvic   floor with squeezing and pelvic floor weakness with abnormal descent of the   pelvic floor with straining and evacuation. POSTERIOR COMPARTMENT: There is pelvic floor weakness with pelvic descensus with   an anterior rectocele and the patient is unable to fully evacuate the rectum. There is no rectal intussusception or navin rectal prolapse. ANTERIOR COMPARTMENT: There is minimal descent of the bladder neck below the   sacrococcygeal line. MIDDLE COMPARTMENT: The uterus is absent. There is prolapse of the vagina below   the sacrococcygeal line. Impression   IMPRESSION: Pelvic floor weakness with pelvic descensus. There is a small   anterior rectocele and prolapse of the vagina below the sacrococcygeal line. The   patient is unable to fully evacuate the rectum. Recent Labs   12/17/22   LFT normal  TSH 2.81         Allergies   Allergen Reactions    Amoxicillin Anaphylaxis    Amoxicillin Anaphylaxis    Lipitor [Atorvastatin] Other (comments)     Severe muscle pain and spasms    Zocor [Simvastatin] Other (comments)     Cramps, muscle spasms    Buspar [Buspirone] Other (comments)     Drunk sensation, headaches    Hydroxyzine Other (comments)     Blurred vision, lightheadedness    Livalo [Pitavastatin] Myalgia    Pravastatin Other (comments)     Leg cramps    Tramadol Vertigo          No current facility-administered medications on file prior to encounter. Current Outpatient Medications on File Prior to Encounter   Medication Sig Dispense Refill    amiodarone (CORDARONE) 200 mg tablet Take 2 Tablets by mouth two (2) times a day. 120 Tablet 1    dilTIAZem ER (CARDIZEM CD) 300 mg capsule Take 1 Capsule by mouth daily.  30 Capsule 1    zolpidem (AMBIEN) 10 mg tablet TAKE 1 TABLET BY MOUTH NIGHTLY AS NEEDED FOR SLEEP. MAX DAILY AMOUNT: 10 MG. 30 Tablet 1    apixaban (ELIQUIS) 5 mg tablet Take 1 Tablet by mouth two (2) times a day. Prescribed by Dr. Diana Truong. 180 Tablet 0    ezetimibe (ZETIA) 10 mg tablet TAKE 1 TABLET BY MOUTH EVERY DAY IN THE MORNING 90 Tablet 1    potassium chloride (Klor-Con M20) 20 mEq tablet TAKE 2 TABLETS BY MOUTH EVERY  Tablet 3    isosorbide mononitrate ER (IMDUR) 30 mg tablet TAKE 1 TABLET BY MOUTH EVERY DAY 90 Tablet 1    letrozole (FEMARA) 2.5 mg tablet Take 1 Tablet by mouth in the morning. Indications: hormone receptor positive postmenopausal early breast cancer 90 Tablet 3    isosorbide dinitrate (ISORDIL) 30 mg tablet Take 20 mg by mouth daily. IBUPROFEN PO Take  by mouth as needed. aspirin delayed-release 81 mg tablet Take 81 mg by mouth as needed for Pain. bumetanide (BUMEX) 2 mg tablet TAKE 1 TABLET BY MOUTH TWICE A DAY 60 Tablet 11    albuterol (PROVENTIL HFA, VENTOLIN HFA, PROAIR HFA) 90 mcg/actuation inhaler TAKE 2 PUFFS BY INHALATION EVERY FOUR HOURS AS NEEDED FOR WHEEZING OR SHORTNESS OF BREATH. 18 Each 11    FLUoxetine (PROzac) 40 mg capsule Take 1 Capsule by mouth two (2) times a day. 180 Capsule 3    Entresto  mg tablet TAKE 1 TABLET BY MOUTH TWICE A DAY 60 Tablet 5    psyllium (METAMUCIL) powd Take  by mouth. 2 tsp daily      cholecalciferol, VITAMIN D3, (VITAMIN D3) 5,000 unit tab tablet Take 10,000 Units by mouth daily. biotin 10,000 mcg cap Take  by mouth daily. OTHER Complete multi formula, bariatric advantage      magnesium 250 mg tab Take 1 Tab by mouth daily. ferrous sulfate 325 mg (65 mg iron) tablet Take  by mouth daily (before breakfast). CALCIUM PO Take 600 mg by mouth daily. Hazel Randall M.D.    Electrophysiology/Cardiology   [facility] [place]   [address] [facility], [address] [place] [address] Alyssa Lowry MD

## 2022-12-20 NOTE — PROGRESS NOTES
1930: Bedside shift change report given to Lindy (oncoming nurse) by Savanah Grace (offgoing nurse). Report included the following information SBAR, Kardex, MAR, and Cardiac Rhythm NSR c 1 AVB .

## 2022-12-20 NOTE — PROGRESS NOTES
Problem: Mobility Impaired (Adult and Pediatric)  Goal: *Acute Goals and Plan of Care (Insert Text)  Description: FUNCTIONAL STATUS PRIOR TO ADMISSION: Patient was independent and active without use of DME.    HOME SUPPORT PRIOR TO ADMISSION: The patient lived with , son but did not require assist.    Physical Therapy Goals  Initiated 12/18/2022  1. Patient will move from supine to sit and sit to supine  in bed with independence within 7 day(s). 2.  Patient will transfer from bed to chair and chair to bed with independence using the least restrictive device within 7 day(s). 3.  Patient will perform sit to stand with independence within 7 day(s). 4.  Patient will ambulate with independence for 150 feet with the least restrictive device within 7 day(s). Outcome: Progressing Towards Goal   PHYSICAL THERAPY TREATMENT  Patient: Tamie Pearce (36 y.o. female)  Date: 12/20/2022  Diagnosis: A-fib [facility]) [I48.91] <principal problem not specified>      Precautions: Fall (Droplet (flu +))  Chart, physical therapy assessment, plan of care and goals were reviewed. ASSESSMENT  Patient continues with skilled PT services and is progressing towards goals. Patient found supine in bed and agreeable to PT, Spo2 97% on RA at rest. Patient independent with supine > sit and sit <> stand. Patient ambulated 100 feet x 2 with CGA - SBA, with seated rest between bouts 2/2 generalized weakness and fatigue. Patient demonstrates mild trunk sway though no LOB. Patient initially reaching out for wall or objects in hallway for stability, but receptive to cues for reciprocal arm swing, upright posture, and increased gait speed with improved stability demonstrated with cues. Patient's spo2 95% and > throughout ambulation on RA. Patient returned to room and left seated in chair with all needs in reach. Patient provided with incentive spirometer and educated on use, completing 5 reps.      Current Level of Function Impacting Discharge (mobility/balance): ambulates 100 feet x 2 with CGA-SBA on RA     Other factors to consider for discharge: spo2 stable on RA throughout activity          PLAN :  Patient continues to benefit from skilled intervention to address the above impairments. Continue treatment per established plan of care. to address goals. Recommendation for discharge: (in order for the patient to meet his/her long term goals)  Physical therapy at least 2 days/week in the home     This discharge recommendation:  A follow-up discussion with the attending provider and/or case management is planned    IF patient discharges home will need the following DME: none       SUBJECTIVE:   Patient stated I just feel a little fatigued.     OBJECTIVE DATA SUMMARY:   Critical Behavior:  Neurologic State: Alert  Orientation Level: Oriented X4  Cognition: Appropriate decision making, Appropriate for age attention/concentration, Appropriate safety awareness, Follows commands  Safety/Judgement: Awareness of environment  Functional Mobility Training:  Bed Mobility:  Rolling: Independent  Supine to Sit: Independent     Scooting: Independent        Transfers:  Sit to Stand: Supervision  Stand to Sit: Supervision        Bed to Chair: Supervision                    Balance:  Sitting: Intact; Without support  Standing: Impaired  Standing - Static: Good  Standing - Dynamic : Good;Fair  Ambulation/Gait Training:  Distance (ft): 200 Feet (ft) (100 feet x 2 with seated rest between bouts 2/2 fatigue and minimal SOB)  Assistive Device: Gait belt  Ambulation - Level of Assistance: Contact guard assistance;Stand-by assistance        Gait Abnormalities: Decreased step clearance; Altered arm swing;Trunk sway increased        Base of Support: Widened     Speed/Andree: Pace decreased (<100 feet/min)  Step Length: Right shortened;Left shortened       Therapeutic Exercises:   Patient completed 5 reps on incentive spirometer.      Pain Ratin/10    Activity Tolerance:   Good, SpO2 stable on RA, and requires rest breaks    After treatment patient left in no apparent distress:   Sitting in chair and Call bell within reach    COMMUNICATION/COLLABORATION:   The patients plan of care was discussed with: Registered nurse.      Senia Davis, PT   Time Calculation: 17 mins

## 2022-12-20 NOTE — PROGRESS NOTES
[address] [facility] Adult  Hospitalist Group                                                                                          Hospitalist Progress Note  Ford Quintana MD  Answering service: [phone] or [phone] from in house phone        Date of Service:  2022  NAME:  Mustapha Dwyer YOB: 1950  MRN:  [id number]      Admission Summary:   Per ICU: 72F with ischCMP LVEF30% with ICD, CAD s/p AMRIT   Afib on Eliquis, h.o gastric bypass, h/o DVT presenting to SPED found to be in Afib RVR, started on diltiazem gtt. On arrival to [facility] Telemetry unit still in RVR, received Amio bolus -- became acute hypoxic and HTN, placed on bipap, 1mg Bumex administered. Moved to CCU for continued care. Patient of Dr Chevy Reina, recently started on Amio PO, as she had a moderate burden of asymptomatic afib on her recent icd interrogation at home while on her cardiazem.  - Flu A +, tamiflu started, on 5L NC  ICU requested to transfer the patient to medicine on 2022    Interval history / Subjective:   Remains in NSR, rate controlled. Complains of some congestion in her chest, difficult to cough it up. She is still on NC. Really wants to go home tomorrow.       Assessment & Plan:     Acute respiratory failure with hypoxia:  - multifactorial: pulmonary edema in the setting of acute on chronic systolic HF exacerbation and hypertensive urgency, AFib with RVR;   - initially on BIPAP;   - improved, on NC;   - continue addressing underlying issues;  - : tested positive for the Flu;     Hypertensive Emergency:  - BP now better controlled, continue current Rx;     Paroxysmal Atrial fibrillation with Rapid Ventricular Response:  - initially placed on Cardizem drip for rate control;   - Cardiology was consulted;  - : she received a bolus dose of IV Amiodarone, and was taken off the Cardizem drip, and started Amiodarone drip instead;   - s/p successful Cardioversion on ;   - transition to PO Amiodarone per Cardiology;   - continue Eliquis for [facility]:     Acute on chronic systolic HF, NYHA III:  - LVEF 25-30%;  - underlying CAD/ Ischemic cardiomyopathy;   - s/p AICD;   - continue current cardiac medications: Aspirin, Bumex;  - Entresto, Isordil: currently on hold because of low BP; resume as tolerated;   - BB: currently on hold, to avoid bradycardia and hypotension, as patient is already on Amiodarone;  - not on Aldosterone antagonist due to renal insufficiency;  - not on a statin due to allergy; Influenza A positive:   Continue Tamiflu  - wean off O2;  Add Mucinex;    LEMUEL:   - trend Creatinine; Code status: Full code  Prophylaxis: Eliquis  Care Plan discussed with: patient, RN  Anticipated Disposition:   Home tomorrow if cleared by Cardiology; Hospital Problems  Date Reviewed: 8/15/2022            Codes Class Noted POA    History of cardioversion ICD-10-CM: Z98.890  ICD-9-CM: V15.1  12/19/2022 Unknown        A-fib [facility]) ICD-10-CM: I48.91  ICD-9-CM: 427.31  12/17/2022 Unknown         Review of Systems:   A comprehensive review of systems was negative except for that written in the HPI. Vital Signs:    Last 24hrs VS reviewed since prior progress note. Most recent are:  Visit Vitals  /68   Pulse 90   Temp 97.9 °F (36.6 °C)   Resp 19   Ht 5' 4\" (1.626 m)   Wt 103.1 kg (227 lb 4.7 oz)   SpO2 96%   BMI 39.01 kg/m²         Intake/Output Summary (Last 24 hours) at 12/20/2022 1440  Last data filed at 12/20/2022 1316  Gross per 24 hour   Intake 840 ml   Output 1950 ml   Net -1110 ml          Physical Examination:     I had a face to face encounter with this patient and independently examined them on 12/20/2022 as outlined below:          Constitutional:  No acute distress, cooperative, pleasant    ENT:  Oral mucosa moist, oropharynx benign. Resp:  Coarse bilaterally. No wheezing/rhonchi/rales. No accessory muscle use.     CV:  IRIR, tachy,  no gallops, rubs    GI: Soft, non distended, non tender. normoactive bowel sounds, no hepatosplenomegaly, obese    Musculoskeletal:  No edema, warm, 2+ pulses throughout    Neurologic:  Moves all extremities. AAOx3, CN II-XII reviewed            Data Review:    Review and/or order of clinical lab test      Labs:     Recent Labs     12/20/22 0621 12/19/22 0318   WBC 7.3 10.5   HGB 9.7* 10.2*   HCT 32.1* 33.8*    224       Recent Labs     12/20/22  0621 12/20/22  0503 12/19/22 0319 12/19/22 0318 12/18/22  0436 12/17/22  1709     --  135*  --  139 133*   K 3.2*  --  3.2*  --  3.5 4.0     --  101  --  108 106   CO2 29  --  25  --  24 22   BUN 20  --  26*  --  24* 18   CREA 0.95  --  1.22*  --  1.29* 1.42*   *  --  183*  --  122* 251*   CA 8.9  --  8.6  --  8.5 8.6   MG 1.9 1.8  --  1.9 2.6* 2.1   PHOS 3.3  --   --   --   --  6.6*       Recent Labs     12/20/22 0621 12/19/22 0319 12/18/22  0436   ALT 48 58 71   AP 58 70 79   TBILI 0.5 0.5 0.5   TP 6.3* 6.2* 6.5   ALB 3.2* 3.5 3.1*   GLOB 3.1 2.7 3.4       Recent Labs     12/19/22 0318 12/18/22  0436 12/17/22  1709   INR 1.3* 1.2* 1.2*   PTP 13.0* 12.7* 12.8*        No results for input(s): FE, TIBC, PSAT, FERR in the last 72 hours. Lab Results   Component Value Date/Time    Folate 5.8 08/10/2020 11:28 AM        No results for input(s): PH, PCO2, PO2 in the last 72 hours. No results for input(s): CPK, CKNDX, TROIQ in the last 72 hours.     No lab exists for component: CPKMB  Lab Results   Component Value Date/Time    Cholesterol, total 220 (H) 11/28/2022 10:48 AM    HDL Cholesterol 72 11/28/2022 10:48 AM    LDL, calculated 130 (H) 11/28/2022 10:48 AM    LDL, calculated 49 08/10/2020 11:28 AM    Triglyceride 102 11/28/2022 10:48 AM     Lab Results   Component Value Date/Time    Glucose (POC) 167 (H) 11/26/2011 09:50 PM    Glucose (POC) 91 04/07/2010 06:25 AM    Glucose (POC) 129 (H) 04/06/2010 11:42 PM    Glucose (POC) 127 (H) 04/06/2010 06:18 PM     Lab Results   Component Value Date/Time    Color YELLOW 04/12/2010 12:05 PM    Appearance CLEAR 04/12/2010 12:05 PM    Specific gravity 1.019 04/12/2010 12:05 PM    pH (UA) 6.5 04/12/2010 12:05 PM    Protein NEGATIVE 04/12/2010 12:05 PM    Glucose NEGATIVE 04/12/2010 12:05 PM    Ketone 15 (A) 04/12/2010 12:05 PM    Bilirubin NEGATIVE 03/22/2010 05:10 PM    Urobilinogen 1.0 04/12/2010 12:05 PM    Nitrites NEGATIVE 04/12/2010 12:05 PM    Leukocyte Esterase NEGATIVE 04/12/2010 12:05 PM    Epithelial cells 0-5 04/12/2010 12:05 PM    Bacteria NEGATIVE 04/12/2010 12:05 PM    WBC 0-4 04/12/2010 12:05 PM    RBC 0-3 04/12/2010 12:05 PM         Medications Reviewed:     Current Facility-Administered Medications   Medication Dose Route Frequency    sacubitriL-valsartan (ENTRESTO) 24-26 mg tablet 1 Tablet  1 Tablet Oral Q12H    potassium chloride SR (KLOR-CON 10) tablet 40 mEq  40 mEq Oral DAILY    oseltamivir (TAMIFLU) capsule 75 mg  75 mg Oral BID    amiodarone (CORDARONE) tablet 400 mg  400 mg Oral TID    zolpidem (AMBIEN) tablet 10 mg  10 mg Oral QHS PRN    bumetanide (BUMEX) injection 2 mg  2 mg IntraVENous BID    aspirin delayed-release tablet 81 mg  81 mg Oral DAILY    FLUoxetine (PROzac) capsule 40 mg  40 mg Oral BID    fluticasone propionate (FLONASE) 50 mcg/actuation nasal spray 2 Spray  2 Spray Both Nostrils DAILY    melatonin tablet 3 mg  3 mg Oral QHS PRN    sodium chloride (NS) flush 5-40 mL  5-40 mL IntraVENous Q8H    sodium chloride (NS) flush 5-40 mL  5-40 mL IntraVENous PRN    sodium chloride (NS) flush 5-40 mL  5-40 mL IntraVENous PRN    acetaminophen (TYLENOL) tablet 650 mg  650 mg Oral Q6H PRN    Or    acetaminophen (TYLENOL) suppository 650 mg  650 mg Rectal Q6H PRN    polyethylene glycol (MIRALAX) packet 17 g  17 g Oral DAILY PRN    ondansetron (ZOFRAN ODT) tablet 4 mg  4 mg Oral Q8H PRN    Or    ondansetron (ZOFRAN) injection 4 mg  4 mg IntraVENous Q6H PRN    apixaban (ELIQUIS) tablet 5 mg  5 mg Oral BID polyethylene glycol (MIRALAX) packet 17 g  17 g Oral DAILY    senna-docusate (PERICOLACE) 8.6-50 mg per tablet 1 Tablet  1 Tablet Oral BID    magnesium hydroxide (MILK OF MAGNESIA) 400 mg/5 mL oral suspension 30 mL  30 mL Oral DAILY PRN    albuterol-ipratropium (DUO-NEB) 2.5 MG-0.5 MG/3 ML  3 mL Nebulization Q6H RT    letrozole (FEMARA) tablet 2.5 mg  2.5 mg Oral DAILY     ______________________________________________________________________  EXPECTED LENGTH OF STAY: 3d 9h  ACTUAL LENGTH OF STAY:          3                 Gloria Hernandez MD

## 2022-12-20 NOTE — ROUTINE PROCESS
07:30 SBAR from Lindy    14:00 Up walking with PT, tolerated well. 18:00 uneventful afternoon    19:30 Bedside shift change report given to Lindy (oncoming nurse) by MOM (offgoing nurse). Report included the following information SBAR, Kardex, Procedure Summary, Intake/Output, MAR, Accordion, Recent Results, Med Rec Status, Cardiac Rhythm NSR occ pac,pvc, Alarm Parameters , Pre Procedure Checklist, Procedure Verification, and Quality Measures.

## 2022-12-21 ENCOUNTER — TRANSCRIBE ORDER (OUTPATIENT)
Dept: CARDIAC REHAB | Age: 72
End: 2022-12-21

## 2022-12-21 DIAGNOSIS — I50.22 SYSTOLIC CHF, CHRONIC (HCC): Primary | ICD-10-CM

## 2022-12-21 LAB
ALBUMIN SERPL-MCNC: 2.8 G/DL (ref 3.5–5)
ALBUMIN/GLOB SERPL: 0.8 {RATIO} (ref 1.1–2.2)
ALP SERPL-CCNC: 62 U/L (ref 45–117)
ALT SERPL-CCNC: 39 U/L (ref 12–78)
ANION GAP SERPL CALC-SCNC: 6 MMOL/L (ref 5–15)
AST SERPL-CCNC: 34 U/L (ref 15–37)
ATRIAL RATE: 352 BPM
BASOPHILS # BLD: 0 K/UL (ref 0–0.1)
BASOPHILS NFR BLD: 0 % (ref 0–1)
BILIRUB SERPL-MCNC: 0.6 MG/DL (ref 0.2–1)
BNP SERPL-MCNC: 4851 PG/ML
BUN SERPL-MCNC: 21 MG/DL (ref 6–20)
BUN/CREAT SERPL: 23 (ref 12–20)
CALCIUM SERPL-MCNC: 8.6 MG/DL (ref 8.5–10.1)
CALCULATED R AXIS, ECG10: 15 DEGREES
CALCULATED T AXIS, ECG11: 167 DEGREES
CHLORIDE SERPL-SCNC: 99 MMOL/L (ref 97–108)
CO2 SERPL-SCNC: 28 MMOL/L (ref 21–32)
CREAT SERPL-MCNC: 0.93 MG/DL (ref 0.55–1.02)
DIAGNOSIS, 93000: NORMAL
DIFFERENTIAL METHOD BLD: ABNORMAL
EOSINOPHIL # BLD: 0 K/UL (ref 0–0.4)
EOSINOPHIL NFR BLD: 0 % (ref 0–7)
ERYTHROCYTE [DISTWIDTH] IN BLOOD BY AUTOMATED COUNT: 16.6 % (ref 11.5–14.5)
GLOBULIN SER CALC-MCNC: 3.5 G/DL (ref 2–4)
GLUCOSE SERPL-MCNC: 86 MG/DL (ref 65–100)
HCT VFR BLD AUTO: 33.5 % (ref 35–47)
HGB BLD-MCNC: 10.3 G/DL (ref 11.5–16)
IMM GRANULOCYTES # BLD AUTO: 0 K/UL (ref 0–0.04)
IMM GRANULOCYTES NFR BLD AUTO: 0 % (ref 0–0.5)
INR PPP: 1.1 (ref 0.9–1.1)
LYMPHOCYTES # BLD: 1.3 K/UL (ref 0.8–3.5)
LYMPHOCYTES NFR BLD: 20 % (ref 12–49)
MAGNESIUM SERPL-MCNC: 1.7 MG/DL (ref 1.6–2.4)
MCH RBC QN AUTO: 23.8 PG (ref 26–34)
MCHC RBC AUTO-ENTMCNC: 30.7 G/DL (ref 30–36.5)
MCV RBC AUTO: 77.4 FL (ref 80–99)
MONOCYTES # BLD: 0.8 K/UL (ref 0–1)
MONOCYTES NFR BLD: 12 % (ref 5–13)
NEUTS SEG # BLD: 4.4 K/UL (ref 1.8–8)
NEUTS SEG NFR BLD: 68 % (ref 32–75)
NRBC # BLD: 0 K/UL (ref 0–0.01)
NRBC BLD-RTO: 0 PER 100 WBC
PLATELET # BLD AUTO: 178 K/UL (ref 150–400)
POTASSIUM SERPL-SCNC: 3.8 MMOL/L (ref 3.5–5.1)
PROT SERPL-MCNC: 6.3 G/DL (ref 6.4–8.2)
PROTHROMBIN TIME: 11.8 SEC (ref 9–11.1)
Q-T INTERVAL, ECG07: 304 MS
QRS DURATION, ECG06: 114 MS
QTC CALCULATION (BEZET), ECG08: 456 MS
RBC # BLD AUTO: 4.33 M/UL (ref 3.8–5.2)
RBC MORPH BLD: ABNORMAL
RBC MORPH BLD: ABNORMAL
SODIUM SERPL-SCNC: 133 MMOL/L (ref 136–145)
VENTRICULAR RATE, ECG03: 135 BPM
WBC # BLD AUTO: 6.5 K/UL (ref 3.6–11)

## 2022-12-21 PROCEDURE — 74011250637 HC RX REV CODE- 250/637: Performed by: INTERNAL MEDICINE

## 2022-12-21 PROCEDURE — 94664 DEMO&/EVAL PT USE INHALER: CPT

## 2022-12-21 PROCEDURE — 65270000046 HC RM TELEMETRY

## 2022-12-21 PROCEDURE — 85025 COMPLETE CBC W/AUTO DIFF WBC: CPT

## 2022-12-21 PROCEDURE — 99233 SBSQ HOSP IP/OBS HIGH 50: CPT | Performed by: INTERNAL MEDICINE

## 2022-12-21 PROCEDURE — 74011000250 HC RX REV CODE- 250: Performed by: STUDENT IN AN ORGANIZED HEALTH CARE EDUCATION/TRAINING PROGRAM

## 2022-12-21 PROCEDURE — 83880 ASSAY OF NATRIURETIC PEPTIDE: CPT

## 2022-12-21 PROCEDURE — 83735 ASSAY OF MAGNESIUM: CPT

## 2022-12-21 PROCEDURE — 36415 COLL VENOUS BLD VENIPUNCTURE: CPT

## 2022-12-21 PROCEDURE — 80053 COMPREHEN METABOLIC PANEL: CPT

## 2022-12-21 PROCEDURE — 74011250637 HC RX REV CODE- 250/637: Performed by: STUDENT IN AN ORGANIZED HEALTH CARE EDUCATION/TRAINING PROGRAM

## 2022-12-21 PROCEDURE — 74011000250 HC RX REV CODE- 250: Performed by: EMERGENCY MEDICINE

## 2022-12-21 PROCEDURE — 94640 AIRWAY INHALATION TREATMENT: CPT

## 2022-12-21 PROCEDURE — 85610 PROTHROMBIN TIME: CPT

## 2022-12-21 RX ORDER — GUAIFENESIN 100 MG/5ML
100 SOLUTION ORAL
Qty: 236 ML | Refills: 0 | Status: SHIPPED | OUTPATIENT
Start: 2022-12-21

## 2022-12-21 RX ORDER — IPRATROPIUM BROMIDE AND ALBUTEROL SULFATE 2.5; .5 MG/3ML; MG/3ML
3 SOLUTION RESPIRATORY (INHALATION)
Status: DISCONTINUED | OUTPATIENT
Start: 2022-12-22 | End: 2022-12-22 | Stop reason: HOSPADM

## 2022-12-21 RX ORDER — AMIODARONE HYDROCHLORIDE 200 MG/1
TABLET ORAL
Qty: 89 TABLET | Refills: 0 | Status: SHIPPED | OUTPATIENT
Start: 2022-12-21 | End: 2023-01-20

## 2022-12-21 RX ORDER — OSELTAMIVIR PHOSPHATE 75 MG/1
75 CAPSULE ORAL 2 TIMES DAILY
Qty: 3 CAPSULE | Refills: 0 | Status: SHIPPED | OUTPATIENT
Start: 2022-12-21 | End: 2022-12-23

## 2022-12-21 RX ORDER — METOPROLOL SUCCINATE 25 MG/1
25 TABLET, EXTENDED RELEASE ORAL 2 TIMES DAILY
Status: DISCONTINUED | OUTPATIENT
Start: 2022-12-21 | End: 2022-12-22 | Stop reason: HOSPADM

## 2022-12-21 RX ORDER — GUAIFENESIN 600 MG/1
600 TABLET, EXTENDED RELEASE ORAL EVERY 12 HOURS
Qty: 10 TABLET | Refills: 0 | Status: SHIPPED | OUTPATIENT
Start: 2022-12-21 | End: 2022-12-26

## 2022-12-21 RX ADMIN — SODIUM CHLORIDE, PRESERVATIVE FREE 10 ML: 5 INJECTION INTRAVENOUS at 21:05

## 2022-12-21 RX ADMIN — IPRATROPIUM BROMIDE AND ALBUTEROL SULFATE 3 ML: .5; 3 SOLUTION RESPIRATORY (INHALATION) at 21:13

## 2022-12-21 RX ADMIN — OSELTAMIVIR PHOSPHATE 75 MG: 75 CAPSULE ORAL at 18:44

## 2022-12-21 RX ADMIN — SODIUM CHLORIDE, PRESERVATIVE FREE 10 ML: 5 INJECTION INTRAVENOUS at 18:45

## 2022-12-21 RX ADMIN — AMIODARONE HYDROCHLORIDE 400 MG: 200 TABLET ORAL at 09:04

## 2022-12-21 RX ADMIN — APIXABAN 5 MG: 5 TABLET, FILM COATED ORAL at 18:45

## 2022-12-21 RX ADMIN — GUAIFENESIN 600 MG: 600 TABLET, EXTENDED RELEASE ORAL at 21:05

## 2022-12-21 RX ADMIN — IPRATROPIUM BROMIDE AND ALBUTEROL SULFATE 3 ML: .5; 3 SOLUTION RESPIRATORY (INHALATION) at 13:11

## 2022-12-21 RX ADMIN — AMIODARONE HYDROCHLORIDE 400 MG: 200 TABLET ORAL at 21:07

## 2022-12-21 RX ADMIN — SODIUM CHLORIDE, PRESERVATIVE FREE 10 ML: 5 INJECTION INTRAVENOUS at 07:33

## 2022-12-21 RX ADMIN — APIXABAN 5 MG: 5 TABLET, FILM COATED ORAL at 09:05

## 2022-12-21 RX ADMIN — IPRATROPIUM BROMIDE AND ALBUTEROL SULFATE 3 ML: .5; 3 SOLUTION RESPIRATORY (INHALATION) at 07:50

## 2022-12-21 RX ADMIN — ASPIRIN 81 MG: 81 TABLET, COATED ORAL at 09:05

## 2022-12-21 RX ADMIN — FLUOXETINE 40 MG: 20 CAPSULE ORAL at 09:05

## 2022-12-21 RX ADMIN — IPRATROPIUM BROMIDE AND ALBUTEROL SULFATE 3 ML: .5; 3 SOLUTION RESPIRATORY (INHALATION) at 00:05

## 2022-12-21 RX ADMIN — ZOLPIDEM TARTRATE 10 MG: 5 TABLET ORAL at 22:10

## 2022-12-21 RX ADMIN — OSELTAMIVIR PHOSPHATE 75 MG: 75 CAPSULE ORAL at 13:53

## 2022-12-21 RX ADMIN — GUAIFENESIN 600 MG: 600 TABLET, EXTENDED RELEASE ORAL at 09:05

## 2022-12-21 RX ADMIN — SENNOSIDES AND DOCUSATE SODIUM 1 TABLET: 50; 8.6 TABLET ORAL at 21:05

## 2022-12-21 RX ADMIN — AMIODARONE HYDROCHLORIDE 400 MG: 200 TABLET ORAL at 18:45

## 2022-12-21 RX ADMIN — FLUOXETINE 40 MG: 20 CAPSULE ORAL at 18:45

## 2022-12-21 RX ADMIN — FLUTICASONE PROPIONATE 2 SPRAY: 50 SPRAY, METERED NASAL at 09:11

## 2022-12-21 RX ADMIN — SACUBITRIL AND VALSARTAN 1 TABLET: 24; 26 TABLET, FILM COATED ORAL at 09:27

## 2022-12-21 RX ADMIN — POTASSIUM CHLORIDE 40 MEQ: 750 TABLET, FILM COATED, EXTENDED RELEASE ORAL at 09:05

## 2022-12-21 RX ADMIN — BUMETANIDE 2 MG: 0.25 INJECTION, SOLUTION INTRAMUSCULAR; INTRAVENOUS at 09:05

## 2022-12-21 NOTE — PROGRESS NOTES
Full note to follow  Back into AF RVR and low BP  Will stop bumex and entresto  Look dry now  Add back toprol BID 25 mg bid  Discuss repeat cardioversion vs ablation of AF if does not self convert on amiodarone tid  Cannot go home yet

## 2022-12-21 NOTE — PROGRESS NOTES
12/21/22 0006   Oxygen Therapy   O2 Sat (%) 90 %   Pulse via Oximetry 80 beats per minute   O2 Device None (Room air)   CPAP/BIPAP   CPAP/BIPAP Start/Stop Off  (pt stable on room air, not indicated at this time)   Procedures   $$ Subsequent Procedure Aerosol

## 2022-12-21 NOTE — PROGRESS NOTES
0200: pt oxygen desat to 86% while she slept. Placed her on 2L NC. Currently sating at 97%. Will continue to monitor.

## 2022-12-21 NOTE — PROGRESS NOTES
Occupational Therapy: Chart reviewed, team members consulted. Patient is reportedly completing all ADL and related mobility in her room with independence and on room air. OT services no longer indicated.  Completing order  TIFFANI Valerio/PORFIRIO

## 2022-12-21 NOTE — PROGRESS NOTES
TRANSFER - IN REPORT:    Verbal report received from Yana Fierro RN(name) on Esaw Massac  being received from CCU(unit) for routine progression of care      Report consisted of patients Situation, Background, Assessment and   Recommendations(SBAR). Information from the following report(s) SBAR, MAR, and Cardiac Rhythm NS  was reviewed with the receiving nurse. Opportunity for questions and clarification was provided. Assessment completed upon patients arrival to unit and care assumed.

## 2022-12-21 NOTE — PROGRESS NOTES
[address] [facility] Adult  Hospitalist Group                                                                                          Hospitalist Progress Note  Sukhi Herrera MD  Answering service: [phone] OR [phone] from in house phone        Date of Service:  2022  NAME:  Tee Ignacio  :  1950  MRN:  [id number]      Admission Summary:   Per ICU: 72F with ischCMP LVEF30% with ICD, CAD s/p AMRIT   Afib on Eliquis, h.o gastric bypass, h/o DVT presenting to SPED found to be in Afib RVR, started on diltiazem gtt. On arrival to [facility] Telemetry unit still in RVR, received Amio bolus -- became acute hypoxic and HTN, placed on bipap, 1mg Bumex administered. Moved to CCU for continued care. Patient of Dr Marcelo Piedra, recently started on Amio PO, as she had a moderate burden of asymptomatic afib on her recent icd interrogation at home while on her cardiazem.  - Flu A +, tamiflu started, on 5L NC  ICU requested to transfer the patient to medicine on 2022    Interval history / Subjective: The plan was for patient to go this morning, but she went back into AFib with RVR this afternoon. Discharge was cancelled.       Assessment & Plan:     Acute respiratory failure with hypoxia:  - multifactorial: pulmonary edema in the setting of acute on chronic systolic HF exacerbation and hypertensive urgency, AFib with RVR;   - initially on BIPAP;   - improved, on NC;   - continue addressing underlying issues;  - : tested positive for the Flu;     Hypertensive Emergency:  - BP now better controlled, continue current Rx;     Paroxysmal Atrial fibrillation with Rapid Ventricular Response:  - initially placed on Cardizem drip for rate control;   - Cardiology was consulted;  - : she received a bolus dose of IV Amiodarone, and was taken off the Cardizem drip, and started Amiodarone drip instead;   - s/p successful Cardioversion on ;   - transition to PO Amiodarone per Cardiology; - continue Eliquis for [facility];  - 12/21: initially planned for discharge to home, but went back into AFib with RVR this afternoon; BP is low; Bumex and Entresto held; Added back Toprol 25 mg BID; appreciate follow-up and recommendations by Dr. Lucita Saldaña;      Acute on chronic systolic HF, NYHA III:  - LVEF 25-30%;  - underlying CAD/ Ischemic cardiomyopathy;   - s/p AICD;   - continue current cardiac medications: Aspirin, Bumex;  - Entresto, Isordil: currently on hold because of low BP; resume as tolerated;   - BB: currently on hold, to avoid bradycardia and hypotension, as patient is already on Amiodarone;  - not on Aldosterone antagonist due to renal insufficiency;  - not on a statin due to allergy; Influenza A positive:   Continue Tamiflu  - wean off O2;  Add Mucinex;    LEMUEL:   - trend Creatinine; Code status: Full code  Prophylaxis: Eliquis  Care Plan discussed with: patient, RN  Anticipated Disposition:   Not medically stable for discharge at this time; Hospital Problems  Date Reviewed: 8/15/2022            Codes Class Noted POA    History of cardioversion ICD-10-CM: Z98.890  ICD-9-CM: V15.1  12/19/2022 Unknown        A-fib [facility]) ICD-10-CM: I48.91  ICD-9-CM: 427.31  12/17/2022 Unknown         Review of Systems:   A comprehensive review of systems was negative except for that written in the HPI. Vital Signs:    Last 24hrs VS reviewed since prior progress note.  Most recent are:  Visit Vitals  /76 (BP 1 Location: Left upper arm, BP Patient Position: At rest)   Pulse (!) 109   Temp 98.5 °F (36.9 °C)   Resp 18   Ht 5' 4\" (1.626 m)   Wt 103.1 kg (227 lb 4.7 oz)   SpO2 93%   BMI 39.01 kg/m²         Intake/Output Summary (Last 24 hours) at 12/21/2022 1525  Last data filed at 12/20/2022 1909  Gross per 24 hour   Intake 300 ml   Output 500 ml   Net -200 ml          Physical Examination:     I had a face to face encounter with this patient and independently examined them on 12/21/2022 as outlined below:          Constitutional:  No acute distress, cooperative, pleasant    ENT:  Oral mucosa moist, oropharynx benign. Resp:  Coarse bilaterally. No wheezing/rhonchi/rales. No accessory muscle use. CV:  IRIR, tachy,  no gallops, rubs    GI:  Soft, non distended, non tender. normoactive bowel sounds, no hepatosplenomegaly, obese    Musculoskeletal:  No edema, warm, 2+ pulses throughout    Neurologic:  Moves all extremities. AAOx3, CN II-XII reviewed            Data Review:    Review and/or order of clinical lab test      Labs:     Recent Labs     12/21/22  0745 12/20/22  0621   WBC 6.5 7.3   HGB 10.3* 9.7*   HCT 33.5* 32.1*    205       Recent Labs     12/21/22  0745 12/20/22  0621 12/20/22  0503 12/19/22  0319   * 136  --  135*   K 3.8 3.2*  --  3.2*   CL 99 100  --  101   CO2 28 29  --  25   BUN 21* 20  --  26*   CREA 0.93 0.95  --  1.22*   GLU 86 101*  --  183*   CA 8.6 8.9  --  8.6   MG 1.7 1.9 1.8  --    PHOS  --  3.3  --   --        Recent Labs     12/21/22  0745 12/20/22  0621 12/19/22  0319   ALT 39 48 58   AP 62 58 70   TBILI 0.6 0.5 0.5   TP 6.3* 6.3* 6.2*   ALB 2.8* 3.2* 3.5   GLOB 3.5 3.1 2.7       Recent Labs     12/21/22  0745 12/19/22  0318   INR 1.1 1.3*   PTP 11.8* 13.0*        No results for input(s): FE, TIBC, PSAT, FERR in the last 72 hours. Lab Results   Component Value Date/Time    Folate 5.8 08/10/2020 11:28 AM        No results for input(s): PH, PCO2, PO2 in the last 72 hours. No results for input(s): CPK, CKNDX, TROIQ in the last 72 hours.     No lab exists for component: CPKMB  Lab Results   Component Value Date/Time    Cholesterol, total 220 (H) 11/28/2022 10:48 AM    HDL Cholesterol 72 11/28/2022 10:48 AM    LDL, calculated 130 (H) 11/28/2022 10:48 AM    LDL, calculated 49 08/10/2020 11:28 AM    Triglyceride 102 11/28/2022 10:48 AM     Lab Results   Component Value Date/Time    Glucose (POC) 167 (H) 11/26/2011 09:50 PM    Glucose (POC) 91 04/07/2010 06:25 AM Glucose (POC) 129 (H) 04/06/2010 11:42 PM    Glucose (POC) 127 (H) 04/06/2010 06:18 PM     Lab Results   Component Value Date/Time    Color YELLOW 04/12/2010 12:05 PM    Appearance CLEAR 04/12/2010 12:05 PM    Specific gravity 1.019 04/12/2010 12:05 PM    pH (UA) 6.5 04/12/2010 12:05 PM    Protein NEGATIVE 04/12/2010 12:05 PM    Glucose NEGATIVE 04/12/2010 12:05 PM    Ketone 15 (A) 04/12/2010 12:05 PM    Bilirubin NEGATIVE 03/22/2010 05:10 PM    Urobilinogen 1.0 04/12/2010 12:05 PM    Nitrites NEGATIVE 04/12/2010 12:05 PM    Leukocyte Esterase NEGATIVE 04/12/2010 12:05 PM    Epithelial cells 0-5 04/12/2010 12:05 PM    Bacteria NEGATIVE 04/12/2010 12:05 PM    WBC 0-4 04/12/2010 12:05 PM    RBC 0-3 04/12/2010 12:05 PM         Medications Reviewed:     Current Facility-Administered Medications   Medication Dose Route Frequency    metoprolol succinate (TOPROL-XL) XL tablet 25 mg  25 mg Oral BID    oseltamivir (TAMIFLU) capsule 75 mg  75 mg Oral BID    guaiFENesin ER (MUCINEX) tablet 600 mg  600 mg Oral Q12H    guaiFENesin (ROBITUSSIN) 100 mg/5 mL oral liquid 100 mg  100 mg Oral Q4H PRN    amiodarone (CORDARONE) tablet 400 mg  400 mg Oral TID    zolpidem (AMBIEN) tablet 10 mg  10 mg Oral QHS PRN    aspirin delayed-release tablet 81 mg  81 mg Oral DAILY    FLUoxetine (PROzac) capsule 40 mg  40 mg Oral BID    fluticasone propionate (FLONASE) 50 mcg/actuation nasal spray 2 Spray  2 Spray Both Nostrils DAILY    melatonin tablet 3 mg  3 mg Oral QHS PRN    sodium chloride (NS) flush 5-40 mL  5-40 mL IntraVENous Q8H    sodium chloride (NS) flush 5-40 mL  5-40 mL IntraVENous PRN    sodium chloride (NS) flush 5-40 mL  5-40 mL IntraVENous PRN    acetaminophen (TYLENOL) tablet 650 mg  650 mg Oral Q6H PRN    Or    acetaminophen (TYLENOL) suppository 650 mg  650 mg Rectal Q6H PRN    polyethylene glycol (MIRALAX) packet 17 g  17 g Oral DAILY PRN    ondansetron (ZOFRAN ODT) tablet 4 mg  4 mg Oral Q8H PRN    Or    ondansetron (ZOFRAN) injection 4 mg  4 mg IntraVENous Q6H PRN    apixaban (ELIQUIS) tablet 5 mg  5 mg Oral BID    polyethylene glycol (MIRALAX) packet 17 g  17 g Oral DAILY    senna-docusate (PERICOLACE) 8.6-50 mg per tablet 1 Tablet  1 Tablet Oral BID    magnesium hydroxide (MILK OF MAGNESIA) 400 mg/5 mL oral suspension 30 mL  30 mL Oral DAILY PRN    albuterol-ipratropium (DUO-NEB) 2.5 MG-0.5 MG/3 ML  3 mL Nebulization Q6H RT     ______________________________________________________________________  EXPECTED LENGTH OF STAY: 3d 21h  ACTUAL LENGTH OF STAY:          4                 Lori Tolbert MD

## 2022-12-21 NOTE — PROGRESS NOTES
Transition Plan of Care  RUR 12%-Low  Disposition-plan was to discharge today. Was seen by Dr Maximiliano Briseno and patient is back in AF RVR. She will not discharge today. Possible repeat cardioversion vs ablation of AF if she does not self convert on po medication. [facility] will provide home care services. Medicare pt has received, reviewed, and signed 2nd IM letter informing them of their right to appeal the discharge. Signed copy has been placed on pt bedside chart.

## 2022-12-21 NOTE — PROGRESS NOTES
[facility]   Cardiac Electrophysiology Care Note                [x ]Established visit     Patient Name: Hal Coffey - INV:[id number]   Primary Cardiologist: Edgar Levy MD   Consulting Cardiologist: Edgar Levy MD     Reason for initial visit: rapid afib     HPI:     She is a morbidly obese 81 y.o. female who was cardioverted  to NSR from AF RVR  She was on amiodarone IV and now loaded with PO amiodarone  She is  back in AF RVR despite PO amiodarone and BP is low side  She however does not feel short of breaths right now    She has hx of non ischemic cardiomyopathy and S AICD, paroxysmal A. fib, hypertension, and coronary artery disease. Her most recent heart catheterization was in late September at [facility] and I have reviewed and summarized that study below. Earlier in the week she had some nasal congestion and a mild cough but no real fever. 2 days ago she actually felt fairly good and had an office visit with Dr. Rosie Christian. Her her vitals were stable and she was not hypoxic and she was found to be in A. fib with a heart rate of about 100 bpm.  Her device was interrogated and it turned out that she has been in A. fib about half the time since it was last checked. She was started on amiodarone in addition to her Cardizem with the thought that if she did not convert on her own she be cardioverted on drug and if that failed she would be offered an AV node ablation with BIV ICD. Yesterday she began to feel bad with fatigue shortness of breath intermittently productive cough body aches and a headache. Her grandson took her to urgent care this morning and her COVID test was negative at [place]. She still was not feeling well so she came to the emergency room here where she was noted to be hypoxic and in rapid A. fib. Her flu test was negative. She was given a milligram of IV Bumex and started on a Cardizem drip.   When she arrived to the floor she was given a bolus of amiodarone and her Cardizem drip was stopped. She is taken no of her usual p.o. meds today. Shortly after all this she became more tachypneic and hypoxic and she was put back on BiPAP and at the time I saw her her heart rate was about 110 her oxygen saturations were 97% and her blood pressure was 170/100. Influenza screen positive on repeat here at [facility]. Interim:     Remains in NSR    Echo on 12/18/2022 showed LVEF 25-30% with mod LA dilation & mod to severe MR.       OFF O2 NC this am       ECHO ADULT COMPLETE 12/18/2022 12/18/2022    Interpretation Summary    Left Ventricle: Severely reduced left ventricular systolic function with a visually estimated EF of 25 - 30%. Left ventricle is moderately dilated. Normal wall thickness. Severe global hypokinesis present. Left Atrium: Left atrium is moderately dilated. Aortic Valve: Mildly calcified cusp. Mild stenosis of the aortic valve. AV mean gradient is 13 mmHg. Mitral Valve: Moderate to severe regurgitation. Tricuspid Valve: Mild regurgitation. The estimated RVSP is 46 mmHg. Contrast used: Definity.     Signed by: Jody Corrales MD on 12/18/2022 11:39 AM       Assessment and Plan       Persistent AF with RVR: Rates 110s-120s despite amiodarone IV gtt  DCCV POD 2  400 mg tid po amiodarone 10 days and then cut down to 200 mg bid for 2 weeks and then 200 mg every day to maintain NSR  Stop diltiazem PO  She is already back in AF and RVR  BP does not give room for rate control  She looks dry now  Will stop bumex and entresto  Add back toprol BID 25 mg bid  Discuss repeat cardioversion vs ablation of AF if does not self convert on amiodarone tid  Cannot go home yet until V rate is controlled    I recommend ablation of AF and try to get her off long term amiodarone use  Because of the holidays EP lab does not have room to add her on for ablation this week  Will work on getting her rate or rhythm control with meds for now    NICMP- no more edema  Bp is not high so stop entresto to maximize AFIB rate control again      Future Appointments   Date Time Provider Denis Burksi   12/29/2022  9:40 AM MD RANDOLPH Gandhi BS AMB   2/7/2023 10:30 AM Shivani Villegas NP [facility] BS AMB   2/21/2023 10:00 AM Mojgan Catalan Chloe,  [address] BS AMB   3/15/2023  9:00 AM REMOTE1, JOSE ALBERTO DICKSON BS AMB   12/14/2023  2:00 PM PACEMAKER3, JOSE ALBERTO DICKSON BS AMB   12/14/2023  2:20 PM MD RANDOLPH Yanez BS AMB         LVEF reduced to 25%, had been 30-35% in 2021. Interstitial pulmonary edema noted on CXR during admission. Suspect decompensation is driven by underlying influenza infection & AF with RVR. Mar Quinteros had been to 97/103 (prev not tolerated due to dizziness)   she had hair loss on coreg and bystolic   -off spironolactone due to hypotension)    Usually followed up on CHF with Dr Maria Teresa Bar    I manage her S ICD in office. Transvenous leads dislodged many times in the past so no BIV ICD and AV node ablation as option    Anticoagulation: Continue Eliquis for embolic CVA prophylaxis. Denies bleeding issues. Shaheed Martínez M.D. Dionne Dentonyers  Electrophysiology/Cardiology  [facility]  [address] [place] [address]                              [phone]                                          ____________________________________________________________     Cardiac testing     Received records from admission at [facility].       ECG (09/22/2022) showed AF with controlled ventricular rate. RHC/LHC (09/28/2022): LVEDP 15-20 mmHg. LM with MLI. LCx with mild diffuse disease;  in OM with left to left & right to left collaterals. LAD with prior stent with AUBREY-3 flow; otherwise MLI. RCA with very mild ISR in proximal segment, otherwise MLI.        12/17/22     ECHO ADULT COMPLETE 12/18/2022 12/18/2022     Interpretation Summary   ·  Left Ventricle: Severely reduced left ventricular systolic function with a visually estimated EF of 25 - 30%. Left ventricle is moderately dilated. Normal wall thickness. Severe global hypokinesis present. ·  Left Atrium: Left atrium is moderately dilated. ·  Aortic Valve: Mildly calcified cusp. Mild stenosis of the aortic valve. AV mean gradient is 13 mmHg. ·  Mitral Valve: Moderate to severe regurgitation. ·  Tricuspid Valve: Mild regurgitation. The estimated RVSP is 46 mmHg. ·  Contrast used: Definity. Signed by: Emma Russ MD on 12/18/2022 11:39 AM         NUCLEAR CARDIAC STRESS TEST 08/13/2021 8/16/2021     Interpretation Summary   · SPECT: Left ventricular function post-stress was abnormal. Calculated ejection fraction is 22%. There is no evidence of transient ischemic dilation (TID). The TID ratio is 0.86. · Baseline ECG: Normal sinus rhythm. · SPECT: Myocardial perfusion imaging defect 1: There is a defect that is large in size with a severe reduction in uptake present in the lateral location(s) that is partially reversible. There is abnormal wall motion in the defect area. The defect appears to be infarction with esther-infarct ischemia. Perfusion defect was visually and quantitatively present. · SPECT: Left ventricular perfusion is probably abnormal. Myocardial perfusion imaging supports a high risk stress test.     Signed by: Jayson Argueta MD on 8/13/2021  8:26 AM     08/13/21     CARDIAC PROCEDURE 08/13/2021 8/13/2021     Conclusion   Findings:   1)Normal LVEDP   2)Severe native 1 vessel CAD with  of ramus intermedius. LCx  Is small with occluded distal LCx. OM2 and distal ramus fill from collaterals from LAD, diagonal and RCA. 3)Limited wire probing suggests no micro channel in Ramus ISR . Proximal cap start before the stent     Access: Right radial no issues   Contrast 40 cc     Recommendations   1)Continue GDMT and weight loss. If dyspnea continue may consider Ramus  PCI.  Uncertain if benefit will be substantial.   2)GDMT for CAD     Signed by: Vladimir Romeo MD on 8/13/2021 10:34 AM         Most recent HS troponins:   Recent Labs     12/17/22   1703 12/17/22   1317 12/17/22   0938   TROPHS 56* 31 22     ECG: atrial fibrillation, rate 110   Review of Systems     [x]All other systems reviewed and all negative except as written in HPI     [ ] Patient unable to provide secondary to condition           Past Medical History:   Diagnosis Date   · Acute right ankle pain 06/08/2018 5/29/18: X-ray showed possible right ankle sprain. 6/6/18: saw Dr. Madhav Stock ([facility]), diagnosed with peroneal tendonitis. Prescribed an ASO   · Asthma   · Atrial fibrillation (HCC)   · Cardiomyopathy ([facility]   · Coronary artery disease 2008   s/p RCA stent ([facility]) on 11/26/11   · Depression with anxiety   2011   · DVT (deep venous thrombosis) ([facility] 07/27/2010   · Family history of early CAD   · GERD (gastroesophageal reflux disease)   · H/O gastric bypass 2006   Revision in 2009   · Hepatitis C antibody test positive   Does not have chronic hep C (labs 10/6/15: neg HCV RNA)   · HTN (hypertension) 07/27/2010   · Hyperlipidemia 07/27/2010   · Incontinence of feces 09/03/2018   Dr. Maurisio Howard. 8/20/18: Improving with pelvic physical therapy and psyllium husk treatment. Saw Dr. Domonique Bardales who suggested PT first. MR defecography showed pelvic floor weakness with pelvic descensus, small anterior  Rectocele, and vaginal prolapse. To have pelvic PT weekly for 8 wks and to see Dr. Domonique Bardales again in Oct 2018.    · Joint pain 07/27/2010   · MI (myocardial infarction) ([facility] 07/27/2010   · Mitral valve regurgitation   Mild to moderate   · Morbid obesity ([facility] 07/27/2010   · Myocardial infarction [facility]) 2006 and 2011   Dr. Omar Palacios   · Psychiatric disorder   · PUD (peptic ulcer disease)   gi bleed 2008; ulcer n gastric bypass pouch   · Urinary incontinence, stress 07/27/2010     Past Surgical History:   Procedure Laterality Date   · COLONOSCOPY N/A 6/29/2018   COLONOSCOPY performed by Shawn Beaver MD at [facility] ENDOSCOPY; redundant colon, int hemorrhoids, pathology: normal colonic mucosa   · HX BREAST BIOPSY Left   benign   · HX BREAST LUMPECTOMY Right 7/14/2022   RIGHT BREAST LUMPECTOMY WITH ULTRASOUND performed by Agustin Reyes MD at [facility] 11   · HX COLONOSCOPY 9/5/14   Dr. Ghassan Kinsey; normal, repeat in 5 yrs   · HX GASTRIC BYPASS   · HX IMPLANTABLE CARDIOVERTER DEFIBRILLATOR 08/24/2016   · HX LAP GASTRIC BYPASS 2006   revision 2009/Dr. Vences Sic   · HX OTHER SURGICAL 09/19/2016   Removal of right ventricular ICD lead & single chamber transvenous AICD   · HX OTHER SURGICAL 10/12/2016   pocket revison of ICD; Dr. Lito Tracey   · HX OTHER SURGICAL 06/07/2018   Dr Fredy Feldman; high resolution anorectal manaometry-abnormal study. Study done for eval of fecal incontinence   · HX OTHER SURGICAL 12/14/2018   Dr. Fredy Feldman. Rectal endoscopic US (EUS) for rectal incontinence. Normal rectal mucosa, no fistulas. · HX PACEMAKER   sicd/left side of chest under arm   · INS PPM/ICD LED SING ONLY 8/24/2016     · INS PPM/ICD LED SING ONLY 8/26/2016     · INS PPM/ICD LED SING ONLY 9/21/2016     · NH CARDIAC SURG PROCEDURE UNLIST   Stent x 5-6,Most recent 2011   · NH LAP,STOMACH,OTHER,W/O TUBE 04/05/2010   Revision GBP     Social Hx:  reports that she quit smoking about 18 years ago. Her smoking use included cigarettes. She started smoking about 53 years ago. She has never used smokeless tobacco. She reports that she does not drink alcohol and does not use drugs. Family Hx: family history includes Alzheimer's Disease in her mother; Cancer in her father and mother; Dementia in her mother; Heart Attack in her brother; Heart Disease in her father and sister; Hypertension in her father; Stroke in her sister and sister.    Allergies   Allergen Reactions   · Amoxicillin Anaphylaxis   · Amoxicillin Anaphylaxis   · Lipitor [Atorvastatin] Other (comments)   Severe muscle pain and spasms   · Zocor [Simvastatin] Other (comments)   Cramps, muscle spasms   · Buspar [Buspirone] Other (comments)   Drunk sensation, headaches   · Hydroxyzine Other (comments)   Blurred vision, lightheadedness   · Livalo [Pitavastatin] Myalgia   · Pravastatin Other (comments)   Leg cramps   · Tramadol Vertigo            OBJECTIVE:   Wt Readings from Last 3 Encounters:   12/19/22 229 lb 0.9 oz (103.9 kg)   12/15/22 229 lb (103.9 kg)   11/28/22 223 lb 6.4 oz (101.3 kg)       Intake/Output Summary (Last 24 hours) at 12/19/2022 0836   Last data filed at 12/19/2022 0700   Gross per 24 hour   Intake 560 ml   Output 900 ml   Net -340 ml       Physical Exam   Visit Vitals  /76 (BP 1 Location: Left upper arm, BP Patient Position: At rest)   Pulse (!) 109   Temp 98.5 °F (36.9 °C)   Resp 18   Ht 5' 4\" (1.626 m)   Wt 227 lb 4.7 oz (103.1 kg)   SpO2 93%   BMI 39.01 kg/m²       Telemetry: NSR    Neck: supple, no JVD     General:  Alert, cooperative, no distress, appears stated age. Neck:  Supple, no carotid bruit and no JVD. HEENT: mouth is dry  Lungs: clear and no accessory muscle use  Heart:  irregular rhythm rapid rate  Abdomen:    Soft, non-tender. Bowel sounds normal. Morbidly obese   Extremities: no edema. Vascular:  Pulses - 2+   Skin:  Skin color normal. No rashes    Neurologic:  Alert, Moves all extremities. Data Review:     Radiology:   XR Results (most recent):   Results from Hospital Encounter encounter on 12/17/22     XR CHEST PORT     Narrative   EXAM: Portable CXR. 1620 hours. COMPARISON: 0912 hours. INDICATION: chest pain     FINDINGS:   Unchanged interstitial pulmonary edema. No focal consolidation. Borderline   cardiomegaly. Subcutaneous ICD. No pneumothorax, midline shift or pleural   effusion. Impression   Stable interstitial pulmonary edema.      CT Results (most recent):   Results from [place] encounter on 02/23/17     CT SPINE CERV WO CONT     Narrative EXAM:  CT CERVICAL SPINE WITHOUT CONTRAST     INDICATION: Ground-level fall this morning. COMPARISON: None. TECHNIQUE: Multislice helical CT of the cervical spine was performed without   intravenous contrast administration. Sagittal and coronal reconstructions were   generated. CT dose reduction was achieved through use of a standardized   protocol tailored for this examination and automatic exposure control for dose   modulation. Adaptive statistical iterative reconstruction (ASIR) was utilized. CONTRAST: None. FINDINGS:     The alignment is within normal limits. There is no fracture or subluxation. There is degenerative disc narrowing and marginal osteophyte formation at the   C5-6 and C6-7 levels. The odontoid process is intact. The craniocervical   junction is within normal limits. The prevertebral soft tissues are within   normal limits. There is no spinal canal or neural foraminal stenosis. The   surrounding soft tissue and musculoskeletal structures appear unremarkable. The   visualized lung apices are unremarkable. Impression   IMPRESSION: No acute findings. MRI Results (most recent):   Results from [place] encounter on 07/02/18     MRI PELVIC FLOOR STUDY     Narrative   EXAM: MR PELVIC FLOOR   INDICATION: Full incontinence of feces. CONTRAST: Sterile ultrasound gel was placed into the vagina and rectum. No   intravenous contrast was administered. Oral Volumen was administered. TECHNIQUE: MR of the pelvis was performed according to standard departmental   protocol. The patient voided prior to imaging. Following three plane localizer   images, sagittal, axial and coronal breath-hold T2 (HASTE) images as well as   breath-hold axial T1 in and out of phase images were acquired.  Static and   dynamic MR of the pelvic floor was performed using static sagittal axial and   coronal high-resolution T2 weighted images followed by dynamic sagittal midline   SSFSE images at rest, during squeezing (Kegel maneuver), during straining   (Valsalva maneuver) and during defecation. FINDINGS:   H line = width of the levator hiatus   M line = descent of the levator hiatus     At rest:   H line measures 5.7 cm   M line measures 23 mm. The anorectal angle measures 131 degrees. (Normal 108 to 127 degrees.)     With squeezing:   H line measures 4.7 cm   M line measures 8 mm. The anorectal angle measures 112 degrees. With straining:   H line measures 5.5 cm   M line measures 5 mm. The anorectal angle measures 121 degrees. With evacuation:   H line measures 5.3 cm   M line measures 4.5 mm. The anorectal angle measures 140 degrees. The puborectalis sling is intact. There is there is elevation of the pelvic   floor with squeezing and pelvic floor weakness with abnormal descent of the   pelvic floor with straining and evacuation. POSTERIOR COMPARTMENT: There is pelvic floor weakness with pelvic descensus with   an anterior rectocele and the patient is unable to fully evacuate the rectum. There is no rectal intussusception or navin rectal prolapse. ANTERIOR COMPARTMENT: There is minimal descent of the bladder neck below the   sacrococcygeal line. MIDDLE COMPARTMENT: The uterus is absent. There is prolapse of the vagina below   the sacrococcygeal line. Impression   IMPRESSION: Pelvic floor weakness with pelvic descensus. There is a small   anterior rectocele and prolapse of the vagina below the sacrococcygeal line. The   patient is unable to fully evacuate the rectum.          Recent Labs   12/17/22   LFT normal  TSH 2.81         Allergies   Allergen Reactions    Amoxicillin Anaphylaxis    Amoxicillin Anaphylaxis    Lipitor [Atorvastatin] Other (comments)     Severe muscle pain and spasms    Zocor [Simvastatin] Other (comments)     Cramps, muscle spasms    Buspar [Buspirone] Other (comments)     Drunk sensation, headaches    Hydroxyzine Other (comments)     Blurred vision, lightheadedness    Livalo [Pitavastatin] Myalgia    Pravastatin Other (comments)     Leg cramps    Tramadol Vertigo          Current Facility-Administered Medications   Medication Dose Route Frequency    metoprolol succinate (TOPROL-XL) XL tablet 25 mg  25 mg Oral BID    oseltamivir (TAMIFLU) capsule 75 mg  75 mg Oral BID    guaiFENesin ER (MUCINEX) tablet 600 mg  600 mg Oral Q12H    guaiFENesin (ROBITUSSIN) 100 mg/5 mL oral liquid 100 mg  100 mg Oral Q4H PRN    amiodarone (CORDARONE) tablet 400 mg  400 mg Oral TID    zolpidem (AMBIEN) tablet 10 mg  10 mg Oral QHS PRN    aspirin delayed-release tablet 81 mg  81 mg Oral DAILY    FLUoxetine (PROzac) capsule 40 mg  40 mg Oral BID    fluticasone propionate (FLONASE) 50 mcg/actuation nasal spray 2 Spray  2 Spray Both Nostrils DAILY    melatonin tablet 3 mg  3 mg Oral QHS PRN    sodium chloride (NS) flush 5-40 mL  5-40 mL IntraVENous Q8H    sodium chloride (NS) flush 5-40 mL  5-40 mL IntraVENous PRN    sodium chloride (NS) flush 5-40 mL  5-40 mL IntraVENous PRN    acetaminophen (TYLENOL) tablet 650 mg  650 mg Oral Q6H PRN    Or    acetaminophen (TYLENOL) suppository 650 mg  650 mg Rectal Q6H PRN    polyethylene glycol (MIRALAX) packet 17 g  17 g Oral DAILY PRN    ondansetron (ZOFRAN ODT) tablet 4 mg  4 mg Oral Q8H PRN    Or    ondansetron (ZOFRAN) injection 4 mg  4 mg IntraVENous Q6H PRN    apixaban (ELIQUIS) tablet 5 mg  5 mg Oral BID    polyethylene glycol (MIRALAX) packet 17 g  17 g Oral DAILY    senna-docusate (PERICOLACE) 8.6-50 mg per tablet 1 Tablet  1 Tablet Oral BID    magnesium hydroxide (MILK OF MAGNESIA) 400 mg/5 mL oral suspension 30 mL  30 mL Oral DAILY PRN    albuterol-ipratropium (DUO-NEB) 2.5 MG-0.5 MG/3 ML  3 mL Nebulization Q6H RT           Torrie Atkins M.D.    Electrophysiology/Cardiology   [facility] [place]   [address] [place] [address]                                [phone] Juanita Johnson MD

## 2022-12-21 NOTE — PROGRESS NOTES
Bedside shift change report given to [address] [place] [address] (oncoming nurse) by Leandra Mcfarland RN (offgoing nurse). Report included the following information SBAR and MAR.

## 2022-12-21 NOTE — PROGRESS NOTES
Will make follow up appt after ECG check 12/29 in office with nurse and possible Dr Mavis Pierre will review as needed  Will discuss ablation with her  I also had informed Dr Neil Escobar that she is admitted with AF and I did cardioversion and load her on amiodarone PO    Future Appointments   Date Time Provider Denis Hurst   12/29/2022  9:40 AM MD RANDOLPH Plata BS AMB   2/7/2023 10:30 AM Kehinde Rogers NP [facility] BS AMB   2/21/2023 10:00 AM Inge Hernandez,  [address] BS AMB   3/15/2023  9:00 AM REMOTE1, JOSE ALBERTO DICKSON BS AMB   12/14/2023  2:00 PM PACEMAKER3, JOSE ALBERTO DICKSON BS AMB   12/14/2023  2:20 PM MD RANDOLPH Arvizu BS AMB

## 2022-12-22 VITALS
SYSTOLIC BLOOD PRESSURE: 107 MMHG | OXYGEN SATURATION: 96 % | HEART RATE: 105 BPM | DIASTOLIC BLOOD PRESSURE: 77 MMHG | WEIGHT: 223.5 LBS | RESPIRATION RATE: 13 BRPM | BODY MASS INDEX: 38.16 KG/M2 | TEMPERATURE: 98.1 F | HEIGHT: 64 IN

## 2022-12-22 LAB
ANION GAP SERPL CALC-SCNC: 8 MMOL/L (ref 5–15)
BASOPHILS # BLD: 0 K/UL (ref 0–0.1)
BASOPHILS NFR BLD: 0 % (ref 0–1)
BUN SERPL-MCNC: 21 MG/DL (ref 6–20)
BUN/CREAT SERPL: 24 (ref 12–20)
CALCIUM SERPL-MCNC: 8.9 MG/DL (ref 8.5–10.1)
CHLORIDE SERPL-SCNC: 100 MMOL/L (ref 97–108)
CO2 SERPL-SCNC: 28 MMOL/L (ref 21–32)
CREAT SERPL-MCNC: 0.88 MG/DL (ref 0.55–1.02)
DIFFERENTIAL METHOD BLD: ABNORMAL
EOSINOPHIL # BLD: 0.1 K/UL (ref 0–0.4)
EOSINOPHIL NFR BLD: 1 % (ref 0–7)
ERYTHROCYTE [DISTWIDTH] IN BLOOD BY AUTOMATED COUNT: 16.4 % (ref 11.5–14.5)
GLUCOSE SERPL-MCNC: 99 MG/DL (ref 65–100)
HCT VFR BLD AUTO: 33.6 % (ref 35–47)
HGB BLD-MCNC: 10 G/DL (ref 11.5–16)
IMM GRANULOCYTES # BLD AUTO: 0 K/UL (ref 0–0.04)
IMM GRANULOCYTES NFR BLD AUTO: 0 % (ref 0–0.5)
INR PPP: 1.2 (ref 0.9–1.1)
LYMPHOCYTES # BLD: 1.6 K/UL (ref 0.8–3.5)
LYMPHOCYTES NFR BLD: 22 % (ref 12–49)
MAGNESIUM SERPL-MCNC: 1.9 MG/DL (ref 1.6–2.4)
MCH RBC QN AUTO: 23.9 PG (ref 26–34)
MCHC RBC AUTO-ENTMCNC: 29.8 G/DL (ref 30–36.5)
MCV RBC AUTO: 80.2 FL (ref 80–99)
MONOCYTES # BLD: 0.7 K/UL (ref 0–1)
MONOCYTES NFR BLD: 10 % (ref 5–13)
NEUTS SEG # BLD: 4.7 K/UL (ref 1.8–8)
NEUTS SEG NFR BLD: 67 % (ref 32–75)
NRBC # BLD: 0 K/UL (ref 0–0.01)
NRBC BLD-RTO: 0 PER 100 WBC
PHOSPHATE SERPL-MCNC: 3.9 MG/DL (ref 2.6–4.7)
PLATELET # BLD AUTO: 210 K/UL (ref 150–400)
PMV BLD AUTO: 12.8 FL (ref 8.9–12.9)
POTASSIUM SERPL-SCNC: 3.1 MMOL/L (ref 3.5–5.1)
PROTHROMBIN TIME: 12.3 SEC (ref 9–11.1)
RBC # BLD AUTO: 4.19 M/UL (ref 3.8–5.2)
SODIUM SERPL-SCNC: 136 MMOL/L (ref 136–145)
WBC # BLD AUTO: 7.1 K/UL (ref 3.6–11)

## 2022-12-22 PROCEDURE — 99233 SBSQ HOSP IP/OBS HIGH 50: CPT | Performed by: INTERNAL MEDICINE

## 2022-12-22 PROCEDURE — 74011250637 HC RX REV CODE- 250/637: Performed by: STUDENT IN AN ORGANIZED HEALTH CARE EDUCATION/TRAINING PROGRAM

## 2022-12-22 PROCEDURE — 80048 BASIC METABOLIC PNL TOTAL CA: CPT

## 2022-12-22 PROCEDURE — 74011000250 HC RX REV CODE- 250: Performed by: EMERGENCY MEDICINE

## 2022-12-22 PROCEDURE — 74011250637 HC RX REV CODE- 250/637: Performed by: INTERNAL MEDICINE

## 2022-12-22 PROCEDURE — 85610 PROTHROMBIN TIME: CPT

## 2022-12-22 PROCEDURE — 74011000250 HC RX REV CODE- 250: Performed by: INTERNAL MEDICINE

## 2022-12-22 PROCEDURE — 84100 ASSAY OF PHOSPHORUS: CPT

## 2022-12-22 PROCEDURE — 85025 COMPLETE CBC W/AUTO DIFF WBC: CPT

## 2022-12-22 PROCEDURE — 83735 ASSAY OF MAGNESIUM: CPT

## 2022-12-22 PROCEDURE — 36415 COLL VENOUS BLD VENIPUNCTURE: CPT

## 2022-12-22 RX ORDER — BUMETANIDE 1 MG/1
1 TABLET ORAL DAILY
Qty: 30 TABLET | Refills: 0 | Status: SHIPPED | OUTPATIENT
Start: 2022-12-22

## 2022-12-22 RX ORDER — POTASSIUM CHLORIDE 750 MG/1
40 TABLET, FILM COATED, EXTENDED RELEASE ORAL
Status: COMPLETED | OUTPATIENT
Start: 2022-12-22 | End: 2022-12-22

## 2022-12-22 RX ADMIN — FLUOXETINE 40 MG: 20 CAPSULE ORAL at 08:36

## 2022-12-22 RX ADMIN — APIXABAN 5 MG: 5 TABLET, FILM COATED ORAL at 08:36

## 2022-12-22 RX ADMIN — POTASSIUM CHLORIDE 40 MEQ: 750 TABLET, FILM COATED, EXTENDED RELEASE ORAL at 08:39

## 2022-12-22 RX ADMIN — METOPROLOL SUCCINATE 25 MG: 25 TABLET, EXTENDED RELEASE ORAL at 08:37

## 2022-12-22 RX ADMIN — AMIODARONE HYDROCHLORIDE 400 MG: 200 TABLET ORAL at 08:36

## 2022-12-22 RX ADMIN — SODIUM CHLORIDE, PRESERVATIVE FREE 10 ML: 5 INJECTION INTRAVENOUS at 06:27

## 2022-12-22 RX ADMIN — ASPIRIN 81 MG: 81 TABLET, COATED ORAL at 08:36

## 2022-12-22 RX ADMIN — OSELTAMIVIR PHOSPHATE 75 MG: 75 CAPSULE ORAL at 08:39

## 2022-12-22 RX ADMIN — GUAIFENESIN 600 MG: 600 TABLET, EXTENDED RELEASE ORAL at 08:36

## 2022-12-22 RX ADMIN — IPRATROPIUM BROMIDE AND ALBUTEROL SULFATE 3 ML: .5; 3 SOLUTION RESPIRATORY (INHALATION) at 08:39

## 2022-12-22 NOTE — PROGRESS NOTES
[facility]   Cardiac Electrophysiology Care Note                [x ]Established visit     Patient Name: Aundrea España - AKD:[id number]   Primary Cardiologist: Angela Lanes, MD   Consulting Cardiologist: Angela Lanes, MD     Reason for initial visit: rapid afib     HPI:     She is a morbidly obese 81 y.o. female who was cardioverted  to NSR from AF RVR  She was on amiodarone IV and now loaded with PO amiodarone  She is  back in AF RVR despite PO amiodarone and BP is low side so I had to stop entresto again to use toprol  She however does not feel short of breaths right now  She is eager to go home  Not on NC O2    She has hx of non ischemic cardiomyopathy and S AICD, paroxysmal A. fib, hypertension, and coronary artery disease. Her most recent heart catheterization was in late September at [facility] and I have reviewed and summarized that study below. Earlier in the week she had some nasal congestion and a mild cough but no real fever. 2 days ago she actually felt fairly good and had an office visit with Dr. Liana Peguero. Her her vitals were stable and she was not hypoxic and she was found to be in A. fib with a heart rate of about 100 bpm.  Her device was interrogated and it turned out that she has been in A. fib about half the time since it was last checked. She was started on amiodarone in addition to her Cardizem with the thought that if she did not convert on her own she be cardioverted on drug and if that failed she would be offered an AV node ablation with BIV ICD. Yesterday she began to feel bad with fatigue shortness of breath intermittently productive cough body aches and a headache. Her grandson took her to urgent care this morning and her COVID test was negative at [facility]. She still was not feeling well so she came to the emergency room here where she was noted to be hypoxic and in rapid A. fib. Her flu test was negative.   She was given a milligram of IV Bumex and started on a Cardizem drip. When she arrived to the floor she was given a bolus of amiodarone and her Cardizem drip was stopped. She is taken no of her usual p.o. meds today. Shortly after all this she became more tachypneic and hypoxic and she was put back on BiPAP and at the time I saw her her heart rate was about 110 her oxygen saturations were 97% and her blood pressure was 170/100. Influenza screen positive on repeat here at [facility]. Interim:     Remains in NSR    Echo on 12/18/2022 showed LVEF 25-30% with mod LA dilation & mod to severe MR.       OFF O2 NC this am       ECHO ADULT COMPLETE 12/18/2022 12/18/2022    Interpretation Summary    Left Ventricle: Severely reduced left ventricular systolic function with a visually estimated EF of 25 - 30%. Left ventricle is moderately dilated. Normal wall thickness. Severe global hypokinesis present. Left Atrium: Left atrium is moderately dilated. Aortic Valve: Mildly calcified cusp. Mild stenosis of the aortic valve. AV mean gradient is 13 mmHg. Mitral Valve: Moderate to severe regurgitation. Tricuspid Valve: Mild regurgitation. The estimated RVSP is 46 mmHg. Contrast used: Definity. Signed by: Cinthya Vega MD on 12/18/2022 11:39 AM       Assessment and Plan       Persistent AF with RVR: Rates 110s-120s despite amiodarone IV gtt  DCCV POD 2  400 mg tid po amiodarone 10 days and then cut down to 200 mg bid for 2 weeks and then 200 mg every day to maintain NSR  Stop diltiazem PO  She is already back in AF and RVR  BP does not give room for rate control  She looks dry now  I stop bumex and entresto.   May go on once a day 1 mg bumex for home  Added back toprol BID 25 mg bid  May go home and follow up in clinic  May convert to NSR later at home  I recommend ablation of AF and try to get her off long term amiodarone use  Amiodarone is to be reduced in dose to 200 mg bid for 2 weeks after one more week at 400 mg tid and then 200 mg every day until I see her  Because of the holidays EP lab does not have room to add her on for ablation this week  Will ablate as outpatient  She agrees to go home and follow up in office next month with me  She also sees Dr Ricardo Coffey- no more edema  Bp is not high so stop entresto to maximize AFIB rate control again      Future Appointments   Date Time Provider Denis Natali   12/29/2022  9:40 AM MD RANDOLPH Fischer BS AMB   2/7/2023 10:30 AM Lauren Crouch NP [facility] BS AMB   2/21/2023 10:00 AM Robbie Rucker,  [address] BS AMB   3/15/2023  9:00 AM REMOTE1, JOSE ALBERTO DICKSON BS AMB   12/14/2023  2:00 PM PACEMAKER3, JOSE ALBERTO DICKSON BS AMB   12/14/2023  2:20 PM MD MOHIT AmayaREY BS AMB         LVEF reduced to 25%, had been 30-35% in 2021. Interstitial pulmonary edema noted on CXR during admission. Suspect decompensation is driven by underlying influenza infection & AF with RVR. Tavo Fatima had been to 97/103 (prev not tolerated due to dizziness)   she had hair loss on coreg and bystolic   -off spironolactone due to hypotension)    Usually followed up on CHF with Dr Ana Rankin    I manage her S ICD in office. Transvenous leads dislodged many times in the past so no BIV ICD and AV node ablation as option    Anticoagulation: Continue Eliquis for embolic CVA prophylaxis. Denies bleeding issues. Kimberly Butts M.D. [facility]  Electrophysiology/Cardiology  [facility]  [address] [place] [address] Avenue                              [phone]                                          ____________________________________________________________     Cardiac testing     Received records from admission at [facility].       ECG (09/22/2022) showed AF with controlled ventricular rate. RHC/LHC (09/28/2022): LVEDP 15-20 mmHg. LM with MLI.   LCx with mild diffuse disease;  in OM with left to left & right to left collaterals. LAD with prior stent with AUBREY-3 flow; otherwise MLI. RCA with very mild ISR in proximal segment, otherwise MLI. 12/17/22     ECHO ADULT COMPLETE 12/18/2022 12/18/2022     Interpretation Summary   ·  Left Ventricle: Severely reduced left ventricular systolic function with a visually estimated EF of 25 - 30%. Left ventricle is moderately dilated. Normal wall thickness. Severe global hypokinesis present. ·  Left Atrium: Left atrium is moderately dilated. ·  Aortic Valve: Mildly calcified cusp. Mild stenosis of the aortic valve. AV mean gradient is 13 mmHg. ·  Mitral Valve: Moderate to severe regurgitation. ·  Tricuspid Valve: Mild regurgitation. The estimated RVSP is 46 mmHg. ·  Contrast used: Definity. Signed by: Mike Irizarry MD on 12/18/2022 11:39 AM         NUCLEAR CARDIAC STRESS TEST 08/13/2021 8/16/2021     Interpretation Summary   · SPECT: Left ventricular function post-stress was abnormal. Calculated ejection fraction is 22%. There is no evidence of transient ischemic dilation (TID). The TID ratio is 0.86. · Baseline ECG: Normal sinus rhythm. · SPECT: Myocardial perfusion imaging defect 1: There is a defect that is large in size with a severe reduction in uptake present in the lateral location(s) that is partially reversible. There is abnormal wall motion in the defect area. The defect appears to be infarction with esther-infarct ischemia. Perfusion defect was visually and quantitatively present. · SPECT: Left ventricular perfusion is probably abnormal. Myocardial perfusion imaging supports a high risk stress test.     Signed by: Ashley Levy MD on 8/13/2021  8:26 AM     08/13/21     CARDIAC PROCEDURE 08/13/2021 8/13/2021     Conclusion   Findings:   1)Normal LVEDP   2)Severe native 1 vessel CAD with  of ramus intermedius. LCx  Is small with occluded distal LCx. OM2 and distal ramus fill from collaterals from LAD, diagonal and RCA.    3)Limited wire probing suggests no micro channel in Ramus ISR . Proximal cap start before the stent     Access: Right radial no issues   Contrast 40 cc     Recommendations   1)Continue GDMT and weight loss. If dyspnea continue may consider Ramus  PCI. Uncertain if benefit will be substantial.   2)GDMT for CAD     Signed by: Erin Clemons MD on 8/13/2021 10:34 AM         Most recent HS troponins:   Recent Labs     12/17/22   1703 12/17/22   1317 12/17/22   0938   TROPHS 56* 31 22     ECG: atrial fibrillation, rate 110   Review of Systems     [x]All other systems reviewed and all negative except as written in HPI     [ ] Patient unable to provide secondary to condition           Past Medical History:   Diagnosis Date   · Acute right ankle pain 06/08/2018 5/29/18: X-ray showed possible right ankle sprain. 6/6/18: saw Dr. Mary Stevens ([facility]), diagnosed with peroneal tendonitis. Prescribed an ASO   · Asthma   · Atrial fibrillation (HCC)   · Cardiomyopathy ([facility]   · Coronary artery disease 2008   s/p RCA stent ([facility]) on 11/26/11   · Depression with anxiety   2011   · DVT (deep venous thrombosis) ([facility] 07/27/2010   · Family history of early CAD   · GERD (gastroesophageal reflux disease)   · H/O gastric bypass 2006   Revision in 2009   · Hepatitis C antibody test positive   Does not have chronic hep C (labs 10/6/15: neg HCV RNA)   · HTN (hypertension) 07/27/2010   · Hyperlipidemia 07/27/2010   · Incontinence of feces 09/03/2018   Dr. Dawit Gutierrez. 8/20/18: Improving with pelvic physical therapy and psyllium husk treatment. Saw Dr. Marvine Dubin who suggested PT first. MR defecography showed pelvic floor weakness with pelvic descensus, small anterior  Rectocele, and vaginal prolapse. To have pelvic PT weekly for 8 wks and to see Dr. Marvine Dubin again in Oct 2018.    · Joint pain 07/27/2010   · MI (myocardial infarction) ([facility] 07/27/2010   · Mitral valve regurgitation   Mild to moderate   · Morbid obesity ([facility] 07/27/2010   · Myocardial infarction [facility]) 2006 and 2011   Dr. Mary Jo Chase   · Psychiatric disorder   · PUD (peptic ulcer disease)   gi bleed 2008; ulcer n gastric bypass pouch   · Urinary incontinence, stress 07/27/2010     Past Surgical History:   Procedure Laterality Date   · COLONOSCOPY N/A 6/29/2018   COLONOSCOPY performed by Geraldine Eden MD at [facility] ENDOSCOPY; redundant colon, int hemorrhoids, pathology: normal colonic mucosa   · HX BREAST BIOPSY Left   benign   · HX BREAST LUMPECTOMY Right 7/14/2022   RIGHT BREAST LUMPECTOMY WITH ULTRASOUND performed by Francisco Domínguez MD at [facility] 11   · HX COLONOSCOPY 9/5/14   Dr. Rosa Elena Alvarez; normal, repeat in 5 yrs   · HX GASTRIC BYPASS   · HX IMPLANTABLE CARDIOVERTER DEFIBRILLATOR 08/24/2016   · HX LAP GASTRIC BYPASS 2006   revision 2009/Dr. Irais Al   · HX OTHER SURGICAL 09/19/2016   Removal of right ventricular ICD lead & single chamber transvenous AICD   · HX OTHER SURGICAL 10/12/2016   pocket revison of ICD; Dr. Mavis Wynn   · HX OTHER SURGICAL 06/07/2018   Dr Colin Jordan; high resolution anorectal manaometry-abnormal study. Study done for eval of fecal incontinence   · HX OTHER SURGICAL 12/14/2018   Dr. Colin Jordan. Rectal endoscopic US (EUS) for rectal incontinence. Normal rectal mucosa, no fistulas. · HX PACEMAKER   sicd/left side of chest under arm   · INS PPM/ICD LED SING ONLY 8/24/2016     · INS PPM/ICD LED SING ONLY 8/26/2016     · INS PPM/ICD LED SING ONLY 9/21/2016     · AR CARDIAC SURG PROCEDURE UNLIST   Stent x 5-6,Most recent 2011   · AR LAP,STOMACH,OTHER,W/O TUBE 04/05/2010   Revision GBP     Social Hx:  reports that she quit smoking about 18 years ago. Her smoking use included cigarettes. She started smoking about 53 years ago. She has never used smokeless tobacco. She reports that she does not drink alcohol and does not use drugs.    Family Hx: family history includes Alzheimer's Disease in her mother; Cancer in her father and mother; Dementia in her mother; Heart Attack in her brother; Heart Disease in her father and sister; Hypertension in her father; Stroke in her sister and sister. Allergies   Allergen Reactions   · Amoxicillin Anaphylaxis   · Amoxicillin Anaphylaxis   · Lipitor [Atorvastatin] Other (comments)   Severe muscle pain and spasms   · Zocor [Simvastatin] Other (comments)   Cramps, muscle spasms   · Buspar [Buspirone] Other (comments)   Drunk sensation, headaches   · Hydroxyzine Other (comments)   Blurred vision, lightheadedness   · Livalo [Pitavastatin] Myalgia   · Pravastatin Other (comments)   Leg cramps   · Tramadol Vertigo            OBJECTIVE:       Physical Exam   Visit Vitals  /77 (BP 1 Location: Left upper arm, BP Patient Position: At rest)   Pulse 98   Temp 98.1 °F (36.7 °C)   Resp 13   Ht 5' 4\" (1.626 m)   Wt 223 lb 8 oz (101.4 kg)   SpO2 96%   BMI 38.36 kg/m²       Telemetry: NSR    Neck: supple, no JVD     General:  Alert, cooperative, no distress, appears stated age. Neck:  Supple, no carotid bruit and no JVD. HEENT: mouth is dry  Lungs: clear and no accessory muscle use  Heart:  irregular rhythm mildly rapid rate  Abdomen:    Soft, non-tender. Bowel sounds normal. Morbidly obese   Extremities: no edema. Vascular:  Pulses - 2+   Skin:  Skin color normal. No rashes    Neurologic:  Alert, Moves all extremities. Data Review:     Radiology:   XR Results (most recent):   Results from Hospital Encounter encounter on 12/17/22     XR CHEST PORT     Narrative   EXAM: Portable CXR. 1620 hours. COMPARISON: 0912 hours. INDICATION: chest pain     FINDINGS:   Unchanged interstitial pulmonary edema. No focal consolidation. Borderline   cardiomegaly. Subcutaneous ICD. No pneumothorax, midline shift or pleural   effusion. Impression   Stable interstitial pulmonary edema.      CT Results (most recent):   Results from Hospital Encounter encounter on 02/23/17     CT SPINE CERV WO CONT     Narrative   EXAM:  CT CERVICAL SPINE WITHOUT CONTRAST INDICATION: Ground-level fall this morning. COMPARISON: None. TECHNIQUE: Multislice helical CT of the cervical spine was performed without   intravenous contrast administration. Sagittal and coronal reconstructions were   generated. CT dose reduction was achieved through use of a standardized   protocol tailored for this examination and automatic exposure control for dose   modulation. Adaptive statistical iterative reconstruction (ASIR) was utilized. CONTRAST: None. FINDINGS:     The alignment is within normal limits. There is no fracture or subluxation. There is degenerative disc narrowing and marginal osteophyte formation at the   C5-6 and C6-7 levels. The odontoid process is intact. The craniocervical   junction is within normal limits. The prevertebral soft tissues are within   normal limits. There is no spinal canal or neural foraminal stenosis. The   surrounding soft tissue and musculoskeletal structures appear unremarkable. The   visualized lung apices are unremarkable. Impression   IMPRESSION: No acute findings. MRI Results (most recent):   Results from [place] encounter on 07/02/18     MRI PELVIC FLOOR STUDY     Narrative   EXAM: MR PELVIC FLOOR   INDICATION: Full incontinence of feces. CONTRAST: Sterile ultrasound gel was placed into the vagina and rectum. No   intravenous contrast was administered. Oral Volumen was administered. TECHNIQUE: MR of the pelvis was performed according to standard departmental   protocol. The patient voided prior to imaging. Following three plane localizer   images, sagittal, axial and coronal breath-hold T2 (HASTE) images as well as   breath-hold axial T1 in and out of phase images were acquired.  Static and   dynamic MR of the pelvic floor was performed using static sagittal axial and   coronal high-resolution T2 weighted images followed by dynamic sagittal midline   SSFSE images at rest, during squeezing (Kegel maneuver), during straining   (Valsalva maneuver) and during defecation. FINDINGS:   H line = width of the levator hiatus   M line = descent of the levator hiatus     At rest:   H line measures 5.7 cm   M line measures 23 mm. The anorectal angle measures 131 degrees. (Normal 108 to 127 degrees.)     With squeezing:   H line measures 4.7 cm   M line measures 8 mm. The anorectal angle measures 112 degrees. With straining:   H line measures 5.5 cm   M line measures 5 mm. The anorectal angle measures 121 degrees. With evacuation:   H line measures 5.3 cm   M line measures 4.5 mm. The anorectal angle measures 140 degrees. The puborectalis sling is intact. There is there is elevation of the pelvic   floor with squeezing and pelvic floor weakness with abnormal descent of the   pelvic floor with straining and evacuation. POSTERIOR COMPARTMENT: There is pelvic floor weakness with pelvic descensus with   an anterior rectocele and the patient is unable to fully evacuate the rectum. There is no rectal intussusception or navin rectal prolapse. ANTERIOR COMPARTMENT: There is minimal descent of the bladder neck below the   sacrococcygeal line. MIDDLE COMPARTMENT: The uterus is absent. There is prolapse of the vagina below   the sacrococcygeal line. Impression   IMPRESSION: Pelvic floor weakness with pelvic descensus. There is a small   anterior rectocele and prolapse of the vagina below the sacrococcygeal line. The   patient is unable to fully evacuate the rectum.          Recent Labs   12/17/22   LFT normal  TSH 2.81         Allergies   Allergen Reactions    Amoxicillin Anaphylaxis    Amoxicillin Anaphylaxis    Lipitor [Atorvastatin] Other (comments)     Severe muscle pain and spasms    Zocor [Simvastatin] Other (comments)     Cramps, muscle spasms    Buspar [Buspirone] Other (comments)     Drunk sensation, headaches    Hydroxyzine Other (comments)     Blurred vision, lightheadedness    Livalo [Pitavastatin] Myalgia    Pravastatin Other (comments)     Leg cramps    Tramadol Vertigo          Current Facility-Administered Medications   Medication Dose Route Frequency    metoprolol succinate (TOPROL-XL) XL tablet 25 mg  25 mg Oral BID    albuterol-ipratropium (DUO-NEB) 2.5 MG-0.5 MG/3 ML  3 mL Nebulization TID RT    oseltamivir (TAMIFLU) capsule 75 mg  75 mg Oral BID    guaiFENesin ER (MUCINEX) tablet 600 mg  600 mg Oral Q12H    guaiFENesin (ROBITUSSIN) 100 mg/5 mL oral liquid 100 mg  100 mg Oral Q4H PRN    amiodarone (CORDARONE) tablet 400 mg  400 mg Oral TID    zolpidem (AMBIEN) tablet 10 mg  10 mg Oral QHS PRN    aspirin delayed-release tablet 81 mg  81 mg Oral DAILY    FLUoxetine (PROzac) capsule 40 mg  40 mg Oral BID    fluticasone propionate (FLONASE) 50 mcg/actuation nasal spray 2 Spray  2 Spray Both Nostrils DAILY    melatonin tablet 3 mg  3 mg Oral QHS PRN    sodium chloride (NS) flush 5-40 mL  5-40 mL IntraVENous Q8H    sodium chloride (NS) flush 5-40 mL  5-40 mL IntraVENous PRN    sodium chloride (NS) flush 5-40 mL  5-40 mL IntraVENous PRN    acetaminophen (TYLENOL) tablet 650 mg  650 mg Oral Q6H PRN    Or    acetaminophen (TYLENOL) suppository 650 mg  650 mg Rectal Q6H PRN    polyethylene glycol (MIRALAX) packet 17 g  17 g Oral DAILY PRN    ondansetron (ZOFRAN ODT) tablet 4 mg  4 mg Oral Q8H PRN    Or    ondansetron (ZOFRAN) injection 4 mg  4 mg IntraVENous Q6H PRN    apixaban (ELIQUIS) tablet 5 mg  5 mg Oral BID    polyethylene glycol (MIRALAX) packet 17 g  17 g Oral DAILY    senna-docusate (PERICOLACE) 8.6-50 mg per tablet 1 Tablet  1 Tablet Oral BID    magnesium hydroxide (MILK OF MAGNESIA) 400 mg/5 mL oral suspension 30 mL  30 mL Oral DAILY PRN           Heavenly Arriola M.D.    Electrophysiology/Cardiology   [facility] [place]   [address] [place] [address] Avenue                                [phone]         Wild Pickens MD

## 2022-12-22 NOTE — PROGRESS NOTES
Discharging home today followed by [facility] to provide home care services. Family will transport.

## 2022-12-22 NOTE — PROGRESS NOTES
Bedside and Verbal shift change report given to Corinne Nunes RN (oncoming nurse) by Celso Lerner (offgoing nurse). Report included the following information SBAR, Kardex, ED Summary, Recent Results, Med Rec Status, Cardiac Rhythm Afib, and Quality Measures.

## 2022-12-22 NOTE — DISCHARGE SUMMARY
Discharge Summary       PATIENT ID: Tamie Pearce  MRN: [id number]   YOB: 1950    DATE OF ADMISSION: 12/17/2022  8:50 AM    DATE OF DISCHARGE: 12/22/22   PRIMARY CARE PROVIDER: Madeline Jean MD     ATTENDING PHYSICIAN: Renee Edward MD  DISCHARGING PROVIDER: Renee Edward MD    To contact this individual call [phone] and ask the  to page. If unavailable ask to be transferred the Adult Hospitalist Department. CONSULTATIONS: IP CONSULT TO HOSPITALIST  IP CONSULT TO CARDIOLOGY  IP CONSULT TO INTENSIVIST    PROCEDURES/SURGERIES: * No surgery found *    ADMITTING [address] [place] [address] COURSE:   Tamie Pearce is a 81 y.o. female with known past medical history of atrial fibrillation, coronary artery disease, ischemic cardiomyopathy with chronic systolic heart failure with left ventricular ejection fraction 30%, who presents to ED with complaining of shortness of breath for 5 days that gradually increase in severity, aggravated by activity, and associated with chest pain substernal pressure-like in severity 5/10 without radiation to the neck back or left shoulder. In the emergency department patient was evaluated, she was found in atrial fibrillation with rapid ventricular response and ventricular rate up to 137, she is hypoxic with oxygen saturation 80% on room air, high flow oxygen was provided, patient was placed on BiPAP. Chest x-ray was obtained was significant for interstitial pulmonary edema. BNP was elevated at 6000, troponin was 22 with slight increase to 31. IV Cardizem bolus was given, Cardizem drip was initiated, Bumex IV was provided, medicine was consulted for admission and further evaluation. The patient denies using any supplemental oxygen or CPAP BiPAP at home. The patient was evaluated by cardiologist Dr. Mavis Wynn 3 days ago, amiodarone was added, patient was informed if she continue experience A. fib RVR she might need cardioversion. A-fib ([facility] (12/17/2022)     Atrial fibrillation with rapid ventricular response. Acute respiratory failure with hypoxia related to pulmonary edema, due to acute heart failure exacerbation. Acute on chronic systolic heart failure, left ventricular ejection fraction 30%. Coronary artery disease. Chest pain possible related to demand ischemia in the light of atrial fibrillation with rapid ventricular response, hypoxia, cardiac troponin without significant uptrend. Ischemic cardiomyopathy. Hospital course  Patient was seen in ICU due to developing acute heart failure and A. fib with RVR. Patient was found positive for influenza as well. Patient was diuresed. Initially on BiPAP and was able to be weaned off of it. Patient was downgraded. Patient had a cardioversion done with EP and this helped for about 24 hours but she went back into A. fib with RVR. Patient was controlled with medications and a discussion with EP was had and determined the patient would follow-up for outpatient ablation. Patient was continued on p.o. amiodarone, Toprol, Bumex. Wilhelmenia Memos was held to allow for up titration of A. fib medications. Patient's blood pressure did not allow for Entresto with all these other medications. This will be started as outpatient once appropriate. DISCHARGE DIAGNOSES / PLAN:      Acute hypoxic respiratory failure with shortness of breath and hypoxia. Due to pulmonary edema in the setting of acute on chronic systolic heart failure and A. fib with RVR, and hypertensive urgency. Hypertensive emergency. Resolved. Paroxysmal atrial fibrillation with rapid ventricular response. Continue amiodarone. Follow-up outpatient for ablation. Acute on chronic systolic heart failure NYHA class III with an EF of 25 to 30%. Continue medications. Entresto held due to blood pressure not allowing for proper rate control of atrial fibrillation. This will be restarted later as outpatient.   Underlying coronary artery disease/ischemic cardiomyopathy. Stable. Status post AICD  Influenza A positive  LEMUEL. Resolved. PENDING TEST RESULTS:   At the time of discharge the following test results are still pending: none    FOLLOW UP APPOINTMENTS:    Follow-up Information       Follow up With Specialties Details Why Contact Info    Miguel A Moraes MD Cardio Vascular Surgery, Cardiovascular Disease Physician Schedule an appointment as soon as possible for a visit in 2 week(s)  [address] [address]  [phone]      Jarod Mejia MD Cardiovascular Disease Physician, Clinical Cardiac Electrophysiology Physician Go on 12/29/2022 For an EKG check and to discuss further treatment/ medications; [address] [facility]  Schedule an appointment as soon as possible for a visit in 2 week(s) For an overnight sleep study; [address]    Jero Frazier MD Internal Medicine Physician   [place] [id number] [phone]      [address] [place] [address] Follow up  [address] [place] [address] [phone]             ADDITIONAL CARE RECOMMENDATIONS: none    DIET: Cardiac Diet    ACTIVITY: Activity as tolerated    WOUND CARE: none    EQUIPMENT needed: none      DISCHARGE MEDICATIONS:  Discharge Medication List as of 12/22/2022 12:00 PM        START taking these medications    Details   guaiFENesin ER (MUCINEX) 600 mg ER tablet Take 1 Tablet by mouth every twelve (12) hours for 5 days. , Normal, Disp-10 Tablet, R-0      guaiFENesin (ROBITUSSIN) 100 mg/5 mL liquid Take 5 mL by mouth every four (4) hours as needed for Cough., Normal, Disp-236 mL, R-0      oseltamivir (TAMIFLU) 75 mg capsule Take 1 Capsule by mouth two (2) times a day for 3 doses. , Normal, Disp-3 Capsule, R-0           CONTINUE these medications which have CHANGED    Details   bumetanide (BUMEX) 1 mg tablet Take 1 Tablet by mouth daily. , Normal, Disp-30 Tablet, R-0      amiodarone (CORDARONE) 200 mg tablet Take 2 Tablets by mouth three (3) times daily for 9 days, THEN 1 Tablet two (2) times a day for 14 days, THEN 1 Tablet daily for 7 days. EKG with Cardiology on 12/29/22;, Normal, Disp-89 Tablet, R-0           CONTINUE these medications which have NOT CHANGED    Details   zolpidem (AMBIEN) 10 mg tablet TAKE 1 TABLET BY MOUTH NIGHTLY AS NEEDED FOR SLEEP. MAX DAILY AMOUNT: 10 MG., Normal, Disp-30 Tablet, R-1Not to exceed 4 additional fills before 03/05/2023      apixaban (ELIQUIS) 5 mg tablet Take 1 Tablet by mouth two (2) times a day. Prescribed by Dr. Sita Gongora., No Print, Disp-180 Tablet, R-0      ezetimibe (ZETIA) 10 mg tablet TAKE 1 TABLET BY MOUTH EVERY DAY IN THE MORNING, Normal, Disp-90 Tablet, R-1      potassium chloride (Klor-Con M20) 20 mEq tablet TAKE 2 TABLETS BY MOUTH EVERY DAY, Normal, Disp-180 Tablet, R-3      IBUPROFEN PO Take  by mouth as needed., Historical Med      aspirin delayed-release 81 mg tablet Take 81 mg by mouth as needed for Pain., Historical Med      albuterol (PROVENTIL HFA, VENTOLIN HFA, PROAIR HFA) 90 mcg/actuation inhaler TAKE 2 PUFFS BY INHALATION EVERY FOUR HOURS AS NEEDED FOR WHEEZING OR SHORTNESS OF BREATH., Normal, Disp-18 Each, R-11      FLUoxetine (PROzac) 40 mg capsule Take 1 Capsule by mouth two (2) times a day., Normal, Disp-180 Capsule, R-3      psyllium (METAMUCIL) powd Take  by mouth. 2 tsp daily, Historical Med      cholecalciferol, VITAMIN D3, (VITAMIN D3) 5,000 unit tab tablet Take 10,000 Units by mouth daily. , Historical Med      biotin 10,000 mcg cap Take  by mouth daily. , Historical Med      OTHER Complete multi formula, bariatric advantage, Historical Med      magnesium 250 mg tab Take 1 Tab by mouth daily. , Historical Med      ferrous sulfate 325 mg (65 mg iron) tablet Take  by mouth daily (before breakfast). , Historical Med      CALCIUM PO Take 600 mg by mouth daily. , Historical Med           STOP taking these medications       sacubitril-valsartan (ENTRESTO) 24 mg/26 mg tablet Comments:   Reason for Stopping:         dilTIAZem ER (CARDIZEM CD) 300 mg capsule Comments:   Reason for Stopping:         isosorbide mononitrate ER (IMDUR) 30 mg tablet Comments:   Reason for Stopping:         letrozole (FEMARA) 2.5 mg tablet Comments:   Reason for Stopping:         isosorbide dinitrate (ISORDIL) 30 mg tablet Comments:   Reason for Stopping:         Entresto  mg tablet Comments:   Reason for Stopping:                 NOTIFY YOUR PHYSICIAN FOR ANY OF THE FOLLOWING:   Fever over 101 degrees for 24 hours. Chest pain, shortness of breath, fever, chills, nausea, vomiting, diarrhea, change in mentation, falling, weakness, bleeding. Severe pain or pain not relieved by medications. Or, any other signs or symptoms that you may have questions about. DISPOSITION:   X Home With:   OT  PT  HH  RN       Long term SNF/Inpatient Rehab    Independent/assisted living    Hospice    Other:       PATIENT CONDITION AT DISCHARGE:     Functional status    Poor     Deconditioned    X Independent      Cognition   X  Lucid     Forgetful     Dementia      Catheters/lines (plus indication)    Hdz     PICC     PEG    X None      Code status    X Full code     DNR      PHYSICAL EXAMINATION AT DISCHARGE:  Constitutional:  No acute distress, cooperative, pleasant    ENT:  Oral mucosa moist, oropharynx benign. Resp:  Coarse bilaterally. No wheezing/rhonchi/rales. No accessory muscle use. CV:  IRIR, tachy,  no gallops, rubs    GI:  Soft, non distended, non tender. normoactive bowel sounds, no hepatosplenomegaly, obese    Musculoskeletal:  No edema, warm, 2+ pulses throughout    Neurologic:  Moves all extremities.   AAOx3, CN II-XII reviewed     CHRONIC MEDICAL DIAGNOSES:  Problem List as of 12/22/2022 Date Reviewed: 8/15/2022            Codes Class Noted - Resolved    History of cardioversion ICD-10-CM: Z98.890  ICD-9-CM: V15.1  12/19/2022 - Present        A-fib ([facility] ICD-10-CM: I48.91  ICD-9-CM: 427.31  12/17/2022 - Present        Paroxysmal atrial fibrillation ([facility] ICD-10-CM: I48.0  ICD-9-CM: 427.31  10/11/2022 - Present        Malignant neoplasm of right breast in female, estrogen receptor positive ([facility] ICD-10-CM: C50.911, Z17.0  ICD-9-CM: 174.9, V86.0  6/15/2022 - Present    Overview Addendum 8/1/2022  5:23 PM by Luis Gill     06/08/2022: RIGHT breast core bx. PATH: IMC with ductal and lobular features, 10mm, grade 1, ER+(90%), SC-(<1), HER2-, Ki-67(12%). LCIS: present. Mammaprint: LOW RISK, Luminal type A, +0.226.    07/14/2022: RIGHT breast lumpectomy. PATH: IMC with ductal and lobular features, 26mm, grade 1, negative margins. Pathologic stage: pT2, pNX.  RIGHT breast deep margin, lumpectomy: Benign breast tissue. RIGHT breast medial margin, lumpectomy: Benign breast tissue. RIGHT breast anterior medial margin, lumpectomy: Fibrocystic changes, duct ectasia. Generalized anxiety disorder ICD-10-CM: F41.1  ICD-9-CM: 300.02  10/14/2019 - Present        Mild intermittent asthma without complication NVT-10-UL: C48.79  ICD-9-CM: 493.90  5/19/2017 - Present        Primary osteoarthritis of both knees ICD-10-CM: M17.0  ICD-9-CM: 715.16  2/28/2017 - Present    Overview Signed 6/3/2018  4:40 PM by MD Dr. Mina Law.  5/30/18: bilateral corticosteroid injections to both knees             S/P ICD (internal cardiac defibrillator) procedure ICD-10-CM: Z95.810  ICD-9-CM: V45.02  8/24/2016 - Present    Overview Addendum 9/15/2021  3:11 PM by Pita Hernandez MD     [employer] ICD single lead implant with DFT < 25 J 8/24/2016- removed 9/19 and [employer] scientific sub cutaneous ICD placed 9/21/16  9/15/21 S ICD change             Chronic insomnia ICD-10-CM: F51.04  ICD-9-CM: 780.52  8/4/2016 - Present Morbid obesity with BMI of 40.0-44.9, adult [facility]) ICD-10-CM: E66.01, Z68.41  ICD-9-CM: 278.01, V85.41  6/14/2016 - Present        Osteoporosis ICD-10-CM: M81.0  ICD-9-CM: 733.00  2/3/2016 - Present    Overview Signed 2/3/2016  8:04 AM by Delia Ryan MD     DEXA 2/1/16: L femoral neck T -2.5, L total hip -2.4, LS spine T -2.2             Cardiomyopathy ([facility] ICD-10-CM: I42.9  ICD-9-CM: 425.4  10/6/2015 - Present    Overview Signed 10/14/2019  4:05 PM by Delia Ryan MD     Echo 7/1/15: EF 30-35%, dilated LV, LAE               CAD (coronary artery disease) ICD-10-CM: I25.10  ICD-9-CM: 414.00  7/9/2015 - Present    Overview Addendum 10/5/2022  1:40 PM by MD Yossi Love NP [facility]). Hx of MI twice in 3/22/03 & 11/26/11, multiple stents. Had  AMRIT mid RCA 11/26/11, Cath 8/21: RI with occluded distal LCx, distal LAD to RI collaterals             Systolic CHF, chronic ([facility] ICD-10-CM: I50.22  ICD-9-CM: 428.22, 428.0  7/9/2015 - Present    Overview Addendum 10/5/2022  1:35 PM by MD Yossi Love NP. Echo 2/2021: EF 35% , mild TR, mod MR. S/p AICD placement in 9/21 with Dr. Abdi Molina and subsequent lead revisions and procedures due to non-healing mid-sternal incision. 9/23/22: on Entresto 97/103 BID and Bumex 2 mg BID             Hyperlipidemia ICD-10-CM: E78.5  ICD-9-CM: 272.4  7/9/2015 - Present        Mitral valve regurgitation ICD-10-CM: I34.0  ICD-9-CM: 424.0  7/9/2015 - Present    Overview Addendum 7/13/2015  6:43 AM by Delia Ryan MD     Echo 1/13/15:  Mod to severe MR with marked LAE, 7/1/15: EF 30-35%, dilated LV, LAE, mild to mod MR             History of MI (myocardial infarction) ICD-10-CM: I25.2  ICD-9-CM: 412  7/9/2015 - Present    Overview Signed 7/9/2015  8:12 PM by Delia Ryan MD     2006 and IWMI 11/26/2011             HTN (hypertension) ICD-10-CM: I10  ICD-9-CM: 401.9  6/30/2015 - Present        History of gastric bypass ICD-10-CM: Z98.84  ICD-9-CM: V45.86  6/30/2015 - Present    Overview Signed 7/13/2015  6:37 AM by Cuate Blanco MD     2006, revision 2009             Moderate episode of recurrent major depressive disorder ([facility] ICD-10-CM: F33.1  ICD-9-CM: 296.32  6/30/2015 - Present        RESOLVED: Angina pectoris, unspecified ICD-10-CM: I20.9  ICD-9-CM: 413.9  8/19/2021 - 6/8/2022        RESOLVED: Atherosclerotic heart disease of native coronary artery with unspecified angina pectoris ICD-10-CM: I25.119  ICD-9-CM: 414.01, 413.9  8/19/2021 - 6/8/2022        RESOLVED: Incontinence of feces ICD-10-CM: R15.9  ICD-9-CM: 787.60  9/3/2018 - 10/14/2019    Overview Signed 9/3/2018 11:47 AM by MD Dr. Seven Posada. 8/20/18: Improving with pelvic physical therapy and psyllium husk treatment. Saw Dr. Kane Riley who suggested PT first. MR defecography showed pelvic floor weakness with pelvic descensus, small anterior  Rectocele, and vaginal prolapse. To have pelvic PT weekly for 8 wks and to see Dr. Kane Riley again in Oct 2018. RESOLVED: Acute right ankle pain ICD-10-CM: M25.571  ICD-9-CM: 719.47, 338.19  6/8/2018 - 10/14/2019    Overview Signed 6/8/2018  8:37 AM by Cuate Blanco MD     5/29/18: Jinx Boots showed possible right ankle sprain. 6/6/18: saw Dr. Per Bermudez ([facility]), diagnosed with peroneal tendonitis.  Prescribed an ASO             RESOLVED: High risk medication use ICD-10-CM: Z79.899  ICD-9-CM: V58.69  2/23/2018 - 10/14/2019        RESOLVED: Obesity, Class III, BMI 40-49.9 (morbid obesity) ([facility] ICD-10-CM: E66.01  ICD-9-CM: 278.01  5/30/2017 - 5/29/2018        RESOLVED: ICD (implantable cardioverter-defibrillator) in place ICD-10-CM: Z95.810  ICD-9-CM: V45.02  10/12/2016 - 10/14/2019    Overview Addendum 10/12/2016  4:29 PM by Mindi Guerrier NP     Sub cutaneous pocket revision 10/12/16  Necrotic skin debrided  DFT, = 65 J              RESOLVED: Infection involving implantable cardioverter-defibrillator (ICD) ([facility] ICD-10-CM: T82. 7XXA  ICD-9-CM: 996.61  10/11/2016 - 10/12/2016        RESOLVED: AICD lead displacement ICD-10-CM: T82.120A  ICD-9-CM: 996.04  9/19/2016 - 10/14/2019    Overview Addendum 9/21/2016  1:17 PM by Emre Pederson NP     9/19/2016 Removal of the right ventricular ICD lead and the single chamber transvenous AICD with fluoroscopy             RESOLVED: Advance care planning ICD-10-CM: Z71.89  ICD-9-CM: V65.49  7/16/2016 - 10/14/2019    Overview Signed 7/16/2016 11:23 AM by Monico Carr MD     Discussed. Patient previously given informational booklet and form.                  RESOLVED: 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[facility] ICD-10-CM: I42.9  ICD-9-CM: 425.9  7/9/2015 - 10/14/2019    Overview Signed 7/19/2015  7:26 PM by Monico Carr MD     Echo 7/1/15: EF 30-35%, dilated LV, LAE             RESOLVED: Asthma ICD-10-CM: J45.909  ICD-9-CM: 493.90  6/30/2015 - 6/30/2015        RESOLVED: Reactive airway disease ICD-10-CM: J45.909  ICD-9-CM: 493.90  6/30/2015 - 10/14/2019        RESOLVED: Morbid obesity ([facility] ICD-10-CM: E66.01  ICD-9-CM: 278.01  7/27/2010 - 12/6/2016        RESOLVED: SOB (shortness of breath) ICD-10-CM: R06.02  ICD-9-CM: 786.05  7/27/2010 - 7/19/2015        RESOLVED: Joint pain ICD-10-CM: M25.50  ICD-9-CM: 719.40  7/27/2010 - 7/19/2015        RESOLVED: HLD (hyperlipidemia) ICD-10-CM: E78.5  ICD-9-CM: 272.4  7/27/2010 - 7/19/2015        RESOLVED: Hepatitis C ICD-10-CM: B19.20  ICD-9-CM: 070.70  7/27/2010 - 10/12/2015        RESOLVED: HTN (hypertension) ICD-10-CM: I10  ICD-9-CM: 401.9  7/27/2010 - 7/19/2015        RESOLVED: Urinary incontinence, stress ICD-9-CM: 625.6  7/27/2010 - 10/14/2019        RESOLVED: CAD (coronary artery disease) ICD-10-CM: I25.10  ICD-9-CM: 414.00  7/27/2010 - 7/19/2015        RESOLVED: MI (myocardial infarction) ([facility] ICD-10-CM: I21.9  ICD-9-CM: 410.90  7/27/2010 - 7/19/2015        RESOLVED: Ulcer ICD-10-CM: [id number]  ICD-9-CM: 707.9  7/27/2010 - 7/19/2015    Overview Signed 7/27/2010  5:27 PM by Shahnaz Valdes     In pouch             RESOLVED: DVT (deep venous thrombosis) ([facility] ICD-10-CM: I82.409  ICD-9-CM: 453.40  7/27/2010 - 10/14/2019           Greater than 30 minutes were spent with the patient on counseling and coordination of care    Signed:   Marcie Mina MD  12/22/2022  5:14 PM

## 2022-12-22 NOTE — PROGRESS NOTES
Problem: Patient Education: Go to Patient Education Activity  Goal: Patient/Family Education  Outcome: Progressing Towards Goal     Problem: Afib Pathway: Day 2  Goal: Off Pathway (Use only if patient is Off Pathway)  Outcome: Progressing Towards Goal  Goal: Activity/Safety  Outcome: Progressing Towards Goal  Goal: Consults, if ordered  Outcome: Progressing Towards Goal  Goal: Diagnostic Test/Procedures  Outcome: Progressing Towards Goal  Goal: Nutrition/Diet  Outcome: Progressing Towards Goal  Goal: Discharge Planning  Outcome: Progressing Towards Goal  Goal: Medications  Outcome: Progressing Towards Goal  Goal: Respiratory  Outcome: Progressing Towards Goal  Goal: Treatments/Interventions/Procedures  Outcome: Progressing Towards Goal  Goal: Psychosocial  Outcome: Progressing Towards Goal  Goal: *Hemodynamically stable  Outcome: Progressing Towards Goal  Goal: *Optimal pain control at patient's stated goal  Outcome: Progressing Towards Goal  Goal: *Stable cardiac rhythm  Outcome: Progressing Towards Goal  Goal: *Lungs clear or at baseline  Outcome: Progressing Towards Goal  Goal: *Describes available resources and support systems  Outcome: Progressing Towards Goal     Problem: Afib Pathway: Day 3  Goal: Off Pathway (Use only if patient is Off Pathway)  Outcome: Progressing Towards Goal  Goal: Activity/Safety  Outcome: Progressing Towards Goal  Goal: Diagnostic Test/Procedures  Outcome: Progressing Towards Goal  Goal: Nutrition/Diet  Outcome: Progressing Towards Goal  Goal: Discharge Planning  Outcome: Progressing Towards Goal  Goal: Medications  Outcome: Progressing Towards Goal  Goal: Respiratory  Outcome: Progressing Towards Goal  Goal: Treatments/Interventions/Procedures  Outcome: Progressing Towards Goal  Goal: Psychosocial  Outcome: Progressing Towards Goal     Problem: Afib: Discharge Outcomes (not in CAT)  Goal: *Hemodynamically stable  Outcome: Progressing Towards Goal  Goal: *Stable cardiac rhythm  Outcome: Progressing Towards Goal  Goal: *Lungs clear or at baseline  Outcome: Progressing Towards Goal  Goal: *Optimal pain control at patient's stated goal  Outcome: Progressing Towards Goal  Goal: *Identifies cardiac risk factors  Outcome: Progressing Towards Goal  Goal: *Verbalizes home exercise program, activity guidelines, cardiac precautions  Outcome: Progressing Towards Goal  Goal: *Verbalizes understanding and describes prescribed diet  Outcome: Progressing Towards Goal  Goal: *Verbalizes understanding and describes medication purposes and frequencies  Outcome: Progressing Towards Goal  Goal: *Anxiety reduced or absent  Outcome: Progressing Towards Goal  Goal: *Understands and describes signs and symptoms to report to providers(Stroke Metric)  Outcome: Progressing Towards Goal  Goal: *Describes follow-up/return visits to physicians  Outcome: Progressing Towards Goal  Goal: *Describes available resources and support systems  Outcome: Progressing Towards Goal  Goal: *Influenza immunization  Outcome: Progressing Towards Goal  Goal: *Pneumococcal immunization  Outcome: Progressing Towards Goal  Goal: *Describes smoking cessation resources  Outcome: Progressing Towards Goal     Problem: Pressure Injury - Risk of  Goal: *Prevention of pressure injury  Description: Document Erick Scale and appropriate interventions in the flowsheet.   Outcome: Progressing Towards Goal  Note: Pressure Injury Interventions:       Moisture Interventions: Apply protective barrier, creams and emollients, Minimize layers, Absorbent underpads    Activity Interventions: PT/OT evaluation, Increase time out of bed    Mobility Interventions: Assess need for specialty bed    Nutrition Interventions: Document food/fluid/supplement intake    Friction and Shear Interventions: Apply protective barrier, creams and emollients, HOB 30 degrees or less                Problem: Patient Education: Go to Patient Education Activity  Goal: Patient/Family Education  Outcome: Progressing Towards Goal     Problem: Pain  Goal: *Control of Pain  Outcome: Progressing Towards Goal  Goal: *PALLIATIVE CARE:  Alleviation of Pain  Outcome: Progressing Towards Goal     Problem: Patient Education: Go to Patient Education Activity  Goal: Patient/Family Education  Outcome: Progressing Towards Goal     Problem: Falls - Risk of  Goal: *Absence of Falls  Description: Document Pallavi Fall Risk and appropriate interventions in the flowsheet.   Outcome: Progressing Towards Goal  Note: Fall Risk Interventions:  Mobility Interventions: PT Consult for mobility concerns, PT Consult for assist device competence, Assess mobility with egress test, Bed/chair exit alarm         Medication Interventions: Assess postural VS orthostatic hypotension, Bed/chair exit alarm    Elimination Interventions: Bed/chair exit alarm, Call light in reach, Toilet paper/wipes in reach

## 2022-12-23 ENCOUNTER — PATIENT OUTREACH (OUTPATIENT)
Dept: CASE MANAGEMENT | Age: 72
End: 2022-12-23

## 2022-12-23 ENCOUNTER — TELEPHONE (OUTPATIENT)
Dept: CARDIOLOGY CLINIC | Age: 72
End: 2022-12-23

## 2022-12-23 RX ORDER — BENZONATATE 100 MG/1
100 CAPSULE ORAL
Qty: 24 CAPSULE | Refills: 0 | Status: SHIPPED | OUTPATIENT
Start: 2022-12-23

## 2022-12-23 RX ORDER — METOPROLOL SUCCINATE 25 MG/1
25 TABLET, EXTENDED RELEASE ORAL 2 TIMES DAILY
Qty: 180 TABLET | Refills: 1 | Status: SHIPPED | OUTPATIENT
Start: 2022-12-23

## 2022-12-23 NOTE — PROGRESS NOTES
12/23/22    CM received a call from nurse coordinator stating patient has no home health. After reviewing, it was determined that patient had initially been accepted by [facility]. CM updated orders and referral in [address] as requested by nurse coordinator.     Forrest Jones, [address] [place] [address],[address]  [phone]

## 2022-12-23 NOTE — TELEPHONE ENCOUNTER
Script for Tessalon perles 100 mg sent to [facility]. Next appointment 1/26/23. Written order per Dr. Rosie Christian.

## 2022-12-23 NOTE — TELEPHONE ENCOUNTER
----- Message from Binh Escudero MD sent at 12/23/2022  9:22 AM EST -----  Hospitalist discharged her, not sure why she could not order as such  I wanted amiodarone 400 mg tid for 7 more days and then 200 mg bid for 14 days and then 200 mg daily  Toprol 25 mg bid  No entresto  Bumex 1 mg daily and Kcl 20 meq daily    Thanks  [address] [employer]

## 2022-12-23 NOTE — PROGRESS NOTES
Care Transitions Initial Call    Call within 2 business days of discharge: Yes     Patient: Ang Adler Patient : 1950 MRN: [id number]    Last Discharge [address]       Date Complaint Diagnosis Description Type Department Provider    22 Shortness of Breath; Chest Pain Atrial fibrillation with rapid ventricular response ([facility] [address] . .. ED to Hosp-Admission (Discharged) (ADMIT) Cruz West MD; Uriel Sadler,... Was this an external facility discharge? No     Challenges to be reviewed by the provider   Additional needs identified to be addressed with provider: yes    Increasing SOB, Transfer from [facility] ED- AFib w/RVR and pulmonary edema -AHRF requiring Bi-PAP. Cardiology consulted. Echo done. DCCV done - two shocks, converted to NSR, back in AFib . Plan is convert with meds, possible repeat DCCV and/or ablation. See EP cardiology on - EKG check. Follow up with primary cardiology on . CTN clarified meds with consulting cardiologist:    Per cardiology:  amiodarone 400 mg tid for 7 more days and then 200 mg bid for 14 days and then 200 mg daily   Toprol 25 mg bid -CTN secured RX sent to [employer]  No entresto, diltiazem. Bumex 1 mg daily and Kcl 20 meq daily    COVID negative, Flu neg on , + on . New medications: Tamiflu 75 mg BID for 3 days. Robitussin 100 mg/5ml- PRN cough, mucinex 600 mg Q12H for 5 days. CTN reaching out to PCP to determine plan for follow up. Method of communication with provider : staff message to cardiology and PCP, chart routing to PCP. Discussed COVID-19 related testing which was available at this time. Test results were negative. Patient informed of results, if available? Aware. Note: Flu + on 22. Advance Care Planning:   Does patient have an Advance Directive: not on file. Inpatient Readmission Risk score: Unplanned Readmit Risk Score: 11.4    Was this a readmission? no     Patients top risk factors for readmission: lack of knowledge about disease, medical condition- , multiple health system providers, utilization of services, and to be further assessed    Interventions to address risk factors: Communication with home health agencies or other community services the patient is currently [facility], Communication with specialists who will assume or re-assume care of the patient's system-specific problems-Cardiology, Education of patient/family/caregiver/guardian to support self-management- , Assessment and support for treatment adherence and medication management- , and communication     Care Transition Nurse (CTN) contacted the patient by telephone to perform post hospital discharge assessment. Verified name and  with patient as identifiers. Provided introduction to self, and explanation of the CTN role. CTN reviewed discharge instructions, medical action plan and red flags with patient who verbalized understanding. Were discharge instructions available to patient? yes. Reviewed appropriate site of care based on symptoms and resources available to patient including: Specialist, After hours contact number- , Home Health, and CTN . Patient given an opportunity to ask questions and does not have any further questions or concerns at this time. The patient agrees to contact the PCP office for questions related to their healthcare. Medication reconciliation was performed with patient, who verbalizes understanding of administration of home medications. Advised obtaining a 90-day supply of all daily and as-needed medications. Referral to Pharm D needed: no     Home Health/Outpatient orders at discharge: JUAN MIGUEL and [address]: [facility]   Date of initial visit: per office, [facility] will be . Durable Medical Equipment ordered at discharge: None    Was patient discharged with a pulse oximeter? no    Discussed follow-up appointments.  If no appointment was previously scheduled, appointment scheduling offered:  CTN reaching out to PCP and cardiology for follow up appointment . Is follow up appointment scheduled within 7 days of discharge? yes. [facility] follow up appointment(s):   Future Appointments   Date Time Provider Denis Hurst   12/29/2022  9:40 AM MD RANDOLPH Rubio BS AMB   1/26/2023  1:00 PM MD RANDOLPH Allan BS AMB   2/7/2023 10:30 AM Dianna Bender NP [facility] BS AMB   2/21/2023 10:[facility] AM [facility]  [address] BS AMB   3/15/2023  9:00 AM REMOTE1, JOSE ALBERTO DICKSON BS AMB   12/14/2023  2:00 PM PACEMAKER3, JOSE ALBERTO DICKSON BS AMB   12/14/2023  2:20 PM MD RANDOLPH Allan BS AMB     Non-[facility] follow up appointment(s): refer to goals section     Plan for follow-up call in 5-7 days based on severity of symptoms and risk factors. Plan for next call:   Assess current symptoms including daily monitoring items  Progress with flu recovery-symptom management. Follow up with PCP in place  Attended follow up with cardiology, plan. EKG- NSR? Vs. Afib. Assess if AMD in place, if so, secure copy for EMR. If not, assist with completing. CTN provided contact information for future needs. Goals Addressed                   This Visit's Progress       Heart Failure     Patient verbalizes understanding of self-management goals of living with Congestive Heart Failure and Treatment for recurrent/chronic AFib        12/23/22- spoke with MsShayna Brandon Camacho. She reports increased cough. CTN was able to secure PRN RX for Tessalon. Advised to keep cough symptoms controlled to help with AFib control. Asked her to reach out to PCP if symptoms are not controlled by PRN meds. She is aware that she converted back and remains in AFib, explained goal of rate control.  CTN clarified dosing for amiodarone with EP- discharge instructions not correct, updated in CC current med list. Amiodarone 400 mg TID for 9 days, then 200 mg BID for 14 days, then 200 mg daily. CTN asked EP about Toprol XL 25 mg BID dosing referenced in progress note, but no order written, patient does not have this medication at home. Cardiology sent in RX to pharmacy. Clarified about dose of KCL to be taken with changed dose of bumex. Continue 20 meq 2 tablets daily. Confirmed she has stopped: Diltiazem and Entresto. She is not taking imdur/isosorbide. Note: intolerance to Femara in Hem-oncology note. Discussion about how to help with Flu recovery:  reduce-treat symptoms, maintain hydration, focus on healthy foods rich with vitamin C and anti-oxidants. Be active, ambulate regularly, rest when tired. Pt will remain out of the hospital or ER for remainder of Bundle period. [facility]              Post Hospitalization     Attends follow-up appointments as directed. 12/23/22-   PCP- Dr. Alexia Best reached out to secure [place] appt. Cardiology- Primary- Dr. Geo Carolina- has appt on 12/27 at 9 am.      EP- Dr. Lucita Saldaña- has visit on 12/29 for EKG. CTN will clarify with provider, if needs to keep since she is seeing primary card on 12/27. [facility]        Supportive resources in place to maintain patient in the community (ie. Home Health, DME equipment, refer to, medication assistant plan, etc.)   On track     12/23/22-   CTN spoke with [facility] they were notified on 12/19 by , [facility] will follow. CTN spoke with in-pt Funmilayo SELLERS. Confusion, but referral was picked up by Monalisa ([facility]). Spoke with Jayda Rolon, they state they were notified on 12/21 by MARLO, [facility] would be following. They state they can  if CM will re-send information through [employer]. CTN asked Funmilayo to send. They state that SN will see on 12/24/22 for [facility]. Spoke with ms. Mónica De Jesus- she discharged to daughter's home. Son, Venessa Fuller has flu, and some other family members may have as well.    CTN updated closer pharmacy to daughter's home- [facility].       [facility]

## 2022-12-23 NOTE — TELEPHONE ENCOUNTER
Prescription for Toprol 25 mg BID sent to [facility]. Next appointment 1/26/23. Written order per Dr. Gurpreet Feliz.

## 2022-12-23 NOTE — TELEPHONE ENCOUNTER
----- Message from Gonzalo Louie MD sent at 12/23/2022 10:40 AM EST -----  I should not do primary care but to help her during winter holidays it is ok to this  By the way, I am on call at [facility] and only have limited time to deal with outpatient charting   Please call nurse at office directly or these messages will be sitting in inbox for days  ----- Message -----  From: Crissie Soulier, RN  Sent: 12/23/2022  10:25 AM EST  To: Gonzalo Louie MD, Neva Robin, MIRIAM, #    Zoe  She needs a RX for the Toprol 25 mg BID. I placed the [address] on her profile, she is staying at daughter's house. Can you please send it in there. Also Dr. Villalobos Lobe- would you be willing to send in some tessalon pearles 100 mg Q8H for her cough-  24 tablets. she is coughing a lot. If not, I can ask her PCP. Thank you  Ashlie Gao S [facility] Transition Nurse  Direct phone:  [phone]

## 2022-12-29 ENCOUNTER — PATIENT OUTREACH (OUTPATIENT)
Dept: CASE MANAGEMENT | Age: 72
End: 2022-12-29

## 2022-12-29 ENCOUNTER — CLINICAL SUPPORT (OUTPATIENT)
Dept: CARDIOLOGY CLINIC | Age: 72
End: 2022-12-29
Payer: MEDICARE

## 2022-12-29 VITALS
RESPIRATION RATE: 18 BRPM | SYSTOLIC BLOOD PRESSURE: 118 MMHG | DIASTOLIC BLOOD PRESSURE: 80 MMHG | BODY MASS INDEX: 37.22 KG/M2 | HEIGHT: 64 IN | WEIGHT: 218 LBS | HEART RATE: 86 BPM | OXYGEN SATURATION: 97 %

## 2022-12-29 DIAGNOSIS — I25.5 ISCHEMIC CARDIOMYOPATHY: ICD-10-CM

## 2022-12-29 DIAGNOSIS — I25.119 ATHEROSCLEROSIS OF NATIVE CORONARY ARTERY OF NATIVE HEART WITH ANGINA PECTORIS (HCC): ICD-10-CM

## 2022-12-29 DIAGNOSIS — I48.19 PERSISTENT ATRIAL FIBRILLATION (HCC): Primary | ICD-10-CM

## 2022-12-29 PROCEDURE — 93000 ELECTROCARDIOGRAM COMPLETE: CPT | Performed by: SPECIALIST

## 2022-12-29 NOTE — PROGRESS NOTES
Care Transitions Follow Up Call    Challenges to be reviewed by the provider   Additional needs identified to be addressed with provider: yes    CTN working with EP cardiology to determine dose of amiodarone after 23. Method of communication with provider : staff message    Care Transition Nurse (CTN) contacted the patient by telephone to follow up after admission on 22. Verified name and  with patient as identifiers. Addressed changes since last contact: refer to goals section. CTN reviewed current symptoms, discharge instructions, medical action plan and red flags with patient and discussed any barriers to care and/or understanding of plan of care after discharge. Discussed appropriate site of care based on symptoms and resources available to patient including: Specialist, After hours contact number- , MyChart Messaging, and CTN and Urgent Care . The patient agrees to contact the PCP office for questions related to their healthcare. [facility] follow up appointment(s):   Future Appointments   Date Time Provider Denis Hurst   2023  1:00 PM MD RANDOLPH Lion BS [facility]   2023 10:30 AM Precious Barnett NP [facility] BS AMB   2023 10:00 AM Willparris Morrison Pastel,  [address]  BS AMB   3/15/2023  9:00 AM REMOTE1, JOSE ALBERTO DICKSON BS AMB   2023  2:00 PM PACEMAKER3, JOSE ALBERTO DICKSON BS AMB   2023  2:20 PM MD RANDOLPH Lion BS [facility] follow up appointment(s): refer to goals section    CTN provided contact information for future needs. Plan for follow-up call in 5-7 days based on severity of symptoms and risk factors. Plan for next call:   Assess current symptoms, including daily monitoring items  status of PRN meds managing symptoms related to FLU. Course, dose of amiodarone clarified after 23. Follow up with primary cardiology in place  Plan for ablation per EP, scheduled.       Goals Addressed                   This Visit's Progress Heart Failure     Patient verbalizes understanding of self-management goals of living with Congestive Heart Failure and Treatment for recurrent/chronic AFib        12/29/22- spoke with Ms. Mandi Sampson- she feels much better, tires easily. Asked her to be regularly active, but rest when she feels tired. Stay hydrated and eat for recovery- focus on low NA. Coughing less, PRN meds at home and using to control symptoms with good relief. She was seen at Cardiology, Dr. Marli Moreno office today, VS and EKG done. Shows AFib with good rate control at 80 bpm.  118/80, HR 80. Weight reported 218 lbs. She is monitoring BP and HR and daily weight at home. Reports they are good, weight is steady. She confirms continuing amiodarone 200 mg- 3 tabs TID dose until 12/30. Will start on 12/31 200 mg BID. On 1/14/21- transition to  200 daily. CTN will clarify with cardiology if she is to stop 200 mg daily or continue until seen for follow up on 1/26. If she is to continue, will need RX sent. [facility]     12/23/22- spoke with Ms. Mandi Sampson. She reports increased cough. CTN was able to secure PRN RX for Tessalon. Advised to keep cough symptoms controlled to help with AFib control. Asked her to reach out to PCP if symptoms are not controlled by PRN meds. She is aware that she converted back and remains in AFib, explained goal of rate control. CTN clarified dosing for amiodarone with EP- discharge instructions not correct, updated in CC current med list. Amiodarone 400 mg TID for 9 days, then 200 mg BID for 14 days, then 200 mg daily. CTN asked EP about Toprol XL 25 mg BID dosing referenced in progress note, but no order written, patient does not have this medication at home. Cardiology sent in RX to pharmacy. Clarified about dose of KCL to be taken with changed dose of bumex. Continue 20 meq 2 tablets daily. Confirmed she has stopped: Diltiazem and Entresto. She is not taking imdur/isosorbide.   Note: intolerance to Femara in Hem-oncology note. Discussion about how to help with Flu recovery:  reduce-treat symptoms, maintain hydration, focus on healthy foods rich with vitamin C and anti-oxidants. Be active, ambulate regularly, rest when tired. Pt will remain out of the hospital or ER for remainder of Bundle period. [facility]              Post Hospitalization     Attends follow-up appointments as directed. 12/23/22-   PCP- Dr. Jean Barnes reached out to secure [employer] appt. Cardiology- Primary- Dr. Campos Joy- has appt on 12/27 at 9 am. States she forgot, will call tomorrow to reschedule. EP- Dr. Renetta Anna- has visit on 12/29 for EKG. Attended-[facility]   CTN will clarify with provider, if needs to keep since she is seeing primary card on 12/27.         [facility]

## 2022-12-29 NOTE — PROGRESS NOTES
Per Dr. Zach Poon, patient in rate controlled afib. Continue amiodarone therapy. Patient states that she feels much better since hospitalization, except that she feels weak. Instructed her to call office with any concerns.

## 2022-12-29 NOTE — PROGRESS NOTES
.Visit Vitals  /80 (BP 1 Location: Left upper arm, BP Patient Position: Sitting, BP Cuff Size: Adult)   Pulse 86   Resp 18   Ht 5' 4\" (1.626 m)   Wt 218 lb (98.9 kg)   SpO2 97%   BMI 37.42 kg/m²

## 2022-12-30 ENCOUNTER — TELEPHONE (OUTPATIENT)
Dept: CARDIOLOGY CLINIC | Age: 72
End: 2022-12-30

## 2022-12-30 NOTE — TELEPHONE ENCOUNTER
Verified patient with two types of identifiers. Clarified amiodarone dosage instructions with patient. Informed patient that once she begins to take one tablet daily, she will continue to do that until instructed otherwise by Dr. Brielle Castillo. Patient verbalized understanding and will call with any other questions.

## 2023-01-03 NOTE — TELEPHONE ENCOUNTER
----- Message from Amelia Galeazzi sent at 7/18/2022 11:31 AM EDT -----  Subject: Appointment Request    Reason for Call: Established Patient Appointment needed: Routine Existing   Condition Follow Up    QUESTIONS    Reason for appointment request? Available appointments did not meet   patient need     Additional Information for Provider? pt needs to reschedule appt for   7/20/22 for a later time dt recent surgery.    ---------------------------------------------------------------------------  --------------  Jacki MOLINA  [phone]; OK to leave message on voicemail  ---------------------------------------------------------------------------  --------------  SCRIPT ANSWERS  COVID Screen: David López Gerardo calling today in regards to recommendations previously given. Gerardo's recipient has another approved donor and they are scheduling soon. Gerardo would like to possibly move forward as NDD. He started BP meds one month ago-- and his doctor just increased his dose.Gerardo will reach out when he is ready.

## 2023-01-04 ENCOUNTER — TELEPHONE (OUTPATIENT)
Dept: INTERNAL MEDICINE CLINIC | Age: 73
End: 2023-01-04

## 2023-01-04 NOTE — TELEPHONE ENCOUNTER
Lemuel Wharton with [facility]  called and stated that the pt called them yesterday and has canceled her home health services

## 2023-01-06 ENCOUNTER — PATIENT OUTREACH (OUTPATIENT)
Dept: CASE MANAGEMENT | Age: 73
End: 2023-01-06

## 2023-01-06 NOTE — PROGRESS NOTES
Care Transitions Follow Up Call    Challenges to be reviewed by the provider   Additional needs identified to be addressed with provider: yes    BP steady but HR continues to fluctuate on home values. Slowly increasing stamina, tires easily. Feels resolved from FLU. Wt steady at 222 lbs. Denies LE edema, CP, SOB. She agreed to check on supply of amiodarone and let CTN/office know if she needs RX sent prior to [address] with EP on 23. Rescheduled follow up with Dr. Margarita Cervantes to 23. Method of communication with provider : chart routing    Care Transition Nurse (CTN) contacted the patient by telephone to follow up after admission on 22. Verified name and  with patient as identifiers. Addressed changes since last contact: refer to goals section and above yellow box. CTN reviewed current symptoms, discharge instructions, medical action plan and red flags with patient and discussed any barriers to care and/or understanding of plan of care after discharge. Discussed appropriate site of care based on symptoms and resources available to patient including: Specialist, After hours contact number- , Urgent Care Clinics, [address] [place] [address], and CTN and [facility] . The patient agrees to contact the PCP and/or specialist office for questions related to their healthcare. [facility] follow up appointment(s):   Future Appointments   Date Time Provider Denis Hurst   1/10/2023 10:30 AM MD ROSIO BurrisMA BS AMB   2023  1:00 PM MD RANDOLPH Arguelles BS AMB   2023 10:30 AM Mavis West NP [facility] BS AMB   2023 10:00 AM Abelino Fowler,  [address] BS AMB   3/15/2023  9:00 AM REMOTE1JOSE ALBERTO BS AMB   2023  2:00 PM PACEMAKER3, JOSE ALBERTO DICKSON BS AMB   2023  2:20 PM MD RANDOLPH Arguelles BS AMB     Non-[facility] follow up appointment(s): refer to goals section    CTN provided contact information for future needs.  Plan for follow-up call in 5-7 days based on severity of symptoms and risk factors. Plan for next call:   Assess current symptoms, including HR and BP. Attended follow up with PCP on 1/10     Goals Addressed                   This Visit's Progress       Heart Failure     Patient verbalizes understanding of self-management goals of living with Congestive Heart Failure and Treatment for recurrent/chronic AFib        1/6/23- spoke with Ms. Sasha Christensen- she is feeling better, tires easily but does feel it is slowly improving each day. She feels recovered from Flu. Denies LE edema, SOB and/or chest pain. Has been checking wts- steady around 222 lbs. Monitoring BP and HR- BP steady but HR fluctuates up and down. Asked her to check to make sure she has enough amiodarone to take until she attends follow up with EP on 1/26/23. [facility]     12/29/22- spoke with Ms. Sasha Christensen- she feels much better, tires easily. Asked her to be regularly active, but rest when she feels tired. Stay hydrated and eat for recovery- focus on low NA. Coughing less, PRN meds at home and using to control symptoms with good relief. She was seen at Cardiology, Dr. Guerra [place] office today, VS and EKG done. Shows AFib with good rate control at 80 bpm.  118/80, HR 80. Weight reported 218 lbs. She is monitoring BP and HR and daily weight at home. Reports they are good, weight is steady. She confirms continuing amiodarone 200 mg- 3 tabs TID dose until 12/30. Will start on 12/31 200 mg BID. On 1/14/21- transition to  200 daily. CTN will clarify with cardiology if she is to stop 200 mg daily or continue until seen for follow up on 1/26. If she is to continue, will need RX sent. [facility]     12/23/22- spoke with Ms. Sasha Christensen. She reports increased cough. CTN was able to secure PRN RX for Tessalon. Advised to keep cough symptoms controlled to help with AFib control. Asked her to reach out to PCP if symptoms are not controlled by PRN meds.     She is aware that she converted back and remains in AFib, explained goal of rate control. CTN clarified dosing for amiodarone with EP- discharge instructions not correct, updated in CC current med list. Amiodarone 400 mg TID for 9 days, then 200 mg BID for 14 days, then 200 mg daily. CTN asked EP about Toprol XL 25 mg BID dosing referenced in progress note, but no order written, patient does not have this medication at home. Cardiology sent in RX to pharmacy. Clarified about dose of KCL to be taken with changed dose of bumex. Continue 20 meq 2 tablets daily. Confirmed she has stopped: Diltiazem and Entresto. She is not taking imdur/isosorbide. Note: intolerance to Femara in Hem-oncology note. Discussion about how to help with Flu recovery:  reduce-treat symptoms, maintain hydration, focus on healthy foods rich with vitamin C and anti-oxidants. Be active, ambulate regularly, rest when tired. Pt will remain out of the hospital or ER for remainder of Bundle period. [facility]              Post Hospitalization     COMPLETED: Supportive resources in place to maintain patient in the community (ie. Home Health, DME equipment, refer to, medication assistant plan, etc.)        1/6/23- per PCP documentation, HH called to say patient declined services. [facility]     12/23/22-   CTN spoke with Brooklynn Cornell they were notified on 12/19 by , [facility] will follow. CTN spoke with in-pt CM, Funmilayo Valentine. Confusion, but referral was picked up by Monalisa ([facility] ). Spoke with Sunny Sommer, they state they were notified on 12/21 by CM, Hilda Madsen would be following. They state they can  if  will re-send information through [address] [place] [address],[facility]. CTN asked Funmilayo to send. They state that  will see on 12/24/22 for [facility]. Spoke with ms. Jenni Sandhu- she discharged to daughter's home. SonAsuncion has flu, and some other family members may have as well. CTN updated closer pharmacy to daughter's home- [facility].       [facility]

## 2023-01-07 DIAGNOSIS — F51.04 CHRONIC INSOMNIA: ICD-10-CM

## 2023-01-09 RX ORDER — ZOLPIDEM TARTRATE 10 MG/1
10 TABLET ORAL
Qty: 30 TABLET | Refills: 1 | Status: SHIPPED | OUTPATIENT
Start: 2023-01-09

## 2023-01-10 ENCOUNTER — OFFICE VISIT (OUTPATIENT)
Dept: INTERNAL MEDICINE CLINIC | Age: 73
End: 2023-01-10
Payer: MEDICARE

## 2023-01-10 VITALS
RESPIRATION RATE: 12 BRPM | DIASTOLIC BLOOD PRESSURE: 86 MMHG | SYSTOLIC BLOOD PRESSURE: 121 MMHG | HEIGHT: 64 IN | TEMPERATURE: 97.4 F | WEIGHT: 225 LBS | OXYGEN SATURATION: 95 % | BODY MASS INDEX: 38.41 KG/M2 | HEART RATE: 98 BPM

## 2023-01-10 DIAGNOSIS — Z23 NEEDS FLU SHOT: ICD-10-CM

## 2023-01-10 DIAGNOSIS — Z09 HOSPITAL DISCHARGE FOLLOW-UP: Primary | ICD-10-CM

## 2023-01-10 DIAGNOSIS — F51.04 CHRONIC INSOMNIA: ICD-10-CM

## 2023-01-10 DIAGNOSIS — F33.1 MODERATE EPISODE OF RECURRENT MAJOR DEPRESSIVE DISORDER (HCC): ICD-10-CM

## 2023-01-10 DIAGNOSIS — J45.20 REACTIVE AIRWAY DISEASE, MILD INTERMITTENT, UNCOMPLICATED: ICD-10-CM

## 2023-01-10 DIAGNOSIS — E66.01 SEVERE OBESITY (BMI 35.0-39.9) WITH COMORBIDITY (HCC): ICD-10-CM

## 2023-01-10 DIAGNOSIS — I50.22 CHRONIC SYSTOLIC (CONGESTIVE) HEART FAILURE (HCC): ICD-10-CM

## 2023-01-10 DIAGNOSIS — I48.19 PERSISTENT ATRIAL FIBRILLATION (HCC): ICD-10-CM

## 2023-01-10 DIAGNOSIS — I25.119 ATHEROSCLEROSIS OF NATIVE CORONARY ARTERY OF NATIVE HEART WITH ANGINA PECTORIS (HCC): ICD-10-CM

## 2023-01-10 DIAGNOSIS — Z17.0 MALIGNANT NEOPLASM OF RIGHT BREAST IN FEMALE, ESTROGEN RECEPTOR POSITIVE, UNSPECIFIED SITE OF BREAST (HCC): ICD-10-CM

## 2023-01-10 DIAGNOSIS — C50.911 MALIGNANT NEOPLASM OF RIGHT BREAST IN FEMALE, ESTROGEN RECEPTOR POSITIVE, UNSPECIFIED SITE OF BREAST (HCC): ICD-10-CM

## 2023-01-10 DIAGNOSIS — F41.1 GENERALIZED ANXIETY DISORDER: ICD-10-CM

## 2023-01-10 PROCEDURE — 1101F PT FALLS ASSESS-DOCD LE1/YR: CPT | Performed by: INTERNAL MEDICINE

## 2023-01-10 PROCEDURE — G9717 DOC PT DX DEP/BP F/U NT REQ: HCPCS | Performed by: INTERNAL MEDICINE

## 2023-01-10 PROCEDURE — 3017F COLORECTAL CA SCREEN DOC REV: CPT | Performed by: INTERNAL MEDICINE

## 2023-01-10 PROCEDURE — G8427 DOCREV CUR MEDS BY ELIG CLIN: HCPCS | Performed by: INTERNAL MEDICINE

## 2023-01-10 PROCEDURE — G0008 ADMIN INFLUENZA VIRUS VAC: HCPCS | Performed by: INTERNAL MEDICINE

## 2023-01-10 PROCEDURE — 1111F DSCHRG MED/CURRENT MED MERGE: CPT | Performed by: INTERNAL MEDICINE

## 2023-01-10 PROCEDURE — G8417 CALC BMI ABV UP PARAM F/U: HCPCS | Performed by: INTERNAL MEDICINE

## 2023-01-10 PROCEDURE — G8536 NO DOC ELDER MAL SCRN: HCPCS | Performed by: INTERNAL MEDICINE

## 2023-01-10 PROCEDURE — 99214 OFFICE O/P EST MOD 30 MIN: CPT | Performed by: INTERNAL MEDICINE

## 2023-01-10 PROCEDURE — 1090F PRES/ABSN URINE INCON ASSESS: CPT | Performed by: INTERNAL MEDICINE

## 2023-01-10 PROCEDURE — 3074F SYST BP LT 130 MM HG: CPT | Performed by: INTERNAL MEDICINE

## 2023-01-10 PROCEDURE — 1123F ACP DISCUSS/DSCN MKR DOCD: CPT | Performed by: INTERNAL MEDICINE

## 2023-01-10 PROCEDURE — 3079F DIAST BP 80-89 MM HG: CPT | Performed by: INTERNAL MEDICINE

## 2023-01-10 PROCEDURE — 90694 VACC AIIV4 NO PRSRV 0.5ML IM: CPT | Performed by: INTERNAL MEDICINE

## 2023-01-10 PROCEDURE — G9899 SCRN MAM PERF RSLTS DOC: HCPCS | Performed by: INTERNAL MEDICINE

## 2023-01-10 RX ORDER — TRAZODONE HYDROCHLORIDE 50 MG/1
TABLET ORAL
Qty: 30 TABLET | Refills: 1 | Status: SHIPPED | OUTPATIENT
Start: 2023-01-10

## 2023-01-10 RX ORDER — ALBUTEROL SULFATE 90 UG/1
AEROSOL, METERED RESPIRATORY (INHALATION)
Qty: 18 EACH | Refills: 11 | Status: SHIPPED | OUTPATIENT
Start: 2023-01-10

## 2023-01-10 RX ORDER — FLUOXETINE HYDROCHLORIDE 40 MG/1
40 CAPSULE ORAL 2 TIMES DAILY
Qty: 180 CAPSULE | Refills: 3 | Status: SHIPPED | OUTPATIENT
Start: 2023-01-10

## 2023-01-10 NOTE — PROGRESS NOTES
Yue Verdin  Identified pt with two pt identifiers(name and ). Chief Complaint   Patient presents with    Hospital Follow Up       Reviewed record In preparation for visit and have obtained necessary documentation. 1. Have you been to the ER, urgent care clinic or hospitalized since your last visit? No     2. Have you seen or consulted any other health care providers outside of the [address] [place] [address] since your last visit? Include any pap smears or colon screening. No    Vitals reviewed with provider. Health Maintenance reviewed: After verbal order read back of Dr. Valerie Becerril, patient received High Dose Flu Shot (Adjuvanted Fluad) in Left deltoid. Otto Moya 47:  [id number] Lot: [id number] Exp: 2023. Patient tolerated procedure without complaints and received VIS.     Health Maintenance Due   Topic    COVID-19 Vaccine (3 - Booster for Pfizer series)    Flu Vaccine (1)          Wt Readings from Last 3 Encounters:   01/10/23 225 lb (102.1 kg)   22 218 lb (98.9 kg)   22 223 lb 8 oz (101.4 kg)        Temp Readings from Last 3 Encounters:   01/10/23 97.4 °F (36.3 °C) (Oral)   22 98.1 °F (36.7 °C)   22 97.6 °F (36.4 °C) (Temporal)        BP Readings from Last 3 Encounters:   01/10/23 121/86   22 118/80   22 107/77        Pulse Readings from Last 3 Encounters:   01/10/23 98   22 86   22 (!) 105        Vitals:    01/10/23 1048   BP: 121/86   Pulse: 98   Resp: 12   Temp: 97.4 °F (36.3 °C)   TempSrc: Oral   SpO2: 95%   Weight: 225 lb (102.1 kg)   Height: 5' 4\" (1.626 m)   PainSc:   5   PainLoc: Ear          Learning Assessment:   :       Learning Assessment 10/26/2017 2015   PRIMARY LEARNER Patient Patient   HIGHEST LEVEL OF EDUCATION - PRIMARY LEARNER  - 2 YEARS OF COLLEGE   BARRIERS PRIMARY LEARNER - NONE   CO-LEARNER CAREGIVER - No   PRIMARY LANGUAGE ENGLISH ENGLISH   LEARNER PREFERENCE PRIMARY READING DEMONSTRATION   ANSWERED BY patient pateint RELATIONSHIP SELF SELF        Depression Screening:   :       3 most recent PHQ Screens 1/10/2023   PHQ Not Done -   Little interest or pleasure in doing things Nearly every day   Feeling down, depressed, irritable, or hopeless Nearly every day   Total Score PHQ 2 6   Trouble falling or staying asleep, or sleeping too much Nearly every day   Feeling tired or having little energy Nearly every day   Poor appetite, weight loss, or overeating Not at all   Feeling bad about yourself - or that you are a failure or have let yourself or your family down Not at all   Trouble concentrating on things such as school, work, reading, or watching TV Not at all   Moving or speaking so slowly that other people could have noticed; or the opposite being so fidgety that others notice Not at all   Thoughts of being better off dead, or hurting yourself in some way Not at all   PHQ 9 Score 12   How difficult have these problems made it for you to do your work, take care of your home and get along with others Very difficult        Fall Risk Assessment:   :       Fall Risk Assessment, last 12 mths 1/10/2023   Able to walk? Yes   Fall in past 12 months? 0   Do you feel unsteady? 1   Are you worried about falling 0   Is TUG test greater than 12 seconds? -   Is the gait abnormal? -   Number of falls in past 12 months -   Fall with injury? -        Abuse Screening:   :       Abuse Screening Questionnaire 1/10/2023 8/15/2022 7/21/2021 7/13/2020 5/29/2018 7/26/2016 6/30/2015   Do you ever feel afraid of your partner? N N N N N N N   Are you in a relationship with someone who physically or mentally threatens you? N N N N N N N   Is it safe for you to go home?  Y Y Y Y Y Y Y        ADL Screening:   :       ADL Assessment 1/10/2023   Feeding yourself No Help Needed   Getting from bed to chair No Help Needed   Getting dressed No Help Needed   Bathing or showering No Help Needed   Walk across the room (includes cane/walker) No Help Needed   Using the telphone No Help Needed   Taking your medications No Help Needed   Preparing meals No Help Needed   Managing money (expenses/bills) No Help Needed   Moderately strenuous housework (laundry) No Help Needed   Shopping for personal items (toiletries/medicines) No Help Needed   Shopping for groceries No Help Needed   Driving No Help Needed   Climbing a flight of stairs No Help Needed   Getting to places beyond walking distances No Help Needed

## 2023-01-10 NOTE — PROGRESS NOTES
CC:   Chief Complaint   Patient presents with    Hospital Follow Up       HISTORY OF PRESENT ILLNESS  Alondra Randall is a 81 y.o. female. Presents for hospital follow up evaluation. Hospitalized at [facility]  from 12/17-12/22/22 for a-fib with RVR, acute on chronic CHF, and influenza A. Underwent cardioversion but went back into a-fib after 24 hrs. Discharged on amiodarone with plans to undergo cardiac ablation. Treated with Tamiflu. Has appointment with Dr. Maria Guadalupe Paulino on 1/16/23 and Dr. Ailin Fitch on 1/26/23. Today complains of feeling very tired and weak all over. She cancelled home PT/home health. Receiving help from her daughter-in-law . Has mild RODNEY and occasional sensation of heart racing. Denies SOB at rest, CP, dizziness, or leg swelling. Feels flu symptoms have cleared completed. Complains of not sleeping well even with Ambien 10 mg nightly. Falls asleep then wakes up after 2-3 hrs and cannot get back to sleep. Has been feeling more depressed since hospitalization. Denies SI. See PHQ screen below.       3 most recent PHQ Screens 1/10/2023   PHQ Not Done -   Little interest or pleasure in doing things Nearly every day   Feeling down, depressed, irritable, or hopeless Nearly every day   Total Score PHQ 2 6   Trouble falling or staying asleep, or sleeping too much Nearly every day   Feeling tired or having little energy Nearly every day   Poor appetite, weight loss, or overeating Not at all   Feeling bad about yourself - or that you are a failure or have let yourself or your family down Not at all   Trouble concentrating on things such as school, work, reading, or watching TV Not at all   Moving or speaking so slowly that other people could have noticed; or the opposite being so fidgety that others notice Not at all   Thoughts of being better off dead, or hurting yourself in some way Not at all   PHQ 9 Score 12   How difficult have these problems made it for you to do your work, take care of your home and get along with others Very difficult        Patient Active Problem List   Diagnosis Code    HTN (hypertension) I10    History of gastric bypass Z98.84    Moderate episode of recurrent major depressive disorder (HCC) F33.1    CAD (coronary artery disease) Q44.62    Systolic CHF, chronic (HCC) I50.22    Hyperlipidemia E78.5    Mitral valve regurgitation I34.0    History of MI (myocardial infarction) I25.2    Cardiomyopathy ([facility] I42.9    Osteoporosis M81.0    Morbid obesity with BMI of 40.0-44.9, adult ([facility] E66.01, Z68.41    Chronic insomnia F51.04    S/P ICD (internal cardiac defibrillator) procedure Z95.810    Primary osteoarthritis of both knees M17.0    Mild intermittent asthma without complication Y14.65    Generalized anxiety disorder F41.1    Malignant neoplasm of right breast in female, estrogen receptor positive (HCC) C50.911, Z17.0    Paroxysmal atrial fibrillation (HCC) I48.0    A-fib (HCC) I48.91    History of cardioversion Z98.890     Past Medical History:   Diagnosis Date    Acute right ankle pain 06/08/2018 5/29/18: X-ray showed possible right ankle sprain. 6/6/18: saw Dr. Monica Yu ([facility]), diagnosed with peroneal tendonitis. Prescribed an ASO    Asthma     Atrial fibrillation ([facility]     Cardiomyopathy ([facility]     Coronary artery disease 2008    s/p RCA stent ([facility]) on 11/26/11    Depression with anxiety     2011    DVT (deep venous thrombosis) ([facility] 07/27/2010    Family history of early CAD     GERD (gastroesophageal reflux disease)     H/O gastric bypass 2006    Revision in 2009    Hepatitis C antibody test positive     Does not have chronic hep C (labs 10/6/15: neg HCV RNA)     HTN (hypertension) 07/27/2010    Hyperlipidemia 07/27/2010    Incontinence of feces 09/03/2018    Dr. Bob Alvarez. 8/20/18: Improving with pelvic physical therapy and psyllium husk treatment.  Saw Dr. Anabell Peguero who suggested PT first. MR defecography showed pelvic floor weakness with pelvic descensus, small anterior  Rectocele, and vaginal prolapse. To have pelvic PT weekly for 8 wks and to see Dr. Naaman Epley again in Oct 2018. Joint pain 07/27/2010    MI (myocardial infarction) ([facility] 07/27/2010    Mitral valve regurgitation     Mild to moderate    Morbid obesity ([facility] 07/27/2010    Myocardial infarction [facility]) 2006 and 2011    Dr. Mary Carmen Mariee    Psychiatric disorder     PUD (peptic ulcer disease)     gi bleed 2008; ulcer n gastric bypass pouch    Urinary incontinence, stress 07/27/2010     Allergies   Allergen Reactions    Amoxicillin Anaphylaxis    Amoxicillin Anaphylaxis    Lipitor [Atorvastatin] Other (comments)     Severe muscle pain and spasms    Zocor [Simvastatin] Other (comments)     Cramps, muscle spasms    Buspar [Buspirone] Other (comments)     Drunk sensation, headaches    Hydroxyzine Other (comments)     Blurred vision, lightheadedness    Livalo [Pitavastatin] Myalgia    Pravastatin Other (comments)     Leg cramps    Tramadol Vertigo       Current Outpatient Medications   Medication Sig Dispense Refill    zolpidem (AMBIEN) 10 mg tablet TAKE 1 TABLET BY MOUTH NIGHTLY AS NEEDED FOR SLEEP. MAX DAILY AMOUNT: 10 MG. 30 Tablet 1    metoprolol succinate (TOPROL-XL) 25 mg XL tablet Take 1 Tablet by mouth two (2) times a day. 180 Tablet 1    bumetanide (BUMEX) 1 mg tablet Take 1 Tablet by mouth daily. 30 Tablet 0    amiodarone (CORDARONE) 200 mg tablet Take 2 Tablets by mouth three (3) times daily for 9 days, THEN 1 Tablet two (2) times a day for 14 days, THEN 1 Tablet daily for 7 days. EKG with Cardiology on 12/29/22; 89 Tablet 0    apixaban (ELIQUIS) 5 mg tablet Take 1 Tablet by mouth two (2) times a day. Prescribed by Dr. Geo Carolina. 180 Tablet 0    aspirin delayed-release 81 mg tablet Take 81 mg by mouth as needed for Pain.       albuterol (PROVENTIL HFA, VENTOLIN HFA, PROAIR HFA) 90 mcg/actuation inhaler TAKE 2 PUFFS BY INHALATION EVERY FOUR HOURS AS NEEDED FOR WHEEZING OR SHORTNESS OF BREATH. 18 Each 11 FLUoxetine (PROzac) 40 mg capsule Take 1 Capsule by mouth two (2) times a day. 180 Capsule 3    psyllium (METAMUCIL) powd Take  by mouth. 2 tsp daily      cholecalciferol, VITAMIN D3, (VITAMIN D3) 5,000 unit tab tablet Take 10,000 Units by mouth daily. biotin 10,000 mcg cap Take  by mouth daily. OTHER Complete multi formula, bariatric advantage      magnesium 250 mg tab Take 1 Tab by mouth daily. ferrous sulfate 325 mg (65 mg iron) tablet Take  by mouth daily (before breakfast). CALCIUM PO Take 600 mg by mouth daily. benzonatate (Tessalon Perles) 100 mg capsule Take 1 Capsule by mouth every eight (8) hours as needed for Cough. (Patient not taking: Reported on 1/10/2023) 24 Capsule 0    guaiFENesin (ROBITUSSIN) 100 mg/5 mL liquid Take 5 mL by mouth every four (4) hours as needed for Cough. (Patient not taking: Reported on 1/10/2023) 236 mL 0    ezetimibe (ZETIA) 10 mg tablet TAKE 1 TABLET BY MOUTH EVERY DAY IN THE MORNING (Patient not taking: No sig reported) 90 Tablet 1    potassium chloride (Klor-Con M20) 20 mEq tablet TAKE 2 TABLETS BY MOUTH EVERY DAY (Patient taking differently: Take 20 mEq by mouth daily. ) 180 Tablet 3    IBUPROFEN PO Take  by mouth as needed. (Patient not taking: No sig reported)           PHYSICAL EXAM  Visit Vitals  /86 (BP 1 Location: Right upper arm, BP Patient Position: Sitting)   Pulse 98   Temp 97.4 °F (36.3 °C) (Oral)   Resp 12   Ht 5' 4\" (1.626 m)   Wt 225 lb (102.1 kg)   SpO2 95%   BMI 38.62 kg/m²       General: Well-developed and well-nourished, no distress. HEENT:  Head normocephalic/atraumatic, no scleral icterus  Lungs:  Clear to ausculation bilaterally. Good air movement. Heart:  Regular rate and rhythm, normal S1 and S2, no murmur, gallop, or rub  Extremities: No clubbing, cyanosis, or edema. Neurological: Alert and oriented. Psychiatric: Normal mood and affect. Behavior is normal.           ASSESSMENT AND PLAN    ICD-10-CM ICD-9-CM    1. Hospital discharge follow-up  Z09 V67.59       2. Persistent atrial fibrillation (HCC)  I48.19 427.31       3. Moderate episode of recurrent major depressive disorder (HCC)  F33.1 296.32 FLUoxetine (PROzac) 40 mg capsule      4. Generalized anxiety disorder  F41.1 300.02 FLUoxetine (PROzac) 40 mg capsule      5. Chronic insomnia  F51.04 780.52 traZODone (DESYREL) 50 mg tablet      6. Atherosclerosis of native coronary artery of native heart with angina pectoris ([facility]  I25.119 414.01      413.9       7. Chronic systolic (congestive) heart failure (HCC)  I50.22 428.22      428.0       8. Reactive airway disease, mild intermittent, uncomplicated  H95.99 711.93 albuterol (PROVENTIL HFA, VENTOLIN HFA, PROAIR HFA) 90 mcg/actuation inhaler      9. Malignant neoplasm of right breast in female, estrogen receptor positive, unspecified site of breast ([place] [address]  C50.911 174.9     Z17.0 V86.0       10. Severe obesity (BMI 35.0-39. 9) with comorbidity ([place] [address]  E66.01 278.01       11. Needs flu shot  Z23 V04.81 INFLUENZA, FLUAD, (AGE 65 Y+), IM, PF, 0.5 ML        Diagnoses and all orders for this visit:    1. Hospital discharge follow-up  2. Persistent atrial fibrillation ([facility]  3. Moderate episode of recurrent major depressive disorder (HCC)  -     FLUoxetine (PROzac) 40 mg capsule; Take 1 Capsule by mouth two (2) times a day. 4. Generalized anxiety disorder  -     FLUoxetine (PROzac) 40 mg capsule; Take 1 Capsule by mouth two (2) times a day. 5. Chronic insomnia  -     traZODone (DESYREL) 50 mg tablet; Take 1/2 to 1 tablet by mouth nightly as needed for sleep. Do not take with Ambien. 6. Atherosclerosis of native coronary artery of native heart with angina pectoris ([facility]  7. Chronic systolic (congestive) heart failure (HCC)  8.  Reactive airway disease, mild intermittent, uncomplicated  -     albuterol (PROVENTIL HFA, VENTOLIN HFA, PROAIR HFA) 90 mcg/actuation inhaler; TAKE 2 PUFFS BY INHALATION EVERY FOUR HOURS AS NEEDED FOR WHEEZING OR SHORTNESS OF BREATH. 9. Malignant neoplasm of right breast in female, estrogen receptor positive, unspecified site of breast ([facility]  10. Severe obesity (BMI 35.0-39. 9) with comorbidity ([facility]  11. Needs flu shot  -     INFLUENZA, FLUAD, (AGE 65 Y+), IM, PF, 0.5 ML    She is in rate-controlled a-fib. Has fatigue and mild RODNEY. Keep scheduled appointment with Dr. Hoang Strickland on 1/16/23 and with Dr. Chantale Knowles on 1/26/23. Plan is for cardiac ablation for a-fib since cardioversion was unsuccessful. Chronic insomnia contributing to fatigue. Ambien no longer helping sleep. Start trazodone 50 mg 1/2-1 tab nightly for sleep and mood. Has increased depressed mood due to latest health issues. Refill Prozac 40 mg twice daily. Refill albuterol inhaler. Time Spent: 45 mins on medical record review, history, exam, discussing problems and plan, counseling, and placing orders. Follow-up and Dispositions    Return in about 3 months (around 4/10/2023), or if symptoms worsen or fail to improve, for Depression, insomnia. I have discussed the diagnosis with the patient and the intended plan as seen in the above orders. Patient is in agreement. The patient has received an after-visit summary and questions were answered concerning future plans. I have discussed medication side effects and warnings with the patient as well.

## 2023-01-10 NOTE — PATIENT INSTRUCTIONS
Vaccine Information Statement    Influenza (Flu) Vaccine (Inactivated or Recombinant): What You Need to Know    Many vaccine information statements are available in Wolof and other languages. See www.immunize.org/vis. Hojas de información sobre vacunas están disponibles en español y en muchos otros idiomas. Visite www.immunize.org/vis. 1. Why get vaccinated? Influenza vaccine can prevent influenza (flu). Flu is a contagious disease that spreads around the [facility] every year, usually between October and May. Anyone can get the flu, but it is more dangerous for some people. Infants and young children, people 72 years and older, pregnant people, and people with certain health conditions or a weakened immune system are at greatest risk of flu complications. Pneumonia, bronchitis, sinus infections, and ear infections are examples of flu-related complications. If you have a medical condition, such as heart disease, cancer, or diabetes, flu can make it worse. Flu can cause fever and chills, sore throat, muscle aches, fatigue, cough, headache, and runny or stuffy nose. Some people may have vomiting and diarrhea, though this is more common in children than adults. In an average year, thousands of people in the [facility] die from flu, and many more are hospitalized. Flu vaccine prevents millions of illnesses and flu-related visits to the doctor each year. 2. Influenza vaccines     CDC recommends everyone 6 months and older get vaccinated every flu season. Children 6 months through 6years of age may need 2 doses during a single flu season. Everyone else needs only 1 dose each flu season. It takes about 2 weeks for protection to develop after vaccination. There are many flu viruses, and they are always changing. Each year a new flu vaccine is made to protect against the influenza viruses believed to be likely to cause disease in the upcoming flu season.  Even when the vaccine doesnt exactly match these viruses, it may still provide some protection. Influenza vaccine does not cause flu. Influenza vaccine may be given at the same time as other vaccines. 3. Talk with your health care provider    Tell your vaccination provider if the person getting the vaccine:  Has had an allergic reaction after a previous dose of influenza vaccine, or has any severe, life-threatening allergies   Has ever had Guillain-Barré Syndrome (also called GBS)    In some cases, your health care provider may decide to postpone influenza vaccination until a future visit. Influenza vaccine can be administered at any time during pregnancy. People who are or will be pregnant during influenza season should receive inactivated influenza vaccine. People with minor illnesses, such as a cold, may be vaccinated. People who are moderately or severely ill should usually wait until they recover before getting influenza vaccine. Your health care provider can give you more information. 4. Risks of a vaccine reaction    Soreness, redness, and swelling where the shot is given, fever, muscle aches, and headache can happen after influenza vaccination. There may be a very small increased risk of Guillain-Barré Syndrome (GBS) after inactivated influenza vaccine (the flu shot). [facility] children who get the flu shot along with pneumococcal vaccine (PCV13) and/or DTaP vaccine at the same time might be slightly more likely to have a seizure caused by fever. Tell your health care provider if a child who is getting flu vaccine has ever had a seizure. People sometimes faint after medical procedures, including vaccination. Tell your provider if you feel dizzy or have vision changes or ringing in the ears. As with any medicine, there is a very remote chance of a vaccine causing a severe allergic reaction, other serious injury, or death. 5. What if there is a serious problem?     An allergic reaction could occur after the vaccinated person leaves the clinic. If you see signs of a severe allergic reaction (hives, swelling of the face and throat, difficulty breathing, a fast heartbeat, dizziness, or weakness), call 9-1-1 and get the person to the nearest hospital.    For other signs that concern you, call your health care provider. Adverse reactions should be reported to the Vaccine Adverse Event Reporting System (VAERS). Your health care provider will usually file this report, or you can do it yourself. Visit the VAERS website at www.vaers. [facility].gov or call 4-597.462.4393. VAERS is only for reporting reactions, and VAERS staff members do not give medical advice. 6. The National Vaccine Injury Compensation Program    The [facility] Vaccine Injury Compensation Program (VICP) is a federal program that was created to compensate people who may have been injured by certain vaccines. Claims regarding alleged injury or death due to vaccination have a time limit for filing, which may be as short as two years. Visit the VICP website at www.[facility].gov/vaccinecompensation or call 6-748.144.4740 to learn about the program and about filing a claim. 7. How can I learn more? Ask your health care provider. Call your local or state health department. Visit the website of the Food and Drug Administration (FDA) for vaccine package inserts and additional information at www.fda.gov/vaccines-blood-biologics/vaccines. Contact the Centers for Disease Control and Prevention (CDC): Call 3-422.792.3516 (1-800-CDC-INFO) or  Visit CDCs influenza website at www.cdc.gov/flu. Vaccine Information Statement   Inactivated Influenza Vaccine   8/6/2021  42 SHANTHI Janette Sharri [id number]   Department of Health and Human Services  Centers for Disease Control and Prevention    Office Use Only

## 2023-01-17 ENCOUNTER — PATIENT OUTREACH (OUTPATIENT)
Dept: CASE MANAGEMENT | Age: 73
End: 2023-01-17

## 2023-01-17 NOTE — PROGRESS NOTES
Care Transitions Follow Up Call    Challenges to be reviewed by the provider   Additional needs identified to be addressed with provider: yes    CTN has not been able to reach patient to complete follow up outreach assessment. Wanted to follow up on cardiology appointment scheduled for 1/16- plan for AFib. Has follow up with EP affiliated with [facility]. Method of communication with provider : none    Care Transition Nurse (CTN) contacted the patient by telephone to follow up after admission on 12/17/22. Left VM asking for return call. Addressed changes since last contact: refer to above yellow box and goals section. [facility] follow up appointment(s):   Future Appointments   Date Time Provider Denis Hurst   1/26/2023  1:00 PM MD RANDOLPH Meléndez BS AMB   2/7/2023 10:30 AM Al Leahy NP [facility] BS AMB   2/21/2023 10:00 AM Rafaela Bishop  [address] BS AMB   3/15/2023  9:00 AM REMOTE1, JOSE ALBERTO DICKSON BS AMB   4/10/2023 11:10 AM MD GAGAN Sy BS AMB   12/14/2023  2:00 PM PACEMAKER3, JOSE ALBERTO DICKSON BS AMB   12/14/2023  2:20 PM MD RANDOLPH Meléndez BS AMB     Non-[facility] follow up appointment(s): refer to goals section    CTN provided contact information for future needs. Plan for follow-up call in 5-7 days based on severity of symptoms and risk factors. Plan for next call:   Complete follow up outreach assessment  Assess current symptoms including daily monitoring items  Attended follow up with cardiology on 1/16? Plan for AFib- EP follow up- her practice vs. Continue with Dr. Arina Wayne. Follow up on sleep- PCP added new medication. Follow up on mood-depression, PCP increased dose of medication. Assess if AMD in place, if so, secure copy for EMR. If not, assist with completing.       Goals Addressed                   This Visit's Progress       Heart Failure     Patient verbalizes understanding of self-management goals of living with Congestive Heart Failure and Treatment for recurrent/chronic AFib        1/17/23- Left VM asking for return call. Review of EMR shows: she followed up with PCP on 1/10/23- /86, HR98 wt- 225 lbs. Reported weak and tired. Her DIL was assisting, declined HH svcs. Reports mild RODNEY with occ HR increase, denied SOB and CP at rest.   PCP added second medication to help with insomnia- trazadone 50 mg- 1/2 tab HS PRN. PCP increased prozac to 40 mg BID. Renewed albuterol inhaler for \"reactive airway disease\". Follow up on 4/10/23. [facility]     1/6/23- spoke with Ms. Carlos Mooney- she is feeling better, tires easily but does feel it is slowly improving each day. She feels recovered from Flu. Denies LE edema, SOB and/or chest pain. Has been checking wts- steady around 222 lbs. Monitoring BP and HR- BP steady but HR fluctuates up and down. Asked her to check to make sure she has enough amiodarone to take until she attends follow up with EP on 1/26/23. [facility]     12/29/22- spoke with Ms. Carlos Mooney- she feels much better, tires easily. Asked her to be regularly active, but rest when she feels tired. Stay hydrated and eat for recovery- focus on low NA. Coughing less, PRN meds at home and using to control symptoms with good relief. She was seen at Cardiology, Dr. Annemarie Maria office today, VS and EKG done. Shows AFib with good rate control at 80 bpm.  118/80, HR 80. Weight reported 218 lbs. She is monitoring BP and HR and daily weight at home. Reports they are good, weight is steady. She confirms continuing amiodarone 200 mg- 3 tabs TID dose until 12/30. Will start on 12/31 200 mg BID. On 1/14/21- transition to  200 daily. CTN will clarify with cardiology if she is to stop 200 mg daily or continue until seen for follow up on 1/26. If she is to continue, will need RX sent. [facility]     12/23/22- spoke with Ms. Carlos Mooney. She reports increased cough. CTN was able to secure PRN RX for Tessalon.   Advised to keep cough symptoms controlled to help with AFib control. Asked her to reach out to PCP if symptoms are not controlled by PRN meds. She is aware that she converted back and remains in AFib, explained goal of rate control. CTN clarified dosing for amiodarone with EP- discharge instructions not correct, updated in [facility] current med list. Amiodarone 400 mg TID for 9 days, then 200 mg BID for 14 days, then 200 mg daily. CTN asked EP about Toprol XL 25 mg BID dosing referenced in progress note, but no order written, patient does not have this medication at home. Cardiology sent in RX to pharmacy. Clarified about dose of KCL to be taken with changed dose of bumex. Continue 20 meq 2 tablets daily. Confirmed she has stopped: Diltiazem and Entresto. She is not taking imdur/isosorbide. Note: intolerance to Femara in Hem-oncology note. Discussion about how to help with Flu recovery:  reduce-treat symptoms, maintain hydration, focus on healthy foods rich with vitamin C and anti-oxidants. Be active, ambulate regularly, rest when tired. Pt will remain out of the hospital or ER for remainder of Bundle period. [facility]              Post Hospitalization     Attends follow-up appointments as directed. 1/6/23-   PCP- Dr. Clay Oliver- 1/10 10:30 am  attended-[facility]   Cardiology- Primary- Dr. Brittany Boyle- appt on 12/27 at 9 am; She rescheduled to 1/16     EP- Dr. Tamia Blount- 1/26/23-1 pm- will determine plan for DCCV-AFib. [facility]     12/23/22-   PCP- Dr. Tyesha Pearce reached out to [facility] appt. Cardiology- Primary- Dr. Brittany Boyle- has appt on 12/27 at 9 am. States she forgot, will call tomorrow to reschedule. EP- Dr. Tamia Blount- has visit on 12/29 for EKG. Attended-[facility]   CTN will clarify with provider, if needs to keep since she is seeing primary card on 12/27.         [employer]

## 2023-01-26 ENCOUNTER — OFFICE VISIT (OUTPATIENT)
Dept: CARDIOLOGY CLINIC | Age: 73
End: 2023-01-26
Payer: MEDICARE

## 2023-01-26 VITALS
RESPIRATION RATE: 18 BRPM | HEART RATE: 90 BPM | BODY MASS INDEX: 39.91 KG/M2 | DIASTOLIC BLOOD PRESSURE: 80 MMHG | WEIGHT: 233.8 LBS | HEIGHT: 64 IN | SYSTOLIC BLOOD PRESSURE: 110 MMHG | OXYGEN SATURATION: 96 %

## 2023-01-26 DIAGNOSIS — I48.0 PAROXYSMAL ATRIAL FIBRILLATION (HCC): ICD-10-CM

## 2023-01-26 DIAGNOSIS — I48.0 PAROXYSMAL ATRIAL FIBRILLATION (HCC): Primary | ICD-10-CM

## 2023-01-26 LAB
ANION GAP SERPL CALC-SCNC: 7 MMOL/L (ref 5–15)
BASOPHILS # BLD: 0.1 K/UL (ref 0–0.1)
BASOPHILS NFR BLD: 1 % (ref 0–1)
BUN SERPL-MCNC: 22 MG/DL (ref 6–20)
BUN/CREAT SERPL: 19 (ref 12–20)
CALCIUM SERPL-MCNC: 9 MG/DL (ref 8.5–10.1)
CHLORIDE SERPL-SCNC: 107 MMOL/L (ref 97–108)
CO2 SERPL-SCNC: 28 MMOL/L (ref 21–32)
CREAT SERPL-MCNC: 1.17 MG/DL (ref 0.55–1.02)
DIFFERENTIAL METHOD BLD: ABNORMAL
EOSINOPHIL # BLD: 0.3 K/UL (ref 0–0.4)
EOSINOPHIL NFR BLD: 2 % (ref 0–7)
ERYTHROCYTE [DISTWIDTH] IN BLOOD BY AUTOMATED COUNT: 18.9 % (ref 11.5–14.5)
GLUCOSE SERPL-MCNC: 96 MG/DL (ref 65–100)
HCT VFR BLD AUTO: 36 % (ref 35–47)
HGB BLD-MCNC: 10.6 G/DL (ref 11.5–16)
IMM GRANULOCYTES # BLD AUTO: 0 K/UL (ref 0–0.04)
IMM GRANULOCYTES NFR BLD AUTO: 0 % (ref 0–0.5)
LYMPHOCYTES # BLD: 2.3 K/UL (ref 0.8–3.5)
LYMPHOCYTES NFR BLD: 18 % (ref 12–49)
MCH RBC QN AUTO: 23.5 PG (ref 26–34)
MCHC RBC AUTO-ENTMCNC: 29.4 G/DL (ref 30–36.5)
MCV RBC AUTO: 79.6 FL (ref 80–99)
MONOCYTES # BLD: 1.1 K/UL (ref 0–1)
MONOCYTES NFR BLD: 9 % (ref 5–13)
NEUTS SEG # BLD: 8.7 K/UL (ref 1.8–8)
NEUTS SEG NFR BLD: 70 % (ref 32–75)
NRBC # BLD: 0 K/UL (ref 0–0.01)
NRBC BLD-RTO: 0 PER 100 WBC
PLATELET # BLD AUTO: 354 K/UL (ref 150–400)
PMV BLD AUTO: 12 FL (ref 8.9–12.9)
POTASSIUM SERPL-SCNC: 4.1 MMOL/L (ref 3.5–5.1)
RBC # BLD AUTO: 4.52 M/UL (ref 3.8–5.2)
SODIUM SERPL-SCNC: 142 MMOL/L (ref 136–145)
WBC # BLD AUTO: 12.5 K/UL (ref 3.6–11)

## 2023-01-26 PROCEDURE — 93010 ELECTROCARDIOGRAM REPORT: CPT | Performed by: INTERNAL MEDICINE

## 2023-01-26 PROCEDURE — G8417 CALC BMI ABV UP PARAM F/U: HCPCS | Performed by: INTERNAL MEDICINE

## 2023-01-26 PROCEDURE — 3017F COLORECTAL CA SCREEN DOC REV: CPT | Performed by: INTERNAL MEDICINE

## 2023-01-26 PROCEDURE — G0463 HOSPITAL OUTPT CLINIC VISIT: HCPCS | Performed by: INTERNAL MEDICINE

## 2023-01-26 PROCEDURE — 1123F ACP DISCUSS/DSCN MKR DOCD: CPT | Performed by: INTERNAL MEDICINE

## 2023-01-26 PROCEDURE — G8427 DOCREV CUR MEDS BY ELIG CLIN: HCPCS | Performed by: INTERNAL MEDICINE

## 2023-01-26 PROCEDURE — 93005 ELECTROCARDIOGRAM TRACING: CPT | Performed by: INTERNAL MEDICINE

## 2023-01-26 PROCEDURE — 99214 OFFICE O/P EST MOD 30 MIN: CPT | Performed by: INTERNAL MEDICINE

## 2023-01-26 PROCEDURE — 3079F DIAST BP 80-89 MM HG: CPT | Performed by: INTERNAL MEDICINE

## 2023-01-26 PROCEDURE — 3074F SYST BP LT 130 MM HG: CPT | Performed by: INTERNAL MEDICINE

## 2023-01-26 PROCEDURE — G8536 NO DOC ELDER MAL SCRN: HCPCS | Performed by: INTERNAL MEDICINE

## 2023-01-26 PROCEDURE — G9899 SCRN MAM PERF RSLTS DOC: HCPCS | Performed by: INTERNAL MEDICINE

## 2023-01-26 PROCEDURE — 1090F PRES/ABSN URINE INCON ASSESS: CPT | Performed by: INTERNAL MEDICINE

## 2023-01-26 PROCEDURE — 1101F PT FALLS ASSESS-DOCD LE1/YR: CPT | Performed by: INTERNAL MEDICINE

## 2023-01-26 PROCEDURE — G9717 DOC PT DX DEP/BP F/U NT REQ: HCPCS | Performed by: INTERNAL MEDICINE

## 2023-01-26 RX ORDER — EVOLOCUMAB 140 MG/ML
INJECTION, SOLUTION SUBCUTANEOUS
COMMUNITY
Start: 2023-01-18

## 2023-01-26 NOTE — PATIENT INSTRUCTIONS
Your Cardioversion procedure has been scheduled for 1/27/23 at 1:00 pm, at [facility].    Please report to Admitting Department by 11:00 am, or 2 hours prior to your scheduled procedure. Please bring a list of your current medications and medication bottles, if able, to the hospital on this day. You will be unable to drive after your procedure so please make sure to bring someone with you to your procedure. You will need to have nothing to eat or drink after midnight, the night prior to your procedure. You may have small sips of water, if needed, to take with your medication. You will need labs drawn prior to your procedure. Please go to have this done today. You should NOT stop your medication prior to your scheduled procedure. After your procedure, you will need to follow up with Jaime Childress NP. Your follow-up appointment has been scheduled for 2/24/23 at 9:00 am.          Your EP Study and Atrial Fibrillation Ablation procedure has been scheduled for 4/13/23 at 1:00 pm, at [facility].    Please report to Admitting Department by 11:00 am, or 2 hours prior to your scheduled procedure. Please bring a list of your current medications and medication bottles, if able, to the hospital on this day. You will be unable to drive after your procedure so please make sure to bring someone with you to your procedure. You will need to have nothing to eat or drink after midnight, the night prior to your procedure. You may have small sips of water, if needed, to take with your medication. You will also need to see Dr. Pardeep Brown Nurse Practitioner, Ellie Mariscal, in office prior to your procedure. An appointment has been scheduled for 3/31/23 at 1:00 pm.    You will need labs drawn prior to your procedure. Please plan to have these drawn at your appointment with Jaime Childress NP. You will be scheduled for a Chest CTA to map out your pulmonary veins.  Please call central scheduling at [phone] to schedule your test.    You should stop your medication, Eliquis, 2 days prior to your scheduled procedure. After your procedure, you will need to follow up with Danny Bautista NP.  Your follow-up appointment has been scheduled for 4/28/23 at 1:00 pm.

## 2023-01-26 NOTE — PROGRESS NOTES
[facility]   Cardiac Electrophysiology Clinic Care Note                 [x]Established visit     Patient Name: Ursula Naranjo - G   Electrophysiologist: Emeli Samuels MD        Reason for visit: Hospital follow up CHF AF      HPI:   Ms. Shaggy Lopes is a 81 y.o. female who was admitted 2022-2022 with influenza, acute hypoxic respiratory failure, AF with RVR, & persistent AF with RVR. Initially on BiPAP during admission, was able to wean with diuresis. She underwent DCCV on 2022, but had recurrent AF with RVR shortly thereafter. Since discharge, she has continued amiodarone loading, now on maintenance dose. ECG today shows AF 73 bpm   Low voltage in precordial leads. -IVCD    -Nonspecific ST depression   +   Nonspecific T-abnormality    Dilated CM. Echo in 2022 showed LVEF 25-30% with moderate to severe MR. NYHA III chronic systolic CHF. On Toprol XL, but no ACEi/ARB due to low normal BP. She states Dr. Ruby Costa recently increased Bumex to 2 mg po daily (had been reduced to 1 mg po daily during admission). Still with increased lower extremity edema, weight gain, & orthopnea. Cardiac cath in 2022 at [facility] showed  in OM, patent LAD stent, & mild ISR in proximal RCA. BP controlled. Anticoagulated with Eliquis, denies bleeding issues. Previous:   Admitted 2022 with influenza, acute hypoxic respiratory failure, acute on chronic CHF, AF with RVR. DCCV 2022, had recurrent AF thereafter. S/p [employer] S-ICD in place of transvenous system 2016. Initial ICD implanted 16. Transvenous RV lead displacement x 2 due to large breast size, repositioned once. Removed entire system d/t to high recurrent risk of perforation. Stopped Toprol XL due to hair loss, didn't improve with change to nebivolol. S/p PCI RCA in . MI 2011, also 2006 or 2007.      Followed by Dr. Nemesio Carmen. History of gastric bypass. She is a part of a support group on Facebook for S-ICD. Poor phone/WiFi reception where she lives. Assessment and Plan     Persistent AF with RVR: Noted during hospital admission in 12/2022, refractory to DCCV. Loaded with amiodarone, now on maintenance dose. ECG today shows AF 73 bpm   Should she remain on amiodarone long term, will require TSH & LFTs q 6 months & CXR annually. Reviewed risks/benefits of DCCV for restoration of NSR. She agrees to proceed with scheduling. She will continue with amiodarone in the meantime as a bridge to ablation to hopefully maintain NSR. Reviewed risks/benefits of AF ablation. She agrees to proceed with scheduling. Risks include but are not limited to bleeding in the groin, infection in the groin and/or heart valves, fistula between the groin arteries and veins, valvular damage, diaphragmatic paralysis, aortic dissection, heart attack, stroke, blood clot in the leg, pulmonary embolism, lung collapse (pneumothorax or hemothorax), heart collapse (pericardial tamponade), death. The added risks for left atrial ablation may be atrial-esophageal fistula, pulmonary vein stenoses, kidney failure (from contrast injection), higher risk of bleeding, stroke and heart attack. Elective or emergency surgery may be required to repair some of these complications. Prolonged hospitalization would be required. General anesthesia and transeptal catheterization may be required for the procedure       Dilated CM: LVEF 25-30% in 12/2022. NYHA III chronic systolic CHF. On Toprol XL, but no ACEi/ARB due to low BP. Managed by Dr. Nemesio Carmen. She is not candidate for BIV ICD implant and AV node ablation together because of previous ICD lead dislodgement   If AFIB ablation fails, she may have BIV ICD and then 1 month later AV node ablation       CAD: S/p prior PCI LAD & RCA. No angina.   Continue Repatha, Zetia, & ASA as previously prescribed. Anticoagulation: Continue Eliquis for embolic CVA prophylaxis. Denies bleeding issues. Jens Sierra M.D. [facility]  Electrophysiology/Cardiology  [facility]  [address], [address] [place] [address] Avenue                              [phone]                                            ____________________________________________________________     Cardiac testing   12/17/22     ECHO ADULT COMPLETE 12/18/2022 12/18/2022     Interpretation Summary   ·  Left Ventricle: Severely reduced left ventricular systolic function with a visually estimated EF of 25 - 30%. Left ventricle is moderately dilated. Normal wall thickness. Severe global hypokinesis present. ·  Left Atrium: Left atrium is moderately dilated. ·  Aortic Valve: Mildly calcified cusp. Mild stenosis of the aortic valve. AV mean gradient is 13 mmHg. ·  Mitral Valve: Moderate to severe regurgitation. ·  Tricuspid Valve: Mild regurgitation. The estimated RVSP is 46 mmHg. ·  Contrast used: Definity. Signed by: Aron Lebron MD on 12/18/2022 11:39 AM       C/C (09/28/2022 at [facility]): LVEDP 15-20 mmHg. LM with MLI. LCx with mild diffuse disease;  in OM with left to left & right to left collaterals. LAD with prior stent with AUBREY-3 flow; otherwise MLI. RCA with very mild ISR in proximal segment, otherwise MLI.         08/09/21     NUCLEAR CARDIAC STRESS TEST 08/13/2021 8/16/2021     Interpretation Summary   · SPECT: Left ventricular function post-stress was abnormal. Calculated ejection fraction is 22%. There is no evidence of transient ischemic dilation (TID). The TID ratio is 0.86. · Baseline ECG: Normal sinus rhythm. · SPECT: Myocardial perfusion imaging defect 1: There is a defect that is large in size with a severe reduction in uptake present in the lateral location(s) that is partially reversible.  There is abnormal wall motion in the defect area. The defect appears to be infarction with esther-infarct ischemia. Perfusion defect was visually and quantitatively present. · SPECT: Left ventricular perfusion is probably abnormal. Myocardial perfusion imaging supports a high risk stress test.     Signed by: Moise Hall MD on 8/13/2021  8:26 AM     08/13/21     CARDIAC PROCEDURE 08/13/2021 8/13/2021     Conclusion   Findings:   1)Normal LVEDP   2)Severe native 1 vessel CAD with  of ramus intermedius. LCx  Is small with occluded distal LCx. OM2 and distal ramus fill from collaterals from LAD, diagonal and RCA. 3)Limited wire probing suggests no micro channel in Ramus ISR . Proximal cap start before the stent     Access: Right radial no issues   Contrast 40 cc     Recommendations   1)Continue GDMT and weight loss. If dyspnea continue may consider Ramus  PCI. Uncertain if benefit will be substantial.   2)GDMT for CAD     Signed by: Ni Salinas MD on 8/13/2021 10:34 AM       ECG: AF 73 bpm; QTc variable, but averages 438 ms. Review of Systems     [x]All other systems reviewed and all negative except as written in HPI     [ ] Patient unable to provide secondary to condition           Past Medical History:   Diagnosis Date   · Acute right ankle pain 06/08/2018 5/29/18: X-ray showed possible right ankle sprain. 6/6/18: saw Dr. Lorene Snow ([facility]), diagnosed with peroneal tendonitis.  Prescribed an ASO   · Asthma   · Atrial fibrillation (HCC)   · Cardiomyopathy ([facility]   · Coronary artery disease 2008   s/p RCA stent ([facility]) on 11/26/11   · Depression with anxiety   2011   · DVT (deep venous thrombosis) ([facility] 07/27/2010   · Family history of early CAD   · GERD (gastroesophageal reflux disease)   · H/O gastric bypass 2006   Revision in 2009   · Hepatitis C antibody test positive   Does not have chronic hep C (labs 10/6/15: neg HCV RNA)   · HTN (hypertension) 07/27/2010   · Hyperlipidemia 07/27/2010   · Incontinence of feces 09/03/2018   Dr. Terri Hidalgo. 8/20/18: Improving with pelvic physical therapy and psyllium husk treatment. Saw Dr. Yuan Houser who suggested PT first. MR defecography showed pelvic floor weakness with pelvic descensus, small anterior  Rectocele, and vaginal prolapse. To have pelvic PT weekly for 8 wks and to see Dr. Yuan Houser again in Oct 2018. · Joint pain 07/27/2010   · MI (myocardial infarction) ([facility] 07/27/2010   · Mitral valve regurgitation   Mild to moderate   · Morbid obesity ([facility] 07/27/2010   · Myocardial infarction [facility]) 2006 and 2011   Dr. Acosta Carl   · Psychiatric disorder   · PUD (peptic ulcer disease)   gi bleed 2008; ulcer n gastric bypass pouch   · Urinary incontinence, stress 07/27/2010     Past Surgical History:   Procedure Laterality Date   · COLONOSCOPY N/A 6/29/2018   COLONOSCOPY performed by Emanuel Arreguin MD at [facility] ENDOSCOPY; redundant colon, int hemorrhoids, pathology: normal colonic mucosa   · HX BREAST BIOPSY Left   benign   · HX BREAST LUMPECTOMY Right 7/14/2022   RIGHT BREAST LUMPECTOMY WITH ULTRASOUND performed by Ailyn Rice MD at [facility] 11   · HX COLONOSCOPY 9/5/14   Dr. Carlotta Sy; normal, repeat in 5 yrs   · HX GASTRIC BYPASS   · HX IMPLANTABLE CARDIOVERTER DEFIBRILLATOR 08/24/2016   · HX LAP GASTRIC BYPASS 2006   revision 2009/Dr. Juan Carlos Toney   · HX OTHER SURGICAL 09/19/2016   Removal of right ventricular ICD lead & single chamber transvenous AICD   · HX OTHER SURGICAL 10/12/2016   pocket revison of ICD; Dr. Sonja Jimenez   · HX OTHER SURGICAL 06/07/2018   Dr Terri Hidalgo; high resolution anorectal manaometry-abnormal study. Study done for eval of fecal incontinence   · HX OTHER SURGICAL 12/14/2018   Dr. Terri Hidaglo. Rectal endoscopic US (EUS) for rectal incontinence. Normal rectal mucosa, no fistulas.    · HX PACEMAKER   sicd/left side of chest under arm   · INS PPM/ICD LED SING ONLY 8/24/2016     · INS PPM/ICD LED SING ONLY 8/26/2016     · INS PPM/ICD LED SING ONLY 9/21/2016     · MT UNLISTED LAPAROSCOPY PROCEDURE STOMACH 04/05/2010   Revision GBP   · MT UNLISTED PROCEDURE CARDIAC SURGERY   Stent x 5-6,Most recent 2011     Social Hx:  reports that she quit smoking about 18 years ago. Her smoking use included cigarettes. She started smoking about 53 years ago. She has never used smokeless tobacco. She reports that she does not drink alcohol and does not use drugs. Family Hx: family history includes Alzheimer's Disease in her mother; Cancer in her father and mother; Dementia in her mother; Heart Attack in her brother; Heart Disease in her father and sister; Hypertension in her father; Stroke in her sister and sister. Allergies   Allergen Reactions   · Amoxicillin Anaphylaxis   · Amoxicillin Anaphylaxis   · Lipitor [Atorvastatin] Other (comments)   Severe muscle pain and spasms   · Zocor [Simvastatin] Other (comments)   Cramps, muscle spasms   · Buspar [Buspirone] Other (comments)   Drunk sensation, headaches   · Hydroxyzine Other (comments)   Blurred vision, lightheadedness   · Livalo [Pitavastatin] Myalgia   · Pravastatin Other (comments)   Leg cramps   · Tramadol Vertigo           OBJECTIVE:   Visit Vitals  /80 (BP 1 Location: Left upper arm, BP Patient Position: Sitting, BP Cuff Size: Large adult)   Pulse 90   Resp 18   Ht 5' 4\" (1.626 m)   Wt 233 lb 12.8 oz (106.1 kg)   SpO2 96%   BMI 40.13 kg/m²     General:  Alert, cooperative, no distress, appears stated age. Neck:  Supple, no carotid bruit and no JVD. Back:    Symmetric. Lungs:    Clear to auscultation bilaterally. Heart[de-identified]  Regular rate and rhythm. No murmur, click, rub or gallop. Abdomen: Soft, non-tender. Bowel sounds normal.   MSK:  Extremities normal, atraumatic. Moves extremities independently. Vasc/lymph:  Trace to 1+ bilateral lower extremity edema, R>L. Skin:  Skin color normal. No rashes or lesions on visible areas. S-ICD site well healed. Neurologic:  Alert, moves all extremities.        Data Review:       Radiology:   XR Results (most recent):   Results from Hospital Encounter encounter on 12/17/22     XR CHEST PORT     Narrative   EXAM: Portable CXR. 1620 hours. COMPARISON: 0912 hours. INDICATION: chest pain     FINDINGS:   Unchanged interstitial pulmonary edema. No focal consolidation. Borderline   cardiomegaly. Subcutaneous ICD. No pneumothorax, midline shift or pleural   effusion. Impression   Stable interstitial pulmonary edema. CT Results (most recent):   Results from Hospital Encounter encounter on 02/23/17     CT SPINE CERV WO CONT     Narrative   EXAM:  CT CERVICAL SPINE WITHOUT CONTRAST     INDICATION: Ground-level fall this morning. COMPARISON: None. TECHNIQUE: Multislice helical CT of the cervical spine was performed without   intravenous contrast administration. Sagittal and coronal reconstructions were   generated. CT dose reduction was achieved through use of a standardized   protocol tailored for this examination and automatic exposure control for dose   modulation. Adaptive statistical iterative reconstruction (ASIR) was utilized. CONTRAST: None. FINDINGS:     The alignment is within normal limits. There is no fracture or subluxation. There is degenerative disc narrowing and marginal osteophyte formation at the   C5-6 and C6-7 levels. The odontoid process is intact. The craniocervical   junction is within normal limits. The prevertebral soft tissues are within   normal limits. There is no spinal canal or neural foraminal stenosis. The   surrounding soft tissue and musculoskeletal structures appear unremarkable. The   visualized lung apices are unremarkable. Impression   IMPRESSION: No acute findings. MRI Results (most recent):   Results from [place] encounter on 07/02/18     MRI PELVIC FLOOR STUDY     Narrative   EXAM: MR PELVIC FLOOR   INDICATION: Full incontinence of feces.    CONTRAST: Sterile ultrasound gel was placed into the vagina and rectum. No   intravenous contrast was administered. Oral Volumen was administered. TECHNIQUE: MR of the pelvis was performed according to standard departmental   protocol. The patient voided prior to imaging. Following three plane localizer   images, sagittal, axial and coronal breath-hold T2 (HASTE) images as well as   breath-hold axial T1 in and out of phase images were acquired. Static and   dynamic MR of the pelvic floor was performed using static sagittal axial and   coronal high-resolution T2 weighted images followed by dynamic sagittal midline   SSFSE images at rest, during squeezing (Kegel maneuver), during straining   (Valsalva maneuver) and during defecation. FINDINGS:   H line = width of the levator hiatus   M line = descent of the levator hiatus     At rest:   H line measures 5.7 cm   M line measures 23 mm. The anorectal angle measures 131 degrees. (Normal 108 to 127 degrees.)     With squeezing:   H line measures 4.7 cm   M line measures 8 mm. The anorectal angle measures 112 degrees. With straining:   H line measures 5.5 cm   M line measures 5 mm. The anorectal angle measures 121 degrees. With evacuation:   H line measures 5.3 cm   M line measures 4.5 mm. The anorectal angle measures 140 degrees. The puborectalis sling is intact. There is there is elevation of the pelvic   floor with squeezing and pelvic floor weakness with abnormal descent of the   pelvic floor with straining and evacuation. POSTERIOR COMPARTMENT: There is pelvic floor weakness with pelvic descensus with   an anterior rectocele and the patient is unable to fully evacuate the rectum. There is no rectal intussusception or navin rectal prolapse. ANTERIOR COMPARTMENT: There is minimal descent of the bladder neck below the   sacrococcygeal line. MIDDLE COMPARTMENT: The uterus is absent. There is prolapse of the vagina below   the sacrococcygeal line.      Impression   IMPRESSION: Pelvic floor weakness with pelvic descensus. There is a small   anterior rectocele and prolapse of the vagina below the sacrococcygeal line. The   patient is unable to fully evacuate the rectum. Current Outpatient Medications   Medication Sig    amiodarone (CORDARONE) 200 mg tablet Take 1 Tablet by mouth daily. bumetanide (BUMEX) 1 mg tablet TAKE 1 TABLET BY MOUTH EVERY DAY (Patient taking differently: 1 mg two (2) times a day.)    FLUoxetine (PROzac) 40 mg capsule Take 1 Capsule by mouth two (2) times a day. albuterol (PROVENTIL HFA, VENTOLIN HFA, PROAIR HFA) 90 mcg/actuation inhaler TAKE 2 PUFFS BY INHALATION EVERY FOUR HOURS AS NEEDED FOR WHEEZING OR SHORTNESS OF BREATH.    zolpidem (AMBIEN) 10 mg tablet TAKE 1 TABLET BY MOUTH NIGHTLY AS NEEDED FOR SLEEP. MAX DAILY AMOUNT: 10 MG.    metoprolol succinate (TOPROL-XL) 25 mg XL tablet Take 1 Tablet by mouth two (2) times a day. apixaban (ELIQUIS) 5 mg tablet Take 1 Tablet by mouth two (2) times a day. Prescribed by Dr. French Weaver. potassium chloride (Klor-Con M20) 20 mEq tablet TAKE 2 TABLETS BY MOUTH EVERY DAY (Patient taking differently: Take 20 mEq by mouth daily.)    aspirin delayed-release 81 mg tablet Take 81 mg by mouth as needed for Pain. psyllium (METAMUCIL) powd Take  by mouth. 2 tsp daily    cholecalciferol, VITAMIN D3, (VITAMIN D3) 5,000 unit tab tablet Take 10,000 Units by mouth daily. biotin 10,000 mcg cap Take  by mouth daily. OTHER Complete multi formula, bariatric advantage    magnesium 250 mg tab Take 1 Tab by mouth daily. ferrous sulfate 325 mg (65 mg iron) tablet Take  by mouth daily (before breakfast). CALCIUM PO Take 600 mg by mouth daily. Repatha SureClick pen injection INJECT 140 MG SUBCUTANEOUSLY EVERY 14 DAYS    traZODone (DESYREL) 50 mg tablet Take 1/2 to 1 tablet by mouth nightly as needed for sleep. Do not take with Ambien.  (Patient not taking: Reported on 1/26/2023)    ezetimibe (ZETIA) 10 mg tablet TAKE 1 TABLET BY MOUTH EVERY DAY IN THE MORNING (Patient not taking: Reported on 1/26/2023)     No current facility-administered medications for this visit. Arin Stovall M.D.    Electrophysiology/Cardiology   [facility]   [address] [place] [address], [address] [place] [address]                                [phone]           Idania Velásquez MD

## 2023-01-27 ENCOUNTER — HOSPITAL ENCOUNTER (OUTPATIENT)
Age: 73
Setting detail: OUTPATIENT SURGERY
Discharge: HOME | End: 2023-01-27
Attending: INTERNAL MEDICINE | Admitting: INTERNAL MEDICINE
Payer: MEDICARE

## 2023-01-27 ENCOUNTER — APPOINTMENT (OUTPATIENT)
Dept: NON INVASIVE DIAGNOSTICS | Age: 73
End: 2023-01-27
Attending: INTERNAL MEDICINE
Payer: MEDICARE

## 2023-01-27 VITALS
TEMPERATURE: 97.7 F | DIASTOLIC BLOOD PRESSURE: 59 MMHG | RESPIRATION RATE: 18 BRPM | BODY MASS INDEX: 41.29 KG/M2 | WEIGHT: 233 LBS | OXYGEN SATURATION: 98 % | HEART RATE: 63 BPM | HEIGHT: 63 IN | SYSTOLIC BLOOD PRESSURE: 110 MMHG

## 2023-01-27 DIAGNOSIS — I48.19 PERSISTENT ATRIAL FIBRILLATION (HCC): ICD-10-CM

## 2023-01-27 DIAGNOSIS — Z01.89 ENCOUNTER FOR CARDIOVERSION PROCEDURE: Primary | ICD-10-CM

## 2023-01-27 DIAGNOSIS — E66.01 MORBID OBESITY WITH BMI OF 40.0-44.9, ADULT (HCC): ICD-10-CM

## 2023-01-27 DIAGNOSIS — I48.91 ATRIAL FIBRILLATION, UNSPECIFIED TYPE (HCC): ICD-10-CM

## 2023-01-27 LAB
ATRIAL RATE: 53 BPM
CALCULATED P AXIS, ECG09: 29 DEGREES
CALCULATED R AXIS, ECG10: -16 DEGREES
CALCULATED T AXIS, ECG11: 149 DEGREES
DIAGNOSIS, 93000: NORMAL
P-R INTERVAL, ECG05: 192 MS
Q-T INTERVAL, ECG07: 480 MS
QRS DURATION, ECG06: 114 MS
QTC CALCULATION (BEZET), ECG08: 450 MS
VENTRICULAR RATE, ECG03: 53 BPM

## 2023-01-27 PROCEDURE — 92960 CARDIOVERSION ELECTRIC EXT: CPT | Performed by: INTERNAL MEDICINE

## 2023-01-27 PROCEDURE — 93005 ELECTROCARDIOGRAM TRACING: CPT

## 2023-01-27 PROCEDURE — 99152 MOD SED SAME PHYS/QHP 5/>YRS: CPT | Performed by: INTERNAL MEDICINE

## 2023-01-27 PROCEDURE — 92960 CARDIOVERSION ELECTRIC EXT: CPT

## 2023-01-27 PROCEDURE — 99152 MOD SED SAME PHYS/QHP 5/>YRS: CPT

## 2023-01-27 PROCEDURE — 74011250636 HC RX REV CODE- 250/636: Performed by: INTERNAL MEDICINE

## 2023-01-27 RX ORDER — FENTANYL CITRATE 50 UG/ML
200 INJECTION, SOLUTION INTRAMUSCULAR; INTRAVENOUS
Status: DISCONTINUED | OUTPATIENT
Start: 2023-01-27 | End: 2023-01-27

## 2023-01-27 RX ORDER — SODIUM CHLORIDE 9 MG/ML
50 INJECTION, SOLUTION INTRAVENOUS CONTINUOUS
Status: DISCONTINUED | OUTPATIENT
Start: 2023-01-27 | End: 2023-01-28 | Stop reason: HOSPADM

## 2023-01-27 RX ORDER — MIDAZOLAM HYDROCHLORIDE 1 MG/ML
6 INJECTION, SOLUTION INTRAMUSCULAR; INTRAVENOUS
Status: DISCONTINUED | OUTPATIENT
Start: 2023-01-27 | End: 2023-01-27

## 2023-01-27 RX ADMIN — FENTANYL CITRATE 25 MCG: 50 INJECTION, SOLUTION INTRAMUSCULAR; INTRAVENOUS at 14:32

## 2023-01-27 RX ADMIN — MIDAZOLAM 2 MG: 1 INJECTION INTRAMUSCULAR; INTRAVENOUS at 14:29

## 2023-01-27 RX ADMIN — MIDAZOLAM 1 MG: 1 INJECTION INTRAMUSCULAR; INTRAVENOUS at 14:26

## 2023-01-27 RX ADMIN — FENTANYL CITRATE 25 MCG: 50 INJECTION, SOLUTION INTRAMUSCULAR; INTRAVENOUS at 14:28

## 2023-01-27 RX ADMIN — FENTANYL CITRATE 50 MCG: 50 INJECTION, SOLUTION INTRAMUSCULAR; INTRAVENOUS at 14:23

## 2023-01-27 RX ADMIN — MIDAZOLAM 3 MG: 1 INJECTION INTRAMUSCULAR; INTRAVENOUS at 14:23

## 2023-01-27 RX ADMIN — MIDAZOLAM 2 MG: 1 INJECTION INTRAMUSCULAR; INTRAVENOUS at 14:33

## 2023-01-27 NOTE — DISCHARGE INSTRUCTIONS
Do not drive today  Resume all medications as usual    Future Appointments   Date Time Provider Denis Hurst   2/7/2023 10:30 AM Junior Valentin NP [facility] BS AMB   2/21/2023 10:00 AM Pat Clarke Benson,  [address] BS AMB   2/24/2023  9:00 AM Dimple Buncesar, JOVAN CAVREY BS AMB   3/15/2023  9:00 AM REMOTE1, JOSE ALBERTO DICKSON BS AMB   3/31/2023  1:00 PM Dimple Bunk, JOVAN CAVREY BS AMB   4/10/2023 11:10 AM MD GAGAN Amin BS AMB   4/13/2023  1:00 PM [facility] CATH LAB 3 [facility]   4/28/2023  1:00 PM Dimple Bunk, JOVAN RUEDAREY BS AMB   12/14/2023  2:00 PM PACEMAKER3, JOSE ALBERTO DICKSON BS AMB   12/14/2023  2:20 PM Toshia Watson MD [facility] BS AMB          Electrical Cardioversion: Care Instructions  Your Care Instructions     Electrical cardioversion is a treatment for an abnormal heartbeat. For example, it may be used to treat atrial fibrillation. In cardioversion, a brief electric shock is given to the heart to reset its rhythm. The shock comes through patches that are put on the outside of your chest. Cardioversion most often restores the heartbeat to normal.  After the procedure, you may have redness where the patches were. (This may look like a sunburn.) Do not drive until the day after a cardioversion. You can eat and drink when you feel ready to. Your doctor may have you take medicines daily to help the heart beat in a normal way and to prevent blood clots. Your doctor may give you medicine before and after cardioversion. This is to help keep your heart in a normal rhythm after the procedure. Instead of electric cardioversion, your doctor may try to change your heartbeat to a normal rhythm by giving you medicine. This is most often done in a clinic or hospital.  You may have had a sedative to help you relax. You may be unsteady after having sedation. It can take a few hours for the medicine's effects to wear off.  Common side effects of sedation include nausea, vomiting, and feeling sleepy or tired. The doctor has checked you carefully, but problems can develop later. If you notice any problems or new symptoms, get medical treatment right away. Follow-up care is a key part of your treatment and safety. Be sure to make and go to all appointments, and call your doctor if you are having problems. It's also a good idea to know your test results and keep a list of the medicines you take. How can you care for yourself at home? Medicines    If the doctor gave you a sedative: For 24 hours, don't do anything that requires attention to detail. This includes going to work, making important decisions, or signing any legal documents. It takes time for the medicine's effects to completely wear off. For your safety, do not drive or operate any machinery that could be dangerous. Wait until the medicine wears off and you can think clearly and react easily. Take your medicines exactly as prescribed. Call your doctor if you think you are having a problem with your medicine. You may take some of the following medicines:  Antiarrhythmic medicines such as amiodarone. Beta-blockers such as propranolol (Inderal), metoprolol (Lopressor), and carvedilol (Coreg). Calcium channel blockers such as diltiazem (Cardizem) and verapamil (Calan). Digoxin (Lanoxin). You will get more details on the specific medicines your doctor prescribes. If you take a blood thinner, be sure you get instructions about how to take your medicine safely. Blood thinners can cause serious bleeding problems. Do not take any vitamins, over-the-counter medicines, or herbal products without talking to your doctor first.   Lifestyle changes    Do not smoke. Smoking increases your risk of stroke and heart attack. If you need help quitting, talk to your doctor about stop-smoking programs and medicines. These can increase your chances of quitting for good. Eat heart-healthy foods.      Limit alcohol to 2 drinks a day for men and 1 drink a day for women. Activity    If your doctor recommends it, get more exercise. Walking is a good choice. Bit by bit, increase the amount you walk every day. Try for 30 minutes on most days of the week. You also may want to swim, bike, or do other activities. When you exercise, watch for signs that your heart is working too hard. You are pushing too hard if you cannot talk while you are exercising. If you become short of breath or dizzy or have chest pain, sit down and rest immediately. Check your pulse regularly. Place two fingers on the artery at the palm side of your wrist in line with your thumb. If your heartbeat seems uneven or fast, talk to your doctor. When should you call for help? Call 911 anytime you think you may need emergency care. For example, call if:    You have trouble breathing. You passed out (lost consciousness). You cough up pink, foamy mucus and you have trouble breathing. You have symptoms of a heart attack. These may include:  Chest pain or pressure, or a strange feeling in the chest.  Sweating. Shortness of breath. Nausea or vomiting. Pain, pressure, or a strange feeling in the back, neck, jaw, or upper belly or in one or both shoulders or arms. Lightheadedness or sudden weakness. A fast or irregular heartbeat. After you call 911, the  may tell you to chew 1 adult-strength or 2 to 4 low-dose aspirin. Wait for an ambulance. Do not try to drive yourself. You have symptoms of a stroke. These may include:  Sudden numbness, tingling, weakness, or loss of movement in your face, arm, or leg, especially on only one side of your body. Sudden vision changes. Sudden trouble speaking. Sudden confusion or trouble understanding simple statements. Sudden problems with walking or balance. A sudden, severe headache that is different from past headaches.    Call your doctor now or seek immediate medical care if:    You have new or worse nausea or vomiting. You have new or increased shortness of breath. You are dizzy or lightheaded, or you feel like you may faint. You have sudden weight gain, such as more than 2 to 3 pounds in a day or 5 pounds in a week. You have increased swelling in your legs, ankles, or feet. Watch closely for changes in your health, and be sure to contact your doctor if you have any problems. Where can you learn more? Go to http://www.gray.com/  Enter H610 in the search box to learn more about \"Electrical Cardioversion: Care Instructions. \"  Current as of: April 29, 2021               Content Version: 13.0  © 7913-1750 [employer]. Care instructions adapted under license by [employer] (which disclaims liability or warranty for this information). If you have questions about a medical condition or this instruction, always ask your healthcare professional. [address] any warranty or liability for your use of this information.

## 2023-01-27 NOTE — PROCEDURES
Cardiac Procedure Note   Patient: Charity Law  MRN: [id number]  SSN: [id number]   YOB: 1950 Age: 81 y.o.   Sex: female    Date of Procedure: 1/27/2023   Pre-procedure Diagnosis: persistent atrial fibrillation chronic systolic CHF obesity  Post-procedure Diagnosis: same  Procedure: Cardioversion  :  Dr. Clifford Perry MD    Assistant(s):  None  Anesthesia: Moderate Sedation   Estimated Blood Loss none  Specimens Removed: None  Findings: 360 J CV to NSR  Complications: None   Implants:  None  Signed by:  Clifford Perry MD  1/27/2023  2:44 PM

## 2023-01-31 ENCOUNTER — PATIENT OUTREACH (OUTPATIENT)
Dept: CASE MANAGEMENT | Age: 73
End: 2023-01-31

## 2023-01-31 ENCOUNTER — DOCUMENTATION ONLY (OUTPATIENT)
Dept: CARDIOLOGY CLINIC | Age: 73
End: 2023-01-31

## 2023-01-31 NOTE — PROGRESS NOTES
Care Transitions Follow Up Call    Challenges to be reviewed by the provider   Additional needs identified to be addressed with provider: yes    CTN reached out to primary cardiology office and EP office, separately. Updated on current symptoms, wt gain and increasing LE/abdominal edema. Orders from primary cardiology conveyed to patient. Continue Bumex 2 mg BID- and KCl 20 meq - 2 tabs daily  take metolazone 5 mg for 3 consecutive days - she will call Friday and update their office on progress. She will assess Friday- dose moving forward of bumex and KCL. She got the lab work and will provider if he wants lab work done when she updates him on Friday. Method of communication with provider : phone call to primary cardiology office, staff message to EP provider/team.     Care Transition Nurse (CTN) contacted the patient by telephone to follow up after admission on 23. Verified name and  with patient as identifiers. Addressed changes since last contact: refer to above yellow box and goals section. CTN reviewed current symptoms, discharge instructions, medical action plan and red flags with patient and discussed any barriers to care and/or understanding of plan of care after discharge. Discussed appropriate site of care based on symptoms and resources available to patient including: PCP, Specialist, After hours contact number- , MyChart Messaging, and [employer] and using patient portal to communicate with provider, CTN . The patient agrees to contact the PCP office for questions related to their healthcare.      [facility] follow up appointment(s):   Future Appointments   Date Time Provider Denis Hurst   2023 10:30 AM Chica Loiuse NP [facility] BS AMB   2023 10:00 AM Selena Renteria,  [address] [place]  BS AMB   2023  9:00 AM JOVAN Santos BS AMB   3/15/2023  9:00 AM JOSE ALBERTO MUSE BS AMB   3/31/2023  1:00 PM JOVAN Santos  AMB   4/10/2023 11:10 AM MD GAGAN Law  AMB   4/13/2023  1:00 PM [facility] CATH LAB 3 [facility]    4/28/2023  1:00 PM JOVAN Marrero  AMB   12/14/2023  2:00 PM PACEMAKER3, ABRAMS RANDOLPH  AMB   12/14/2023  2:20 PM MD RANDOLPH [facility]     Non-[facility] follow up appointment(s): refer to goals section     CTN provided contact information for future needs. Plan for follow-up call in 5-7 days based on severity of symptoms and risk factors. Plan for next call:   Assess current symptoms, including daily monitoring items- documenting, doing? Results of taking 3 doses of metolazone started on 1/31? Follow up on Low NA, fluid hydration. Assess if AMD in place, if so, secure copy for EMR. If not, assist with completing. Goals Addressed                   This Visit's Progress       Heart Failure     Patient verbalizes understanding of self-management goals of living with Congestive Heart Failure and Treatment for recurrent/chronic AFib        1/31/23 - spoke with Ms. Roney De León, just now. - she says she is breathing some better since DCCV done on 1/27/23- but is \"winded\" with walking to [place] and back (done while we were on the phone). She said her legs are tight and swollen, abdomen is brink. She reports continued weight gain with increasing symptoms despite taking bumex 2 mg BID for past 5 days. Note Dr. Khan  had increased dose of bumex from 1 mg daily to 2 mg daily at [address] on 1/16/23. She reports PMH of taking metolazone. Her scale is not working this morning- she had gained 5-6 lbs as of last week. She states she is eating low NA, drinking \"lots of water\". CTN reached out to primary cardiology via phone, asked for nurse to return call. Dr. Erin Duffy is not in the office today. Return call from nurse Yasmeen. Provided update, she will check with Dr. Karl Anderson, (Dr. Khan  away this week, her PA is in the hospital) and call CTN back.  Return call from Dagmar, nurse, Dr. Tonia Obrien ordered:   Continue Bumex 2 mg BID- and KCl 20 meq - 2 tabs daily  take metolazone 5 mg for 3 consecutive days - she will call Friday and update their office on progress. She will assess Friday- dose moving forward of bumex and KCL. Shaylee Perez states she told her to start today, provided directions for taking metolazone 1 hour before bumex taken. She got the lab work from 1/26 done and will ask provider if he wants lab work done when she updates him on Friday. CTN updated EP via CC msg, sent updated staff msg about return call from primary cardiology office. [facility]     1/17/23- Left VM asking for return call. Review of EMR shows: she followed up with PCP on 1/10/23- /86, HR98 wt- 225 lbs. Reported weak and tired. Her DIL was assisting, declined HH svcs. Reports mild RODNEY with occ HR increase, denied SOB and CP at rest.   PCP added second medication to help with insomnia- trazadone 50 mg- 1/2 tab HS PRN. PCP increased prozac to 40 mg BID. Renewed albuterol inhaler for \"reactive airway disease\". Follow up on 4/10/23. [facility]     1/6/23- spoke with Ms. Mónica De Jesus- she is feeling better, tires easily but does feel it is slowly improving each day. She feels recovered from Flu. Denies LE edema, SOB and/or chest pain. Has been checking wts- steady around 222 lbs. Monitoring BP and HR- BP steady but HR fluctuates up and down. Asked her to check to make sure she has enough amiodarone to take until she attends follow up with EP on 1/26/23. [facility]     12/29/22- spoke with Ms. Mónica De Jesus- she feels much better, tires easily. Asked her to be regularly active, but rest when she feels tired. Stay hydrated and eat for recovery- focus on low NA. Coughing less, PRN meds at home and using to control symptoms with good relief. She was seen at Cardiology, Dr. Cydney Copeland office today, VS and EKG done. Shows AFib with good rate control at 80 bpm.  118/80, HR 80. Weight reported 218 lbs. She is monitoring BP and HR and daily weight at home. Reports they are good, weight is steady. She confirms continuing amiodarone 200 mg- 3 tabs TID dose until 12/30. Will start on 12/31 200 mg BID. On 1/14/21- transition to  200 daily. CTN will clarify with cardiology if she is to stop 200 mg daily or continue until seen for follow up on 1/26. If she is to continue, will need RX sent. [facility]     12/23/22- spoke with Ms. Skyla Zuñiga. She reports increased cough. CTN was able to secure PRN RX for Tessalon. Advised to keep cough symptoms controlled to help with AFib control. Asked her to reach out to PCP if symptoms are not controlled by PRN meds. She is aware that she converted back and remains in AFib, explained goal of rate control. CTN clarified dosing for amiodarone with EP- discharge instructions not correct, updated in CC current med list. Amiodarone 400 mg TID for 9 days, then 200 mg BID for 14 days, then 200 mg daily. CTN asked EP about Toprol XL 25 mg BID dosing referenced in progress note, but no order written, patient does not have this medication at home. Cardiology sent in RX to pharmacy. Clarified about dose of KCL to be taken with changed dose of bumex. Continue 20 meq 2 tablets daily. Confirmed she has stopped: Diltiazem and Entresto. She is not taking imdur/isosorbide. Note: intolerance to Femara in Hem-oncology note. Discussion about how to help with Flu recovery:  reduce-treat symptoms, maintain hydration, focus on healthy foods rich with vitamin C and anti-oxidants. Be active, ambulate regularly, rest when tired. Pt will remain out of the hospital or ER for remainder of Bundle period.     LL[employer]

## 2023-02-01 ENCOUNTER — PATIENT OUTREACH (OUTPATIENT)
Dept: CASE MANAGEMENT | Age: 73
End: 2023-02-01

## 2023-02-01 NOTE — PROGRESS NOTES
She is gaining weight via fluid  Dr Sharmaine Mckee gave orders for diuretics as Dr Laura Cheng is not available but I recommend hospital admission for CHF

## 2023-02-03 ENCOUNTER — PATIENT OUTREACH (OUTPATIENT)
Dept: CASE MANAGEMENT | Age: 73
End: 2023-02-03

## 2023-02-03 DIAGNOSIS — F51.04 CHRONIC INSOMNIA: ICD-10-CM

## 2023-02-03 RX ORDER — TRAZODONE HYDROCHLORIDE 50 MG/1
50 TABLET ORAL
Qty: 90 TABLET | Refills: 1 | Status: SHIPPED | OUTPATIENT
Start: 2023-02-03 | End: 2023-02-04

## 2023-02-03 NOTE — TELEPHONE ENCOUNTER
PCP: MD Dr. Mansoor Solis, [facility] sent request for 90 day refill. Last appt: 1/10/2023     Future Appointments   Date Time Provider Denis Hurst   2/7/2023 10:30 AM Maykel Amin NP [facility] BS AMB   2/21/2023 10:00 AM Tracey [place] Dewayne Mercedes,  [facility] BS AMB   2/24/2023  9:00 AM JOVAN Harper BS AMB   3/15/2023  9:00 AM JOSE ALBERTO MUSE BS AMB   3/31/2023  1:00 PM JOVAN Harper BS AMB   4/10/2023 11:10 AM MD GAGAN Solis BS AMB   4/13/2023  1:00 PM [address] [place] [address] LAB 3 [facility]   4/28/2023  1:00 PM JOVAN Harper BS AMB   12/14/2023  2:00 PM PACEMAKER3JOSE ALBERTO BS AMB   12/14/2023  2:20 PM MD RANDOLPH Archer BS AMB          Requested Prescriptions     Pending Prescriptions Disp Refills    traZODone (DESYREL) 50 mg tablet 90 Tablet 1     Sig: Take 1 Tablet by mouth nightly. For sleep.

## 2023-02-03 NOTE — PROGRESS NOTES
Care Transitions Follow Up Call    Challenges to be reviewed by the provider   Additional needs identified to be addressed with provider: yes    Updated cardiology- primary and EP (separate practices) with patient report results from using 2 out of 3 doses of metolazone 5 mg daily. Method of communication with provider : phone call to primary and staff message to EP. Care Transition Nurse (CTN) contacted the patient by telephone to follow up after admission on 22. Verified name and  with patient as identifiers. Addressed changes since last contact: refer to goals section. CTN reviewed current symptoms, discharge instructions, medical action plan and red flags with patient and discussed any barriers to care and/or understanding of plan of care after discharge. Discussed appropriate site of care based on symptoms and resources available to patient including: PCP, Specialist, After hours contact number- , Urgent Care Clinics, [address] [place] [address], and [facility] and CTN . The patient agrees to contact the PCP and/or specialist office for questions related to their healthcare. [facility] follow up appointment(s):   Future Appointments   Date Time Provider Denis Hurst   2023 10:30 AM Kishor Sood NP [facility] BS AMB   2023 10:00 AM Adri Yoder,  [address] BS AMB   2023  9:00 AM Kiel Fortune, JOVAN RUEDAREY BS AMB   3/15/2023  9:00 AM REMOTE1JOSE ALBERTO BS AMB   3/31/2023  1:00 PM Kiel Fortune NP [facility] BS AMB   4/10/2023 11:10 AM MD GAGAN Vega BS AMB   2023  1:00 PM [facility] [place] CATH LAB 3 [facility]   2023  1:00 PM JOVAN PaganREY BS AMB   2023  2:00 PM PACEMAKER3JOSE ALBERTO BS AMB   2023  2:20 PM MD RANDOLPH Vazquez BS AMB     Non-[facility] follow up appointment(s): refer to goals section. CTN provided contact information for future needs.  Plan for follow-up call in 5-7 days based on severity of symptoms and risk factors. Plan for next call:   Assess current symptoms, including daily monitoring items. Plan for follow up - for future doses of bumex and/or KCL, possible lab work. Assess if AMD in place, if so, secure copy for EMR. If not, assist with completing. Goals Addressed                   This Visit's Progress       Heart Failure     Patient verbalizes understanding of self-management goals of living with Congestive Heart Failure and Treatment for recurrent/chronic AFib        2/3/23- return call from Ms. Huddleston. She has two of the 3 doses of metolazone ordered- this morning her weight was down approx 4 lbs- she took 3rd dose this morning. She states her chest has no pressure, less SOB-RODNEY, some LE edema decreasing. Reminded her about low NA-hydration see below, encouraged her to continue. She has not called primary cardiology office- CTN had left msg earlier asking for return call from nurse, I will update her if she returns the call. Return call from Jaú- relayed above. Next appt is on 3/17. Shared date of DCCV and next on 4/13 for ablation. CTN will update EP. [facility]     2/1/23- received response from EP, Dr. Belinda Arango. He recommends return to hospital to treating increasing fluid, etc.    Spoke with Ms. Marchia Aase- she was not able to get the metolazone RX. Her car is not working, son is picking up today, will get it to her around 2:30. Discussion about taking with second dose of bumex - take, wait 30 minutes, then take bumex 2 mg. She says she ordered the scale, but has not arrived. She is not sure of how much she has gained. This morning she feels \"smaller\" in her legs- when she changed pants. Shared what Dr. Belinda Arango had replied. She agreed to check in with CTN tomorrow after taking one dose of metolazone. She will call if she does not have urination to reflect desired effect, before she takes second dose in the morning.    Discussion about:  Look at labels- staying low NA- reducing to lowest possible- reminded about staying under 350-400 mg per meal, less is best.    She continues to be thirsty- advised her to gently hydrate, up to 64 ounces. Use rinsing mouth, hard candy, etc to keep moist.     CTN will update cardiology-EP. [facility]     1/31/23 - spoke with Ms. Lenine Ojeda, just now. - she says she is breathing some better since DCCV done on 1/27/23- but is \"winded\" with walking to [place] and back (done while we were on the phone). She said her legs are tight and swollen, abdomen is brink. She reports continued weight gain with increasing symptoms despite taking bumex 2 mg BID for past 5 days. Note Dr. Lissy Horner had increased dose of bumex from 1 mg daily to 2 mg daily at [address] on 1/16/23. She reports PMH of taking metolazone. Her scale is not working this morning- she had gained 5-6 lbs as of last week. She states she is eating low NA, drinking \"lots of water\". CTN reached out to primary cardiology via phone, asked for nurse to return call. Dr. Lissy Horner is not in the office today. Return call from nurse Yasmeen. Provided update, she will check with Dr. Rosanne Alvarez, (Dr. Lissy Horner away this week, her PA is in the hospital) and call CTN back. Return call from Yasmeen nurse, Dr. Rosanne Alvarez ordered:   Continue Bumex 2 mg BID- and KCl 20 meq - 2 tabs daily  take metolazone 5 mg for 3 consecutive days - she will call Friday and update their office on progress. She will assess Friday- dose moving forward of bumex and KCL. Scout Snider states she told her to start today, provided directions for taking metolazone 1 hour before bumex taken. She got the lab work from 1/26 done and will ask provider if he wants lab work done when she updates him on Friday. CTN updated EP via CC msg, sent updated staff msg about return call from primary cardiology office. [facility]     1/17/23- Left VM asking for return call.    Review of EMR shows: she followed up with PCP on 1/10/23- /86, GE37 wt- 225 lbs. Reported weak and tired. Her DIL was assisting, declined HH svcs. Reports mild RODNEY with occ HR increase, denied SOB and CP at rest.   PCP added second medication to help with insomnia- trazadone 50 mg- 1/2 tab HS PRN. PCP increased prozac to 40 mg BID. Renewed albuterol inhaler for \"reactive airway disease\". Follow up on 4/10/23. [facility]     1/6/23- spoke with Ms. Kang Batista- she is feeling better, tires easily but does feel it is slowly improving each day. She feels recovered from Flu. Denies LE edema, SOB and/or chest pain. Has been checking wts- steady around 222 lbs. Monitoring BP and HR- BP steady but HR fluctuates up and down. Asked her to check to make sure she has enough amiodarone to take until she attends follow up with EP on 1/26/23. [facility]     12/29/22- spoke with Ms. Kang Batista- she feels much better, tires easily. Asked her to be regularly active, but rest when she feels tired. Stay hydrated and eat for recovery- focus on low NA. Coughing less, PRN meds at home and using to control symptoms with good relief. She was seen at Cardiology, Dr. Tony Keyes office today, VS and EKG done. Shows AFib with good rate control at 80 bpm.  118/80, HR 80. Weight reported 218 lbs. She is monitoring BP and HR and daily weight at home. Reports they are good, weight is steady. She confirms continuing amiodarone 200 mg- 3 tabs TID dose until 12/30. Will start on 12/31 200 mg BID. On 1/14/21- transition to  200 daily. CTN will clarify with cardiology if she is to stop 200 mg daily or continue until seen for follow up on 1/26. If she is to continue, will need RX sent. [facility]     12/23/22- spoke with Ms. Kang Batista. She reports increased cough. CTN was able to secure PRN RX for Tessalon. Advised to keep cough symptoms controlled to help with AFib control. Asked her to reach out to PCP if symptoms are not controlled by PRN meds.     She is aware that she converted back and remains in AFib, explained goal of rate control. CTN clarified dosing for amiodarone with EP- discharge instructions not correct, updated in CC current med list. Amiodarone 400 mg TID for 9 days, then 200 mg BID for 14 days, then 200 mg daily. CTN asked EP about Toprol XL 25 mg BID dosing referenced in progress note, but no order written, patient does not have this medication at home. Cardiology sent in RX to pharmacy. Clarified about dose of KCL to be taken with changed dose of bumex. Continue 20 meq 2 tablets daily. Confirmed she has stopped: Diltiazem and Entresto. She is not taking imdur/isosorbide. Note: intolerance to Femara in Hem-oncology note. Discussion about how to help with Flu recovery:  reduce-treat symptoms, maintain hydration, focus on healthy foods rich with vitamin C and anti-oxidants. Be active, ambulate regularly, rest when tired. Pt will remain out of the hospital or ER for remainder of Bundle period.     [employer]

## 2023-02-04 ENCOUNTER — APPOINTMENT (OUTPATIENT)
Dept: GENERAL RADIOLOGY | Age: 73
DRG: 308 | End: 2023-02-04
Attending: EMERGENCY MEDICINE
Payer: MEDICARE

## 2023-02-04 ENCOUNTER — HOSPITAL ENCOUNTER (INPATIENT)
Age: 73
LOS: 2 days | Discharge: HOME | DRG: 308 | End: 2023-02-06
Attending: EMERGENCY MEDICINE | Admitting: STUDENT IN AN ORGANIZED HEALTH CARE EDUCATION/TRAINING PROGRAM
Payer: MEDICARE

## 2023-02-04 DIAGNOSIS — E87.6 HYPOKALEMIA: ICD-10-CM

## 2023-02-04 DIAGNOSIS — I47.20 V-TACH: ICD-10-CM

## 2023-02-04 DIAGNOSIS — Z45.02 ICD (IMPLANTABLE CARDIOVERTER-DEFIBRILLATOR) DISCHARGE: Primary | ICD-10-CM

## 2023-02-04 LAB
ALBUMIN SERPL-MCNC: 2.9 G/DL (ref 3.5–5)
ALBUMIN/GLOB SERPL: 1.2 (ref 1.1–2.2)
ALP SERPL-CCNC: 63 U/L (ref 45–117)
ALT SERPL-CCNC: 46 U/L (ref 12–78)
ANION GAP SERPL CALC-SCNC: 3 MMOL/L (ref 5–15)
AST SERPL-CCNC: 18 U/L (ref 15–37)
BASOPHILS # BLD: 0.1 K/UL (ref 0–0.1)
BASOPHILS NFR BLD: 1 % (ref 0–1)
BILIRUB SERPL-MCNC: 0.5 MG/DL (ref 0.2–1)
BNP SERPL-MCNC: 5757 PG/ML
BUN SERPL-MCNC: 25 MG/DL (ref 6–20)
BUN/CREAT SERPL: 22 (ref 12–20)
CALCIUM SERPL-MCNC: 8.4 MG/DL (ref 8.5–10.1)
CHLORIDE SERPL-SCNC: 104 MMOL/L (ref 97–108)
CO2 SERPL-SCNC: 32 MMOL/L (ref 21–32)
COMMENT, HOLDF: NORMAL
CREAT SERPL-MCNC: 1.15 MG/DL (ref 0.55–1.02)
DIFFERENTIAL METHOD BLD: ABNORMAL
EOSINOPHIL # BLD: 0.1 K/UL (ref 0–0.4)
EOSINOPHIL NFR BLD: 1 % (ref 0–7)
ERYTHROCYTE [DISTWIDTH] IN BLOOD BY AUTOMATED COUNT: 18.4 % (ref 11.5–14.5)
GLOBULIN SER CALC-MCNC: 2.5 G/DL (ref 2–4)
GLUCOSE SERPL-MCNC: 103 MG/DL (ref 65–100)
HCT VFR BLD AUTO: 33.2 % (ref 35–47)
HGB BLD-MCNC: 9.9 G/DL (ref 11.5–16)
IMM GRANULOCYTES # BLD AUTO: 0 K/UL (ref 0–0.04)
IMM GRANULOCYTES NFR BLD AUTO: 0 % (ref 0–0.5)
LYMPHOCYTES # BLD: 1.3 K/UL (ref 0.8–3.5)
LYMPHOCYTES NFR BLD: 15 % (ref 12–49)
MAGNESIUM SERPL-MCNC: 1.7 MG/DL (ref 1.6–2.4)
MCH RBC QN AUTO: 23.2 PG (ref 26–34)
MCHC RBC AUTO-ENTMCNC: 29.8 G/DL (ref 30–36.5)
MCV RBC AUTO: 77.9 FL (ref 80–99)
MONOCYTES # BLD: 0.9 K/UL (ref 0–1)
MONOCYTES NFR BLD: 10 % (ref 5–13)
NEUTS SEG # BLD: 6.6 K/UL (ref 1.8–8)
NEUTS SEG NFR BLD: 73 % (ref 32–75)
NRBC # BLD: 0 K/UL (ref 0–0.01)
NRBC BLD-RTO: 0 PER 100 WBC
PLATELET # BLD AUTO: 326 K/UL (ref 150–400)
PMV BLD AUTO: 11.8 FL (ref 8.9–12.9)
POTASSIUM SERPL-SCNC: 3 MMOL/L (ref 3.5–5.1)
PROT SERPL-MCNC: 5.4 G/DL (ref 6.4–8.2)
RBC # BLD AUTO: 4.26 M/UL (ref 3.8–5.2)
SAMPLES BEING HELD,HOLD: NORMAL
SODIUM SERPL-SCNC: 139 MMOL/L (ref 136–145)
TROPONIN I SERPL HS-MCNC: 17 NG/L (ref 0–51)
TROPONIN I SERPL HS-MCNC: 21 NG/L (ref 0–51)
WBC # BLD AUTO: 9 K/UL (ref 3.6–11)

## 2023-02-04 PROCEDURE — 71045 X-RAY EXAM CHEST 1 VIEW: CPT

## 2023-02-04 PROCEDURE — 96374 THER/PROPH/DIAG INJ IV PUSH: CPT

## 2023-02-04 PROCEDURE — 74011000250 HC RX REV CODE- 250: Performed by: STUDENT IN AN ORGANIZED HEALTH CARE EDUCATION/TRAINING PROGRAM

## 2023-02-04 PROCEDURE — 74011250637 HC RX REV CODE- 250/637: Performed by: STUDENT IN AN ORGANIZED HEALTH CARE EDUCATION/TRAINING PROGRAM

## 2023-02-04 PROCEDURE — 74011250636 HC RX REV CODE- 250/636: Performed by: STUDENT IN AN ORGANIZED HEALTH CARE EDUCATION/TRAINING PROGRAM

## 2023-02-04 PROCEDURE — 83880 ASSAY OF NATRIURETIC PEPTIDE: CPT

## 2023-02-04 PROCEDURE — 74011250636 HC RX REV CODE- 250/636: Performed by: EMERGENCY MEDICINE

## 2023-02-04 PROCEDURE — 80053 COMPREHEN METABOLIC PANEL: CPT

## 2023-02-04 PROCEDURE — 99285 EMERGENCY DEPT VISIT HI MDM: CPT

## 2023-02-04 PROCEDURE — 74011250636 HC RX REV CODE- 250/636

## 2023-02-04 PROCEDURE — 74011250637 HC RX REV CODE- 250/637: Performed by: EMERGENCY MEDICINE

## 2023-02-04 PROCEDURE — 65270000046 HC RM TELEMETRY

## 2023-02-04 PROCEDURE — 74011000258 HC RX REV CODE- 258: Performed by: EMERGENCY MEDICINE

## 2023-02-04 PROCEDURE — 85025 COMPLETE CBC W/AUTO DIFF WBC: CPT

## 2023-02-04 PROCEDURE — 84484 ASSAY OF TROPONIN QUANT: CPT

## 2023-02-04 PROCEDURE — 83735 ASSAY OF MAGNESIUM: CPT

## 2023-02-04 PROCEDURE — 36415 COLL VENOUS BLD VENIPUNCTURE: CPT

## 2023-02-04 RX ORDER — FLUOXETINE HYDROCHLORIDE 20 MG/1
40 CAPSULE ORAL 2 TIMES DAILY
Status: DISCONTINUED | OUTPATIENT
Start: 2023-02-05 | End: 2023-02-06 | Stop reason: HOSPADM

## 2023-02-04 RX ORDER — GUAIFENESIN 100 MG/5ML
324 LIQUID (ML) ORAL
Status: COMPLETED | OUTPATIENT
Start: 2023-02-04 | End: 2023-02-04

## 2023-02-04 RX ORDER — POTASSIUM CHLORIDE 14.9 MG/ML
10 INJECTION INTRAVENOUS
Status: COMPLETED | OUTPATIENT
Start: 2023-02-04 | End: 2023-02-05

## 2023-02-04 RX ORDER — BUMETANIDE 0.25 MG/ML
2 INJECTION INTRAMUSCULAR; INTRAVENOUS
Status: COMPLETED | OUTPATIENT
Start: 2023-02-04 | End: 2023-02-04

## 2023-02-04 RX ORDER — BUMETANIDE 1 MG/1
2 TABLET ORAL DAILY
COMMUNITY

## 2023-02-04 RX ORDER — POTASSIUM CHLORIDE 750 MG/1
40 TABLET, FILM COATED, EXTENDED RELEASE ORAL
Status: COMPLETED | OUTPATIENT
Start: 2023-02-04 | End: 2023-02-04

## 2023-02-04 RX ORDER — LANOLIN ALCOHOL/MO/W.PET/CERES
1 CREAM (GRAM) TOPICAL
Status: DISCONTINUED | OUTPATIENT
Start: 2023-02-05 | End: 2023-02-06 | Stop reason: HOSPADM

## 2023-02-04 RX ORDER — ZOLPIDEM TARTRATE 5 MG/1
5 TABLET ORAL
Status: DISCONTINUED | OUTPATIENT
Start: 2023-02-04 | End: 2023-02-06 | Stop reason: HOSPADM

## 2023-02-04 RX ORDER — MAGNESIUM SULFATE HEPTAHYDRATE 40 MG/ML
2 INJECTION, SOLUTION INTRAVENOUS ONCE
Status: COMPLETED | OUTPATIENT
Start: 2023-02-04 | End: 2023-02-04

## 2023-02-04 RX ORDER — AMIODARONE HYDROCHLORIDE 200 MG/1
200 TABLET ORAL DAILY
Status: DISCONTINUED | OUTPATIENT
Start: 2023-02-05 | End: 2023-02-06 | Stop reason: HOSPADM

## 2023-02-04 RX ORDER — ASPIRIN 81 MG/1
81 TABLET ORAL AS NEEDED
Status: DISCONTINUED | OUTPATIENT
Start: 2023-02-04 | End: 2023-02-06 | Stop reason: HOSPADM

## 2023-02-04 RX ORDER — METOPROLOL SUCCINATE 25 MG/1
25 TABLET, EXTENDED RELEASE ORAL 2 TIMES DAILY
Status: DISCONTINUED | OUTPATIENT
Start: 2023-02-05 | End: 2023-02-06 | Stop reason: HOSPADM

## 2023-02-04 RX ORDER — METOLAZONE 5 MG/1
5 TABLET ORAL DAILY
COMMUNITY
End: 2023-02-06

## 2023-02-04 RX ORDER — AMIODARONE HYDROCHLORIDE 150 MG/3ML
INJECTION, SOLUTION INTRAVENOUS
Status: COMPLETED
Start: 2023-02-04 | End: 2023-02-04

## 2023-02-04 RX ADMIN — DEXTROSE MONOHYDRATE 150 MG: 50 INJECTION, SOLUTION INTRAVENOUS at 18:39

## 2023-02-04 RX ADMIN — BUMETANIDE 2 MG: 0.25 INJECTION, SOLUTION INTRAMUSCULAR; INTRAVENOUS at 20:33

## 2023-02-04 RX ADMIN — ASPIRIN 81 MG CHEWABLE TABLET 324 MG: 81 TABLET CHEWABLE at 20:13

## 2023-02-04 RX ADMIN — MAGNESIUM SULFATE HEPTAHYDRATE 2 G: 40 INJECTION, SOLUTION INTRAVENOUS at 20:25

## 2023-02-04 RX ADMIN — AMIODARONE HYDROCHLORIDE 150 MG: 50 INJECTION, SOLUTION INTRAVENOUS at 20:11

## 2023-02-04 RX ADMIN — POTASSIUM CHLORIDE 10 MEQ: 14.9 INJECTION, SOLUTION INTRAVENOUS at 22:39

## 2023-02-04 RX ADMIN — APIXABAN 5 MG: 5 TABLET, FILM COATED ORAL at 21:41

## 2023-02-04 RX ADMIN — AMIODARONE HYDROCHLORIDE 1 MG/MIN: 50 INJECTION, SOLUTION INTRAVENOUS at 18:39

## 2023-02-04 RX ADMIN — POTASSIUM CHLORIDE 10 MEQ: 14.9 INJECTION, SOLUTION INTRAVENOUS at 20:33

## 2023-02-04 RX ADMIN — POTASSIUM CHLORIDE 10 MEQ: 14.9 INJECTION, SOLUTION INTRAVENOUS at 21:39

## 2023-02-04 RX ADMIN — POTASSIUM CHLORIDE 40 MEQ: 750 TABLET, FILM COATED, EXTENDED RELEASE ORAL at 20:13

## 2023-02-04 RX ADMIN — POTASSIUM CHLORIDE 10 MEQ: 14.9 INJECTION, SOLUTION INTRAVENOUS at 23:52

## 2023-02-04 NOTE — PROGRESS NOTES
Admission Medication Reconciliation:    Information obtained from:  Patient interview and personal medication list, Recent prescription fill history    RxQuery data available¹:  YES    Comments/Recommendations: Updated PTA meds/reviewed patient's allergies. Patient is a great historian and was calm during interview. Medication changes (since last review): Added  - Metolazone 5 mg every day 30 minutes before diuretic for 3 days    Patient has completed 2 days of with metolazone addition but missed her final day (2/4/2023) per patient    Adjusted  - Bumetanide 2 mg every day    Removed  - Trazodone    Of note, patient takes ambien every night for sleep. ¹RxQuery pharmacy benefit data reflects medications filled and processed through the patient's insurance, however   this data does NOT capture whether the medication was picked up or is currently being taken by the patient. Allergies:  Amoxicillin, Amoxicillin, Lipitor [atorvastatin], Zocor [simvastatin], Buspar [buspirone], Hydroxyzine, Livalo [pitavastatin], Pravastatin, and Tramadol    Significant PMH/Disease States:   Past Medical History:   Diagnosis Date    Acute right ankle pain 06/08/2018 5/29/18: X-ray showed possible right ankle sprain. 6/6/18: saw Dr. Namrata Enriquez ([facility]), diagnosed with peroneal tendonitis.  Prescribed an ASO    Asthma     Atrial fibrillation ([address]     Cardiomyopathy [address]     Coronary artery disease 2008    s/p RCA stent (AMRIT) on 11/26/11    Depression with anxiety     2011    DVT (deep venous thrombosis) ([facility] 07/27/2010    Family history of early CAD     GERD (gastroesophageal reflux disease)     H/O gastric bypass 2006    Revision in 2009    Hepatitis C antibody test positive     Does not have chronic hep C (labs 10/6/15: neg HCV RNA)     HTN (hypertension) 07/27/2010    Hyperlipidemia 07/27/2010    Incontinence of feces 09/03/2018    Dr. Katie Gillespie. 8/20/18: Improving with pelvic physical therapy and psyllium husk treatment. Saw Dr. Daniela Gamboa who suggested PT first. MR defecography showed pelvic floor weakness with pelvic descensus, small anterior  Rectocele, and vaginal prolapse. To have pelvic PT weekly for 8 wks and to see Dr. Daniela Gamboa again in Oct 2018. Joint pain 07/27/2010    MI (myocardial infarction) ([facility] 07/27/2010    Mitral valve regurgitation     Mild to moderate    Morbid obesity ([facility] 07/27/2010    Myocardial infarction [facility]) 2006 and 2011    Dr. Burton Nail disorder     PUD (peptic ulcer disease)     gi bleed 2008; ulcer n gastric bypass pouch    Urinary incontinence, stress 07/27/2010     Chief Complaint for this Admission:    Chief Complaint   Patient presents with    Pacemaker Problem     Pt here due to multiple pacemaker episodes of firing. Prior to Admission Medications:   Prior to Admission Medications   Prescriptions Last Dose Informant Taking? CALCIUM PO 2/3/2023 Self Yes   Sig: Take 600 mg by mouth daily. FLUoxetine (PROzac) 40 mg capsule 2/3/2023 Self Yes   Sig: Take 1 Capsule by mouth two (2) times a day. OTHER 2/3/2023 Self Yes   Sig: Complete multi formula, bariatric advantage   Repatha SureClick pen injection  Self No   Sig: INJECT 140 MG SUBCUTANEOUSLY EVERY 14 DAYS   albuterol (PROVENTIL HFA, VENTOLIN HFA, PROAIR HFA) 90 mcg/actuation inhaler  Self Yes   Sig: TAKE 2 PUFFS BY INHALATION EVERY FOUR HOURS AS NEEDED FOR WHEEZING OR SHORTNESS OF BREATH.   amiodarone (CORDARONE) 200 mg tablet 2/3/2023 Self Yes   Sig: Take 1 Tablet by mouth daily. apixaban (ELIQUIS) 5 mg tablet 2/3/2023 Self Yes   Sig: Take 1 Tablet by mouth two (2) times a day. Prescribed by Dr. Eliz Gutierrez. aspirin delayed-release 81 mg tablet 2/3/2023 Self Yes   Sig: Take 81 mg by mouth as needed for Pain. biotin 10,000 mcg cap 2/3/2023 Self Yes   Sig: Take  by mouth daily. bumetanide (BUMEX) 1 mg tablet 2/3/2023  Yes   Sig: Take 2 mg by mouth daily.    cholecalciferol, VITAMIN D3, (VITAMIN D3) 5,000 unit tab tablet 2/3/2023 Self Yes   Sig: Take 10,000 Units by mouth daily. ferrous sulfate 325 mg (65 mg iron) tablet 2/3/2023 Self Yes   Sig: Take  by mouth daily (before breakfast). magnesium 250 mg tab 2/3/2023 Self Yes   Sig: Take 1 Tab by mouth daily. metOLazone (ZAROXOLYN) 5 mg tablet  Self Yes   Sig: Take 5 mg by mouth daily. Take 1 pill by mouth 30 minutes before fluid pill for 3 days   metoprolol succinate (TOPROL-XL) 25 mg XL tablet 2/3/2023 Self Yes   Sig: Take 1 Tablet by mouth two (2) times a day. potassium chloride (Klor-Con M20) 20 mEq tablet 2/3/2023 Self Yes   Sig: TAKE 2 TABLETS BY MOUTH EVERY DAY   psyllium (METAMUCIL) powd 2/3/2023 Self Yes   Sig: Take  by mouth. 2 tsp daily   zolpidem (AMBIEN) 10 mg tablet 2/3/2023 Self Yes   Sig: TAKE 1 TABLET BY MOUTH NIGHTLY AS NEEDED FOR SLEEP. MAX DAILY AMOUNT: 10 MG. Facility-Administered Medications: None     Please contact the main inpatient pharmacy with any questions or concerns at [phone] and we will direct you to the clinical pharmacist covering this patient's care while in-house.    Diego

## 2023-02-04 NOTE — ED PROVIDER NOTES
Nery Bentley is a 82 yo F with h/o HTN, hyperlipidemia, CAD, MI, a-fib and cardiomyopathy with ICD in place who presents to the ED via EMS following episode of syncope and IVD firing x 4. She states she has a [employer] device. She has felt nauseated and fatigued most of the morning but felt much worse around 1:30pm when she started to feel lightheaded and then her ICD fired 4 times. Her son called 46. She states she is feeling much better now. Her cardiologist is Dr. Garrett Chiu and Dr. Can Coyle. Past Medical History:   Diagnosis Date    Acute right ankle pain 06/08/2018 5/29/18: X-ray showed possible right ankle sprain. 6/6/18: saw Dr. Izaiah Travis ([facility]), diagnosed with peroneal tendonitis. Prescribed an ASO    Asthma     Atrial fibrillation ([address]     Cardiomyopathy ([address]     Coronary artery disease 2008    s/p RCA stent (AMRIT) on 11/26/11    Depression with anxiety     2011    DVT (deep venous thrombosis) ([address] 07/27/2010    Family history of early CAD     GERD (gastroesophageal reflux disease)     H/O gastric bypass 2006    Revision in 2009    Hepatitis C antibody test positive     Does not have chronic hep C (labs 10/6/15: neg HCV RNA)     HTN (hypertension) 07/27/2010    Hyperlipidemia 07/27/2010    Incontinence of feces 09/03/2018    Dr. Lashell Russell. 8/20/18: Improving with pelvic physical therapy and psyllium husk treatment. Saw Dr. Chirag Lundberg who suggested PT first. MR defecography showed pelvic floor weakness with pelvic descensus, small anterior  Rectocele, and vaginal prolapse. To have pelvic PT weekly for 8 wks and to see Dr. Chirag Lundberg again in Oct 2018.     Joint pain 07/27/2010    MI (myocardial infarction) ([address] 07/27/2010    Mitral valve regurgitation     Mild to moderate    Morbid obesity ([address] 07/27/2010    Myocardial infarction [facility]) 2006 and 2011    Dr. Doris Hercules disorder     PUD (peptic ulcer disease)     gi bleed 2008; ulcer n gastric bypass pouch    Urinary incontinence, stress 07/27/2010       Past Surgical History:   Procedure Laterality Date    COLONOSCOPY N/A 6/29/2018    COLONOSCOPY performed by Cintia Raya MD at [facility] ENDOSCOPY; redundant colon, int hemorrhoids, pathology: normal colonic mucosa    HX BREAST BIOPSY Left     benign    HX BREAST LUMPECTOMY Right 7/14/2022    RIGHT BREAST LUMPECTOMY WITH ULTRASOUND performed by Hannah Nelson MD at [facility]    HX COLONOSCOPY  9/5/14    Dr. Kenneth Campbell; normal, repeat in 5 yrs    HX GASTRIC BYPASS      HX IMPLANTABLE CARDIOVERTER DEFIBRILLATOR  08/24/2016    HX LAP GASTRIC BYPASS  2006    revision 2009/Dr. Perez Certain    HX OTHER SURGICAL  09/19/2016    Removal of right ventricular ICD lead & single chamber transvenous AICD    HX OTHER SURGICAL  10/12/2016    pocket revison of ICD; Dr. Sonia Muir  06/07/2018    Dr Robb Lau; high resolution anorectal manaometry-abnormal study. Study done for eval of fecal incontinence    HX OTHER SURGICAL  12/14/2018    Dr. Robb Lau. Rectal endoscopic US (EUS) for rectal incontinence. Normal rectal mucosa, no fistulas.     HX PACEMAKER      sicd/left side of chest under arm    INS PPM/ICD LED SING ONLY  8/24/2016         INS PPM/ICD LED SING ONLY  8/26/2016         INS PPM/ICD LED SING ONLY  9/21/2016         DE UNLISTED LAPAROSCOPY PROCEDURE STOMACH  04/05/2010    Revision GBP    DE UNLISTED PROCEDURE CARDIAC SURGERY      Stent x 5-6,Most recent 2011         Family History:   Problem Relation Age of Onset    Dementia Mother     Cancer Mother         Colon    Alzheimer's Disease Mother     Cancer Father         Testical and stomach cancer    Heart Disease Father         Triple by pass    Hypertension Father         High blood pressure    Heart Disease Sister         Aortic replacement    Stroke Sister         Mini strokes    Stroke Sister     Heart Attack Brother     Anesth Problems Neg Hx        Social History     Socioeconomic History    Marital status:      Spouse name: Not on file    Number of children: Not on file    Years of education: Not on file    Highest education level: Not on file   Occupational History    Not on file   Tobacco Use    Smoking status: Former     Packs/day: 0.00     Years: 30.00     Pack years: 0.00     Types: Cigarettes     Start date: 1970     Quit date: 2004     Years since quittin.7    Smokeless tobacco: Never   Vaping Use    Vaping Use: Never used   Substance and Sexual Activity    Alcohol use: No     Alcohol/week: 0.0 standard drinks    Drug use: No     Types: Prescription    Sexual activity: Not Currently     Partners: Female     Birth control/protection: Condom, Pill, Diaphragm, I.U.D., Sponge, None   Other Topics Concern    Not on file   Social History Narrative    ** Merged History Encounter **          Social Determinants of Health     Financial Resource Strain: Not on file   Food Insecurity: Not on file   Transportation Needs: Not on file   Physical Activity: Not on file   Stress: Not on file   Social Connections: Not on file   Intimate Partner Violence: Not on file   Housing Stability: Not on file         ALLERGIES: Amoxicillin, Amoxicillin, Lipitor [atorvastatin], Zocor [simvastatin], Buspar [buspirone], Hydroxyzine, Livalo [pitavastatin], Pravastatin, and Tramadol    Review of Systems   Constitutional:  Negative for fever. HENT:  Negative for sore throat. Eyes:  Negative for visual disturbance. Respiratory:  Negative for cough. Cardiovascular:  Positive for chest pain. Gastrointestinal:  Positive for nausea. Negative for abdominal pain. Genitourinary:  Negative for dysuria. Musculoskeletal:  Negative for back pain. Skin:  Negative for rash. Neurological:  Positive for light-headedness. Negative for headaches.      Vitals:    23 1533 23 1538 23 1552 23 1607   BP: 109/60 115/63 117/61 120/73   Pulse: (!) 54  (!) 54 (!) 54   Resp: 16  15 17   Temp: 97.9 °F (36.6 °C) SpO2: 94%  92% 92%   Weight: 105.7 kg (233 lb)      Height: 5' 5\" (1.651 m)               Physical Exam  Vitals and nursing note reviewed. Constitutional:       General: She is not in acute distress. Appearance: She is well-developed. She is obese. HENT:      Head: Normocephalic and atraumatic. Mouth/Throat:      Mouth: Mucous membranes are moist.   Eyes:      Extraocular Movements: Extraocular movements intact. Conjunctiva/sclera: Conjunctivae normal.   Neck:      Trachea: Phonation normal.   Cardiovascular:      Rate and Rhythm: Normal rate. Pulmonary:      Effort: Pulmonary effort is normal. No respiratory distress. Breath sounds: No wheezing or rales. Abdominal:      General: There is no distension. Musculoskeletal:         General: No tenderness. Normal range of motion. Cervical back: Normal range of motion. Skin:     General: Skin is warm and dry. Neurological:      General: No focal deficit present. Mental Status: She is alert. She is not disoriented. Motor: No abnormal muscle tone. ED EKG interpretation:  Rhythm: sinus bradycardia; and regular . Rate (approx.): 57; Axis: normal; P wave: normal; QRS interval: normal ; ST/T wave: non-specific changes; Other findings: abnormal ekg. This EKG was interpreted by Wale Estrada MD,ED Provider. Medical Decision Making  Amount and/or Complexity of Data Reviewed  Labs: ordered. Radiology: ordered. ECG/medicine tests: ordered. Risk  Prescription drug management. Perfect Serve Consult for Admission  6:00 PM    ED Room Number: ER26/26  Patient Name and age:  Ivonne Douglas 81 y.o.  female  Working Diagnosis:   1. ICD (implantable cardioverter-defibrillator) discharge    2. V-tach    3.  Hypokalemia        COVID-19 Suspicion:  no  Sepsis present:  no  Reassessment needed: N/A  Code Status:  Full Code  Readmission: no  Isolation Requirements:  no  Recommended Level of Care: telemetry  Department: [facility] Adult ED - [phone]    Other:  h/o CAD, MI, A-fib and cardiomyopathy with ICD who had 4 discharges from her ICD and syncope/near syncope this afternoon. NSR in ED throughout observation. ICD interrogated and had 4 episodes of V tach. 1 self corrected without discharge and 4 corrected with device defibrillation. Patient followed by Dr. Yasmeen Zapata and Dr. Fahad Carney. Cardiolgy consulted and awaiting call back. K 3.0. IV and PO KCL ordered. 6:49 PM  Called back to room patient had brief episode of chest pain when amiodarone bolus infused, now resolved. Repeat EKG remains unchanged with sinus bradycardia. Repeat trop ordered and aspirin.         Procedures

## 2023-02-04 NOTE — ED NOTES
Pt arrived via EMS for multiple episodes of Pacemaker firing and pt states she thought maybe she was going to pass out. States she had Chest pain but it has subsided now.

## 2023-02-04 NOTE — PROGRESS NOTES
Called for ICD firing. Do not have interrogation to review. Explorys reported to ED VT. I am suspicious it may be rapid atrial fibrillation. Has not felt well. Diuretics increased, metolazone added seemingly by Dr. Martha Rivers.  K 3.0. Creatinine 1. 15. Admitted 12/2022 with influenza, acute hypoxic respiratory failure, acute on chronic CHF, AF with RVR. DCCV 12/29/2022, had recurrent AF thereafter. Repeat DCCV Dr. Colletta Browns 1/27/2023. Calls to Dr. Colletta Browns for increased weight, SOB - he rec'd inpatient admission. S/p [place] S-ICD in place of transvenous system 09/21/2016. Initial ICD implanted 8/24/16. Transvenous RV lead displacement x 2 due to large breast size, repositioned once. Removed entire system d/t to high recurrent risk of perforation. CAD S/p PCI RCA, LAD. Cardiac cath in 09/2022 at [facility] showed  in OM, patent LAD stent, & mild ISR in proximal RCA. MI 11/2011, also 2006 or 2007. Followed by Dr. Oneal Stafford, Dr. Colletta Browns EP. History of gastric bypass, query poor absorption of medications. Assessment and Plan     1. ICD shocks - VT versus rapid atrial fibrillation. Persistent AF with RVR: DCCV 12/2022, 1/27/2023. Loaded with amiodarone, now on maintenance dose. Plan for amio-reload. Continue BB. Mexiletine an option is amio fails. Replete lytes. IV diuresis to manage decompensated CHF. Will get EP on board Monday - she has an atrial fibrillation ablation planned with Dr. Colletta Browns 4/2023.       2. Dilated ischemic CMP/acute on chronic systolic heart failure: LVEF 25-30% in 12/2022. NYHA III chronic systolic CHF. Plan to diurese, replete lytes aggressively. On Toprol XL, but no ACEi/ARB due to low BP. Managed by Dr. Oneal Stafford. Per Dr. Colletta Browns - she is not candidate for BIV ICD implant and AV node ablation together because of previous ICD lead dislodgement. If AFIB ablation fails, she may have BIV ICD and then 1 month later AV node ablation     3. CAD: S/p prior PCI LAD & RCA. Continue Repatha, Zetia, & ASA as previously prescribed. Cath 9/2022 Chip no intervention. 4.  Refractory atrial fibrillation -  BB, amio, continue Eliquis for embolic CVA prophylaxis.

## 2023-02-04 NOTE — ED NOTES
PT DAILY TREATMENT NOTE 10-18 Patient Name: Elba Cockayne Date:2019 : 1976 [x]  Patient  Verified Payor: BLUE CROSS / Plan: [facility] PPO / Product Type: PPO / In time:9:32  Out time:10:32 Total Treatment Time (min): 60 Visit #: 5 of 24 Medicare/BCBS Only Total Timed Codes (min):  60 1:1 Treatment Time:  45  
 
 
Treatment Area: Pain in right knee [M25.561] Bilateral leg weakness [R29.898] SUBJECTIVE Pain Level (0-10 scale): 0 Any medication changes, allergies to medications, adverse drug reactions, diagnosis change, or new procedure performed?: [x] No    [] Yes (see summary sheet for update) Subjective functional status/changes:   [] No changes reported Pt reports that his knee gave out on him this morning, but he did not fall to the ground. OBJECTIVE Min Type Additional Details  
 [] Estim:  []Unatt       []IFC  []Premod []Other:  []w/ice   []w/heat Position: Location:  
 [] Estim: []Att    []TENS instruct  []NMES []Other:  []w/US   []w/ice   []w/heat Position: Location:  
 []  Traction: [] Cervical       []Lumbar 
                     [] Prone          []Supine []Intermittent   []Continuous Lbs: 
[] before manual 
[] after manual  
 []  Ultrasound: []Continuous   [] Pulsed []1MHz   []3MHz W/cm2: 
Location:  
 []  Iontophoresis with dexamethasone Location: [] Take home patch  
[] In clinic  
 []  Ice     []  heat 
[]  Ice massage 
[]  Laser  
[]  Anodyne Position: Location:  
 []  Laser with stim 
[]  Other:  Position: Location:  
 []  Vasopneumatic Device Pressure:       [] lo [] med [] hi  
Temperature: [] lo [] med [] hi  
[] Skin assessment post-treatment:  []intact []redness- no adverse reaction 
  []redness  adverse reaction:  
 
  
  
35 min Therapeutic Exercise:  [x] See flow sheet :  
 Second page to [employer]. Rationale: increase ROM and increase strength to improve the patients ability to increase ease with ADLs.   
  
25 min Neuromuscular Re-education:  [x]  See flow sheet :balance and gait training. Rationale: increase ROM, increase strength, improve balance and increase proprioception  to improve the patients ability to increase tolerance to activities. With 
 [] TE 
 [] TA 
 [] neuro 
 [] other: Patient Education: [x] Review HEP [] Progressed/Changed HEP based on:  
[] positioning   [] body mechanics   [] transfers   [] heat/ice application   
[] other:   
 
Other Objective/Functional Measures: Pt was able to perform alternating marching without HHA. Pain Level (0-10 scale) post treatment: 0 
 
ASSESSMENT/Changes in Function:Cue pt with ambulation to limit quadricep thrust on left LE. Pt looses trunk and core control with fatigue. Continue to encourage pt to perform a regular HEP program to get more of a carry over from each session. Pt has poor eccentric quad control with stairs and sit to stands. Patient will continue to benefit from skilled PT services to modify and progress therapeutic interventions, address functional mobility deficits, address ROM deficits, address strength deficits, analyze and address soft tissue restrictions and address imbalance/dizziness to attain remaining goals. []  See Plan of Care 
[]  See progress note/recertification 
[]  See Discharge Summary Progress towards goals / Updated goals: 
Short term goals to be achieved in 1 week: 
1) Pt will report full compliance with HEP in order to progress in therapy. At [facility]: handed out HEP Current: Met: reports full compliance with HEP. 2/19/19 
  
Long term goals to be achieved in 8 weeks 1) Pt will have an improved tinetti score to >/= 24 indicating a low risk for falls to increase ease with outdoor ambulation At [facility]: tinetti=13 2) Pt will have an increase in the five time sit to stand test to >/=15 seconds without use of hands to increase ease with household ADLs. At [facility]:  Five time sit to stand: 24 seconds, requires cues of hands, poor eccentric quad control. Current: Not met: 5x sit to stand from 18\" chair with B UE use = 32 sec. 2/21/19 3) Pt will demonstrate an increase in SLS to >/=10 seconds on left to increase ease with stair management. At [facility]: able to hold 2 seconds with hand held support Current: Progressing: Pt was able to perform alternating marching without HHA. 2/25/18 4) Pt will have an improved FOTO to >/= 53 to increase ease with function At [facility]: FOTO= 42 
5) Pt will report an increase in function of >/=70% to increase ease with playing with children At [facility]: 0% 
  
  
 
 
 
PLAN 
[]  Upgrade activities as tolerated     [x]  Continue plan of care 
[]  Update interventions per flow sheet      
[]  Discharge due to:_ 
[]  Other:_ Dino Srivastava PTA 2/25/2019  9:46 AM 
 
Future Appointments Date Time Provider Daron Rojas 2/27/2019 11:30 AM Terryann Pals [facility]  
3/1/2019  9:00 AM Terryann Pals [facility]  
3/5/2019 11:30 AM [facility]als [facility]  
3/6/2019 10:30 AM Terryann Pals [facility]  
3/8/2019  9:30 AM Terryann Pals [facility]  
3/11/2019 10:30 AM Terryann Pals [facility]  
3/13/2019  1:00 PM Terryann Pals [facility]  
3/15/2019  9:30 AM Terryann Pals [facility]  
3/18/2019 10:30 AM [facility]ann Pals [facility]  
3/20/2019 10:30 [facility]  
3/22/2019  9:00 AM Natasha Ashraf [facility]

## 2023-02-05 LAB
ANION GAP SERPL CALC-SCNC: 6 MMOL/L (ref 5–15)
ANION GAP SERPL CALC-SCNC: 6 MMOL/L (ref 5–15)
BASOPHILS # BLD: 0.1 K/UL (ref 0–0.1)
BASOPHILS NFR BLD: 1 % (ref 0–1)
BUN SERPL-MCNC: 18 MG/DL (ref 6–20)
BUN SERPL-MCNC: 20 MG/DL (ref 6–20)
BUN/CREAT SERPL: 16 (ref 12–20)
BUN/CREAT SERPL: 18 (ref 12–20)
CALCIUM SERPL-MCNC: 8.4 MG/DL (ref 8.5–10.1)
CALCIUM SERPL-MCNC: 8.5 MG/DL (ref 8.5–10.1)
CHLORIDE SERPL-SCNC: 101 MMOL/L (ref 97–108)
CHLORIDE SERPL-SCNC: 99 MMOL/L (ref 97–108)
CO2 SERPL-SCNC: 30 MMOL/L (ref 21–32)
CO2 SERPL-SCNC: 33 MMOL/L (ref 21–32)
CREAT SERPL-MCNC: 1.1 MG/DL (ref 0.55–1.02)
CREAT SERPL-MCNC: 1.11 MG/DL (ref 0.55–1.02)
DIFFERENTIAL METHOD BLD: ABNORMAL
EOSINOPHIL # BLD: 0.1 K/UL (ref 0–0.4)
EOSINOPHIL NFR BLD: 1 % (ref 0–7)
ERYTHROCYTE [DISTWIDTH] IN BLOOD BY AUTOMATED COUNT: 18.6 % (ref 11.5–14.5)
GLUCOSE SERPL-MCNC: 105 MG/DL (ref 65–100)
GLUCOSE SERPL-MCNC: 98 MG/DL (ref 65–100)
HCT VFR BLD AUTO: 35.6 % (ref 35–47)
HGB BLD-MCNC: 10.3 G/DL (ref 11.5–16)
IMM GRANULOCYTES # BLD AUTO: 0 K/UL (ref 0–0.04)
IMM GRANULOCYTES NFR BLD AUTO: 0 % (ref 0–0.5)
LYMPHOCYTES # BLD: 1.5 K/UL (ref 0.8–3.5)
LYMPHOCYTES NFR BLD: 14 % (ref 12–49)
MAGNESIUM SERPL-MCNC: 2.1 MG/DL (ref 1.6–2.4)
MCH RBC QN AUTO: 22.3 PG (ref 26–34)
MCHC RBC AUTO-ENTMCNC: 28.9 G/DL (ref 30–36.5)
MCV RBC AUTO: 77.2 FL (ref 80–99)
MONOCYTES # BLD: 1.1 K/UL (ref 0–1)
MONOCYTES NFR BLD: 10 % (ref 5–13)
NEUTS SEG # BLD: 8.2 K/UL (ref 1.8–8)
NEUTS SEG NFR BLD: 74 % (ref 32–75)
NRBC # BLD: 0 K/UL (ref 0–0.01)
NRBC BLD-RTO: 0 PER 100 WBC
PHOSPHATE SERPL-MCNC: 3.1 MG/DL (ref 2.6–4.7)
PLATELET # BLD AUTO: 350 K/UL (ref 150–400)
PMV BLD AUTO: 12.3 FL (ref 8.9–12.9)
POTASSIUM SERPL-SCNC: 3.2 MMOL/L (ref 3.5–5.1)
POTASSIUM SERPL-SCNC: 3.7 MMOL/L (ref 3.5–5.1)
RBC # BLD AUTO: 4.61 M/UL (ref 3.8–5.2)
RBC MORPH BLD: ABNORMAL
RBC MORPH BLD: ABNORMAL
SODIUM SERPL-SCNC: 137 MMOL/L (ref 136–145)
SODIUM SERPL-SCNC: 138 MMOL/L (ref 136–145)
WBC # BLD AUTO: 11 K/UL (ref 3.6–11)

## 2023-02-05 PROCEDURE — 80048 BASIC METABOLIC PNL TOTAL CA: CPT

## 2023-02-05 PROCEDURE — 93005 ELECTROCARDIOGRAM TRACING: CPT

## 2023-02-05 PROCEDURE — 99222 1ST HOSP IP/OBS MODERATE 55: CPT | Performed by: INTERNAL MEDICINE

## 2023-02-05 PROCEDURE — 84100 ASSAY OF PHOSPHORUS: CPT

## 2023-02-05 PROCEDURE — 74011250637 HC RX REV CODE- 250/637: Performed by: INTERNAL MEDICINE

## 2023-02-05 PROCEDURE — 83735 ASSAY OF MAGNESIUM: CPT

## 2023-02-05 PROCEDURE — 74011250636 HC RX REV CODE- 250/636: Performed by: EMERGENCY MEDICINE

## 2023-02-05 PROCEDURE — 74011250637 HC RX REV CODE- 250/637: Performed by: STUDENT IN AN ORGANIZED HEALTH CARE EDUCATION/TRAINING PROGRAM

## 2023-02-05 PROCEDURE — 74011250637 HC RX REV CODE- 250/637: Performed by: NURSE PRACTITIONER

## 2023-02-05 PROCEDURE — 85025 COMPLETE CBC W/AUTO DIFF WBC: CPT

## 2023-02-05 PROCEDURE — 74011000258 HC RX REV CODE- 258: Performed by: EMERGENCY MEDICINE

## 2023-02-05 PROCEDURE — 65270000046 HC RM TELEMETRY

## 2023-02-05 PROCEDURE — 36415 COLL VENOUS BLD VENIPUNCTURE: CPT

## 2023-02-05 RX ORDER — ACETAMINOPHEN 325 MG/1
650 TABLET ORAL
Status: DISCONTINUED | OUTPATIENT
Start: 2023-02-05 | End: 2023-02-06 | Stop reason: HOSPADM

## 2023-02-05 RX ADMIN — POTASSIUM BICARBONATE 40 MEQ: 782 TABLET, EFFERVESCENT ORAL at 07:41

## 2023-02-05 RX ADMIN — AMIODARONE HYDROCHLORIDE 200 MG: 200 TABLET ORAL at 08:58

## 2023-02-05 RX ADMIN — AMIODARONE HYDROCHLORIDE 0.5 MG/MIN: 50 INJECTION, SOLUTION INTRAVENOUS at 20:10

## 2023-02-05 RX ADMIN — FLUOXETINE 40 MG: 20 CAPSULE ORAL at 17:28

## 2023-02-05 RX ADMIN — METOPROLOL SUCCINATE 25 MG: 25 TABLET, EXTENDED RELEASE ORAL at 17:28

## 2023-02-05 RX ADMIN — POTASSIUM BICARBONATE 40 MEQ: 782 TABLET, EFFERVESCENT ORAL at 08:57

## 2023-02-05 RX ADMIN — AMIODARONE HYDROCHLORIDE 1 MG/MIN: 50 INJECTION, SOLUTION INTRAVENOUS at 14:05

## 2023-02-05 RX ADMIN — APIXABAN 5 MG: 5 TABLET, FILM COATED ORAL at 17:28

## 2023-02-05 RX ADMIN — APIXABAN 5 MG: 5 TABLET, FILM COATED ORAL at 08:58

## 2023-02-05 RX ADMIN — FLUOXETINE 40 MG: 20 CAPSULE ORAL at 08:58

## 2023-02-05 RX ADMIN — AMIODARONE HYDROCHLORIDE 1 MG/MIN: 50 INJECTION, SOLUTION INTRAVENOUS at 06:27

## 2023-02-05 RX ADMIN — ZOLPIDEM TARTRATE 5 MG: 5 TABLET ORAL at 21:57

## 2023-02-05 RX ADMIN — FERROUS SULFATE TAB 325 MG (65 MG ELEMENTAL FE) 325 MG: 325 (65 FE) TAB at 08:58

## 2023-02-05 RX ADMIN — METOPROLOL SUCCINATE 25 MG: 25 TABLET, EXTENDED RELEASE ORAL at 08:58

## 2023-02-05 NOTE — PROGRESS NOTES
06:50 pt noted to be in V-tach on monitor same displays Heart rate of 200's, Arrived at pt bedside pt noted to be grimacing but alert and oriented, stated that her ICD just shocked her. Cardiac monitor displays sinus rhythm with pvc's, palpable pulse noted. Rapid response called, EKG done with labs. 07:43  Good morning, This above pt this A.M  had x3 runs of Ventricular tachycardia, pt has ICD which intervened with shocks. Pt currently on Amiodarone infusion 1mg/min overnight. Spoke with on-call Cardiologist Suzanne Yen who is requesting patient received a total of 60-80meq potassium. patiently has only received 40meq so far.

## 2023-02-05 NOTE — ED NOTES
1838 Pt had CP during Amio bolus. MD guardado Notified. CP subsided. EKG and repeat trop preformed. 2018 Pt had two episode where ICD fired . MD Olivares at bedside Pt stable. Received Verbal for 150mg Amio bolus IV. Override med given. Bedside shift change report given to Debo Otto  (oncoming nurse) by Bernard Clemons RN  (offgoing nurse). Report included the following information SBAR, Kardex, and ED Summary.

## 2023-02-05 NOTE — PROGRESS NOTES
Bedside and Verbal shift change report given to Jay Esquivel RN (oncoming nurse) by Feliz Jansen LPN (offgoing nurse). Report included the following information SBAR, Kardex, Intake/Output, MAR, Recent Results, Cardiac Rhythm SR, and Quality Measures.

## 2023-02-05 NOTE — ED NOTES
TRANSFER - OUT REPORT:    Verbal report given to MIRIAM Paul(name) on Justo Gorman  being transferred to [facility]) for routine progression of care       Report consisted of patients Situation, Background, Assessment and   Recommendations(SBAR). Information from the following report(s) SBAR, Kardex, ED Summary, Intake/Output, MAR, and Recent Results was reviewed with the receiving nurse. Lines:   Peripheral IV 02/04/23 Left Antecubital (Active)   Site Assessment Clean, dry, & intact 02/04/23 1547   Phlebitis Assessment 0 02/04/23 1547   Infiltration Assessment 0 02/04/23 1547   Dressing Status Clean, dry, & intact 02/04/23 1547       Peripheral IV 02/04/23 Anterior;Proximal;Right Forearm (Active)        Opportunity for questions and clarification was provided.       Patient transported with:   Monitor  Registered Nurse

## 2023-02-05 NOTE — H&P
History & Physical    Primary Care Provider: Eva Trammell MD  Source of Information: Patient and chart review    History of Presenting Illness:   Marline Sharpe is a 81 y.o. female with history of dilated cardiomyopathy with EF of 25-30, A. fib status post ablation and recent recent cardioversion (1/27) on Eliquis, biventricular ICD, CAD status post PCI, morbid obesity, hypertension, GERD, asthma, mood disorder who presented to ED with complaints of her ICD firing. Patient states over the last week and a half, she has noted increased weight gain, dyspnea and lower extremity edema. Contacted her PCP office and had her diuretics adjusted and is now 1 Bumex 2 mg p.o. twice daily as well as metolazone. States earlier today, she had an episode of nausea, malaise and fatigue. This was followed by her ICD firing leading to what she believes was a syncopal episode. EMS was activated and she was transported to hospital.  At time of my evaluation, she feels fine and denies any symptoms at rest.    The patient denies any fever, chills, chest or abdominal pain, nausea, vomiting, cough, congestion, recent illness, palpitations, or dysuria. Remarkable vitals on ER Presentation: Heart rate in 50s to 200s  Labs Remarkable for: Hemoglobin 9.9, potassium 3.0, creatinine 1.5, mag 1.7, BNP 5757  ER Images: Chest x-ray showed pulmonary edema  ER treatment: Anemia 150 mg bolus followed by amnio gtt. Brief ER course:  Patient treated with amnio bolus and subsequently amiodarone gtt  Had recurrence of V. tach noted on monitor with ICD deployed x3  Administered additional amnio bolus and continued amnio GTT at 1     Review of Systems:  Pertinent items are noted in the History of Present Illness. Past Medical History:   Diagnosis Date    Acute right ankle pain 06/08/2018 5/29/18: X-ray showed possible right ankle sprain.  6/6/18: saw Dr. Kentrell Owens ([facility]), diagnosed with peroneal tendonitis. Prescribed an ASO    Asthma     Atrial fibrillation ([facility]     Cardiomyopathy ([facility] [address]     Coronary artery disease 2008    s/p RCA stent ([facility]IT) on 11/26/11    Depression with anxiety     2011    DVT (deep venous thrombosis) ([facility] [address] 07/27/2010    Family history of early CAD     GERD (gastroesophageal reflux disease)     H/O gastric bypass 2006    Revision in 2009    Hepatitis C antibody test positive     Does not have chronic hep C (labs 10/6/15: neg HCV RNA)     HTN (hypertension) 07/27/2010    Hyperlipidemia 07/27/2010    Incontinence of feces 09/03/2018    Dr. Leyla Edge. 8/20/18: Improving with pelvic physical therapy and psyllium husk treatment. Saw Dr. Kingston Guzman who suggested PT first. MR defecography showed pelvic floor weakness with pelvic descensus, small anterior  Rectocele, and vaginal prolapse. To have pelvic PT weekly for 8 wks and to see Dr. Kingston Guzman again in Oct 2018.     Joint pain 07/27/2010    MI (myocardial infarction) ([facility] 07/27/2010    Mitral valve regurgitation     Mild to moderate    Morbid obesity ([facility] 07/27/2010    Myocardial infarction [facility]) 2006 and 2011    Dr. Travis Ashley disorder     PUD (peptic ulcer disease)     gi bleed 2008; ulcer n gastric bypass pouch    Urinary incontinence, stress 07/27/2010      Past Surgical History:   Procedure Laterality Date    COLONOSCOPY N/A 6/29/2018    COLONOSCOPY performed by Celien Pantoja MD at [facility] ENDOSCOPY; redundant colon, int hemorrhoids, pathology: normal colonic mucosa    HX BREAST BIOPSY Left     benign    HX BREAST LUMPECTOMY Right 7/14/2022    RIGHT BREAST LUMPECTOMY WITH ULTRASOUND performed by Mala Barreto MD at [facility] 11    HX COLONOSCOPY  9/5/14    Dr. Casey Marcum; normal, repeat in 5 yrs    HX GASTRIC BYPASS      HX IMPLANTABLE CARDIOVERTER DEFIBRILLATOR  08/24/2016    HX LAP GASTRIC BYPASS  2006    revision 2009/Dr. Virgene Najjar    HX OTHER SURGICAL  09/19/2016    Removal of right ventricular ICD lead & single chamber transvenous AICD    HX OTHER SURGICAL  10/12/2016    pocket revison of ICD; Dr. Alejandrina Love  06/07/2018    Dr Katie Gillespie; high resolution anorectal manaometry-abnormal study. Study done for eval of fecal incontinence    HX OTHER SURGICAL  12/14/2018    Dr. Katie Gillespie. Rectal endoscopic US (EUS) for rectal incontinence. Normal rectal mucosa, no fistulas. HX PACEMAKER      sicd/left side of chest under arm    INS PPM/ICD LED SING ONLY  8/24/2016         INS PPM/ICD LED SING ONLY  8/26/2016         INS PPM/ICD LED SING ONLY  9/21/2016         AK UNLISTED LAPAROSCOPY PROCEDURE STOMACH  04/05/2010    Revision GBP    AK UNLISTED PROCEDURE CARDIAC SURGERY      Stent x 5-6,Most recent 2011     Prior to Admission medications    Medication Sig Start Date End Date Taking? Authorizing Provider   metOLazone (ZAROXOLYN) 5 mg tablet Take 5 mg by mouth daily. Take 1 pill by mouth 30 minutes before fluid pill for 3 days   Yes Provider, Historical   bumetanide (BUMEX) 1 mg tablet Take 2 mg by mouth daily. Yes Provider, Historical   amiodarone (CORDARONE) 200 mg tablet Take 1 Tablet by mouth daily. 1/17/23  Yes Zak IRELAND NP   FLUoxetine (PROzac) 40 mg capsule Take 1 Capsule by mouth two (2) times a day. 1/10/23  Yes Judith El MD   albuterol (PROVENTIL HFA, VENTOLIN HFA, PROAIR HFA) 90 mcg/actuation inhaler TAKE 2 PUFFS BY INHALATION EVERY FOUR HOURS AS NEEDED FOR WHEEZING OR SHORTNESS OF BREATH. 1/10/23  Yes Marcella El MD   zolpidem (AMBIEN) 10 mg tablet TAKE 1 TABLET BY MOUTH NIGHTLY AS NEEDED FOR SLEEP. MAX DAILY AMOUNT: 10 MG. 1/9/23  Yes Judith El MD   metoprolol succinate (TOPROL-XL) 25 mg XL tablet Take 1 Tablet by mouth two (2) times a day. 12/23/22  Yes Traci Fritz MD   apixaban (ELIQUIS) 5 mg tablet Take 1 Tablet by mouth two (2) times a day. Prescribed by Dr. Terry Ugalde.  10/11/22  Yes José Luis Lees MD   potassium chloride (Klor-Con M20) 20 mEq tablet TAKE 2 TABLETS BY MOUTH EVERY DAY 9/8/22  Yes José Luis Lees MD   aspirin delayed-release 81 mg tablet Take 81 mg by mouth as needed for Pain. Yes Provider, Historical   psyllium (METAMUCIL) powd Take  by mouth. 2 tsp daily   Yes Provider, Historical   cholecalciferol, VITAMIN D3, (VITAMIN D3) 5,000 unit tab tablet Take 10,000 Units by mouth daily. Yes Provider, Historical   biotin 10,000 mcg cap Take  by mouth daily. Yes Provider, Historical   OTHER Complete multi formula, bariatric advantage   Yes Provider, Historical   magnesium 250 mg tab Take 1 Tab by mouth daily. Yes Provider, Historical   ferrous sulfate 325 mg (65 mg iron) tablet Take  by mouth daily (before breakfast). Yes Provider, Historical   CALCIUM PO Take 600 mg by mouth daily.    Yes Provider, Historical   Repatha SureClick pen injection INJECT 140 MG SUBCUTANEOUSLY EVERY 14 DAYS 1/18/23   Provider, Historical     Allergies   Allergen Reactions    Amoxicillin Anaphylaxis    Amoxicillin Anaphylaxis    Lipitor [Atorvastatin] Other (comments)     Severe muscle pain and spasms    Zocor [Simvastatin] Other (comments)     Cramps, muscle spasms    Buspar [Buspirone] Other (comments)     Drunk sensation, headaches    Hydroxyzine Other (comments)     Blurred vision, lightheadedness    Livalo [Pitavastatin] Myalgia    Pravastatin Other (comments)     Leg cramps    Tramadol Vertigo      Family History   Problem Relation Age of Onset    Dementia Mother     Cancer Mother         Colon    Alzheimer's Disease Mother     Cancer Father         Testical and stomach cancer    Heart Disease Father         Triple by pass    Hypertension Father         High blood pressure    Heart Disease Sister         Aortic replacement    Stroke Sister         Mini strokes    Stroke Sister     Heart Attack Brother     Anesth Problems Neg Hx         SOCIAL HISTORY:  Patient resides:  Independently x   Assisted Living    SNF    With family care       Smoking history:   None x   Former Chronic      Alcohol history:   None x   Social    Chronic      Ambulates:   Independently x   w/cane    w/walker    w/wc    CODE STATUS:  DNR    Full x   Other      Objective:     Physical Exam:     Visit Vitals  /73   Pulse (!) 54   Temp 97.9 °F (36.6 °C)   Resp 17   Ht 5' 5\" (1.651 m)   Wt 105.7 kg (233 lb)   SpO2 92%   BMI 38.77 kg/m²      O2 Device: None (Room air)    General:  Alert, cooperative, no distress, appears stated age. Head:  Normocephalic, without obvious abnormality, atraumatic. Eyes:  Conjunctivae/corneas clear. PERRL, EOMs intact. Nose: Nares normal. Septum midline. Mucosa normal.        Neck: Supple, symmetrical, trachea midline       Lungs:   Bilateral crackles   Chest wall:  No tenderness or deformity. Heart:  Regular rate and rhythm, S1, S2 normal   Abdomen:   Soft, non-tender. Bowel sounds normal. No masses,  No organomegaly. Extremities: Extremities normal, atraumatic, no cyanosis. 3+ bilateral nonpitting lower extremity   Pulses: 2+ and symmetric all extremities. Skin: Skin color, texture, turgor normal. No rashes or lesions   Neurologic: CNII-XII intact. EKG/Tel:  Vtach  Data Review:     Recent Days:  Recent Labs     02/04/23  1536   WBC 9.0   HGB 9.9*   HCT 33.2*        Recent Labs     02/04/23  1536      K 3.0*      CO2 32   *   BUN 25*   CREA 1.15*   CA 8.4*   MG 1.7   ALB 2.9*   ALT 46     No results for input(s): PH, PCO2, PO2, HCO3, FIO2 in the last 72 hours.     24 Hour Results:  Recent Results (from the past 24 hour(s))   CBC WITH AUTOMATED DIFF    Collection Time: 02/04/23  3:36 PM   Result Value Ref Range    WBC 9.0 3.6 - 11.0 K/uL    RBC 4.26 3.80 - 5.20 M/uL    HGB 9.9 (L) 11.5 - 16.0 g/dL    HCT 33.2 (L) 35.0 - 47.0 %    MCV 77.9 (L) 80.0 - 99.0 FL    MCH 23.2 (L) 26.0 - 34.0 PG    MCHC 29.8 (L) 30.0 - 36.5 g/dL    RDW 18.4 (H) 11.5 - 14.5 %    PLATELET 393 789 - 494 K/uL    MPV 11.8 8.9 - 12.9 FL    NRBC 0.0 0  WBC    ABSOLUTE NRBC 0.00 0.00 - 0.01 K/uL    NEUTROPHILS 73 32 - 75 %    LYMPHOCYTES 15 12 - 49 %    MONOCYTES 10 5 - 13 %    EOSINOPHILS 1 0 - 7 %    BASOPHILS 1 0 - 1 %    IMMATURE GRANULOCYTES 0 0.0 - 0.5 %    ABS. NEUTROPHILS 6.6 1.8 - 8.0 K/UL    ABS. LYMPHOCYTES 1.3 0.8 - 3.5 K/UL    ABS. MONOCYTES 0.9 0.0 - 1.0 K/UL    ABS. EOSINOPHILS 0.1 0.0 - 0.4 K/UL    ABS. BASOPHILS 0.1 0.0 - 0.1 K/UL    ABS. IMM. GRANS. 0.0 0.00 - 0.04 K/UL    DF AUTOMATED     METABOLIC PANEL, COMPREHENSIVE    Collection Time: 02/04/23  3:36 PM   Result Value Ref Range    Sodium 139 136 - 145 mmol/L    Potassium 3.0 (L) 3.5 - 5.1 mmol/L    Chloride 104 97 - 108 mmol/L    CO2 32 21 - 32 mmol/L    Anion gap 3 (L) 5 - 15 mmol/L    Glucose 103 (H) 65 - 100 mg/dL    BUN 25 (H) 6 - 20 MG/DL    Creatinine 1.15 (H) 0.55 - 1.02 MG/DL    BUN/Creatinine ratio 22 (H) 12 - 20      eGFR 51 (L) >60 ml/min/1.73m2    Calcium 8.4 (L) 8.5 - 10.1 MG/DL    Bilirubin, total 0.5 0.2 - 1.0 MG/DL    ALT (SGPT) 46 12 - 78 U/L    AST (SGOT) 18 15 - 37 U/L    Alk. phosphatase 63 45 - 117 U/L    Protein, total 5.4 (L) 6.4 - 8.2 g/dL    Albumin 2.9 (L) 3.5 - 5.0 g/dL    Globulin 2.5 2.0 - 4.0 g/dL    A-G Ratio 1.2 1.1 - 2.2     SAMPLES BEING HELD    Collection Time: 02/04/23  3:36 PM   Result Value Ref Range    SAMPLES BEING HELD 1RED, BLUE     COMMENT        Add-on orders for these samples will be processed based on acceptable specimen integrity and analyte stability, which may vary by analyte.    MAGNESIUM    Collection Time: 02/04/23  3:36 PM   Result Value Ref Range    Magnesium 1.7 1.6 - 2.4 mg/dL   TROPONIN-HIGH SENSITIVITY    Collection Time: 02/04/23  3:36 PM   Result Value Ref Range    Troponin-High Sensitivity 17 0 - 51 ng/L   NT-PRO BNP    Collection Time: 02/04/23  3:36 PM   Result Value Ref Range    NT pro-BNP 5,757 (H) <125 PG/ML   TROPONIN-HIGH SENSITIVITY    Collection Time: 02/04/23  6:59 PM   Result Value Ref Range    Troponin-High Sensitivity 21 0 - 51 ng/L         Imaging:     Assessment:     Wanda Connolly is a 81 y.o. female with history of dilated cardiomyopathy with EF of 25-30, A. fib status post ablation and recent recent cardioversion (1/27) on Eliquis, biventricular ICD, CAD status post PCI, morbid obesity, hypertension, GERD, asthma, mood disorder who is admitted for decompensated HFrEF       Plan:       Acute on chronic systolic heart failure  Dilated ischemic cardiomyopathy  -EF 25 to 30%  -Fluid overload noted on exam  -Continue diuresis with Bumex 2 mg IV twice daily  -Continue with metolazone-  -Monitor and replete lites aggressively  -Cardiology consult in a.m.    VT storm  -Recurrent ICD shocks  -Treated with amnio bolus  -Continue amnio gtt.   -Mexiletine if amnio nonresponsive    Atrial fibrillation  -Continue beta-blocker, amiodarone, Eliquis  -Cardiology on consult    CAD status post PCI  -Continue beta-blocker  -On Repatha outpatient    Mood disorder  -Continue home Prozac, Ambien nightly    Obesity  -Weight loss counseling prior to discharge        FEN/GI -  po hydration  Activity - as tolerated  DVT prophylaxis - eliquis  GI prophylaxis -  NI  Disposition - Home    CODE STATUS:   full code       Signed By: Nathaniel Ca MD     February 4, 2023

## 2023-02-05 NOTE — PROGRESS NOTES
Notified Rosanne Del Rio of pt's Heart rate in 50's. Orders received to continue amio drip, replete potassium and recheck BMP at noon time.

## 2023-02-05 NOTE — CONSULTS
[facility]                    Cardiology Care Note     [x]Initial Encounter     []Follow-up    Patient Name: Josesito Lyles - XSB:[birth year] - NMM:[id number]  Primary Cardiologist: [facility]: Yohannes Lozano MD  Consulting Cardiologist: [facility]: Pb Erazo MD     Reason for encounter: ICD shocks, VT    HPI:         Called for ICD firing. Do not have interrogation to review. [employer] reported to ED VT. I am suspicious it may be rapid atrial fibrillation. Has not felt well. Diuretics increased, metolazone added seemingly by Dr. Saqib William.  K 3.0. Creatinine 1. 15. Admitted 12/2022 with influenza, acute hypoxic respiratory failure, acute on chronic CHF, AF with RVR. DCCV 12/29/2022, had recurrent AF thereafter. Repeat DCCV Dr. Dipika Hudson 1/27/2023. Calls to Dr. Dipika Hudson for increased weight, SOB - he rec'd inpatient admission. S/p [employer] S-ICD in place of transvenous system 09/21/2016. Initial ICD implanted 8/24/16. Transvenous RV lead displacement x 2 due to large breast size, repositioned once. Removed entire system d/t to high recurrent risk of perforation. CAD S/p PCI RCA, LAD. Cardiac cath in 09/2022 at [facility] showed  in OM, patent LAD stent, & mild ISR in proximal RCA. MI 11/2011, also 2006 or 2007. Followed by Dr. Emerson Baeza, Dr. Dipika Hudson EP. History of gastric bypass, query poor absorption of medications. Subjective:      Josesito Lyles reports none. K repleted, still low @ 3.2 - more repletion ordere. d Diuretics on hold. .  Denies chest pain, SOB. Assessment and Plan         1. ICD shocks - VT versus rapid atrial fibrillation. Persistent AF with RVR: DCCV 12/2022, 1/27/2023. Loaded with amiodarone, now on maintenance dose. Plan for amio-reload. Continue BB. Mexiletine an option is amio fails. Replete lytes. IV diuresis to manage decompensated CHF once K repleted sufficiently. Will get EP on board Monday - she has an atrial fibrillation ablation planned with Dr. Yasmeen Zapata 4/2023.       2. Dilated ischemic CMP/acute on chronic systolic heart failure: LVEF 25-30% in 12/2022. NYHA III chronic systolic CHF. Plan to diurese, replete lytes aggressively. On Toprol XL, but no ACEi/ARB due to low BP. Managed by Dr. Fahad Carney. Per Dr. Yasmeen Zapata - she is not candidate for BIV ICD implant and AV node ablation together because of previous ICD lead dislodgement. If AFIB ablation fails, she may have BIV ICD and then 1 month later AV node ablation     3. CAD: S/p prior PCI LAD & RCA. Continue Repatha, Zetia, & ASA as previously prescribed. Cath 9/2022 Chip no intervention. 4.  Refractory atrial fibrillation -  BB, amio, continue Eliquis for embolic CVA prophylaxis. ____________________________________________________________    Cardiac testing  12/17/22    ECHO ADULT COMPLETE 12/18/2022 12/18/2022    Interpretation Summary    Left Ventricle: Severely reduced left ventricular systolic function with a visually estimated EF of 25 - 30%. Left ventricle is moderately dilated. Normal wall thickness. Severe global hypokinesis present. Left Atrium: Left atrium is moderately dilated. Aortic Valve: Mildly calcified cusp. Mild stenosis of the aortic valve. AV mean gradient is 13 mmHg. Mitral Valve: Moderate to severe regurgitation. Tricuspid Valve: Mild regurgitation. The estimated RVSP is 46 mmHg. Contrast used: Definity. Signed by: Arti Alonso MD on 12/18/2022 11:39 AM      08/09/21    NUCLEAR CARDIAC STRESS TEST 08/13/2021 8/16/2021    Interpretation Summary  · SPECT: Left ventricular function post-stress was abnormal. Calculated ejection fraction is 22%. There is no evidence of transient ischemic dilation (TID). The TID ratio is 0.86. · Baseline ECG: Normal sinus rhythm.   · SPECT: Myocardial perfusion imaging defect 1: There is a defect that is large in size with a severe reduction in uptake present in the lateral location(s) that is partially reversible. There is abnormal wall motion in the defect area. The defect appears to be infarction with esther-infarct ischemia. Perfusion defect was visually and quantitatively present. · SPECT: Left ventricular perfusion is probably abnormal. Myocardial perfusion imaging supports a high risk stress test.    Signed by: Aamir Burks MD on 8/13/2021  8:26 AM    08/13/21    CARDIAC PROCEDURE 08/13/2021 8/13/2021    Conclusion  Findings:  1)Normal LVEDP  2)Severe native 1 vessel CAD with  of ramus intermedius. LCx  Is small with occluded distal LCx. OM2 and distal ramus fill from collaterals from LAD, diagonal and RCA. 3)Limited wire probing suggests no micro channel in Ramus ISR . Proximal cap start before the stent    Access: Right radial no issues  Contrast 40 cc    Recommendations  1)Continue GDMT and weight loss. If dyspnea continue may consider Ramus  PCI. Uncertain if benefit will be substantial.  2)GDMT for CAD    Signed by: Ni Garcias MD on 8/13/2021 10:34 AM        Most recent HS troponins:  Recent Labs     02/04/23  1859 02/04/23  1536   TROPHS 21 17       ECG: normal sinus rhythm    Review of Systems:    []All other systems reviewed and all negative except as written in HPI    [] Patient unable to provide secondary to condition    Past Medical History:   Diagnosis Date    Acute right ankle pain 06/08/2018 5/29/18: X-ray showed possible right ankle sprain. 6/6/18: saw Dr. Zeferino Durant ([facility]), diagnosed with peroneal tendonitis.  Prescribed an ASO    Asthma     Atrial fibrillation ([facility]     Cardiomyopathy ([facility]     Coronary artery disease 2008    s/p RCA stent ([facility]) on 11/26/11    Depression with anxiety     2011    DVT (deep venous thrombosis) ([facility] 07/27/2010    Family history of early CAD     GERD (gastroesophageal reflux disease)     H/O gastric bypass 2006    Revision in 2009    Hepatitis C antibody test positive     Does not have chronic hep C (labs 10/6/15: neg HCV RNA)     HTN (hypertension) 07/27/2010    Hyperlipidemia 07/27/2010    Incontinence of feces 09/03/2018    Dr. Katie Gillespie. 8/20/18: Improving with pelvic physical therapy and psyllium husk treatment. Saw Dr. Collie Severe who suggested PT first. MR defecography showed pelvic floor weakness with pelvic descensus, small anterior  Rectocele, and vaginal prolapse. To have pelvic PT weekly for 8 wks and to see Dr. Collie Severe again in Oct 2018. Joint pain 07/27/2010    MI (myocardial infarction) ([facility] 07/27/2010    Mitral valve regurgitation     Mild to moderate    Morbid obesity ([facility] 07/27/2010    Myocardial infarction [facility]) 2006 and 2011    Dr. Geraldine Sharma disorder     PUD (peptic ulcer disease)     gi bleed 2008; ulcer n gastric bypass pouch    Urinary incontinence, stress 07/27/2010     Past Surgical History:   Procedure Laterality Date    COLONOSCOPY N/A 6/29/2018    COLONOSCOPY performed by Rae Lantigua MD at [facility] ENDOSCOPY; redundant colon, int hemorrhoids, pathology: normal colonic mucosa    HX BREAST BIOPSY Left     benign    HX BREAST LUMPECTOMY Right 7/14/2022    RIGHT BREAST LUMPECTOMY WITH ULTRASOUND performed by Efrain Hall MD at [place] [address]    HX COLONOSCOPY  9/5/14    Dr. Jax Kuo; normal, repeat in 5 yrs    HX GASTRIC BYPASS      HX IMPLANTABLE CARDIOVERTER DEFIBRILLATOR  08/24/2016    HX LAP GASTRIC BYPASS  2006    revision 2009/Dr. Demarcus Reilly    HX OTHER SURGICAL  09/19/2016    Removal of right ventricular ICD lead & single chamber transvenous AICD    HX OTHER SURGICAL  10/12/2016    pocket revison of ICD; Dr. Alejandrina Love  06/07/2018    Dr Katie Gillespie; high resolution anorectal manaometry-abnormal study. Study done for eval of fecal incontinence    HX OTHER SURGICAL  12/14/2018    Dr. Katie Gillespie.  Rectal endoscopic US (EUS) for rectal incontinence. Normal rectal mucosa, no fistulas. HX PACEMAKER      sicd/left side of chest under arm    INS PPM/ICD LED SING ONLY  8/24/2016         INS PPM/ICD LED SING ONLY  8/26/2016         INS PPM/ICD LED SING ONLY  9/21/2016         PA UNLISTED LAPAROSCOPY PROCEDURE STOMACH  04/05/2010    Revision GBP    PA UNLISTED PROCEDURE CARDIAC SURGERY      Stent x 5-6,Most recent 2011     Social Hx:  reports that she quit smoking about 18 years ago. Her smoking use included cigarettes. She started smoking about 53 years ago. She has never used smokeless tobacco. She reports that she does not drink alcohol and does not use drugs. Family Hx: family history includes Alzheimer's Disease in her mother; Cancer in her father and mother; Dementia in her mother; Heart Attack in her brother; Heart Disease in her father and sister; Hypertension in her father; Stroke in her sister and sister.   Allergies   Allergen Reactions    Amoxicillin Anaphylaxis    Amoxicillin Anaphylaxis    Lipitor [Atorvastatin] Other (comments)     Severe muscle pain and spasms    Zocor [Simvastatin] Other (comments)     Cramps, muscle spasms    Buspar [Buspirone] Other (comments)     Drunk sensation, headaches    Hydroxyzine Other (comments)     Blurred vision, lightheadedness    Livalo [Pitavastatin] Myalgia    Pravastatin Other (comments)     Leg cramps    Tramadol Vertigo          OBJECTIVE:  Wt Readings from Last 3 Encounters:   02/04/23 105.7 kg (233 lb)   01/27/23 105.7 kg (233 lb)   01/26/23 106.1 kg (233 lb 12.8 oz)       Intake/Output Summary (Last 24 hours) at 2/5/2023 0804  Last data filed at 2/5/2023 0403  Gross per 24 hour   Intake 100 ml   Output 3300 ml   Net -3200 ml       Physical Exam:    Vitals:   Vitals:    02/05/23 0351 02/05/23 0411 02/05/23 0639 02/05/23 0745   BP: (!) 123/98   120/62   Pulse: (!) 54 (!) 54 (!) 59 70   Resp: 18   15   Temp: 97.7 °F (36.5 °C)      SpO2: 100%   94%   Weight:       Height:         Telemetry: normal sinus rhythm    Gen: Well-developed, well-nourished, in no acute distress  Neck: Supple, No JVD, No Carotid Bruit  Resp: No accessory muscle use, Clear breath sounds, No rales or rhonchi  Card: Regular Rate,Rythm, Normal S1, S2, No murmurs, rubs or gallop. No thrills. Abd:   Soft, non-tender, non-distended, BS+   MSK: No cyanosis  Skin: No rashes    Neuro: Moving all four extremities, follows commands appropriately  Psych: Good insight, oriented to person, place, alert, Nml Affect  LE: No edema    Data Review:     Radiology:   XR Results (most recent):  Results from Hospital Encounter encounter on 02/04/23    XR CHEST PORT    Narrative  INDICATION:  ICD firing    EXAM: Chest single view. COMPARISON: 12/17/2022. 9/13/2016. FINDINGS: A single frontal view of the chest at 1550 hours shows mild diffuse  interstitial prominence, slightly improved since the most recent prior study. .  Minimal left midlung field atelectasis. The heart, mediastinum and pulmonary  vasculature are stable with cardiomegaly. AICD from below appears stable. .  The  bony thorax is unremarkable for age. .    Impression  Persistent but slightly improved interstitial edema pattern since prior study. Left midlung field atelectasis. Stable cardiomegaly. .  . Recent Labs     02/05/23  0630 02/04/23  1536    139   K 3.2* 3.0*    104   CO2 30 32   BUN 20 25*   CREA 1.10* 1.15*   * 103*   PHOS 3.1  --    CA 8.5 8.4*     Recent Labs     02/04/23  1536   WBC 9.0   HGB 9.9*   HCT 33.2*        Recent Labs     02/04/23  1536   AP 63     No results for input(s): CHOL, LDLC in the last 72 hours.     No lab exists for component: TGL, HDLC,  HBA1C      Current meds:    Current Facility-Administered Medications:     potassium bicarb-citric acid (EFFER-K) tablet 40 mEq, 40 mEq, Oral, NOW, Blake Lino MD    acetaminophen (TYLENOL) tablet 650 mg, 650 mg, Oral, Q6H PRN, Viet Friedman MD    amiodarone (CORDARONE) 375 mg/250 mL D5W infusion, 0.5-1 mg/min, IntraVENous, CONTINUOUS, Lola Black MD, Last Rate: 40 mL/hr at 02/05/23 4824, 1 mg/min at 02/05/23 3084    amiodarone (CORDARONE) tablet 200 mg, 200 mg, Oral, DAILY, Marshall Oliva MD    apixaban (ELIQUIS) tablet 5 mg, 5 mg, Oral, BID, Marshall Oliva MD, 5 mg at 02/04/23 2141    aspirin delayed-release tablet 81 mg, 81 mg, Oral, PRN, Marshall Oliva MD    ferrous sulfate tablet 325 mg, 1 Tablet, Oral, DAILY WITH BREAKFAST, Marshall Oliva MD    FLUoxetine (PROzac) capsule 40 mg, 40 mg, Oral, BID, Marshall Oliva MD    metoprolol succinate (TOPROL-XL) XL tablet 25 mg, 25 mg, Oral, BID, Marshall Oliva MD    zolpidem (AMBIEN) tablet 5 mg, 5 mg, Oral, QHS PRN, [place], Bleck B, MD Enrique Kanner, MD    [facility]  Call center: [phone] (f) [phone]      Rosetta Garcia MD

## 2023-02-05 NOTE — PROGRESS NOTES
[facility]  Hospitalist Group                                                                                          Hospitalist Progress Note  Max Horner MD  Answering service: [phone] OR [phone] from in house phone        Date of Service:  2023  NAME:  Paige Arita  :  1950  MRN:  [id number]       Admission Summary:   81 y.o lady w/ HFrEF, afib s/p DCCV, s/p ICD, CAD s/p PCI, morbid obesity, HTN, who presented with shortness of breath as well as her ICD firing. She was found to have decompensated CHF as well as ventricular tachycardia. Interval history / Subjective:   Multiple shocks since admit, last being around 6 AM today. Currently stable on amiodarone infusion. Her breathing and leg edema are better however. No chest pain but entire chest feels sore from the shocks. No n/v/d.      Assessment & Plan:     ICD shocks: recurrent VT vs afib/rvr  -continue amiodarone drip, PO metoprolol  -replete and monitor electrolytes  -cardiology recs appreciated      Acute on chronic systolic heart failure: NYHA III  -hold diuretics until electrolytes stable  -clinically improved  -continue    CAD s/p PCI:  -continue ASA    Refractory afib, s/p DCCV:  -continue apixaban, metoprolol  -there was a plan for ablation as an outpatient    Mood disorder:  -continue home meds    Morbid obesity    Patient is critically-ill and at risk for decline, CCT 35 min   Code status: full  Prophylaxis: anticoagulated  Care Plan discussed with: pt, RN  Anticipated Disposition: home when ready  Inpatient  Cardiac monitoring: [facility]  Date Reviewed: 2023            Codes Class Noted POA    V-tach ICD-10-CM: D48.61  ICD-9-CM: 427.1  2023 Unknown           Social Determinants of Health     Tobacco Use: Medium Risk    Smoking Tobacco Use: Former    Smokeless Tobacco Use: Never    Passive Exposure: Not on file   Alcohol Use: Not on file   Financial Resource Strain: Not on file   Food Insecurity: Not on file   Transportation Needs: Not on file   Physical Activity: Not on file   Stress: Not on file   Social Connections: Not on file   Intimate Partner Violence: Not on file   Depression: Not at risk    PHQ-2 Score: 0   Housing Stability: Not on file       Review of Systems:   A comprehensive review of systems was negative except for that written in the HPI. Vital Signs:    Last 24hrs VS reviewed since prior progress note. Most recent are:  Visit Vitals  BP (!) 118/58   Pulse 61   Temp 97.7 °F (36.5 °C)   Resp 14   Ht 5' 5\" (1.651 m)   Wt 105.7 kg (233 lb)   SpO2 92%   BMI 38.77 kg/m²         Intake/Output Summary (Last 24 hours) at 2/5/2023 1057  Last data filed at 2/5/2023 0900  Gross per 24 hour   Intake 627.33 ml   Output 3700 ml   Net -3072.67 ml        Physical Examination:     I had a face to face encounter with this patient and independently examined them on 2/5/2023 as outlined below:          Constitutional:  No acute distress, cooperative, pleasant, obese    ENT:  Oral mucosa moist, oropharynx benign. Resp:  CTA bilaterally. No wheezing/rhonchi/rales. No accessory muscle use. CV:  Regular rhythm, normal rate, +systolic murmurs, gallops, rubs    GI:  Soft, non distended, non tender. normoactive bowel sounds, no hepatosplenomegaly     Musculoskeletal:  No edema, warm, 2+ pulses throughout    Neurologic:  Moves all extremities. AAOx3, CN II-XII reviewed           Data Review:    Review and/or order of clinical lab test  Review and/or order of tests in the radiology section of CPT      I have personally and independently reviewed all pertinent labs, diagnostic studies, imaging, and have provided independent interpretation of the same.      Labs:     Recent Labs     02/04/23  1536   WBC 9.0   HGB 9.9*   HCT 33.2*        Recent Labs     02/05/23  0630 02/04/23  1536    139   K 3.2* 3.0*    104   CO2 30 32   BUN 20 25*   CREA 1.10* 1.15* * 103*   CA 8.5 8.4*   MG 2.1 1.7   PHOS 3.1  --      Recent Labs     02/04/23  1536   ALT 46   AP 63   TBILI 0.5   TP 5.4*   ALB 2.9*   GLOB 2.5     No results for input(s): INR, PTP, APTT, INREXT in the last 72 hours. No results for input(s): FE, TIBC, PSAT, FERR in the last 72 hours. Lab Results   Component Value Date/Time    Folate 5.8 08/10/2020 11:28 AM      No results for input(s): PH, PCO2, PO2 in the last 72 hours. No results for input(s): CPK, CKNDX, TROIQ in the last 72 hours. No lab exists for component: CPKMB  Lab Results   Component Value Date/Time    Cholesterol, total 220 (H) 11/28/2022 10:48 AM    HDL Cholesterol 72 11/28/2022 10:48 AM    LDL, calculated 130 (H) 11/28/2022 10:48 AM    LDL, calculated 49 08/10/2020 11:28 AM    Triglyceride 102 11/28/2022 10:48 AM     Lab Results   Component Value Date/Time    Glucose (POC) 167 (H) 11/26/2011 09:50 PM    Glucose (POC) 91 04/07/2010 06:25 AM    Glucose (POC) 129 (H) 04/06/2010 11:42 PM    Glucose (POC) 127 (H) 04/06/2010 06:18 PM     Lab Results   Component Value Date/Time    Color YELLOW 04/12/2010 12:05 PM    Appearance CLEAR 04/12/2010 12:05 PM    Specific gravity 1.019 04/12/2010 12:05 PM    pH (UA) 6.5 04/12/2010 12:05 PM    Protein NEGATIVE 04/12/2010 12:05 PM    Glucose NEGATIVE 04/12/2010 12:05 PM    Ketone 15 (A) 04/12/2010 12:05 PM    Bilirubin NEGATIVE 03/22/2010 05:10 PM    Urobilinogen 1.0 04/12/2010 12:05 PM    Nitrites NEGATIVE 04/12/2010 12:05 PM    Leukocyte Esterase NEGATIVE 04/12/2010 12:05 PM    Epithelial cells 0-5 04/12/2010 12:05 PM    Bacteria NEGATIVE 04/12/2010 12:05 PM    WBC 0-4 04/12/2010 12:05 PM    RBC 0-3 04/12/2010 12:05 PM       Notes reviewed from all clinical/nonclinical/nursing services involved in patient's clinical care. Care coordination discussions were held with appropriate clinical/nonclinical/ nursing providers based on care coordination needs.          Patients current active Medications were reviewed, considered, added and adjusted based on the clinical condition today. Home Medications were reconciled to the best of my ability given all available resources at the time of admission. Route is PO if not otherwise noted.       Admission Status:[id number]:::1}      Medications Reviewed:     Current Facility-Administered Medications   Medication Dose Route Frequency    acetaminophen (TYLENOL) tablet 650 mg  650 mg Oral Q6H PRN    amiodarone (CORDARONE) 375 mg/250 mL D5W infusion  0.5-1 mg/min IntraVENous CONTINUOUS    amiodarone (CORDARONE) tablet 200 mg  200 mg Oral DAILY    apixaban (ELIQUIS) tablet 5 mg  5 mg Oral BID    aspirin delayed-release tablet 81 mg  81 mg Oral PRN    ferrous sulfate tablet 325 mg  1 Tablet Oral DAILY WITH BREAKFAST    FLUoxetine (PROzac) capsule 40 mg  40 mg Oral BID    metoprolol succinate (TOPROL-XL) XL tablet 25 mg  25 mg Oral BID    zolpidem (AMBIEN) tablet 5 mg  5 mg Oral QHS PRN     ______________________________________________________________________  EXPECTED LENGTH OF STAY: - - -  ACTUAL LENGTH OF STAY:          1                 Allen Higgins MD

## 2023-02-05 NOTE — ED NOTES
Provider Marshall at bedside. Given verbal order for Bumex 2mg. Patient stable at this time. Will continue to monitor. Verbal order per MD Marshall to keep amiodarone gtt at 1mg.

## 2023-02-06 VITALS
HEIGHT: 65 IN | TEMPERATURE: 98 F | SYSTOLIC BLOOD PRESSURE: 112 MMHG | DIASTOLIC BLOOD PRESSURE: 62 MMHG | BODY MASS INDEX: 38.38 KG/M2 | WEIGHT: 230.38 LBS | RESPIRATION RATE: 18 BRPM | HEART RATE: 56 BPM | OXYGEN SATURATION: 92 %

## 2023-02-06 LAB
ANION GAP SERPL CALC-SCNC: 6 MMOL/L (ref 5–15)
BUN SERPL-MCNC: 17 MG/DL (ref 6–20)
BUN/CREAT SERPL: 16 (ref 12–20)
CALCIUM SERPL-MCNC: 8.6 MG/DL (ref 8.5–10.1)
CHLORIDE SERPL-SCNC: 99 MMOL/L (ref 97–108)
CO2 SERPL-SCNC: 30 MMOL/L (ref 21–32)
CREAT SERPL-MCNC: 1.07 MG/DL (ref 0.55–1.02)
GLUCOSE BLD STRIP.AUTO-MCNC: 84 MG/DL (ref 65–117)
GLUCOSE SERPL-MCNC: 105 MG/DL (ref 65–100)
MAGNESIUM SERPL-MCNC: 2.1 MG/DL (ref 1.6–2.4)
POTASSIUM SERPL-SCNC: 3.7 MMOL/L (ref 3.5–5.1)
SERVICE CMNT-IMP: NORMAL
SODIUM SERPL-SCNC: 135 MMOL/L (ref 136–145)

## 2023-02-06 PROCEDURE — 36415 COLL VENOUS BLD VENIPUNCTURE: CPT

## 2023-02-06 PROCEDURE — 83735 ASSAY OF MAGNESIUM: CPT

## 2023-02-06 PROCEDURE — 80048 BASIC METABOLIC PNL TOTAL CA: CPT

## 2023-02-06 PROCEDURE — 74011250637 HC RX REV CODE- 250/637: Performed by: STUDENT IN AN ORGANIZED HEALTH CARE EDUCATION/TRAINING PROGRAM

## 2023-02-06 PROCEDURE — 99223 1ST HOSP IP/OBS HIGH 75: CPT | Performed by: INTERNAL MEDICINE

## 2023-02-06 PROCEDURE — 82962 GLUCOSE BLOOD TEST: CPT

## 2023-02-06 RX ORDER — POTASSIUM CHLORIDE 750 MG/1
40 TABLET, FILM COATED, EXTENDED RELEASE ORAL
Status: DISCONTINUED | OUTPATIENT
Start: 2023-02-06 | End: 2023-02-06 | Stop reason: HOSPADM

## 2023-02-06 RX ADMIN — METOPROLOL SUCCINATE 25 MG: 25 TABLET, EXTENDED RELEASE ORAL at 10:19

## 2023-02-06 RX ADMIN — AMIODARONE HYDROCHLORIDE 200 MG: 200 TABLET ORAL at 10:19

## 2023-02-06 RX ADMIN — FERROUS SULFATE TAB 325 MG (65 MG ELEMENTAL FE) 325 MG: 325 (65 FE) TAB at 10:21

## 2023-02-06 RX ADMIN — FLUOXETINE 40 MG: 20 CAPSULE ORAL at 10:19

## 2023-02-06 RX ADMIN — APIXABAN 5 MG: 5 TABLET, FILM COATED ORAL at 10:19

## 2023-02-06 NOTE — PROGRESS NOTES
Transition Plan of Care  RUR 19%-Med  Disposition-discharging home today with no skilled needs. Family can provide transport. Medicare pt has received, reviewed, and signed 2nd IM letter informing them of their right to appeal the discharge. Signed copy has been placed on pt bedside chart.

## 2023-02-06 NOTE — PROGRESS NOTES
[facility] Initial Care Note    Reason: ICD shocks. [address] sent me one page report of ventricular flutter/ VT and ICD shock  She had cardioversion of atrial fibrillation and is planned for ablation with me  She had called my office before this admission with edema and weight gain and also called Dr Charis Qiu but she was out.  metolazone added and he diuresed well but also had significantly low potassium which I think triggered VT/ICD shocks      Admitted 12/2022 with influenza, acute hypoxic respiratory failure, acute on chronic CHF, AF with RVR. DCCV 12/29/2022, had recurrent AF thereafter. DCCV with me 1/27/2023. S/p [address] S-ICD in place of transvenous system 09/21/2016. Initial ICD implanted 8/24/16. Transvenous RV lead displacement x 2 due to large breast size, repositioned once. Removed entire system d/t to high recurrent risk of perforation/infection. CAD S/p PCI RCA, LAD. Cardiac cath in 09/2022 at [facility] showed  in OM, patent LAD stent, & mild ISR in proximal RCA. MI 11/2011, also 2006 or 2007. Followed by Dr. Charis Qiu, Dr. Bell Patel EP. History of gastric bypass, query poor absorption of medications. Assessment and Plan     1. ICD shocks - VT ventricular flutter. Low potassium with diuretics at home  DCCV AFIB 12/2022, 1/27/2023. Loaded with amiodarone, now on maintenance dose. Atrial fibrillation ablation planned with me 4/2023. Her K is now near normal  She will stop metolazone  Mag 2.1    2. Dilated ischemic CMP/acute on chronic systolic heart failure: LVEF 25-30% in 12/2022. NYHA III chronic systolic CHF. On Toprol XL, but no ACEi/ARB due to low BP. Managed by Dr. Charis Qiu. She is not candidate for BIV ICD implant and AV node ablation together because of previous ICD lead dislodgement. If AFIB ablation fails, she may have BIV ICD and then 1 month later AV node ablation     3.   CAD: S/p prior PCI LAD & RCA. Continue Repatha, Zetia, & ASA as previously prescribed. Cath 9/2022 Chip no intervention. Eliquis for embolic CVA prophylaxis. Future Appointments   Date Time Provider Denis Hurst   2/13/2023 10:30 AM MD GAGAN Polanco BS AMB   2/21/2023 10:00 AM Mila Carr,  [address] BS AMB   2/24/2023  9:00 AM JOVAN Armstrong BS AMB   3/15/2023  9:00 AM [email or web address] ALBERTO BS AMB   3/31/2023  1:00 PM JOVAN Armstrong BS AMB   4/10/2023 11:10 AM MD GAGAN Polanco BS AMB   4/13/2023  1:00 PM [address] [place] [address] CATH LAB 3 [facility]   4/28/2023  1:00 PM JOVAN Armstrong BS AMB   12/14/2023  2:00 PM [email or web address], JOSE ALBERTO DICKSON BS AMB   12/14/2023  2:20 PM MD RANDOLPH Fish BS AMB     -----------------------------------------------------     ECHO ADULT COMPLETE 12/18/2022 12/18/2022    Interpretation Summary    Left Ventricle: Severely reduced left ventricular systolic function with a visually estimated EF of 25 - 30%. Left ventricle is moderately dilated. Normal wall thickness. Severe global hypokinesis present. Left Atrium: Left atrium is moderately dilated. Aortic Valve: Mildly calcified cusp. Mild stenosis of the aortic valve. AV mean gradient is 13 mmHg. Mitral Valve: Moderate to severe regurgitation. Tricuspid Valve: Mild regurgitation. The estimated RVSP is 46 mmHg. Contrast used: Definity. Signed by: Delilah Young MD on 12/18/2022 11:39 AM    Past Medical History:   Diagnosis Date    Acute right ankle pain 06/08/2018 5/29/18: X-ray showed possible right ankle sprain. 6/6/18: saw [facility] In[facility] ([facility] diagnosed with peroneal tendonitis.  Prescribed an ASO    Asthma     Atrial fibrillation ([address]     Cardiomy[facility]     Coronary artery disease 2008    s/p RCA stent ([facility]) on 11/26/11    Depression with anxiety     2011    DVT (deep venous thrombosis) ([facility] 07/27/2010    Family history of early CAD     GERD (gastroesophageal reflux disease)     H/O gastric bypass 2006    Revision in 2009    Hepatitis C antibody test positive     Does not have chronic hep C (labs 10/6/15: neg HCV RNA)     HTN (hypertension) 07/27/2010    Hyperlipidemia 07/27/2010    Incontinence of feces 09/03/2018    Dr. Dale Verdugo. 8/20/18: Improving with pelvic physical therapy and psyllium husk treatment. Saw Dr. Maryruth Cowden who suggested PT first. MR defecography showed pelvic floor weakness with pelvic descensus, small anterior  Rectocele, and vaginal prolapse. To have pelvic PT weekly for 8 wks and to see Dr. Maryruth Cowden again in Oct 2018. Joint pain 07/27/2010    MI (myocardial infarction) ([facility] 07/27/2010    Mitral valve regurgitation     Mild to moderate    Morbid obesity ([facility] 07/27/2010    Myocardial infarction [facility]) 2006 and 2011    Dr. Jordin Stone disorder     PUD (peptic ulcer disease)     gi bleed 2008; ulcer n gastric bypass pouch    Urinary incontinence, stress 07/27/2010     Past Surgical History:   Procedure Laterality Date    COLONOSCOPY N/A 6/29/2018    COLONOSCOPY performed by Willi Caballero MD at [facility] ENDOSCOPY; redundant colon, int hemorrhoids, pathology: normal colonic mucosa    HX BREAST BIOPSY Left     benign    HX BREAST LUMPECTOMY Right 7/14/2022    RIGHT BREAST LUMPECTOMY WITH ULTRASOUND performed by Susi Ariza MD at [place] [address]    HX COLONOSCOPY  9/5/14    Dr. Ghassan Segal; normal, repeat in 5 yrs    HX GASTRIC BYPASS      HX IMPLANTABLE CARDIOVERTER DEFIBRILLATOR  08/24/2016    HX LAP GASTRIC BYPASS  2006    revision 2009/Dr. John Paul Cortes    HX OTHER SURGICAL  09/19/2016    Removal of right ventricular ICD lead & single chamber transvenous AICD    HX OTHER SURGICAL  10/12/2016    pocket revison of ICD; Dr. Gabriel Harrison  06/07/2018    Dr Dale Verdugo; high resolution anorectal manaometry-abnormal study.  Study done for eval of fecal incontinence    HX OTHER SURGICAL  12/14/2018 Dr. Malachi Mccormick. Rectal endoscopic US (EUS) for rectal incontinence. Normal rectal mucosa, no fistulas.     HX PACEMAKER      sicd/left side of chest under arm    INS PPM/ICD LED SING ONLY  2016         INS PPM/ICD LED SING ONLY  2016         INS PPM/ICD LED SING ONLY  2016         CO UNLISTED LAPAROSCOPY PROCEDURE STOMACH  2010    Revision GBP    CO UNLISTED PROCEDURE CARDIAC SURGERY      Stent x 5-6,Most recent      Social History     Socioeconomic History    Marital status:      Spouse name: Not on file    Number of children: Not on file    Years of education: Not on file    Highest education level: Not on file   Occupational History    Not on file   Tobacco Use    Smoking status: Former     Packs/day: 0.00     Years: 30.00     Pack years: 0.00     Types: Cigarettes     Start date: 1970     Quit date: 2004     Years since quittin.7    Smokeless tobacco: Never   Vaping Use    Vaping Use: Never used   Substance and Sexual Activity    Alcohol use: No     Alcohol/week: 0.0 standard drinks    Drug use: No     Types: Prescription    Sexual activity: Not Currently     Partners: Female     Birth control/protection: Condom, Pill, Diaphragm, I.U.D., Sponge, None   Other Topics Concern    Not on file   Social History Narrative    ** Merged History Encounter **          Social Determinants of Health     Financial Resource Strain: Not on file   Food Insecurity: Not on file   Transportation Needs: Not on file   Physical Activity: Not on file   Stress: Not on file   Social Connections: Not on file   Intimate Partner Violence: Not on file   Housing Stability: Not on file     Family History   Problem Relation Age of Onset    Dementia Mother     Cancer Mother         Colon    Alzheimer's Disease Mother     Cancer Father         Testical and stomach cancer    Heart Disease Father         Triple by pass    Hypertension Father         High blood pressure    Heart Disease Sister         Aortic replacement    Stroke Sister         Mini strokes    Stroke Sister     Heart Attack Brother     Anesth Problems Neg Hx      Allergies   Allergen Reactions    Amoxicillin Anaphylaxis    Amoxicillin Anaphylaxis    Lipitor [Atorvastatin] Other (comments)     Severe muscle pain and spasms    Zocor [Simvastatin] Other (comments)     Cramps, muscle spasms    Buspar [Buspirone] Other (comments)     Drunk sensation, headaches    Hydroxyzine Other (comments)     Blurred vision, lightheadedness    Livalo [Pitavastatin] Myalgia    Pravastatin Other (comments)     Leg cramps    Tramadol Vertigo     No current facility-administered medications on file prior to encounter. Current Outpatient Medications on File Prior to Encounter   Medication Sig Dispense Refill    metOLazone (ZAROXOLYN) 5 mg tablet Take 5 mg by mouth daily. Take 1 pill by mouth 30 minutes before fluid pill for 3 days      bumetanide (BUMEX) 1 mg tablet Take 2 mg by mouth daily. amiodarone (CORDARONE) 200 mg tablet Take 1 Tablet by mouth daily. 180 Tablet 1    FLUoxetine (PROzac) 40 mg capsule Take 1 Capsule by mouth two (2) times a day. 180 Capsule 3    albuterol (PROVENTIL HFA, VENTOLIN HFA, PROAIR HFA) 90 mcg/actuation inhaler TAKE 2 PUFFS BY INHALATION EVERY FOUR HOURS AS NEEDED FOR WHEEZING OR SHORTNESS OF BREATH. 18 Each 11    zolpidem (AMBIEN) 10 mg tablet TAKE 1 TABLET BY MOUTH NIGHTLY AS NEEDED FOR SLEEP. MAX DAILY AMOUNT: 10 MG. 30 Tablet 1    metoprolol succinate (TOPROL-XL) 25 mg XL tablet Take 1 Tablet by mouth two (2) times a day. 180 Tablet 1    apixaban (ELIQUIS) 5 mg tablet Take 1 Tablet by mouth two (2) times a day. Prescribed by Dr. Carlita Nicole. 180 Tablet 0    potassium chloride (Klor-Con M20) 20 mEq tablet TAKE 2 TABLETS BY MOUTH EVERY  Tablet 3    aspirin delayed-release 81 mg tablet Take 81 mg by mouth as needed for Pain. psyllium (METAMUCIL) powd Take  by mouth.  2 tsp daily      cholecalciferol, VITAMIN D3, (VITAMIN D3) 5,000 unit tab tablet Take 10,000 Units by mouth daily. biotin 10,000 mcg cap Take  by mouth daily. OTHER Complete multi formula, bariatric advantage      magnesium 250 mg tab Take 1 Tab by mouth daily. ferrous sulfate 325 mg (65 mg iron) tablet Take  by mouth daily (before breakfast). CALCIUM PO Take 600 mg by mouth daily. Repatha SureClick pen injection INJECT 140 MG SUBCUTANEOUSLY EVERY 14 DAYS       ROS:  Constitutional: Negative for fever, chills, + weight loss and felt better with less fluid after diuresis  HEENT: Negative for nosebleeds, vision changes. Respiratory: Negative for cough, hemoptysis, sputum production, and wheezing. Cardiovascular: Negative for chest pain, palpitations, orthopnea, claudication, leg swelling, syncope, and PND. Gastrointestinal: Negative for nausea, vomiting, diarrhea, constipation, blood in stool and melena. Genitourinary: Negative for dysuria, urgency, frequency and hematuria. Musculoskeletal: Negative for myalgias. Skin: Negative for rash and itching. Heme: Does not bleed or bruise easily. Neurological: Negative for speech change and focal weakness     Visit Vitals  /62   Pulse (!) 56   Temp 98 °F (36.7 °C)   Resp 18   Ht 5' 5\" (1.651 m)   Wt 230 lb 6.1 oz (104.5 kg)   SpO2 92%   BMI 38.34 kg/m²     Constitutional: well-developed and well-nourished. No distress. Head: Normocephalic and atraumatic. Eyes: Pupils are equal, round   Neck: Neck supple. No JVD present. Cardiovascular: Normal rate, regular rhythm and normal heart sounds. Exam reveals no gallop and no friction rub. No murmur heard. Pulmonary/Chest: Effort normal and breath sounds normal. No wheezes. Abdominal: Soft, obese  Musculoskeletal: no edema and no tenderness. Neurological: alert,oriented. Skin: Skin is warm and dry, not diaphoretic. Psychiatric: normal mood and affect.  Behavior is normal. Judgment and thought content normal.        Lendell Rumble, RICO Driscoll  Electrophysiology/Cardiology  [facility] [place]  [address] [place] [address]                              [phone]

## 2023-02-06 NOTE — DISCHARGE SUMMARY
Discharge Summary     Patient: Vitor Palacios MRN: [id number]  SSN: [id number]    YOB: 1950  Age: 81 y.o. Sex: female       Admit Date: 2/4/2023    Discharge Date: 2/6/2023      Admission Diagnoses: V-tach [I47.20]    Discharge Diagnoses:   Ventricular tachycardia  S/p ICD shocks  Acute on chronic HFrEF  Hypokalemia  CAD s/p PCI  Chronic afib  Morbid obesity    Problem List as of 2/6/2023 Date Reviewed: 1/27/2023            Codes Class Noted - Resolved    V-tach ICD-10-CM: I47.20  ICD-9-CM: 427.1  2/4/2023 - Present        Encounter for cardioversion procedure ICD-10-CM: Z01.89  ICD-9-CM: V72.85  1/27/2023 - Present    Overview Signed 1/27/2023  2:43 PM by Collins Shelton MD     1/27/23 360 J CV to NSR             Atherosclerosis of native coronary artery of native heart with angina pectoris ([facility] ICD-10-CM: I25.119  ICD-9-CM: 414.01, 413.9  1/10/2023 - Present        History of cardioversion ICD-10-CM: Z98.890  ICD-9-CM: V15.1  12/19/2022 - Present        A-fib ([facility] ICD-10-CM: I48.91  ICD-9-CM: 427.31  12/17/2022 - Present    Overview Signed 2/5/2023 11:47 AM by Rock Chandana MD     S/p cardioversion on 1/27/23. On Eliquis 5 mg BID. Persistent atrial fibrillation (HCC) ICD-10-CM: I48.19  ICD-9-CM: 427.31  10/11/2022 - Present        Malignant neoplasm of right breast in female, estrogen receptor positive ([facility] ICD-10-CM: C50.911, Z17.0  ICD-9-CM: 174.9, V86.0  6/15/2022 - Present    Overview Addendum 8/1/2022  5:23 PM by Adrian Nicholas     06/08/2022: RIGHT breast core bx. PATH: IMC with ductal and lobular features, 10mm, grade 1, ER+(90%), ID-(<1), HER2-, Ki-67(12%). LCIS: present. Mammaprint: LOW RISK, Luminal type A, +0.226.    07/14/2022: RIGHT breast lumpectomy. PATH: IMC with ductal and lobular features, 26mm, grade 1, negative margins. Pathologic stage: pT2, pNX.  RIGHT breast deep margin, lumpectomy: Benign breast tissue.   RIGHT breast medial margin, lumpectomy: Benign breast tissue. RIGHT breast anterior medial margin, lumpectomy: Fibrocystic changes, duct ectasia. Generalized anxiety disorder ICD-10-CM: F41.1  ICD-9-CM: 300.02  10/14/2019 - Present        Mild intermittent asthma without complication UXL-10-BU: T47.79  ICD-9-CM: 493.90  5/19/2017 - Present        Primary osteoarthritis of both knees ICD-10-CM: M17.0  ICD-9-CM: 715.16  2/28/2017 - Present    Overview Signed 6/3/2018  4:40 PM by MD Dr. Lanie Swartz. 5/30/18: bilateral corticosteroid injections to both knees             S/P ICD (internal cardiac defibrillator) procedure ICD-10-CM: Z95.810  ICD-9-CM: V45.02  8/24/2016 - Present    Overview Addendum 9/15/2021  3:11 PM by Rick Vail MD     [employer] ICD single lead implant with DFT < 25 J 8/24/2016- removed 9/19 and [employer] sub cutaneous ICD placed 9/21/16  9/15/21 S ICD change             Chronic insomnia ICD-10-CM: F51.04  ICD-9-CM: 780.52  8/4/2016 - Present        Morbid obesity with BMI of 40.0-44.9, adult ([facility] ICD-10-CM: E66.01, Z68.41  ICD-9-CM: 278.01, V85.41  6/14/2016 - Present        Osteoporosis ICD-10-CM: M81.0  ICD-9-CM: 733.00  2/3/2016 - Present    Overview Signed 2/3/2016  8:04 AM by Eyal Tomlin MD     DEXA 2/1/16: L femoral neck T -2.5, L total hip -2.4, LS spine T -2.2             Cardiomyopathy ([facility] ICD-10-CM: I42.9  ICD-9-CM: 425.4  10/6/2015 - Present    Overview Signed 10/14/2019  4:05 PM by Eyal Tomlin MD     Echo 7/1/15: EF 30-35%, dilated LV, LAE               CAD (coronary artery disease) ICD-10-CM: I25.10  ICD-9-CM: 414.00  7/9/2015 - Present    Overview Addendum 2/5/2023 11:52 AM by Paulla Risk, MD Dr. Marylee Shaggy NP [facility]). Hx of MI twice in 3/22/03 & 11/26/11, multiple stents.  Had  AMRIT mid RCA 11/26/11, Cath 8/21: RI with occluded distal LCx, distal LAD to RI collaterals             Systolic CHF, chronic (HCC) ICD-10-CM: I50.22  ICD-9-CM: 428.22, 428.0  7/9/2015 - Present    Overview Addendum 2/5/2023 11:51 AM by MD Dr. Jono Brian NP. Echo 2/2021: EF 35% , mild TR, mod MR. S/p AICD placement in 9/21 with Dr. Dione Gan and subsequent lead revisions and procedures due to non-healing mid-sternal incision. 9/23/22: on Entresto 97/103 BID and Bumex 2 mg BID. 1/16/23: Entresto on hold due to hypotension. Had hair loss on Coreg and Bystolic and hypotension with spironolactone. Hyperlipidemia ICD-10-CM: E78.5  ICD-9-CM: 272.4  7/9/2015 - Present    Overview Signed 2/5/2023 11:55 AM by MD Dr. Dale Brian. Statin-intolerant. 1/16/23: Could not tolerate Zetia. Praluent brought LDL<50 but stopped due to leg pains and cramping. Plan is to rx with Repatha or [place]             Mitral valve regurgitation ICD-10-CM: I34.0  ICD-9-CM: 424.0  7/9/2015 - Present    Overview Addendum 7/13/2015  6:43 AM by Silvia Edwards MD     Echo 1/13/15:  Mod to severe MR with marked LAE, 7/1/15: EF 30-35%, dilated LV, LAE, mild to mod MR             History of MI (myocardial infarction) ICD-10-CM: I25.2  ICD-9-CM: 412  7/9/2015 - Present    Overview Signed 7/9/2015  8:12 PM by Silvia Edwards MD     2006 and IWMI 11/26/2011             HTN (hypertension) ICD-10-CM: I10  ICD-9-CM: 401.9  6/30/2015 - Present        History of gastric bypass ICD-10-CM: Z98.84  ICD-9-CM: V45.86  6/30/2015 - Present    Overview Signed 7/13/2015  6:37 AM by Silvia Edwards MD     2006, revision 2009             Moderate episode of recurrent major depressive disorder ([facility] ICD-10-CM: F33.1  ICD-9-CM: 296.32  6/30/2015 - Present        RESOLVED: Angina pectoris, unspecified ICD-10-CM: I20.9  ICD-9-CM: 413.9  8/19/2021 - 6/8/2022        RESOLVED: Atherosclerotic heart disease of native coronary artery with unspecified angina pectoris ICD-10-CM: I25.119  ICD-9-CM: 414.01, 413.9  8/19/2021 - 6/8/2022        RESOLVED: Incontinence of feces ICD-10-CM: R15.9  ICD-9-CM: 787.60  9/3/2018 - 10/14/2019    Overview Signed 9/3/2018 11:47 AM by MD Dr. Hoa Wilson. 8/20/18: Improving with pelvic physical therapy and psyllium husk treatment. Saw Dr. Tawanda Hardin who suggested PT first. MR defecography showed pelvic floor weakness with pelvic descensus, small anterior  Rectocele, and vaginal prolapse. To have pelvic PT weekly for 8 wks and to see Dr. Tawanda Hardin again in Oct 2018. RESOLVED: Acute right ankle pain ICD-10-CM: M25.571  ICD-9-CM: 719.47, 338.19  6/8/2018 - 10/14/2019    Overview Signed 6/8/2018  8:37 AM by Domingo Louie MD     5/29/18: Allan Artemio showed possible right ankle sprain. 6/6/18: saw [facility]. Shae Cotton ([facility]), diagnosed with peroneal tendonitis. Prescribed an ASO             RESOLVED: High risk medication use ICD-10-CM: Z79.899  ICD-9-CM: V58.69  2/23/2018 - 10/14/2019        RESOLVED: Obesity, Class III, BMI 40-49.9 (morbid obesity) ([facility] ICD-10-CM: E66.01  ICD-9-CM: 278.01  5/30/2017 - 5/29/2018        RESOLVED: ICD (implantable cardioverter-defibrillator) in place ICD-10-CM: Z95.810  ICD-9-CM: V45.02  10/12/2016 - 10/14/2019    Overview Addendum 10/12/2016  4:29 PM by Denny Spence NP     Sub cutaneous pocket revision 10/12/16  Necrotic skin debrided  DFT, = 65 J              RESOLVED: Infection involving implantable cardioverter-defibrillator (ICD) ([facility] ICD-10-CM: T82. 7XXA  ICD-9-CM: 996.61  10/11/2016 - 10/12/2016        RESOLVED: AICD lead displacement ICD-10-CM: T82.120A  ICD-9-CM: 996.04  9/19/2016 - 10/14/2019    Overview Addendum 9/21/2016  1:17 PM by Denny Spence NP     9/19/2016 Removal of the right ventricular ICD lead and the single chamber transvenous AICD with fluoroscopy             RESOLVED: Advance care planning ICD-10-CM: Z71.89  ICD-9-CM: V65.49  7/16/2016 - 10/14/2019    Overview Signed 7/16/2016 11:23 AM by Domingo Louie MD     Discussed. Patient previously given informational booklet and form. RESOLVED: 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[facility] ICD-10-CM: I42.9  ICD-9-CM: 425.9  7/9/2015 - 10/14/2019    Overview Signed 7/19/2015  7:26 PM by Gomez Patiño MD     Echo 7/1/15: EF 30-35%, dilated LV, LAE             RESOLVED: Asthma ICD-10-CM: J45.909  ICD-9-CM: 493.90  6/30/2015 - 6/30/2015        RESOLVED: Reactive airway disease ICD-10-CM: J45.909  ICD-9-CM: 493.90  6/30/2015 - 10/14/2019        RESOLVED: Morbid obesity ([facility] ICD-10-CM: E66.01  ICD-9-CM: 278.01  7/27/2010 - 12/6/2016        RESOLVED: SOB (shortness of breath) ICD-10-CM: R06.02  ICD-9-CM: 786.05  7/27/2010 - 7/19/2015        RESOLVED: Joint pain ICD-10-CM: M25.50  ICD-9-CM: 719.40  7/27/2010 - 7/19/2015        RESOLVED: HLD (hyperlipidemia) ICD-10-CM: E78.5  ICD-9-CM: 272.4  7/27/2010 - 7/19/2015        RESOLVED: Hepatitis C ICD-10-CM: B19.20  ICD-9-CM: 070.70  7/27/2010 - 10/12/2015        RESOLVED: HTN (hypertension) ICD-10-CM: I10  ICD-9-CM: 401.9  7/27/2010 - 7/19/2015        RESOLVED: Urinary incontinence, stress ICD-9-CM: 625.6  7/27/2010 - 10/14/2019        RESOLVED: CAD (coronary artery disease) ICD-10-CM: I25.10  ICD-9-CM: 414.00  7/27/2010 - 7/19/2015        RESOLVED: MI (myocardial infarction) ([facility] ICD-10-CM: I21.9  ICD-9-CM: 410.90  7/27/2010 - 7/19/2015        RESOLVED: Ulcer ICD-10-CM: XGX8280  ICD-9-CM: 707.9  7/27/2010 - 7/19/2015    Overview Signed 7/27/2010  5:27 PM by Elizabeth Graf     In pouch             RESOLVED: DVT (deep venous thrombosis) ([facility] ICD-10-CM: I82.409  ICD-9-CM: 453.40  7/27/2010 - 10/14/2019            Discharge Condition: Stable    Hospital Course: 81 y.o lady w/ HFrEF, afib s/p DCCV, s/p ICD, CAD s/p PCI, morbid obesity, HTN, who presented with shortness of breath as well as her ICD firing. She was found to have decompensated CHF as well as ventricular tachycardia.    K 3.7 on the day of discharge; replenished prior to dc.     Consults: Cardiology    Significant Diagnostic Studies: see above    Disposition: home    S:    O: Visit Vitals  /62   Pulse (!) 53   Temp 98 °F (36.7 °C)   Resp 18   Ht 5' 5\" (1.651 m)   Wt 104.5 kg (230 lb 6.1 oz)   SpO2 92%   BMI 38.34 kg/m²     NAD  RRR  Lungs CTAB      Discharge Medications:   Current Discharge Medication List        CONTINUE these medications which have NOT CHANGED    Details   bumetanide (BUMEX) 1 mg tablet Take 2 mg by mouth daily. amiodarone (CORDARONE) 200 mg tablet Take 1 Tablet by mouth daily. Qty: 180 Tablet, Refills: 1      FLUoxetine (PROzac) 40 mg capsule Take 1 Capsule by mouth two (2) times a day. Qty: 180 Capsule, Refills: 3    Associated Diagnoses: Moderate episode of recurrent major depressive disorder ([facility] Generalized anxiety disorder      albuterol (PROVENTIL HFA, VENTOLIN HFA, PROAIR HFA) 90 mcg/actuation inhaler TAKE 2 PUFFS BY INHALATION EVERY FOUR HOURS AS NEEDED FOR WHEEZING OR SHORTNESS OF BREATH. Qty: 18 Each, Refills: 11    Associated Diagnoses: Reactive airway disease, mild intermittent, uncomplicated      zolpidem (AMBIEN) 10 mg tablet TAKE 1 TABLET BY MOUTH NIGHTLY AS NEEDED FOR SLEEP. MAX DAILY AMOUNT: 10 MG. Qty: 30 Tablet, Refills: 1    Comments: Not to exceed 4 additional fills before 04/30/2023  Associated Diagnoses: Chronic insomnia      metoprolol succinate (TOPROL-XL) 25 mg XL tablet Take 1 Tablet by mouth two (2) times a day. Qty: 180 Tablet, Refills: 1      apixaban (ELIQUIS) 5 mg tablet Take 1 Tablet by mouth two (2) times a day. Prescribed by Dr. Brittany Boyle. Qty: 180 Tablet, Refills: 0    Associated Diagnoses: Paroxysmal atrial fibrillation (HCC)      potassium chloride (Klor-Con M20) 20 mEq tablet TAKE 2 TABLETS BY MOUTH EVERY DAY  Qty: 180 Tablet, Refills: 3    Associated Diagnoses: Systolic CHF, chronic (HCC)      aspirin delayed-release 81 mg tablet Take 81 mg by mouth as needed for Pain. psyllium (METAMUCIL) powd Take  by mouth.  2 tsp daily      cholecalciferol, VITAMIN D3, (VITAMIN D3) 5,000 unit tab tablet Take 10,000 Units by mouth daily. biotin 10,000 mcg cap Take  by mouth daily. OTHER Complete multi formula, bariatric advantage      magnesium 250 mg tab Take 1 Tab by mouth daily. ferrous sulfate 325 mg (65 mg iron) tablet Take  by mouth daily (before breakfast). CALCIUM PO Take 600 mg by mouth daily. Repatha SureClick pen injection INJECT 140 MG SUBCUTANEOUSLY EVERY 14 DAYS           STOP taking these medications       metOLazone (ZAROXOLYN) 5 mg tablet Comments:   Reason for Stopping: Follow-up Information       Follow up With Specialties Details Why Contact Info    Pamela Youngblood MD Internal Medicine Physician Schedule an appointment as soon as possible for a visit in 1 week(s)  [address] [place] [address] Avenue  [phone]            Future Appointments   Date Time Provider Denis Burksi   2/21/2023 10:00 AM Bijan Gerber AlejandroDO [address] BS AMB   2/24/2023  9:00 AM JOVAN Villanueva BS AMB   3/15/2023  9:00 AM REMOTE1JOSE ALBERTO BS AMB   3/31/2023  1:00 PM JOVAN Villanueva BS AMB   4/10/2023 11:10 AM MD GAGAN Iyer BS AMB   4/13/2023  1:00 PM [facility] CATH LAB 3 [facility]   4/28/2023  1:00 PM JOVAN Villanueva BS AMB   12/14/2023  2:00 PM PACEMAKER3JOSE ALBERTO BS AMB   12/14/2023  2:20 PM MD RANDOLPH Roche BS AMB       Signed By: Marizol Jeffers MD     February 6, 2023      Greater than 30 minutes spent on discharge management.

## 2023-02-06 NOTE — NURSE NAVIGATOR
HEART FAILURE NURSE NAVIGATOR NOTE  [address]    Patient chart was reviewed by Heart Failure (HF) Nurse Navigators for compliance of prescribed treatment with guidelines directed medical therapy (GDMT) and HF database completed. Please, review beneath recommendations for symptomatic patients with HF with Reduced Ejection Fraction ? 40% (HFrEF)* and patients whose LVEF improved > 40% (HFimpEF)* for your consideration when taking care of this patient. Current Medical Therapy:    Name Karla Plaza    1950   LVEF 25-30% (echo dated 22)   NYHA Functional Class III on admission   ARNi/ACEi/ARB Contraindicated d/t hypotension   Beta-blocker Toprol XL 25 mg qd   Aldosterone Antagonist Not prescribed-see below recommendations   SGLT2 inhibitor Not prescribed-see below recommendations   Hydralazine/Isosorbide Dinitrate    Consulting Cardiologist: MOHIT     Recommendations:    Please, add the following GDMT for HFrEF ? 40% [Class 1] or document in discharge summary/progress note why patient cannot take the medication:  ARNi/ACEi or ARB  Beta-blockers (carvedilol, sustained-release metoprolol succinate or bisoprolol)  Aldosterone antagonists GFR > 30 and K< 5  SGLT2 inhibitor  Hydralazine/Isosorbide dinitrate for  Americans with Class III/IV symptoms  Adjust diuretic dose at discharge if hospitalized for volume overload    Consider adding the following GDMT for HFrEF ? 40%, if appropriate [Class 2b]:   Ivabradine for patients on maximally tolerated beta-blocker dose in order to achieve HR 70-80bpm  Digoxin, goal level 0.5-0.9  Polyunsaturated fatty acids  Vericuguat  For patient with hyperkalemia while on RAASi > 5.5, consider adding potassium binders (patiromer, sodium zirconium cycosilicate)    Note: the following medications may be potentially harmful in heart failure [Class 3]:  Calcium channel blockers (doxazosin, diltiazem, verapamil, nifedipine)  Antiarrhythmics (flecanide, disopyrimide, sotalol, dronedarone)  Diabetes medications (thiasolidinediones, saxagliptin, alogliptin)  NSAIDs and NARVAEZ 2 inhibitors    Consider vaccinations for respiratory illnesses (flu, pneumonia, covid) [Class 2b]    For eligible patients with LVEF < 35% consider discharge with wearable defibrillation [Class 2b] and/or referral for ICD implantation [Class 1] for prevention of sudden cardiac death. Due to severely reduced LVEF < 25% and/or recurrent hospitalizations this patient may benefit from referral to Advanced Heart Failure Program to assess suitability for advanced therapies, such as left-ventricular assist device, heart transplantation, palliative inotropes or palliation [Class 1]. To obtain in-patient consultation or refer to [address] [place] [address], call [phone]    Patient Education:     Teach back in heart failure education provided, including information about medical therapy, lifestyle modifications, diet and fluid restrictions, physical activity. Educational resources provided: Living with Heart Failure booklet; Signs/Symptoms magnet; Weight Calendar; Dispatch [facility] flyer; Preparation for Successful Discharge Checklist.  Information provided about HF support group. Heart failure avoiding triggers on discharge instructions. Plan for Transitional Care:    Post discharge follow up phone call to be made within 48-72 hours of discharge. Patient will follow-up with PCP. Patient will follow-up with Primary Cardiologist.  Obstructive sleep apnea screening done and patient was referred to Sleep Medicine. Referral/follow-up with Cardiac Rehabilitation.   Referral/follow-up with Advanced Heart Failure Specialist.  Referral/follow-up with Palliative Care Specialist.      Heart Failure Nurse Navigator  Phone: [phone]  /  [phone]    *Recommendations listed above are based on 2022 AHA/ACC/HFSA Guideline for the Management of Heart Failure: A Report of the [address] on Clinical Practice Guidelines. Circulation 2022; [id number]. and 2017 AHA/ACC/HRS guideline for management of patients with ventricular arrhythmias and the prevention of sudden cardiac death: A Report of the [employer] of Cardiology/American Heart Association Task Force on Clinical Practice Guidelines and the Heart Rhythm Society.  Heart Rhythm, Vol 15, No 10, October 2018 *Class of Recommendation: Class 1 (strong), Class 2a (moderate), Class 2b (weak), Class 3 (not recommended, potentially harmful)

## 2023-02-06 NOTE — PROGRESS NOTES
Problem: General Medical Care Plan  Goal: *Vital signs within specified parameters  Outcome: Progressing Towards Goal  Goal: *Labs within defined limits  Outcome: Progressing Towards Goal  Goal: *Absence of infection signs and symptoms  Outcome: Progressing Towards Goal  Goal: *Optimal pain control at patient's stated goal  Outcome: Progressing Towards Goal  Goal: *Skin integrity maintained  Outcome: Progressing Towards Goal  Goal: *Fluid volume balance  Outcome: Progressing Towards Goal  Goal: *Optimize nutritional status  Outcome: Progressing Towards Goal  Goal: *Anxiety reduced or absent  Outcome: Progressing Towards Goal  Goal: *Progressive mobility and function (eg: ADL's)  Outcome: Progressing Towards Goal     Problem: Patient Education: Go to Patient Education Activity  Goal: Patient/Family Education  Outcome: Progressing Towards Goal     Problem: Falls - Risk of  Goal: *Absence of Falls  Description: Document Pallavi Fall Risk and appropriate interventions in the flowsheet.   Outcome: Progressing Towards Goal  Note: Fall Risk Interventions:  Mobility Interventions: Bed/chair exit alarm, Communicate number of staff needed for ambulation/transfer, Patient to call before getting OOB         Medication Interventions: Assess postural VS orthostatic hypotension, Bed/chair exit alarm, Evaluate medications/consider consulting pharmacy, Patient to call before getting OOB, Teach patient to arise slowly    Elimination Interventions: Bed/chair exit alarm, Call light in reach, Patient to call for help with toileting needs, Toileting schedule/hourly rounds              Problem: Patient Education: Go to Patient Education Activity  Goal: Patient/Family Education  Outcome: Progressing Towards Goal     Problem: Arrhythmia Pathway (Adult)  Goal: *Absence of arrhythmia  Outcome: Progressing Towards Goal     Problem: Patient Education: Go to Patient Education Activity  Goal: Patient/Family Education  Outcome: Progressing Towards Goal

## 2023-02-06 NOTE — PROGRESS NOTES
Bedside shift change report given to Sarah (oncoming nurse) by Leonardo Dennis (offgoing nurse). Report included the following information SBAR, Kardex, MAR, and Cardiac Rhythm SB/NSR c 1 AVB .

## 2023-02-06 NOTE — DISCHARGE INSTRUCTIONS
Future Appointments   Date Time Provider Denis Burksi   2/21/2023 10:00 AM Hali Richardson,  [address] BS AMB   2/24/2023  9:00 AM JOVAN Woody BS AMB   3/15/2023  9:00 AM REMOTE1JOSE ALBERTO BS AMB   3/31/2023  1:00 PM JOVAN Woody BS AMB   4/10/2023 11:10 AM MD GAGAN Welsh BS AMB   4/13/2023  1:00 PM [facility] CATH LAB 3 [facility]   4/28/2023  1:00 PM JOVAN Woody BS AMB   12/14/2023  2:00 PM PACEMAKER3, JOSE ALBERTO DICKSON BS AMB   12/14/2023  2:20 PM Manny Rincon MD CAVREY BS AMB     Low potassium is suspected to have caused your ventricular tachycardia. Follow-up with Dr. Magui Cuevas. Your metolazone has been stopped to prevent further decreases of your potassium level. Download the Heart Failure [place] Min: Search in your [employer] (Android) or [employer] Store (Rent[employer]): Search for- HF [place] Min.    [facility]    HF [place] is a brand-new phone min that helps you track daily symptoms, vitals, mood, energy level and more. You can even add your heart failure care team members to view your data and monitor your condition at home.     HF [place] lets you:  Track symptoms, medications and more  Share health information with your health care team  Connect with others living with heart failure

## 2023-02-07 ENCOUNTER — PATIENT OUTREACH (OUTPATIENT)
Dept: CASE MANAGEMENT | Age: 73
End: 2023-02-07

## 2023-02-07 LAB
ATRIAL RATE: 62 BPM
CALCULATED P AXIS, ECG09: 84 DEGREES
CALCULATED R AXIS, ECG10: -21 DEGREES
CALCULATED T AXIS, ECG11: 109 DEGREES
DIAGNOSIS, 93000: NORMAL
P-R INTERVAL, ECG05: 178 MS
Q-T INTERVAL, ECG07: 470 MS
QRS DURATION, ECG06: 102 MS
QTC CALCULATION (BEZET), ECG08: 477 MS
VENTRICULAR RATE, ECG03: 62 BPM

## 2023-02-07 NOTE — PROGRESS NOTES
Care Transitions Initial Call-Care Transitions Follow Up Call    Call within 2 business days of discharge: Yes     Patient: Bita Calloway Patient : 1950 MRN: [id number]    Last Discharge [address]       Date Complaint Diagnosis Description Type Department Provider    23 Pacemaker Problem ICD (implantable cardioverter-defibrillator) discharge . .. ED to Hosp-Admission (Discharged) (ADMIT) [id number] Yamileth Méndez MD; Rupal Gomez. .. Was this an external facility discharge? No    Challenges to be reviewed by the provider   Additional needs identified to be addressed with provider: yes    CTN was not able to reach patient to complete post hospital discharge assessment. CTN coordinate MARGARITO CRANE appointment with primary cardiology for . Method of communication with provider : phone call to primary cardiology. Chart routing to PCP. COVID-19 related testing was not done at this time. Advance Care Planning:   Does patient have an Advance Directive: not on file. Inpatient Readmission Risk score: Unplanned Readmit Risk Score: 18.7    Was this a readmission? yes   Patient stated reason for the admission: ICD fired multiple times PTA to hospital.    Patients top risk factors for readmission: lack of knowledge about disease, level of motivation, medical condition- , and to be further assessed    Interventions to address risk factors: Scheduled appointment with [facility] cardiology    Care Transition Nurse (CTN) contacted the patient by telephone to perform post hospital discharge assessment. Left VM asking for return call. Home Health/Outpatient orders at discharge: none    Durable Medical Equipment ordered at discharge: None    Was patient discharged with a pulse oximeter? NA    If no appointment was previously scheduled, appointment scheduling offered: yes. Is follow up appointment scheduled within 7 days of discharge? yes.    REHABILITATION [facility] follow up appointment(s): Future Appointments   Date Time Provider Denis Burksi   2/13/2023 10:30 AM MD GAGAN Arshad BS AMB   2/21/2023 10:00 AM Myles Reece,  [address] BS AMB   2/24/2023  9:00 AM JOVAN Jarrell AMB   3/15/2023  9:00 AM REMOTE1, JOSE ALBERTO DICKSON BS AMB   3/31/2023  1:00 PM JOVAN Jarrell AMB   4/10/2023 11:10 AM MD GAGAN Arshad BS AMB   4/13/2023  1:00 PM [facility] CATH LAB 3 [facility]    4/28/2023  1:00 PM JOVAN Jarrell AMB   12/14/2023  2:00 PM PACEMAKER3, JOSE ALBERTO VILCHIS AMB   12/14/2023  2:20 PM MD RANDOLPH Payne BS AMB     Non-[facility] follow up appointment(s): refer to goals section    Plan for follow-up call in 3-5 days based on severity of symptoms and risk factors. Plan for next call: to complete follow up- post hospital discharge assessment. CTN provided contact information for future needs. Goals Addressed                   This Visit's Progress       Heart Failure     Patient verbalizes understanding of self-management goals of living with Congestive Heart Failure and Treatment for recurrent/chronic AFib        2/8/23- left VM asking for return call. Relayed cardiology appt scheduled for later today at Dr. Travis Saez office. [facility]     2/7/23- Left  asking for return call. Return call from [facility] with Dr. Yohannes Tam- sent records via [address] [place] [address] Avenue fax per her request to: Duncan Thompson [phone]. [facility]     2/3/23- return call from Ms. Huddleston. She has two of the 3 doses of metolazone ordered- this morning her weight was down approx 4 lbs- she took 3rd dose this morning. She states her chest has no pressure, less SOB-RODNEY, some LE edema decreasing. Reminded her about low NA-hydration see below, encouraged her to continue. She has not called primary cardiology office- CTN had left msg earlier asking for return call from nurse, I will update her if she returns the call. Return call from Jeremíasú- relayed above.  Next appt is on 3/17. Shared date of DCCV and next on 4/13 for ablation. CTN will update EP. [facility]     2/1/23- received response from EP, Dr. Eli Samson. He recommends return to hospital to treating increasing fluid, etc.    Spoke with Ms. Evette Collet- she was not able to get the metolazone RX. Her car is not working, son is picking up today, will get it to her around 2:30. Discussion about taking with second dose of bumex - take, wait 30 minutes, then take bumex 2 mg. She says she ordered the scale, but has not arrived. She is not sure of how much she has gained. This morning she feels \"smaller\" in her legs- when she changed pants. Shared what Dr. Eli Samson had replied. She agreed to check in with CTN tomorrow after taking one dose of metolazone. She will call if she does not have urination to reflect desired effect, before she takes second dose in the morning. Discussion about:  Look at labels- staying low NA- reducing to lowest possible- reminded about staying under 350-400 mg per meal, less is best.    She continues to be thirsty- advised her to gently hydrate, up to 64 ounces. Use rinsing mouth, hard candy, etc to keep moist.     CTN will update cardiology-EP. [facility]     1/31/23 - spoke with Ms. Caryn Quesadaestrellita, just now. - she says she is breathing some better since DCCV done on 1/27/23- but is \"winded\" with walking to [place] and back (done while we were on the phone). She said her legs are tight and swollen, abdomen is brink. She reports continued weight gain with increasing symptoms despite taking bumex 2 mg BID for past 5 days. Note Dr. Michael Starr had increased dose of bumex from 1 mg daily to 2 mg daily at [address] on 1/16/23. She reports PMH of taking metolazone. Her scale is not working this morning- she had gained 5-6 lbs as of last week. She states she is eating low NA, drinking \"lots of water\". CTN reached out to primary cardiology via phone, asked for nurse to return call. Dr. Michael Starr is not in the office today. Return call from nurse Yasmeen. Provided update, she will check with Dr. Kaylynn Quiles, (Dr. Mendenhall Cook away this week, her PA is in the hospital) and call CTN back. Return call from Yasmeen, nurse, Dr. Kaylynn Quiles ordered:   Continue Bumex 2 mg BID- and KCl 20 meq - 2 tabs daily  take metolazone 5 mg for 3 consecutive days - she will call Friday and update their office on progress. She will assess Friday- dose moving forward of bumex and KCL. Leyla Tracy states she told her to start today, provided directions for taking metolazone 1 hour before bumex taken. She got the lab work from 1/26 done and will ask provider if he wants lab work done when she updates him on Friday. CTN updated EP via CC msg, sent updated staff msg about return call from primary cardiology office. [facility]     1/17/23- Left VM asking for return call. Review of EMR shows: she followed up with PCP on 1/10/23- /86, HR98 wt- 225 lbs. Reported weak and tired. Her DIL was assisting, declined HH svcs. Reports mild RODNEY with occ HR increase, denied SOB and CP at rest.   PCP added second medication to help with insomnia- trazadone 50 mg- 1/2 tab HS PRN. PCP increased prozac to 40 mg BID. Renewed albuterol inhaler for \"reactive airway disease\". Follow up on 4/10/23. [facility]     1/6/23- spoke with Ms. Patria Baker- she is feeling better, tires easily but does feel it is slowly improving each day. She feels recovered from Flu. Denies LE edema, SOB and/or chest pain. Has been checking wts- steady around 222 lbs. Monitoring BP and HR- BP steady but HR fluctuates up and down. Asked her to check to make sure she has enough amiodarone to take until she attends follow up with EP on 1/26/23. [facility]     12/29/22- spoke with Ms. Patria Baker- she feels much better, tires easily. Asked her to be regularly active, but rest when she feels tired. Stay hydrated and eat for recovery- focus on low NA.     Coughing less, PRN meds at home and using to control symptoms with good relief. She was seen at Cardiology, Dr. Dar Lucas office today, VS and EKG done. Shows AFib with good rate control at 80 bpm.  118/80, HR 80. Weight reported 218 lbs. She is monitoring BP and HR and daily weight at home. Reports they are good, weight is steady. She confirms continuing amiodarone 200 mg- 3 tabs TID dose until 12/30. Will start on 12/31 200 mg BID. On 1/14/21- transition to  200 daily. CTN will clarify with cardiology if she is to stop 200 mg daily or continue until seen for follow up on 1/26. If she is to continue, will need RX sent. [facility]     12/23/22- spoke with Ms. Tse November. She reports increased cough. CTN was able to secure PRN RX for Tessalon. Advised to keep cough symptoms controlled to help with AFib control. Asked her to reach out to PCP if symptoms are not controlled by PRN meds. She is aware that she converted back and remains in AFib, explained goal of rate control. CTN clarified dosing for amiodarone with EP- discharge instructions not correct, updated in CC current med list. Amiodarone 400 mg TID for 9 days, then 200 mg BID for 14 days, then 200 mg daily. CTN asked EP about Toprol XL 25 mg BID dosing referenced in progress note, but no order written, patient does not have this medication at home. Cardiology sent in RX to pharmacy. Clarified about dose of KCL to be taken with changed dose of bumex. Continue 20 meq 2 tablets daily. Confirmed she has stopped: Diltiazem and Entresto. She is not taking imdur/isosorbide. Note: intolerance to Femara in Hem-oncology note. Discussion about how to help with Flu recovery:  reduce-treat symptoms, maintain hydration, focus on healthy foods rich with vitamin C and anti-oxidants. Be active, ambulate regularly, rest when tired. Pt will remain out of the hospital or ER for remainder of Bundle period. [facility]              Post Hospitalization     Attends follow-up appointments as directed. 2/7/23-  Dr. Alissa Jasso- 2/13 at 10:30 am.     Dr. Yohannes Tam- CTN secured follow up for 2/8 at 2:45. Has one also on 3/17 at 10 am. Left in place for now. Left VM for Ms. Lyudmila Cheney to call office if cannot attend. Dr. Ga Dennis- see below. [facility]     2/3/23-   PCP  Dr. Alissa Jasso-    4/10/2023 11:10 AM Andrei Bethea MD [facility]   Cardiology- Primary Dr. Yohannes Tam- next 3/17. EP- Dr. Ga Dennis-    note- 4/13 is AFib ablation procedure. 2/24/2023  9:00 AM Siddharth Bond, JOVAN DICKSON   3/15/2023  9:00 AM REMOTE1, JOSE ALBERTO DICKSON   3/31/2023  1:00 PM Siddharth Bond, JOVAN DICKSON         4/13/2023  1:00 PM [facility] CATH LAB 3 [facility]   4/28/2023  1:00 PM Siddharth Bond, JOVAN [address]       1/6/23-   PCP- Dr. Alejandro Kim- 1/10 10:30 am  attended-[facility]   Cardiology- Primary- Dr. Yohannes Tam- appt on 12/27 at 9 am; She rescheduled to 1/16 attended-[facility]      EP- Dr. Ga Dennis- 1/26/23-1 pm- will determine plan for DCCV-AFib. [facility]     12/23/22-   PCP- Dr. Remedios Narayanan reached out to secure [facility] [place] appt. Cardiology- Primary- Dr. Yohannes Tam- has appt on 12/27 at 9 am. States she forgot, will call tomorrow to reschedule. EP- Dr. Ga Dennis- has visit on 12/29 for EKG. Attended-[facility]   CTN will clarify with provider, if needs to keep since she is seeing primary card on 12/27.         [facility]

## 2023-02-08 NOTE — PROGRESS NOTES
Physician Progress Note      PATIENT:               Michelle Plaza  CSN #:                  [id number]  :                       1950  ADMIT DATE:       2023 3:20 PM  DISCH DATE:        2023 5:54 PM  RESPONDING  PROVIDER #:        Aubree Baig MD          QUERY TEXT:    Patient admitted with Acute on chronic systolic heart failure, noted to have Persistent atrial fibrillation and is maintained on Eliquis. If possible, please document in progress notes and discharge summary if you are evaluating and/or treating any of the following: The medical record reflects the following:  Risk Factors: Afib, CHF, HTN, MI, Advance Age, Female    Clinical Indicators:  Internal Medicine PN,  \"Refractory afib, s/p DCCV, continue apixaban, metoprolol\". Treatment: Eliquis    Thank you,  Tulio Rogers  Options provided:  -- Secondary hypercoagulable state in a patient with atrial fibrillation  -- Other - I will add my own diagnosis  -- Disagree - Not applicable / Not valid  -- Disagree - Clinically unable to determine / Unknown  -- Refer to Clinical Documentation Reviewer    PROVIDER RESPONSE TEXT:    This patient has secondary hypercoagulable state in a patient with atrial fibrillation.     Query created by: Ines Ayon on 2023 8:05 AM      Electronically signed by:  Aubree Baig MD 2023 6:34 PM

## 2023-02-09 ENCOUNTER — DOCUMENTATION ONLY (OUTPATIENT)
Dept: INTERNAL MEDICINE CLINIC | Age: 73
End: 2023-02-09

## 2023-02-09 NOTE — PROGRESS NOTES
Message received from Jose Thurston. Good evening   She has PHILIP with you on 2/13- Monday. I have not been able to reach her since discharge on 2/6- I hope she attended the appointment with Dr. Eliz Gutierrez today. I have not been able to determine if she is not engaged, overwhelmed, or what- I do not think she was weighing and checking BP and HR values at home. I am not sure she understands her heart condition- I welcome your assistance and insight to try to help her. Thank you   Ashlie Doll 134 Transition Nurse   Direct phone:  [phone]     She coded for the HF bundle until 3/22/23.  :)

## 2023-02-13 ENCOUNTER — OFFICE VISIT (OUTPATIENT)
Dept: INTERNAL MEDICINE CLINIC | Age: 73
End: 2023-02-13

## 2023-02-13 DIAGNOSIS — Z91.199 NO-SHOW FOR APPOINTMENT: Primary | ICD-10-CM

## 2023-02-14 RX ORDER — AMIODARONE HYDROCHLORIDE 200 MG/1
200 TABLET ORAL DAILY
Qty: 180 TABLET | Refills: 1 | Status: SHIPPED | OUTPATIENT
Start: 2023-02-14

## 2023-02-14 RX ORDER — METOPROLOL SUCCINATE 25 MG/1
TABLET, EXTENDED RELEASE ORAL
Qty: 180 TABLET | Refills: 1 | Status: SHIPPED | OUTPATIENT
Start: 2023-02-14

## 2023-02-14 NOTE — TELEPHONE ENCOUNTER
Received refill request for Toprol XL 25 mg po tabs. Refill request authorized.     Future Appointments   Date Time Provider Denis Hurst   2/21/2023 10:00 AM Dawood Pritchard,  [address] BS AMB   2/24/2023  9:00 AM JOVAN Avitia BS AMB   3/15/2023  9:00 AM [email or web address] ALBERTO BS AMB   3/31/2023  1:00 PM JOVAN Avitia BS AMB   4/10/2023 11:10 AM MD GAGAN Alberts BS AMB   4/13/2023  1:00 PM [facility] CATH LAB 3 [facility]   4/28/2023  1:00 PM JOVAN Avitia BS AMB   12/14/2023  2:00 PM [email or web address] ALBERTO BS AMB   12/14/2023  2:20 PM MD RANDOLPH Jameson BS AMB

## 2023-02-15 ENCOUNTER — PATIENT OUTREACH (OUTPATIENT)
Dept: CASE MANAGEMENT | Age: 73
End: 2023-02-15

## 2023-02-15 NOTE — PROGRESS NOTES
Care Transitions Follow Up Call    Challenges to be reviewed by the provider   Additional needs identified to be addressed with provider: yes    CTN updated cardiology on current weight, symptoms and medication doses taking:    Weight down to 210 lbs- feels much improved- no SOB-RODNEY doing activities within the home. Legs are soft, no longer tight. Has been taking bumex 2 mg daily, KCL 20 meq-1 tablet (not 2) daily. Asked her to increase to two 20 meq tablets daily. Check if refill is needed. Asked about lab work needed prior to [address] scheduled for . Coordinate with lab work possibly to be done at Hem-Oncology OV on - or that day if none ordered by Hem-Oncology. Method of communication with provider : chart routing    Care Transition Nurse (CTN) contacted the patient by telephone to follow up after admission on 22 and 23. Verified name and  with patient as identifiers. Addressed changes since last contact: refer to above yellow box and goals section. CTN reviewed current symptoms, discharge instructions, medical action plan and red flags with patient and discussed any barriers to care and/or understanding of plan of care after discharge. Discussed appropriate site of care based on symptoms and resources available to patient including: PCP, Specialist, After hours contact number- , Urgent Care Clinics, [address] [place] [address], and [facility] and CTN . The patient agrees to contact the PCP and/or specialist office for questions related to their healthcare.      [address] Dr follow up appointment(s):   Future Appointments   Date Time Provider Denis Hurst   2023 10:00 AM Anya Be,  N Misael Reynoso BS AMB   2023  9:00 AM JOVAN Elizabeth BS AMB   3/15/2023  9:00 AM JOSE ALBERTO MUSE BS AMB   3/31/2023  1:00 PM JOVAN Elizabeth BS AMB   4/10/2023 11:10 AM MD GAGAN Montilla BS AMB   2023  1:00 PM [address] [place] [address] LAB 3 [facility]   4/28/2023  1:00 PM JOVAN Fernandez AMB   12/14/2023  2:00 PM NILO3JOSE ALBERTO AMB   12/14/2023  2:20 PM MD RANDOLPH Pérez AMB     Non-[facility] follow up appointment(s): refer to goals section below. CTN provided contact information for future needs. Plan for follow-up call in 5-7 days based on severity of symptoms and risk factors. Plan for next call:   Assess current symptoms including daily monitoring items. Attended follow up with Hem-Oncology  Ordered lab work completed. Goals Addressed                   This Visit's Progress       Heart Failure     Patient verbalizes understanding of self-management goals of living with Congestive Heart Failure and Treatment for recurrent/chronic AFib        2/15/23- reached Ms. Amparo Santacruz- she states that her phone at home does not work well when it rains a lot. Shared that I had also left Vm on her cell phone- she will check that. She missed her appt last week with Dr. Luis Fernando Mello. Today she reports- she feels so much better, \"I did not realize how much fluid I had on me\". This morning she weighs 210 lbs. She denies SOB-RODNEY with walking inside the home, any distance. She has soft legs, no longer tight. She states she is eating low NA and staying hydrated. She has been taking bumex 2 mg daily since discharge from the hospital on 2/6/23- she has been taking KCL 20 meq- 1 tab daily, CTN advised current order is 2 tablets of 20 meq daily- she will increase. CTN will update cardiology and ask about checking lab work for next week's appointment on 2/24. Note she is seeing Hem-onc on 2/21- Tuesday. Could get them done then- coordinate lab work with Hem-oncology. [facility]     2/8/23- left VM asking for return call. Relayed cardiology appt scheduled for later today at Dr. Eric Bates office. [facility]     2/7/23- Left VM asking for return call.    Return call from [facility] with Dr. Luis Fernando Mello- sent records via [address] [place] [address] fax per her request to: [facility] [phone]. [facility]     2/3/23- return call from Ms. Huddleston. She has two of the 3 doses of metolazone ordered- this morning her weight was down approx 4 lbs- she took 3rd dose this morning. She states her chest has no pressure, less SOB-RODNEY, some LE edema decreasing. Reminded her about low NA-hydration see below, encouraged her to continue. She has not called primary cardiology office- CTN had left msg earlier asking for return call from nurse, I will update her if she returns the call. Return call from Jaú- relayed above. Next appt is on 3/17. Shared date of DCCV and next on 4/13 for ablation. CTN will update EP. [facility]     2/1/23- received response from EP, Dr. Diego Palacios. He recommends return to hospital to treating increasing fluid, etc.    Spoke with MsShayna Yolande Gold- she was not able to get the metolazone RX. Her car is not working, son is picking up today, will get it to her around 2:30. Discussion about taking with second dose of bumex - take, wait 30 minutes, then take bumex 2 mg. She says she ordered the scale, but has not arrived. She is not sure of how much she has gained. This morning she feels \"smaller\" in her legs- when she changed pants. Shared what Dr. Diego Palacios had replied. She agreed to check in with CTN tomorrow after taking one dose of metolazone. She will call if she does not have urination to reflect desired effect, before she takes second dose in the morning. Discussion about:  Look at labels- staying low NA- reducing to lowest possible- reminded about staying under 350-400 mg per meal, less is best.    She continues to be thirsty- advised her to gently hydrate, up to 64 ounces. Use rinsing mouth, hard candy, etc to keep moist.     CTN will update cardiology-EP. [facility]     1/31/23 - spoke with Ms. Yolande Gold, just now. - she says she is breathing some better since DCCV done on 1/27/23- but is \"winded\" with walking to [place] and back (done while we were on the phone).    She said her legs are tight and swollen, abdomen is brink. She reports continued weight gain with increasing symptoms despite taking bumex 2 mg BID for past 5 days. Note Dr. Monteiro had increased dose of bumex from 1 mg daily to 2 mg daily at [address] on 1/16/23. She reports PMH of taking metolazone. Her scale is not working this morning- she had gained 5-6 lbs as of last week. She states she is eating low NA, drinking \"lots of water\". CTN reached out to primary cardiology via phone, asked for nurse to return call. Dr. Monteiro is not in the office today. Return call from nurse Yasmeen. Provided update, she will check with Dr. Vishal Bardales, (Dr. Monteiro away this week, her PA is in the hospital) and call CTN back. Return call from Yasmeen nurse, Dr. Vishal Bardales ordered:   Continue Bumex 2 mg BID- and KCl 20 meq - 2 tabs daily  take metolazone 5 mg for 3 consecutive days - she will call Friday and update their office on progress. She will assess Friday- dose moving forward of bumex and KCL. Jeremíasú states she told her to start today, provided directions for taking metolazone 1 hour before bumex taken. She got the lab work from 1/26 done and will ask provider if he wants lab work done when she updates him on Friday. CTN updated EP via CC msg, sent updated staff msg about return call from primary cardiology office. [facility]     1/17/23- Left VM asking for return call. Review of EMR shows: she followed up with PCP on 1/10/23- /86, HR98 wt- 225 lbs. Reported weak and tired. Her DIL was assisting, declined HH svcs. Reports mild RODNEY with occ HR increase, denied SOB and CP at rest.   PCP added second medication to help with insomnia- trazadone 50 mg- 1/2 tab HS PRN. PCP increased prozac to 40 mg BID. Renewed albuterol inhaler for \"reactive airway disease\". Follow up on 4/10/23. [facility]     1/6/23- spoke with Ms. Brenda Ryan- she is feeling better, tires easily but does feel it is slowly improving each day.  She feels recovered from Flu. Denies LE edema, SOB and/or chest pain. Has been checking wts- steady around 222 lbs. Monitoring BP and HR- BP steady but HR fluctuates up and down. Asked her to check to make sure she has enough amiodarone to take until she attends follow up with EP on 1/26/23. [facility]     12/29/22- spoke with Ms. Kelly Ferguson- she feels much better, tires easily. Asked her to be regularly active, but rest when she feels tired. Stay hydrated and eat for recovery- focus on low NA. Coughing less, PRN meds at home and using to control symptoms with good relief. She was seen at Cardiology, Dr. Mary Jane Gonzales office today, VS and EKG done. Shows AFib with good rate control at 80 bpm.  118/80, HR 80. Weight reported 218 lbs. She is monitoring BP and HR and daily weight at home. Reports they are good, weight is steady. She confirms continuing amiodarone 200 mg- 3 tabs TID dose until 12/30. Will start on 12/31 200 mg BID. On 1/14/21- transition to  200 daily. CTN will clarify with cardiology if she is to stop 200 mg daily or continue until seen for follow up on 1/26. If she is to continue, will need RX sent. [facility]     12/23/22- spoke with Ms. Kelly Ferguson. She reports increased cough. CTN was able to secure PRN RX for Tessalon. Advised to keep cough symptoms controlled to help with AFib control. Asked her to reach out to PCP if symptoms are not controlled by PRN meds. She is aware that she converted back and remains in AFib, explained goal of rate control. CTN clarified dosing for amiodarone with EP- discharge instructions not correct, updated in CC current med list. Amiodarone 400 mg TID for 9 days, then 200 mg BID for 14 days, then 200 mg daily. CTN asked EP about Toprol XL 25 mg BID dosing referenced in progress note, but no order written, patient does not have this medication at home. Cardiology sent in RX to pharmacy. Clarified about dose of KCL to be taken with changed dose of bumex. Continue 20 meq 2 tablets daily. Confirmed she has stopped: Diltiazem and Entresto. She is not taking imdur/isosorbide. Note: intolerance to Femara in Hem-oncology note. Discussion about how to help with Flu recovery:  reduce-treat symptoms, maintain hydration, focus on healthy foods rich with vitamin C and anti-oxidants. Be active, ambulate regularly, rest when tired. Pt will remain out of the hospital or ER for remainder of [facility] [employer]              Post Hospitalization     Attends follow-up appointments as directed. 2/15/23-   PCP- Dr. Olivia Escudero-   Cardiology- Primary- Dr. April Patton- Ms. Caity Benítez did not attend 2/8 appt- states she did not receive message from CTN- today she is asking if she could see Dr. Aldair Castellanos, she met her in the hospital on the 2/4-2/6 admission- she states it is good to have her providers in the same network. CTN reached out to her  to see if there could be an appointment coordinated on same day with EP. EP-  Dr. Joyce Moser- seeing Tone Rowe NP on 2/24. Hem-Oncology- Dr. Chance West- 2/21 at 10am.          Note- CTN reached out to cardiology and coordinated lab work to be done same time as hem-oncology lab work if done on 2/21 OV. [facility]     2/7/23-  Dr. Olivia Escudero- 2/13 at 10:30 am.     Dr. April Patton- CTN secured follow up for 2/8 at 2:45. Has one also on 3/17 at 10 am. Left in place for now. Left VM for Ms. Chadwick Notice to call office if cannot attend. Dr. Joyce Moser- see below. [facility]     2/3/23-   PCP  Dr. Olivia Escudero-    4/10/2023 11:10 AM Shaina Teixeira MD [facility]   Cardiology- Primary Dr. April Patton- next 3/17. EP- Dr. Joyce Moser-    note- 4/13 is AFib ablation procedure.    2/24/2023  9:00 AM JOVAN Croft   3/15/2023  9:00 AM JOSE ALBERTO MUSE   3/31/2023  1:00 PM JOVAN Croft         4/13/2023  1:00 PM [facility] CATH LAB 3 [facility]   4/28/2023  1:00 PM JOVAN Croft [facility]       1/6/23- PCP- Dr. Jef Meléndez- 1/10 10:30 am  attended-[facility]   Cardiology- Primary- Dr. Jesus Donovan- appt on 12/27 at 9 am; She rescheduled to 1/16 attended-[facility]      EP- Dr. Tristan Porter- 1/26/23-1 pm- will determine plan for DCCV-AFib. [facility]     12/23/22-   PCP- Dr. Konstantin Last reached out to secure [facility] appt. Cardiology- Primary- Dr. Jesus Donovan- has appt on 12/27 at 9 am. States she forgot, will call tomorrow to reschedule. EP- Dr. Tristan Porter- has visit on 12/29 for EKG. Attended-[facility]   CTN will clarify with provider, if needs to keep since she is seeing primary card on 12/27.         [employer]

## 2023-02-16 ENCOUNTER — DOCUMENTATION ONLY (OUTPATIENT)
Dept: INTERNAL MEDICINE CLINIC | Age: 73
End: 2023-02-16

## 2023-02-16 ENCOUNTER — DOCUMENTATION ONLY (OUTPATIENT)
Dept: CARDIOLOGY CLINIC | Age: 73
End: 2023-02-16

## 2023-02-16 DIAGNOSIS — Z79.899 ON AMIODARONE THERAPY: ICD-10-CM

## 2023-02-16 DIAGNOSIS — I48.91 ATRIAL FIBRILLATION, UNSPECIFIED TYPE (HCC): Primary | ICD-10-CM

## 2023-02-16 DIAGNOSIS — I25.5 ISCHEMIC CARDIOMYOPATHY: ICD-10-CM

## 2023-02-16 NOTE — PROGRESS NOTES
Patient was scheduled for hospital discharge follow up appointment on 2/13/23 but cancelled the appointment. She has HTN, CAD s/p MI in 2006 and 2011, CHF (EF 25-30% in 12/22), a-fib with hx of DCCV on 12/29/22 and again on 1/27/23, hx of ICD 9/21/16, R breast cancer s/p lumpectomy on 7/14/22, depression with anxiety, and obesity s/p gastric bypass in 2006. Hospitalized at [facility] from 2/4/-2/6/23 for ventricular tachycardia, CHF exacerbation, and hypokalemia with K+ 3.0. Presented with SOB and ICD firing. Cardiology consulted; determined ICD shocks due to VT verus rapid a-fib. Continued on amiodarone. K+ repleted, treated with IV diuretics. Metolazone discontinued. Scheduled for a-fib alblation with Dr. Yossi Campos on 4/13/23.

## 2023-02-22 ENCOUNTER — TELEPHONE (OUTPATIENT)
Dept: CARDIOLOGY CLINIC | Age: 73
End: 2023-02-22

## 2023-02-22 NOTE — TELEPHONE ENCOUNTER
----- Message from Svitlana Luna NP sent at 2/22/2023  7:30 AM EST -----  TSH & LFTs without significant acute abnormalities. OK to continue amiodarone as previously prescribed.     Future Appointments  2/24/2023  8:40 AM    MD RANDOLPH Jeronimo              BS AMB  2/28/2023  12:45 PM   [facility] 6                  [facility]             [facility]   3/15/2023  9:00 AM    JOSE ALBERTO MUSE AMB  3/27/2023  8:40 AM    JOVAN Bah AMB  4/10/2023  11:10 AM   MD GAGAN Fisher               BS AMB  4/13/2023  1:00 PM    [facility] CATH LAB 3             [facility]              [facility]   4/28/2023  1:00 PM    JOVAN Bah AMB  12/14/2023 2:00 PM    JOSE ALBERTO CHRISTIANSON AMB  12/14/2023 2:20 PM    MD RANDOLPH Prescott              BS AMB

## 2023-02-24 ENCOUNTER — OFFICE VISIT (OUTPATIENT)
Dept: CARDIOLOGY CLINIC | Age: 73
End: 2023-02-24
Payer: MEDICARE

## 2023-02-24 VITALS
BODY MASS INDEX: 37.05 KG/M2 | HEART RATE: 51 BPM | RESPIRATION RATE: 18 BRPM | SYSTOLIC BLOOD PRESSURE: 126 MMHG | HEIGHT: 64 IN | DIASTOLIC BLOOD PRESSURE: 60 MMHG | OXYGEN SATURATION: 96 % | WEIGHT: 217 LBS

## 2023-02-24 DIAGNOSIS — I48.19 PERSISTENT ATRIAL FIBRILLATION (HCC): ICD-10-CM

## 2023-02-24 DIAGNOSIS — I48.91 ATRIAL FIBRILLATION, UNSPECIFIED TYPE (HCC): Primary | ICD-10-CM

## 2023-02-24 DIAGNOSIS — I50.22 SYSTOLIC CHF, CHRONIC (HCC): ICD-10-CM

## 2023-02-24 DIAGNOSIS — I25.5 ISCHEMIC CARDIOMYOPATHY: ICD-10-CM

## 2023-02-24 DIAGNOSIS — I47.20 V-TACH: ICD-10-CM

## 2023-02-24 DIAGNOSIS — I25.10 CORONARY ARTERY DISEASE INVOLVING NATIVE CORONARY ARTERY OF NATIVE HEART WITHOUT ANGINA PECTORIS: ICD-10-CM

## 2023-02-24 RX ORDER — SACUBITRIL AND VALSARTAN 24; 26 MG/1; MG/1
1 TABLET, FILM COATED ORAL 2 TIMES DAILY
Qty: 180 TABLET | Refills: 3 | Status: SHIPPED | OUTPATIENT
Start: 2023-02-24

## 2023-02-24 NOTE — PROGRESS NOTES
Marybel Ford MD, MS, [address] [facility]            HISTORY OF PRESENT ILLNESS:    Steff Oseguera is a 81 y.o. female here for hospital FU. CAD sp PCI RCA, LAD. Cardiac cath in 09/2022 at [facility] showed  in OM, patent LAD stent, & mild ISR in proximal RCA. MI 11/2011, also 2006 or 2007. Ischemic CMP, EF 25-30%, moderate to severe MR. Recent admission for ICD shocks, [facility] 2/2022. Ventricular flutter in setting of low K, metolazone. K repleted, metolazone stopped. Atrial fibrillation DCCV AFIB 12/2022, 1/27/2023. Loaded with amiodarone, now on maintenance dose. Atrial fibrillation ablation planned with Dr. Christy Loyola 4/2023. Admitted 12/2022 with influenza, acute hypoxic respiratory failure, acute on chronic CHF, AF with RVR. S/p [employer] S-ICD in place of transvenous system 09/21/2016. Initial ICD implanted 8/24/16. Transvenous RV lead displacement x 2 due to large breast size, repositioned once. Removed entire system d/t to high recurrent risk of perforation. Followed by Dr. Lucille Nieto, Dr. Christy Loyola EP. History of gastric bypass, query poor absorption of medications. Was on entresto stopped during FLU admission. Will restart entresto. Start jardiance. Weight down @ 117. Feels well. No SOB, edema. Labs 2/21 - K 3.7, creatinine 1.28. LFTS TSH wnl.       SUMMARY:   Problem List  Date Reviewed: 2/24/2023            Codes Class Noted    V-tach ICD-10-CM: I47.20  ICD-9-CM: 427.1  2/4/2023        Encounter for cardioversion procedure ICD-10-CM: Z01.89  ICD-9-CM: V72.85  1/27/2023    Overview Signed 1/27/2023  2:43 PM by Ryan Manjarrez MD     1/27/23 360 J CV to NSR             Atherosclerosis of native coronary artery of native heart with angina pectoris ([facility] [address] ICD-10-CM: I25.119  ICD-9-CM: 414.01, 413.9  1/10/2023        History of cardioversion ICD-10-CM: Z98.890  ICD-9-CM: V15.1  12/19/2022        A-fib ([facility] ICD-10-CM: I48.91  ICD-9-CM: 427.31  12/17/2022    Overview Signed 2/5/2023 11:47 AM by Yusuf Huang MD     S/p cardioversion on 1/27/23. On Eliquis 5 mg BID. Persistent atrial fibrillation (HCC) ICD-10-CM: I48.19  ICD-9-CM: 427.31  10/11/2022        Malignant neoplasm of right breast in female, estrogen receptor positive ([facility] [address] ICD-10-CM: C50.911, Z17.0  ICD-9-CM: 174.9, V86.0  6/15/2022    Overview Addendum 8/1/2022  5:23 PM by Lisa Del Valle     06/08/2022: RIGHT breast core bx. PATH: IMC with ductal and lobular features, 10mm, grade 1, ER+(90%), MD-(<1), HER2-, Ki-67(12%). LCIS: present. Mammaprint: LOW RISK, Luminal type A, +0.226.    07/14/2022: RIGHT breast lumpectomy. PATH: IMC with ductal and lobular features, 26mm, grade 1, negative margins. Pathologic stage: pT2, pNX.  RIGHT breast deep margin, lumpectomy: Benign breast tissue. RIGHT breast medial margin, lumpectomy: Benign breast tissue. RIGHT breast anterior medial margin, lumpectomy: Fibrocystic changes, duct ectasia. Generalized anxiety disorder ICD-10-CM: F41.1  ICD-9-CM: 300.02  10/14/2019        Mild intermittent asthma without complication M-37-KN: R70.71  ICD-9-CM: 493.90  5/19/2017        Primary osteoarthritis of both knees ICD-10-CM: M17.0  ICD-9-CM: 715.16  2/28/2017    Overview Signed 6/3/2018  4:40 PM by MD Dr. Thelma Thomas.  5/30/18: bilateral corticosteroid injections to both knees             S/P ICD (internal cardiac defibrillator) procedure ICD-10-CM: Z95.810  ICD-9-CM: V45.02  8/24/2016    Overview Addendum 9/15/2021  3:11 PM by Cynthea Cockayne, MD     [employer] ICD single lead implant with DFT < 25 J 8/24/2016- removed 9/19 and [employer] sub cutaneous ICD placed 9/21/16  9/15/21 S ICD change             Chronic insomnia ICD-10-CM: F51.04  ICD-9-CM: 780.52  8/4/2016        Morbid obesity with BMI of 40.0-44.9, adult [facility]) ICD-10-CM: E66.01, Z68.41  ICD-9-CM: 278.01, V85.41  6/14/2016        Osteoporosis ICD-10-CM: M81.0  ICD-9-CM: 733.00 2/3/2016    Overview Signed 2/3/2016  8:04 AM by Adan Barker MD     DEXA 2/1/16: L femoral neck T -2.5, L total hip -2.4, LS spine T -2.2             Cardiomyopathy ([address] ICD-10-CM: I42.9  ICD-9-CM: 425.4  10/6/2015    Overview Signed 10/14/2019  4:05 PM by Adan Barker MD     Echo 7/1/15: EF 30-35%, dilated LV, LAE               CAD (coronary artery disease) ICD-10-CM: I25.10  ICD-9-CM: 414.00  7/9/2015    Overview Addendum 2/5/2023 11:52 AM by MD Dr. Daniella Mcintosh NP [facility]). Hx of MI twice in 3/22/03 & 11/26/11, multiple stents. Had  AMRIT mid RCA 11/26/11, Cath 8/21: RI with occluded distal LCx, distal LAD to RI collaterals             Systolic CHF, chronic (HCC) ICD-10-CM: I50.22  ICD-9-CM: 428.22, 428.0  7/9/2015    Overview Addendum 2/5/2023 11:51 AM by MD Dr. Daniella Mcintosh NP. Echo 2/2021: EF 35% , mild TR, mod MR. S/p AICD placement in 9/21 with Dr. Huey Cotter and subsequent lead revisions and procedures due to non-healing mid-sternal incision. 9/23/22: on Entresto 97/103 BID and Bumex 2 mg BID. 1/16/23: Entresto on hold due to hypotension. Had hair loss on Coreg and Bystolic and hypotension with spironolactone. Hyperlipidemia ICD-10-CM: E78.5  ICD-9-CM: 272.4  7/9/2015    Overview Signed 2/5/2023 11:55 AM by MD Dr. Brendan Mcintosh. Statin-intolerant. 1/16/23: Could not tolerate Zetia. Praluent brought LDL<50 but stopped due to leg pains and cramping. Plan is to rx with Repatha or Dewanda Clifton             Mitral valve regurgitation ICD-10-CM: I34.0  ICD-9-CM: 424.0  7/9/2015    Overview Addendum 7/13/2015  6:43 AM by Adan Barker MD     Echo 1/13/15:  Mod to severe MR with marked LAE, 7/1/15: EF 30-35%, dilated LV, LAE, mild to mod MR             History of MI (myocardial infarction) ICD-10-CM: I25.2  ICD-9-CM: 412  7/9/2015    Overview Signed 7/9/2015  8:12 PM by Adan Barker MD     2006 and [facility] [place] 11/26/2011             HTN (hypertension) ICD-10-CM: I10  ICD-9-CM: 401.9  6/30/2015        History of gastric bypass ICD-10-CM: Z98.84  ICD-9-CM: V45.86  6/30/2015    Overview Signed 7/13/2015  6:37 AM by Mak Stearns MD     2006, revision 2009             Moderate episode of recurrent major depressive disorder ([facility] ICD-10-CM: F33.1  ICD-9-CM: 296.32  6/30/2015           Current Outpatient Medications on File Prior to Visit   Medication Sig    metoprolol succinate (TOPROL-XL) 25 mg XL tablet TAKE 1 TABLET BY MOUTH TWICE A DAY    amiodarone (CORDARONE) 200 mg tablet Take 1 Tablet by mouth daily. bumetanide (BUMEX) 1 mg tablet Take 2 mg by mouth daily. Repatha SureClick pen injection INJECT 140 MG SUBCUTANEOUSLY EVERY 14 DAYS    FLUoxetine (PROzac) 40 mg capsule Take 1 Capsule by mouth two (2) times a day. albuterol (PROVENTIL HFA, VENTOLIN HFA, PROAIR HFA) 90 mcg/actuation inhaler TAKE 2 PUFFS BY INHALATION EVERY FOUR HOURS AS NEEDED FOR WHEEZING OR SHORTNESS OF BREATH.    zolpidem (AMBIEN) 10 mg tablet TAKE 1 TABLET BY MOUTH NIGHTLY AS NEEDED FOR SLEEP. MAX DAILY AMOUNT: 10 MG. apixaban (ELIQUIS) 5 mg tablet Take 1 Tablet by mouth two (2) times a day. Prescribed by Dr. Jermaine Whyte. potassium chloride (Klor-Con M20) 20 mEq tablet TAKE 2 TABLETS BY MOUTH EVERY DAY    aspirin delayed-release 81 mg tablet Take 81 mg by mouth as needed for Pain. psyllium (METAMUCIL) powd Take  by mouth. 2 tsp daily    cholecalciferol, VITAMIN D3, (VITAMIN D3) 5,000 unit tab tablet Take 10,000 Units by mouth daily. OTHER Complete multi formula, bariatric advantage    magnesium 250 mg tab Take 1 Tab by mouth daily. ferrous sulfate 325 mg (65 mg iron) tablet Take  by mouth daily (before breakfast). CALCIUM PO Take 600 mg by mouth daily. biotin 10,000 mcg cap Take  by mouth daily. (Patient not taking: Reported on 2/24/2023)     No current facility-administered medications on file prior to visit. CARDIOLOGY STUDIES TO DATE:  No results found for this visit on 02/24/23.   08/13/21    CARDIAC PROCEDURE 08/13/2021 8/13/2021    Conclusion  Findings:  1)Normal LVEDP  2)Severe native 1 vessel CAD with  of ramus intermedius. LCx  Is small with occluded distal LCx. OM2 and distal ramus fill from collaterals from LAD, diagonal and RCA. 3)Limited wire probing suggests no micro channel in Ramus ISR . Proximal cap start before the stent    Access: Right radial no issues  Contrast 40 cc    Recommendations  1)Continue GDMT and weight loss. If dyspnea continue may consider Ramus  PCI. Uncertain if benefit will be substantial.  2)GDMT for CAD    Signed by: Jerrell Clark MD on 8/13/2021 10:34 AM     12/17/22    ECHO ADULT COMPLETE 12/18/2022 12/18/2022    Interpretation Summary    Left Ventricle: Severely reduced left ventricular systolic function with a visually estimated EF of 25 - 30%. Left ventricle is moderately dilated. Normal wall thickness. Severe global hypokinesis present. Left Atrium: Left atrium is moderately dilated. Aortic Valve: Mildly calcified cusp. Mild stenosis of the aortic valve. AV mean gradient is 13 mmHg. Mitral Valve: Moderate to severe regurgitation. Tricuspid Valve: Mild regurgitation. The estimated RVSP is 46 mmHg. Contrast used: Definity. Signed by: Roberta Quick MD on 12/18/2022 11:39 AM     08/09/21    NUCLEAR CARDIAC STRESS TEST 08/13/2021 8/16/2021    Interpretation Summary  · SPECT: Left ventricular function post-stress was abnormal. Calculated ejection fraction is 22%. There is no evidence of transient ischemic dilation (TID). The TID ratio is 0.86. · Baseline ECG: Normal sinus rhythm. · SPECT: Myocardial perfusion imaging defect 1: There is a defect that is large in size with a severe reduction in uptake present in the lateral location(s) that is partially reversible. There is abnormal wall motion in the defect area.  The defect appears to be infarction with esther-infarct ischemia. Perfusion defect was visually and quantitatively present. · SPECT: Left ventricular perfusion is probably abnormal. Myocardial perfusion imaging supports a high risk stress test.    Signed by: Roxanna Mack MD on 8/13/2021  8:26 AM         CARDIAC ROS:   positive for dyspnea    Past Medical History:   Diagnosis Date    Acute right ankle pain 06/08/2018 5/29/18: X-ray showed possible right ankle sprain. 6/6/18: saw Dr. Steffanie Farias ([facility][1]), diagnosed with peroneal tendonitis. Prescribed an ASO    Asthma     Atrial fibrillation ([address]     Cardiomyopathy ([address]     Coronary artery disease 2008    s/p RCA stent (AMRIT) on 11/26/11    Depression with anxiety     2011    DVT (deep venous thrombosis) ([address] 07/27/2010    Family history of early CAD     GERD (gastroesophageal reflux disease)     H/O gastric bypass 2006    Revision in 2009    Hepatitis C antibody test positive     Does not have chronic hep C (labs 10/6/15: neg HCV RNA)     HTN (hypertension) 07/27/2010    Hyperlipidemia 07/27/2010    Incontinence of feces 09/03/2018    Dr. Salina Hermosillo. 8/20/18: Improving with pelvic physical therapy and psyllium husk treatment. Saw Dr. Jann Morejon who suggested PT first. MR defecography showed pelvic floor weakness with pelvic descensus, small anterior  Rectocele, and vaginal prolapse. To have pelvic PT weekly for 8 wks and to see Dr. Jann Morejon again in Oct 2018.     Joint pain 07/27/2010    MI (myocardial infarction) ([address] 07/27/2010    Mitral valve regurgitation     Mild to moderate    Morbid obesity ([address] 07/27/2010    Myocardial infarction [facility]) 2006 and 2011    Dr. Juju Guerrero    Psychiatric disorder     PUD (peptic ulcer disease)     gi bleed 2008; ulcer n gastric bypass pouch    Urinary incontinence, stress 07/27/2010       Family History   Problem Relation Age of Onset    Dementia Mother     Cancer Mother         Colon    Alzheimer's Disease Mother     Cancer Father Testical and stomach cancer    Heart Disease Father         Triple by pass    Hypertension Father         High blood pressure    Heart Disease Sister         Aortic replacement    Stroke Sister         Mini strokes    Stroke Sister     Heart Attack Brother     Anesth Problems Neg Hx        Social History     Socioeconomic History    Marital status:      Spouse name: Not on file    Number of children: Not on file    Years of education: Not on file    Highest education level: Not on file   Occupational History    Not on file   Tobacco Use    Smoking status: Former     Packs/day: 0.00     Years: 30.00     Pack years: 0.00     Types: Cigarettes     Start date: 1970     Quit date: 2004     Years since quittin.7    Smokeless tobacco: Never   Vaping Use    Vaping Use: Never used   Substance and Sexual Activity    Alcohol use: No     Alcohol/week: 0.0 standard drinks    Drug use: No     Types: Prescription    Sexual activity: Not Currently     Partners: Female     Birth control/protection: Condom, Pill, Diaphragm, I.U.D., Sponge, None   Other Topics Concern    Not on file   Social History Narrative    ** Merged History Encounter **          Social Determinants of Health     Financial Resource Strain: Not on file   Food Insecurity: Not on file   Transportation Needs: Not on file   Physical Activity: Not on file   Stress: Not on file   Social Connections: Not on file   Intimate Partner Violence: Not on file   Housing Stability: Not on file        GENERAL ROS:  A comprehensive review of systems was negative except for that written in the HPI.     Visit Vitals  /60 (BP 1 Location: Left upper arm, BP Patient Position: Sitting, BP Cuff Size: Large adult)   Pulse (!) 51   Resp 18   Ht 5' 4\" (1.626 m)   Wt 98.4 kg (217 lb)   SpO2 96%   BMI 37.25 kg/m²     Vitals:    23 0836   BP: 126/60   Pulse: (!) 51   Resp: 18   SpO2: 96%   Weight: 98.4 kg (217 lb)   Height: 5' 4\" (1.626 m)        Wt Readings from Last 3 Encounters:   02/24/23 98.4 kg (217 lb)   02/06/23 104.5 kg (230 lb 6.1 oz)   01/27/23 105.7 kg (233 lb)            BP Readings from Last 3 Encounters:   02/24/23 126/60   02/06/23 112/62   01/27/23 (!) 110/59       PHYSICAL EXAM  General appearance: alert, cooperative, no distress, appears stated age  Neck: supple, symmetrical, trachea midline, no adenopathy, thyroid: not enlarged, symmetric, no tenderness/mass/nodules, no carotid bruit, and no JVD  Lungs: clear to auscultation bilaterally  Heart: regular rate and rhythm, S1, S2 normal, no murmur, click, rub or gallop  Extremities: extremities normal, atraumatic, no cyanosis or edema    Lab Results   Component Value Date/Time    Cholesterol, total 220 (H) 11/28/2022 10:48 AM    Cholesterol, total 205 (H) 08/12/2022 11:25 AM    Cholesterol, total 150 07/30/2021 11:25 AM    Cholesterol, total 131 08/10/2020 11:28 AM    Cholesterol, total 139 10/14/2019 12:00 AM    HDL Cholesterol 72 11/28/2022 10:48 AM    HDL Cholesterol 68 08/12/2022 11:25 AM    HDL Cholesterol 63 07/30/2021 11:25 AM    HDL Cholesterol 66 08/10/2020 11:28 AM    HDL Cholesterol 75 10/14/2019 12:00 AM    LDL, calculated 130 (H) 11/28/2022 10:48 AM    LDL, calculated 114 (H) 08/12/2022 11:25 AM    LDL, calculated 70 07/30/2021 11:25 AM    LDL, calculated 49 08/10/2020 11:28 AM    LDL, calculated 43 10/14/2019 12:00 AM    LDL, calculated 60 12/19/2018 11:43 AM    LDL, calculated 132 (H) 06/13/2018 01:37 PM    LDL, calculated 140 (H) 12/22/2017 11:29 AM    Triglyceride 102 11/28/2022 10:48 AM    Triglyceride 131 08/12/2022 11:25 AM    Triglyceride 92 07/30/2021 11:25 AM    Triglyceride 78 08/10/2020 11:28 AM    Triglyceride 103 10/14/2019 12:00 AM       Lab Results   Component Value Date/Time    WBC 11.0 02/05/2023 12:27 PM    Hemoglobin (POC) 14.3 11/26/2011 09:50 PM    HGB 10.3 (L) 02/05/2023 12:27 PM    Hematocrit (POC) 42 11/26/2011 09:50 PM    HCT 35.6 02/05/2023 12:27 PM    PLATELET 870 02/05/2023 12:27 PM    MCV 77.2 (L) 02/05/2023 12:27 PM        Lab Results   Component Value Date/Time    Cholesterol, total 220 (H) 11/28/2022 10:48 AM    Cholesterol, total 205 (H) 08/12/2022 11:25 AM    Cholesterol, total 150 07/30/2021 11:25 AM    Cholesterol, total 131 08/10/2020 11:28 AM    Cholesterol, total 139 10/14/2019 12:00 AM    HDL Cholesterol 72 11/28/2022 10:48 AM    HDL Cholesterol 68 08/12/2022 11:25 AM    HDL Cholesterol 63 07/30/2021 11:25 AM    HDL Cholesterol 66 08/10/2020 11:28 AM    HDL Cholesterol 75 10/14/2019 12:00 AM    LDL, calculated 130 (H) 11/28/2022 10:48 AM    LDL, calculated 114 (H) 08/12/2022 11:25 AM    LDL, calculated 70 07/30/2021 11:25 AM    LDL, calculated 49 08/10/2020 11:28 AM    LDL, calculated 43 10/14/2019 12:00 AM    LDL, calculated 60 12/19/2018 11:43 AM    LDL, calculated 132 (H) 06/13/2018 01:37 PM    LDL, calculated 140 (H) 12/22/2017 11:29 AM    Triglyceride 102 11/28/2022 10:48 AM    Triglyceride 131 08/12/2022 11:25 AM    Triglyceride 92 07/30/2021 11:25 AM    Triglyceride 78 08/10/2020 11:28 AM    Triglyceride 103 10/14/2019 12:00 AM        ASSESSMENT  Diagnoses and all orders for this visit:    1. Atrial fibrillation, unspecified type ([address]    2. Coronary artery disease involving native coronary artery of native heart without angina pectoris    3. Ischemic cardiomyopathy    4. Persistent atrial fibrillation ([address]    5. Systolic CHF, chronic (HCC)  -     sacubitriL-valsartan (Entresto) 24-26 mg tablet; Take 1 Tablet by mouth two (2) times a day. -     empagliflozin (Jardiance) 10 mg tablet; Take 1 Tablet by mouth daily. 6. V-tach       Encounter Diagnoses   Name Primary?     Atrial fibrillation, unspecified type ([address] Yes    Coronary artery disease involving native coronary artery of native heart without angina pectoris     Ischemic cardiomyopathy     Persistent atrial fibrillation (HCC)     Systolic CHF, chronic ([address]     V-tach      Orders Placed This Encounter    sacubitriL-valsartan (Entresto) 24-26 mg tablet    empagliflozin (Jardiance) 10 mg tablet         Plan    Start entresto, jardiance  . Follow-up and Dispositions    Return in about 3 months (around 5/24/2023). Maximiliano Cline MD  2/24/2023        [address]  [address]  [facility], [address] [place] [address]  [phone] (F)    [address] [facility]  [address]  [phone] (P)  [phone] (F)    ATTENTION:   This medical record was transcribed using an electronic medical records/speech recognition system. Although proofread, it may and can contain electronic, spelling and other errors. Corrections may be executed at a later time. Please feel free to contact us for any clarifications as needed.

## 2023-02-28 ENCOUNTER — HOSPITAL ENCOUNTER (OUTPATIENT)
Dept: MAMMOGRAPHY | Age: 73
Discharge: HOME | End: 2023-02-28
Attending: INTERNAL MEDICINE
Payer: MEDICARE

## 2023-02-28 ENCOUNTER — PATIENT OUTREACH (OUTPATIENT)
Dept: CASE MANAGEMENT | Age: 73
End: 2023-02-28

## 2023-02-28 DIAGNOSIS — I50.22 SYSTOLIC CHF, CHRONIC (HCC): ICD-10-CM

## 2023-02-28 DIAGNOSIS — I48.91 ATRIAL FIBRILLATION, UNSPECIFIED TYPE (HCC): ICD-10-CM

## 2023-02-28 DIAGNOSIS — C50.911 MALIGNANT NEOPLASM OF RIGHT BREAST IN FEMALE, ESTROGEN RECEPTOR POSITIVE, UNSPECIFIED SITE OF BREAST (HCC): ICD-10-CM

## 2023-02-28 DIAGNOSIS — I25.5 ISCHEMIC CARDIOMYOPATHY: ICD-10-CM

## 2023-02-28 DIAGNOSIS — Z17.0 MALIGNANT NEOPLASM OF RIGHT BREAST IN FEMALE, ESTROGEN RECEPTOR POSITIVE, UNSPECIFIED SITE OF BREAST (HCC): ICD-10-CM

## 2023-02-28 DIAGNOSIS — I10 PRIMARY HYPERTENSION: Primary | ICD-10-CM

## 2023-02-28 DIAGNOSIS — I25.10 CORONARY ARTERY DISEASE INVOLVING NATIVE CORONARY ARTERY OF NATIVE HEART WITHOUT ANGINA PECTORIS: ICD-10-CM

## 2023-02-28 DIAGNOSIS — Z98.890 HISTORY OF LUMPECTOMY OF RIGHT BREAST: ICD-10-CM

## 2023-02-28 PROCEDURE — 77061 BREAST TOMOSYNTHESIS UNI: CPT

## 2023-02-28 NOTE — PROGRESS NOTES
Care Transitions Follow Up Call    Challenges to be reviewed by the provider   Additional needs identified to be addressed with provider: yes    Attended cardiology OV on 2/24- restarted entresto 24-26 mg BID, new Jardiance 10 mg daily. CTN not able to reach patient today to complete follow up outreach assessment. Method of communication with provider : staff message    Care Transition Nurse (CTN) contacted the patient by telephone to follow up after admission on 12/17/22 and 2/4/23. Left VM asking for return call. Addressed changes since last contact: refer to above yellow box and goals section    [facility] follow up appointment(s):   Future Appointments   Date Time Provider Denis Hurst   2/28/2023 12:45 PM [facility] [address] [facility]   3/15/2023  9:00 AM REMOTE1, JOSE ALBERTO DICKSON BS AMB   3/27/2023  8:40 AM JOVAN Watson BS AMB   4/10/2023 11:10 AM MD GAGAN Lemus BS AMB   4/13/2023  1:00 PM [facility] CATH LAB 3 [facility]   4/28/2023  1:00 PM JOVAN Watson AMB   7/7/2023 11:00 AM MD RANDOLPH Palacio BS AMB   12/14/2023  2:00 PM PACEMAKER3, JOSE ALBERTO DICKSON BS AMB   12/14/2023  2:20 PM MD RANDOLPH Kapadia BS AMB     Non-[facility] follow up appointment(s): refer to goals section     CTN provided contact information for future needs. Plan for follow-up call in 3-5 days based on severity of symptoms and risk factors. Plan for next call: complete follow up outreach assessment. Goals Addressed                   This Visit's Progress       Heart Failure     Patient verbalizes understanding of self-management goals of living with Congestive Heart Failure and Treatment for recurrent/chronic AFib        2/28/23- Left VM asking for return call. Review of EMR shows: lab work done on 2/21- OV with cardiology on 2/24- VSS- 126/60, HR 51- weight 217 lbs-pt reports down. Cont Bumex 2 mg daily, KCL 20 meq-2 tabs daily.    Restarted Patsy Lucas 24-26 mg BID, new Jardiance 10 mg daily. [facility]     2/15/23- reached Ms. Ruddy Chow- she states that her phone at home does not work well when it rains a lot. Shared that I had also left Vm on her cell phone- she will check that. She missed her appt last week with Dr. Milo Markham. Today she reports- she feels so much better, \"I did not realize how much fluid I had on me\". This morning she weighs 210 lbs. She denies SOB-RODNEY with walking inside the home, any distance. She has soft legs, no longer tight. She states she is eating low NA and staying hydrated. She has been taking bumex 2 mg daily since discharge from the hospital on 2/6/23- she has been taking KCL 20 meq- 1 tab daily, CTN advised current order is 2 tablets of 20 meq daily- she will increase. CTN will update cardiology and ask about checking lab work for next week's appointment on 2/24. Note she is seeing Hem-onc on 2/21- Tuesday. Could get them done then- coordinate lab work with Hem-oncology. [facility]     2/8/23- left  asking for return call. Relayed cardiology appt scheduled for later today at Dr. Concepción Lema office. [facility]     2/7/23- Left  asking for return call. Return call from [facility] with Dr. Milo Markham- sent records via [address] [place] [address] fax per her request to: Teresa Adair [phone]. [facility]     2/3/23- return call from Ms. Huddleston. She has two of the 3 doses of metolazone ordered- this morning her weight was down approx 4 lbs- she took 3rd dose this morning. She states her chest has no pressure, less SOB-RODNEY, some LE edema decreasing. Reminded her about low NA-hydration see below, encouraged her to continue. She has not called primary cardiology office- CTN had left msg earlier asking for return call from nurse, I will update her if she returns the call. Return call from [facility]- relayed above. Next appt is on 3/17. Shared date of DCCV and next on 4/13 for ablation. CTN will update EP. [facility]     2/1/23- received response from EP, Dr. Edilma Dixon.  He recommends return to hospital to treating increasing fluid, etc.    Spoke with Ms. Tati Dozier- she was not able to get the metolazone RX. Her car is not working, son is picking up today, will get it to her around 2:30. Discussion about taking with second dose of bumex - take, wait 30 minutes, then take bumex 2 mg. She says she ordered the scale, but has not arrived. She is not sure of how much she has gained. This morning she feels \"smaller\" in her legs- when she changed pants. Shared what Dr. Dione Gan had replied. She agreed to check in with CTN tomorrow after taking one dose of metolazone. She will call if she does not have urination to reflect desired effect, before she takes second dose in the morning. Discussion about:  Look at labels- staying low NA- reducing to lowest possible- reminded about staying under 350-400 mg per meal, less is best.    She continues to be thirsty- advised her to gently hydrate, up to 64 ounces. Use rinsing mouth, hard candy, etc to keep moist.     CTN will update cardiology-EP. [facility]     1/31/23 - spoke with Ms. Tati Dozier, just now. - she says she is breathing some better since DCCV done on 1/27/23- but is \"winded\" with walking to [place] and back (done while we were on the phone). She said her legs are tight and swollen, abdomen is brink. She reports continued weight gain with increasing symptoms despite taking bumex 2 mg BID for past 5 days. Note Dr. Dale De La Cruz had increased dose of bumex from 1 mg daily to 2 mg daily at [address] on 1/16/23. She reports PMH of taking metolazone. Her scale is not working this morning- she had gained 5-6 lbs as of last week. She states she is eating low NA, drinking \"lots of water\". CTN reached out to primary cardiology via phone, asked for nurse to return call. Dr. Dale De La Cruz is not in the office today. Return call from nurse Yasmeen. Provided update, she will check with Dr. Mirna Long, (Dr. Dale De La Cruz away this week, her PA is in the hospital) and call CTN back. Return call from Yasmeen, nurse, Dr. Mirna Long ordered:   Continue Bumex 2 mg BID- and KCl 20 meq - 2 tabs daily  take metolazone 5 mg for 3 consecutive days - she will call Friday and update their office on progress. She will assess Friday- dose moving forward of bumex and KCL. Rod Bentley states she told her to start today, provided directions for taking metolazone 1 hour before bumex taken. She got the lab work from 1/26 done and will ask provider if he wants lab work done when she updates him on Friday. CTN updated EP via CC msg, sent updated staff msg about return call from primary cardiology office. [facility]     1/17/23- Left VM asking for return call. Review of EMR shows: she followed up with PCP on 1/10/23- /86, HR98 wt- 225 lbs. Reported weak and tired. Her DIL was assisting, declined HH svcs. Reports mild RODNEY with occ HR increase, denied SOB and CP at rest.   PCP added second medication to help with insomnia- trazadone 50 mg- 1/2 tab HS PRN. PCP increased prozac to 40 mg BID. Renewed albuterol inhaler for \"reactive airway disease\". Follow up on 4/10/23. [facility]     1/6/23- spoke with Ms. Tati Dozier- she is feeling better, tires easily but does feel it is slowly improving each day. She feels recovered from Flu. Denies LE edema, SOB and/or chest pain. Has been checking wts- steady around 222 lbs. Monitoring BP and HR- BP steady but HR fluctuates up and down. Asked her to check to make sure she has enough amiodarone to take until she attends follow up with EP on 1/26/23. [facility]     12/29/22- spoke with Ms. Tati Dozier- she feels much better, tires easily. Asked her to be regularly active, but rest when she feels tired. Stay hydrated and eat for recovery- focus on low NA. Coughing less, PRN meds at home and using to control symptoms with good relief. She was seen at Cardiology, Dr. Regan Rangel office today, VS and EKG done. Shows AFib with good rate control at 80 bpm.  118/80, HR 80.   Weight reported 218 lbs. She is monitoring BP and HR and daily weight at home. Reports they are good, weight is steady. She confirms continuing amiodarone 200 mg- 3 tabs TID dose until 12/30. Will start on 12/31 200 mg BID. On 1/14/21- transition to  200 daily. CTN will clarify with cardiology if she is to stop 200 mg daily or continue until seen for follow up on 1/26. If she is to continue, will need RX sent. [facility]     12/23/22- spoke with Ms. Nathan Adair. She reports increased cough. CTN was able to secure PRN RX for Tessalon. Advised to keep cough symptoms controlled to help with AFib control. Asked her to reach out to PCP if symptoms are not controlled by PRN meds. She is aware that she converted back and remains in AFib, explained goal of rate control. CTN clarified dosing for amiodarone with EP- discharge instructions not correct, updated in CC current med list. Amiodarone 400 mg TID for 9 days, then 200 mg BID for 14 days, then 200 mg daily. CTN asked EP about Toprol XL 25 mg BID dosing referenced in progress note, but no order written, patient does not have this medication at home. Cardiology sent in RX to pharmacy. Clarified about dose of KCL to be taken with changed dose of bumex. Continue 20 meq 2 tablets daily. Confirmed she has stopped: Diltiazem and Entresto. She is not taking imdur/isosorbide. Note: intolerance to Femara in Hem-oncology note. Discussion about how to help with Flu recovery:  reduce-treat symptoms, maintain hydration, focus on healthy foods rich with vitamin C and anti-oxidants. Be active, ambulate regularly, rest when tired. Pt will remain out of the hospital or ER for remainder of Bundle period. [facility]              Post Hospitalization     Attends follow-up appointments as directed.         2/28/23-   PCP- dr. Jose Kitchen- 4/10 at 11:10 am.     Cardiology- primary- changed to Dr. Adrian Bermudez- saw on 2/24, next 7/7 at 11 am.     EP- Dr. Arsen Bhakta-  see Johnson Desserestrellita Amelia Hidalgo NP on 3/27 8:40 am, procedure at [facility] 4/13-1 pm, follow up on 4/28 at 1 pm with Rambo Smalls. [facility]     2/15/23-   PCP- Dr. Papi Michael-   Cardiology- Primary- Dr. Jeanine Gaston- Ms. Blanche Torrez did not attend 2/8 appt- states she did not receive message from CTN- today she is asking if she could see Dr. Rosanne Del Rio, she met her in the hospital on the 2/4-2/6 admission- she states it is good to have her providers in the same network. CTN reached out to her  to see if there could be an appointment coordinated on same day with EP. EP-  Dr. Devika Castro- seeing Shari Cedeno NP on 2/24. Hem-Oncology- Dr. Sloan Bhandari- 2/21 at 10am.          Note- CTN reached out to cardiology and coordinated lab work to be done same time as hem-oncology lab work if done on 2/21 OV. [facility]     2/7/23-  Dr. Papi Michael- 2/13 at 10:30 am.     Dr. Jeanine Gaston- CTN secured follow up for 2/8 at 2:45. Has one also on 3/17 at 10 am. Left in place for now. Left VM for Ms. Serafina Koyanagi to call office if cannot attend. Dr. Devika Castro- see below. [facility]     2/3/23-   PCP  Dr. Papi Michael-    4/10/2023 11:10 AM Glenn Salmeron MD [facility]   Cardiology- Primary Dr. Jeanine Gaston- next 3/17. EP- Dr. Devika Castro-    note- 4/13 is AFib ablation procedure. 2/24/2023  9:00 AM Emile Jarret, JOVAN DIKCSON   3/15/2023  9:00 AM REMOTE1JOSE ALBERTO   3/31/2023  1:00 PM Emile Friendly, JOVAN DICKSON         4/13/2023  1:00 PM [facility] CATH LAB 3 [facility]   4/28/2023  1:00 PM JOVAN Watson [address]       1/6/23-   PCP- Dr. Agustin Mccall- 1/10 10:30 am  attended-[facility]   Cardiology- Primary- Dr. Jeanine Gaston- appt on 12/27 at 9 am; She rescheduled to 1/16 attended-[facility]      EP- Dr. Devika Castro- 1/26/23-1 pm- will determine plan for DCCV-AFib. [facility]     12/23/22-   PCP- Dr. Rosanne Antonio reached out to secure [facility] appt. Cardiology- Primary- Dr. Jeanine Gaston- has appt on 12/27 at 9 am. States she forgot, will call tomorrow to reschedule.       EP- Dr. Devika Castro- has visit on 12/29 for EKG. Attended-[facility]   CTN will clarify with provider, if needs to keep since she is seeing primary card on 12/27.         [facility]

## 2023-03-02 ENCOUNTER — TELEPHONE (OUTPATIENT)
Dept: CARDIOLOGY CLINIC | Age: 73
End: 2023-03-02

## 2023-03-02 NOTE — TELEPHONE ENCOUNTER
LVM-Lab req does not need to be emailed as they have been changed to [facility] lab. Patient plans to go to [place] to have drawn.

## 2023-03-06 ENCOUNTER — DOCUMENTATION ONLY (OUTPATIENT)
Dept: CARDIOLOGY CLINIC | Age: 73
End: 2023-03-06

## 2023-03-06 NOTE — PROGRESS NOTES
Kamlesh Newman ([place]: [id number]) - [phone]  Jardiance 10MG tablets       Status: PA Response - Approved    Created: February 24th, 2023 [phone]    Sent: March 6th, 2023    Open  Archive                    Approved today  Your PA request has been approved. Additional information will be provided in the approval communication.  (Message [id number])

## 2023-03-07 NOTE — PROGRESS NOTES
Approval documentation received for Josselyn EGAN-     Approval 03/06/23-03/05/2026 Haider/LEXI Travis

## 2023-03-10 ENCOUNTER — PATIENT OUTREACH (OUTPATIENT)
Dept: CASE MANAGEMENT | Age: 73
End: 2023-03-10

## 2023-03-10 NOTE — PROGRESS NOTES
Care Transitions Follow Up Call    Challenges to be reviewed by the provider   Additional needs identified to be addressed with provider: yes  CTN was not able to reach patient to complete follow up outreach assessment. Method of communication with provider : none    Care Transition Nurse (CTN) contacted the patient by telephone to follow up after admission on 12/17/22 and 2/4/23. Left VM asking for return call. Addressed changes since last contact: refer to above yellow box    [address] follow up appointment(s):   Future Appointments   Date Time Provider Denis Hurst   3/15/2023  9:00 AM REMOTE1, JOSE ALBERTO DICKSON BS AMB   3/27/2023  8:40 AM JOVAN Nayak BS AMB   4/3/2023 11:00 AM SPT CT 1 SPTRCT SPT   4/10/2023 11:10 AM MD GAGAN Brown BS AMB   4/13/2023  1:00 PM [address] [place] [address] [facility]   4/28/2023  1:00 PM JOVAN Nayak BS AMB   7/7/2023 11:00 AM MD RANDOLPH Howard BS AMB   12/14/2023  2:00 PM PACEMAKER3, JOSE ALBERTO DICKSON BS AMB   12/14/2023  2:20 PM MD RANDOLPH Zarate BS AMB     Non-[facility] follow up appointment(s): refer to goals section     CTN provided contact information for future needs. Plan for follow-up call in 3-5 days based on severity of symptoms and risk factors. Plan for next call: complete follow up outreach assessment       Goals Addressed                   This Visit's Progress       Heart Failure     Patient verbalizes understanding of self-management goals of living with Congestive Heart Failure and Treatment for recurrent/chronic AFib        3/10/23- Left VM asking for return call. [facility]     2/28/23- Left VM asking for return call. Review of EMR shows: lab work done on 2/21- OV with cardiology on 2/24- VSS- 126/60, HR 51- weight 217 lbs-pt reports down. Cont Bumex 2 mg daily, KCL 20 meq-2 tabs daily. Restarted Entresto 24-26 mg BID, new Jardiance 10 mg daily.     [facility]     2/15/23- reached Ms. Gaby Marks- she states that her phone at home does not work well when it rains a lot. Shared that I had also left Vm on her cell phone- she will check that. She missed her appt last week with Dr. Roselyn Boxer. Today she reports- she feels so much better, \"I did not realize how much fluid I had on me\". This morning she weighs 210 lbs. She denies SOB-RODNEY with walking inside the home, any distance. She has soft legs, no longer tight. She states she is eating low NA and staying hydrated. She has been taking bumex 2 mg daily since discharge from the hospital on 2/6/23- she has been taking KCL 20 meq- 1 tab daily, CTN advised current order is 2 tablets of 20 meq daily- she will increase. CTN will update cardiology and ask about checking lab work for next week's appointment on 2/24. Note she is seeing Hem-onc on 2/21- Tuesday. Could get them done then- coordinate lab work with Hem-oncology. [facility]     2/8/23- left  asking for return call. Relayed cardiology appt scheduled for later today at Dr. Laurie Chairez office. [facility]     2/7/23- Left  asking for return call. [facility] with Dr. Roselyn Boxer- sent records via [address] fax per her request to: King Yates [phone]. [facility]     2/3/23- return call from Ms. Huddleston. She has two of the 3 doses of metolazone ordered- this morning her weight was down approx 4 lbs- she took 3rd dose this morning. She states her chest has no pressure, less SOB-RODNEY, some LE edema decreasing. Reminded her about low NA-hydration see below, encouraged her to continue. She has not called primary cardiology office- CTN had left msg earlier asking for return call from nurse, I will update her if she returns the call. [facility] call from [facility]- relayed above. Next appt is on 3/17. Shared date of DCCV and next on 4/13 for ablation. CTN will update EP. [facility]     2/1/23- received response from EP, Dr. Ronal Baker.  He recommends return to hospital to treating increasing fluid, etc.    Spoke with Ms. Kellen Quinn- she was not able to get the metolazone RX. Her car is not working, son is picking up today, will get it to her around 2:30. Discussion about taking with second dose of bumex - take, wait 30 minutes, then take bumex 2 mg. She says she ordered the scale, but has not arrived. She is not sure of how much she has gained. This morning she feels \"smaller\" in her legs- when she changed pants. Shared what Dr. Charlotte Guerrero had replied. She agreed to check in with CTN tomorrow after taking one dose of metolazone. She will call if she does not have urination to reflect desired effect, before she takes second dose in the morning. Discussion about:  Look at labels- staying low NA- reducing to lowest possible- reminded about staying under 350-400 mg per meal, less is best.    She continues to be thirsty- advised her to gently hydrate, up to 64 ounces. Use rinsing mouth, hard candy, etc to keep moist.     CTN will update cardiology-EP. [facility]     1/31/23 - spoke with Ms. Georgi Weiss, just now. - she says she is breathing some better since DCCV done on 1/27/23- but is \"winded\" with walking to [place] and back (done while we were on the phone). She said her legs are tight and swollen, abdomen is brink. She reports continued weight gain with increasing symptoms despite taking bumex 2 mg BID for past 5 days. Note Dr. Ernie Hutchins had increased dose of bumex from 1 mg daily to 2 mg daily at [address] on 1/16/23. She reports PMH of taking metolazone. Her scale is not working this morning- she had gained 5-6 lbs as of last week. She states she is eating low NA, drinking \"lots of water\". CTN reached out to primary cardiology via phone, asked for nurse to return call. Dr. Ernie Hutchins is not in the office today. Return call from nurse Leyla. Provided update, she will check with Dr. Lalo Corrales, (Dr. Ernie Hutchins away this week, her PA is in the hospital) and call CTN back.  Return call from nurse Leyla, Dr. Lalo Corrales ordered:   Continue Bumex 2 mg BID- and KCl 20 meq - 2 tabs daily  take metolazone 5 mg for 3 consecutive days - she will call Friday and update their office on progress. She will assess Friday- dose moving forward of bumex and KCL. Sherol Leventhal states she told her to start today, provided directions for taking metolazone 1 hour before bumex taken. She got the lab work from 1/26 done and will ask provider if he wants lab work done when she updates him on Friday. CTN updated EP via CC msg, sent updated staff msg about return call from primary cardiology office. [facility]     1/17/23- Left VM asking for return call. Review of EMR shows: she followed up with PCP on 1/10/23- /86, HR98 wt- 225 lbs. Reported weak and tired. Her DIL was assisting, declined HH svcs. Reports mild RODNEY with occ HR increase, denied SOB and CP at rest.   PCP added second medication to help with insomnia- trazadone 50 mg- 1/2 tab HS PRN. PCP increased prozac to 40 mg BID. Renewed albuterol inhaler for \"reactive airway disease\". Follow up on 4/10/23. [facility]     1/6/23- spoke with Ms. Scotty Villa- she is feeling better, tires easily but does feel it is slowly improving each day. She feels recovered from Flu. Denies LE edema, SOB and/or chest pain. Has been checking wts- steady around 222 lbs. Monitoring BP and HR- BP steady but HR fluctuates up and down. Asked her to check to make sure she has enough amiodarone to take until she attends follow up with EP on 1/26/23. [facility]     12/29/22- spoke with Ms. Scotty Villa- she feels much better, tires easily. Asked her to be regularly active, but rest when she feels tired. Stay hydrated and eat for recovery- focus on low NA. Coughing less, PRN meds at home and using to control symptoms with good relief. She was seen at Cardiology, Dr. Adela Vicente office today, VS and EKG done. Shows AFib with good rate control at 80 bpm.  118/80, HR 80. Weight reported 218 lbs. She is monitoring BP and HR and daily weight at home.  Reports they are good, weight is steady. She confirms continuing amiodarone 200 mg- 3 tabs TID dose until 12/30. Will start on 12/31 200 mg BID. On 1/14/21- transition to  200 daily. CTN will clarify with cardiology if she is to stop 200 mg daily or continue until seen for follow up on 1/26. If she is to continue, will need RX sent. [facility]     12/23/22- spoke with MsShayna Caity Jackelin. She reports increased cough. CTN was able to secure PRN RX for Tessalon. Advised to keep cough symptoms controlled to help with AFib control. Asked her to reach out to PCP if symptoms are not controlled by PRN meds. She is aware that she converted back and remains in AFib, explained goal of rate control. CTN clarified dosing for amiodarone with EP- discharge instructions not correct, updated in CC current med list. Amiodarone 400 mg TID for 9 days, then 200 mg BID for 14 days, then 200 mg daily. CTN asked EP about Toprol XL 25 mg BID dosing referenced in progress note, but no order written, patient does not have this medication at home. Cardiology sent in RX to pharmacy. Clarified about dose of KCL to be taken with changed dose of bumex. Continue 20 meq 2 tablets daily. Confirmed she has stopped: Diltiazem and Entresto. She is not taking imdur/isosorbide. Note: intolerance to Femara in Hem-oncology note. Discussion about how to help with Flu recovery:  reduce-treat symptoms, maintain hydration, focus on healthy foods rich with vitamin C and anti-oxidants. Be active, ambulate regularly, rest when tired. Pt will remain out of the hospital or ER for remainder of Bundle period.     [facility]

## 2023-03-12 DIAGNOSIS — F51.04 CHRONIC INSOMNIA: ICD-10-CM

## 2023-03-13 RX ORDER — ZOLPIDEM TARTRATE 10 MG/1
10 TABLET ORAL
Qty: 30 TABLET | Refills: 1 | Status: SHIPPED | OUTPATIENT
Start: 2023-03-13

## 2023-03-15 ENCOUNTER — OFFICE VISIT (OUTPATIENT)
Dept: CARDIOLOGY CLINIC | Age: 73
End: 2023-03-15

## 2023-03-15 DIAGNOSIS — Z95.810 AUTOMATIC IMPLANTABLE CARDIAC DEFIBRILLATOR IN SITU: Primary | ICD-10-CM

## 2023-03-16 ENCOUNTER — TELEPHONE (OUTPATIENT)
Dept: INTERNAL MEDICINE CLINIC | Age: 73
End: 2023-03-16

## 2023-03-16 DIAGNOSIS — F51.04 CHRONIC INSOMNIA: ICD-10-CM

## 2023-03-16 RX ORDER — ZOLPIDEM TARTRATE 10 MG/1
10 TABLET ORAL
Qty: 30 TABLET | Refills: 1 | Status: CANCELLED | OUTPATIENT
Start: 2023-03-16

## 2023-03-16 NOTE — TELEPHONE ENCOUNTER
Approvedtoday  Your PA request has been approved. Additional information will be provided in the approval communication. (Message [id number] [phone])    Patient notified.

## 2023-03-16 NOTE — TELEPHONE ENCOUNTER
She has been on zolpidem 10 mg nightly since 9/16/16. Prior to that she was on zolpidem 5 mg without improvement in sleep.

## 2023-03-16 NOTE — TELEPHONE ENCOUNTER
PCP: Nehal Hurt MD     Last appt: 1/10/2023     Future Appointments   Date Time Provider Denis Hurst   3/27/2023  8:40 AM JOVAN Waite BS AMB   4/3/2023 11:00 AM SPT CT 1 SPTRCT SPT   4/10/2023 11:10 AM MD GAGAN Lacey BS AMB   4/13/2023  1:00 PM [address] [place] [address] LAB 3 [facility]   4/28/2023  1:00 PM JOVAN Waite AMB   6/19/2023  4:30 PM REMOTE1, JOSE ALBERTO VILCHIS AMB   7/7/2023 11:00 AM MD RANDOLPH Steele BS AMB   12/14/2023  2:00 PM PACEMAKER3, JOSE ALBERTO VILCHIS AMB   12/14/2023  2:20 PM MD RANDOLPH Mendez BS AMB          Requested Prescriptions     Pending Prescriptions Disp Refills    zolpidem (AMBIEN) 10 mg tablet 30 Tablet 1     Sig: Take 1 Tablet by mouth nightly as needed for Sleep. Max Daily Amount: 10 mg.

## 2023-03-16 NOTE — TELEPHONE ENCOUNTER
Patient does not need a refill. This prescription requires a PA. I called the patient to inform them & stated that I will get to it as soon as I can.

## 2023-03-16 NOTE — TELEPHONE ENCOUNTER
Pt has not had her zolpidem for the past 2 nights and the pharmacy is saying they have not received any med refills.

## 2023-03-17 DIAGNOSIS — I10 PRIMARY HYPERTENSION: ICD-10-CM

## 2023-03-17 DIAGNOSIS — I25.5 ISCHEMIC CARDIOMYOPATHY: ICD-10-CM

## 2023-03-17 DIAGNOSIS — I48.91 ATRIAL FIBRILLATION, UNSPECIFIED TYPE (HCC): ICD-10-CM

## 2023-03-17 DIAGNOSIS — I50.22 SYSTOLIC CHF, CHRONIC (HCC): ICD-10-CM

## 2023-03-22 ENCOUNTER — PATIENT OUTREACH (OUTPATIENT)
Dept: CASE MANAGEMENT | Age: 73
End: 2023-03-22

## 2023-03-22 NOTE — PROGRESS NOTES
Patient has graduated from the Bundle program on 3/22/23. Patient/family has the ability to self-manage at this time Care management goals have been completed. Patient was asked to let CTN know if she was interested in referral to the [facility] team for further management. Return call, she is interested in working with ACM, referral made. ACP Discussion:  Spoke with Ms. Julio Rod. She does not have an AMD in place, but would like to complete. Agreed for referral to ACP. Goals Addressed                   This Visit's Progress       Heart Failure     COMPLETED: Patient verbalizes understanding of self-management goals of living with Congestive Heart Failure and Treatment for recurrent/chronic AFib        3/22/23- Left VM asking for return call. Explained end of 90 day PHILIP bundle period is today. Asked about interest in referral to ACM. Return call from Ms. Karo. She would like to continue to work with staff- related to sleep, depression/mood. Referral made to ACM. Will update PCP. [facility]     3/10/23- Left VM asking for return call. [facility]     2/28/23- Left VM asking for return call. Review of EMR shows: lab work done on 2/21- OV with cardiology on 2/24- VSS- 126/60, HR 51- weight 217 lbs-pt reports down. Cont Bumex 2 mg daily, KCL 20 meq-2 tabs daily. Restarted Entresto 24-26 mg BID, new Jardiance 10 mg daily. [facility]     2/15/23- reached Ms. Julio Rod- she states that her phone at home does not work well when it rains a lot. Shared that I had also left Vm on her cell phone- she will check that. She missed her appt last week with Dr. Garrett Chiu. Today she reports- she feels so much better, \"I did not realize how much fluid I had on me\". This morning she weighs 210 lbs. She denies SOB-RODNEY with walking inside the home, any distance. She has soft legs, no longer tight. She states she is eating low NA and staying hydrated.     She has been taking bumex 2 mg daily since discharge from the hospital on 2/6/23- she has been taking KCL 20 meq- 1 tab daily, CTN advised current order is 2 tablets of 20 meq daily- she will increase. CTN will update cardiology and ask about checking lab work for next week's appointment on 2/24. Note she is seeing Hem-onc on 2/21- Tuesday. Could get them done then- coordinate lab work with Hem-oncology. [facility]     2/8/23- left  asking for return call. Relayed cardiology appt scheduled for later today at Dr. Kirstie Johnson office. [facility]     2/7/23- Left  asking for return call. Return call from [facility] with Dr. Mis Roman- sent records via [address] [place] [address] fax per her request to: Sage Barnard [phone]. [facility]     2/3/23- return call from Ms. Karo. She has two of the 3 doses of metolazone ordered- this morning her weight was down approx 4 lbs- she took 3rd dose this morning. She states her chest has no pressure, less SOB-RODNEY, some LE edema decreasing. Reminded her about low NA-hydration see below, encouraged her to continue. She has not called primary cardiology office- CTN had left msg earlier asking for return call from nurse, I will update her if she returns the call. Return call from [facility]- relayed above. Next appt is on 3/17. Shared date of DCCV and next on 4/13 for ablation. CTN will update EP. [facility]     2/1/23- received response from EP, Dr. Arina Wayne. He recommends return to hospital to treating increasing fluid, etc.    Spoke with Ms. Usha Daly- she was not able to get the metolazone RX. Her car is not working, son is picking up today, will get it to her around 2:30. Discussion about taking with second dose of bumex - take, wait 30 minutes, then take bumex 2 mg. She says she ordered the scale, but has not arrived. She is not sure of how much she has gained. This morning she feels \"smaller\" in her legs- when she changed pants. Shared what Dr. Arina Wayne had replied. She agreed to check in with CTN tomorrow after taking one dose of metolazone.   She will call if she does not have urination to reflect desired effect, before she takes second dose in the morning. Discussion about:  Look at labels- staying low NA- reducing to lowest possible- reminded about staying under 350-400 mg per meal, less is best.    She continues to be thirsty- advised her to gently hydrate, up to 64 ounces. Use rinsing mouth, hard candy, etc to keep moist.     CTN will update cardiology-EP. [facility]     1/31/23 - spoke with Ms. Rosalind Smith, just now. - she says she is breathing some better since DCCV done on 1/27/23- but is \"winded\" with walking to [place] and back (done while we were on the phone). She said her legs are tight and swollen, abdomen is brink. She reports continued weight gain with increasing symptoms despite taking bumex 2 mg BID for past 5 days. Note Dr. Charis Qiu had increased dose of bumex from 1 mg daily to 2 mg daily at [address] on 1/16/23. She reports PMH of taking metolazone. Her scale is not working this morning- she had gained 5-6 lbs as of last week. She states she is eating low NA, drinking \"lots of water\". CTN reached out to primary cardiology via phone, asked for nurse to return call. Dr. Charis Qiu is not in the office today. Return call from nurse Jennifer. Provided update, she will check with Dr. Laurence Haley, (Dr. Charis Qiu away this week, her PA is in the hospital) and call CTN back. Return call from nurse Jennifer, Dr. Laurence Haley ordered:   Continue Bumex 2 mg BID- and KCl 20 meq - 2 tabs daily  take metolazone 5 mg for 3 consecutive days - she will call Friday and update their office on progress. She will assess Friday- dose moving forward of bumex and KCL. Katharina Omar states she told her to start today, provided directions for taking metolazone 1 hour before bumex taken. She got the lab work from 1/26 done and will ask provider if he wants lab work done when she updates him on Friday. CTN updated EP via CC msg, sent updated staff msg about return call from primary cardiology office.         [facility]     1/17/23- Left VM asking for return call. Review of EMR shows: she followed up with PCP on 1/10/23- /86, HR98 wt- 225 lbs. Reported weak and tired. Her DIL was assisting, declined HH svcs. Reports mild RODNEY with occ HR increase, denied SOB and CP at rest.   PCP added second medication to help with insomnia- trazadone 50 mg- 1/2 tab HS PRN. PCP increased prozac to 40 mg BID. Renewed albuterol inhaler for \"reactive airway disease\". Follow up on 4/10/23. [facility]     1/6/23- spoke with Ms. Gaby Pereira- she is feeling better, tires easily but does feel it is slowly improving each day. She feels recovered from Flu. Denies LE edema, SOB and/or chest pain. Has been checking wts- steady around 222 lbs. Monitoring BP and HR- BP steady but HR fluctuates up and down. Asked her to check to make sure she has enough amiodarone to take until she attends follow up with EP on 1/26/23. [facility]     12/29/22- spoke with Ms. Gaby Pereira- she feels much better, tires easily. Asked her to be regularly active, but rest when she feels tired. Stay hydrated and eat for recovery- focus on low NA. Coughing less, PRN meds at home and using to control symptoms with good relief. She was seen at Cardiology, Dr. Ronald Guillen office today, VS and EKG done. Shows AFib with good rate control at 80 bpm.  118/80, HR 80. Weight reported 218 lbs. She is monitoring BP and HR and daily weight at home. Reports they are good, weight is steady. She confirms continuing amiodarone 200 mg- 3 tabs TID dose until 12/30. Will start on 12/31 200 mg BID. On 1/14/21- transition to  200 daily. CTN will clarify with cardiology if she is to stop 200 mg daily or continue until seen for follow up on 1/26. If she is to continue, will need RX sent. [facility]     12/23/22- spoke with Ms. Gaby Pereira. She reports increased cough. CTN was able to secure PRN RX for Tessalon. Advised to keep cough symptoms controlled to help with AFib control.   Asked her to reach out to PCP if symptoms are not controlled by PRN meds. She is aware that she converted back and remains in AFib, explained goal of rate control. CTN clarified dosing for amiodarone with EP- discharge instructions not correct, updated in CC current med list. Amiodarone 400 mg TID for 9 days, then 200 mg BID for 14 days, then 200 mg daily. CTN asked EP about Toprol XL 25 mg BID dosing referenced in progress note, but no order written, patient does not have this medication at home. Cardiology sent in RX to pharmacy. Clarified about dose of KCL to be taken with changed dose of bumex. Continue 20 meq 2 tablets daily. Confirmed she has stopped: Diltiazem and Entresto. She is not taking imdur/isosorbide. Note: intolerance to Femara in Hem-oncology note. Discussion about how to help with Flu recovery:  reduce-treat symptoms, maintain hydration, focus on healthy foods rich with vitamin C and anti-oxidants. Be active, ambulate regularly, rest when tired. Pt will remain out of the hospital or ER for remainder of Bundle period. [facility]              Post Hospitalization     COMPLETED: Attends follow-up appointments as directed. 3/22/23-   PCP-Dr. Bekah Fletcher- 4/10 at 11:10 am.   Cardiology- primary- Dr. Joyce Covarrubias- 7/7 - 6 am.       EP-  Dr. Caitlin George- CT chest on 4/3 at [facility], 4/13 AFib ablation at [facility]. [facility]     2/28/23-   PCP- dr. Bekah Fletcher- 4/10 at 11:10 am.     Cardiology- primary- changed to Dr. Joyce Covarrubias- saw on 2/24, next 7/7 at 6 am.     EP- Dr. Caitlin George-  see Christopher Amador NP on 3/27 8:40 am- changed to see Dr. Sara Lynn, procedure at [facility] 4/13-1 pm, follow up on 4/28 at 1 pm with Altagracia Cordero. [facility]     2/15/23-   PCP- Dr. Bekah Fletcher-   Cardiology- Primary- Dr. Janette Carmen- Ms. Katelyn Owens did not attend 2/8 appt- states she did not receive message from CTN- today she is asking if she could see Dr. Joyce Covarrubias, she met her in the hospital on the 2/4-2/6 admission- she states it is good to have her providers in the same network.  CTN reached out to her  to see if there could be an appointment coordinated on same day with EP. EP-  Dr. Eli Samson- seeing Lanie Noble NP on 2/24. Hem-Oncology- Dr. Asha Smith- 2/21 at 10am.          Note- CTN reached out to cardiology and coordinated lab work to be done same time as hem-oncology lab work if done on 2/21 OV. [facility]     2/7/23-  Dr. Lianne Monae- 2/13 at 10:30 am.     Dr. Michael Starr- CTN secured follow up for 2/8 at 2:45. Has one also on 3/17 at 10 am. Left in place for now. Left VM for Ms. Vito Sicard to call office if cannot attend. Dr. Eli Samson- see below. [facility]     2/3/23-   PCP  Dr. Lianne Monae-    4/10/2023 11:10 AM Jesse Hightower MD [facility]   Cardiology- Primary Dr. Michael Starr- next 3/17. EP- Dr. Eli Samson-    note- 4/13 is AFib ablation procedure. 2/24/2023  9:00 AM Lee Ann Remedies, NP RANDOLPH   3/15/2023  9:00 AM REMOTE1, ABRAMS [facility]   3/31/2023  1:00 PM Lee Ann Remedies, NP [facility]         4/13/2023  1:00 PM [facility] CATH LAB 3 [facility]   4/28/2023  1:00 PM Lee Ann Remedies, NP [address]       1/6/23-   PCP- Dr. Marlinda Ahumada- 1/10 10:30 am  attended-[facility]   Cardiology- Primary- Dr. Michael Starr- appt on 12/27 at 9 am; She rescheduled to 1/16 attended-llc      EP- Dr. Eli Samson- 1/26/23-1 pm- will determine plan for DCCV-AFib. [facility]     12/23/22-   PCP- Dr. Jasmin Truong reached out to secure [facility] appt. Cardiology- Primary- Dr. Michael Starr- has appt on 12/27 at 9 am. States she forgot, will call tomorrow to reschedule. EP- Dr. Eli Samson- has visit on 12/29 for EKG. Attended-llc   CTN will clarify with provider, if needs to keep since she is seeing primary card on 12/27. [facility]               Patient has Care Transition Nurse's contact information for any further questions, concerns, or needs.   Patients upcoming visits:    Future Appointments   Date Time Provider Denis Hurst   3/27/2023  8:40 AM JOVAN Malone [facility]   4/3/2023 11:00 AM SPT CT 1 SPTRCT SPT 4/10/2023 11:10 AM MD GAGAN Perez BS AMB   4/13/2023  1:00 PM [address] [place] [address] LAB 3 [facility]   4/28/2023  1:00 PM JOVAN Jaramillo BS AMB   6/19/2023  4:30 PM REMOTE1JOSE ALBERTO BS AMB   7/7/2023 11:00 AM MD RANDOLPH Cox BS AMB   12/14/2023  2:00 PM PACEMAKER3JOSE ALBERTO BS AMB   12/14/2023  2:20 PM MD RANDOLPH Alcantar BS AMB

## 2023-03-22 NOTE — ACP (ADVANCE CARE PLANNING)
Spoke with Ms. Oxana Dunlap. She does not have an AMD in place, but would like to complete. Agreed for referral to ACP.

## 2023-03-22 NOTE — PROGRESS NOTES
[facility]  Cardiac Electrophysiology Clinic Care Note                  []Initial visit     [x]Established visit     Patient Name: Hannah Shields - QPW:[id number]  Primary Cardiologist: Jarod Mackenzie MD  Electrophysiologist: Andreas Ball MD     Reason for visit: [facility] follow up, H&P update    HPI:  Ms. Tamara Prieto is a 81 y.o. female who is s/p DCCV on 01/27/2023. She had ICD shock on 02/04/2023, was treated at [facility]. Noted to be hypokalemic after diuresis with metolazone. Continues amiodarone. Since then, she reports doing well. She states breathing & lower extremity edema have improved. She denies chest pain or lightheadedness. ECG today shows sinus bradycardia 53 bpm with PVC, QTc 474 ms. She's currently scheduled for AF ablation 04/13/2023. Dilated ICM. LVEF 24-30% per echo in 12/2022. NYHA II. Continues GDMT. Cardiac cath in 09/2022 at [facility] showed  in OM, patent LAD stent, & mild ISR in proximal RCA. BP controlled. Anticoagulated with Eliquis & ASA, denies bleeding issues. Previous:  Admitted at [facility] for ICD shock (VT) 02/2023. Hypokalemic after diuresis with metolazone. [facility] 01/27/2023. Admitted 12/2022 for influenza, acute on chronic CHF, AF with RVR, & acute hypoxic respiratory failure. DCCV 12/09/2022. S/p [employer] S-ICD in place of transvenous system 09/21/2016. Initial ICD implanted 8/24/16. Transvenous RV lead displacement x 2 due to large breast size, repositioned once. Removed entire system d/t to high recurrent risk of perforation/infection. S/p PCI RCA, LAD. Previously followed by Dr. Idania Colindres. Assessment and Plan     Persistent AF: Continue amiodarone for now. ECG today shows sinus bradycardia 53 bpm with PVC, QTc 474 ms. LA moderately dilated per last echo. Reviewed risks/benefits of AF ablation.   She agrees to proceed as scheduled. Labs ordered. CTA chest scheduled for 04/03/2023. Should she fail AF ablation, could consider AV node ablation & biventricular ICD. [employer] S-ICD (DOI 09/21/2016): Device check on 03/13/2023 showed proper lead & generator function. Generator longevity estimated at middle of service. Since 12/15/2022, 5 shocks delivered to convert patient, all on 02/04/2023 (already addressed). AF burden 7%. ICM: Dilated LV, LVEF 25-30%. NYHA II. Continue GDMT as previously prescribed. VT: Admitted after ICD shock in 02/2023. Continue amiodarone. CAD: Last cath in 09/2022 showed known  in OM, prior LAD stent patent. RCA stent had mild ISR. No angina. Continue Repatha & ASA. Anticoagulation: Continue Eliquis for embolic CVA prophylaxis. Denies bleeding issues. Remote ICD checks q 3 months. Follow up in EP clinic post ablation. Future Appointments   Date Time Provider Denis Hurst   4/3/2023 11:00 AM SPT CT 1 SPTRCT SPT   4/10/2023 11:10 AM Noa El Mt, MD BSIMA BS AMB   4/13/2023  1:00 PM [facility] CATH LAB 3 [facility]   4/28/2023  1:00 PM JOVAN Brumfield BS AMB   6/19/2023  4:30 PM REMOTE1, JOSE ALBERTO DICKSON BS AMB   7/7/2023 11:00 AM MD RANDOLPH Pinto BS AMB   12/14/2023  2:00 PM PACEMAKER3, JOSE ALBERTO DICKSON BS AMB   12/14/2023  2:20 PM MD RANDOLPH Briones BS AMB        ____________________________________________________________    Cardiac testing  12/17/22    ECHO ADULT COMPLETE 12/18/2022 12/18/2022    Interpretation Summary    Left Ventricle: Severely reduced left ventricular systolic function with a visually estimated EF of 25 - 30%. Left ventricle is moderately dilated. Normal wall thickness. Severe global hypokinesis present. Left Atrium: Left atrium is moderately dilated. Aortic Valve: Mildly calcified cusp. Mild stenosis of the aortic valve. AV mean gradient is 13 mmHg. Mitral Valve:  Moderate to severe regurgitation. Tricuspid Valve: Mild regurgitation. The estimated RVSP is 46 mmHg. Contrast used: Definity. Signed by: Janetet Mayorga MD on 12/18/2022 11:39 AM    [facility] (09/28/2022 at [facility]): LVEDP 15-20 mmHg. LM with MLI. LCx with mild diffuse disease;  in OM with left to left & right to left collaterals. LAD with prior stent with AUBREY-3 flow; otherwise MLI. RCA with very mild ISR in proximal segment, otherwise MLI.      08/09/21    NUCLEAR CARDIAC STRESS TEST 08/13/2021 8/16/2021    Interpretation Summary  · SPECT: Left ventricular function post-stress was abnormal. Calculated ejection fraction is 22%. There is no evidence of transient ischemic dilation (TID). The TID ratio is 0.86. · Baseline ECG: Normal sinus rhythm. · SPECT: Myocardial perfusion imaging defect 1: There is a defect that is large in size with a severe reduction in uptake present in the lateral location(s) that is partially reversible. There is abnormal wall motion in the defect area. The defect appears to be infarction with esther-infarct ischemia. Perfusion defect was visually and quantitatively present. · SPECT: Left ventricular perfusion is probably abnormal. Myocardial perfusion imaging supports a high risk stress test.    Signed by: Jeanette Montoya MD on 8/13/2021  8:26 AM    08/13/21    CARDIAC PROCEDURE 08/13/2021 8/13/2021    Conclusion  Findings:  1)Normal LVEDP  2)Severe native 1 vessel CAD with  of ramus intermedius. LCx  Is small with occluded distal LCx. OM2 and distal ramus fill from collaterals from LAD, diagonal and RCA. 3)Limited wire probing suggests no micro channel in Ramus ISR . Proximal cap start before the stent    Access: Right radial no issues  Contrast 40 cc    Recommendations  1)Continue GDMT and weight loss. If dyspnea continue may consider Ramus  PCI. Uncertain if benefit will be substantial.  2)GDMT for CAD    Signed by:  Chu Haq MD on 8/13/2021 10:34 AM    ECG: Sinus bradycardia 53 bpm with PVC, QTc 474 ms. Review of Systems    [x]All other systems reviewed and all negative except as written in HPI    [] Patient unable to provide secondary to condition         Past Medical History:   Diagnosis Date    Acute right ankle pain 06/08/2018 5/29/18: X-ray showed possible right ankle sprain. 6/6/18: saw Dr. Kadi Patricio ([facility]), diagnosed with peroneal tendonitis. Prescribed an ASO    Asthma     Atrial fibrillation ([address]     Breast cancer ([facility]     Right lumpectomy    Cardiomyopathy ([address]     Coronary artery disease 2008    s/p RCA stent ([facility]) on 11/26/11    Depression with anxiety     2011    DVT (deep venous thrombosis) ([facility] 07/27/2010    Family history of early CAD     GERD (gastroesophageal reflux disease)     H/O gastric bypass 2006    Revision in 2009    Hepatitis C antibody test positive     Does not have chronic hep C (labs 10/6/15: neg HCV RNA)     HTN (hypertension) 07/27/2010    Hyperlipidemia 07/27/2010    Incontinence of feces 09/03/2018    Dr. Xiao Goldsmith. 8/20/18: Improving with pelvic physical therapy and psyllium husk treatment. Saw Dr. Jocelyn Sheffield who suggested PT first. MR defecography showed pelvic floor weakness with pelvic descensus, small anterior  Rectocele, and vaginal prolapse. To have pelvic PT weekly for 8 wks and to see Dr. Jocelyn Sheffield again in Oct 2018. Joint pain 07/27/2010    Menopause     LMP-50years old?     MI (myocardial infarction) ([facility] 07/27/2010    Mitral valve regurgitation     Mild to moderate    Morbid obesity ([address] 07/27/2010    Myocardial infarction [facility]) 2006 and 2011    Dr. Jacquelyn Brown disorder     PUD (peptic ulcer disease)     gi bleed 2008; ulcer n gastric bypass pouch    Urinary incontinence, stress 07/27/2010     Past Surgical History:   Procedure Laterality Date    COLONOSCOPY N/A 06/29/2018    COLONOSCOPY performed by Sherri Maynard MD at [facility] ENDOSCOPY; redundant colon, int hemorrhoids, pathology: normal colonic mucosa    HX BREAST LUMPECTOMY Right 07/14/2022    RIGHT BREAST LUMPECTOMY WITH ULTRASOUND performed by Krishan Wen MD at [place] [address]    HX COLONOSCOPY  09/05/2014    Dr. Chai Broussard; normal, repeat in 5 yrs    HX GASTRIC BYPASS      HX IMPLANTABLE CARDIOVERTER DEFIBRILLATOR  08/24/2016    HX LAP GASTRIC BYPASS  2006    revision 2009/Dr. Toñito Alexis    HX OTHER SURGICAL  09/19/2016    Removal of right ventricular ICD lead & single chamber transvenous AICD    HX OTHER SURGICAL  10/12/2016    pocket revison of ICD; Dr. Tash Romero  06/07/2018    Dr Roosevelt Mckeon; high resolution anorectal manaometry-abnormal study. Study done for eval of fecal incontinence    HX OTHER SURGICAL  12/14/2018    Dr. Roosevelt Mckeon. Rectal endoscopic US (EUS) for rectal incontinence. Normal rectal mucosa, no fistulas. HX PACEMAKER      sicd/left side of chest under arm    INS PPM/ICD LED SING ONLY  08/24/2016         INS PPM/ICD LED SING ONLY  08/26/2016         INS PPM/ICD LED SING ONLY  09/21/2016         BELEN US BX BREAST RT 1ST LESION W/CLIP AND SPECIMEN Left 06/10/2022    Positive for breast cancer    VT UNLISTED LAPAROSCOPY PROCEDURE STOMACH  04/05/2010    Revision GBP    VT UNLISTED PROCEDURE CARDIAC SURGERY      Stent x 5-6,Most recent 2011     Social Hx:  reports that she quit smoking about 18 years ago. Her smoking use included cigarettes. She started smoking about 53 years ago. She has never used smokeless tobacco. She reports that she does not drink alcohol and does not use drugs. Family Hx: family history includes Alzheimer's Disease in her mother; Cancer in her father and mother; Dementia in her mother; Heart Attack in her brother; Heart Disease in her father and sister; Hypertension in her father; Stroke in her sister and sister.   Allergies   Allergen Reactions    Amoxicillin Anaphylaxis    Amoxicillin Anaphylaxis    Lipitor [Atorvastatin] Other (comments)     Severe muscle pain and spasms    Zocor [Simvastatin] Other (comments)     Cramps, muscle spasms    Buspar [Buspirone] Other (comments)     Drunk sensation, headaches    Hydroxyzine Other (comments)     Blurred vision, lightheadedness    Livalo [Pitavastatin] Myalgia    Pravastatin Other (comments)     Leg cramps    Tramadol Vertigo          OBJECTIVE:    Physical Exam    Vitals:   Vitals:    03/27/23 0830   BP: 120/76   Pulse: 61   Resp: 16   SpO2: 98%   Weight: 214 lb (97.1 kg)   Height: 5' 4\" (1.626 m)         General:    Alert, cooperative, no distress, appears stated age. Neck:   Supple, no carotid bruit and no JVD. Back:     Symmetric. Lungs:     Clear to auscultation bilaterally. Heart[de-identified]    Regular rate and rhythm. No murmur, click, rub or gallop. Abdomen:     Soft, non-tender. Bowel sounds normal.    MSK:   Extremities normal, atraumatic. Moves extremities independently. Vasc/lymph:   No lower extremity edema. Skin:   Skin color normal. No rashes or lesions on visible areas. ICD site healed. Neurologic:   Alert, Moves all extremities. Data Review:     Radiology:   XR Results (most recent):  Results from Hospital Encounter encounter on 02/04/23    XR CHEST PORT    Narrative  INDICATION:  ICD firing    EXAM: Chest single view. COMPARISON: 12/17/2022. 9/13/2016. FINDINGS: A single frontal view of the chest at 1550 hours shows mild diffuse  interstitial prominence, slightly improved since the most recent prior study. .  Minimal left midlung field atelectasis. The heart, mediastinum and pulmonary  vasculature are stable with cardiomegaly. AICD from below appears stable. .  The  bony thorax is unremarkable for age. .    Impression  Persistent but slightly improved interstitial edema pattern since prior study. Left midlung field atelectasis. Stable cardiomegaly. .  .     CT Results (most recent):  Results from Hospital Encounter encounter on 02/23/17    CT SPINE CERV WO CONT    Narrative  EXAM:  CT CERVICAL SPINE WITHOUT CONTRAST    INDICATION: Ground-level fall this morning. COMPARISON: None. TECHNIQUE: Multislice helical CT of the cervical spine was performed without  intravenous contrast administration. Sagittal and coronal reconstructions were  generated. CT dose reduction was achieved through use of a standardized  protocol tailored for this examination and automatic exposure control for dose  modulation. Adaptive statistical iterative reconstruction (ASIR) was utilized. CONTRAST: None. FINDINGS:    The alignment is within normal limits. There is no fracture or subluxation. There is degenerative disc narrowing and marginal osteophyte formation at the  C5-6 and C6-7 levels. The odontoid process is intact. The craniocervical  junction is within normal limits. The prevertebral soft tissues are within  normal limits. There is no spinal canal or neural foraminal stenosis. The  surrounding soft tissue and musculoskeletal structures appear unremarkable. The  visualized lung apices are unremarkable. Impression  IMPRESSION: No acute findings. MRI Results (most recent):  Results from [place] encounter on 07/02/18    MRI PELVIC FLOOR STUDY     Narrative  EXAM: MR PELVIC FLOOR  INDICATION: Full incontinence of feces. CONTRAST: Sterile ultrasound gel was placed into the vagina and rectum. No  intravenous contrast was administered. Oral Volumen was administered. TECHNIQUE: MR of the pelvis was performed according to standard departmental  protocol. The patient voided prior to imaging. Following three plane localizer  images, sagittal, axial and coronal breath-hold T2 (HASTE) images as well as  breath-hold axial T1 in and out of phase images were acquired.  Static and  dynamic MR of the pelvic floor was performed using static sagittal axial and  coronal high-resolution T2 weighted images followed by dynamic sagittal midline  SSFSE images at rest, during squeezing (Kegel maneuver), during straining  (Valsalva maneuver) and during defecation. FINDINGS:  H line = width of the levator hiatus  M line = descent of the levator hiatus    At rest:  H line measures 5.7 cm  M line measures 23 mm. The anorectal angle measures 131 degrees. (Normal 108 to 127 degrees.)    With squeezing:  H line measures 4.7 cm  M line measures 8 mm. The anorectal angle measures 112 degrees. With straining:  H line measures 5.5 cm  M line measures 5 mm. The anorectal angle measures 121 degrees. With evacuation:  H line measures 5.3 cm  M line measures 4.5 mm. The anorectal angle measures 140 degrees. The puborectalis sling is intact. There is there is elevation of the pelvic  floor with squeezing and pelvic floor weakness with abnormal descent of the  pelvic floor with straining and evacuation. POSTERIOR COMPARTMENT: There is pelvic floor weakness with pelvic descensus with  an anterior rectocele and the patient is unable to fully evacuate the rectum. There is no rectal intussusception or navin rectal prolapse. ANTERIOR COMPARTMENT: There is minimal descent of the bladder neck below the  sacrococcygeal line. MIDDLE COMPARTMENT: The uterus is absent. There is prolapse of the vagina below  the sacrococcygeal line. Impression  IMPRESSION: Pelvic floor weakness with pelvic descensus. There is a small  anterior rectocele and prolapse of the vagina below the sacrococcygeal line. The  patient is unable to fully evacuate the rectum. No results for input(s): CPK, TROIQ in the last 72 hours. No lab exists for component: CKQMB, CPKMB, BMPP  No results for input(s): NA, K, CL, CO2, BUN, CREA, GLU, PHOS, CA in the last 72 hours. No results for input(s): WBC, HGB, HCT, PLT, HGBEXT, HCTEXT, PLTEXT, HGBEXT, HCTEXT, PLTEXT in the last 72 hours. No results for input(s): PTP, INR, AP, INREXT, INREXT in the last 72 hours.     No lab exists for component: PTTP, GPT, SGOT  No results for input(s): CHOL, LDLC in the last 72 hours. No lab exists for component: TGL, HDLC,  HBA1C  No results for input(s): CRP, TSH, TSHEXT, TSHEXT in the last 72 hours. No lab exists for component: ESR        Current meds:    Current Outpatient Medications:     zolpidem (AMBIEN) 10 mg tablet, TAKE 1 TABLET BY MOUTH NIGHTLY AS NEEDED FOR SLEEP. MAX DAILY AMOUNT: 10 MG., Disp: 30 Tablet, Rfl: 1    sacubitriL-valsartan (Entresto) 24-26 mg tablet, Take 1 Tablet by mouth two (2) times a day., Disp: 180 Tablet, Rfl: 3    empagliflozin (Jardiance) 10 mg tablet, Take 1 Tablet by mouth daily. , Disp: 90 Tablet, Rfl: 3    metoprolol succinate (TOPROL-XL) 25 mg XL tablet, TAKE 1 TABLET BY MOUTH TWICE A DAY, Disp: 180 Tablet, Rfl: 1    bumetanide (BUMEX) 1 mg tablet, Take 2 mg by mouth daily. , Disp: , Rfl:     Repatha SureClick pen injection, INJECT 140 MG SUBCUTANEOUSLY EVERY 14 DAYS, Disp: , Rfl:     FLUoxetine (PROzac) 40 mg capsule, Take 1 Capsule by mouth two (2) times a day., Disp: 180 Capsule, Rfl: 3    albuterol (PROVENTIL HFA, VENTOLIN HFA, PROAIR HFA) 90 mcg/actuation inhaler, TAKE 2 PUFFS BY INHALATION EVERY FOUR HOURS AS NEEDED FOR WHEEZING OR SHORTNESS OF BREATH., Disp: 18 Each, Rfl: 11    apixaban (ELIQUIS) 5 mg tablet, Take 1 Tablet by mouth two (2) times a day. Prescribed by Dr. Luis Fernando Mello., Disp: 180 Tablet, Rfl: 0    potassium chloride (Klor-Con M20) 20 mEq tablet, TAKE 2 TABLETS BY MOUTH EVERY DAY, Disp: 180 Tablet, Rfl: 3    aspirin delayed-release 81 mg tablet, Take 81 mg by mouth as needed for Pain., Disp: , Rfl:     psyllium (METAMUCIL) powd, Take  by mouth. 2 tsp daily, Disp: , Rfl:     cholecalciferol, VITAMIN D3, (VITAMIN D3) 5,000 unit tab tablet, Take 10,000 Units by mouth daily. , Disp: , Rfl:     OTHER, Complete multi formula, bariatric advantage, Disp: , Rfl:     magnesium 250 mg tab, Take 1 Tab by mouth daily. , Disp: , Rfl:     ferrous sulfate 325 mg (65 mg iron) tablet, Take  by mouth daily (before breakfast). , Disp: , Rfl: CALCIUM PO, Take 600 mg by mouth daily. , Disp: , Rfl:     amiodarone (CORDARONE) 200 mg tablet, Take 1 Tablet by mouth daily. (Patient not taking: Reported on 3/27/2023), Disp: 180 Tablet, Rfl: 1    biotin 10,000 mcg cap, Take  by mouth daily.  (Patient not taking: No sig reported), Disp: , Rfl:       Mehnaz Niall, NP  [facility]  [address] [address]  [facility]  [phone]      Louie Newman MD

## 2023-03-23 ENCOUNTER — PATIENT OUTREACH (OUTPATIENT)
Dept: CASE MANAGEMENT | Age: 73
End: 2023-03-23

## 2023-03-23 NOTE — PROGRESS NOTES
CM received referral from CTN and attempted to reach patient for CM . Unable to reach and left VM's on home and mobile lines for return call, contact info provided.

## 2023-03-27 ENCOUNTER — OFFICE VISIT (OUTPATIENT)
Dept: CARDIOLOGY CLINIC | Age: 73
End: 2023-03-27
Payer: MEDICARE

## 2023-03-27 ENCOUNTER — PATIENT OUTREACH (OUTPATIENT)
Dept: CASE MANAGEMENT | Age: 73
End: 2023-03-27

## 2023-03-27 VITALS
RESPIRATION RATE: 16 BRPM | SYSTOLIC BLOOD PRESSURE: 120 MMHG | WEIGHT: 214 LBS | HEIGHT: 64 IN | BODY MASS INDEX: 36.54 KG/M2 | DIASTOLIC BLOOD PRESSURE: 76 MMHG | OXYGEN SATURATION: 98 % | HEART RATE: 61 BPM

## 2023-03-27 DIAGNOSIS — I48.19 PERSISTENT ATRIAL FIBRILLATION (HCC): ICD-10-CM

## 2023-03-27 DIAGNOSIS — I25.5 ISCHEMIC CARDIOMYOPATHY: ICD-10-CM

## 2023-03-27 DIAGNOSIS — Z79.899 ON AMIODARONE THERAPY: ICD-10-CM

## 2023-03-27 DIAGNOSIS — I48.19 PERSISTENT ATRIAL FIBRILLATION (HCC): Primary | ICD-10-CM

## 2023-03-27 DIAGNOSIS — I34.0 NONRHEUMATIC MITRAL VALVE REGURGITATION: ICD-10-CM

## 2023-03-27 DIAGNOSIS — Z79.01 ANTICOAGULATED: ICD-10-CM

## 2023-03-27 DIAGNOSIS — I25.10 CORONARY ARTERY DISEASE INVOLVING NATIVE CORONARY ARTERY OF NATIVE HEART WITHOUT ANGINA PECTORIS: ICD-10-CM

## 2023-03-27 LAB
ANION GAP SERPL CALC-SCNC: 3 MMOL/L (ref 5–15)
BASOPHILS # BLD: 0.1 K/UL (ref 0–0.1)
BASOPHILS NFR BLD: 1 % (ref 0–1)
BUN SERPL-MCNC: 25 MG/DL (ref 6–20)
BUN/CREAT SERPL: 23 (ref 12–20)
CALCIUM SERPL-MCNC: 9.1 MG/DL (ref 8.5–10.1)
CHLORIDE SERPL-SCNC: 109 MMOL/L (ref 97–108)
CO2 SERPL-SCNC: 28 MMOL/L (ref 21–32)
CREAT SERPL-MCNC: 1.07 MG/DL (ref 0.55–1.02)
DIFFERENTIAL METHOD BLD: ABNORMAL
EOSINOPHIL # BLD: 0.1 K/UL (ref 0–0.4)
EOSINOPHIL NFR BLD: 1 % (ref 0–7)
ERYTHROCYTE [DISTWIDTH] IN BLOOD BY AUTOMATED COUNT: 20.3 % (ref 11.5–14.5)
GLUCOSE SERPL-MCNC: 101 MG/DL (ref 65–100)
HCT VFR BLD AUTO: 40.1 % (ref 35–47)
HGB BLD-MCNC: 11.7 G/DL (ref 11.5–16)
IMM GRANULOCYTES # BLD AUTO: 0 K/UL (ref 0–0.04)
IMM GRANULOCYTES NFR BLD AUTO: 0 % (ref 0–0.5)
LYMPHOCYTES # BLD: 1.7 K/UL (ref 0.8–3.5)
LYMPHOCYTES NFR BLD: 22 % (ref 12–49)
MCH RBC QN AUTO: 23.8 PG (ref 26–34)
MCHC RBC AUTO-ENTMCNC: 29.2 G/DL (ref 30–36.5)
MCV RBC AUTO: 81.7 FL (ref 80–99)
MONOCYTES # BLD: 0.7 K/UL (ref 0–1)
MONOCYTES NFR BLD: 9 % (ref 5–13)
NEUTS SEG # BLD: 5.3 K/UL (ref 1.8–8)
NEUTS SEG NFR BLD: 67 % (ref 32–75)
NRBC # BLD: 0 K/UL (ref 0–0.01)
NRBC BLD-RTO: 0 PER 100 WBC
PLATELET # BLD AUTO: 255 K/UL (ref 150–400)
POTASSIUM SERPL-SCNC: 4.6 MMOL/L (ref 3.5–5.1)
RBC # BLD AUTO: 4.91 M/UL (ref 3.8–5.2)
RBC MORPH BLD: ABNORMAL
RBC MORPH BLD: ABNORMAL
SODIUM SERPL-SCNC: 140 MMOL/L (ref 136–145)
WBC # BLD AUTO: 7.9 K/UL (ref 3.6–11)

## 2023-03-27 PROCEDURE — G0463 HOSPITAL OUTPT CLINIC VISIT: HCPCS | Performed by: NURSE PRACTITIONER

## 2023-03-27 PROCEDURE — 99214 OFFICE O/P EST MOD 30 MIN: CPT | Performed by: NURSE PRACTITIONER

## 2023-03-27 PROCEDURE — G9899 SCRN MAM PERF RSLTS DOC: HCPCS | Performed by: NURSE PRACTITIONER

## 2023-03-27 PROCEDURE — G8417 CALC BMI ABV UP PARAM F/U: HCPCS | Performed by: NURSE PRACTITIONER

## 2023-03-27 PROCEDURE — 93005 ELECTROCARDIOGRAM TRACING: CPT | Performed by: NURSE PRACTITIONER

## 2023-03-27 PROCEDURE — 3017F COLORECTAL CA SCREEN DOC REV: CPT | Performed by: NURSE PRACTITIONER

## 2023-03-27 PROCEDURE — G8427 DOCREV CUR MEDS BY ELIG CLIN: HCPCS | Performed by: NURSE PRACTITIONER

## 2023-03-27 PROCEDURE — 3078F DIAST BP <80 MM HG: CPT | Performed by: NURSE PRACTITIONER

## 2023-03-27 PROCEDURE — 1090F PRES/ABSN URINE INCON ASSESS: CPT | Performed by: NURSE PRACTITIONER

## 2023-03-27 PROCEDURE — G8536 NO DOC ELDER MAL SCRN: HCPCS | Performed by: NURSE PRACTITIONER

## 2023-03-27 PROCEDURE — 93010 ELECTROCARDIOGRAM REPORT: CPT | Performed by: NURSE PRACTITIONER

## 2023-03-27 PROCEDURE — G9717 DOC PT DX DEP/BP F/U NT REQ: HCPCS | Performed by: NURSE PRACTITIONER

## 2023-03-27 PROCEDURE — 3074F SYST BP LT 130 MM HG: CPT | Performed by: NURSE PRACTITIONER

## 2023-03-27 PROCEDURE — 1101F PT FALLS ASSESS-DOCD LE1/YR: CPT | Performed by: NURSE PRACTITIONER

## 2023-03-27 PROCEDURE — 1123F ACP DISCUSS/DSCN MKR DOCD: CPT | Performed by: NURSE PRACTITIONER

## 2023-03-27 NOTE — PROGRESS NOTES
ACM made second attempt to reach patient for CM assistance unable to reach patient left VM for return call

## 2023-03-27 NOTE — PATIENT INSTRUCTIONS
Your EP Study and Atrial Fibrillation Ablation procedure has been scheduled for 4/13/23 at 1:00 pm, at [address].     Please report to Admitting Department by 11:00 am, or 2 hours prior to your scheduled procedure. Please bring a list of your current medications and medication bottles, if able, to the hospital on this day. You will be unable to drive after your procedure so please make sure to bring someone with you to your procedure. You will need to have nothing to eat or drink after midnight, the night prior to your procedure. You may have small sips of water, if needed, to take with your medication. You will need labs drawn prior to your procedure. Please plan to have these drawn at your appointment with Peterson Chavis NP. You should stop your medication, Eliquis, 2 days prior to your scheduled procedure. After your procedure, you will need to follow up with Peterson Chavis NP.  Your follow-up appointment has been scheduled for 4/28/23 at 1:00 pm.

## 2023-03-28 ENCOUNTER — PATIENT OUTREACH (OUTPATIENT)
Dept: CASE MANAGEMENT | Age: 73
End: 2023-03-28

## 2023-03-28 NOTE — PROGRESS NOTES
Labs without significant acute abnormality. OK to proceed with upcoming procedure as scheduled.     Future Appointments  4/3/2023   11:00 AM   SPT CT 1                   SPTRCT              SPT  4/10/2023  11:10 AM   Madeline El MD BSIMA               BS AMB  4/13/2023  1:00 PM    [facility] CATH LAB 3             [facility]   4/28/2023  1:00 PM    JOVAN Torres              BS AMB  6/19/2023  4:30 PM    REMOTE1JOSE ALBERTO              BS AMB  7/7/2023   11:00 AM   MD RANDOLPH Monae              BS AMB  12/14/2023 2:00 PM    [id number] ALBERTO              BS AMB  12/14/2023 2:20 PM    MD RANDOLPH Nunn              BS AMB

## 2023-03-28 NOTE — PROGRESS NOTES
Ambulatory Care Management Note    Date/Time:  3/28/2023 11:46 AM    Patient Current Location: [place]     This patient was received as a referral from Care Transition Nurse. Ambulatory Care Manager outreached to patient today to offer care management services. Introduction to self and role of care manager provided. Patient accepted care management services at this time. Follow up call scheduled at this time. Patient has Ambulatory Care Manager's contact number for any questions or concerns. Ambulatory Care Management Note    Date/Time:  3/28/2023 11:46 AM    Patient Current Location: [place]    This [facility]) reviewed and updated the following screenings during this call; general assessment, self management assessment, SDOH assessments, ACP assessment and note, and medication reconciliation     Patient's challenges to self-management identified:    none      Medication Management:  good adherence and good understanding    Advance Care Planning:   Does patient have an Advance Directive:  currently not on file; education provided     [employer], Referrals, and Durable Medical Equipment: none      Health Maintenance Due   Topic Date Due    COVID-19 Vaccine (3 - Booster for Wagner Peter series) 06/29/2021     Health Maintenance Reviewed: not reviewed     Patient was asked to consider health care goals that they would like to focus on with this ACM. ACM will follow up with patient to discuss goals and establish care plan in the next 7-14 days.        PCP/Specialist follow up:   Future Appointments   Date Time Provider Denis Hurst   4/3/2023 11:00 AM SPT CT 1 SPTRCT SPT   4/10/2023 11:10 AM MD GAGAN Morelos BS AMB   4/13/2023  1:00 PM [facility] 3 [facility]    4/28/2023  1:00 PM JOVAN Pinedo BS AMB   6/19/2023  4:30 PM JOSE ALBERTO MUSE BS AMB   7/7/2023 11:00 AM MD RANDOLPH Guidry BS AMB   12/14/2023  2:00 PM [address] [place] [address] JOSE ALBERTO VILCHIS AMB   12/14/2023  2:20 PM MD RANDOLPH Sutherland BS AMB       Patient reports that HF is managed well, weight is stable at dry weight of 214. Very slight pedal edema . No SOB or orthopnea. Having AFIB ablation on 3/13/23.   Patient would like [facility] to clarify with Dr Boby Valverde if she should be taking KCL 20 meq or 40 meq and [facility] will send a message to Dr Boby Valverde to clarify

## 2023-03-29 ENCOUNTER — PATIENT OUTREACH (OUTPATIENT)
Dept: CASE MANAGEMENT | Age: 73
End: 2023-03-29

## 2023-04-03 ENCOUNTER — PATIENT OUTREACH (OUTPATIENT)
Dept: CASE MANAGEMENT | Age: 73
End: 2023-04-03

## 2023-04-03 ENCOUNTER — HOSPITAL ENCOUNTER (OUTPATIENT)
Dept: CT IMAGING | Age: 73
End: 2023-04-03
Attending: INTERNAL MEDICINE
Payer: MEDICARE

## 2023-04-03 DIAGNOSIS — I48.0 PAROXYSMAL ATRIAL FIBRILLATION (HCC): ICD-10-CM

## 2023-04-03 PROCEDURE — 71275 CT ANGIOGRAPHY CHEST: CPT

## 2023-04-03 PROCEDURE — 74011000636 HC RX REV CODE- 636: Performed by: INTERNAL MEDICINE

## 2023-04-03 RX ADMIN — IOPAMIDOL 100 ML: 755 INJECTION, SOLUTION INTRAVENOUS at 12:17

## 2023-04-07 ENCOUNTER — PATIENT OUTREACH (OUTPATIENT)
Dept: CASE MANAGEMENT | Age: 73
End: 2023-04-07

## 2023-04-13 ENCOUNTER — HOSPITAL ENCOUNTER (OUTPATIENT)
Age: 73
Setting detail: OUTPATIENT SURGERY
Discharge: HOME | End: 2023-04-13
Attending: INTERNAL MEDICINE | Admitting: INTERNAL MEDICINE
Payer: MEDICARE

## 2023-04-13 ENCOUNTER — APPOINTMENT (OUTPATIENT)
Dept: CARDIAC CATH/INVASIVE PROCEDURES | Age: 73
End: 2023-04-13
Attending: INTERNAL MEDICINE
Payer: MEDICARE

## 2023-04-13 VITALS
SYSTOLIC BLOOD PRESSURE: 121 MMHG | WEIGHT: 214 LBS | DIASTOLIC BLOOD PRESSURE: 65 MMHG | RESPIRATION RATE: 14 BRPM | HEART RATE: 62 BPM | TEMPERATURE: 98.4 F | OXYGEN SATURATION: 93 % | BODY MASS INDEX: 36.54 KG/M2 | HEIGHT: 64 IN

## 2023-04-13 DIAGNOSIS — I48.91 ATRIAL FIBRILLATION, UNSPECIFIED TYPE (HCC): ICD-10-CM

## 2023-04-13 PROBLEM — Z86.79 STATUS POST ABLATION OF ATRIAL FIBRILLATION: Status: ACTIVE | Noted: 2023-04-13

## 2023-04-13 PROBLEM — Z98.890 STATUS POST CATHETER ABLATION OF ATRIAL FLUTTER: Status: ACTIVE | Noted: 2023-04-13

## 2023-04-13 PROBLEM — Z98.890 STATUS POST ABLATION OF ATRIAL FIBRILLATION: Status: ACTIVE | Noted: 2023-04-13

## 2023-04-13 LAB
ABO + RH BLD: NORMAL
ACT BLD: 227 SECS (ref 79–138)
ACT BLD: 281 SECS (ref 79–138)
ACT BLD: 287 SECS (ref 79–138)
BLOOD GROUP ANTIBODIES SERPL: NORMAL
GLUCOSE BLD STRIP.AUTO-MCNC: 87 MG/DL (ref 65–117)
SERVICE CMNT-IMP: NORMAL
SPECIMEN EXP DATE BLD: NORMAL

## 2023-04-13 PROCEDURE — 93656 COMPRE EP EVAL ABLTJ ATR FIB: CPT | Performed by: INTERNAL MEDICINE

## 2023-04-13 PROCEDURE — 93662 INTRACARDIAC ECG (ICE): CPT | Performed by: INTERNAL MEDICINE

## 2023-04-13 PROCEDURE — 74011000636 HC RX REV CODE- 636: Performed by: INTERNAL MEDICINE

## 2023-04-13 PROCEDURE — 77030012468 HC VLV BLEEDBK CNTRL ABBT -B: Performed by: INTERNAL MEDICINE

## 2023-04-13 PROCEDURE — 92960 CARDIOVERSION ELECTRIC EXT: CPT | Performed by: INTERNAL MEDICINE

## 2023-04-13 PROCEDURE — 82962 GLUCOSE BLOOD TEST: CPT

## 2023-04-13 PROCEDURE — C1769 GUIDE WIRE: HCPCS | Performed by: INTERNAL MEDICINE

## 2023-04-13 PROCEDURE — 76060000036 HC ANESTHESIA 2.5 TO 3 HR: Performed by: INTERNAL MEDICINE

## 2023-04-13 PROCEDURE — 93655 ICAR CATH ABLTJ DSCRT ARRHYT: CPT | Performed by: INTERNAL MEDICINE

## 2023-04-13 PROCEDURE — 77030015398 HC CBL EP EXT STJU -C: Performed by: INTERNAL MEDICINE

## 2023-04-13 PROCEDURE — 77030029163 HC CBL EP DX ACHV DISP MEDT -C: Performed by: INTERNAL MEDICINE

## 2023-04-13 PROCEDURE — C1759 CATH, INTRA ECHOCARDIOGRAPHY: HCPCS | Performed by: INTERNAL MEDICINE

## 2023-04-13 PROCEDURE — 93650 ICAR CATH ABLTJ AV NODE FUNC: CPT | Performed by: INTERNAL MEDICINE

## 2023-04-13 PROCEDURE — 77030030278 HC COAX UMB DISP MEDT -C: Performed by: INTERNAL MEDICINE

## 2023-04-13 PROCEDURE — C1894 INTRO/SHEATH, NON-LASER: HCPCS | Performed by: INTERNAL MEDICINE

## 2023-04-13 PROCEDURE — 93613 INTRACARDIAC EPHYS 3D MAPG: CPT | Performed by: INTERNAL MEDICINE

## 2023-04-13 PROCEDURE — 86900 BLOOD TYPING SEROLOGIC ABO: CPT

## 2023-04-13 PROCEDURE — 85347 COAGULATION TIME ACTIVATED: CPT

## 2023-04-13 PROCEDURE — 77030018729 HC ELECTRD DEFIB PAD CARD -B: Performed by: INTERNAL MEDICINE

## 2023-04-13 PROCEDURE — 77030013797 HC KT TRNSDUC PRSSR EDWD -A: Performed by: INTERNAL MEDICINE

## 2023-04-13 PROCEDURE — C1733 CATH, EP, OTHR THAN COOL-TIP: HCPCS | Performed by: INTERNAL MEDICINE

## 2023-04-13 PROCEDURE — 2709999900 HC NON-CHARGEABLE SUPPLY: Performed by: INTERNAL MEDICINE

## 2023-04-13 PROCEDURE — 36415 COLL VENOUS BLD VENIPUNCTURE: CPT

## 2023-04-13 RX ORDER — ACETAMINOPHEN 325 MG/1
650 TABLET ORAL
Status: DISCONTINUED | OUTPATIENT
Start: 2023-04-13 | End: 2023-04-13 | Stop reason: HOSPADM

## 2023-04-13 RX ORDER — SODIUM CHLORIDE 0.9 % (FLUSH) 0.9 %
5-40 SYRINGE (ML) INJECTION EVERY 8 HOURS
Status: DISCONTINUED | OUTPATIENT
Start: 2023-04-13 | End: 2023-04-13 | Stop reason: HOSPADM

## 2023-04-13 RX ORDER — ONDANSETRON 2 MG/ML
4 INJECTION INTRAMUSCULAR; INTRAVENOUS
Status: DISCONTINUED | OUTPATIENT
Start: 2023-04-13 | End: 2023-04-13 | Stop reason: HOSPADM

## 2023-04-13 RX ORDER — HYDROCODONE BITARTRATE AND ACETAMINOPHEN 5; 325 MG/1; MG/1
1 TABLET ORAL
Status: DISCONTINUED | OUTPATIENT
Start: 2023-04-13 | End: 2023-04-13 | Stop reason: HOSPADM

## 2023-04-13 RX ORDER — SODIUM CHLORIDE 0.9 % (FLUSH) 0.9 %
5-40 SYRINGE (ML) INJECTION AS NEEDED
Status: DISCONTINUED | OUTPATIENT
Start: 2023-04-13 | End: 2023-04-13 | Stop reason: HOSPADM

## 2023-04-13 NOTE — PROCEDURES
Cardiac Procedure Note   Patient: Yonatan Marquez  MRN: [id number]  SSN: [id number]   YOB: 1950 Age: 81 y.o.   Sex: female    Date of Procedure: 4/13/2023   Pre-procedure Diagnosis: cardiomyopathy, atrial fibrillation  Post-procedure Diagnosis: same, typical atrial flutter, sustained monomorphic VT  Procedure: EP Study and Ablation atrial flutter and fibrillation  Cardioversion of ventricular tachycardia  :  Dr. Izzy Monae MD    Assistant(s):  None  Anesthesia: general  Estimated Blood Loss: Less than 10 mL   Specimens Removed: None  Findings: right femoral vein 2 accesses  Cryoablation PVI AF  CTI atrial flutter ablation   Cardioversion of sustained VT  Perclose used to close venous accesses  Complications: None   Implants:  None  Signed by:  Izzy Monae MD  4/13/2023  4:52 PM

## 2023-04-13 NOTE — PROGRESS NOTES
Cardiac Cath Lab Recovery Arrival Note:      Sophia Adamson arrived to Cardiac Cath Lab, Recovery Area. Staff introduced to patient. Patient identifiers verified with NAME and DATE OF BIRTH. Procedure verified with patient. Consent forms reviewed and signed by patient or authorized representative and verified. Allergies verified. Patient and family oriented to department. Patient and family informed of procedure and plan of care. Questions answered with review. Patient prepped for procedure, per orders from physician, prior to arrival.    Patient on cardiac monitor, non-invasive blood pressure, SPO2 monitor. On Room air. Patient is A&Ox 4. Patient reports no complaints. Patient in stretcher, in low position, with side rails up, call bell within reach, patient instructed to call if assistance as needed. Patient prep in: [address], [address] [facility] [address]. Patient family has pager # Zenobia Schwartz (daughter) [phone]  Family in: Waiting upstairs.    Prep by: Andrei Lozano

## 2023-04-13 NOTE — PROGRESS NOTES
1523  Pt arrived to recovery room post procedure  GENERAL ANESTHESIA:  RN at bedside for first 20 min obtaining q 5 min vitals with temps. 1545  First 20 min of recovery is complete. Pt placed in q 15 min vitals. GROIN ACCESS:  Bed Rest for 2 hours. 1600  Pt Sat up after 1 hour at  Pt eating and tolerating PO diet well. [address] reviewed discharge instructions with pt. Pt had an opportunity to ask questions. 65  RN assisted pt to stand up; no bleeding and no hematoma at site(s)  Pt walked to the restroom and voided successfully. 46  RN changed pt dressing at site  RN removed pt IV.    3245  Pt getting dressed  RN called pt ride home. 1720  Pt discharged to ride at main entrance of hospital via wheel chair by RN.   Pt discharged with: Discharge Paperwork, Prescription(s), Closure Device Info Card, Extra Band-Aids, Cell Phone, Glasses, Clothing, and [place]

## 2023-04-13 NOTE — DISCHARGE INSTRUCTIONS
Patient Instructions Post-EP Study and Ablation    Resume all meds    1. No heavy lifting or exercises for 1 week. This includes the following:  Do not push or move furniture, jog or run    2. Do not drive for 3 days. 3.  Call Dr. Fifi Muir at [phone] if you experience any of the following symptoms:  Dizziness, lightheadedness, fainting spells  Lack of energy  Shortness of breath  Rapid heart rate  Chest or muscle twitches  Blurry vision, double vision, weakness, numbness  Nausea, vomiting  Fever  Bleeding in the stool, black stool  Leg swelling, pain    4. Follow-up with Dr. Edwina Guthrie NP as noted below on 04/28/2023. Future Appointments   Date Time Provider Denis Hurst   4/28/2023  1:00 PM JOVAN De La Cruz BS AMB   6/19/2023  4:30 PM Josemanuel DICKSON BS AMB   7/7/2023 11:00 AM MD RANDOLPH Correa BS AMB   12/14/2023  2:00 PM PACEMAKER3, JOSE ALBERTO DICKSON BS AMB   12/14/2023  2:20 PM MD Saida Jay M.D. Electrophysiology/Cardiology  [facility]  [address], [place] [place] [address] [address]                               [phone]        . Groin Site Discharge Instructions    Do not drive, operate any machinery, or sign any legal documents for 24 hours after your procedure. You must have someone to drive you home. You may take a shower 24 hours after your procedure. Be sure to get the dressing wet and then remove it; gently wash the area with warm soapy water. Pat dry and leave open to air. To help prevent infections, be sure to keep the cath site clean and dry. No lotions, creams, powders, ointments, etc. in the cath site for approximately 1 week. Do not soak site with such activities as: take a tub bath, get in a hot tub or swimming pool for approximately 5 days or until the cath site is completely healed. No strenuous activity or heavy lifting over 10 lbs. for 7 days.     Drink plenty of fluids for 24-48 hours after your procedure to flush the contrast dye from your kidneys. No alcoholic beverages for 24 hours. You may resume your previous diet (low fat, low cholesterol) after your procedure. After your procedure, some bruising or discomfort is common during the healing process. An ice pack and Tylenol, 1-2 tablets every 6 hours as needed, is recommended if you experience any discomfort. If you experience any signs or symptoms of infection such as fever, chills, or poorly healing incision, persistent tenderness or swelling around the cath site, redness and/or warmth to the touch, numbness, significant tingling or pain at the cath site or affected extremity, rash, drainage from the cath site, or if the affected arm or leg feels tight or swollen, call your physician right away. 30 minutes of activity a day is recommended. If it is too hot or too cold outside, perform activities in doors. Do not strain when baring down with bowel movements. This could cause the site to bleed. If bleeding at the cath site occurs, take a clean gauze pad and apply direct pressure to the cath site just above the puncture site. Call 911 immediately, and continue to apply direct pressure until an ambulance gets to your location to take you to the ER. DO not drive yourself, do not have anyone else drive you. CALL 911 for any circumstance that you feel is emergent. You may return to work  2  days after your cardiac cath if no cath site bleeding.

## 2023-04-13 NOTE — PROGRESS NOTES
Cardiac Cath Lab Recovery Arrival Note:      Kyle Snider arrived to Cardiac Cath Lab, Recovery Area. Staff introduced to patient. Patient identifiers verified with NAME and DATE OF BIRTH. Procedure verified with patient. Consent forms reviewed and signed by patient or authorized representative and verified. Allergies verified. Patient and family oriented to department. Patient and family informed of procedure and plan of care. Questions answered with review. Patient prepped for procedure, per orders from physician, prior to arrival.    Patient on cardiac monitor, non-invasive blood pressure, SPO2 monitor. On room air. Patient is A&Ox 4. Patient reports no complaints. Patient in stretcher, in low position, with side rails up, call bell within reach, patient instructed to call if assistance as needed. Patient prep in: [address], [address] [facility] [address]. Patient family has pager # [phone]  Family in: Waiting upstairs.    Prep by: Heaven Hastings

## 2023-04-13 NOTE — INTERVAL H&P NOTE
Update History & Physical    The Patient's History and Physical of March 27, 2023 was reviewed with the patient and I examined the patient. There was no change. The surgical site was confirmed by the patient and me. Plan:  The risk, benefits, expected outcome, and alternative to the recommended procedure have been discussed with the patient. Patient understands and wants to proceed with the procedure.     Electronically signed by Delicia Enciso MD on 4/13/2023 at 4:52 PM

## 2023-04-13 NOTE — PROGRESS NOTES
1523  [facility] Procedural to Recovery REPORT:    Verbal report received from [address] and CRNA on Yonatan Marquez  being received from [address] [place] [address] for routine progression of care. Report consisted of patients Situation, Background, Assessment and Recommendations(SBAR). Information from the following report(s) SBAR, Procedure Summary, Intake/Output, MAR, Recent Results, and Cardiac Rhythm NSR  was reviewed with the receiving clinician. Opportunity for questions and clarification was provided. Assessment completed upon patients arrival to [address] [place] [address] and care assumed. Cardiac Cath Lab Recovery Arrival Note:    Yonatan Marquez arrived to [facility] recovery area. Patient procedure= AFib Ablation. Patient on cardiac monitor, non-invasive blood pressure, SPO2 monitor. On O2 @ 2 lpm via NC.  IV  of NS on pump at [facility] 50 ml/hr. Patient status doing well without problems. Patient is A&Ox 4. Patient reports no complaints. PROCEDURE SITE CHECK:    Procedure site:without any bleeding and no hemaotma, no pain/discomfort reported at procedure site. No change in patient status. Continue to monitor patient and status.     First set of vitals belongs to Anesthesia CRNA

## 2023-04-13 NOTE — PROGRESS NOTES
Cardiac Cath Lab Procedure Area Arrival Note:    Sofia Anderson arrived to Cardiac Cath Lab, Procedure Area. Patient identifiers verified with NAME and DATE OF BIRTH. Procedure verified with patient. Consent forms verified. Allergies verified. Patient informed of procedure and plan of care. Questions answered with review. Patient voiced understanding of procedure and plan of care. Patient on cardiac monitor, non-invasive blood pressure, SPO2 monitor. On ra. Anesthesia here for sedation and airway management  IV of ns on pump at 25 ml/hr. Patient status doing well without problems. Patient is A&Ox 3. Patient reports no pain. Patient medicated during procedure with orders obtained and verified by Dr. Dione Gan. Refer to patients Cardiac Cath Lab PROCEDURE REPORT for vital signs, assessment, status, and response during procedure, printed at end of case. Printed report on chart or scanned into chart.

## 2023-04-13 NOTE — PROGRESS NOTES
EP LAB to Recovery Room Report    Procedure: A-Fib Ablation with Cryo    MD: Dr. Alda Lama heart rhythm: Normal Sinus Rhythm  Verbal Report given to Recovery Nurse on patient being transferred to Recovery Room for routine post-op. Patient stable upon transfer to . Pt had general anesthesia. Anesthesia team sedated, intubated patient, and then extubated patient post procedure. Anesthesia team gave medication report to the Recovery RN. Vitals, mental status, MAR, procedural summary discussed with recovery RN. Patient cardioverted x1 during procedure. defib    A total of Heparin 51185 units given. Protamine 50mg given post ablation. Conscious sedation medication given by EP RN:  Versed 0 mg  Fentanyl 0mcg  Benadryl 0mg    Post-procedure heart rhythm: Normal Sinus Rhythm      Sheaths:    Right femoral vein 8fr sheath removed, closed with [facility] Cath Closure Device: angioseal and perclose. Right femoral vein 11fr sheath removed, closed with [facility] Cath Closure Device: perclose.   ...    .  .

## 2023-04-17 ENCOUNTER — PATIENT OUTREACH (OUTPATIENT)
Dept: CASE MANAGEMENT | Age: 73
End: 2023-04-17

## 2023-04-17 NOTE — PROGRESS NOTES
ACM attempted to reach patient for CM follow up. Left VM on home and mobile lines with contact info.

## 2023-04-18 DIAGNOSIS — I50.22 CHRONIC SYSTOLIC (CONGESTIVE) HEART FAILURE (HCC): ICD-10-CM

## 2023-04-18 RX ORDER — BUMETANIDE 2 MG/1
1 TABLET ORAL DAILY
Qty: 90 TABLET | Refills: 1 | Status: CANCELLED | OUTPATIENT
Start: 2023-04-18

## 2023-04-18 NOTE — TELEPHONE ENCOUNTER
PCP: Neel Cochran MD     Last appt: 4/10/2023     Future Appointments   Date Time Provider Denis Hurst   4/28/2023  1:00 PM JOVAN York AMB   6/19/2023  4:30 PM REMOTE1, JOSE ALBERTO VILCHIS AMB   7/7/2023 11:00 AM MD RANDOLPH Pratt AMB   12/14/2023  2:00 PM PACEMAKER3, JOSE ALBERTO VILCHIS AMB   12/14/2023  2:20 PM MD RANDOLPH Montoya BS AMB          Requested Prescriptions     Pending Prescriptions Disp Refills    bumetanide (BUMEX) 2 mg tablet 90 Tablet 1     Sig: Take 0.5 Tablets by mouth daily.

## 2023-04-19 ENCOUNTER — PATIENT OUTREACH (OUTPATIENT)
Dept: CASE MANAGEMENT | Age: 73
End: 2023-04-19

## 2023-04-21 ENCOUNTER — PATIENT OUTREACH (OUTPATIENT)
Dept: CASE MANAGEMENT | Age: 73
End: 2023-04-21

## 2023-05-05 ENCOUNTER — TELEPHONE (OUTPATIENT)
Dept: INTERNAL MEDICINE CLINIC | Age: 73
End: 2023-05-05

## 2023-05-08 NOTE — TELEPHONE ENCOUNTER
Verified patient with two types of identifiers. Spoke with patient regarding lab work and recommendations per Jody Parker NP. Patient verbalized understanding and will call with any other questions. MST Score 2 or >

## 2023-05-10 ENCOUNTER — TELEPHONE (OUTPATIENT)
Facility: CLINIC | Age: 73
End: 2023-05-10

## 2023-05-10 DIAGNOSIS — F51.04 PSYCHOPHYSIOLOGIC INSOMNIA: ICD-10-CM

## 2023-05-10 NOTE — TELEPHONE ENCOUNTER
PCP: Sosa Romeo MD     Last appt: 1/10/2023      Future Appointments   Date Time Provider Gabriel Huitron   6/5/2023  1:20 PM LEONEL Keyes - DORA MCCLENDON BS AMB   6/19/2023  4:30 PM REMOTE1, DANY LUONG AMB   7/7/2023 11:00 AM MD HAKAN Cabello AMB   8/24/2023  9:10 AM MD BRIAN Moore AMB   12/14/2023  2:00 PM PACEMAKER3, DANY LUONG AMB   12/14/2023  2:20 PM MD HAKAN Zuniga BS AMB          Requested Prescriptions     Pending Prescriptions Disp Refills    zolpidem (AMBIEN) 10 MG tablet [Pharmacy Med Name: ZOLPIDEM TARTRATE 10 MG TABLET] 30 tablet 1     Sig: TAKE 1 TABLET BY MOUTH NIGHTLY AS NEEDED FOR SLEEP. MAX DAILY AMOUNT: 10 MG. -MAX 15/30DAYS

## 2023-05-11 RX ORDER — ZOLPIDEM TARTRATE 10 MG/1
10 TABLET ORAL NIGHTLY PRN
Qty: 30 TABLET | Refills: 2 | Status: SHIPPED | OUTPATIENT
Start: 2023-05-11 | End: 2023-06-10

## 2023-05-12 ENCOUNTER — TELEPHONE (OUTPATIENT)
Age: 73
End: 2023-05-12

## 2023-05-15 NOTE — TELEPHONE ENCOUNTER
Per Dr. Martino-D/C Luis Fernando Clifton and add to allergy-   Can discuss alternative at follow up.      LVM

## 2023-05-22 ENCOUNTER — OFFICE VISIT (OUTPATIENT)
Facility: CLINIC | Age: 73
End: 2023-05-22
Payer: MEDICARE

## 2023-05-22 VITALS
WEIGHT: 219 LBS | HEIGHT: 64 IN | OXYGEN SATURATION: 96 % | TEMPERATURE: 98.4 F | DIASTOLIC BLOOD PRESSURE: 68 MMHG | BODY MASS INDEX: 37.39 KG/M2 | RESPIRATION RATE: 18 BRPM | HEART RATE: 60 BPM | SYSTOLIC BLOOD PRESSURE: 104 MMHG

## 2023-05-22 DIAGNOSIS — E66.01 SEVERE OBESITY (BMI 35.0-39.9) WITH COMORBIDITY (HCC): ICD-10-CM

## 2023-05-22 DIAGNOSIS — I25.10 CORONARY ARTERY DISEASE INVOLVING NATIVE CORONARY ARTERY OF NATIVE HEART WITHOUT ANGINA PECTORIS: ICD-10-CM

## 2023-05-22 DIAGNOSIS — R73.03 PREDIABETES: ICD-10-CM

## 2023-05-22 DIAGNOSIS — I50.22 SYSTOLIC CHF, CHRONIC (HCC): ICD-10-CM

## 2023-05-22 DIAGNOSIS — D50.9 IRON DEFICIENCY ANEMIA, UNSPECIFIED IRON DEFICIENCY ANEMIA TYPE: ICD-10-CM

## 2023-05-22 DIAGNOSIS — I10 PRIMARY HYPERTENSION: Primary | ICD-10-CM

## 2023-05-22 DIAGNOSIS — F51.04 CHRONIC INSOMNIA: ICD-10-CM

## 2023-05-22 DIAGNOSIS — E83.42 HYPOMAGNESEMIA: ICD-10-CM

## 2023-05-22 DIAGNOSIS — E78.2 MIXED HYPERLIPIDEMIA: ICD-10-CM

## 2023-05-22 PROCEDURE — 3078F DIAST BP <80 MM HG: CPT | Performed by: INTERNAL MEDICINE

## 2023-05-22 PROCEDURE — G8427 DOCREV CUR MEDS BY ELIG CLIN: HCPCS | Performed by: INTERNAL MEDICINE

## 2023-05-22 PROCEDURE — 3017F COLORECTAL CA SCREEN DOC REV: CPT | Performed by: INTERNAL MEDICINE

## 2023-05-22 PROCEDURE — G8417 CALC BMI ABV UP PARAM F/U: HCPCS | Performed by: INTERNAL MEDICINE

## 2023-05-22 PROCEDURE — G8399 PT W/DXA RESULTS DOCUMENT: HCPCS | Performed by: INTERNAL MEDICINE

## 2023-05-22 PROCEDURE — 1090F PRES/ABSN URINE INCON ASSESS: CPT | Performed by: INTERNAL MEDICINE

## 2023-05-22 PROCEDURE — 99214 OFFICE O/P EST MOD 30 MIN: CPT | Performed by: INTERNAL MEDICINE

## 2023-05-22 PROCEDURE — 1123F ACP DISCUSS/DSCN MKR DOCD: CPT | Performed by: INTERNAL MEDICINE

## 2023-05-22 PROCEDURE — 3074F SYST BP LT 130 MM HG: CPT | Performed by: INTERNAL MEDICINE

## 2023-05-22 PROCEDURE — 1036F TOBACCO NON-USER: CPT | Performed by: INTERNAL MEDICINE

## 2023-05-22 RX ORDER — MULTIVITAMIN WITH IRON
1 TABLET ORAL DAILY
COMMUNITY

## 2023-05-22 RX ORDER — SEMAGLUTIDE 1.34 MG/ML
0.25 INJECTION, SOLUTION SUBCUTANEOUS
Qty: 4 ADJUSTABLE DOSE PRE-FILLED PEN SYRINGE | Refills: 1 | Status: SHIPPED | OUTPATIENT
Start: 2023-05-22

## 2023-05-22 SDOH — ECONOMIC STABILITY: FOOD INSECURITY: WITHIN THE PAST 12 MONTHS, YOU WORRIED THAT YOUR FOOD WOULD RUN OUT BEFORE YOU GOT MONEY TO BUY MORE.: NEVER TRUE

## 2023-05-22 SDOH — ECONOMIC STABILITY: FOOD INSECURITY: WITHIN THE PAST 12 MONTHS, THE FOOD YOU BOUGHT JUST DIDN'T LAST AND YOU DIDN'T HAVE MONEY TO GET MORE.: NEVER TRUE

## 2023-05-22 SDOH — ECONOMIC STABILITY: INCOME INSECURITY: HOW HARD IS IT FOR YOU TO PAY FOR THE VERY BASICS LIKE FOOD, HOUSING, MEDICAL CARE, AND HEATING?: NOT VERY HARD

## 2023-05-22 SDOH — ECONOMIC STABILITY: HOUSING INSECURITY
IN THE LAST 12 MONTHS, WAS THERE A TIME WHEN YOU DID NOT HAVE A STEADY PLACE TO SLEEP OR SLEPT IN A SHELTER (INCLUDING NOW)?: NO

## 2023-05-22 ASSESSMENT — PATIENT HEALTH QUESTIONNAIRE - PHQ9
SUM OF ALL RESPONSES TO PHQ QUESTIONS 1-9: 0
2. FEELING DOWN, DEPRESSED OR HOPELESS: 0
SUM OF ALL RESPONSES TO PHQ QUESTIONS 1-9: 0
1. LITTLE INTEREST OR PLEASURE IN DOING THINGS: 0
SUM OF ALL RESPONSES TO PHQ9 QUESTIONS 1 & 2: 0
SUM OF ALL RESPONSES TO PHQ QUESTIONS 1-9: 0
SUM OF ALL RESPONSES TO PHQ QUESTIONS 1-9: 0

## 2023-05-22 NOTE — PROGRESS NOTES
Rachel Hannon  Identified pt with two pt identifiers(name and ). Chief Complaint   Patient presents with    Hypertension    Medication Problem       Reviewed record In preparation for visit and have obtained necessary documentation. 1. Have you been to the ER, urgent care clinic or hospitalized since your last visit? [facility] heart related issues     2. Have you seen or consulted any other health care providers outside of the [address] [place] [address] since your last visit? Include any pap smears or colon screening. No    Vitals reviewed with provider. Health Maintenance reviewed:     Health Maintenance Due   Topic    Shingles vaccine (2 of 3)    COVID-19 Vaccine (3 - Booster for Pfizer series)          Wt Readings from Last 3 Encounters:   23 219 lb (99.3 kg)   23 214 lb (97.1 kg)   23 217 lb (98.4 kg)        Temp Readings from Last 3 Encounters:   23 98.4 °F (36.9 °C) (Oral)        BP Readings from Last 3 Encounters:   23 104/68   23 120/76   23 126/60        Pulse Readings from Last 3 Encounters:   23 60   23 61   23 51      No flowsheet data found. Learning Assessment:   :     [facility] LEARNING ASSESSMENT 2023   PRIMARY LEARNER Patient   HIGHEST LEVEL OF EDUCATION - PRIMARY LEARNER 2 YEARS OF COLLEGE   PRIMARY LANGUAGE ENGLISH   LEARNER PREFERENCE PRIMARY READING   ANSWERED BY patient   RELATIONSHIP SELF         Abuse Assessment:    No flowsheet data found. Fall Risk Assessment:   :     ,  [facility] Fall Risk Assessment and TUG Test 3/28/2023 3/27/2023 2023 2023 2023 2023 1/10/2023   Fall in past 12 months? 0 0 0 - - 0 0   Able to walk? Yes Yes Yes - - Yes Yes   Total Score - - - 2 2 - -          ADL Assessment:    No flowsheet data found.

## 2023-05-22 NOTE — PROGRESS NOTES
Chief Complaint   Patient presents with    Hypertension    Medication Problem       HISTORY OF PRESENT ILLNESS  Sophia Mendoza is a 72 y.o. female    Presents for 4 month follow up evaluation. She has HTN, atrial fibrillation, CHF (EF 25-30% in 9/20) with ICD in place since 9/21 with hx lead revisions, CAD s/p 2 MI's in 2006 and 2011, valvular heart disease, R breast cancer s/p lumpectomy on 7/14/22, depression with anxiety, and obesity s/p gastric bypass in 2006. Had cardiac ablation for a-fib on 4/13/23 with Dr. Dianna Contreras. Next appt in [address] on 6/15/23. Changed cardiologists from Dr. Lenora Pena to Dr. Naomi Bamberger. Was placed on Jardiance, took for 3 months but had 2 bad yeast infections and one UTI on it. Stopped Jardiance 3 weeks ago. Complains of mildly increased SOB. Thinks she is building up fluid in her lungs and wonders if her Bumex dose should go back to 2 mg daily. Is currently on 1 mg daily. Denies CP, dizziness, heart palpitations, or leg swelling. Interested in losing weight. Wants to know if she can take Ozempic or Wegovy. Sleeps well on zolpidem 10 mg nightly.     Patient Active Problem List   Diagnosis    Cardiomyopathy ([address]    Mild intermittent asthma without complication    History of gastric bypass    Primary osteoarthritis of both knees    HTN (hypertension)    History of MI (myocardial infarction)    Hyperlipidemia    Generalized anxiety disorder    Systolic CHF, chronic (HCC)    Chronic insomnia    Morbid obesity with BMI of 40.0-44.9, adult ([address]    S/P ICD (internal cardiac defibrillator) procedure    Mitral valve regurgitation    Osteoporosis    CAD (coronary artery disease)    Moderate episode of recurrent major depressive disorder (HCC)    Malignant neoplasm of right breast in female, estrogen receptor positive ([address]    A-fib ([address]    History of cardioversion    Atherosclerosis of native coronary artery of native heart with angina pectoris (HCC)    Persistent atrial fibrillation

## 2023-05-24 ENCOUNTER — TELEPHONE (OUTPATIENT)
Facility: CLINIC | Age: 73
End: 2023-05-24

## 2023-05-24 ENCOUNTER — TELEPHONE (OUTPATIENT)
Age: 73
End: 2023-05-24

## 2023-05-25 NOTE — TELEPHONE ENCOUNTER
The PA has been denied due to not having a diagnosis of Type 2 Diabetes. They will approve the request when the patient:    Has a diagnosis of Type 2 Diabetes    AND    The patient has not been receiving a sable maintenance dose of a GLP-1 (glucagon-like peptide 1) Agonist for at least 3 months  AND  The patient has experienced an inadequate treatment response, intolerance, or has a contraindication to Metformin  OR    The patient requires combination therapy AND has an A1C of 7.5% or greater.    OR  The patient has established cardiovascular disease  OR  The patient has not been receiving a sable maintenance dose of a GLP-1 (glucagon-like peptide 1) Agonist for at least 3 months  AND  The patient hs demonstrated a reduction in A1C since starting GLP-1 Agonist therapy  OR  The patient has establish cardiovascular disease

## 2023-06-05 ENCOUNTER — TELEPHONE (OUTPATIENT)
Facility: CLINIC | Age: 73
End: 2023-06-05

## 2023-06-05 NOTE — TELEPHONE ENCOUNTER
Pt is called to follow up on her ozempic the pharmacy told her to call us due to insurance would not cover Handoff

## 2023-06-05 NOTE — TELEPHONE ENCOUNTER
Called and left message on answer machine that Dr Sasha Luna has been notified that the medication will not be covered; and waiting on Dr Rommel Ramirez reply.

## 2023-06-27 ENCOUNTER — OFFICE VISIT (OUTPATIENT)
Age: 73
End: 2023-06-27
Payer: MEDICARE

## 2023-06-27 ENCOUNTER — TELEPHONE (OUTPATIENT)
Age: 73
End: 2023-06-27

## 2023-06-27 VITALS
DIASTOLIC BLOOD PRESSURE: 60 MMHG | HEART RATE: 58 BPM | OXYGEN SATURATION: 95 % | RESPIRATION RATE: 18 BRPM | BODY MASS INDEX: 37.39 KG/M2 | SYSTOLIC BLOOD PRESSURE: 100 MMHG | WEIGHT: 219 LBS | HEIGHT: 64 IN

## 2023-06-27 DIAGNOSIS — Z79.01 ANTICOAGULATED: ICD-10-CM

## 2023-06-27 DIAGNOSIS — I47.20 VT (VENTRICULAR TACHYCARDIA) (HCC): ICD-10-CM

## 2023-06-27 DIAGNOSIS — Z86.79 S/P ABLATION OF ATRIAL FIBRILLATION: ICD-10-CM

## 2023-06-27 DIAGNOSIS — I25.5 ISCHEMIC CARDIOMYOPATHY: ICD-10-CM

## 2023-06-27 DIAGNOSIS — Z95.810 ICD (IMPLANTABLE CARDIOVERTER-DEFIBRILLATOR) IN PLACE: ICD-10-CM

## 2023-06-27 DIAGNOSIS — Z98.890 S/P ABLATION OF ATRIAL FIBRILLATION: ICD-10-CM

## 2023-06-27 DIAGNOSIS — Z95.5 S/P CORONARY ARTERY STENT PLACEMENT: ICD-10-CM

## 2023-06-27 DIAGNOSIS — Z79.899 ON AMIODARONE THERAPY: ICD-10-CM

## 2023-06-27 DIAGNOSIS — I50.22 CHRONIC SYSTOLIC CHF (CONGESTIVE HEART FAILURE) (HCC): ICD-10-CM

## 2023-06-27 DIAGNOSIS — I48.19 PERSISTENT ATRIAL FIBRILLATION (HCC): Primary | ICD-10-CM

## 2023-06-27 DIAGNOSIS — I25.10 CORONARY ARTERY DISEASE INVOLVING NATIVE CORONARY ARTERY OF NATIVE HEART WITHOUT ANGINA PECTORIS: ICD-10-CM

## 2023-06-27 PROCEDURE — 3078F DIAST BP <80 MM HG: CPT | Performed by: NURSE PRACTITIONER

## 2023-06-27 PROCEDURE — 1090F PRES/ABSN URINE INCON ASSESS: CPT | Performed by: NURSE PRACTITIONER

## 2023-06-27 PROCEDURE — 93010 ELECTROCARDIOGRAM REPORT: CPT | Performed by: NURSE PRACTITIONER

## 2023-06-27 PROCEDURE — 3017F COLORECTAL CA SCREEN DOC REV: CPT | Performed by: NURSE PRACTITIONER

## 2023-06-27 PROCEDURE — 99214 OFFICE O/P EST MOD 30 MIN: CPT | Performed by: NURSE PRACTITIONER

## 2023-06-27 PROCEDURE — G8427 DOCREV CUR MEDS BY ELIG CLIN: HCPCS | Performed by: NURSE PRACTITIONER

## 2023-06-27 PROCEDURE — G8399 PT W/DXA RESULTS DOCUMENT: HCPCS | Performed by: NURSE PRACTITIONER

## 2023-06-27 PROCEDURE — 93005 ELECTROCARDIOGRAM TRACING: CPT | Performed by: NURSE PRACTITIONER

## 2023-06-27 PROCEDURE — 1123F ACP DISCUSS/DSCN MKR DOCD: CPT | Performed by: NURSE PRACTITIONER

## 2023-06-27 PROCEDURE — 1036F TOBACCO NON-USER: CPT | Performed by: NURSE PRACTITIONER

## 2023-06-27 PROCEDURE — 3074F SYST BP LT 130 MM HG: CPT | Performed by: NURSE PRACTITIONER

## 2023-06-27 PROCEDURE — G8417 CALC BMI ABV UP PARAM F/U: HCPCS | Performed by: NURSE PRACTITIONER

## 2023-06-27 RX ORDER — ZOLPIDEM TARTRATE 10 MG/1
TABLET ORAL
COMMUNITY
Start: 2023-06-11

## 2023-07-07 ENCOUNTER — TELEPHONE (OUTPATIENT)
Age: 73
End: 2023-07-07

## 2023-07-07 ENCOUNTER — OFFICE VISIT (OUTPATIENT)
Age: 73
End: 2023-07-07
Payer: MEDICARE

## 2023-07-07 VITALS
BODY MASS INDEX: 37.05 KG/M2 | SYSTOLIC BLOOD PRESSURE: 120 MMHG | OXYGEN SATURATION: 98 % | RESPIRATION RATE: 16 BRPM | DIASTOLIC BLOOD PRESSURE: 60 MMHG | HEIGHT: 64 IN | HEART RATE: 62 BPM | WEIGHT: 217 LBS

## 2023-07-07 DIAGNOSIS — E66.01 SEVERE OBESITY (BMI 35.0-39.9) WITH COMORBIDITY (HCC): ICD-10-CM

## 2023-07-07 DIAGNOSIS — Z86.79 STATUS POST ABLATION OF ATRIAL FIBRILLATION: ICD-10-CM

## 2023-07-07 DIAGNOSIS — Z98.84 HISTORY OF GASTRIC BYPASS: ICD-10-CM

## 2023-07-07 DIAGNOSIS — I47.20 V-TACH (HCC): ICD-10-CM

## 2023-07-07 DIAGNOSIS — I50.22 SYSTOLIC CHF, CHRONIC (HCC): ICD-10-CM

## 2023-07-07 DIAGNOSIS — I25.5 ISCHEMIC CARDIOMYOPATHY: Primary | ICD-10-CM

## 2023-07-07 DIAGNOSIS — I25.2 HISTORY OF MI (MYOCARDIAL INFARCTION): ICD-10-CM

## 2023-07-07 DIAGNOSIS — I25.10 CORONARY ARTERY DISEASE INVOLVING NATIVE HEART, UNSPECIFIED VESSEL OR LESION TYPE, UNSPECIFIED WHETHER ANGINA PRESENT: ICD-10-CM

## 2023-07-07 DIAGNOSIS — Z98.890 STATUS POST CATHETER ABLATION OF ATRIAL FLUTTER: ICD-10-CM

## 2023-07-07 DIAGNOSIS — Z95.810 S/P ICD (INTERNAL CARDIAC DEFIBRILLATOR) PROCEDURE: ICD-10-CM

## 2023-07-07 DIAGNOSIS — Z98.890 STATUS POST ABLATION OF ATRIAL FIBRILLATION: ICD-10-CM

## 2023-07-07 DIAGNOSIS — I10 HYPERTENSION, UNSPECIFIED TYPE: ICD-10-CM

## 2023-07-07 DIAGNOSIS — I48.91 ATRIAL FIBRILLATION, UNSPECIFIED TYPE (HCC): ICD-10-CM

## 2023-07-07 PROCEDURE — 1090F PRES/ABSN URINE INCON ASSESS: CPT | Performed by: INTERNAL MEDICINE

## 2023-07-07 PROCEDURE — 3078F DIAST BP <80 MM HG: CPT | Performed by: INTERNAL MEDICINE

## 2023-07-07 PROCEDURE — 99214 OFFICE O/P EST MOD 30 MIN: CPT | Performed by: INTERNAL MEDICINE

## 2023-07-07 PROCEDURE — G8417 CALC BMI ABV UP PARAM F/U: HCPCS | Performed by: INTERNAL MEDICINE

## 2023-07-07 PROCEDURE — 1036F TOBACCO NON-USER: CPT | Performed by: INTERNAL MEDICINE

## 2023-07-07 PROCEDURE — G8427 DOCREV CUR MEDS BY ELIG CLIN: HCPCS | Performed by: INTERNAL MEDICINE

## 2023-07-07 PROCEDURE — 1123F ACP DISCUSS/DSCN MKR DOCD: CPT | Performed by: INTERNAL MEDICINE

## 2023-07-07 PROCEDURE — 3074F SYST BP LT 130 MM HG: CPT | Performed by: INTERNAL MEDICINE

## 2023-07-07 PROCEDURE — G8399 PT W/DXA RESULTS DOCUMENT: HCPCS | Performed by: INTERNAL MEDICINE

## 2023-07-07 PROCEDURE — 3017F COLORECTAL CA SCREEN DOC REV: CPT | Performed by: INTERNAL MEDICINE

## 2023-07-07 RX ORDER — BUMETANIDE 2 MG/1
2 TABLET ORAL DAILY
Qty: 90 TABLET | Refills: 3 | Status: SHIPPED | OUTPATIENT
Start: 2023-07-07

## 2023-07-07 ASSESSMENT — ENCOUNTER SYMPTOMS
CHEST TIGHTNESS: 0
WHEEZING: 0
SHORTNESS OF BREATH: 0

## 2023-07-07 ASSESSMENT — PATIENT HEALTH QUESTIONNAIRE - PHQ9
9. THOUGHTS THAT YOU WOULD BE BETTER OFF DEAD, OR OF HURTING YOURSELF: 0
SUM OF ALL RESPONSES TO PHQ QUESTIONS 1-9: 0
3. TROUBLE FALLING OR STAYING ASLEEP: 0
5. POOR APPETITE OR OVEREATING: 0
SUM OF ALL RESPONSES TO PHQ QUESTIONS 1-9: 0
7. TROUBLE CONCENTRATING ON THINGS, SUCH AS READING THE NEWSPAPER OR WATCHING TELEVISION: 0
6. FEELING BAD ABOUT YOURSELF - OR THAT YOU ARE A FAILURE OR HAVE LET YOURSELF OR YOUR FAMILY DOWN: 0
SUM OF ALL RESPONSES TO PHQ9 QUESTIONS 1 & 2: 0
2. FEELING DOWN, DEPRESSED OR HOPELESS: 0
1. LITTLE INTEREST OR PLEASURE IN DOING THINGS: 0
8. MOVING OR SPEAKING SO SLOWLY THAT OTHER PEOPLE COULD HAVE NOTICED. OR THE OPPOSITE, BEING SO FIGETY OR RESTLESS THAT YOU HAVE BEEN MOVING AROUND A LOT MORE THAN USUAL: 0
SUM OF ALL RESPONSES TO PHQ QUESTIONS 1-9: 0
SUM OF ALL RESPONSES TO PHQ QUESTIONS 1-9: 0
4. FEELING TIRED OR HAVING LITTLE ENERGY: 0

## 2023-07-14 NOTE — TELEPHONE ENCOUNTER
I spoke to "[employer]" and prior De La Rosa Dee has been received for Julia. Exp 7/18. Patient aware she can have medication filled at her [facility] pharmacy.   2 pt identifiers used Single mechanical

## 2023-08-03 DIAGNOSIS — I48.19 PERSISTENT ATRIAL FIBRILLATION (HCC): ICD-10-CM

## 2023-08-03 DIAGNOSIS — Z79.899 ON AMIODARONE THERAPY: ICD-10-CM

## 2023-08-03 LAB
ALBUMIN SERPL-MCNC: 3.6 G/DL (ref 3.5–5)
ALBUMIN/GLOB SERPL: 1.4 (ref 1.1–2.2)
ALP SERPL-CCNC: 50 U/L (ref 45–117)
ALT SERPL-CCNC: 25 U/L (ref 12–78)
ANION GAP SERPL CALC-SCNC: 3 MMOL/L (ref 5–15)
AST SERPL-CCNC: 18 U/L (ref 15–37)
BASOPHILS # BLD: 0.1 K/UL (ref 0–0.1)
BASOPHILS NFR BLD: 1 % (ref 0–1)
BILIRUB SERPL-MCNC: 0.5 MG/DL (ref 0.2–1)
BUN SERPL-MCNC: 14 MG/DL (ref 6–20)
BUN/CREAT SERPL: 14 (ref 12–20)
CALCIUM SERPL-MCNC: 8.4 MG/DL (ref 8.5–10.1)
CHLORIDE SERPL-SCNC: 105 MMOL/L (ref 97–108)
CHOLEST SERPL-MCNC: 149 MG/DL
CO2 SERPL-SCNC: 28 MMOL/L (ref 21–32)
CREAT SERPL-MCNC: 0.97 MG/DL (ref 0.55–1.02)
DIFFERENTIAL METHOD BLD: ABNORMAL
EOSINOPHIL # BLD: 0.1 K/UL (ref 0–0.4)
EOSINOPHIL NFR BLD: 2 % (ref 0–7)
ERYTHROCYTE [DISTWIDTH] IN BLOOD BY AUTOMATED COUNT: 15.7 % (ref 11.5–14.5)
EST. AVERAGE GLUCOSE BLD GHB EST-MCNC: 114 MG/DL
FERRITIN SERPL-MCNC: 10 NG/ML (ref 8–252)
GLOBULIN SER CALC-MCNC: 2.6 G/DL (ref 2–4)
GLUCOSE SERPL-MCNC: 90 MG/DL (ref 65–100)
HBA1C MFR BLD: 5.6 % (ref 4–5.6)
HCT VFR BLD AUTO: 37.4 % (ref 35–47)
HDLC SERPL-MCNC: 76 MG/DL
HDLC SERPL: 2 (ref 0–5)
HGB BLD-MCNC: 10.7 G/DL (ref 11.5–16)
IMM GRANULOCYTES # BLD AUTO: 0 K/UL (ref 0–0.04)
IMM GRANULOCYTES NFR BLD AUTO: 0 % (ref 0–0.5)
LDLC SERPL CALC-MCNC: 53.4 MG/DL (ref 0–100)
LYMPHOCYTES # BLD: 1.4 K/UL (ref 0.8–3.5)
LYMPHOCYTES NFR BLD: 23 % (ref 12–49)
MAGNESIUM SERPL-MCNC: 2.2 MG/DL (ref 1.6–2.4)
MCH RBC QN AUTO: 23.8 PG (ref 26–34)
MCHC RBC AUTO-ENTMCNC: 28.6 G/DL (ref 30–36.5)
MCV RBC AUTO: 83.1 FL (ref 80–99)
MONOCYTES # BLD: 0.5 K/UL (ref 0–1)
MONOCYTES NFR BLD: 8 % (ref 5–13)
NEUTS SEG # BLD: 4.2 K/UL (ref 1.8–8)
NEUTS SEG NFR BLD: 66 % (ref 32–75)
NRBC # BLD: 0 K/UL (ref 0–0.01)
NRBC BLD-RTO: 0 PER 100 WBC
PLATELET # BLD AUTO: 263 K/UL (ref 150–400)
PMV BLD AUTO: 12.9 FL (ref 8.9–12.9)
POTASSIUM SERPL-SCNC: 4.4 MMOL/L (ref 3.5–5.1)
PROT SERPL-MCNC: 6.2 G/DL (ref 6.4–8.2)
RBC # BLD AUTO: 4.5 M/UL (ref 3.8–5.2)
RBC MORPH BLD: ABNORMAL
RBC MORPH BLD: ABNORMAL
SODIUM SERPL-SCNC: 136 MMOL/L (ref 136–145)
TRIGL SERPL-MCNC: 98 MG/DL
TSH SERPL DL<=0.05 MIU/L-ACNC: 1.62 UIU/ML (ref 0.36–3.74)
VLDLC SERPL CALC-MCNC: 19.6 MG/DL
WBC # BLD AUTO: 6.3 K/UL (ref 3.6–11)

## 2023-08-04 LAB
IRON SATN MFR SERPL: 12 % (ref 20–50)
IRON SERPL-MCNC: 42 UG/DL (ref 35–150)
TIBC SERPL-MCNC: 365 UG/DL (ref 250–450)

## 2023-08-08 DIAGNOSIS — F51.04 PSYCHOPHYSIOLOGIC INSOMNIA: ICD-10-CM

## 2023-08-08 NOTE — TELEPHONE ENCOUNTER
PCP: Tony Valadez MD     Last appt:  5/22/2023    Future Appointments   Date Time Provider [address]   8/24/2023  9:10 AM MD BRIAN Forte BS AMB   11/7/2023  1:00 PM BS SAENZ ECHO 1 HAKAN LUONG AMB   11/17/2023 11:40 AM MD HAKAN Zaldivar BS AMB   12/14/2023  2:00 PM PACEMAKER3, DANY LUONG AMB   12/14/2023  2:20 PM MD HAKAN Bingham BS AMB          Requested Prescriptions     Pending Prescriptions Disp Refills    zolpidem (AMBIEN) 10 MG tablet [Pharmacy Med Name: ZOLPIDEM TARTRATE 10 MG TABLET] 30 tablet 2     Sig: TAKE 1 TABLET BY MOUTH NIGHTLY AS NEEDED FOR SLEEP FOR UP TO 30 DAYS. MAX DAILY AMOUNT: 10 MG.

## 2023-08-09 RX ORDER — ZOLPIDEM TARTRATE 10 MG/1
TABLET ORAL
Qty: 30 TABLET | Refills: 2 | Status: SHIPPED | OUTPATIENT
Start: 2023-08-09 | End: 2023-09-08

## 2023-08-17 DIAGNOSIS — E83.42 HYPOMAGNESEMIA: ICD-10-CM

## 2023-08-17 DIAGNOSIS — E78.2 MIXED HYPERLIPIDEMIA: ICD-10-CM

## 2023-08-17 DIAGNOSIS — D50.9 IRON DEFICIENCY ANEMIA, UNSPECIFIED IRON DEFICIENCY ANEMIA TYPE: ICD-10-CM

## 2023-08-17 DIAGNOSIS — R73.03 PREDIABETES: ICD-10-CM

## 2023-08-17 DIAGNOSIS — I10 PRIMARY HYPERTENSION: ICD-10-CM

## 2023-08-25 ENCOUNTER — OFFICE VISIT (OUTPATIENT)
Facility: CLINIC | Age: 73
End: 2023-08-25

## 2023-08-25 VITALS
HEIGHT: 64 IN | WEIGHT: 221 LBS | SYSTOLIC BLOOD PRESSURE: 109 MMHG | RESPIRATION RATE: 16 BRPM | HEART RATE: 56 BPM | DIASTOLIC BLOOD PRESSURE: 75 MMHG | BODY MASS INDEX: 37.73 KG/M2 | OXYGEN SATURATION: 95 % | TEMPERATURE: 98 F

## 2023-08-25 DIAGNOSIS — Z12.11 SCREENING FOR COLON CANCER: ICD-10-CM

## 2023-08-25 DIAGNOSIS — D50.9 IRON DEFICIENCY ANEMIA, UNSPECIFIED IRON DEFICIENCY ANEMIA TYPE: ICD-10-CM

## 2023-08-25 DIAGNOSIS — E78.2 MIXED HYPERLIPIDEMIA: ICD-10-CM

## 2023-08-25 DIAGNOSIS — I10 HYPERTENSION, UNSPECIFIED TYPE: Primary | ICD-10-CM

## 2023-08-25 DIAGNOSIS — E66.01 SEVERE OBESITY (HCC): ICD-10-CM

## 2023-08-25 NOTE — PATIENT INSTRUCTIONS
Patient Instructions   Healthy snacks include:  -  Apple                                    -  Rice cakes               -  Raisins  -  98% fat-free popcorn           -  Almonds                   -  Orange  -  Yogurt                                  -  Granola bar              -  Animal crackers  -  Fruit smoothie                      -  Hardboiled egg        -  Carrot sticks  -  Sunflower seeds                  -  Seedless grapes      -  Banana  -  Apple sauce                         -  Fig bars                    -  Celery & reduced-fat peanut butter        WEIGHT LOSS RECOMMENDATIONS:     New Technology:              -  Use the free gerber My Fitness Pal to keep track of daily calories    -  Wear Fit Bit or other exercise watch to track steps with goal of 10,000 steps a day     Aerobic exercise: goal of 3-5 times per week, about 30 minutes     Diet changes: limiting daily calorie intake to 2,000. Work on reading nutrition labels on food (in particular the serving size, the calories per serving, and carbohydrates). Work on decreasing portion sizes & snacking. Eat more vegetables and less high carbohydrate foods like bread, rice, potatoes, crackers, potato chips, and fries. Drink at least 64 oz of water daily.  Avoid eating after 8 pm.

## 2023-08-25 NOTE — PROGRESS NOTES
Chief Complaint   Patient presents with    Diabetes     3B       HISTORY OF PRESENT ILLNESS  Abhishek Nam is a 72 y.o. female    Presents for follow up evaluation. She does not have DM. She has HTN, atrial fibrillation, CHF (EF 25-30% in 9/20) with ICD in place since 9/21 with hx lead revisions, CAD s/p 2 MI's in 2006 and 2011, valvular heart disease, R breast cancer s/p lumpectomy on 7/14/22, depression with anxiety, and obesity s/p gastric bypass in 2006. No complaints. Denies headaches, CP, SOB, dizziness, heart palpitations, leg swelling, hematochezia, or melena. Patient Active Problem List   Diagnosis    Cardiomyopathy ([address])    Mild intermittent asthma without complication    History of gastric bypass    Primary osteoarthritis of both knees    HTN (hypertension)    History of MI (myocardial infarction)    Hyperlipidemia    Generalized anxiety disorder    Systolic CHF, chronic (HCC)    Chronic insomnia    Severe obesity (BMI 35.0-39. 9) with comorbidity ([address])    S/P ICD (internal cardiac defibrillator) procedure    Mitral valve regurgitation    Osteoporosis    CAD (coronary artery disease)    Moderate episode of recurrent major depressive disorder (HCC)    Malignant neoplasm of right breast in female, estrogen receptor positive ([address])    A-fib ([address])    History of cardioversion    Atherosclerosis of native coronary artery of native heart with angina pectoris ([address])    Persistent atrial fibrillation ([address])    Encounter for cardioversion procedure    V-tach [facility])    Status post ablation of atrial fibrillation    Status post catheter ablation of atrial flutter     Past Medical History:   Diagnosis Date    Acute right ankle pain 06/08/2018 5/29/18: X-ray showed possible right ankle sprain. 6/6/18: saw Dr. Shima Singer ([facility]), diagnosed with peroneal tendonitis.  Prescribed an ASO    Asthma     Atrial fibrillation ([address])     Breast cancer ([address])     Right lumpectomy    Cardiomyopathy ([address])

## 2023-08-28 DIAGNOSIS — I50.22 CHRONIC SYSTOLIC (CONGESTIVE) HEART FAILURE (HCC): ICD-10-CM

## 2023-08-28 RX ORDER — POTASSIUM CHLORIDE 1500 MG/1
TABLET, EXTENDED RELEASE ORAL
Qty: 180 TABLET | Refills: 3 | Status: SHIPPED | OUTPATIENT
Start: 2023-08-28

## 2023-10-20 RX ORDER — METOPROLOL SUCCINATE 25 MG/1
TABLET, EXTENDED RELEASE ORAL
Qty: 180 TABLET | Refills: 1 | Status: SHIPPED | OUTPATIENT
Start: 2023-10-20

## 2023-10-20 NOTE — TELEPHONE ENCOUNTER
Requested Prescriptions     Signed Prescriptions Disp Refills    metoprolol succinate (TOPROL XL) 25 MG extended release tablet 180 tablet 1     Sig: TAKE 1 TABLET BY MOUTH TWICE A DAY     Authorizing Provider: CHETAN MERCADO     Ordering User: Davina Platt     Verbal order per Dr. Gino Barber.     Future Appointments   Date Time Provider [address]   11/7/2023  1:00 PM JERSON SAENZ ECHO 1 HAKAN LUONG AMB   11/17/2023 11:40 AM MD HAKAN Mullen AMB   12/14/2023  2:00 PM PACEMAKER3DANY AMB   12/14/2023  2:20 PM MD HAKAN Menon AMB   2/26/2024 11:10 AM MD BRIAN Will BS AMB

## 2023-11-04 DIAGNOSIS — F33.1 MODERATE EPISODE OF RECURRENT MAJOR DEPRESSIVE DISORDER (HCC): Primary | ICD-10-CM

## 2023-11-04 DIAGNOSIS — F41.1 GENERALIZED ANXIETY DISORDER: ICD-10-CM

## 2023-11-04 DIAGNOSIS — F51.04 PSYCHOPHYSIOLOGIC INSOMNIA: ICD-10-CM

## 2023-11-06 RX ORDER — ZOLPIDEM TARTRATE 10 MG/1
TABLET ORAL
Qty: 30 TABLET | Refills: 2 | Status: SHIPPED | OUTPATIENT
Start: 2023-11-06 | End: 2023-12-06

## 2023-11-06 RX ORDER — FLUOXETINE HYDROCHLORIDE 40 MG/1
40 CAPSULE ORAL 2 TIMES DAILY
Qty: 180 CAPSULE | Refills: 3 | Status: SHIPPED | OUTPATIENT
Start: 2023-11-06

## 2023-11-06 NOTE — TELEPHONE ENCOUNTER
PCP: Ella Baker MD     Last appt:  8/25/2023      Future Appointments   Date Time Provider Department Center   11/7/2023  1:00 PM BSC SAENZ ECHO 1 [facility] BS [facility]   11/17/2023 11:40 AM Nuria Martino MD CAVREY  AMB   12/14/2023  2:00 PM DANY ANDINO AMB   12/14/2023  2:20 PM Dwayne Walker MD [facility]   2/26/2024 11:10 AM Ella Baker MD [facility]          Requested Prescriptions     Pending Prescriptions Disp Refills    FLUoxetine (PROZAC) 40 MG capsule [Pharmacy Med Name: FLUOXETINE HCL 40 MG CAPSULE] 180 capsule 3     Sig: TAKE 1 CAPSULE BY MOUTH TWICE A DAY

## 2023-11-06 NOTE — TELEPHONE ENCOUNTER
PCP: Ella Baker MD     Last appt:  8/25/2023      Future Appointments   Date Time Provider Department Center   11/7/2023  1:00 PM BSC SAENZ ECHO 1 [facility] BS [facility]   11/17/2023 11:40 AM Nuria Martino MD [facility]   12/14/2023  2:00 PM DANY ANDINO AMB   12/14/2023  2:20 PM Dwayne Walker MD [facility]   2/26/2024 11:10 AM Ella Baker MD [facility]          Requested Prescriptions     Pending Prescriptions Disp Refills    zolpidem (AMBIEN) 10 MG tablet [Pharmacy Med Name: ZOLPIDEM TARTRATE 10 MG TABLET] 30 tablet      Sig: TAKE 1 TABLET BY MOUTH NIGHTLY AS NEEDED FOR SLEEP FOR UP TO 30 DAYS. MAX DAILY AMOUNT: 10 MG.

## 2023-11-07 ENCOUNTER — ANCILLARY PROCEDURE (OUTPATIENT)
Age: 73
End: 2023-11-07
Payer: MEDICARE

## 2023-11-07 VITALS
SYSTOLIC BLOOD PRESSURE: 109 MMHG | WEIGHT: 221 LBS | BODY MASS INDEX: 37.73 KG/M2 | HEIGHT: 64 IN | DIASTOLIC BLOOD PRESSURE: 75 MMHG

## 2023-11-07 DIAGNOSIS — I25.5 ISCHEMIC CARDIOMYOPATHY: ICD-10-CM

## 2023-11-07 PROCEDURE — 93306 TTE W/DOPPLER COMPLETE: CPT | Performed by: INTERNAL MEDICINE

## 2023-11-08 LAB
ECHO AO ASC DIAM: 3.4 CM
ECHO AO ASCENDING AORTA INDEX: 1.67 CM/M2
ECHO AO ROOT DIAM: 2.9 CM
ECHO AO ROOT INDEX: 1.42 CM/M2
ECHO AV AREA PEAK VELOCITY: 1.2 CM2
ECHO AV AREA VTI: 1.2 CM2
ECHO AV AREA/BSA PEAK VELOCITY: 0.6 CM2/M2
ECHO AV AREA/BSA VTI: 0.6 CM2/M2
ECHO AV MEAN GRADIENT: 15 MMHG
ECHO AV MEAN VELOCITY: 1.8 M/S
ECHO AV PEAK GRADIENT: 30 MMHG
ECHO AV PEAK VELOCITY: 2.7 M/S
ECHO AV VELOCITY RATIO: 0.3
ECHO AV VTI: 66.7 CM
ECHO BSA: 2.13 M2
ECHO EST RA PRESSURE: 3 MMHG
ECHO LA DIAMETER INDEX: 3.24 CM/M2
ECHO LA DIAMETER: 6.6 CM
ECHO LA TO AORTIC ROOT RATIO: 2.28
ECHO LA VOL A-L A2C: 134 ML (ref 22–52)
ECHO LA VOL A-L A4C: 194 ML (ref 22–52)
ECHO LA VOL BP: 165 ML (ref 22–52)
ECHO LA VOL/BSA BIPLANE: 81 ML/M2 (ref 16–34)
ECHO LA VOLUME AREA LENGTH: 166 ML
ECHO LA VOLUME INDEX A-L A2C: 66 ML/M2 (ref 16–34)
ECHO LA VOLUME INDEX A-L A4C: 95 ML/M2 (ref 16–34)
ECHO LA VOLUME INDEX AREA LENGTH: 81 ML/M2 (ref 16–34)
ECHO LV E' LATERAL VELOCITY: 9 CM/S
ECHO LV E' SEPTAL VELOCITY: 5 CM/S
ECHO LV EJECTION FRACTION A2C: 55 %
ECHO LV EJECTION FRACTION A4C: 35 %
ECHO LV FRACTIONAL SHORTENING: 20 % (ref 28–44)
ECHO LV INTERNAL DIMENSION DIASTOLE INDEX: 3.38 CM/M2
ECHO LV INTERNAL DIMENSION DIASTOLIC: 6.9 CM (ref 3.9–5.3)
ECHO LV INTERNAL DIMENSION SYSTOLIC INDEX: 2.7 CM/M2
ECHO LV INTERNAL DIMENSION SYSTOLIC: 5.5 CM
ECHO LV IVSD: 0.6 CM (ref 0.6–0.9)
ECHO LV MASS 2D: 186 G (ref 67–162)
ECHO LV MASS INDEX 2D: 91.2 G/M2 (ref 43–95)
ECHO LV POSTERIOR WALL DIASTOLIC: 0.7 CM (ref 0.6–0.9)
ECHO LV RELATIVE WALL THICKNESS RATIO: 0.2
ECHO LVOT AREA: 4.2 CM2
ECHO LVOT AV VTI INDEX: 0.28
ECHO LVOT DIAM: 2.3 CM
ECHO LVOT MEAN GRADIENT: 1 MMHG
ECHO LVOT PEAK GRADIENT: 2 MMHG
ECHO LVOT PEAK VELOCITY: 0.8 M/S
ECHO LVOT STROKE VOLUME INDEX: 38.3 ML/M2
ECHO LVOT SV: 78.1 ML
ECHO LVOT VTI: 18.8 CM
ECHO MV A VELOCITY: 0.54 M/S
ECHO MV E DECELERATION TIME (DT): 121 MS
ECHO MV E VELOCITY: 1.31 M/S
ECHO MV E/A RATIO: 2.43
ECHO MV E/E' LATERAL: 14.56
ECHO MV E/E' RATIO (AVERAGED): 20.38
ECHO MV E/E' SEPTAL: 26.2
ECHO RIGHT VENTRICULAR SYSTOLIC PRESSURE (RVSP): 34 MMHG
ECHO RV BASAL DIMENSION: 3.5 CM
ECHO RV FREE WALL PEAK S': 16 CM/S
ECHO RV TAPSE: 2.6 CM (ref 1.7–?)
ECHO TV REGURGITANT MAX VELOCITY: 2.78 M/S
ECHO TV REGURGITANT PEAK GRADIENT: 31 MMHG

## 2023-11-08 PROCEDURE — 93306 TTE W/DOPPLER COMPLETE: CPT | Performed by: INTERNAL MEDICINE

## 2023-11-08 NOTE — RESULT ENCOUNTER NOTE
Kit Correa-    Your echo shows reduce heart function and a leaky mitral valve. We will discuss more in person at follow up.     Dr. Jerrod Carlton

## 2023-11-17 ENCOUNTER — OFFICE VISIT (OUTPATIENT)
Age: 73
End: 2023-11-17
Payer: MEDICARE

## 2023-11-17 VITALS
BODY MASS INDEX: 37.9 KG/M2 | DIASTOLIC BLOOD PRESSURE: 60 MMHG | RESPIRATION RATE: 18 BRPM | HEIGHT: 64 IN | HEART RATE: 56 BPM | OXYGEN SATURATION: 95 % | WEIGHT: 222 LBS | SYSTOLIC BLOOD PRESSURE: 100 MMHG

## 2023-11-17 DIAGNOSIS — I42.0 DILATED CARDIOMYOPATHY (HCC): Primary | ICD-10-CM

## 2023-11-17 PROCEDURE — 99214 OFFICE O/P EST MOD 30 MIN: CPT | Performed by: INTERNAL MEDICINE

## 2023-11-17 PROCEDURE — 3078F DIAST BP <80 MM HG: CPT | Performed by: INTERNAL MEDICINE

## 2023-11-17 PROCEDURE — G8484 FLU IMMUNIZE NO ADMIN: HCPCS | Performed by: INTERNAL MEDICINE

## 2023-11-17 PROCEDURE — 1123F ACP DISCUSS/DSCN MKR DOCD: CPT | Performed by: INTERNAL MEDICINE

## 2023-11-17 PROCEDURE — 1090F PRES/ABSN URINE INCON ASSESS: CPT | Performed by: INTERNAL MEDICINE

## 2023-11-17 PROCEDURE — 1036F TOBACCO NON-USER: CPT | Performed by: INTERNAL MEDICINE

## 2023-11-17 PROCEDURE — 3074F SYST BP LT 130 MM HG: CPT | Performed by: INTERNAL MEDICINE

## 2023-11-17 PROCEDURE — 3017F COLORECTAL CA SCREEN DOC REV: CPT | Performed by: INTERNAL MEDICINE

## 2023-11-17 PROCEDURE — G8427 DOCREV CUR MEDS BY ELIG CLIN: HCPCS | Performed by: INTERNAL MEDICINE

## 2023-11-17 PROCEDURE — G8399 PT W/DXA RESULTS DOCUMENT: HCPCS | Performed by: INTERNAL MEDICINE

## 2023-11-17 PROCEDURE — G8417 CALC BMI ABV UP PARAM F/U: HCPCS | Performed by: INTERNAL MEDICINE

## 2023-11-17 ASSESSMENT — PATIENT HEALTH QUESTIONNAIRE - PHQ9
1. LITTLE INTEREST OR PLEASURE IN DOING THINGS: 0
SUM OF ALL RESPONSES TO PHQ QUESTIONS 1-9: 0
8. MOVING OR SPEAKING SO SLOWLY THAT OTHER PEOPLE COULD HAVE NOTICED. OR THE OPPOSITE, BEING SO FIGETY OR RESTLESS THAT YOU HAVE BEEN MOVING AROUND A LOT MORE THAN USUAL: 0
9. THOUGHTS THAT YOU WOULD BE BETTER OFF DEAD, OR OF HURTING YOURSELF: 0
SUM OF ALL RESPONSES TO PHQ9 QUESTIONS 1 & 2: 0
SUM OF ALL RESPONSES TO PHQ QUESTIONS 1-9: 0
2. FEELING DOWN, DEPRESSED OR HOPELESS: 0
10. IF YOU CHECKED OFF ANY PROBLEMS, HOW DIFFICULT HAVE THESE PROBLEMS MADE IT FOR YOU TO DO YOUR WORK, TAKE CARE OF THINGS AT HOME, OR GET ALONG WITH OTHER PEOPLE: 0
6. FEELING BAD ABOUT YOURSELF - OR THAT YOU ARE A FAILURE OR HAVE LET YOURSELF OR YOUR FAMILY DOWN: 0
5. POOR APPETITE OR OVEREATING: 0
3. TROUBLE FALLING OR STAYING ASLEEP: 0
7. TROUBLE CONCENTRATING ON THINGS, SUCH AS READING THE NEWSPAPER OR WATCHING TELEVISION: 0
4. FEELING TIRED OR HAVING LITTLE ENERGY: 0
SUM OF ALL RESPONSES TO PHQ QUESTIONS 1-9: 0
SUM OF ALL RESPONSES TO PHQ QUESTIONS 1-9: 0

## 2023-11-17 ASSESSMENT — COLUMBIA-SUICIDE SEVERITY RATING SCALE - C-SSRS
4. HAVE YOU HAD THESE THOUGHTS AND HAD SOME INTENTION OF ACTING ON THEM?: NO
7. DID THIS OCCUR IN THE LAST THREE MONTHS: NO
3. HAVE YOU BEEN THINKING ABOUT HOW YOU MIGHT KILL YOURSELF?: NO
5. HAVE YOU STARTED TO WORK OUT OR WORKED OUT THE DETAILS OF HOW TO KILL YOURSELF? DO YOU INTEND TO CARRY OUT THIS PLAN?: NO

## 2023-11-17 ASSESSMENT — ENCOUNTER SYMPTOMS
SHORTNESS OF BREATH: 0
CHEST TIGHTNESS: 0
WHEEZING: 0

## 2023-11-17 NOTE — PROGRESS NOTES
Amy Dinh MD, MS, [facility]      Persistent AF: S/p cryoablation of AF, typical atrial flutter ablation on 04/13/2023. Required cardioversion of sustained VT during procedure. Continue amiodarone. ECG shows sinus bradycardia 58 bpm with QTc 429 ms. S-ICD has limited ability for recording/reporting arrhythmias. 30 day loop monitor suggested by EP- could not due to poor cell service. Should she ultimately fail AF ablation, could consider AV node ablation & upgrade to biventricular ICD. [employer] S-ICD (DOI 09/21/2016): Device check on 03/13/2023 showed proper  lead & generator function. Generator longevity estimated at middle of service. Since 12/15/2022, 5 shocks delivered to convert patient, all on 02/04/2023 (already addressed). AF burden 7%. Dr. Lemon Fraction     ICM: Dilated LV, LVEF 30-35%. NYHA II. Continue GDMT. VT: Admitted after ICD shock in 02/2023. Required cardioversion during AF ablation. Continue amiodarone. CAD: Last cath in 09/2022 showed known  in OM, prior LAD stent patent. RCA stent had mild ISR. No angina. Continue Repatha & ASA. She trasniently stopped Repath bc she had a cold, runny nose and leg pain - advised to restart. LDL 56. Anticoagulation: Continue Eliquis for embolic CVA prophylaxis. No bleeding issues. HISTORY OF PRESENT ILLNESS:    Florecita Srinivasan is a 72 y.o. female presents today for had concerns including Shortness of Breath, Results (Echo), Congestive Heart Failure, Coronary Artery Disease, Hypertension, Atrial Fibrillation, and V TACH. s/p ablation of AF/typical atrial flutter on 04/13/2023 Dr. Tiesha Curtis. During ablation procedure, she required cardioversion of VT. Continues amiodarone post ablation. She reports intermittent palpitations (occurs weekly). She states breathing & lower extremity edema have improved significantly since ablation. She denies chest pain or lightheadedness.   Monitor was ordered by

## 2023-12-08 DIAGNOSIS — F51.04 PSYCHOPHYSIOLOGIC INSOMNIA: ICD-10-CM

## 2023-12-11 RX ORDER — TRAZODONE HYDROCHLORIDE 50 MG/1
TABLET ORAL
Qty: 90 TABLET | Refills: 1 | Status: SHIPPED | OUTPATIENT
Start: 2023-12-11

## 2023-12-11 RX ORDER — AMIODARONE HYDROCHLORIDE 200 MG/1
200 TABLET ORAL DAILY
Qty: 90 TABLET | Refills: 3 | Status: SHIPPED | OUTPATIENT
Start: 2023-12-11

## 2023-12-11 NOTE — TELEPHONE ENCOUNTER
VO per MD    Future Appointments   Date Time Provider 4600  46[facility]   12/14/2023  2:00 PM Josué Velazquez BS AMB   12/14/2023  2:20 PM MD HAKAN Hurt BS AMB   2/23/2024 11:20 AM MD HAKAN Carvajal BS AMB   2/26/2024 11:10 AM Joselyn Platt MD [facility] BS AMB

## 2023-12-14 ENCOUNTER — OFFICE VISIT (OUTPATIENT)
Age: 73
End: 2023-12-14
Payer: MEDICARE

## 2023-12-14 ENCOUNTER — PROCEDURE VISIT (OUTPATIENT)
Age: 73
End: 2023-12-14
Payer: MEDICARE

## 2023-12-14 VITALS
OXYGEN SATURATION: 96 % | HEIGHT: 64 IN | HEART RATE: 56 BPM | BODY MASS INDEX: 37.66 KG/M2 | WEIGHT: 220.6 LBS | SYSTOLIC BLOOD PRESSURE: 116 MMHG | DIASTOLIC BLOOD PRESSURE: 78 MMHG

## 2023-12-14 DIAGNOSIS — Z95.810 ICD (IMPLANTABLE CARDIOVERTER-DEFIBRILLATOR) IN PLACE: Primary | ICD-10-CM

## 2023-12-14 DIAGNOSIS — I50.22 SYSTOLIC CHF, CHRONIC (HCC): ICD-10-CM

## 2023-12-14 DIAGNOSIS — Z98.890 STATUS POST CATHETER ABLATION OF ATRIAL FLUTTER: ICD-10-CM

## 2023-12-14 DIAGNOSIS — I10 PRIMARY HYPERTENSION: ICD-10-CM

## 2023-12-14 DIAGNOSIS — Z98.890 S/P ABLATION OF ATRIAL FIBRILLATION: ICD-10-CM

## 2023-12-14 DIAGNOSIS — I25.2 HISTORY OF MI (MYOCARDIAL INFARCTION): ICD-10-CM

## 2023-12-14 DIAGNOSIS — Z79.01 ANTICOAGULATED: ICD-10-CM

## 2023-12-14 DIAGNOSIS — I25.5 ISCHEMIC CARDIOMYOPATHY: ICD-10-CM

## 2023-12-14 DIAGNOSIS — Z95.810 S/P ICD (INTERNAL CARDIAC DEFIBRILLATOR) PROCEDURE: Primary | ICD-10-CM

## 2023-12-14 DIAGNOSIS — I48.19 PERSISTENT ATRIAL FIBRILLATION (HCC): ICD-10-CM

## 2023-12-14 DIAGNOSIS — Z86.79 S/P ABLATION OF ATRIAL FIBRILLATION: ICD-10-CM

## 2023-12-14 DIAGNOSIS — I25.10 CORONARY ARTERY DISEASE INVOLVING NATIVE CORONARY ARTERY OF NATIVE HEART WITHOUT ANGINA PECTORIS: ICD-10-CM

## 2023-12-14 DIAGNOSIS — I34.0 NONRHEUMATIC MITRAL (VALVE) INSUFFICIENCY: ICD-10-CM

## 2023-12-14 PROCEDURE — 93005 ELECTROCARDIOGRAM TRACING: CPT | Performed by: INTERNAL MEDICINE

## 2023-12-14 PROCEDURE — 93282 PRGRMG EVAL IMPLANTABLE DFB: CPT | Performed by: INTERNAL MEDICINE

## 2023-12-14 PROCEDURE — 99214 OFFICE O/P EST MOD 30 MIN: CPT | Performed by: INTERNAL MEDICINE

## 2023-12-14 RX ORDER — ZOLPIDEM TARTRATE 10 MG/1
10 TABLET ORAL NIGHTLY
COMMUNITY
Start: 2023-12-08

## 2023-12-14 ASSESSMENT — PATIENT HEALTH QUESTIONNAIRE - PHQ9
SUM OF ALL RESPONSES TO PHQ QUESTIONS 1-9: 0
2. FEELING DOWN, DEPRESSED OR HOPELESS: 0
SUM OF ALL RESPONSES TO PHQ9 QUESTIONS 1 & 2: 0
SUM OF ALL RESPONSES TO PHQ QUESTIONS 1-9: 0
1. LITTLE INTEREST OR PLEASURE IN DOING THINGS: 0
SUM OF ALL RESPONSES TO PHQ QUESTIONS 1-9: 0
SUM OF ALL RESPONSES TO PHQ QUESTIONS 1-9: 0

## 2023-12-15 NOTE — PROGRESS NOTES
proximal RCA. BP controlled. Anticoagulated with Eliquis & ASA, denies bleeding issues. Previous:  S/p cryoablation of AF, typical atrial flutter ablation on 04/13/2023. Required cardioversion of sustained VT during procedure. Admitted at [facility] for ICD shock (VT) 02/2023. Hypokalemic after diuresis with metolazone. DCCV 01/27/2023. Admitted 12/2022 for influenza, acute on chronic CHF, AF with RVR, & acute hypoxic respiratory failure. DCCV 12/09/2022. S/p [employer] S-ICD in place of transvenous system 09/21/2016. Initial ICD implanted 8/24/16. Transvenous RV lead displacement  x 2 due to large breast size, repositioned once. Removed entire system d/t to high recurrent risk of perforation/infection. S/p PCI RCA, LAD. Previously followed by Dr. Andrés Parker. Assessment and Plan      Diagnosis Orders   1. ICD (implantable cardioverter-defibrillator) in place        2. Coronary artery disease involving native coronary artery of native heart without angina pectoris  EKG 12 Lead      3. Primary hypertension  EKG 12 Lead      4. Status post catheter ablation of atrial flutter        5. History of MI (myocardial infarction)        6. Ischemic cardiomyopathy        7. Systolic CHF, chronic ([address])        8. Persistent atrial fibrillation ([address])        9. S/P ablation of atrial fibrillation        10. Anticoagulated        11. Nonrheumatic mitral (valve) insufficiency          ECG sinus bradycardia  Persistent AF: S/p cryoablation of AF, typical atrial flutter ablation on 04/13/2023. Required cardioversion of sustained VT during procedure. She remains in sinus rhythm  On amiodarone. ECG today shows sinus bradycardia 58 bpm with QTc 430 ms. [email or web address] S-ICD (DOI 09/21/2016): Device check on 03/13/2023 showed proper  lead & generator function. Generator longevity estimated at middle of service. ICM: Dilated LV, LVEF is improved. NYHA II.   Continue

## 2024-01-17 NOTE — PROGRESS NOTES
HISTORY OF PRESENT ILLNESS  Tomasa Siegel is a 81 y.o. female. HPI NEW patient consult referred by  Dr. Mignon Fuller for RIGHT breast invasive mammary carcinoma. Denies pain or changes to the breast area. 06/08/2022: RIGHT breast core bx. PATH: IMC with ductal and lobular features, 10mm, grade 1, ER+(90%), NE-(<1), HER2-, Ki-67(12%). LCIS: present. Family History: Mother- Colon cancer  Father- testicular and stomach cancer      Breast imaging-   [facility] Results (most recent):  Results from Hospital Encounter encounter on 06/08/22    [facility] IMAGING RT INCL CAD    Addendum 6/10/2022 11:02 AM  Addendum: Addendum to the right breast biopsy:    Findings: Pathology of the right breast mass is consistent with invasive mammary  carcinoma with ductal and lobular features, in addition to LCIS . The imaging  findings are concordant with the histology results. Recommend surgical  consultation and excision . The patient was contacted by Dr. Nickolas Bahena at 11:00  AM 6/10/2022 . We will inform the office of the referring physician and assist  in scheduling a breast surgical appointment. Narrative  STUDY:  ULTRASOUND-GUIDED CORE NEEDLE BIOPSY OF THE RIGHT BREAST    INDICATION: 81-year-old female with a right breast mass, presenting for biopsy. PROCEDURE:    The risks and benefits of the procedure were explained to the patient, questions  were answered, and informed consent was obtained. The mass at 11 o'clock in the right breast was localized with ultrasound. The  breast was prepped in the usual sterile manner. Following the administration of  a local anesthetic and dermatotomy, and using ultrasound guidance,  a 12 gauge  vacuum-assisted Atec needle was advanced to the lesion, and 5 cores were  obtained. Pre and post-fire images confirmed accurate needle trajectory. A  metallic clip was placed at the biopsy site.   Post-biopsy digital mammogram  confirms the presence of the clip at the intended biopsy Faiza Olmedo RN   site.  The patient  tolerated the procedure well without complication. Final pathology is pending. Impression  Successful ultrasound-guided biopsy yielding cores submitted for histologic  analysis. A clip was placed at the biopsy site. Post-biopsy digital mammogram  confirms the presence of the clip at the intended biopsy site. Further  recommendations will be based on final pathology results.           ROS    Physical Exam    ASSESSMENT and PLAN  {ASSESSMENT/PLAN:21165}

## 2024-02-01 DIAGNOSIS — F51.04 PSYCHOPHYSIOLOGIC INSOMNIA: ICD-10-CM

## 2024-02-02 NOTE — TELEPHONE ENCOUNTER
PCP: Ella Baker MD     Last appt:  8/25/2023      Future Appointments   Date Time Provider Department Center   2/23/2024 11:20 AM Nuria Martino MD [facility]   2/26/2024 11:10 AM Ella Baker MD [facility]   12/27/2024 10:00 AM [email or web address] BS AMB   12/27/2024 10:20 AM Megan Soto, APRN - NP [facility]          Requested Prescriptions     Pending Prescriptions Disp Refills    zolpidem (AMBIEN) 10 MG tablet [Pharmacy Med Name: ZOLPIDEM TARTRATE 10 MG TABLET] 30 tablet 2     Sig: TAKE 1 TABLET BY MOUTH NIGHTLY AS NEEDED FOR SLEEP FOR UP TO 30 DAYS. MAX DAILY AMOUNT: 10 MG.

## 2024-02-04 RX ORDER — ZOLPIDEM TARTRATE 10 MG/1
TABLET ORAL
Qty: 30 TABLET | Refills: 2 | Status: SHIPPED | OUTPATIENT
Start: 2024-02-04 | End: 2024-05-04

## 2024-02-23 ENCOUNTER — OFFICE VISIT (OUTPATIENT)
Age: 74
End: 2024-02-23

## 2024-02-23 VITALS
DIASTOLIC BLOOD PRESSURE: 66 MMHG | WEIGHT: 216 LBS | HEIGHT: 64 IN | BODY MASS INDEX: 36.88 KG/M2 | HEART RATE: 57 BPM | OXYGEN SATURATION: 96 % | SYSTOLIC BLOOD PRESSURE: 114 MMHG

## 2024-02-23 DIAGNOSIS — Z95.810 S/P ICD (INTERNAL CARDIAC DEFIBRILLATOR) PROCEDURE: ICD-10-CM

## 2024-02-23 DIAGNOSIS — Z98.890 STATUS POST ABLATION OF ATRIAL FIBRILLATION: ICD-10-CM

## 2024-02-23 DIAGNOSIS — I25.119 ATHEROSCLEROSIS OF NATIVE CORONARY ARTERY OF NATIVE HEART WITH ANGINA PECTORIS (HCC): ICD-10-CM

## 2024-02-23 DIAGNOSIS — I47.20 VT (VENTRICULAR TACHYCARDIA) (HCC): ICD-10-CM

## 2024-02-23 DIAGNOSIS — I25.10 CORONARY ARTERY DISEASE INVOLVING NATIVE CORONARY ARTERY OF NATIVE HEART WITHOUT ANGINA PECTORIS: ICD-10-CM

## 2024-02-23 DIAGNOSIS — I48.0 PAROXYSMAL ATRIAL FIBRILLATION (HCC): ICD-10-CM

## 2024-02-23 DIAGNOSIS — I10 HYPERTENSION, UNSPECIFIED TYPE: ICD-10-CM

## 2024-02-23 DIAGNOSIS — E66.01 SEVERE OBESITY (BMI 35.0-39.9) WITH COMORBIDITY (HCC): ICD-10-CM

## 2024-02-23 DIAGNOSIS — I35.0 MILD AORTIC STENOSIS: ICD-10-CM

## 2024-02-23 DIAGNOSIS — I50.22 SYSTOLIC CHF, CHRONIC (HCC): ICD-10-CM

## 2024-02-23 DIAGNOSIS — I25.5 ISCHEMIC CARDIOMYOPATHY: ICD-10-CM

## 2024-02-23 DIAGNOSIS — Z86.79 STATUS POST ABLATION OF ATRIAL FIBRILLATION: ICD-10-CM

## 2024-02-23 DIAGNOSIS — I34.0 MITRAL VALVE INSUFFICIENCY, UNSPECIFIED ETIOLOGY: Primary | ICD-10-CM

## 2024-02-23 RX ORDER — EZETIMIBE 10 MG/1
10 TABLET ORAL DAILY
Qty: 90 TABLET | Refills: 3 | Status: SHIPPED | OUTPATIENT
Start: 2024-02-23

## 2024-02-23 ASSESSMENT — PATIENT HEALTH QUESTIONNAIRE - PHQ9
SUM OF ALL RESPONSES TO PHQ QUESTIONS 1-9: 0
SUM OF ALL RESPONSES TO PHQ9 QUESTIONS 1 & 2: 0
SUM OF ALL RESPONSES TO PHQ QUESTIONS 1-9: 0
2. FEELING DOWN, DEPRESSED OR HOPELESS: 0
SUM OF ALL RESPONSES TO PHQ QUESTIONS 1-9: 0
1. LITTLE INTEREST OR PLEASURE IN DOING THINGS: 0
SUM OF ALL RESPONSES TO PHQ QUESTIONS 1-9: 0

## 2024-02-23 ASSESSMENT — ENCOUNTER SYMPTOMS
SHORTNESS OF BREATH: 0
CHEST TIGHTNESS: 0
WHEEZING: 0

## 2024-02-23 NOTE — PROGRESS NOTES
STEVIE RODRIGUEZ MD, MS, [facility]      Persistent AF: S/p cryoablation of AF, typical atrial flutter ablation on 04/13/2023.  Required cardioversion of sustained VT during procedure.  Continue amiodarone.  ECG shows sinus bradycardia 58 bpm with QTc 429 ms.      S-ICD has limited ability for recording/reporting arrhythmias.  30 day loop monitor suggested by EP - could not due to poor cell service.      Should she ultimately fail AF ablation, could consider AV node ablation & upgrade to biventricular ICD.     [employer] S-ICD (DOI 09/21/2016): Device check on 12/2023 showed proper  lead & generator function.  Generator longevity estimated at middle of service.  Since 12/15/2022, 5 shocks delivered to convert patient, all on 02/04/2023 (already addressed).  AF burden 7%.  Dr. Walker.     ICM: Dilated LV, LVEF 30-35%.  NYHA II.  Continue GDMT.     VT: Admitted after ICD shock in 02/2023.  Required cardioversion during AF ablation.  Continue amiodarone.     CAD: Last cath in 09/2022 showed known  in OM, prior LAD stent patent.  RCA stent had mild ISR.  No angina.  Continue Repatha & ASA.  She trasniently stopped Repatha bc she had a cold, runny nose and leg pain - advised to restart.  LDL 56.    Trialed praulent, could not tolerate, leg cramps.  Trialed repatha, developed leg pains could not tolerate.  Will trial zetia.       Anticoagulation: Continue Eliquis for embolic CVA prophylaxis in setting of PAF. No bleeding issues.          HISTORY OF PRESENT ILLNESS:    Madeline Rocha is a 73 y.o. female presents today for had concerns including 3 Month Follow-Up.      s/p ablation of AF/typical atrial flutter on 04/13/2023 Dr. Walker. During ablation procedure, she required cardioversion of VT.     Continues amiodarone post ablation.  She reports intermittent palpitations (occurs weekly).  She states breathing & lower extremity edema have improved significantly since ablation.  She denies chest pain or

## 2024-02-25 SDOH — HEALTH STABILITY: PHYSICAL HEALTH
ON AVERAGE, HOW MANY DAYS PER WEEK DO YOU ENGAGE IN MODERATE TO STRENUOUS EXERCISE (LIKE A BRISK WALK)?: PATIENT DECLINED

## 2024-02-25 ASSESSMENT — LIFESTYLE VARIABLES
HOW OFTEN DO YOU HAVE SIX OR MORE DRINKS ON ONE OCCASION: 1
HOW MANY STANDARD DRINKS CONTAINING ALCOHOL DO YOU HAVE ON A TYPICAL DAY: PATIENT DOES NOT DRINK
HOW OFTEN DO YOU HAVE A DRINK CONTAINING ALCOHOL: NEVER
HOW MANY STANDARD DRINKS CONTAINING ALCOHOL DO YOU HAVE ON A TYPICAL DAY: 0
HOW OFTEN DO YOU HAVE A DRINK CONTAINING ALCOHOL: 1

## 2024-02-25 ASSESSMENT — PATIENT HEALTH QUESTIONNAIRE - PHQ9
SUM OF ALL RESPONSES TO PHQ QUESTIONS 1-9: 0
SUM OF ALL RESPONSES TO PHQ9 QUESTIONS 1 & 2: 0
SUM OF ALL RESPONSES TO PHQ QUESTIONS 1-9: 0
2. FEELING DOWN, DEPRESSED OR HOPELESS: 0
1. LITTLE INTEREST OR PLEASURE IN DOING THINGS: 0

## 2024-02-26 ENCOUNTER — OFFICE VISIT (OUTPATIENT)
Facility: CLINIC | Age: 74
End: 2024-02-26
Payer: MEDICARE

## 2024-02-26 VITALS
DIASTOLIC BLOOD PRESSURE: 78 MMHG | HEART RATE: 55 BPM | RESPIRATION RATE: 16 BRPM | SYSTOLIC BLOOD PRESSURE: 137 MMHG | BODY MASS INDEX: 36.84 KG/M2 | TEMPERATURE: 97.7 F | HEIGHT: 64 IN | OXYGEN SATURATION: 95 % | WEIGHT: 215.8 LBS

## 2024-02-26 DIAGNOSIS — E66.01 SEVERE OBESITY (BMI 35.0-39.9) WITH COMORBIDITY (HCC): ICD-10-CM

## 2024-02-26 DIAGNOSIS — F51.04 CHRONIC INSOMNIA: ICD-10-CM

## 2024-02-26 DIAGNOSIS — I10 PRIMARY HYPERTENSION: ICD-10-CM

## 2024-02-26 DIAGNOSIS — E53.8 VITAMIN B12 DEFICIENCY: ICD-10-CM

## 2024-02-26 DIAGNOSIS — Z00.00 MEDICARE ANNUAL WELLNESS VISIT, SUBSEQUENT: Primary | ICD-10-CM

## 2024-02-26 DIAGNOSIS — Z12.31 ENCOUNTER FOR SCREENING MAMMOGRAM FOR MALIGNANT NEOPLASM OF BREAST: ICD-10-CM

## 2024-02-26 DIAGNOSIS — E83.42 HYPOMAGNESEMIA: ICD-10-CM

## 2024-02-26 DIAGNOSIS — I25.10 CORONARY ARTERY DISEASE INVOLVING NATIVE CORONARY ARTERY OF NATIVE HEART WITHOUT ANGINA PECTORIS: ICD-10-CM

## 2024-02-26 DIAGNOSIS — E78.2 MIXED HYPERLIPIDEMIA: ICD-10-CM

## 2024-02-26 DIAGNOSIS — Z12.11 SCREENING FOR COLON CANCER: ICD-10-CM

## 2024-02-26 DIAGNOSIS — M17.0 PRIMARY OSTEOARTHRITIS OF BOTH KNEES: ICD-10-CM

## 2024-02-26 DIAGNOSIS — C50.911 MALIGNANT NEOPLASM OF RIGHT FEMALE BREAST, UNSPECIFIED ESTROGEN RECEPTOR STATUS, UNSPECIFIED SITE OF BREAST (HCC): ICD-10-CM

## 2024-02-26 DIAGNOSIS — E55.9 VITAMIN D DEFICIENCY: ICD-10-CM

## 2024-02-26 DIAGNOSIS — I48.0 PAROXYSMAL ATRIAL FIBRILLATION (HCC): ICD-10-CM

## 2024-02-26 DIAGNOSIS — F33.1 MAJOR DEPRESSIVE DISORDER, RECURRENT, MODERATE (HCC): ICD-10-CM

## 2024-02-26 DIAGNOSIS — D50.9 IRON DEFICIENCY ANEMIA, UNSPECIFIED IRON DEFICIENCY ANEMIA TYPE: ICD-10-CM

## 2024-02-26 PROCEDURE — G8417 CALC BMI ABV UP PARAM F/U: HCPCS | Performed by: INTERNAL MEDICINE

## 2024-02-26 PROCEDURE — G8427 DOCREV CUR MEDS BY ELIG CLIN: HCPCS | Performed by: INTERNAL MEDICINE

## 2024-02-26 PROCEDURE — 99214 OFFICE O/P EST MOD 30 MIN: CPT | Performed by: INTERNAL MEDICINE

## 2024-02-26 PROCEDURE — 3078F DIAST BP <80 MM HG: CPT | Performed by: INTERNAL MEDICINE

## 2024-02-26 PROCEDURE — G8399 PT W/DXA RESULTS DOCUMENT: HCPCS | Performed by: INTERNAL MEDICINE

## 2024-02-26 PROCEDURE — 1123F ACP DISCUSS/DSCN MKR DOCD: CPT | Performed by: INTERNAL MEDICINE

## 2024-02-26 PROCEDURE — 1090F PRES/ABSN URINE INCON ASSESS: CPT | Performed by: INTERNAL MEDICINE

## 2024-02-26 PROCEDURE — 1036F TOBACCO NON-USER: CPT | Performed by: INTERNAL MEDICINE

## 2024-02-26 PROCEDURE — 3075F SYST BP GE 130 - 139MM HG: CPT | Performed by: INTERNAL MEDICINE

## 2024-02-26 PROCEDURE — G8484 FLU IMMUNIZE NO ADMIN: HCPCS | Performed by: INTERNAL MEDICINE

## 2024-02-26 PROCEDURE — 3017F COLORECTAL CA SCREEN DOC REV: CPT | Performed by: INTERNAL MEDICINE

## 2024-02-26 PROCEDURE — G0439 PPPS, SUBSEQ VISIT: HCPCS | Performed by: INTERNAL MEDICINE

## 2024-02-26 RX ORDER — SEMAGLUTIDE 0.25 MG/.5ML
0.25 INJECTION, SOLUTION SUBCUTANEOUS
Qty: 2 ML | Refills: 0 | Status: SHIPPED | OUTPATIENT
Start: 2024-02-26

## 2024-02-26 ASSESSMENT — PATIENT HEALTH QUESTIONNAIRE - PHQ9
SUM OF ALL RESPONSES TO PHQ QUESTIONS 1-9: 0
6. FEELING BAD ABOUT YOURSELF - OR THAT YOU ARE A FAILURE OR HAVE LET YOURSELF OR YOUR FAMILY DOWN: 0
5. POOR APPETITE OR OVEREATING: 0
9. THOUGHTS THAT YOU WOULD BE BETTER OFF DEAD, OR OF HURTING YOURSELF: 0
4. FEELING TIRED OR HAVING LITTLE ENERGY: 0
8. MOVING OR SPEAKING SO SLOWLY THAT OTHER PEOPLE COULD HAVE NOTICED. OR THE OPPOSITE, BEING SO FIGETY OR RESTLESS THAT YOU HAVE BEEN MOVING AROUND A LOT MORE THAN USUAL: 0
SUM OF ALL RESPONSES TO PHQ QUESTIONS 1-9: 0
10. IF YOU CHECKED OFF ANY PROBLEMS, HOW DIFFICULT HAVE THESE PROBLEMS MADE IT FOR YOU TO DO YOUR WORK, TAKE CARE OF THINGS AT HOME, OR GET ALONG WITH OTHER PEOPLE: 0
3. TROUBLE FALLING OR STAYING ASLEEP: 0
7. TROUBLE CONCENTRATING ON THINGS, SUCH AS READING THE NEWSPAPER OR WATCHING TELEVISION: 0

## 2024-02-26 NOTE — PROGRESS NOTES
Medicare Annual Wellness Visit    Madeline Rocha is here for Medicare AWV    Assessment & Plan     Medicare annual wellness visit, subsequent  Mixed hyperlipidemia  Stable on Zetia. Unable to tolerate statins due to severe muscle pains and spasms.  -     Lipid Panel  Primary hypertension  Controlled. Continue Entresto and metoprolol.  -     CBC with Auto Differential  -     Comprehensive Metabolic Panel  Major depressive disorder, recurrent, moderate (HCC)  Controlled on Prozac 40 mg BID.  Malignant neoplasm of right female breast, unspecified estrogen receptor status, unspecified site of breast (HCC)  Severe obesity (BMI 35.0-39.9) with comorbidity (HCC)  Her cardiologist strongly supported weight loss for heart health. Will order Wegovy and aim to get prior authorization if not covered.  -     Start Semaglutide-Weight Management (WEGOVY) 0.25 MG/0.5ML SOAJ SC injection; Inject 0.25 mg into the skin every 7 days, Disp-2 mL, R-0Normal  Vitamin D deficiency  -     Vitamin D 25 Hydroxy  Vitamin B12 deficiency  -     Vitamin B12 & Folate  Iron deficiency anemia, unspecified iron deficiency anemia type  Started after gastric bypass surgery. Continue Fe tablets.  -     Ferritin  -     Iron and TIBC  Hypomagnesemia  -     Magnesium  Chronic insomnia  Will aim to slowly wean off Zolpidem. Take trazodone 50 mg 2 tabs nightly alternating with Zolpidem 10 mg nightly.  Primary osteoarthritis of both knees  Coronary artery disease involving native coronary artery of native heart without angina pectoris  Stable. Has CHF; takes Entresto, metoprolol, and Bumex.  Paroxysmal atrial fibrillation (HCC)  S/p cardioversion on 1/27/23. Stable on amiodarone and Eliquis.  Screening for colon cancer  -     AFL - Wu Correa MD, Gastroenterology, [place]  Encounter for screening mammogram for malignant neoplasm of breast  -     MELINDA HECTOR DIGITAL SCREEN BILATERAL; Future        Time Spent: 40 mins on medical record review,

## 2024-02-26 NOTE — PATIENT INSTRUCTIONS
liability for your use of this information.           A Healthy Heart: Care Instructions  Your Care Instructions     Coronary artery disease, also called heart disease, occurs when a substance called plaque builds up in the vessels that supply oxygen-rich blood to your heart muscle. This can narrow the blood vessels and reduce blood flow. A heart attack happens when blood flow is completely blocked. A high-fat diet, smoking, and other factors increase the risk of heart disease.  Your doctor has found that you have a chance of having heart disease. You can do lots of things to keep your heart healthy. It may not be easy, but you can change your diet, exercise more, and quit smoking. These steps really work to lower your chance of heart disease.  Follow-up care is a key part of your treatment and safety. Be sure to make and go to all appointments, and call your doctor if you are having problems. It's also a good idea to know your test results and keep a list of the medicines you take.  How can you care for yourself at home?  Diet    Use less salt when you cook and eat. This helps lower your blood pressure. Taste food before salting. Add only a little salt when you think you need it. With time, your taste buds will adjust to less salt.     Eat fewer snack items, fast foods, canned soups, and other high-salt, high-fat, processed foods.     Read food labels and try to avoid saturated and trans fats. They increase your risk of heart disease by raising cholesterol levels.     Limit the amount of solid fat-butter, margarine, and shortening-you eat. Use olive, peanut, or canola oil when you cook. Bake, broil, and steam foods instead of frying them.     Eat a variety of fruit and vegetables every day. Dark green, deep orange, red, or yellow fruits and vegetables are especially good for you. Examples include spinach, carrots, peaches, and berries.     Foods high in fiber can reduce your cholesterol and provide important

## 2024-02-26 NOTE — PROGRESS NOTES
Madeline Rocha  Identified pt with two pt identifiers(name and ).  Chief Complaint   Patient presents with    Medicare AWV       1. Have you been to the ER, urgent care clinic since your last visit?  Hospitalized since your last visit? NO    2. Have you seen or consulted any other health care providers outside of the [facility] since your last visit?  Include any pap smears or colon screening. Pt had colonoscopy done about 10 yrs ago with Dr. Correa.   Pt had Shingles vaccine.     Provider notified of reason for visit, vitals and flowsheets obtained on patients.     Patient received paperwork for advance directive during previous visit but has not completed at this time     Reviewed record In preparation for visit, huddled with provider and have obtained necessary documentation      Health Maintenance Due   Topic    Colorectal Cancer Screen     COVID-19 Vaccine (3 - 2023-24 season)    Annual Wellness Visit (Medicare)     Shingles vaccine (3 of 3)    Breast cancer screen        Wt Readings from Last 3 Encounters:   24 97.9 kg (215 lb 12.8 oz)   24 98 kg (216 lb)   23 100.1 kg (220 lb 9.6 oz)     Temp Readings from Last 3 Encounters:   24 97.7 °F (36.5 °C) (Oral)   23 98 °F (36.7 °C) (Oral)   23 98.4 °F (36.9 °C) (Oral)     BP Readings from Last 3 Encounters:   24 137/78   24 114/66   23 116/78     Pulse Readings from Last 3 Encounters:   24 55   24 57   23 56          No data to display                  Learning Assessment:  :         2023     1:40 PM   [facility] AMB LEARNING ASSESSMENT   Primary Learner Patient   level of education 2 YEARS OF COLLEGE   Primary Language ENGLISH   Learning Preference READING   Answered By patient   Relationship to Learner SELF       Fall Risk Assessment:  :         2024    11:00 PM 2024    10:59 AM 2023     1:53 PM 2023    10:58 AM 3/28/2023    11:39 AM 3/27/2023     8:31

## 2024-02-27 NOTE — ACP (ADVANCE CARE PLANNING)
Advance Care Planning     Advance Care Planning (ACP) Physician/NP/PA (Provider) Conversation      Date of ACP Conversation: 2/26/2024    Conversation Conducted with:   Patient with Decision Making Capacity    Health Care Decision Maker:    Current Designated Health Care Decision Maker:    Primary Decision Maker: Edilson Rocha - Spouse - [phone]    Secondary Decision Maker: Kelsey Nguyen - Child - [phone]    Note: Assess and validate information in current ACP documents, as indicated.     Care Preferences:    Hospitalization:  \"If your health worsens and it becomes clear that your chance of recovery is unlikely, what would your preference be regarding hospitalization?\"  If the patient would want hospitalization, answer \"yes\". If the patient would prefer comfort-focused treatment without hospitalization, answer \"no\". YES/NO/WILD CARDS: yes      Ventilation:  \"If you were in your present state of health and suddenly became very ill and were unable to breathe on your own, what would your preference be about the use of a ventilator (breathing machine) if it was available to you?\"    If patient would desire the use of a ventilator (breathing machine), answer \"yes\", if not answer \"no\":yes    \"If your health worsens and it becomes clear that your chance of recovery is unlikely, what would your preference be about the use of a ventilator (breathing machine) if it was available to you?\"   undecided    Resuscitation:  \"CPR works best to restart the heart when there is a sudden event, like a heart attack, in someone who is otherwise healthy. Unfortunately, CPR does not typically restart the heart for people who have serious health conditions or who are very sick.\"    \"In the event your heart stopped as a result of an underlying serious health condition, would you want attempts to be made to restart your heart (answer \"yes\" for attempt to resuscitate) or would you prefer a natural death (answer \"no\" for do not

## 2024-03-12 LAB
25(OH)D3 SERPL-MCNC: 21.7 NG/ML (ref 30–100)
ALBUMIN SERPL-MCNC: 3.5 G/DL (ref 3.5–5)
ALBUMIN/GLOB SERPL: 1.1 (ref 1.1–2.2)
ALP SERPL-CCNC: 57 U/L (ref 45–117)
ALT SERPL-CCNC: 35 U/L (ref 12–78)
ANION GAP SERPL CALC-SCNC: 3 MMOL/L (ref 5–15)
AST SERPL-CCNC: 16 U/L (ref 15–37)
BASOPHILS # BLD: 0.1 K/UL (ref 0–0.1)
BASOPHILS NFR BLD: 1 % (ref 0–1)
BILIRUB SERPL-MCNC: 0.5 MG/DL (ref 0.2–1)
BUN SERPL-MCNC: 20 MG/DL (ref 6–20)
BUN/CREAT SERPL: 22 (ref 12–20)
CALCIUM SERPL-MCNC: 8.8 MG/DL (ref 8.5–10.1)
CHLORIDE SERPL-SCNC: 105 MMOL/L (ref 97–108)
CHOLEST SERPL-MCNC: 218 MG/DL
CO2 SERPL-SCNC: 28 MMOL/L (ref 21–32)
CREAT SERPL-MCNC: 0.93 MG/DL (ref 0.55–1.02)
DIFFERENTIAL METHOD BLD: ABNORMAL
EOSINOPHIL # BLD: 0.1 K/UL (ref 0–0.4)
EOSINOPHIL NFR BLD: 2 % (ref 0–7)
ERYTHROCYTE [DISTWIDTH] IN BLOOD BY AUTOMATED COUNT: 18.2 % (ref 11.5–14.5)
FERRITIN SERPL-MCNC: 10 NG/ML (ref 8–252)
GLOBULIN SER CALC-MCNC: 3.1 G/DL (ref 2–4)
GLUCOSE SERPL-MCNC: 82 MG/DL (ref 65–100)
HCT VFR BLD AUTO: 34.6 % (ref 35–47)
HDLC SERPL-MCNC: 70 MG/DL
HDLC SERPL: 3.1 (ref 0–5)
HGB BLD-MCNC: 10.3 G/DL (ref 11.5–16)
IMM GRANULOCYTES # BLD AUTO: 0 K/UL (ref 0–0.04)
IMM GRANULOCYTES NFR BLD AUTO: 0 % (ref 0–0.5)
IRON SATN MFR SERPL: 9 % (ref 20–50)
IRON SERPL-MCNC: 32 UG/DL (ref 35–150)
LDLC SERPL CALC-MCNC: 130.6 MG/DL (ref 0–100)
LYMPHOCYTES # BLD: 1.3 K/UL (ref 0.8–3.5)
LYMPHOCYTES NFR BLD: 20 % (ref 12–49)
MAGNESIUM SERPL-MCNC: 2 MG/DL (ref 1.6–2.4)
MCH RBC QN AUTO: 23.2 PG (ref 26–34)
MCHC RBC AUTO-ENTMCNC: 29.8 G/DL (ref 30–36.5)
MCV RBC AUTO: 77.9 FL (ref 80–99)
MONOCYTES # BLD: 0.5 K/UL (ref 0–1)
MONOCYTES NFR BLD: 8 % (ref 5–13)
NEUTS SEG # BLD: 4.3 K/UL (ref 1.8–8)
NEUTS SEG NFR BLD: 69 % (ref 32–75)
NRBC # BLD: 0 K/UL (ref 0–0.01)
NRBC BLD-RTO: 0 PER 100 WBC
PLATELET # BLD AUTO: 279 K/UL (ref 150–400)
PMV BLD AUTO: 13 FL (ref 8.9–12.9)
POTASSIUM SERPL-SCNC: 3.5 MMOL/L (ref 3.5–5.1)
PROT SERPL-MCNC: 6.6 G/DL (ref 6.4–8.2)
RBC # BLD AUTO: 4.44 M/UL (ref 3.8–5.2)
SODIUM SERPL-SCNC: 136 MMOL/L (ref 136–145)
TIBC SERPL-MCNC: 376 UG/DL (ref 250–450)
TRIGL SERPL-MCNC: 87 MG/DL
VIT B12 SERPL-MCNC: 382 PG/ML (ref 193–986)
VLDLC SERPL CALC-MCNC: 17.4 MG/DL
WBC # BLD AUTO: 6.2 K/UL (ref 3.6–11)

## 2024-03-14 LAB — FOLATE SERPL-MCNC: 8.8 NG/ML

## 2024-04-04 RX ORDER — METOPROLOL SUCCINATE 25 MG/1
TABLET, EXTENDED RELEASE ORAL
Qty: 180 TABLET | Refills: 2 | Status: SHIPPED | OUTPATIENT
Start: 2024-04-04

## 2024-04-04 NOTE — TELEPHONE ENCOUNTER
Requested Prescriptions     Pending Prescriptions Disp Refills    metoprolol succinate (TOPROL XL) 25 MG extended release tablet [Pharmacy Med Name: METOPROLOL SUCC ER 25 MG TAB] 180 tablet 2     Sig: TAKE 1 TABLET BY MOUTH TWICE A DAY       Med refilled per VO by MD.      Future Appointments   Date Time Provider Department Center   8/5/2024 11:00 AM BSTALITA SAENZ VASCULAR HAKAN BS AMB   8/15/2024 10:00 AM Nuria Martino MD [facility] BS AMB   8/26/2024 11:10 AM Ella Baker MD [facility] BS AMB   12/27/2024 10:00 AM [email or web address], DANY MCCLENDON BS AMB   12/27/2024 10:20 AM Megan Soto, APRN - NP [facility] BS AMB

## 2024-04-12 RX ORDER — SACUBITRIL AND VALSARTAN 24; 26 MG/1; MG/1
1 TABLET, FILM COATED ORAL 2 TIMES DAILY
Qty: 180 TABLET | Refills: 3 | Status: SHIPPED | OUTPATIENT
Start: 2024-04-12

## 2024-05-02 DIAGNOSIS — F51.04 PSYCHOPHYSIOLOGIC INSOMNIA: ICD-10-CM

## 2024-05-02 NOTE — TELEPHONE ENCOUNTER
PCP: Ella Baker MD     Last appt: 2/26/2024    Future Appointments   Date Time Provider Department Center   8/5/2024 11:00 AM [facility] SAENZ VASCULAR [facility]   8/15/2024 10:00 AM Nuria Martino MD [facility]   8/26/2024 11:10 AM Ella Baker MD [facility]   10/10/2024 11:00 AM Pablo Camacho MD [facility] AMB   12/27/2024 10:00 AM DANY ANDINO [facility]   12/27/2024 10:20 AM Christiane Ervin APRN - CNP [facility]          Requested Prescriptions     Pending Prescriptions Disp Refills    zolpidem (AMBIEN) 10 MG tablet [Pharmacy Med Name: ZOLPIDEM TARTRATE 10 MG TABLET] 30 tablet      Sig: TAKE 1 TABLET BY MOUTH NIGHTLY AS NEEDED FOR SLEEP FOR UP TO 30 DAYS. MAX DAILY AMOUNT: 10 MG.

## 2024-05-04 RX ORDER — ZOLPIDEM TARTRATE 10 MG/1
TABLET ORAL
Qty: 30 TABLET | Refills: 2 | Status: SHIPPED | OUTPATIENT
Start: 2024-05-04 | End: 2024-08-02

## 2024-06-30 DIAGNOSIS — I25.5 ISCHEMIC CARDIOMYOPATHY: ICD-10-CM

## 2024-07-02 DIAGNOSIS — I50.22 CHRONIC SYSTOLIC (CONGESTIVE) HEART FAILURE (HCC): ICD-10-CM

## 2024-07-02 DIAGNOSIS — J45.20 MILD INTERMITTENT ASTHMA, UNCOMPLICATED: ICD-10-CM

## 2024-07-02 RX ORDER — BUMETANIDE 2 MG/1
2 TABLET ORAL DAILY
Qty: 90 TABLET | Refills: 1 | Status: SHIPPED | OUTPATIENT
Start: 2024-07-02

## 2024-07-02 NOTE — TELEPHONE ENCOUNTER
Per verbal order from Dr. Nuria Rodriguez  Last appt: 2/23/2024     Future Appointments   Date Time Provider Department Center   8/5/2024 11:00 AM [facility] SAENZ VASCULAR [facility] BS AMB   8/15/2024 10:00 AM Nuria Rodriguez MD [facility] BS AMB   8/26/2024 11:10 AM Ella Baker MD [facility] BS AMB   10/10/2024 11:00 AM Pablo Camacho MD [facility] BS AMB   12/27/2024 10:00 AM DANY ANDINO BS AMB   12/27/2024 10:20 AM Megan Soto, APRN - NP HAKAN BS AMB       Requested Prescriptions     Signed Prescriptions Disp Refills    bumetanide (BUMEX) 2 MG tablet 90 tablet 1     Sig: TAKE 1 TABLET BY MOUTH EVERY DAY     Authorizing Provider: NURIA RODRIGUEZ     Ordering User: ADONIS PERKINS

## 2024-07-03 RX ORDER — POTASSIUM CHLORIDE 1500 MG/1
TABLET, EXTENDED RELEASE ORAL
Qty: 180 TABLET | Refills: 3 | Status: SHIPPED | OUTPATIENT
Start: 2024-07-03

## 2024-07-03 RX ORDER — ALBUTEROL SULFATE 90 UG/1
AEROSOL, METERED RESPIRATORY (INHALATION)
Qty: 6.7 EACH | Refills: 11 | Status: SHIPPED | OUTPATIENT
Start: 2024-07-03

## 2024-07-03 NOTE — TELEPHONE ENCOUNTER
PCP: Ella Baker MD     Last appt:  2/26/2024      Future Appointments   Date Time Provider Department Center   8/5/2024 11:00 AM [facility] SAENZ VASCULAR [facility]   8/15/2024 10:00 AM Nuria Martino MD [facility]  AMB   8/26/2024 11:10 AM Ella Baker MD [facility] AMB   10/10/2024 11:00 AM Pablo Camacho MD [facility] AMB   12/27/2024 10:00 AM DANY ANDINO  AMB   12/27/2024 10:20 AM Megan Stoo, APRN - NP HAKAN  AMB          Requested Prescriptions     Pending Prescriptions Disp Refills    albuterol sulfate HFA (PROVENTIL;VENTOLIN;PROAIR) 108 (90 Base) MCG/ACT inhaler [Pharmacy Med Name: ALBUTEROL HFA (PROVENTIL) INH] 6.7 each 11     Sig: TAKE 2 PUFFS BY INHALATION EVERY FOUR HOURS AS NEEDED FOR WHEEZING OR SHORTNESS OF BREATH.    KLOR-CON M20 20 MEQ extended release tablet [Pharmacy Med Name: KLOR-CON M20 TABLET] 180 tablet 3     Sig: TAKE 2 TABLETS BY MOUTH EVERY DAY

## 2024-07-27 PROCEDURE — 93295 DEV INTERROG REMOTE 1/2/MLT: CPT | Performed by: INTERNAL MEDICINE

## 2024-08-05 ENCOUNTER — ANCILLARY PROCEDURE (OUTPATIENT)
Age: 74
End: 2024-08-05
Payer: MEDICARE

## 2024-08-05 VITALS — WEIGHT: 215 LBS | HEIGHT: 64 IN | BODY MASS INDEX: 36.7 KG/M2

## 2024-08-05 DIAGNOSIS — I48.0 PAROXYSMAL ATRIAL FIBRILLATION (HCC): ICD-10-CM

## 2024-08-05 DIAGNOSIS — I25.119 ATHEROSCLEROSIS OF NATIVE CORONARY ARTERY OF NATIVE HEART WITH ANGINA PECTORIS (HCC): ICD-10-CM

## 2024-08-05 DIAGNOSIS — I25.5 ISCHEMIC CARDIOMYOPATHY: ICD-10-CM

## 2024-08-05 DIAGNOSIS — I34.0 MITRAL VALVE INSUFFICIENCY, UNSPECIFIED ETIOLOGY: ICD-10-CM

## 2024-08-05 DIAGNOSIS — I50.22 SYSTOLIC CHF, CHRONIC (HCC): ICD-10-CM

## 2024-08-05 DIAGNOSIS — I35.0 MILD AORTIC STENOSIS: ICD-10-CM

## 2024-08-05 DIAGNOSIS — F51.04 PSYCHOPHYSIOLOGIC INSOMNIA: ICD-10-CM

## 2024-08-05 DIAGNOSIS — I10 HYPERTENSION, UNSPECIFIED TYPE: ICD-10-CM

## 2024-08-05 DIAGNOSIS — I25.10 CORONARY ARTERY DISEASE INVOLVING NATIVE CORONARY ARTERY OF NATIVE HEART WITHOUT ANGINA PECTORIS: ICD-10-CM

## 2024-08-05 PROCEDURE — 93306 TTE W/DOPPLER COMPLETE: CPT | Performed by: INTERNAL MEDICINE

## 2024-08-06 RX ORDER — ZOLPIDEM TARTRATE 10 MG/1
10 TABLET ORAL NIGHTLY PRN
Qty: 30 TABLET | Refills: 2 | Status: SHIPPED | OUTPATIENT
Start: 2024-08-06 | End: 2024-11-04

## 2024-08-06 NOTE — TELEPHONE ENCOUNTER
PCP: Ella Baker MD     Last appt:  2/26/2024      Future Appointments   Date Time Provider Department Center   8/15/2024 10:00 AM Nuria Martino MD [facility]   8/26/2024 11:10 AM Ella Baker MD [facility]   10/10/2024 11:00 AM Pablo Camacho MD [facility]   12/27/2024 10:00 AM DANY ANDINO BS AMB   12/27/2024 10:20 AM Megan Soto, APRN - NP HAKAN BS AMB          Requested Prescriptions     Pending Prescriptions Disp Refills    zolpidem (AMBIEN) 10 MG tablet [Pharmacy Med Name: ZOLPIDEM TARTRATE 10 MG TABLET] 30 tablet 0     Sig: TAKE 1 TABLET BY MOUTH NIGHTLY AS NEEDED FOR SLEEP FOR UP TO 30 DAYS. MAX DAILY AMOUNT: 10 MG.

## 2024-08-08 LAB
ECHO AO ASC DIAM: 3.2 CM
ECHO AO ASCENDING AORTA INDEX: 1.58 CM/M2
ECHO AO ROOT DIAM: 3.1 CM
ECHO AO ROOT INDEX: 1.53 CM/M2
ECHO AV AREA PEAK VELOCITY: 1.5 CM2
ECHO AV AREA VTI: 1.5 CM2
ECHO AV AREA/BSA PEAK VELOCITY: 0.7 CM2/M2
ECHO AV AREA/BSA VTI: 0.7 CM2/M2
ECHO AV MEAN GRADIENT: 13 MMHG
ECHO AV MEAN VELOCITY: 1.7 M/S
ECHO AV PEAK GRADIENT: 24 MMHG
ECHO AV PEAK VELOCITY: 2.5 M/S
ECHO AV VELOCITY RATIO: 0.36
ECHO AV VTI: 59.3 CM
ECHO BSA: 2.1 M2
ECHO EST RA PRESSURE: 3 MMHG
ECHO LA DIAMETER INDEX: 3.02 CM/M2
ECHO LA DIAMETER: 6.1 CM
ECHO LA TO AORTIC ROOT RATIO: 1.97
ECHO LA VOL A-L A2C: 176 ML (ref 22–52)
ECHO LA VOL A-L A4C: 209 ML (ref 22–52)
ECHO LA VOL BP: 181 ML (ref 22–52)
ECHO LA VOL MOD A2C: 165 ML (ref 22–52)
ECHO LA VOL/BSA BIPLANE: 90 ML/M2 (ref 16–34)
ECHO LA VOLUME AREA LENGTH: 197 ML
ECHO LA VOLUME INDEX A-L A2C: 87 ML/M2 (ref 16–34)
ECHO LA VOLUME INDEX A-L A4C: 103 ML/M2 (ref 16–34)
ECHO LA VOLUME INDEX AREA LENGTH: 98 ML/M2 (ref 16–34)
ECHO LA VOLUME INDEX MOD A2C: 82 ML/M2 (ref 16–34)
ECHO LA VOLUME INDEX MOD A4C: 94 ML/M2 (ref 16–34)
ECHO LV E' LATERAL VELOCITY: 8 CM/S
ECHO LV E' SEPTAL VELOCITY: 4 CM/S
ECHO LV EDV A2C: 240 ML
ECHO LV EDV A4C: 267 ML
ECHO LV EDV BP: 271 ML (ref 56–104)
ECHO LV EDV INDEX A4C: 132 ML/M2
ECHO LV EDV INDEX BP: 134 ML/M2
ECHO LV EDV NDEX A2C: 119 ML/M2
ECHO LV EJECTION FRACTION A2C: 35 %
ECHO LV EJECTION FRACTION A4C: 33 %
ECHO LV EJECTION FRACTION BIPLANE: 37 % (ref 55–100)
ECHO LV ESV A2C: 156 ML
ECHO LV ESV A4C: 179 ML
ECHO LV ESV BP: 172 ML (ref 19–49)
ECHO LV ESV INDEX A2C: 77 ML/M2
ECHO LV ESV INDEX A4C: 89 ML/M2
ECHO LV FRACTIONAL SHORTENING: 23 % (ref 28–44)
ECHO LV INTERNAL DIMENSION DIASTOLE INDEX: 3.47 CM/M2
ECHO LV INTERNAL DIMENSION DIASTOLIC: 7 CM (ref 3.9–5.3)
ECHO LV INTERNAL DIMENSION SYSTOLIC INDEX: 2.67 CM/M2
ECHO LV INTERNAL DIMENSION SYSTOLIC: 5.4 CM
ECHO LV IVSD: 1.1 CM (ref 0.6–0.9)
ECHO LV MASS 2D: 321.8 G (ref 67–162)
ECHO LV MASS INDEX 2D: 159.3 G/M2 (ref 43–95)
ECHO LV POSTERIOR WALL DIASTOLIC: 0.9 CM (ref 0.6–0.9)
ECHO LV RELATIVE WALL THICKNESS RATIO: 0.26
ECHO LVOT AREA: 4.2 CM2
ECHO LVOT AV VTI INDEX: 0.35
ECHO LVOT DIAM: 2.3 CM
ECHO LVOT MEAN GRADIENT: 2 MMHG
ECHO LVOT PEAK GRADIENT: 3 MMHG
ECHO LVOT PEAK VELOCITY: 0.9 M/S
ECHO LVOT STROKE VOLUME INDEX: 42.3 ML/M2
ECHO LVOT SV: 85.5 ML
ECHO LVOT VTI: 20.6 CM
ECHO MV A VELOCITY: 0.48 M/S
ECHO MV AREA PHT: 4.8 CM2
ECHO MV E DECELERATION TIME (DT): 159.4 MS
ECHO MV E VELOCITY: 1.35 M/S
ECHO MV E/A RATIO: 2.81
ECHO MV E/E' LATERAL: 16.88
ECHO MV E/E' RATIO (AVERAGED): 25.31
ECHO MV E/E' SEPTAL: 33.75
ECHO MV EROA PISA: 0.1 CM2
ECHO MV PRESSURE HALF TIME (PHT): 46.2 MS
ECHO MV REGURGITANT ALIASING (NYQUIST) VELOCITY: 19 CM/S
ECHO MV REGURGITANT RADIUS PISA: 0.63 CM
ECHO MV REGURGITANT VELOCITY PISA: 5 M/S
ECHO MV REGURGITANT VOLUME PISA: 17.52 ML
ECHO MV REGURGITANT VTIA: 185 CM
ECHO RIGHT VENTRICULAR SYSTOLIC PRESSURE (RVSP): 33 MMHG
ECHO RV INTERNAL DIMENSION: 3.8 CM
ECHO RV TAPSE: 1.8 CM (ref 1.7–?)
ECHO TV REGURGITANT MAX VELOCITY: 2.72 M/S
ECHO TV REGURGITANT PEAK GRADIENT: 30 MMHG

## 2024-08-13 ENCOUNTER — TELEPHONE (OUTPATIENT)
Facility: CLINIC | Age: 74
End: 2024-08-13

## 2024-08-14 NOTE — TELEPHONE ENCOUNTER
Call Scheduling and ask if digital mammogram is for both breasts and what side for breast US. Get the codes needed to order and pend to me.

## 2024-08-15 ENCOUNTER — OFFICE VISIT (OUTPATIENT)
Age: 74
End: 2024-08-15
Payer: MEDICARE

## 2024-08-15 VITALS
SYSTOLIC BLOOD PRESSURE: 128 MMHG | BODY MASS INDEX: 37.56 KG/M2 | HEIGHT: 64 IN | WEIGHT: 220 LBS | OXYGEN SATURATION: 96 % | HEART RATE: 60 BPM | DIASTOLIC BLOOD PRESSURE: 68 MMHG

## 2024-08-15 DIAGNOSIS — I25.5 ISCHEMIC CARDIOMYOPATHY: Primary | ICD-10-CM

## 2024-08-15 DIAGNOSIS — I25.10 CORONARY ARTERY DISEASE INVOLVING NATIVE CORONARY ARTERY OF NATIVE HEART WITHOUT ANGINA PECTORIS: ICD-10-CM

## 2024-08-15 DIAGNOSIS — I34.0 MITRAL VALVE INSUFFICIENCY, UNSPECIFIED ETIOLOGY: ICD-10-CM

## 2024-08-15 DIAGNOSIS — Z79.899 ON AMIODARONE THERAPY: ICD-10-CM

## 2024-08-15 DIAGNOSIS — I48.0 PAROXYSMAL ATRIAL FIBRILLATION (HCC): ICD-10-CM

## 2024-08-15 DIAGNOSIS — I10 HYPERTENSION, UNSPECIFIED TYPE: ICD-10-CM

## 2024-08-15 PROCEDURE — 93010 ELECTROCARDIOGRAM REPORT: CPT | Performed by: INTERNAL MEDICINE

## 2024-08-15 PROCEDURE — 1090F PRES/ABSN URINE INCON ASSESS: CPT | Performed by: INTERNAL MEDICINE

## 2024-08-15 PROCEDURE — 3017F COLORECTAL CA SCREEN DOC REV: CPT | Performed by: INTERNAL MEDICINE

## 2024-08-15 PROCEDURE — 1123F ACP DISCUSS/DSCN MKR DOCD: CPT | Performed by: INTERNAL MEDICINE

## 2024-08-15 PROCEDURE — 3074F SYST BP LT 130 MM HG: CPT | Performed by: INTERNAL MEDICINE

## 2024-08-15 PROCEDURE — G8427 DOCREV CUR MEDS BY ELIG CLIN: HCPCS | Performed by: INTERNAL MEDICINE

## 2024-08-15 PROCEDURE — 93005 ELECTROCARDIOGRAM TRACING: CPT | Performed by: INTERNAL MEDICINE

## 2024-08-15 PROCEDURE — 3078F DIAST BP <80 MM HG: CPT | Performed by: INTERNAL MEDICINE

## 2024-08-15 PROCEDURE — G8417 CALC BMI ABV UP PARAM F/U: HCPCS | Performed by: INTERNAL MEDICINE

## 2024-08-15 PROCEDURE — 1036F TOBACCO NON-USER: CPT | Performed by: INTERNAL MEDICINE

## 2024-08-15 PROCEDURE — 99214 OFFICE O/P EST MOD 30 MIN: CPT | Performed by: INTERNAL MEDICINE

## 2024-08-15 PROCEDURE — G8399 PT W/DXA RESULTS DOCUMENT: HCPCS | Performed by: INTERNAL MEDICINE

## 2024-08-15 RX ORDER — ROSUVASTATIN CALCIUM 5 MG/1
5 TABLET, COATED ORAL DAILY
Qty: 90 TABLET | Refills: 3 | Status: SHIPPED | OUTPATIENT
Start: 2024-08-15

## 2024-08-15 ASSESSMENT — ENCOUNTER SYMPTOMS
WHEEZING: 0
CHEST TIGHTNESS: 0
SHORTNESS OF BREATH: 0

## 2024-08-15 ASSESSMENT — PATIENT HEALTH QUESTIONNAIRE - PHQ9
SUM OF ALL RESPONSES TO PHQ QUESTIONS 1-9: 0
SUM OF ALL RESPONSES TO PHQ9 QUESTIONS 1 & 2: 0
2. FEELING DOWN, DEPRESSED OR HOPELESS: NOT AT ALL
SUM OF ALL RESPONSES TO PHQ QUESTIONS 1-9: 0
1. LITTLE INTEREST OR PLEASURE IN DOING THINGS: NOT AT ALL

## 2024-08-15 NOTE — PROGRESS NOTES
Per Dr. Martino follow up 6 months.  
rhythm.      Pulses: Normal pulses.      Heart sounds: Normal heart sounds. No murmur heard.     No friction rub. No gallop.   Pulmonary:      Effort: Pulmonary effort is normal.      Breath sounds: Normal breath sounds.   Musculoskeletal:         General: No swelling. Normal range of motion.      Cervical back: Normal range of motion and neck supple.      Right lower leg: No edema.      Left lower leg: No edema.   Skin:     General: Skin is warm and dry.      Capillary Refill: Capillary refill takes more than 3 seconds.   Neurological:      General: No focal deficit present.      Mental Status: She is alert and oriented to person, place, and time.   Psychiatric:         Mood and Affect: Mood normal.          Lab Results   Component Value Date/Time    CHOL 218 03/11/2024 11:16 AM    CHOL 149 08/03/2023 12:04 PM    CHOL 220 11/28/2022 10:48 AM    CHOL 205 08/12/2022 11:25 AM    CHOL 150 07/30/2021 11:25 AM    HDL 70 03/11/2024 11:16 AM    HDL 76 08/03/2023 12:04 PM    HDL 72 11/28/2022 10:48 AM    HDL 68 08/12/2022 11:25 AM    HDL 63 07/30/2021 11:25 AM    .6 03/11/2024 11:16 AM    LDL 53.4 08/03/2023 12:04 PM     11/28/2022 10:48 AM     08/12/2022 11:25 AM    LDL 70 07/30/2021 11:25 AM       Lab Results   Component Value Date/Time    WBC 6.2 03/11/2024 11:16 AM    HGB 10.3 03/11/2024 11:16 AM    HCT 34.6 03/11/2024 11:16 AM     03/11/2024 11:16 AM    MCV 77.9 03/11/2024 11:16 AM        Lab Results   Component Value Date/Time    CHOL 218 03/11/2024 11:16 AM    CHOL 149 08/03/2023 12:04 PM    CHOL 220 11/28/2022 10:48 AM    CHOL 205 08/12/2022 11:25 AM    CHOL 150 07/30/2021 11:25 AM    HDL 70 03/11/2024 11:16 AM    HDL 76 08/03/2023 12:04 PM    HDL 72 11/28/2022 10:48 AM    HDL 68 08/12/2022 11:25 AM    HDL 63 07/30/2021 11:25 AM    .6 03/11/2024 11:16 AM    LDL 53.4 08/03/2023 12:04 PM     11/28/2022 10:48 AM     08/12/2022 11:25 AM    LDL 70 07/30/2021 11:25 AM

## 2024-08-16 NOTE — TELEPHONE ENCOUNTER
Spoke to Scheduling, she stated there are no notes in pt's chart to know what exactly pt needs. The ordering provider would have to look up the codes.     Spoke to pt verified name and . She stated the imaging is just for right breast only.

## 2024-08-23 SDOH — ECONOMIC STABILITY: INCOME INSECURITY: HOW HARD IS IT FOR YOU TO PAY FOR THE VERY BASICS LIKE FOOD, HOUSING, MEDICAL CARE, AND HEATING?: NOT HARD AT ALL

## 2024-08-23 SDOH — ECONOMIC STABILITY: FOOD INSECURITY: WITHIN THE PAST 12 MONTHS, YOU WORRIED THAT YOUR FOOD WOULD RUN OUT BEFORE YOU GOT MONEY TO BUY MORE.: NEVER TRUE

## 2024-08-23 SDOH — ECONOMIC STABILITY: FOOD INSECURITY: WITHIN THE PAST 12 MONTHS, THE FOOD YOU BOUGHT JUST DIDN'T LAST AND YOU DIDN'T HAVE MONEY TO GET MORE.: NEVER TRUE

## 2024-08-23 SDOH — ECONOMIC STABILITY: TRANSPORTATION INSECURITY
IN THE PAST 12 MONTHS, HAS LACK OF TRANSPORTATION KEPT YOU FROM MEETINGS, WORK, OR FROM GETTING THINGS NEEDED FOR DAILY LIVING?: NO

## 2024-08-26 ENCOUNTER — OFFICE VISIT (OUTPATIENT)
Facility: CLINIC | Age: 74
End: 2024-08-26
Payer: MEDICARE

## 2024-08-26 ENCOUNTER — HOSPITAL ENCOUNTER (OUTPATIENT)
Facility: HOSPITAL | Age: 74
Discharge: HOME | End: 2024-08-29
Payer: MEDICARE

## 2024-08-26 VITALS
RESPIRATION RATE: 16 BRPM | HEART RATE: 59 BPM | DIASTOLIC BLOOD PRESSURE: 75 MMHG | OXYGEN SATURATION: 94 % | TEMPERATURE: 98 F | SYSTOLIC BLOOD PRESSURE: 119 MMHG | BODY MASS INDEX: 36.4 KG/M2 | HEIGHT: 64 IN | WEIGHT: 213.2 LBS

## 2024-08-26 DIAGNOSIS — M25.531 PAIN IN RIGHT WRIST: ICD-10-CM

## 2024-08-26 DIAGNOSIS — M79.644 PAIN OF RIGHT THUMB: ICD-10-CM

## 2024-08-26 DIAGNOSIS — E78.2 MIXED HYPERLIPIDEMIA: ICD-10-CM

## 2024-08-26 DIAGNOSIS — E55.9 VITAMIN D DEFICIENCY: ICD-10-CM

## 2024-08-26 DIAGNOSIS — I10 PRIMARY HYPERTENSION: Primary | ICD-10-CM

## 2024-08-26 DIAGNOSIS — H44.001 EYE INFECTION, RIGHT: ICD-10-CM

## 2024-08-26 DIAGNOSIS — D50.9 IRON DEFICIENCY ANEMIA, UNSPECIFIED IRON DEFICIENCY ANEMIA TYPE: ICD-10-CM

## 2024-08-26 PROCEDURE — 73130 X-RAY EXAM OF HAND: CPT

## 2024-08-26 PROCEDURE — 99214 OFFICE O/P EST MOD 30 MIN: CPT | Performed by: INTERNAL MEDICINE

## 2024-08-26 PROCEDURE — 3078F DIAST BP <80 MM HG: CPT | Performed by: INTERNAL MEDICINE

## 2024-08-26 PROCEDURE — 1123F ACP DISCUSS/DSCN MKR DOCD: CPT | Performed by: INTERNAL MEDICINE

## 2024-08-26 PROCEDURE — 1036F TOBACCO NON-USER: CPT | Performed by: INTERNAL MEDICINE

## 2024-08-26 PROCEDURE — 3074F SYST BP LT 130 MM HG: CPT | Performed by: INTERNAL MEDICINE

## 2024-08-26 PROCEDURE — G8399 PT W/DXA RESULTS DOCUMENT: HCPCS | Performed by: INTERNAL MEDICINE

## 2024-08-26 PROCEDURE — G8417 CALC BMI ABV UP PARAM F/U: HCPCS | Performed by: INTERNAL MEDICINE

## 2024-08-26 PROCEDURE — 1090F PRES/ABSN URINE INCON ASSESS: CPT | Performed by: INTERNAL MEDICINE

## 2024-08-26 PROCEDURE — 3017F COLORECTAL CA SCREEN DOC REV: CPT | Performed by: INTERNAL MEDICINE

## 2024-08-26 PROCEDURE — G8427 DOCREV CUR MEDS BY ELIG CLIN: HCPCS | Performed by: INTERNAL MEDICINE

## 2024-08-26 RX ORDER — ERGOCALCIFEROL 1.25 MG/1
50000 CAPSULE, LIQUID FILLED ORAL WEEKLY
Qty: 12 CAPSULE | Refills: 3 | Status: SHIPPED | OUTPATIENT
Start: 2024-08-26

## 2024-08-26 RX ORDER — FERROUS SULFATE 325(65) MG
325 TABLET ORAL 2 TIMES DAILY WITH MEALS
Qty: 60 TABLET | Refills: 2 | Status: SHIPPED
Start: 2024-08-26

## 2024-08-26 RX ORDER — CIPROFLOXACIN HYDROCHLORIDE 3.5 MG/ML
1 SOLUTION/ DROPS TOPICAL 3 TIMES DAILY
Qty: 2.5 ML | Refills: 0 | Status: SHIPPED | OUTPATIENT
Start: 2024-08-26 | End: 2024-09-02

## 2024-08-26 NOTE — PROGRESS NOTES
Chief Complaint   Patient presents with    Hypertension    Cholesterol Problem    Hand Injury     Fell on right hand about 3 weeks ago. Did not get it checked out. Ordered a brace off [employer] but still having a lot of pain in the right thumb.      History of Present Illness  The patient is a 74-year-old female presenting for a 6-month follow-up evaluation of hypertension and hypercholesterolemia.    She complains of right hand pain following a fall 5-6 weeks ago, during which she believes a Baker's cyst in her knee ruptured.This caused her to lose balance and land on her right hand, resulting in bruising from her fingertips to her elbow. She did not seek evaluation at the ED or urgent care. She ordered a brace from [employer] and has been using it. Despite applying ice, taking Tylenol, and using a brace, she continues to experience significant pain in her right thumb and wrist, particularly when performing fine motor tasks. She also reports occasional popping sensations at the right wrist and finds it difficult to fasten her bra due to the discomfort. She reports no shortness of breath or chest pain and overall feels well.    She is currently taking rosuvastatin, prescribed by her cardiologist a few days ago. She is also concerned about potential low levels of vitamin D and iron, as she has been supplementing these since undergoing gastric bypass surgery in 2006. She takes over-the-counter vitamin D (10,000 units) and ferrous sulfate daily.    She suspects a blockage in her right tear duct, a condition she has experienced in the past. Despite massaging the area, she feels as though something is irritating her eye. She often wakes up with her eyelid feeling stuck and has been dealing with this issue for several weeks. Over-the-counter eye drops and warm compresses have not provided relief.    Patient Active Problem List   Diagnosis    Cardiomyopathy (HCC)    Mild intermittent asthma without complication    History of  identify any underlying issues. A referral to a hand orthopedist at [facility] will be made for specialized evaluation.  - Jose Garcia MD, Orthopedic Surgery (elbow, hand, wrist), [place]    2. Hypertension.  Her hypertension is well-managed with the current medication regimen. The existing medication plan will be maintained without any changes.    3. Vitamin D deficiency.  Her vitamin D levels remain suboptimal despite ongoing supplementation. Her vitamin D3 supplement will be changed to vitamin D2 50,000 units once a week.   - vitamin D (ERGOCALCIFEROL) 1.25 MG (18853 UT) CAPS capsule; Take 1 capsule by mouth once a week  Dispense: 12 capsule; Refill: 3    4. Iron deficiency anemia.  Her iron levels have not shown significant improvement with the current iron supplement. The dosage of her ferrous sulfate will be increased to twice daily. She is advised to monitor for potential constipation.    5. Right eye pain.  She is experiencing discomfort in her right eye, likely due to a blocked tear duct. A prescription for antibiotic eye drops will be provided to alleviate her symptoms.  - ciprofloxacin (CILOXAN) 0.3 % ophthalmic solution; Place 1 drop into the right eye in the morning, at noon, and at bedtime for 7 days  Dispense: 2.5 mL; Refill: 0      ICD-10-CM    1. Primary hypertension  I10 CBC with Auto Differential     Comprehensive Metabolic Panel      2. Mixed hyperlipidemia  E78.2 Lipid Panel      3. Pain of right thumb  M79.644 Jose Garcia MD, Orthopedic Surgery (elbow, hand, wrist), [facility]     CANCELED: XR HAND RIGHT (2 VIEWS)      4. Pain in right wrist  M25.531 Jose Garcia MD, Orthopedic Surgery (elbow, hand, wrist), [facility]     CANCELED: XR HAND RIGHT (2 VIEWS)      5. Vitamin D deficiency  E55.9 vitamin D (ERGOCALCIFEROL) 1.25 MG (05901 UT) CAPS capsule     Vitamin D 25 Hydroxy      6. Iron deficiency anemia, unspecified iron deficiency anemia type  D50.9

## 2024-08-26 NOTE — PROGRESS NOTES
Madeline Rocha  Identified pt with two pt identifiers(name and ).  Chief Complaint   Patient presents with    Hypertension    Cholesterol Problem       1. Have you been to the ER, urgent care clinic since your last visit?  Hospitalized since your last visit? NO    2. Have you seen or consulted any other health care providers outside of the [facility] since your last visit?  Include any pap smears or colon screening. NO      Provider notified of reason for visit, vitals and flowsheets obtained on patients.     Patient received paperwork for advance directive during previous visit but has not completed at this time     Reviewed record In preparation for visit, huddled with provider and have obtained necessary documentation      Health Maintenance Due   Topic    Colorectal Cancer Screen     COVID-19 Vaccine (3 - 2023-24 season)    Shingles vaccine (3 of 3)    Breast cancer screen     Flu vaccine (1)       Wt Readings from Last 3 Encounters:   08/15/24 99.8 kg (220 lb)   24 97.5 kg (215 lb)   24 97.9 kg (215 lb 12.8 oz)     Temp Readings from Last 3 Encounters:   24 97.7 °F (36.5 °C) (Oral)   23 98 °F (36.7 °C) (Oral)   23 98.4 °F (36.9 °C) (Oral)     BP Readings from Last 3 Encounters:   08/15/24 128/68   24 137/78   24 114/66     Pulse Readings from Last 3 Encounters:   08/15/24 60   24 55   24 57          No data to display                  Learning Assessment:  :         2023     1:40 PM   [facility] AMB LEARNING ASSESSMENT   Primary Learner Patient   level of education 2 YEARS OF COLLEGE   Primary Language ENGLISH   Learning Preference READING   Answered By patient   Relationship to Learner SELF       Fall Risk Assessment:  :         8/15/2024    10:04 AM 2024    11:00 PM 2024    10:59 AM 2023     1:53 PM 2023    10:58 AM 3/28/2023    11:39 AM 3/27/2023     8:31 AM   Amb Fall Risk Assessment and TUG Test   Do you feel  unsteady or are you worried about falling?  no no no no no     2 or more falls in past year? no no no no no     Fall with injury in past year? yes no no no no     Fall in past 12 months?      0 0   Able to walk?      Yes Yes       Abuse Screening:  :         2/26/2024    11:00 AM   AMB Abuse Screening   Do you ever feel afraid of your partner? N   Are you in a relationship with someone who physically or mentally threatens you? N   Is it safe for you to go home? Y       ADL Screening:  :         2/26/2024    11:00 AM   ADL ASSESSMENT   Feeding yourself No Help Needed   Getting from bed to chair No Help Needed   Getting dressed No Help Needed   Bathing or showering No Help Needed   Walk across the room (includes cane/walker) No Help Needed   Using the telphone No Help Needed   Taking your medications No Help Needed   Preparing meals No Help Needed   Managing money (expenses/bills) No Help Needed   Moderately strenuous housework (laundry) No Help Needed   Shopping for personal items (toiletries/medicines) No Help Needed   Shopping for groceries No Help Needed   Driving No Help Needed   Climbing a flight of stairs No Help Needed   Getting to places beyond walking distances No Help Needed         Medication reconciliation up to date and corrected with patient at this time.

## 2024-10-02 ENCOUNTER — HOSPITAL ENCOUNTER (OUTPATIENT)
Facility: HOSPITAL | Age: 74
Discharge: HOME | End: 2024-10-05
Payer: MEDICARE

## 2024-10-02 VITALS — BODY MASS INDEX: 37.22 KG/M2 | HEIGHT: 64 IN | WEIGHT: 218 LBS

## 2024-10-02 DIAGNOSIS — R92.8 ABNORMAL MAMMOGRAM OF RIGHT BREAST: ICD-10-CM

## 2024-10-02 DIAGNOSIS — Z85.3 HISTORY OF CANCER OF RIGHT BREAST: ICD-10-CM

## 2024-10-02 PROCEDURE — G0279 TOMOSYNTHESIS, MAMMO: HCPCS

## 2024-10-31 DIAGNOSIS — F51.04 PSYCHOPHYSIOLOGIC INSOMNIA: ICD-10-CM

## 2024-11-01 RX ORDER — METOPROLOL SUCCINATE 25 MG/1
TABLET, EXTENDED RELEASE ORAL
Qty: 180 TABLET | Refills: 2 | Status: SHIPPED | OUTPATIENT
Start: 2024-11-01

## 2024-11-01 NOTE — TELEPHONE ENCOUNTER
PCP: Ella Baker MD     Last appt:  8/26/2024      Future Appointments   Date Time Provider Department Center   12/27/2024 10:00 AM DANY ANDINO [facility]   12/27/2024 10:20 AM Megan Soto, APRN - DORA MCCLENDON [facility]   1/24/2025 11:00 AM Cain London MD TOSP [facility]   2/20/2025 10:00 AM Nuria Martino MD C[facility]   2/28/2025  9:30 AM Ella Baker MD [facility]   10/3/2025 12:50 PM [facility] 5 [facility]          Requested Prescriptions     Pending Prescriptions Disp Refills    zolpidem (AMBIEN) 10 MG tablet [Pharmacy Med Name: ZOLPIDEM TARTRATE 10 MG TABLET] 90 tablet 0     Sig: Take 1 tablet by mouth nightly as needed for Sleep for up to 90 days. Max Daily Amount: 10 mg

## 2024-11-02 RX ORDER — ZOLPIDEM TARTRATE 10 MG/1
10 TABLET ORAL NIGHTLY PRN
Qty: 90 TABLET | Refills: 0 | Status: SHIPPED | OUTPATIENT
Start: 2024-11-02 | End: 2025-01-31

## 2024-12-14 NOTE — PROGRESS NOTES
Take 1 tablet by mouth with breakfast and with evening meal Takes tablets that dissolve under the tongue. 60 tablet 2    albuterol sulfate HFA (PROVENTIL;VENTOLIN;PROAIR) 108 (90 Base) MCG/ACT inhaler TAKE 2 PUFFS BY INHALATION EVERY FOUR HOURS AS NEEDED FOR WHEEZING OR SHORTNESS OF BREATH. 6.7 each 11    KLOR-CON M20 20 MEQ extended release tablet TAKE 2 TABLETS BY MOUTH EVERY  tablet 3    bumetanide (BUMEX) 2 MG tablet TAKE 1 TABLET BY MOUTH EVERY DAY 90 tablet 1    sacubitril-valsartan (ENTRESTO) 24-26 MG per tablet Take 1 tablet by mouth 2 times daily 180 tablet 3    amiodarone (CORDARONE) 200 MG tablet TAKE 1 TABLET BY MOUTH EVERY DAY 90 tablet 3    FLUoxetine (PROZAC) 40 MG capsule TAKE 1 CAPSULE BY MOUTH TWICE A  capsule 3    magnesium (MAGNESIUM-OXIDE) 250 MG TABS tablet Take 1 tablet by mouth daily      MULTIPLE VITAMINS-MINERALS ER PO Take by mouth      CALCIUM PO Take by mouth daily      apixaban (ELIQUIS) 5 MG TABS tablet Take by mouth 2 times daily      rosuvastatin (CRESTOR) 5 MG tablet Take 1 tablet by mouth daily (Patient not taking: Reported on 12/20/2024) 90 tablet 3    vitamin D3 (CHOLECALCIFEROL) 125 MCG (5000 UT) TABS tablet Take by mouth daily (Patient not taking: Reported on 12/20/2024)       No current facility-administered medications for this visit.          LEONEL Velazco - NP  [facility]  [address], [address]  [place], [place] [address] [phone]      CC:Ella Baker MD

## 2024-12-20 ENCOUNTER — PROCEDURE VISIT (OUTPATIENT)
Age: 74
End: 2024-12-20

## 2024-12-20 ENCOUNTER — OFFICE VISIT (OUTPATIENT)
Age: 74
End: 2024-12-20
Payer: MEDICARE

## 2024-12-20 VITALS
HEART RATE: 54 BPM | OXYGEN SATURATION: 96 % | WEIGHT: 219 LBS | SYSTOLIC BLOOD PRESSURE: 110 MMHG | DIASTOLIC BLOOD PRESSURE: 78 MMHG | RESPIRATION RATE: 16 BRPM | HEIGHT: 64 IN | BODY MASS INDEX: 37.39 KG/M2

## 2024-12-20 DIAGNOSIS — I47.20 VT (VENTRICULAR TACHYCARDIA) (HCC): ICD-10-CM

## 2024-12-20 DIAGNOSIS — Z95.810 ICD (IMPLANTABLE CARDIOVERTER-DEFIBRILLATOR) IN PLACE: ICD-10-CM

## 2024-12-20 DIAGNOSIS — Z98.890 S/P ABLATION OF ATRIAL FIBRILLATION: ICD-10-CM

## 2024-12-20 DIAGNOSIS — I25.5 ISCHEMIC CARDIOMYOPATHY: ICD-10-CM

## 2024-12-20 DIAGNOSIS — Z95.5 S/P CORONARY ARTERY STENT PLACEMENT: ICD-10-CM

## 2024-12-20 DIAGNOSIS — I48.3 TYPICAL ATRIAL FLUTTER (HCC): ICD-10-CM

## 2024-12-20 DIAGNOSIS — Z86.79 S/P ABLATION OF ATRIAL FIBRILLATION: ICD-10-CM

## 2024-12-20 DIAGNOSIS — I50.22 CHRONIC SYSTOLIC CHF (CONGESTIVE HEART FAILURE), NYHA CLASS 2 (HCC): ICD-10-CM

## 2024-12-20 DIAGNOSIS — I48.19 PERSISTENT ATRIAL FIBRILLATION (HCC): Primary | ICD-10-CM

## 2024-12-20 DIAGNOSIS — Z95.810 ICD (IMPLANTABLE CARDIOVERTER-DEFIBRILLATOR) IN PLACE: Primary | ICD-10-CM

## 2024-12-20 DIAGNOSIS — I25.10 CORONARY ARTERY DISEASE INVOLVING NATIVE CORONARY ARTERY OF NATIVE HEART WITHOUT ANGINA PECTORIS: ICD-10-CM

## 2024-12-20 DIAGNOSIS — Z79.01 ANTICOAGULATED: ICD-10-CM

## 2024-12-20 PROCEDURE — 3078F DIAST BP <80 MM HG: CPT | Performed by: NURSE PRACTITIONER

## 2024-12-20 PROCEDURE — 1036F TOBACCO NON-USER: CPT | Performed by: NURSE PRACTITIONER

## 2024-12-20 PROCEDURE — 3074F SYST BP LT 130 MM HG: CPT | Performed by: NURSE PRACTITIONER

## 2024-12-20 PROCEDURE — 1123F ACP DISCUSS/DSCN MKR DOCD: CPT | Performed by: NURSE PRACTITIONER

## 2024-12-20 PROCEDURE — G8484 FLU IMMUNIZE NO ADMIN: HCPCS | Performed by: NURSE PRACTITIONER

## 2024-12-20 PROCEDURE — 1090F PRES/ABSN URINE INCON ASSESS: CPT | Performed by: NURSE PRACTITIONER

## 2024-12-20 PROCEDURE — G8399 PT W/DXA RESULTS DOCUMENT: HCPCS | Performed by: NURSE PRACTITIONER

## 2024-12-20 PROCEDURE — G8417 CALC BMI ABV UP PARAM F/U: HCPCS | Performed by: NURSE PRACTITIONER

## 2024-12-20 PROCEDURE — 3017F COLORECTAL CA SCREEN DOC REV: CPT | Performed by: NURSE PRACTITIONER

## 2024-12-20 PROCEDURE — 99214 OFFICE O/P EST MOD 30 MIN: CPT | Performed by: NURSE PRACTITIONER

## 2024-12-20 PROCEDURE — G8427 DOCREV CUR MEDS BY ELIG CLIN: HCPCS | Performed by: NURSE PRACTITIONER

## 2024-12-20 PROCEDURE — 1159F MED LIST DOCD IN RCRD: CPT | Performed by: NURSE PRACTITIONER

## 2024-12-20 PROCEDURE — 1126F AMNT PAIN NOTED NONE PRSNT: CPT | Performed by: NURSE PRACTITIONER

## 2024-12-20 ASSESSMENT — PATIENT HEALTH QUESTIONNAIRE - PHQ9
1. LITTLE INTEREST OR PLEASURE IN DOING THINGS: NOT AT ALL
SUM OF ALL RESPONSES TO PHQ QUESTIONS 1-9: 0
SUM OF ALL RESPONSES TO PHQ9 QUESTIONS 1 & 2: 0
SUM OF ALL RESPONSES TO PHQ QUESTIONS 1-9: 0
SUM OF ALL RESPONSES TO PHQ QUESTIONS 1-9: 0
2. FEELING DOWN, DEPRESSED OR HOPELESS: NOT AT ALL
SUM OF ALL RESPONSES TO PHQ QUESTIONS 1-9: 0

## 2024-12-20 NOTE — PATIENT INSTRUCTIONS
Please send us a manual transmission for your defibrillator on these dates:     1/15/25  4/15/25  7/15/25  10/15/25    If you have any difficulty please contact GitHub:    Le (GitHub)  [phone]     Happy Holidays!

## 2024-12-28 DIAGNOSIS — I25.5 ISCHEMIC CARDIOMYOPATHY: ICD-10-CM

## 2024-12-30 RX ORDER — BUMETANIDE 2 MG/1
2 TABLET ORAL DAILY
Qty: 90 TABLET | Refills: 0 | Status: SHIPPED | OUTPATIENT
Start: 2024-12-30

## 2025-01-22 DIAGNOSIS — F33.1 MODERATE EPISODE OF RECURRENT MAJOR DEPRESSIVE DISORDER (HCC): ICD-10-CM

## 2025-01-22 DIAGNOSIS — F41.1 GENERALIZED ANXIETY DISORDER: ICD-10-CM

## 2025-01-22 RX ORDER — AMIODARONE HYDROCHLORIDE 200 MG/1
200 TABLET ORAL DAILY
Qty: 90 TABLET | Refills: 3 | Status: SHIPPED | OUTPATIENT
Start: 2025-01-22

## 2025-01-22 RX ORDER — FLUOXETINE 40 MG/1
40 CAPSULE ORAL 2 TIMES DAILY
Qty: 180 CAPSULE | Refills: 3 | Status: SHIPPED | OUTPATIENT
Start: 2025-01-22

## 2025-01-22 NOTE — TELEPHONE ENCOUNTER
Future Appointments:  Future Appointments   Date Time Provider Department Center   2/20/2025 10:00 AM Nuria Martino MD CAVREY [facility]   2/21/2025 10:00 AM Cain London MD TOSP [facility]   2/28/2025  9:30 AM Ella Baker MD [facility]   10/3/2025 12:50 PM [facility] 5 [facility]   1/6/2026 10:20 AM DANY ANDINO [facility] AMB   1/6/2026 10:40 AM Dwayne Walker MD CAVREY [facility]        Last Appointment With Me:  8/26/2024     Requested Prescriptions     Pending Prescriptions Disp Refills    FLUoxetine (PROZAC) 40 MG capsule [Pharmacy Med Name: FLUOXETINE HCL 40 MG CAPSULE] 180 capsule 3     Sig: TAKE 1 CAPSULE BY MOUTH TWICE A DAY

## 2025-01-30 DIAGNOSIS — F51.04 PSYCHOPHYSIOLOGIC INSOMNIA: ICD-10-CM

## 2025-01-31 DIAGNOSIS — F51.04 PSYCHOPHYSIOLOGIC INSOMNIA: ICD-10-CM

## 2025-01-31 NOTE — TELEPHONE ENCOUNTER
PCP: Ella Baker MD     Last appt:  8/26/2024      Future Appointments   Date Time Provider Department Center   2/20/2025 10:00 AM Nuria Martino MD [facility]   2/21/2025 10:00 AM Cain London MD T[facility]P [facility]   2/28/2025  9:30 AM Ella Baker MD [facility]   10/3/2025 12:50 PM [facility] 5 [facility]   1/6/2026 10:20 AM DANY ANDINO [facility]   1/6/2026 10:40 AM Dwayne Walker MD C[facility]          Requested Prescriptions     Pending Prescriptions Disp Refills    zolpidem (AMBIEN) 10 MG tablet [Pharmacy Med Name: ZOLPIDEM TARTRATE 10 MG TABLET] 90 tablet 0     Sig: Take 1 tablet by mouth nightly as needed for Sleep for up to 90 days. Max Daily Amount: 10 mg

## 2025-02-01 RX ORDER — ZOLPIDEM TARTRATE 10 MG/1
10 TABLET ORAL NIGHTLY PRN
Qty: 90 TABLET | Refills: 0 | Status: SHIPPED | OUTPATIENT
Start: 2025-02-01 | End: 2025-05-02

## 2025-02-02 RX ORDER — ZOLPIDEM TARTRATE 10 MG/1
10 TABLET ORAL NIGHTLY PRN
Qty: 90 TABLET | Refills: 0 | OUTPATIENT
Start: 2025-02-02 | End: 2025-05-03

## 2025-02-05 ENCOUNTER — HOSPITAL ENCOUNTER (OUTPATIENT)
Facility: HOSPITAL | Age: 75
Discharge: HOME | End: 2025-02-08
Payer: MEDICARE

## 2025-02-05 DIAGNOSIS — I25.5 ISCHEMIC CARDIOMYOPATHY: ICD-10-CM

## 2025-02-05 DIAGNOSIS — I50.22 CHRONIC SYSTOLIC CHF (CONGESTIVE HEART FAILURE), NYHA CLASS 2 (HCC): ICD-10-CM

## 2025-02-05 DIAGNOSIS — Z86.79 S/P ABLATION OF ATRIAL FIBRILLATION: ICD-10-CM

## 2025-02-05 DIAGNOSIS — I48.3 TYPICAL ATRIAL FLUTTER (HCC): ICD-10-CM

## 2025-02-05 DIAGNOSIS — Z79.01 ANTICOAGULATED: ICD-10-CM

## 2025-02-05 DIAGNOSIS — Z95.5 S/P CORONARY ARTERY STENT PLACEMENT: ICD-10-CM

## 2025-02-05 DIAGNOSIS — Z98.890 S/P ABLATION OF ATRIAL FIBRILLATION: ICD-10-CM

## 2025-02-05 DIAGNOSIS — I25.10 CORONARY ARTERY DISEASE INVOLVING NATIVE CORONARY ARTERY OF NATIVE HEART WITHOUT ANGINA PECTORIS: ICD-10-CM

## 2025-02-05 DIAGNOSIS — Z95.810 ICD (IMPLANTABLE CARDIOVERTER-DEFIBRILLATOR) IN PLACE: ICD-10-CM

## 2025-02-05 DIAGNOSIS — I48.19 PERSISTENT ATRIAL FIBRILLATION (HCC): ICD-10-CM

## 2025-02-05 DIAGNOSIS — I47.20 VT (VENTRICULAR TACHYCARDIA) (HCC): ICD-10-CM

## 2025-02-05 LAB
ALBUMIN SERPL-MCNC: 3.6 G/DL (ref 3.5–5)
ALBUMIN/GLOB SERPL: 1.3 (ref 1.1–2.2)
ALP SERPL-CCNC: 62 U/L (ref 45–117)
ALT SERPL-CCNC: 22 U/L (ref 12–78)
ANION GAP SERPL CALC-SCNC: 6 MMOL/L (ref 2–12)
AST SERPL-CCNC: 22 U/L (ref 15–37)
BASOPHILS # BLD: 0.06 K/UL (ref 0–0.1)
BASOPHILS NFR BLD: 0.9 % (ref 0–1)
BILIRUB SERPL-MCNC: 0.3 MG/DL (ref 0.2–1)
BUN SERPL-MCNC: 21 MG/DL (ref 6–20)
BUN/CREAT SERPL: 19 (ref 12–20)
CALCIUM SERPL-MCNC: 9.3 MG/DL (ref 8.5–10.1)
CHLORIDE SERPL-SCNC: 102 MMOL/L (ref 97–108)
CO2 SERPL-SCNC: 28 MMOL/L (ref 21–32)
CREAT SERPL-MCNC: 1.12 MG/DL (ref 0.55–1.02)
DIFFERENTIAL METHOD BLD: ABNORMAL
EOSINOPHIL # BLD: 0.08 K/UL (ref 0–0.4)
EOSINOPHIL NFR BLD: 1.2 % (ref 0–7)
ERYTHROCYTE [DISTWIDTH] IN BLOOD BY AUTOMATED COUNT: 17.3 % (ref 11.5–14.5)
GLOBULIN SER CALC-MCNC: 2.8 G/DL (ref 2–4)
GLUCOSE SERPL-MCNC: 185 MG/DL (ref 65–100)
HCT VFR BLD AUTO: 34.8 % (ref 35–47)
HGB BLD-MCNC: 10.3 G/DL (ref 11.5–16)
IMM GRANULOCYTES # BLD AUTO: 0.02 K/UL (ref 0–0.04)
IMM GRANULOCYTES NFR BLD AUTO: 0.3 % (ref 0–0.5)
LYMPHOCYTES # BLD: 1.51 K/UL (ref 0.8–3.5)
LYMPHOCYTES NFR BLD: 23.4 % (ref 12–49)
MCH RBC QN AUTO: 23.5 PG (ref 26–34)
MCHC RBC AUTO-ENTMCNC: 29.6 G/DL (ref 30–36.5)
MCV RBC AUTO: 79.3 FL (ref 80–99)
MONOCYTES # BLD: 0.45 K/UL (ref 0–1)
MONOCYTES NFR BLD: 7 % (ref 5–13)
NEUTS SEG # BLD: 4.32 K/UL (ref 1.8–8)
NEUTS SEG NFR BLD: 67.2 % (ref 32–75)
NRBC # BLD: 0 K/UL (ref 0–0.01)
NRBC BLD-RTO: 0 PER 100 WBC
PLATELET # BLD AUTO: 225 K/UL (ref 150–400)
PMV BLD AUTO: 12.7 FL (ref 8.9–12.9)
POTASSIUM SERPL-SCNC: 4.3 MMOL/L (ref 3.5–5.1)
PROT SERPL-MCNC: 6.4 G/DL (ref 6.4–8.2)
RBC # BLD AUTO: 4.39 M/UL (ref 3.8–5.2)
SODIUM SERPL-SCNC: 136 MMOL/L (ref 136–145)
TSH SERPL DL<=0.05 MIU/L-ACNC: 1.87 UIU/ML (ref 0.36–3.74)
WBC # BLD AUTO: 6.4 K/UL (ref 3.6–11)

## 2025-02-05 PROCEDURE — 71046 X-RAY EXAM CHEST 2 VIEWS: CPT

## 2025-02-06 ENCOUNTER — TELEPHONE (OUTPATIENT)
Age: 75
End: 2025-02-06

## 2025-02-06 NOTE — TELEPHONE ENCOUNTER
----- Message from LEONEL Nuñez NP sent at 2/6/2025  7:48 AM EST -----  Cr marginally elevated.  Mildly anemic but stable.  LFTs & TSH unremarkable.  No change in treatment plan based on results.

## 2025-02-07 ENCOUNTER — TELEPHONE (OUTPATIENT)
Age: 75
End: 2025-02-07

## 2025-02-07 NOTE — TELEPHONE ENCOUNTER
----- Message from LEONEL Nuñez NP sent at 2/7/2025  9:17 AM EST -----  CXR unremarkable.  OK to continue amiodarone.

## 2025-02-07 NOTE — TELEPHONE ENCOUNTER
Megan Soto, APRN - NP  Preethi Gutierrez, TEJASN  Cc: Jordan Live, RN  CXR unremarkable.  OK to continue amiodarone.

## 2025-02-20 ENCOUNTER — OFFICE VISIT (OUTPATIENT)
Age: 75
End: 2025-02-20
Payer: MEDICARE

## 2025-02-20 VITALS
HEART RATE: 60 BPM | OXYGEN SATURATION: 98 % | DIASTOLIC BLOOD PRESSURE: 68 MMHG | WEIGHT: 224 LBS | BODY MASS INDEX: 38.45 KG/M2 | SYSTOLIC BLOOD PRESSURE: 122 MMHG

## 2025-02-20 DIAGNOSIS — I47.20 VT (VENTRICULAR TACHYCARDIA) (HCC): ICD-10-CM

## 2025-02-20 DIAGNOSIS — I10 HYPERTENSION, UNSPECIFIED TYPE: ICD-10-CM

## 2025-02-20 DIAGNOSIS — Z79.899 ON AMIODARONE THERAPY: ICD-10-CM

## 2025-02-20 DIAGNOSIS — I47.20 V-TACH (HCC): ICD-10-CM

## 2025-02-20 DIAGNOSIS — I25.10 CORONARY ARTERY DISEASE INVOLVING NATIVE CORONARY ARTERY OF NATIVE HEART WITHOUT ANGINA PECTORIS: ICD-10-CM

## 2025-02-20 DIAGNOSIS — Z98.890 S/P ABLATION OF ATRIAL FIBRILLATION: ICD-10-CM

## 2025-02-20 DIAGNOSIS — I25.5 ISCHEMIC CARDIOMYOPATHY: Primary | ICD-10-CM

## 2025-02-20 DIAGNOSIS — Z86.79 S/P ABLATION OF ATRIAL FIBRILLATION: ICD-10-CM

## 2025-02-20 DIAGNOSIS — Z98.84 HISTORY OF GASTRIC BYPASS: ICD-10-CM

## 2025-02-20 DIAGNOSIS — Z95.810 ICD (IMPLANTABLE CARDIOVERTER-DEFIBRILLATOR) IN PLACE: ICD-10-CM

## 2025-02-20 DIAGNOSIS — I50.22 CHRONIC SYSTOLIC CHF (CONGESTIVE HEART FAILURE), NYHA CLASS 2 (HCC): ICD-10-CM

## 2025-02-20 DIAGNOSIS — I48.19 PERSISTENT ATRIAL FIBRILLATION (HCC): ICD-10-CM

## 2025-02-20 PROCEDURE — 1036F TOBACCO NON-USER: CPT | Performed by: INTERNAL MEDICINE

## 2025-02-20 PROCEDURE — 99214 OFFICE O/P EST MOD 30 MIN: CPT | Performed by: INTERNAL MEDICINE

## 2025-02-20 PROCEDURE — 1159F MED LIST DOCD IN RCRD: CPT | Performed by: INTERNAL MEDICINE

## 2025-02-20 PROCEDURE — 1090F PRES/ABSN URINE INCON ASSESS: CPT | Performed by: INTERNAL MEDICINE

## 2025-02-20 PROCEDURE — 3017F COLORECTAL CA SCREEN DOC REV: CPT | Performed by: INTERNAL MEDICINE

## 2025-02-20 PROCEDURE — G8399 PT W/DXA RESULTS DOCUMENT: HCPCS | Performed by: INTERNAL MEDICINE

## 2025-02-20 PROCEDURE — G8427 DOCREV CUR MEDS BY ELIG CLIN: HCPCS | Performed by: INTERNAL MEDICINE

## 2025-02-20 PROCEDURE — 1123F ACP DISCUSS/DSCN MKR DOCD: CPT | Performed by: INTERNAL MEDICINE

## 2025-02-20 PROCEDURE — 3078F DIAST BP <80 MM HG: CPT | Performed by: INTERNAL MEDICINE

## 2025-02-20 PROCEDURE — G8417 CALC BMI ABV UP PARAM F/U: HCPCS | Performed by: INTERNAL MEDICINE

## 2025-02-20 PROCEDURE — 3074F SYST BP LT 130 MM HG: CPT | Performed by: INTERNAL MEDICINE

## 2025-02-20 RX ORDER — BUMETANIDE 1 MG/1
1 TABLET ORAL DAILY
Qty: 90 TABLET | Refills: 3 | Status: SHIPPED | OUTPATIENT
Start: 2025-02-20

## 2025-02-20 NOTE — PROGRESS NOTES
22%. There is no evidence of transient ischemic dilation (TID).The TID ratio is 0.86.  · Baseline ECG: Normal sinus rhythm.  · SPECT: Myocardial perfusion imaging defect 1: There is a defect that is large in size with a severe reduction in uptake present in the lateral location(s) that is partially reversible. There is abnormal wall motion in the defect area. The defect appears to be infarction with michael-infarct ischemia. Perfusion defect was visually and quantitatively present.  · SPECT: Left ventricular perfusion is probably abnormal. Myocardial perfusion imaging supports a high risk stress test.    Signed by: Reina Loyola MD on 8/13/2021  8:19 AM, Signed by: Unknown Provider Result on 8/13/2021 12:00 AM      No results found for this or any previous visit.       Future Appointments   Date Time Provider Department Center   2/20/2025 10:00 AM Nuria Martino MD [facility]   2/21/2025 10:00 AM Cain London MD TOS[facility]ARRIET [facility]   2/28/2025  9:30 AM Ella Baker MD [facility]   10/3/2025 12:50 PM [facility] 5 [facility]   1/6/2026 10:20 AM DANY ANDINO [facility] AMB   1/6/2026 10:40 AM Dwayne Walker MD CAVREY [facility] AMB       Nuria Martino MD    [facility]  [facility]    [address], [address]    [place], [place]  [address]    Ph: [phone]

## 2025-02-25 ENCOUNTER — TELEPHONE (OUTPATIENT)
Facility: CLINIC | Age: 75
End: 2025-02-25

## 2025-02-25 NOTE — TELEPHONE ENCOUNTER
Attempted to contact patient regarding upcoming Medicare wellness appointment and completion of HRA questionnaire. No answer, VM Full.

## 2025-02-27 NOTE — TELEPHONE ENCOUNTER
Attempted to contact patient regarding upcoming Medicare wellness appointment and completion of HRA questionnaire. LVM for patient to please return call at  [phone].

## 2025-02-28 ENCOUNTER — OFFICE VISIT (OUTPATIENT)
Facility: CLINIC | Age: 75
End: 2025-02-28

## 2025-02-28 VITALS
BODY MASS INDEX: 39.27 KG/M2 | TEMPERATURE: 98.1 F | WEIGHT: 230 LBS | RESPIRATION RATE: 16 BRPM | OXYGEN SATURATION: 98 % | DIASTOLIC BLOOD PRESSURE: 66 MMHG | HEIGHT: 64 IN | SYSTOLIC BLOOD PRESSURE: 122 MMHG | HEART RATE: 63 BPM

## 2025-02-28 DIAGNOSIS — Z12.11 SCREENING FOR COLON CANCER: ICD-10-CM

## 2025-02-28 DIAGNOSIS — I10 PRIMARY HYPERTENSION: ICD-10-CM

## 2025-02-28 DIAGNOSIS — Z00.00 MEDICARE ANNUAL WELLNESS VISIT, SUBSEQUENT: Primary | ICD-10-CM

## 2025-02-28 DIAGNOSIS — F33.1 MAJOR DEPRESSIVE DISORDER, RECURRENT, MODERATE (HCC): ICD-10-CM

## 2025-02-28 DIAGNOSIS — F51.04 CHRONIC INSOMNIA: ICD-10-CM

## 2025-02-28 DIAGNOSIS — Z17.0 MALIGNANT NEOPLASM OF RIGHT BREAST IN FEMALE, ESTROGEN RECEPTOR POSITIVE, UNSPECIFIED SITE OF BREAST (HCC): ICD-10-CM

## 2025-02-28 DIAGNOSIS — I50.22 CHRONIC SYSTOLIC CHF (CONGESTIVE HEART FAILURE), NYHA CLASS 2 (HCC): ICD-10-CM

## 2025-02-28 DIAGNOSIS — C50.911 MALIGNANT NEOPLASM OF RIGHT BREAST IN FEMALE, ESTROGEN RECEPTOR POSITIVE, UNSPECIFIED SITE OF BREAST (HCC): ICD-10-CM

## 2025-02-28 DIAGNOSIS — H04.129 DRY EYE SYNDROME, UNSPECIFIED LATERALITY: ICD-10-CM

## 2025-02-28 DIAGNOSIS — E66.01 SEVERE OBESITY (BMI 35.0-39.9) WITH COMORBIDITY: ICD-10-CM

## 2025-02-28 SDOH — ECONOMIC STABILITY: TRANSPORTATION INSECURITY
IN THE PAST 12 MONTHS, HAS THE LACK OF TRANSPORTATION KEPT YOU FROM MEDICAL APPOINTMENTS OR FROM GETTING MEDICATIONS?: NO

## 2025-02-28 SDOH — HEALTH STABILITY: PHYSICAL HEALTH: ON AVERAGE, HOW MANY DAYS PER WEEK DO YOU ENGAGE IN MODERATE TO STRENUOUS EXERCISE (LIKE A BRISK WALK)?: 0 DAYS

## 2025-02-28 SDOH — ECONOMIC STABILITY: FOOD INSECURITY: WITHIN THE PAST 12 MONTHS, THE FOOD YOU BOUGHT JUST DIDN'T LAST AND YOU DIDN'T HAVE MONEY TO GET MORE.: NEVER TRUE

## 2025-02-28 SDOH — ECONOMIC STABILITY: FOOD INSECURITY: WITHIN THE PAST 12 MONTHS, YOU WORRIED THAT YOUR FOOD WOULD RUN OUT BEFORE YOU GOT MONEY TO BUY MORE.: NEVER TRUE

## 2025-02-28 SDOH — ECONOMIC STABILITY: INCOME INSECURITY: IN THE LAST 12 MONTHS, WAS THERE A TIME WHEN YOU WERE NOT ABLE TO PAY THE MORTGAGE OR RENT ON TIME?: NO

## 2025-02-28 SDOH — HEALTH STABILITY: PHYSICAL HEALTH: ON AVERAGE, HOW MANY MINUTES DO YOU ENGAGE IN EXERCISE AT THIS LEVEL?: 30 MIN

## 2025-02-28 ASSESSMENT — PATIENT HEALTH QUESTIONNAIRE - PHQ9
8. MOVING OR SPEAKING SO SLOWLY THAT OTHER PEOPLE COULD HAVE NOTICED. OR THE OPPOSITE, BEING SO FIGETY OR RESTLESS THAT YOU HAVE BEEN MOVING AROUND A LOT MORE THAN USUAL: NOT AT ALL
1. LITTLE INTEREST OR PLEASURE IN DOING THINGS: SEVERAL DAYS
3. TROUBLE FALLING OR STAYING ASLEEP: NOT AT ALL
5. POOR APPETITE OR OVEREATING: NOT AT ALL
SUM OF ALL RESPONSES TO PHQ QUESTIONS 1-9: 2
10. IF YOU CHECKED OFF ANY PROBLEMS, HOW DIFFICULT HAVE THESE PROBLEMS MADE IT FOR YOU TO DO YOUR WORK, TAKE CARE OF THINGS AT HOME, OR GET ALONG WITH OTHER PEOPLE: NOT DIFFICULT AT ALL
9. THOUGHTS THAT YOU WOULD BE BETTER OFF DEAD, OR OF HURTING YOURSELF: NOT AT ALL
7. TROUBLE CONCENTRATING ON THINGS, SUCH AS READING THE NEWSPAPER OR WATCHING TELEVISION: NOT AT ALL
SUM OF ALL RESPONSES TO PHQ QUESTIONS 1-9: 2
4. FEELING TIRED OR HAVING LITTLE ENERGY: NOT AT ALL
6. FEELING BAD ABOUT YOURSELF - OR THAT YOU ARE A FAILURE OR HAVE LET YOURSELF OR YOUR FAMILY DOWN: NOT AT ALL
2. FEELING DOWN, DEPRESSED OR HOPELESS: SEVERAL DAYS
SUM OF ALL RESPONSES TO PHQ9 QUESTIONS 1 & 2: 2
SUM OF ALL RESPONSES TO PHQ QUESTIONS 1-9: 2
SUM OF ALL RESPONSES TO PHQ QUESTIONS 1-9: 2

## 2025-02-28 ASSESSMENT — LIFESTYLE VARIABLES
HOW OFTEN DO YOU HAVE A DRINK CONTAINING ALCOHOL: NEVER
HOW MANY STANDARD DRINKS CONTAINING ALCOHOL DO YOU HAVE ON A TYPICAL DAY: PATIENT DOES NOT DRINK
HOW OFTEN DO YOU HAVE A DRINK CONTAINING ALCOHOL: 1
HOW OFTEN DO YOU HAVE SIX OR MORE DRINKS ON ONE OCCASION: 1
HOW MANY STANDARD DRINKS CONTAINING ALCOHOL DO YOU HAVE ON A TYPICAL DAY: 0

## 2025-02-28 NOTE — PROGRESS NOTES
Medicare Annual Wellness Visit    Madeline Rocha is here for Medicare AWV    Assessment & Plan    ICD-10-CM    1. Medicare annual wellness visit, subsequent  Z00.00       2. Primary hypertension  I10       3. Chronic insomnia  F51.04 Suvorexant 10 MG TABS     AG Neisha Ledezma MD, Sleep Medicine, [place]      4. Dry eye syndrome, unspecified laterality  H04.129 External Referral To Ophthalmology      5. Major depressive disorder, recurrent, moderate (HCC)  F33.1       6. Chronic systolic CHF (congestive heart failure), NYHA class 2 (HCC)  I50.22       7. Malignant neoplasm of right breast in female, estrogen receptor positive, unspecified site of breast (HCC)  C50.911     Z17.0       8. Severe obesity (BMI 35.0-39.9) with comorbidity  E66.01       9. Screening for colon cancer  Z12.11 AFL - Wu Correa MD, Gastroenterology, [place]        1. Medicare annual wellness visit.  - Complete and return advance directive form.  - Provided information on fall prevention and weight loss.  - Instructed to schedule a dental appointment.  - Placed a new order for a colonoscopy.    2. Hypertension: Controlled.  - continue present management.    3. Chronic Insomnia: Ambien 10 mg nightly no longer helping sleep. Combination Ambien and trazodone did not help her sleep in the past. 90-day supply of Ambien was sent to her pharmacy on 2/1/25. Will stop Ambien and start trial of Belsomra.  - Start Suvorexant 10 MG TABS; Take 10 mg by mouth nightly as needed (sleep) for up to 30 days. Take 30 mins before bedtime. Max Daily Amount: 10 mg  Dispense: 30 tablet; Refill: 0  - Discussed potential side effects including dry mouth, diarrhea, dizziness, headache, and abnormal dreams.  - [facility] Neisha Ledezma MD, Sleep Medicine, [place]  - Increase physical activity to help with sleep.    4. Dry eye: Referral to an ophthalmologist for further evaluation and potential prescription drops.  - External

## 2025-02-28 NOTE — PROGRESS NOTES
Madeline Rocha  Identified pt with two pt identifiers(name and ).  Chief Complaint   Patient presents with    Medicare AWV       1. Have you been to the ER, urgent care clinic since your last visit?  Hospitalized since your last visit? NO    2. Have you seen or consulted any other health care providers outside of the [facility] since your last visit?  Include any pap smears or colon screening. NO      Provider notified of reason for visit, vitals and flowsheets obtained on patients.     Patient received paperwork for advance directive during previous visit but has not completed at this time     Reviewed record In preparation for visit, huddled with provider and have obtained necessary documentation      Health Maintenance Due   Topic    Colorectal Cancer Screen     Annual Wellness Visit (Medicare)        Wt Readings from Last 3 Encounters:   25 104.3 kg (230 lb)   25 101.6 kg (224 lb)   24 99.3 kg (219 lb)     Temp Readings from Last 3 Encounters:   25 98.1 °F (36.7 °C) (Oral)   24 98 °F (36.7 °C) (Oral)   24 97.7 °F (36.5 °C) (Oral)     BP Readings from Last 3 Encounters:   25 122/66   25 122/68   24 110/78     Pulse Readings from Last 3 Encounters:   25 63   25 60   24 54          No data to display                  Learning Assessment:  :         2023     1:40 PM   [facility] AMB LEARNING ASSESSMENT   Primary Learner Patient   level of education 2 YEARS OF COLLEGE   Primary Language ENGLISH   Learning Preference READING   Answered By patient   Relationship to Learner SELF       Fall Risk Assessment:  :         2025     7:18 AM 2024    10:28 AM 8/15/2024    10:04 AM 2024    11:00 PM 2024    10:59 AM 2023     1:53 PM 2023    10:58 AM   Amb Fall Risk Assessment and TUG Test   Do you feel unsteady or are you worried about falling?  no no no no no no no   2 or more falls in past year? no yes no

## 2025-03-04 ENCOUNTER — TELEPHONE (OUTPATIENT)
Facility: CLINIC | Age: 75
End: 2025-03-04

## 2025-03-04 NOTE — TELEPHONE ENCOUNTER
Tell her to increase Belsomra to 2 tablets at bedtime as needed for sleep. She should also patient schedule an appointment with Dr. Baker to discuss insomnia, either in-person or virtual.

## 2025-03-10 ENCOUNTER — TELEMEDICINE (OUTPATIENT)
Facility: CLINIC | Age: 75
End: 2025-03-10

## 2025-03-10 ENCOUNTER — TELEPHONE (OUTPATIENT)
Age: 75
End: 2025-03-10

## 2025-03-10 DIAGNOSIS — R63.5 WEIGHT GAIN WITH EDEMA: ICD-10-CM

## 2025-03-10 DIAGNOSIS — R60.9 WEIGHT GAIN WITH EDEMA: ICD-10-CM

## 2025-03-10 DIAGNOSIS — F51.04 CHRONIC INSOMNIA: Primary | ICD-10-CM

## 2025-03-10 RX ORDER — ZOLPIDEM TARTRATE 12.5 MG/1
12.5 TABLET, FILM COATED, EXTENDED RELEASE ORAL NIGHTLY PRN
Qty: 30 TABLET | Refills: 0 | Status: SHIPPED | OUTPATIENT
Start: 2025-03-10 | End: 2025-04-09

## 2025-03-10 NOTE — TELEPHONE ENCOUNTER
Spoke with patient after ID x 2 and she will go to [facility] [place] [facility] for eval.

## 2025-03-10 NOTE — PROGRESS NOTES
Madeline Rocha  Identified pt with two pt identifiers(name and ).  Chief Complaint   Patient presents with    Insomnia       1. Have you been to the ER, urgent care clinic since your last visit?  Hospitalized since your last visit? NO    2. Have you seen or consulted any other health care providers outside of the [facility] since your last visit?  Include any pap smears or colon screening. Order for colonoscopy was ordered at last apt.        Provider notified of reason for visit, vitals and flowsheets obtained on patients.     Patient received paperwork for advance directive during previous visit but has not completed at this time     Reviewed record In preparation for visit, huddled with provider and have obtained necessary documentation      Health Maintenance Due   Topic    Colorectal Cancer Screen        Wt Readings from Last 3 Encounters:   25 104.3 kg (230 lb)   25 101.6 kg (224 lb)   24 99.3 kg (219 lb)     Temp Readings from Last 3 Encounters:   25 98.1 °F (36.7 °C) (Oral)   24 98 °F (36.7 °C) (Oral)   24 97.7 °F (36.5 °C) (Oral)     BP Readings from Last 3 Encounters:   25 122/66   25 122/68   24 110/78     Pulse Readings from Last 3 Encounters:   25 63   25 60   24 54          No data to display                  Learning Assessment:  :         2023     1:40 PM   [facility] AMB LEARNING ASSESSMENT   Primary Learner Patient   level of education 2 YEARS OF COLLEGE   Primary Language ENGLISH   Learning Preference READING   Answered By patient   Relationship to Learner SELF       Fall Risk Assessment:  :         2025     7:18 AM 2024    10:28 AM 8/15/2024    10:04 AM 2024    11:00 PM 2024    10:59 AM 2023     1:53 PM 2023    10:58 AM   Amb Fall Risk Assessment and TUG Test   Do you feel unsteady or are you worried about falling?  no no no no no no no   2 or more falls in past year? no

## 2025-03-10 NOTE — PROGRESS NOTES
3/10/2025    TELEHEALTH EVALUATION -- Audio/Visual    HPI:    Madeline Rocha (:  1950) has requested an audio/video evaluation for the following concern(s):    Patient complains of chronic insomnia. No longer sleeping well with Ambien 10 mg nightly. Feels like her body has gotten used to it. I prescribed Belsomra but did not help sleep. Made her feel more alert and caused vision changes. After taking Belsomra, \"Everything went pitch black like someone had closed the curtains. I then saw a green dot and green and blue swirls. It scared me.\" Stopped taking. Now taking Ambien 10 mg and Chin sleep gummy. This combination helps her sleep about 75% of the time. Chin contains melatonin, L-theanine, and chamomile.    Also complains of gaining 11 lbs over the past 3 weeks. Increased swelling across entire body.     Prior to Visit Medications    Medication Sig Taking? Authorizing Provider   Suvorexant 10 MG TABS Take 10 mg by mouth nightly as needed (sleep) for up to 30 days. Take 30 mins before bedtime. Max Daily Amount: 10 mg Yes Ella Baker MD   bumetanide (BUMEX) 1 MG tablet Take 1 tablet by mouth daily  Patient taking differently: Take 3 tablets by mouth daily Yes Nuria Martino MD   zolpidem (AMBIEN) 10 MG tablet Take 1 tablet by mouth nightly as needed for Sleep for up to 90 days. Max Daily Amount: 10 mg Yes Ella Baker MD   amiodarone (CORDARONE) 200 MG tablet TAKE 1 TABLET BY MOUTH EVERY DAY Yes Dwayne Walker MD   FLUoxetine (PROZAC) 40 MG capsule TAKE 1 CAPSULE BY MOUTH TWICE A DAY Yes Ella Baker MD   bumetanide (BUMEX) 2 MG tablet TAKE 1 TABLET BY MOUTH EVERY DAY Yes Nuria Martino MD   metoprolol succinate (TOPROL XL) 25 MG extended release tablet TAKE 1 TABLET BY MOUTH TWICE A DAY Yes Nuria Martino MD   vitamin D (ERGOCALCIFEROL) 1.25 MG (66529 UT) CAPS capsule Take 1 capsule by mouth once a week Yes Ella Baker MD   ferrous sulfate (IRON 325) 325 (65 Fe) MG tablet Take 1

## 2025-03-10 NOTE — TELEPHONE ENCOUNTER
SOB increased and felt she has fluid but doesn't get like everyone else in the lower extremities, gets all over her body, sometimes hidden.     Even just getting up to go to the bathroom gets very SOB, doubled up a few days in the past and didn't continue due her potassium abnormality cause device shock, wanted to check with us first    LOV feb we had increased Bumex from 2 to 3 mg daily   Weight at last   Current cardia meds:  Bumex 3mg daily, Amio 200 mg daily, toprol xl 25 mg daily, entresto 24-26 mg BID, eliquis 5 mg BID    Daily weights at home, had to order a new battery   28th was at PCP and told her she had gained 5 lbs (231lbs) got battery and noting daily gains of 1-2 lbs   232 lbs after new battery  241 lbs now     Reports BP okay HR okay O2 96%

## 2025-03-11 ENCOUNTER — HOSPITAL ENCOUNTER (EMERGENCY)
Facility: HOSPITAL | Age: 75
Discharge: HOME | End: 2025-03-11
Attending: EMERGENCY MEDICINE
Payer: MEDICARE

## 2025-03-11 ENCOUNTER — APPOINTMENT (OUTPATIENT)
Facility: HOSPITAL | Age: 75
End: 2025-03-11
Payer: MEDICARE

## 2025-03-11 VITALS
RESPIRATION RATE: 20 BRPM | DIASTOLIC BLOOD PRESSURE: 71 MMHG | OXYGEN SATURATION: 94 % | BODY MASS INDEX: 39.38 KG/M2 | SYSTOLIC BLOOD PRESSURE: 140 MMHG | HEIGHT: 65 IN | WEIGHT: 236.33 LBS | HEART RATE: 54 BPM | TEMPERATURE: 97.7 F

## 2025-03-11 DIAGNOSIS — R06.09 DYSPNEA ON EXERTION: Primary | ICD-10-CM

## 2025-03-11 LAB
ALBUMIN SERPL-MCNC: 3.3 G/DL (ref 3.5–5)
ALBUMIN/GLOB SERPL: 1 (ref 1.1–2.2)
ALP SERPL-CCNC: 56 U/L (ref 45–117)
ALT SERPL-CCNC: 22 U/L (ref 12–78)
ANION GAP SERPL CALC-SCNC: 11 MMOL/L (ref 2–12)
ANION GAP SERPL CALC-SCNC: 9 MMOL/L (ref 2–12)
AST SERPL-CCNC: 24 U/L (ref 15–37)
BASOPHILS # BLD: 0.04 K/UL (ref 0–0.1)
BASOPHILS NFR BLD: 0.6 % (ref 0–1)
BILIRUB SERPL-MCNC: 0.3 MG/DL (ref 0.2–1)
BUN SERPL-MCNC: 16 MG/DL (ref 6–20)
BUN SERPL-MCNC: 16 MG/DL (ref 6–20)
BUN/CREAT SERPL: 16 (ref 12–20)
BUN/CREAT SERPL: 17 (ref 12–20)
CALCIUM SERPL-MCNC: 8.4 MG/DL (ref 8.5–10.1)
CALCIUM SERPL-MCNC: 8.6 MG/DL (ref 8.5–10.1)
CHLORIDE SERPL-SCNC: 103 MMOL/L (ref 97–108)
CHLORIDE SERPL-SCNC: 103 MMOL/L (ref 97–108)
CO2 SERPL-SCNC: 24 MMOL/L (ref 21–32)
CO2 SERPL-SCNC: 28 MMOL/L (ref 21–32)
COMMENT:: NORMAL
CREAT SERPL-MCNC: 0.93 MG/DL (ref 0.55–1.02)
CREAT SERPL-MCNC: 1.02 MG/DL (ref 0.55–1.02)
DIFFERENTIAL METHOD BLD: ABNORMAL
EKG ATRIAL RATE: 63 BPM
EKG DIAGNOSIS: NORMAL
EKG P AXIS: 41 DEGREES
EKG P-R INTERVAL: 208 MS
EKG Q-T INTERVAL: 472 MS
EKG QRS DURATION: 112 MS
EKG QTC CALCULATION (BAZETT): 483 MS
EKG R AXIS: -17 DEGREES
EKG T AXIS: 127 DEGREES
EKG VENTRICULAR RATE: 63 BPM
EOSINOPHIL # BLD: 0.08 K/UL (ref 0–0.4)
EOSINOPHIL NFR BLD: 1.3 % (ref 0–7)
ERYTHROCYTE [DISTWIDTH] IN BLOOD BY AUTOMATED COUNT: 17.8 % (ref 11.5–14.5)
GLOBULIN SER CALC-MCNC: 3.3 G/DL (ref 2–4)
GLUCOSE SERPL-MCNC: 214 MG/DL (ref 65–100)
GLUCOSE SERPL-MCNC: 74 MG/DL (ref 65–100)
HCT VFR BLD AUTO: 35.5 % (ref 35–47)
HGB BLD-MCNC: 10.9 G/DL (ref 11.5–16)
IMM GRANULOCYTES # BLD AUTO: 0.01 K/UL (ref 0–0.04)
IMM GRANULOCYTES NFR BLD AUTO: 0.2 % (ref 0–0.5)
LYMPHOCYTES # BLD: 1.21 K/UL (ref 0.8–3.5)
LYMPHOCYTES NFR BLD: 19.6 % (ref 12–49)
MCH RBC QN AUTO: 24.1 PG (ref 26–34)
MCHC RBC AUTO-ENTMCNC: 30.7 G/DL (ref 30–36.5)
MCV RBC AUTO: 78.5 FL (ref 80–99)
MONOCYTES # BLD: 0.47 K/UL (ref 0–1)
MONOCYTES NFR BLD: 7.6 % (ref 5–13)
NEUTS SEG # BLD: 4.35 K/UL (ref 1.8–8)
NEUTS SEG NFR BLD: 70.7 % (ref 32–75)
NRBC # BLD: 0 K/UL (ref 0–0.01)
NRBC BLD-RTO: 0 PER 100 WBC
NT PRO BNP: 1908 PG/ML (ref 0–125)
PLATELET # BLD AUTO: 226 K/UL (ref 150–400)
PMV BLD AUTO: 12.5 FL (ref 8.9–12.9)
POTASSIUM SERPL-SCNC: 3.6 MMOL/L (ref 3.5–5.1)
POTASSIUM SERPL-SCNC: 3.7 MMOL/L (ref 3.5–5.1)
PROT SERPL-MCNC: 6.6 G/DL (ref 6.4–8.2)
RBC # BLD AUTO: 4.52 M/UL (ref 3.8–5.2)
SODIUM SERPL-SCNC: 138 MMOL/L (ref 136–145)
SODIUM SERPL-SCNC: 140 MMOL/L (ref 136–145)
SPECIMEN HOLD: NORMAL
TROPONIN I SERPL HS-MCNC: 17 NG/L (ref 0–51)
WBC # BLD AUTO: 6.2 K/UL (ref 3.6–11)

## 2025-03-11 PROCEDURE — 84484 ASSAY OF TROPONIN QUANT: CPT

## 2025-03-11 PROCEDURE — 93005 ELECTROCARDIOGRAM TRACING: CPT | Performed by: EMERGENCY MEDICINE

## 2025-03-11 PROCEDURE — 71046 X-RAY EXAM CHEST 2 VIEWS: CPT

## 2025-03-11 PROCEDURE — 85025 COMPLETE CBC W/AUTO DIFF WBC: CPT

## 2025-03-11 PROCEDURE — 6360000002 HC RX W HCPCS: Performed by: EMERGENCY MEDICINE

## 2025-03-11 PROCEDURE — 83880 ASSAY OF NATRIURETIC PEPTIDE: CPT

## 2025-03-11 PROCEDURE — 80053 COMPREHEN METABOLIC PANEL: CPT

## 2025-03-11 PROCEDURE — 36415 COLL VENOUS BLD VENIPUNCTURE: CPT

## 2025-03-11 PROCEDURE — 99285 EMERGENCY DEPT VISIT HI MDM: CPT

## 2025-03-11 PROCEDURE — 96374 THER/PROPH/DIAG INJ IV PUSH: CPT

## 2025-03-11 RX ORDER — BUMETANIDE 0.25 MG/ML
1 INJECTION, SOLUTION INTRAMUSCULAR; INTRAVENOUS ONCE
Status: COMPLETED | OUTPATIENT
Start: 2025-03-11 | End: 2025-03-11

## 2025-03-11 RX ADMIN — BUMETANIDE 1 MG: 0.25 INJECTION INTRAMUSCULAR; INTRAVENOUS at 15:50

## 2025-03-11 ASSESSMENT — ENCOUNTER SYMPTOMS
VOMITING: 0
SORE THROAT: 0
SHORTNESS OF BREATH: 1
COUGH: 0

## 2025-03-11 ASSESSMENT — PAIN SCALES - GENERAL: PAINLEVEL_OUTOF10: 2

## 2025-03-11 ASSESSMENT — PAIN - FUNCTIONAL ASSESSMENT: PAIN_FUNCTIONAL_ASSESSMENT: 0-10

## 2025-03-11 ASSESSMENT — PAIN DESCRIPTION - LOCATION: LOCATION: LEG

## 2025-03-11 NOTE — ED PROVIDER NOTES
[facility]  EMERGENCY DEPARTMENT ENCOUNTER      Pt Name: Madeline Rocha  MRN: [id number]  Birthdate 1950  Date of evaluation: 3/11/2025  Provider: Willian Reich MD    CHIEF COMPLAINT       Chief Complaint   Patient presents with    Shortness of Breath         HISTORY OF PRESENT ILLNESS   (Location/Symptom, Timing/Onset, Context/Setting, Quality, Duration, Modifying Factors, Severity)  Note limiting factors.   Sob and weight gain over past couple of months.  Pt followed by Dr. Martino who increased Bumex a couple of weeks ago.  Pt reports no consistent response.  NO fever or cough,  No CP.  Some swelling in legs.  Pt states she's been taking Bumex 3mg daily for past 7 days.      The history is provided by the patient and medical records.         Review of External Medical Records:     Nursing Notes were reviewed.    REVIEW OF SYSTEMS    (2-9 systems for level 4, 10 or more for level 5)     Review of Systems   Constitutional:  Negative for fatigue.   HENT:  Negative for sore throat.    Eyes:  Negative for visual disturbance.   Respiratory:  Positive for shortness of breath. Negative for cough.    Cardiovascular:  Negative for palpitations.   Gastrointestinal:  Negative for vomiting.   Genitourinary:  Negative for difficulty urinating.   Musculoskeletal:  Negative for myalgias.   Skin:  Negative for rash.   Neurological:  Negative for weakness.       Except as noted above the remainder of the review of systems was reviewed and negative.       PAST MEDICAL HISTORY     Past Medical History:   Diagnosis Date    Acute right ankle pain 06/08/2018 5/29/18: X-ray showed possible right ankle sprain. 6/6/18: saw Dr. Mike Pierce ([facility]), diagnosed with peroneal tendonitis. Prescribed an ASO    Asthma     Atrial fibrillation (HCC)     Breast cancer (HCC)     Right lumpectomy    Cardiomyopathy (HCC)     Coronary artery disease 2008    s/p RCA stent (GALINDO) on 11/26/11    Depression with

## 2025-03-11 NOTE — ED TRIAGE NOTES
Patient presents ambulatory with steady gait to triage. Pt states, \"I have a hard time getting rid of the fluid, I've gained like 11lbs in 8 or 10 days.\" +SOB with exertion. Denies chest pain. +Diuretic use, and increased dose by cardiology recently. Pt reports 3mg Bumex qd. Pt speaking in full sentences in no respiratory distress in triage. Does appear SOB with exertion, but recovers once resting. +Dry cough.

## 2025-03-11 NOTE — ED NOTES
Pt discharged in stable condition at this time. MD/YAYA reviewed discharge instructions, prescriptions, and follow up with patient at bedside. Pt verbalized understanding and denies any needs or questions at this time.

## 2025-03-12 ENCOUNTER — PATIENT MESSAGE (OUTPATIENT)
Age: 75
End: 2025-03-12

## 2025-03-25 DIAGNOSIS — D50.9 IRON DEFICIENCY ANEMIA, UNSPECIFIED IRON DEFICIENCY ANEMIA TYPE: ICD-10-CM

## 2025-03-25 DIAGNOSIS — I10 PRIMARY HYPERTENSION: ICD-10-CM

## 2025-03-25 DIAGNOSIS — E55.9 VITAMIN D DEFICIENCY: ICD-10-CM

## 2025-03-25 DIAGNOSIS — E78.2 MIXED HYPERLIPIDEMIA: ICD-10-CM

## 2025-03-25 LAB
25(OH)D3 SERPL-MCNC: 42.8 NG/ML (ref 30–100)
ALBUMIN SERPL-MCNC: 3.5 G/DL (ref 3.5–5)
ALBUMIN/GLOB SERPL: 1.2 (ref 1.1–2.2)
ALP SERPL-CCNC: 56 U/L (ref 45–117)
ALT SERPL-CCNC: 21 U/L (ref 12–78)
ANION GAP SERPL CALC-SCNC: 5 MMOL/L (ref 2–12)
AST SERPL-CCNC: 14 U/L (ref 15–37)
BASOPHILS # BLD: 0.06 K/UL (ref 0–0.1)
BASOPHILS NFR BLD: 0.8 % (ref 0–1)
BILIRUB SERPL-MCNC: 0.5 MG/DL (ref 0.2–1)
BUN SERPL-MCNC: 23 MG/DL (ref 6–20)
BUN/CREAT SERPL: 21 (ref 12–20)
CALCIUM SERPL-MCNC: 9.2 MG/DL (ref 8.5–10.1)
CHLORIDE SERPL-SCNC: 104 MMOL/L (ref 97–108)
CHOLEST SERPL-MCNC: 230 MG/DL
CO2 SERPL-SCNC: 28 MMOL/L (ref 21–32)
CREAT SERPL-MCNC: 1.12 MG/DL (ref 0.55–1.02)
DIFFERENTIAL METHOD BLD: ABNORMAL
EOSINOPHIL # BLD: 0.11 K/UL (ref 0–0.4)
EOSINOPHIL NFR BLD: 1.5 % (ref 0–7)
ERYTHROCYTE [DISTWIDTH] IN BLOOD BY AUTOMATED COUNT: 17.2 % (ref 11.5–14.5)
FERRITIN SERPL-MCNC: 8 NG/ML (ref 8–252)
GLOBULIN SER CALC-MCNC: 3 G/DL (ref 2–4)
GLUCOSE SERPL-MCNC: 83 MG/DL (ref 65–100)
HCT VFR BLD AUTO: 35.3 % (ref 35–47)
HDLC SERPL-MCNC: 74 MG/DL
HDLC SERPL: 3.1 (ref 0–5)
HGB BLD-MCNC: 10.6 G/DL (ref 11.5–16)
IMM GRANULOCYTES # BLD AUTO: 0.01 K/UL (ref 0–0.04)
IMM GRANULOCYTES NFR BLD AUTO: 0.1 % (ref 0–0.5)
IRON SATN MFR SERPL: 9 % (ref 20–50)
IRON SERPL-MCNC: 33 UG/DL (ref 35–150)
LDLC SERPL CALC-MCNC: 128 MG/DL (ref 0–100)
LYMPHOCYTES # BLD: 1.75 K/UL (ref 0.8–3.5)
LYMPHOCYTES NFR BLD: 24.6 % (ref 12–49)
MCH RBC QN AUTO: 24.3 PG (ref 26–34)
MCHC RBC AUTO-ENTMCNC: 30 G/DL (ref 30–36.5)
MCV RBC AUTO: 81 FL (ref 80–99)
MONOCYTES # BLD: 0.74 K/UL (ref 0–1)
MONOCYTES NFR BLD: 10.4 % (ref 5–13)
NEUTS SEG # BLD: 4.43 K/UL (ref 1.8–8)
NEUTS SEG NFR BLD: 62.6 % (ref 32–75)
NRBC # BLD: 0 K/UL (ref 0–0.01)
NRBC BLD-RTO: 0 PER 100 WBC
PLATELET # BLD AUTO: 252 K/UL (ref 150–400)
POTASSIUM SERPL-SCNC: 4.4 MMOL/L (ref 3.5–5.1)
PROT SERPL-MCNC: 6.5 G/DL (ref 6.4–8.2)
RBC # BLD AUTO: 4.36 M/UL (ref 3.8–5.2)
SODIUM SERPL-SCNC: 137 MMOL/L (ref 136–145)
TIBC SERPL-MCNC: 373 UG/DL (ref 250–450)
TRIGL SERPL-MCNC: 140 MG/DL
VLDLC SERPL CALC-MCNC: 28 MG/DL
WBC # BLD AUTO: 7.1 K/UL (ref 3.6–11)

## 2025-03-30 ENCOUNTER — RESULTS FOLLOW-UP (OUTPATIENT)
Facility: CLINIC | Age: 75
End: 2025-03-30

## 2025-03-30 DIAGNOSIS — N18.31 STAGE 3A CHRONIC KIDNEY DISEASE (HCC): Primary | ICD-10-CM

## 2025-04-02 ENCOUNTER — OFFICE VISIT (OUTPATIENT)
Age: 75
End: 2025-04-02
Payer: MEDICARE

## 2025-04-02 VITALS
OXYGEN SATURATION: 97 % | SYSTOLIC BLOOD PRESSURE: 128 MMHG | WEIGHT: 232 LBS | HEART RATE: 64 BPM | HEIGHT: 65 IN | BODY MASS INDEX: 38.65 KG/M2 | DIASTOLIC BLOOD PRESSURE: 78 MMHG

## 2025-04-02 DIAGNOSIS — R06.02 SHORTNESS OF BREATH: Primary | ICD-10-CM

## 2025-04-02 DIAGNOSIS — I48.91 ATRIAL FIBRILLATION, UNSPECIFIED TYPE (HCC): ICD-10-CM

## 2025-04-02 DIAGNOSIS — D50.9 IRON DEFICIENCY ANEMIA, UNSPECIFIED IRON DEFICIENCY ANEMIA TYPE: ICD-10-CM

## 2025-04-02 DIAGNOSIS — I25.10 CORONARY ARTERY DISEASE INVOLVING NATIVE CORONARY ARTERY OF NATIVE HEART WITHOUT ANGINA PECTORIS: ICD-10-CM

## 2025-04-02 DIAGNOSIS — I25.5 ISCHEMIC CARDIOMYOPATHY: ICD-10-CM

## 2025-04-02 DIAGNOSIS — E78.5 HYPERLIPIDEMIA, UNSPECIFIED HYPERLIPIDEMIA TYPE: ICD-10-CM

## 2025-04-02 DIAGNOSIS — R06.02 SHORTNESS OF BREATH: ICD-10-CM

## 2025-04-02 PROCEDURE — 3078F DIAST BP <80 MM HG: CPT

## 2025-04-02 PROCEDURE — G8427 DOCREV CUR MEDS BY ELIG CLIN: HCPCS

## 2025-04-02 PROCEDURE — 99214 OFFICE O/P EST MOD 30 MIN: CPT

## 2025-04-02 PROCEDURE — 1090F PRES/ABSN URINE INCON ASSESS: CPT

## 2025-04-02 PROCEDURE — 3074F SYST BP LT 130 MM HG: CPT

## 2025-04-02 PROCEDURE — 3017F COLORECTAL CA SCREEN DOC REV: CPT

## 2025-04-02 PROCEDURE — 1036F TOBACCO NON-USER: CPT

## 2025-04-02 PROCEDURE — 1159F MED LIST DOCD IN RCRD: CPT

## 2025-04-02 PROCEDURE — G8399 PT W/DXA RESULTS DOCUMENT: HCPCS

## 2025-04-02 PROCEDURE — 1126F AMNT PAIN NOTED NONE PRSNT: CPT

## 2025-04-02 PROCEDURE — 1123F ACP DISCUSS/DSCN MKR DOCD: CPT

## 2025-04-02 PROCEDURE — G8417 CALC BMI ABV UP PARAM F/U: HCPCS

## 2025-04-02 ASSESSMENT — PATIENT HEALTH QUESTIONNAIRE - PHQ9
SUM OF ALL RESPONSES TO PHQ QUESTIONS 1-9: 0
SUM OF ALL RESPONSES TO PHQ QUESTIONS 1-9: 0
2. FEELING DOWN, DEPRESSED OR HOPELESS: NOT AT ALL
SUM OF ALL RESPONSES TO PHQ QUESTIONS 1-9: 0
SUM OF ALL RESPONSES TO PHQ QUESTIONS 1-9: 0
1. LITTLE INTEREST OR PLEASURE IN DOING THINGS: NOT AT ALL

## 2025-04-02 NOTE — ASSESSMENT & PLAN NOTE
S/p cryoablation of AF, typical atrial flutter ablation on 04/13/2023.  Required cardioversion of sustained VT during procedure.  Continue amiodarone.  ECG shows sinus bradycardia 58 bpm with QTc 429 ms.      S-ICD has limited ability for recording/reporting arrhythmias.  30 day loop monitor suggested by EP - could not due to poor cell service.      Should she ultimately fail AF ablation, could consider AV node ablation & upgrade to biventricular ICD.    VT: Admitted after ICD shock in 02/2023.  Required cardioversion during AF ablation.  Continue amiodarone.    Eliquis- no bleeding issues     Multichannel S-ICD (DOI 09/21/2016):   Per Dr. Walker  Last device check 2/16/25 with 51% battery and no events

## 2025-04-02 NOTE — ASSESSMENT & PLAN NOTE
-reviewed chart and labs per PCP she is supposed to be on iron supplement and was recommended to follow-up with hematology recently by PCP

## 2025-04-02 NOTE — ASSESSMENT & PLAN NOTE
Last cath in 09/2022 showed known  in OM, prior LAD stent patent.  RCA stent had mild ISR.  No angina.  Continue Repatha & ASA.  She trasniently stopped Repatha bc she had a cold, runny nose and leg pain - advised to restart.  LDL 56.    Trialed praulent, could not tolerate, leg cramps.  Trialed repatha, developed leg pains could not tolerate.  Re trial zetia, could not tolerate, aching and muscles.

## 2025-04-02 NOTE — PROGRESS NOTES
Patient: Madeline Rocha  : 1950    Primary Cardiologist:Dr. DANGELO Martino  EP Cardiologist:Dr. CRISTIAN Walker   PCP: Ella Baker MD    Today's Date: 2025      ASSESSMENT AND PLAN:     Assessment and Plan:  Assessment & Plan  Shortness of breath  Ongoing concern for the last couple of months  reporting weight gain and shortness of breath  Dr. Martino increased bumex to 3mg and she was seen in ED but ultimately discharged  On exam today no LE edema, lungs clear and weight is stable over last couple of months  Will move up planned echo and repeat BNP to compare to ED value from early march  She will see me back in 1 month for follow-up but need to consider ischemic evaluation despite lack of chest pain    Orders:    Brain Natriuretic Peptide; Future    Atrial fibrillation, unspecified type (HCC)  S/p cryoablation of AF, typical atrial flutter ablation on 2023.  Required cardioversion of sustained VT during procedure.  Continue amiodarone.  ECG shows sinus bradycardia 58 bpm with QTc 429 ms.      S-ICD has limited ability for recording/reporting arrhythmias.  30 day loop monitor suggested by EP - could not due to poor cell service.      Should she ultimately fail AF ablation, could consider AV node ablation & upgrade to biventricular ICD.    VT: Admitted after ICD shock in 2023.  Required cardioversion during AF ablation.  Continue amiodarone.    Eliquis- no bleeding issues     [employer] S-ICD (DOI 2016):   Per Dr. Walker  Last device check 25 with 51% battery and no events   Coronary artery disease involving native coronary artery of native heart without angina pectoris   Last cath in 2022 showed known  in OM, prior LAD stent patent.  RCA stent had mild ISR.  No angina.  Continue Repatha & ASA.  She trasniently stopped Repatha bc she had a cold, runny nose and leg pain - advised to restart.  LDL 56.    Trialed praulent, could not tolerate, leg cramps.  Trialed

## 2025-04-02 NOTE — ASSESSMENT & PLAN NOTE
Echo August 2024 EF 35-40% LV mildly dilated, grade III diastolic dysfunction, mod MR, mild TR, mild stenosis or aortic valve-looks stable compared to echo November 2023  Continue GDMT.  Toprol, entresto, bumex  She reported allergy to jardiance  Consider adding spironolactone   Reviewed recent CMP 3/25/25 and looks stable  Repeat echo

## 2025-04-02 NOTE — ASSESSMENT & PLAN NOTE
-intolerant to many statins  -recent labs show continued elevated  3/25/25  -will address starting repatha at follow-up

## 2025-04-03 LAB — NT PRO BNP: 1818 PG/ML

## 2025-04-14 ENCOUNTER — PATIENT MESSAGE (OUTPATIENT)
Facility: CLINIC | Age: 75
End: 2025-04-14

## 2025-04-14 DIAGNOSIS — F51.04 CHRONIC INSOMNIA: Primary | ICD-10-CM

## 2025-04-14 DIAGNOSIS — D50.9 IRON DEFICIENCY ANEMIA, UNSPECIFIED IRON DEFICIENCY ANEMIA TYPE: ICD-10-CM

## 2025-04-16 RX ORDER — ESZOPICLONE 1 MG/1
1 TABLET, FILM COATED ORAL
Qty: 30 TABLET | Refills: 0 | Status: SHIPPED | OUTPATIENT
Start: 2025-04-16 | End: 2025-05-16

## 2025-04-24 RX ORDER — SACUBITRIL AND VALSARTAN 24; 26 MG/1; MG/1
1 TABLET, FILM COATED ORAL 2 TIMES DAILY
Qty: 180 TABLET | Refills: 1 | Status: SHIPPED | OUTPATIENT
Start: 2025-04-24

## 2025-04-25 ENCOUNTER — TELEPHONE (OUTPATIENT)
Age: 75
End: 2025-04-25

## 2025-04-25 NOTE — TELEPHONE ENCOUNTER
Called pt to offer appointment for Monday at 9:40 am. Pt stated she could not do Monday and to cb when another appointment is available/offered    Cb

## 2025-05-07 ENCOUNTER — OFFICE VISIT (OUTPATIENT)
Age: 75
End: 2025-05-07
Payer: MEDICARE

## 2025-05-07 VITALS
WEIGHT: 221 LBS | HEIGHT: 64 IN | SYSTOLIC BLOOD PRESSURE: 128 MMHG | OXYGEN SATURATION: 99 % | BODY MASS INDEX: 37.73 KG/M2 | DIASTOLIC BLOOD PRESSURE: 80 MMHG | HEART RATE: 60 BPM

## 2025-05-07 DIAGNOSIS — R07.9 CHEST PAIN, UNSPECIFIED TYPE: ICD-10-CM

## 2025-05-07 DIAGNOSIS — R06.02 SHORTNESS OF BREATH: ICD-10-CM

## 2025-05-07 DIAGNOSIS — I25.5 ISCHEMIC CARDIOMYOPATHY: Primary | ICD-10-CM

## 2025-05-07 DIAGNOSIS — I48.19 PERSISTENT ATRIAL FIBRILLATION (HCC): ICD-10-CM

## 2025-05-07 PROCEDURE — 99214 OFFICE O/P EST MOD 30 MIN: CPT

## 2025-05-07 PROCEDURE — 1159F MED LIST DOCD IN RCRD: CPT

## 2025-05-07 PROCEDURE — G8427 DOCREV CUR MEDS BY ELIG CLIN: HCPCS

## 2025-05-07 PROCEDURE — 3017F COLORECTAL CA SCREEN DOC REV: CPT

## 2025-05-07 PROCEDURE — 1090F PRES/ABSN URINE INCON ASSESS: CPT

## 2025-05-07 PROCEDURE — 3074F SYST BP LT 130 MM HG: CPT

## 2025-05-07 PROCEDURE — 93000 ELECTROCARDIOGRAM COMPLETE: CPT

## 2025-05-07 PROCEDURE — 1036F TOBACCO NON-USER: CPT

## 2025-05-07 PROCEDURE — 3079F DIAST BP 80-89 MM HG: CPT

## 2025-05-07 PROCEDURE — G8417 CALC BMI ABV UP PARAM F/U: HCPCS

## 2025-05-07 PROCEDURE — G8399 PT W/DXA RESULTS DOCUMENT: HCPCS

## 2025-05-07 PROCEDURE — 1125F AMNT PAIN NOTED PAIN PRSNT: CPT

## 2025-05-07 PROCEDURE — 1123F ACP DISCUSS/DSCN MKR DOCD: CPT

## 2025-05-07 RX ORDER — ZOLPIDEM TARTRATE 12.5 MG/1
TABLET, FILM COATED, EXTENDED RELEASE ORAL
COMMUNITY

## 2025-05-07 RX ORDER — ROSUVASTATIN CALCIUM 5 MG/1
TABLET, COATED ORAL
COMMUNITY

## 2025-05-07 ASSESSMENT — PATIENT HEALTH QUESTIONNAIRE - PHQ9
SUM OF ALL RESPONSES TO PHQ QUESTIONS 1-9: 0
1. LITTLE INTEREST OR PLEASURE IN DOING THINGS: NOT AT ALL
2. FEELING DOWN, DEPRESSED OR HOPELESS: NOT AT ALL
SUM OF ALL RESPONSES TO PHQ QUESTIONS 1-9: 0

## 2025-05-07 NOTE — PATIENT INSTRUCTIONS
You will be scheduled for a Nuclear Stress Test after your appointment today.    Nuclear stress testing evaluates blood flow to your heart muscle and assesses cardiac function. There are 2 parts (Rest/Stress) to this procedure and will include either an exercise on a treadmill or an IV administration of a stressing medication called Lexiscan. Your cardiologist will determine which type of testing is best for you. This test can be performed in one day unless it is determined that better quality images will be obtained by performing the test over two days.     *Please arrive 15 minutes prior to your appointment time    Test Duration:    -One day testing will take 4 hours   -Two day testing will take 2 hours each day. Your second day(resting images) will be scheduled after the first day is completed    Day of testing instructions:    NO CAFFEINE (not even decaffeinated products) 24 HOURS PRIOR TO TESTING. This includes coffee, soda, tea, chocolate, multivitamins, and migraine medication, like Excedrin or Fioricet that contains caffeine.  Nothing to eat or drink 4 HOURS prior to testing  NO NICOTINE 12 hours prior to testing  Hold any medications requested by your cardiologist. Otherwise take medications as directed with a few sips of water. If you are unsure you may bring your medications with you to take after instructed by your stressing nurse. It is recommended you hold Metoprolol the evening before test and morning of test.  DIABETIC PATIENTS: Take half of your insulin with a light meal 4 hours before your test.  Wear comfortable clothes and shoes (Shirts with no metal, shorts or pants, tennis shoes, no heels or flip flops)    IMPORTANT: This testing involves a cardiac tracer ordered specifically for you. If you are unable to make your appointment, please call to cancel/reschedule AT LEAST 24 hours prior to your appointment so your tracer can be cancelled. [phone].

## 2025-05-07 NOTE — PROGRESS NOTES
1. Have you been to the ER, urgent care clinic since your last visit?  Hospitalized since your last visit?No    2. Have you seen or consulted any other health care providers outside of the [facility] since your last visit?  Include any pap smears or colon screening. No    
in 2009    Hepatitis C antibody test positive     Does not have chronic hep C (labs 10/6/15: neg HCV RNA)     HTN (hypertension) 07/27/2010    Hyperlipidemia 07/27/2010    Incontinence of feces 09/03/2018    Dr. Correa. 8/20/18: Improving with pelvic physical therapy and psyllium husk treatment. Saw Dr. Gomez who suggested PT first. MR defecography showed pelvic floor weakness with pelvic descensus, small anterior  Rectocele, and vaginal prolapse. To have pelvic PT weekly for 8 wks and to see Dr. Gomez again in Oct 2018.    Joint pain 07/27/2010    Menopause     LMP-52 years old?    MI (myocardial infarction) (HCC) 07/27/2010    Mitral valve regurgitation     Mild to moderate    Morbid obesity (HCC) 07/27/2010    Myocardial infarction (HCC) 2006 and 2011    Dr. Edilson Pascal    Psychiatric disorder     PUD (peptic ulcer disease)     gi bleed 2008; ulcer n gastric bypass pouch        Past Surgical History:   Procedure Laterality Date    ABLATION OF DYSRHYTHMIC FOCUS  04/13/2023    BREAST LUMPECTOMY Right 07/14/2022    RIGHT BREAST LUMPECTOMY WITH ULTRASOUND performed by Reuben Jesus Jr., MD at [facility]    CARDIAC DEFIBRILLATOR PLACEMENT  08/24/2016    COLONOSCOPY N/A 06/29/2018    COLONOSCOPY performed by Hector Correa MD at [facility] ENDOSCOPY; redundant colon, int hemorrhoids, pathology: normal colonic mucosa    COLONOSCOPY  09/05/2014    Dr. Garner; normal, repeat in 5 yrs    GASTRIC BYPASS SURGERY      GASTRIC BYPASS SURGERY  2006    revision 2009/Dr. Tejinder Mane    INS PPM/ICD LED SING ONLY  09/21/2016         INS PPM/ICD LED SING ONLY  08/26/2016         INS PPM/ICD LED SING ONLY  08/24/2016         LAP,STOMACH,OTHER,W/O TUBE  04/05/2010    Revision GBP    MELINDA US GUID NDL BIOPSY RIGHT Right 06/10/2022    Positive for breast cancer    OTHER SURGICAL HISTORY  09/19/2016    Removal of right ventricular ICD lead & single chamber transvenous AICD    OTHER SURGICAL HISTORY  10/12/2016    pocket

## 2025-05-14 ENCOUNTER — TELEPHONE (OUTPATIENT)
Age: 75
End: 2025-05-14

## 2025-05-14 NOTE — TELEPHONE ENCOUNTER
Called pt to offer another hematology appointment. Advised pt to cb to confirm or deny appointment for 5/20 8:40am 40 min slt with Ivonne.     Left date/janis/location of appointment as well as CB number

## 2025-05-19 ENCOUNTER — PATIENT MESSAGE (OUTPATIENT)
Facility: CLINIC | Age: 75
End: 2025-05-19

## 2025-05-19 DIAGNOSIS — F51.04 CHRONIC INSOMNIA: Primary | ICD-10-CM

## 2025-05-19 RX ORDER — ZOLPIDEM TARTRATE 12.5 MG/1
12.5 TABLET, FILM COATED, EXTENDED RELEASE ORAL NIGHTLY PRN
Qty: 30 TABLET | Refills: 0 | Status: SHIPPED | OUTPATIENT
Start: 2025-05-19 | End: 2025-06-18

## 2025-05-19 NOTE — TELEPHONE ENCOUNTER
PCP: Ella Baker MD     Last appt:  3/10/2025      Future Appointments   Date Time Provider Department Center   5/21/2025  9:00 AM [facility] DANY [facility] Armen MCCLENDON [facility]   6/11/2025  1:00 PM Swati Coreas, APRN - NP CAV [facility]   6/19/2025  9:50 AM Ella Baker MD [facility]   8/21/2025 11:40 AM Nuria Martino MD CAVREY [facility]   10/3/2025 12:50 PM [facility] 5 SMHRMAM [facility]   1/6/2026 10:20 AM [id number] [facility]   1/6/2026 10:40 AM Dwayne Walker MD CAVREY [facility]          Requested Prescriptions     Pending Prescriptions Disp Refills    zolpidem (AMBIEN CR) 12.5 MG extended release tablet 30 tablet 0     Sig: Take 1 tablet by mouth nightly as needed for Sleep for up to 30 days. Max Daily Amount: 12.5 mg

## 2025-05-20 ENCOUNTER — TELEPHONE (OUTPATIENT)
Age: 75
End: 2025-05-20

## 2025-05-20 NOTE — TELEPHONE ENCOUNTER
Patient is calling for a refill on her Eliquis 5 mg.Patient was originally given the medication in the hospital.Patient may need a new prescription.    Pharmacy:  [facility]/pharmacy #[id number] - [place]  [address] - P [phone] - F [phone]     [phone] patient

## 2025-05-21 ENCOUNTER — TELEPHONE (OUTPATIENT)
Age: 75
End: 2025-05-21

## 2025-05-21 NOTE — TELEPHONE ENCOUNTER
Verified patient with two types of identifiers.   Spoke with patient as pharmacy dispense report shows that Eliquis prescription is being filled by Dr Calle office. Pt states that she is no longer a patient of Dr Loyola and will continue to see Dr Martino. Informed patient that we will go ahead and fill prescription.       Med refilled per VO by MD.    Future Appointments   Date Time Provider Department Center   6/11/2025 12:30 PM [facility] DANY [facility] 1 HAKAN BS AM[facility]   6/11/2025  1:00 PM Swati Coreas APRN - NP CAV [facility]   6/19/2025  9:50 AM Ella Baker MD [facility]   8/21/2025 11:40 AM Nuria Martino MD CAVREY [facility] AMB   10/3/2025 12:50 PM [facility]ELINDA 5 [facility]HRM[facility]   1/6/2026 10:20 AM DANY ANDINO BS AMB   1/6/2026 10:40 AM Dwayne Walker MD CAVREY BS AMB

## 2025-06-11 ENCOUNTER — ANCILLARY PROCEDURE (OUTPATIENT)
Age: 75
End: 2025-06-11
Payer: MEDICARE

## 2025-06-11 VITALS
HEART RATE: 54 BPM | HEIGHT: 65 IN | WEIGHT: 221 LBS | BODY MASS INDEX: 36.82 KG/M2 | DIASTOLIC BLOOD PRESSURE: 74 MMHG | SYSTOLIC BLOOD PRESSURE: 116 MMHG

## 2025-06-11 DIAGNOSIS — R06.02 SHORTNESS OF BREATH: ICD-10-CM

## 2025-06-11 DIAGNOSIS — R07.9 CHEST PAIN, UNSPECIFIED TYPE: ICD-10-CM

## 2025-06-11 PROCEDURE — 78452 HT MUSCLE IMAGE SPECT MULT: CPT | Performed by: INTERNAL MEDICINE

## 2025-06-11 PROCEDURE — A9500 TC99M SESTAMIBI: HCPCS | Performed by: INTERNAL MEDICINE

## 2025-06-11 RX ORDER — TETRAKIS(2-METHOXYISOBUTYLISOCYANIDE)COPPER(I) TETRAFLUOROBORATE 1 MG/ML
7.6 INJECTION, POWDER, LYOPHILIZED, FOR SOLUTION INTRAVENOUS
Status: COMPLETED | OUTPATIENT
Start: 2025-06-11 | End: 2025-06-11

## 2025-06-11 RX ORDER — TETRAKIS(2-METHOXYISOBUTYLISOCYANIDE)COPPER(I) TETRAFLUOROBORATE 1 MG/ML
23 INJECTION, POWDER, LYOPHILIZED, FOR SOLUTION INTRAVENOUS
Status: COMPLETED | OUTPATIENT
Start: 2025-06-11 | End: 2025-06-11

## 2025-06-11 RX ORDER — REGADENOSON 0.08 MG/ML
0.4 INJECTION, SOLUTION INTRAVENOUS
Status: COMPLETED | OUTPATIENT
Start: 2025-06-11 | End: 2025-06-11

## 2025-06-11 RX ADMIN — REGADENOSON 0.4 MG: 0.08 INJECTION, SOLUTION INTRAVENOUS at 14:05

## 2025-06-11 RX ADMIN — TECHNETIUM TC-99M SESTAMIBI 23 MILLICURIE: 1 INJECTION INTRAVENOUS at 14:05

## 2025-06-11 RX ADMIN — TECHNETIUM TC-99M SESTAMIBI 7.6 MILLICURIE: 1 INJECTION INTRAVENOUS at 12:40

## 2025-06-13 ENCOUNTER — RESULTS FOLLOW-UP (OUTPATIENT)
Age: 75
End: 2025-06-13

## 2025-06-13 LAB
ECHO BSA: 2.14 M2
NUC STRESS EJECTION FRACTION: 29 %
STRESS BASELINE DIAS BP: 74 MMHG
STRESS BASELINE HR: 63 BPM
STRESS BASELINE SYS BP: 116 MMHG
STRESS O2 SAT PEAK: 98 %
STRESS O2 SAT REST: 96 %
STRESS PEAK DIAS BP: 56 MMHG
STRESS PEAK SYS BP: 114 MMHG
STRESS PERCENT HR ACHIEVED: 46 %
STRESS POST PEAK HR: 68 BPM
STRESS RATE PRESSURE PRODUCT: 7752 BPM*MMHG
STRESS TARGET HR: 147 BPM
TID: 1.13

## 2025-06-17 ENCOUNTER — TELEPHONE (OUTPATIENT)
Age: 75
End: 2025-06-17

## 2025-06-18 DIAGNOSIS — E55.9 VITAMIN D DEFICIENCY: ICD-10-CM

## 2025-06-18 DIAGNOSIS — F51.04 CHRONIC INSOMNIA: ICD-10-CM

## 2025-06-18 RX ORDER — ZOLPIDEM TARTRATE 12.5 MG/1
12.5 TABLET, FILM COATED, EXTENDED RELEASE ORAL NIGHTLY PRN
Qty: 30 TABLET | Refills: 2 | Status: SHIPPED | OUTPATIENT
Start: 2025-06-18 | End: 2025-09-16

## 2025-06-18 RX ORDER — ERGOCALCIFEROL 1.25 MG/1
50000 CAPSULE, LIQUID FILLED ORAL WEEKLY
Qty: 12 CAPSULE | Refills: 1 | Status: SHIPPED | OUTPATIENT
Start: 2025-06-18

## 2025-06-18 NOTE — TELEPHONE ENCOUNTER
PCP: Ella Baker MD     Last appt:  3/10/2025      Future Appointments   Date Time Provider Department Center   6/23/2025  9:50 AM Ella Baker MD [facility]   6/25/2025  1:00 PM Swati Coreas, APRN - NP CAV [facility]   6/30/2025  1:00 PM Coretta Sanders, APRN - CNP MEDONC BS [facility]   8/21/2025 11:40 AM Nuria Martino MD CAVREY [facility]   10/3/2025 12:50 PM [facility] 5 SMHRMAM [facility]   1/6/2026 10:20 AM DANY ANDINO  AMB   1/6/2026 10:40 AM Dwayne Walker MD CAVREY  AMB          Requested Prescriptions     Pending Prescriptions Disp Refills    zolpidem (AMBIEN CR) 12.5 MG extended release tablet [Pharmacy Med Name: ZOLPIDEM TART ER 12.5 MG TAB] 30 tablet 0     Sig: Take 1 tablet by mouth nightly as needed for Sleep for up to 30 days. Max Daily Amount: 12.5 mg    vitamin D (ERGOCALCIFEROL) 1.25 MG (11850 UT) CAPS capsule [Pharmacy Med Name: VITAMIN D2 1.25MG(50,000 UNIT)] 12 capsule 3     Sig: TAKE 1 CAPSULE BY MOUTH ONE TIME PER WEEK

## 2025-06-23 ENCOUNTER — OFFICE VISIT (OUTPATIENT)
Facility: CLINIC | Age: 75
End: 2025-06-23
Payer: MEDICARE

## 2025-06-23 VITALS
WEIGHT: 225.4 LBS | BODY MASS INDEX: 37.55 KG/M2 | HEART RATE: 53 BPM | HEIGHT: 65 IN | DIASTOLIC BLOOD PRESSURE: 68 MMHG | SYSTOLIC BLOOD PRESSURE: 122 MMHG | TEMPERATURE: 97.8 F | OXYGEN SATURATION: 97 % | RESPIRATION RATE: 16 BRPM

## 2025-06-23 DIAGNOSIS — F51.04 CHRONIC INSOMNIA: Primary | ICD-10-CM

## 2025-06-23 DIAGNOSIS — N18.31 STAGE 3A CHRONIC KIDNEY DISEASE (HCC): ICD-10-CM

## 2025-06-23 DIAGNOSIS — E78.2 MIXED HYPERLIPIDEMIA: ICD-10-CM

## 2025-06-23 DIAGNOSIS — I10 PRIMARY HYPERTENSION: ICD-10-CM

## 2025-06-23 PROCEDURE — 1159F MED LIST DOCD IN RCRD: CPT | Performed by: INTERNAL MEDICINE

## 2025-06-23 PROCEDURE — G8417 CALC BMI ABV UP PARAM F/U: HCPCS | Performed by: INTERNAL MEDICINE

## 2025-06-23 PROCEDURE — 1036F TOBACCO NON-USER: CPT | Performed by: INTERNAL MEDICINE

## 2025-06-23 PROCEDURE — 1090F PRES/ABSN URINE INCON ASSESS: CPT | Performed by: INTERNAL MEDICINE

## 2025-06-23 PROCEDURE — 1126F AMNT PAIN NOTED NONE PRSNT: CPT | Performed by: INTERNAL MEDICINE

## 2025-06-23 PROCEDURE — 3017F COLORECTAL CA SCREEN DOC REV: CPT | Performed by: INTERNAL MEDICINE

## 2025-06-23 PROCEDURE — 1160F RVW MEDS BY RX/DR IN RCRD: CPT | Performed by: INTERNAL MEDICINE

## 2025-06-23 PROCEDURE — G8427 DOCREV CUR MEDS BY ELIG CLIN: HCPCS | Performed by: INTERNAL MEDICINE

## 2025-06-23 PROCEDURE — 3074F SYST BP LT 130 MM HG: CPT | Performed by: INTERNAL MEDICINE

## 2025-06-23 PROCEDURE — 99214 OFFICE O/P EST MOD 30 MIN: CPT | Performed by: INTERNAL MEDICINE

## 2025-06-23 PROCEDURE — 1123F ACP DISCUSS/DSCN MKR DOCD: CPT | Performed by: INTERNAL MEDICINE

## 2025-06-23 PROCEDURE — G8399 PT W/DXA RESULTS DOCUMENT: HCPCS | Performed by: INTERNAL MEDICINE

## 2025-06-23 PROCEDURE — 3078F DIAST BP <80 MM HG: CPT | Performed by: INTERNAL MEDICINE

## 2025-06-23 NOTE — PROGRESS NOTES
Madeline Rocha  Identified pt with two pt identifiers(name and ).  Chief Complaint   Patient presents with    Insomnia       1. Have you been to the ER, urgent care clinic since your last visit?  Hospitalized since your last visit? NO    2. Have you seen or consulted any other health care providers outside of the [facility] since your last visit?  Include any pap smears or colon screening. NO      Provider notified of reason for visit, vitals and flowsheets obtained on patients.     Patient received paperwork for advance directive during previous visit but has not completed at this time     Reviewed record In preparation for visit, huddled with provider and have obtained necessary documentation      Health Maintenance Due   Topic    Colorectal Cancer Screen        Wt Readings from Last 3 Encounters:   25 102.2 kg (225 lb 6.4 oz)   25 100.2 kg (221 lb)   25 100.2 kg (221 lb)     Temp Readings from Last 3 Encounters:   25 97.8 °F (36.6 °C) (Temporal)   25 97.7 °F (36.5 °C) (Oral)   25 98.1 °F (36.7 °C) (Oral)     BP Readings from Last 3 Encounters:   25 122/68   25 116/74   25 128/80     Pulse Readings from Last 3 Encounters:   25 53   25 54   25 60          No data to display                  Learning Assessment:  :         2023     1:40 PM   [facility] AMB LEARNING ASSESSMENT   Primary Learner Patient   level of education 2 YEARS OF COLLEGE   Primary Language ENGLISH   Learning Preference READING   Answered By patient   Relationship to Learner SELF       Fall Risk Assessment:  :         2025    12:56 PM 2025     1:51 PM 2025     7:18 AM 2024    10:28 AM 8/15/2024    10:04 AM 2024    11:00 PM 2024    10:59 AM   Amb Fall Risk Assessment and TUG Test   Do you feel unsteady or are you worried about falling?  no no no no no no no   2 or more falls in past year? no no no yes no no no   Fall with 
6 months (around 12/23/2025), or if symptoms worsen or fail to improve, for HTN, HLD.     I have discussed the diagnosis with the patient and the intended plan as seen in the above orders. Patient is in agreement. The patient has received an after-visit summary and questions were answered concerning future plans. I have discussed medication side effects and warnings with the patient as well.    The patient (or guardian, if applicable) and other individuals in attendance with the patient were advised that Artificial Intelligence will be utilized during this visit to record and process the conversation to generate a clinical note. The patient (or guardian, if applicable) and other individuals in attendance at the appointment consented to the use of AI, including the recording.

## 2025-06-24 NOTE — PATIENT INSTRUCTIONS
"  {PROVIDER CHARTING PREFERENCE:222176}    Juancho Ceballos is a 89 year old, presenting for the following health issues:  RECHECK, Jaw Pain (Pt had two molars removed and is having left ear pain, could also be from digging in ears), and Forms (POLST)        6/24/2025     1:18 PM   Additional Questions   Roomed by Gena ADAMS   Accompanied by daughter, [place]         6/24/2025   Forms   Any forms needing to be completed Yes     History of Present Illness       Reason for visit:  Follow up    He eats 0-1 servings of fruits and vegetables daily.He consumes 1 sweetened beverage(s) daily.He exercises with enough effort to increase his heart rate 9 or less minutes per day.  He exercises with enough effort to increase his heart rate 3 or less days per week.   He is taking medications regularly.            {additonal problems for provider to add (Optional):311105}    {ROS Picklists (Optional):670964}      Objective    BP 95/60 (BP Location: Left arm, Cuff Size: Adult Regular)   Pulse 76   Temp 97  F (36.1  C) (Tympanic)   Resp 18   Ht 1.778 m (5' 10\")   Wt 71 kg (156 lb 8 oz)   SpO2 99%   BMI 22.46 kg/m    Body mass index is 22.46 kg/m .  Physical Exam   {Exam List (Optional):741019}    {Diagnostic Test Results (Optional):227098}        Signed Electronically by: Kwadwo Winslow MD  {Email feedback regarding this note to [email or web address]   :686126}  " Benign Paroxysmal Positional Vertigo (BPPV): Care Instructions  Your Care Instructions    Benign paroxysmal positional vertigo, also called BPPV, is an inner ear problem. It causes a spinning or whirling sensation when you move your head. This sensation is called vertigo. The vertigo usually lasts for less than a minute. People often have vertigo spells for a few days or weeks. Then the vertigo goes away. But it may come back again. The vertigo may be mild, or it may be bad enough to cause unsteadiness, nausea, and vomiting. When you move, your inner ear sends messages to the brain. This helps you keep your balance. Vertigo can happen when debris builds up in the inner ear. The buildup can cause the inner ear to send the wrong message to the brain. Your doctor may move you in different positions to help your vertigo get better faster. This is called the Epley maneuver. Your doctor may also prescribe medicines or exercises to help with your symptoms. Follow-up care is a key part of your treatment and safety. Be sure to make and go to all appointments, and call your doctor if you are having problems. It's also a good idea to know your test results and keep a list of the medicines you take. How can you care for yourself at home? · If your doctor suggests that you do Hernandez-Daroff exercises:  ¨ Sit on the edge of a bed or sofa. Quickly lie down on the side that causes the worst vertigo. Lie on your side with your ear down. ¨ Stay in this position for at least 30 seconds or until the vertigo goes away. ¨ Sit up. If this causes vertigo, wait for it to stop. ¨ Repeat the procedure on the other side. ¨ Repeat this 10 times. Do these exercises 2 times a day until the vertigo is gone. When should you call for help? Call 911 anytime you think you may need emergency care. For example, call if:  · You have symptoms of a stroke.  These may include:  ¨ Sudden numbness, tingling, weakness, or loss of movement in your face, arm, or leg, especially on only one side of your body. ¨ Sudden vision changes. ¨ Sudden trouble speaking. ¨ Sudden confusion or trouble understanding simple statements. ¨ Sudden problems with walking or balance. ¨ A sudden, severe headache that is different from past headaches. Call your doctor now or seek immediate medical care if:  · You have new or worse nausea and vomiting. · You have new symptoms such as hearing loss or roaring in your ears. Watch closely for changes in your health, and be sure to contact your doctor if:  · You are not getting better as expected. · Your vertigo gets worse. Where can you learn more? Go to http://jose-rm.info/. Enter  in the search box to learn more about \"Benign Paroxysmal Positional Vertigo (BPPV): Care Instructions. \"  Current as of: July 29, 2016  Content Version: 11.2  © 6633-2726 [employer]. Care instructions adapted under license by [employer] (which disclaims liability or warranty for this information). If you have questions about a medical condition or this instruction, always ask your healthcare professional. [place] [address] any warranty or liability for your use of this information.

## 2025-06-25 ENCOUNTER — OFFICE VISIT (OUTPATIENT)
Age: 75
End: 2025-06-25
Payer: MEDICARE

## 2025-06-25 VITALS
BODY MASS INDEX: 39.18 KG/M2 | OXYGEN SATURATION: 96 % | HEIGHT: 65 IN | SYSTOLIC BLOOD PRESSURE: 128 MMHG | HEART RATE: 58 BPM | DIASTOLIC BLOOD PRESSURE: 72 MMHG | WEIGHT: 235.2 LBS

## 2025-06-25 DIAGNOSIS — I50.22 CHRONIC SYSTOLIC CHF (CONGESTIVE HEART FAILURE), NYHA CLASS 2 (HCC): Primary | ICD-10-CM

## 2025-06-25 DIAGNOSIS — R06.02 SOB (SHORTNESS OF BREATH): ICD-10-CM

## 2025-06-25 DIAGNOSIS — I25.10 CORONARY ARTERY DISEASE INVOLVING NATIVE CORONARY ARTERY OF NATIVE HEART WITHOUT ANGINA PECTORIS: ICD-10-CM

## 2025-06-25 DIAGNOSIS — D50.9 IRON DEFICIENCY ANEMIA, UNSPECIFIED IRON DEFICIENCY ANEMIA TYPE: ICD-10-CM

## 2025-06-25 DIAGNOSIS — F51.04 CHRONIC INSOMNIA: ICD-10-CM

## 2025-06-25 DIAGNOSIS — I10 PRIMARY HYPERTENSION: ICD-10-CM

## 2025-06-25 PROCEDURE — 99214 OFFICE O/P EST MOD 30 MIN: CPT

## 2025-06-25 PROCEDURE — 3074F SYST BP LT 130 MM HG: CPT

## 2025-06-25 PROCEDURE — G8427 DOCREV CUR MEDS BY ELIG CLIN: HCPCS

## 2025-06-25 PROCEDURE — 1123F ACP DISCUSS/DSCN MKR DOCD: CPT

## 2025-06-25 PROCEDURE — 1090F PRES/ABSN URINE INCON ASSESS: CPT

## 2025-06-25 PROCEDURE — 3017F COLORECTAL CA SCREEN DOC REV: CPT

## 2025-06-25 PROCEDURE — 3078F DIAST BP <80 MM HG: CPT

## 2025-06-25 PROCEDURE — 1126F AMNT PAIN NOTED NONE PRSNT: CPT

## 2025-06-25 PROCEDURE — G8417 CALC BMI ABV UP PARAM F/U: HCPCS

## 2025-06-25 PROCEDURE — 1159F MED LIST DOCD IN RCRD: CPT

## 2025-06-25 PROCEDURE — G8399 PT W/DXA RESULTS DOCUMENT: HCPCS

## 2025-06-25 PROCEDURE — 1036F TOBACCO NON-USER: CPT

## 2025-06-25 ASSESSMENT — PATIENT HEALTH QUESTIONNAIRE - PHQ9
SUM OF ALL RESPONSES TO PHQ QUESTIONS 1-9: 0
2. FEELING DOWN, DEPRESSED OR HOPELESS: NOT AT ALL
1. LITTLE INTEREST OR PLEASURE IN DOING THINGS: NOT AT ALL

## 2025-06-25 NOTE — PROGRESS NOTES
1. Have you been to the ER, urgent care clinic since your last visit?  Hospitalized since your last visit?  No    2. Have you seen or consulted any other health care providers outside of the [facility] since your last visit?  Include any pap smears or colon screening.   No  
HUMA, APRN - CNP [facility]   8/21/2025 11:40 AM Nuria Martino MD CAVREY BS AMB   10/3/2025 12:50 PM [facility] 5 SMHRMAM [facility]   12/23/2025 11:10 AM Ella Baker MD [facility]   1/6/2026 10:20 AM [id number] BS AMB   1/6/2026 10:40 AM Dwayne Walker MD CAVREY BS AMB       Swati Coreas, APRN-NP  [facility]    [address] [place] [address], [address]  [place] [address] [phone]    [address] [place] [address], [facility] [address]  [place] [address] [phone]

## 2025-06-30 ENCOUNTER — OFFICE VISIT (OUTPATIENT)
Age: 75
End: 2025-06-30
Payer: MEDICARE

## 2025-06-30 VITALS
RESPIRATION RATE: 14 BRPM | WEIGHT: 233 LBS | HEART RATE: 62 BPM | HEIGHT: 65 IN | TEMPERATURE: 98 F | SYSTOLIC BLOOD PRESSURE: 134 MMHG | OXYGEN SATURATION: 95 % | BODY MASS INDEX: 38.82 KG/M2 | DIASTOLIC BLOOD PRESSURE: 82 MMHG

## 2025-06-30 DIAGNOSIS — Z98.84 HISTORY OF GASTRIC BYPASS: ICD-10-CM

## 2025-06-30 DIAGNOSIS — D50.9 IRON DEFICIENCY ANEMIA, UNSPECIFIED IRON DEFICIENCY ANEMIA TYPE: Primary | ICD-10-CM

## 2025-06-30 DIAGNOSIS — D50.9 IRON DEFICIENCY ANEMIA, UNSPECIFIED IRON DEFICIENCY ANEMIA TYPE: ICD-10-CM

## 2025-06-30 DIAGNOSIS — I50.22 CHRONIC SYSTOLIC CHF (CONGESTIVE HEART FAILURE), NYHA CLASS 2 (HCC): ICD-10-CM

## 2025-06-30 LAB
ANION GAP SERPL CALC-SCNC: 8 MMOL/L (ref 2–12)
BASOPHILS # BLD: 0.05 K/UL (ref 0–0.1)
BASOPHILS NFR BLD: 0.8 % (ref 0–1)
BUN SERPL-MCNC: 14 MG/DL (ref 6–20)
BUN/CREAT SERPL: 12 (ref 12–20)
CALCIUM SERPL-MCNC: 8.6 MG/DL (ref 8.5–10.1)
CHLORIDE SERPL-SCNC: 105 MMOL/L (ref 97–108)
CO2 SERPL-SCNC: 23 MMOL/L (ref 21–32)
CREAT SERPL-MCNC: 1.14 MG/DL (ref 0.55–1.02)
DIFFERENTIAL METHOD BLD: ABNORMAL
EOSINOPHIL # BLD: 0.11 K/UL (ref 0–0.4)
EOSINOPHIL NFR BLD: 1.7 % (ref 0–7)
ERYTHROCYTE [DISTWIDTH] IN BLOOD BY AUTOMATED COUNT: 16.2 % (ref 11.5–14.5)
FERRITIN SERPL-MCNC: 8 NG/ML (ref 8–252)
GLUCOSE SERPL-MCNC: 190 MG/DL (ref 65–100)
HCT VFR BLD AUTO: 33.9 % (ref 35–47)
HGB BLD-MCNC: 10.2 G/DL (ref 11.5–16)
IMM GRANULOCYTES # BLD AUTO: 0.01 K/UL (ref 0–0.04)
IMM GRANULOCYTES NFR BLD AUTO: 0.2 % (ref 0–0.5)
IRON SATN MFR SERPL: 8 % (ref 20–50)
IRON SERPL-MCNC: 28 UG/DL (ref 35–150)
LYMPHOCYTES # BLD: 1.53 K/UL (ref 0.8–3.5)
LYMPHOCYTES NFR BLD: 23.3 % (ref 12–49)
MCH RBC QN AUTO: 24.2 PG (ref 26–34)
MCHC RBC AUTO-ENTMCNC: 30.1 G/DL (ref 30–36.5)
MCV RBC AUTO: 80.5 FL (ref 80–99)
MONOCYTES # BLD: 0.57 K/UL (ref 0–1)
MONOCYTES NFR BLD: 8.7 % (ref 5–13)
NEUTS SEG # BLD: 4.29 K/UL (ref 1.8–8)
NEUTS SEG NFR BLD: 65.3 % (ref 32–75)
NRBC # BLD: 0 K/UL (ref 0–0.01)
NRBC BLD-RTO: 0 PER 100 WBC
PLATELET # BLD AUTO: 218 K/UL (ref 150–400)
PMV BLD AUTO: 12.9 FL (ref 8.9–12.9)
POTASSIUM SERPL-SCNC: 4.4 MMOL/L (ref 3.5–5.1)
RBC # BLD AUTO: 4.21 M/UL (ref 3.8–5.2)
SODIUM SERPL-SCNC: 136 MMOL/L (ref 136–145)
TIBC SERPL-MCNC: 350 UG/DL (ref 250–450)
WBC # BLD AUTO: 6.6 K/UL (ref 3.6–11)

## 2025-06-30 PROCEDURE — G8399 PT W/DXA RESULTS DOCUMENT: HCPCS

## 2025-06-30 PROCEDURE — 3079F DIAST BP 80-89 MM HG: CPT

## 2025-06-30 PROCEDURE — 1090F PRES/ABSN URINE INCON ASSESS: CPT

## 2025-06-30 PROCEDURE — G8417 CALC BMI ABV UP PARAM F/U: HCPCS

## 2025-06-30 PROCEDURE — 99214 OFFICE O/P EST MOD 30 MIN: CPT

## 2025-06-30 PROCEDURE — 3017F COLORECTAL CA SCREEN DOC REV: CPT

## 2025-06-30 PROCEDURE — 1159F MED LIST DOCD IN RCRD: CPT

## 2025-06-30 PROCEDURE — 1036F TOBACCO NON-USER: CPT

## 2025-06-30 PROCEDURE — 3075F SYST BP GE 130 - 139MM HG: CPT

## 2025-06-30 PROCEDURE — 1123F ACP DISCUSS/DSCN MKR DOCD: CPT

## 2025-06-30 PROCEDURE — G8427 DOCREV CUR MEDS BY ELIG CLIN: HCPCS

## 2025-06-30 PROCEDURE — 1126F AMNT PAIN NOTED NONE PRSNT: CPT

## 2025-06-30 RX ORDER — EPINEPHRINE 1 MG/ML
0.3 INJECTION, SOLUTION, CONCENTRATE INTRAVENOUS PRN
OUTPATIENT
Start: 2025-07-14

## 2025-06-30 RX ORDER — DIPHENHYDRAMINE HYDROCHLORIDE 50 MG/ML
50 INJECTION, SOLUTION INTRAMUSCULAR; INTRAVENOUS
OUTPATIENT
Start: 2025-07-14

## 2025-06-30 RX ORDER — HEPARIN 100 UNIT/ML
500 SYRINGE INTRAVENOUS PRN
OUTPATIENT
Start: 2025-07-14

## 2025-06-30 RX ORDER — ONDANSETRON 2 MG/ML
8 INJECTION INTRAMUSCULAR; INTRAVENOUS
OUTPATIENT
Start: 2025-07-14

## 2025-06-30 RX ORDER — ACETAMINOPHEN 325 MG/1
650 TABLET ORAL
OUTPATIENT
Start: 2025-07-14

## 2025-06-30 RX ORDER — SODIUM CHLORIDE 9 MG/ML
5-250 INJECTION, SOLUTION INTRAVENOUS PRN
OUTPATIENT
Start: 2025-07-14

## 2025-06-30 RX ORDER — SODIUM CHLORIDE 0.9 % (FLUSH) 0.9 %
5-40 SYRINGE (ML) INJECTION PRN
OUTPATIENT
Start: 2025-07-14

## 2025-06-30 RX ORDER — SODIUM CHLORIDE 9 MG/ML
INJECTION, SOLUTION INTRAVENOUS PRN
OUTPATIENT
Start: 2025-07-14

## 2025-06-30 RX ORDER — ALBUTEROL SULFATE 90 UG/1
4 INHALANT RESPIRATORY (INHALATION) PRN
OUTPATIENT
Start: 2025-07-14

## 2025-06-30 RX ORDER — HYDROCORTISONE SODIUM SUCCINATE 100 MG/2ML
100 INJECTION INTRAMUSCULAR; INTRAVENOUS
OUTPATIENT
Start: 2025-07-14

## 2025-06-30 NOTE — PROGRESS NOTES
[facility]  [address], [address] [facility] [address]  W: [phone]  F: [phone]    Reason for Visit:   Madeline Rocha is a 75 y.o. female with Stage 3A CKD, insomnia, HTN who is seen in consultation at the request of Dr. Ella Baker for evaluation of iron deficiency anemia.    History of Present Illness:   Madeline Rocha is a pleasant 75 y.o. female who presents today for evaluation of iron deficiency anemia.    Labs 3/2025: H/H 10.6/35.3, MCV 81, RDW high, iron 33, iron sat 9%, ferritin 8.   Long standing history of SHEYLA. She was first diagnosed after gastric bypass surgery in 2006.  Colonoscopy 9/2014 (Dr. Garner) with polyps removed. Repeat colonoscopy 6/2018 (Dr. Correa) with internal hemorrhoids and redundant colon.   She received a blood transfusion in 2008 after having a bleeding ulcer.   She has been on oral iron for ~20 years. She tried to increase to BID dosing but had constipation.     She presents today to establish care. For the last 6+ months, she notes fatigue. She wakes up feeling exhausted. She thought fatigue might be related to her cardiac condition. She endorses pica (ice cravings), BURTON, occasional heart palpitations and dizziness.    She had one nosebleed the other day, lasted a a few minutes. She otherwise denies abnormal bleeding. No blood in stool.     She tries to avoid NSAIDS but takes ibuprofen once a week for osteoarthritis to knees.   She eats red meat a couple times a week.     She has never received an IV iron infusion.    Family History:  Maternal aunt x2 - SHEYLA   Father - gastric and testicular cancer  Mother - colon cancer  Brother - cancer (unsure which kind)  No known family history of SHEYLA or hematologic conditions.      Social History:  Lives with   Had 4 children, lost her daughter in a car accident. 2 grandsons (26, 14)  Originally from [facility]   Quit smoking +20 years  Denies use of tobacco, alcohol or illicit drugs.     Review

## 2025-06-30 NOTE — PROGRESS NOTES
Chief Complaint   Patient presents with    Breast Cancer    Follow-up        /82   Pulse 62   Temp 98 °F (36.7 °C) (Oral)   Resp 14   Ht 1.651 m (5' 5\")   Wt 105.7 kg (233 lb)   SpO2 95%   BMI 38.77 kg/m²      1. Have you been to the ER, urgent care clinic since your last visit?  Hospitalized since your last visit?No    2. Have you seen or consulted any other health care providers outside of the [facility] since your last visit?  Include any pap smears or colon screening. No

## 2025-07-01 ENCOUNTER — TELEPHONE (OUTPATIENT)
Age: 75
End: 2025-07-01

## 2025-07-14 ENCOUNTER — HOSPITAL ENCOUNTER (OUTPATIENT)
Facility: HOSPITAL | Age: 75
Setting detail: INFUSION SERIES
Discharge: HOME | End: 2025-07-14
Payer: MEDICARE

## 2025-07-14 VITALS
DIASTOLIC BLOOD PRESSURE: 64 MMHG | SYSTOLIC BLOOD PRESSURE: 126 MMHG | RESPIRATION RATE: 18 BRPM | OXYGEN SATURATION: 97 % | WEIGHT: 231.26 LBS | BODY MASS INDEX: 38.53 KG/M2 | TEMPERATURE: 97.8 F | HEART RATE: 54 BPM | HEIGHT: 65 IN

## 2025-07-14 DIAGNOSIS — D50.9 IRON DEFICIENCY ANEMIA, UNSPECIFIED IRON DEFICIENCY ANEMIA TYPE: Primary | ICD-10-CM

## 2025-07-14 PROCEDURE — 2580000003 HC RX 258

## 2025-07-14 PROCEDURE — 6360000002 HC RX W HCPCS

## 2025-07-14 PROCEDURE — 96365 THER/PROPH/DIAG IV INF INIT: CPT

## 2025-07-14 RX ORDER — HYDROCORTISONE SODIUM SUCCINATE 100 MG/2ML
100 INJECTION INTRAMUSCULAR; INTRAVENOUS
Status: CANCELLED | OUTPATIENT
Start: 2025-07-21

## 2025-07-14 RX ORDER — DIPHENHYDRAMINE HYDROCHLORIDE 50 MG/ML
50 INJECTION, SOLUTION INTRAMUSCULAR; INTRAVENOUS
Status: CANCELLED | OUTPATIENT
Start: 2025-07-21

## 2025-07-14 RX ORDER — SODIUM CHLORIDE 0.9 % (FLUSH) 0.9 %
5-40 SYRINGE (ML) INJECTION PRN
Status: CANCELLED | OUTPATIENT
Start: 2025-07-21

## 2025-07-14 RX ORDER — SODIUM CHLORIDE 9 MG/ML
5-250 INJECTION, SOLUTION INTRAVENOUS PRN
Status: CANCELLED | OUTPATIENT
Start: 2025-07-21

## 2025-07-14 RX ORDER — EPINEPHRINE 1 MG/ML
0.3 INJECTION, SOLUTION INTRAMUSCULAR; SUBCUTANEOUS PRN
Status: CANCELLED | OUTPATIENT
Start: 2025-07-21

## 2025-07-14 RX ORDER — ACETAMINOPHEN 325 MG/1
650 TABLET ORAL
Status: CANCELLED | OUTPATIENT
Start: 2025-07-21

## 2025-07-14 RX ORDER — SODIUM CHLORIDE 9 MG/ML
5-250 INJECTION, SOLUTION INTRAVENOUS PRN
Status: DISCONTINUED | OUTPATIENT
Start: 2025-07-14 | End: 2025-07-15 | Stop reason: HOSPADM

## 2025-07-14 RX ORDER — ALBUTEROL SULFATE 90 UG/1
4 INHALANT RESPIRATORY (INHALATION) PRN
Status: CANCELLED | OUTPATIENT
Start: 2025-07-21

## 2025-07-14 RX ORDER — ONDANSETRON 2 MG/ML
8 INJECTION INTRAMUSCULAR; INTRAVENOUS
Status: CANCELLED | OUTPATIENT
Start: 2025-07-21

## 2025-07-14 RX ORDER — HEPARIN 100 UNIT/ML
500 SYRINGE INTRAVENOUS PRN
Status: CANCELLED | OUTPATIENT
Start: 2025-07-21

## 2025-07-14 RX ORDER — SODIUM CHLORIDE 0.9 % (FLUSH) 0.9 %
5-40 SYRINGE (ML) INJECTION PRN
Status: DISCONTINUED | OUTPATIENT
Start: 2025-07-14 | End: 2025-07-15 | Stop reason: HOSPADM

## 2025-07-14 RX ORDER — SODIUM CHLORIDE 9 MG/ML
INJECTION, SOLUTION INTRAVENOUS PRN
Status: CANCELLED | OUTPATIENT
Start: 2025-07-21

## 2025-07-14 RX ADMIN — SODIUM CHLORIDE 150 ML/HR: 0.9 INJECTION, SOLUTION INTRAVENOUS at 13:15

## 2025-07-14 RX ADMIN — FERRIC CARBOXYMALTOSE INJECTION 750 MG: 50 INJECTION, SOLUTION INTRAVENOUS at 12:52

## 2025-07-14 ASSESSMENT — PAIN DESCRIPTION - DESCRIPTORS: DESCRIPTORS: ACHING

## 2025-07-14 ASSESSMENT — PAIN DESCRIPTION - PAIN TYPE: TYPE: CHRONIC PAIN

## 2025-07-14 ASSESSMENT — PAIN DESCRIPTION - ORIENTATION: ORIENTATION: RIGHT

## 2025-07-14 ASSESSMENT — PAIN DESCRIPTION - LOCATION: LOCATION: KNEE

## 2025-07-14 ASSESSMENT — PAIN SCALES - GENERAL: PAINLEVEL_OUTOF10: 4

## 2025-07-14 NOTE — PROGRESS NOTES
[facility] Peds/Adult Note                     Date: 2025    Name: Madeline Rocha    MRN: [id number]         : 1950    1145  Patient arrives for Injectofer infusion without acute problems. Please see Epic for complete assessment and education provided.    Vital signs stable throughout and prior to discharge. Patient tolerated procedure well and was discharged without incident.  Patient is aware of next [facility] appointment on 2025. Appointment card give to the Patient.       Ms. Rocha's vitals were reviewed prior to and after treatment.   Patient Vitals for the past 12 hrs:   Temp Pulse Resp BP SpO2   25 1345 97.8 °F (36.6 °C) 54 18 126/64 --   25 1315 -- 55 18 133/67 --   25 1145 97.7 °F (36.5 °C) 58 16 128/88 97 %       Lab results were obtained and reviewed.  No results found for this or any previous visit (from the past 12 hours).    Medications given:   Medications Administered         0.9 % sodium chloride infusion Admin Date  2025 Action  New Bag Dose  150 mL/hr Rate  150 mL/hr Route  IntraVENous Documented By  Natalia Grey, RN        ferric carboxymaltose (INJECTAFER) 750 mg in sodium chloride 0.9 % 250 mL IVPB Admin Date  2025 Action  New Bag Dose  750 mg Rate  870 mL/hr Route  IntraVENous Documented By  Betty Heart, RN            Ms. Rocha tolerated the infusion, and had no complaints.    [facility].     Future Appointments   Date Time Provider Department Center   2025  3:00 PM MARY SO CHAIR 8 [facility]   2025 11:40 AM Nuria Martino MD [facility] AMB   2025 11:00 AM Coretta Sanders APRN - CNP [facility] BS AMB   10/3/2025 12:50 PM [facility] 5 [facility]   2025 11:10 AM Ella Baker MD [facility] DEP   2026 10:20 AM DANY ANDINO BS AMB   2026 10:40 AM Dwayne Walker MD [facility] AMB Melanie Swanson, RN  ,

## 2025-07-18 DIAGNOSIS — E78.2 MIXED HYPERLIPIDEMIA: ICD-10-CM

## 2025-07-18 RX ORDER — APIXABAN 5 MG/1
5 TABLET, FILM COATED ORAL 2 TIMES DAILY
Qty: 60 TABLET | Refills: 11 | Status: SHIPPED | OUTPATIENT
Start: 2025-07-18

## 2025-07-18 RX ORDER — BEMPEDOIC ACID 180 MG/1
1 TABLET, FILM COATED ORAL DAILY
Qty: 30 TABLET | Refills: 5 | Status: SHIPPED | OUTPATIENT
Start: 2025-07-18

## 2025-07-18 NOTE — TELEPHONE ENCOUNTER
Per verbal order from Dr. Nuria Rodriguez  Last appt: 2/20/2025     Future Appointments   Date Time Provider Department Center   7/21/2025  3:00 PM MARY SO CHAIR 16 [facility]   8/21/2025 11:40 AM Nuria Rodriguez, MD [facility] AMB   9/30/2025 11:00 AM Coretta aSnders, APRN - CNP [facility] BS AMB   10/3/2025 12:50 PM [facility] 5 [facility]   12/23/2025 11:10 AM Ella Baker MD [facility] DEP   1/6/2026 10:20 AM [id number] AMB   1/6/2026 10:40 AM Dwayne Walker MD [facility] AMB       Requested Prescriptions     Signed Prescriptions Disp Refills    apixaban (ELIQUIS) 5 MG TABS tablet 60 tablet 11     Sig: TAKE 1 TABLET BY MOUTH TWICE A DAY     Authorizing Provider: NURIA RODRIGUEZ     Ordering User: ADONIS PERKINS

## 2025-07-21 ENCOUNTER — HOSPITAL ENCOUNTER (OUTPATIENT)
Facility: HOSPITAL | Age: 75
Setting detail: INFUSION SERIES
Discharge: HOME | End: 2025-07-21
Payer: MEDICARE

## 2025-07-21 VITALS
TEMPERATURE: 97.8 F | OXYGEN SATURATION: 96 % | HEART RATE: 68 BPM | SYSTOLIC BLOOD PRESSURE: 118 MMHG | DIASTOLIC BLOOD PRESSURE: 63 MMHG | RESPIRATION RATE: 18 BRPM

## 2025-07-21 DIAGNOSIS — D50.9 IRON DEFICIENCY ANEMIA, UNSPECIFIED IRON DEFICIENCY ANEMIA TYPE: Primary | ICD-10-CM

## 2025-07-21 PROCEDURE — 2580000003 HC RX 258

## 2025-07-21 PROCEDURE — 96365 THER/PROPH/DIAG IV INF INIT: CPT

## 2025-07-21 PROCEDURE — 6360000002 HC RX W HCPCS

## 2025-07-21 RX ORDER — EPINEPHRINE 1 MG/ML
0.3 INJECTION, SOLUTION INTRAMUSCULAR; SUBCUTANEOUS PRN
OUTPATIENT
Start: 2025-07-21

## 2025-07-21 RX ORDER — DIPHENHYDRAMINE HYDROCHLORIDE 50 MG/ML
50 INJECTION, SOLUTION INTRAMUSCULAR; INTRAVENOUS
OUTPATIENT
Start: 2025-07-21

## 2025-07-21 RX ORDER — ONDANSETRON 2 MG/ML
8 INJECTION INTRAMUSCULAR; INTRAVENOUS
OUTPATIENT
Start: 2025-07-21

## 2025-07-21 RX ORDER — ALBUTEROL SULFATE 90 UG/1
4 INHALANT RESPIRATORY (INHALATION) PRN
OUTPATIENT
Start: 2025-07-21

## 2025-07-21 RX ORDER — ACETAMINOPHEN 325 MG/1
650 TABLET ORAL
OUTPATIENT
Start: 2025-07-21

## 2025-07-21 RX ORDER — SODIUM CHLORIDE 9 MG/ML
5-250 INJECTION, SOLUTION INTRAVENOUS PRN
Status: DISCONTINUED | OUTPATIENT
Start: 2025-07-21 | End: 2025-07-22 | Stop reason: HOSPADM

## 2025-07-21 RX ORDER — HEPARIN 100 UNIT/ML
500 SYRINGE INTRAVENOUS PRN
OUTPATIENT
Start: 2025-07-21

## 2025-07-21 RX ORDER — SODIUM CHLORIDE 9 MG/ML
5-250 INJECTION, SOLUTION INTRAVENOUS PRN
OUTPATIENT
Start: 2025-07-21

## 2025-07-21 RX ORDER — SODIUM CHLORIDE 9 MG/ML
INJECTION, SOLUTION INTRAVENOUS PRN
OUTPATIENT
Start: 2025-07-21

## 2025-07-21 RX ORDER — SODIUM CHLORIDE 9 MG/ML
5-250 INJECTION, SOLUTION INTRAVENOUS PRN
Status: CANCELLED | OUTPATIENT
Start: 2025-07-21

## 2025-07-21 RX ORDER — SODIUM CHLORIDE 0.9 % (FLUSH) 0.9 %
5-40 SYRINGE (ML) INJECTION PRN
OUTPATIENT
Start: 2025-07-21

## 2025-07-21 RX ORDER — HYDROCORTISONE SODIUM SUCCINATE 100 MG/2ML
100 INJECTION INTRAMUSCULAR; INTRAVENOUS
OUTPATIENT
Start: 2025-07-21

## 2025-07-21 RX ADMIN — FERRIC CARBOXYMALTOSE INJECTION 750 MG: 50 INJECTION, SOLUTION INTRAVENOUS at 15:15

## 2025-07-21 ASSESSMENT — PAIN SCALES - GENERAL: PAINLEVEL_OUTOF10: 4

## 2025-07-21 ASSESSMENT — PAIN DESCRIPTION - DESCRIPTORS: DESCRIPTORS: ACHING

## 2025-07-21 ASSESSMENT — PAIN DESCRIPTION - PAIN TYPE: TYPE: CHRONIC PAIN

## 2025-07-21 ASSESSMENT — PAIN DESCRIPTION - LOCATION: LOCATION: KNEE

## 2025-07-21 ASSESSMENT — PAIN DESCRIPTION - ORIENTATION: ORIENTATION: RIGHT

## 2025-07-21 NOTE — PROGRESS NOTES
[facility] Short Note                   Date: 2025    Name: Madeline Rocha    MRN: [id number]         : 1950      [facility] 2/2 ambulatory in stable condition. Assessment completed and documented in flowsheets. No acute concerns at this time.    Ms. Rocha's vitals were reviewed  Patient Vitals for the past 12 hrs:   Temp Pulse Resp BP SpO2   25 1443 97.8 °F (36.6 °C) 76 20 (!) 153/78 96 %     Medications given: via PIV in R AC  Medications Administered         ferric carboxymaltose (INJECTAFER) 750 mg in sodium chloride 0.9 % 250 mL IVPB Admin Date  2025 Action  New Bag Dose  750 mg Rate  870 mL/hr Route  IntraVENous Documented By  Sheryl Hyde, JOSE ROBERTO          PIV placed with positive blood return. PIV was flushed and removed per protocol prior to discharge.    Ms. Rocha tolerated the infusion and had no complaints.    [facility]. R[facility] condition and is aware of future appointments.     Future Appointments   Date Time Provider Department Center   2025 11:40 AM Nuria Martino MD [facility] AM[facility]   2025 11:00 AM [facility]   10/3/2025 12:50 PM [facility] MELIN[facility] 5 [facility]   2025 11:10 AM Ella Baker MD [facility] DEP   2026 10:20 AM DANY ANDINO BS AMB   2026 10:40 AM Dwayne Walker MD [facility] AMB       Sheryl Hyde, RN  2025  3:20 PM

## 2025-08-21 ENCOUNTER — OFFICE VISIT (OUTPATIENT)
Age: 75
End: 2025-08-21
Payer: MEDICARE

## 2025-08-21 VITALS
WEIGHT: 223 LBS | SYSTOLIC BLOOD PRESSURE: 120 MMHG | HEART RATE: 60 BPM | RESPIRATION RATE: 16 BRPM | OXYGEN SATURATION: 98 % | DIASTOLIC BLOOD PRESSURE: 60 MMHG | HEIGHT: 65 IN | BODY MASS INDEX: 37.15 KG/M2

## 2025-08-21 DIAGNOSIS — I50.22 CHRONIC SYSTOLIC CHF (CONGESTIVE HEART FAILURE), NYHA CLASS 2 (HCC): Primary | ICD-10-CM

## 2025-08-21 DIAGNOSIS — I25.10 CORONARY ARTERY DISEASE INVOLVING NATIVE CORONARY ARTERY OF NATIVE HEART WITHOUT ANGINA PECTORIS: ICD-10-CM

## 2025-08-21 DIAGNOSIS — I25.5 ISCHEMIC CARDIOMYOPATHY: ICD-10-CM

## 2025-08-21 DIAGNOSIS — R06.02 SHORTNESS OF BREATH: ICD-10-CM

## 2025-08-21 DIAGNOSIS — I48.19 PERSISTENT ATRIAL FIBRILLATION (HCC): ICD-10-CM

## 2025-08-21 DIAGNOSIS — I47.20 VT (VENTRICULAR TACHYCARDIA) (HCC): ICD-10-CM

## 2025-08-21 PROCEDURE — 3078F DIAST BP <80 MM HG: CPT | Performed by: INTERNAL MEDICINE

## 2025-08-21 PROCEDURE — 3074F SYST BP LT 130 MM HG: CPT | Performed by: INTERNAL MEDICINE

## 2025-08-21 PROCEDURE — 1126F AMNT PAIN NOTED NONE PRSNT: CPT | Performed by: INTERNAL MEDICINE

## 2025-08-21 PROCEDURE — 99214 OFFICE O/P EST MOD 30 MIN: CPT | Performed by: INTERNAL MEDICINE

## 2025-08-21 PROCEDURE — 1159F MED LIST DOCD IN RCRD: CPT | Performed by: INTERNAL MEDICINE

## 2025-08-21 PROCEDURE — 1090F PRES/ABSN URINE INCON ASSESS: CPT | Performed by: INTERNAL MEDICINE

## 2025-08-21 PROCEDURE — 1036F TOBACCO NON-USER: CPT | Performed by: INTERNAL MEDICINE

## 2025-08-21 PROCEDURE — G8427 DOCREV CUR MEDS BY ELIG CLIN: HCPCS | Performed by: INTERNAL MEDICINE

## 2025-08-21 PROCEDURE — 3017F COLORECTAL CA SCREEN DOC REV: CPT | Performed by: INTERNAL MEDICINE

## 2025-08-21 PROCEDURE — G8417 CALC BMI ABV UP PARAM F/U: HCPCS | Performed by: INTERNAL MEDICINE

## 2025-08-21 PROCEDURE — G8399 PT W/DXA RESULTS DOCUMENT: HCPCS | Performed by: INTERNAL MEDICINE

## 2025-08-21 PROCEDURE — 1123F ACP DISCUSS/DSCN MKR DOCD: CPT | Performed by: INTERNAL MEDICINE

## 2025-08-21 RX ORDER — SPIRONOLACTONE 25 MG/1
25 TABLET ORAL DAILY
Qty: 90 TABLET | Refills: 3 | Status: SHIPPED | OUTPATIENT
Start: 2025-08-21

## 2025-08-21 ASSESSMENT — PATIENT HEALTH QUESTIONNAIRE - PHQ9
10. IF YOU CHECKED OFF ANY PROBLEMS, HOW DIFFICULT HAVE THESE PROBLEMS MADE IT FOR YOU TO DO YOUR WORK, TAKE CARE OF THINGS AT HOME, OR GET ALONG WITH OTHER PEOPLE: NOT DIFFICULT AT ALL
2. FEELING DOWN, DEPRESSED OR HOPELESS: NOT AT ALL
4. FEELING TIRED OR HAVING LITTLE ENERGY: NOT AT ALL
3. TROUBLE FALLING OR STAYING ASLEEP: NOT AT ALL
6. FEELING BAD ABOUT YOURSELF - OR THAT YOU ARE A FAILURE OR HAVE LET YOURSELF OR YOUR FAMILY DOWN: NOT AT ALL
SUM OF ALL RESPONSES TO PHQ QUESTIONS 1-9: 0
8. MOVING OR SPEAKING SO SLOWLY THAT OTHER PEOPLE COULD HAVE NOTICED. OR THE OPPOSITE, BEING SO FIGETY OR RESTLESS THAT YOU HAVE BEEN MOVING AROUND A LOT MORE THAN USUAL: NOT AT ALL
1. LITTLE INTEREST OR PLEASURE IN DOING THINGS: NOT AT ALL
SUM OF ALL RESPONSES TO PHQ QUESTIONS 1-9: 0
5. POOR APPETITE OR OVEREATING: NOT AT ALL
9. THOUGHTS THAT YOU WOULD BE BETTER OFF DEAD, OR OF HURTING YOURSELF: NOT AT ALL
7. TROUBLE CONCENTRATING ON THINGS, SUCH AS READING THE NEWSPAPER OR WATCHING TELEVISION: NOT AT ALL

## (undated) DEVICE — Device: Brand: BALLOON3

## (undated) DEVICE — PREP SKN CHLRAPRP APL 26ML STR --

## (undated) DEVICE — KIT COAX UMBILICAL FOR N20 --

## (undated) DEVICE — 3M™ TEGADERM™ TRANSPARENT FILM DRESSING FRAME STYLE, 1626W, 4 IN X 4-3/4 IN (10 CM X 12 CM), 50/CT 4CT/CASE: Brand: 3M™ TEGADERM™

## (undated) DEVICE — DRAPE,CHEST,FENES,15X10,STERIL: Brand: MEDLINE

## (undated) DEVICE — KENDALL RADIOLUCENT FOAM MONITORING ELECTRODE RECTANGULAR SHAPE: Brand: KENDALL

## (undated) DEVICE — NEEDLE HYPO 18GA L1.5IN PNK S STL HUB POLYPR SHLD REG BVL

## (undated) DEVICE — HI-TORQUE PILOT 200 GUIDE WIRE .014 STRAIGHT TIP 3.0 CM X 190 CM: Brand: HI-TORQUE PILOT

## (undated) DEVICE — NEONATAL-ADULT SPO2 SENSOR: Brand: NELLCOR

## (undated) DEVICE — MUI SCIENTIFIC BALLOON

## (undated) DEVICE — KIT MED IMAG CNTRST AGNT W/ IOPAMIDOL REUSE

## (undated) DEVICE — Z DISCONTINUED PER MEDLINE LINE GAS SAMPLING O2/CO2 LNG AD 13 FT NSL W/ TBNG FILTERLINE

## (undated) DEVICE — Device

## (undated) DEVICE — SYR 10ML LUER LOK 1/5ML GRAD --

## (undated) DEVICE — CATH GUID COR EB35 6FR 100CM -- LAUNCHER

## (undated) DEVICE — SUTURE MCRYL SZ 4-0 L27IN ABSRB UD L19MM PS-2 1/2 CIR PRIM Y426H

## (undated) DEVICE — CABLE CATH CONN 10 PIN DISP -- ACHIEVE ADVANCE

## (undated) DEVICE — Device: Brand: ASAHI SION BLACK

## (undated) DEVICE — FORCEPS BX L240CM JAW DIA2.8MM L CAP W/ NDL MIC MESH TOOTH

## (undated) DEVICE — ANGIOGRAPHY KIT

## (undated) DEVICE — KIT CATH ELECTROPHYSIOLOGY SURFACE ELECTRODE ENSITE X PATCH

## (undated) DEVICE — GLOVE ORANGE PI 7 1/2   MSG9075

## (undated) DEVICE — TOWEL 4 PLY TISS 19X30 SUE WHT

## (undated) DEVICE — PACK PROC PACEMAKER  LF

## (undated) DEVICE — MEDI-TRACE CADENCE ADULT, DEFIBRILLATION ELECTRODE -RTS  (10 PR/PK) - PHYSIO-CONTROL: Brand: MEDI-TRACE CADENCE

## (undated) DEVICE — SHTH STEERABLE FLEXCATH 12 FR --

## (undated) DEVICE — GOWN,NON-REINFORCED,XXL: Brand: MEDLINE

## (undated) DEVICE — MEDI-VAC YANK SUCT HNDL W/TPRD BULBOUS TIP: Brand: CARDINAL HEALTH

## (undated) DEVICE — INTRO SHTH WO/GDWIRE 11FRX23CM -- BRITE TIP - ORDER AS EA

## (undated) DEVICE — GUIDEWIRE VASC L260CM DIA0.035IN TIP L3MM STD EXCHG PTFE J

## (undated) DEVICE — HEART CATH-MRMC: Brand: MEDLINE INDUSTRIES, INC.

## (undated) DEVICE — ELECTRODE,RADIOTRANSLUCENT,FOAM,5PK: Brand: MEDLINE

## (undated) DEVICE — 1 X VERSACROSS STEERABLE SHEATH (INCLUDING  1 X DILATOR AND 1 X J-TIP GUIDEWIRE); 1 X VERSACROSS RF WIRE (INCLUDING 1 X CONNECTOR CABLE (SINGLE USE)); 1 X DISPERSIVE ELECTRODE: Brand: VERSACROSS STEERABLE ACCESS SOLUTION

## (undated) DEVICE — ADHESIVE SKIN CLOSURE HI VISC MIC 0.5 CC PREMIERPRO EXOFIN

## (undated) DEVICE — COPILOT BLEEDBACK CONTROL VALVE: Brand: COPILOT

## (undated) DEVICE — 1200 GUARD II KIT W/5MM TUBE W/O VAC TUBE: Brand: GUARDIAN

## (undated) DEVICE — SYRINGE 50ML E/T

## (undated) DEVICE — BASIN ST MAJOR-NO CAUTERY: Brand: MEDLINE INDUSTRIES, INC.

## (undated) DEVICE — CATH IV AUTOGRD BC PNK 20GA 25 -- INSYTE

## (undated) DEVICE — INTENDED FOR TISSUE SEPARATION, AND OTHER PROCEDURES THAT REQUIRE A SHARP SURGICAL BLADE TO PUNCTURE OR CUT.: Brand: BARD-PARKER ®  SAFETY SCALPED

## (undated) DEVICE — CONTAINER SPEC 20 ML LID NEUT BUFF FORMALIN 10 % POLYPR STS

## (undated) DEVICE — BAG SPEC BIOHZRD 10 X 10 IN --

## (undated) DEVICE — CATHETER EP ADOL AD 9FR L90CM TEMP SGL HND SELF LOK 4 W TIP

## (undated) DEVICE — REM POLYHESIVE ADULT PATIENT RETURN ELECTRODE: Brand: VALLEYLAB

## (undated) DEVICE — PINNACLE INTRODUCER SHEATH: Brand: PINNACLE

## (undated) DEVICE — SOLIDIFIER MEDC 1200ML -- CONVERT TO 356117

## (undated) DEVICE — INTENDED FOR TISSUE SEPARATION, AND OTHER PROCEDURES THAT REQUIRE A SHARP SURGICAL BLADE TO PUNCTURE OR CUT.: Brand: BARD-PARKER ® CARBON RIB-BACK BLADES

## (undated) DEVICE — CLIP INT L235CM WRK CHAN DIA2.8MM OPN 11MM LCK MECHANISM MR

## (undated) DEVICE — VASCULAR CLOSURE SUTURE PERCLOSE

## (undated) DEVICE — BASIN EMESIS 500CC ROSE 250/CS 60/PLT: Brand: MEDEGEN MEDICAL PRODUCTS, LLC

## (undated) DEVICE — GLIDESHEATH SLENDER STAINLESS STEEL KIT: Brand: GLIDESHEATH SLENDER

## (undated) DEVICE — CARDIAC CATH LAB PACK LF

## (undated) DEVICE — TOWEL SURG W17XL27IN STD BLU COT NONFENESTRATED PREWASHED

## (undated) DEVICE — BASIN EMSIS 16OZ GRAPHITE PLAS KID SHP MOLD GRAD FOR ORAL

## (undated) DEVICE — SET ADMIN 16ML TBNG L100IN 2 Y INJ SITE IV PIGGY BK DISP

## (undated) DEVICE — CATHETER REPROC ELECTROPHYSIOLOGY 2-5-2 MM BAR201101R

## (undated) DEVICE — AGENT HEMSTAT 3GM PURIFIED PLNT STARCH PWD ABSRB ARISTA AH

## (undated) DEVICE — TOWEL,OR,DSP,ST,BLUE,STD,4/PK,20PK/CS: Brand: MEDLINE

## (undated) DEVICE — SUTURE VCRL SZ 3-0 L27IN ABSRB UD L26MM SH 1/2 CIR J416H

## (undated) DEVICE — MTS LEFT HEART KIT ST MARY'S RICHMOND: Brand: NAMIC

## (undated) DEVICE — KIT MFLD ISOLATN NACL CNTRST PRT TBNG SPIK W/ PRSS TRNSDUC

## (undated) DEVICE — SPECIAL PROCEDURE DRAPE 32" X 34": Brand: SPECIAL PROCEDURE DRAPE

## (undated) DEVICE — SUTURE ETHBND EXCEL SZ 2 L30IN NONABSORBABLE GRN L40MM V-37 MX69G

## (undated) DEVICE — SYR 3ML LL TIP 1/10ML GRAD --

## (undated) DEVICE — PADPRO DEFIBRILLATION/PACING/CARDIOVERSION/MONITORING ELECTRODES, ADULT/CHILD GREATER THAN 10 KG RADIOTRANSPARENT ELECTRODE, PHYSIO-CONTROL QUIK-COMBO (M) 60" (152 CM): Brand: PADPRO

## (undated) DEVICE — CUSTOM KT PTCA INFL DEV K05 00052M

## (undated) DEVICE — SPLINT WR POS F/ARTERIAL ACC -- BX/10

## (undated) DEVICE — CUFF BLD PRSS AD SZ 11 25-34CM LNG SFT 2 TB W/ M SCR CONN

## (undated) DEVICE — SOLUTION IRRIG 1000ML 0.9% SOD CHL USP POUR PLAS BTL

## (undated) DEVICE — KIT HND CTRL 3 W STPCOCK ROT END 54IN PREM HI PRSS TBNG AT

## (undated) DEVICE — CATHETER CRYOABLATION 28 MM ARCTIC FRONT ADV

## (undated) DEVICE — SMOKE EVACUATION TUBING WITH 8 IN INTEGRAL WAND AND SPONGE GUARD: Brand: BUFFALO FILTER

## (undated) DEVICE — DRESSING ANTIMIC FOAM OPTIFOAM POSTOP ADH 4 X 6 IN

## (undated) DEVICE — GARMENT,MEDLINE,DVT,INT,CALF,MED, GEN2: Brand: MEDLINE

## (undated) DEVICE — ANGIOGRAPHIC CATHETER: Brand: IMPULSE™

## (undated) DEVICE — STOPCOCK IV 4 W TRNSPAR

## (undated) DEVICE — DRAPE,REIN 53X77,STERILE: Brand: MEDLINE

## (undated) DEVICE — SOLUTION IRRIG 1000ML H2O STRL BLT

## (undated) DEVICE — PAD GROUNDING ADLT ADH FOIL 9FT CORD UNIV

## (undated) DEVICE — STERILE POLYISOPRENE POWDER-FREE SURGICAL GLOVES: Brand: PROTEXIS

## (undated) DEVICE — WASTE KIT - ST MARY: Brand: MEDLINE INDUSTRIES, INC.

## (undated) DEVICE — PLASMABLADE PS210-030S 3.0S LOCK: Brand: PLASMABLADE™

## (undated) DEVICE — PACK PROCEDURE SURG HRT CATH

## (undated) DEVICE — Device: Brand: SINGLE USE ASPIRATION NEEDLE

## (undated) DEVICE — PENCIL SMK EVAC L10FT DIA95MM TBNG NONSTICK W ADPT TO 22MM

## (undated) DEVICE — HANDLE LT SNAP ON ULT DURABLE LENS FOR TRUMPF ALC DISPOSABLE

## (undated) DEVICE — SUTURE V-LOC 180 SZ 2-0 L9IN ABSRB VLT GS-21 L37MM 1/2 CIR VLOCM0345

## (undated) DEVICE — HYPODERMIC SAFETY NEEDLE: Brand: MAGELLAN

## (undated) DEVICE — CATHETER REPROC STEERABLE 115CM 6FR

## (undated) DEVICE — SHEATH CATH ANORECT MNOMTR

## (undated) DEVICE — CATH IV AUTOGRD BC BLU 22GA 25 -- INSYTE

## (undated) DEVICE — SUT SLK 2-0SH 30IN BLK --

## (undated) DEVICE — BLOCK BITE ENDOSCP AD 21 MM W/ DIL BLU LF DISP

## (undated) DEVICE — PRESSURE MONITORING SET: Brand: TRUWAVE

## (undated) DEVICE — SUTURE DEV SZ 3-0 V-LOC 90 L12IN TO L18IN CV-23 VLT VLOCM0844

## (undated) DEVICE — TR BAND RADIAL ARTERY COMPRESSION DEVICE: Brand: TR BAND

## (undated) DEVICE — PACK,BASIC,SIRUS,V: Brand: MEDLINE